# Patient Record
Sex: FEMALE | Race: WHITE | NOT HISPANIC OR LATINO | Employment: OTHER | URBAN - NONMETROPOLITAN AREA
[De-identification: names, ages, dates, MRNs, and addresses within clinical notes are randomized per-mention and may not be internally consistent; named-entity substitution may affect disease eponyms.]

---

## 2021-07-21 ENCOUNTER — HOSPITAL ENCOUNTER (EMERGENCY)
Facility: HOSPITAL | Age: 66
Discharge: HOME | End: 2021-07-21
Attending: EMERGENCY MEDICINE
Payer: MEDICARE

## 2021-07-21 VITALS
HEART RATE: 82 BPM | BODY MASS INDEX: 23.38 KG/M2 | WEIGHT: 111.38 LBS | TEMPERATURE: 98 F | SYSTOLIC BLOOD PRESSURE: 129 MMHG | RESPIRATION RATE: 20 BRPM | OXYGEN SATURATION: 100 % | DIASTOLIC BLOOD PRESSURE: 71 MMHG | HEIGHT: 58 IN

## 2021-07-21 DIAGNOSIS — R68.83 CHILLS: ICD-10-CM

## 2021-07-21 DIAGNOSIS — Z53.29 LEFT AGAINST MEDICAL ADVICE: Primary | ICD-10-CM

## 2021-07-21 PROCEDURE — 99281 EMR DPT VST MAYX REQ PHY/QHP: CPT

## 2023-01-31 ENCOUNTER — HOSPITAL ENCOUNTER (EMERGENCY)
Facility: HOSPITAL | Age: 68
Discharge: HOME | End: 2023-01-31
Attending: EMERGENCY MEDICINE
Payer: MEDICARE

## 2023-01-31 VITALS
HEART RATE: 94 BPM | DIASTOLIC BLOOD PRESSURE: 72 MMHG | TEMPERATURE: 98 F | BODY MASS INDEX: 24.14 KG/M2 | RESPIRATION RATE: 18 BRPM | OXYGEN SATURATION: 94 % | WEIGHT: 115 LBS | SYSTOLIC BLOOD PRESSURE: 144 MMHG | HEIGHT: 58 IN

## 2023-01-31 DIAGNOSIS — J44.9 COPD (CHRONIC OBSTRUCTIVE PULMONARY DISEASE): ICD-10-CM

## 2023-01-31 DIAGNOSIS — F11.93 OPIATE WITHDRAWAL: Primary | ICD-10-CM

## 2023-01-31 DIAGNOSIS — R05.9 COUGH: ICD-10-CM

## 2023-01-31 DIAGNOSIS — M54.2 CERVICALGIA: ICD-10-CM

## 2023-01-31 LAB
ALBUMIN SERPL BCP-MCNC: 3.7 G/DL (ref 3.5–5.2)
ALP SERPL-CCNC: 49 U/L (ref 55–135)
ALT SERPL W/O P-5'-P-CCNC: 30 U/L (ref 10–44)
AMPHET+METHAMPHET UR QL: NEGATIVE
ANION GAP SERPL CALC-SCNC: 4 MMOL/L (ref 8–16)
AST SERPL-CCNC: 34 U/L (ref 10–40)
BACTERIA #/AREA URNS HPF: NEGATIVE /HPF
BARBITURATES UR QL SCN>200 NG/ML: NEGATIVE
BASOPHILS # BLD AUTO: 0.09 K/UL (ref 0–0.2)
BASOPHILS NFR BLD: 0.9 % (ref 0–1.9)
BENZODIAZ UR QL SCN>200 NG/ML: ABNORMAL
BILIRUB SERPL-MCNC: 0.2 MG/DL (ref 0.1–1)
BILIRUB UR QL STRIP: NEGATIVE
BUN SERPL-MCNC: 22 MG/DL (ref 8–23)
BZE UR QL SCN: NEGATIVE
CALCIUM SERPL-MCNC: 9.8 MG/DL (ref 8.7–10.5)
CANNABINOIDS UR QL SCN: NEGATIVE
CHLORIDE SERPL-SCNC: 111 MMOL/L (ref 95–110)
CLARITY UR: CLEAR
CO2 SERPL-SCNC: 28 MMOL/L (ref 23–29)
COLOR UR: YELLOW
CREAT SERPL-MCNC: 1.2 MG/DL (ref 0.5–1.4)
CREAT UR-MCNC: 300 MG/DL (ref 15–325)
DIFFERENTIAL METHOD: NORMAL
EOSINOPHIL # BLD AUTO: 0.3 K/UL (ref 0–0.5)
EOSINOPHIL NFR BLD: 2.7 % (ref 0–8)
ERYTHROCYTE [DISTWIDTH] IN BLOOD BY AUTOMATED COUNT: 13.7 % (ref 11.5–14.5)
EST. GFR  (NO RACE VARIABLE): 49 ML/MIN/1.73 M^2
GLUCOSE SERPL-MCNC: 106 MG/DL (ref 70–110)
GLUCOSE UR QL STRIP: NEGATIVE
HCT VFR BLD AUTO: 39.2 % (ref 37–48.5)
HGB BLD-MCNC: 13.1 G/DL (ref 12–16)
HGB UR QL STRIP: NEGATIVE
HYALINE CASTS #/AREA URNS LPF: 1.2 /LPF
IMM GRANULOCYTES # BLD AUTO: 0.04 K/UL (ref 0–0.04)
IMM GRANULOCYTES NFR BLD AUTO: 0.4 % (ref 0–0.5)
KETONES UR QL STRIP: ABNORMAL
LEUKOCYTE ESTERASE UR QL STRIP: NEGATIVE
LYMPHOCYTES # BLD AUTO: 3 K/UL (ref 1–4.8)
LYMPHOCYTES NFR BLD: 30.1 % (ref 18–48)
MCH RBC QN AUTO: 29.2 PG (ref 27–31)
MCHC RBC AUTO-ENTMCNC: 33.4 G/DL (ref 32–36)
MCV RBC AUTO: 87 FL (ref 82–98)
METHADONE UR QL SCN>300 NG/ML: NEGATIVE
MICROSCOPIC COMMENT: ABNORMAL
MONOCYTES # BLD AUTO: 0.7 K/UL (ref 0.3–1)
MONOCYTES NFR BLD: 6.9 % (ref 4–15)
NEUTROPHILS # BLD AUTO: 5.9 K/UL (ref 1.8–7.7)
NEUTROPHILS NFR BLD: 59 % (ref 38–73)
NITRITE UR QL STRIP: NEGATIVE
NRBC BLD-RTO: 0 /100 WBC
OPIATES UR QL SCN: NEGATIVE
PCP UR QL SCN>25 NG/ML: NEGATIVE
PH UR STRIP: 5 [PH] (ref 5–8)
PLATELET # BLD AUTO: 279 K/UL (ref 150–450)
PMV BLD AUTO: 11.1 FL (ref 9.2–12.9)
POTASSIUM SERPL-SCNC: 4 MMOL/L (ref 3.5–5.1)
PROT SERPL-MCNC: 8.6 G/DL (ref 6–8.4)
PROT UR QL STRIP: ABNORMAL
RBC # BLD AUTO: 4.49 M/UL (ref 4–5.4)
RBC #/AREA URNS HPF: 3 /HPF (ref 0–4)
SODIUM SERPL-SCNC: 143 MMOL/L (ref 136–145)
SP GR UR STRIP: >=1.03 (ref 1–1.03)
SQUAMOUS #/AREA URNS HPF: 2 /HPF
TOXICOLOGY INFORMATION: ABNORMAL
URN SPEC COLLECT METH UR: ABNORMAL
UROBILINOGEN UR STRIP-ACNC: 1 EU/DL
WBC # BLD AUTO: 10.04 K/UL (ref 3.9–12.7)
WBC #/AREA URNS HPF: 1 /HPF (ref 0–5)

## 2023-01-31 PROCEDURE — 80053 COMPREHEN METABOLIC PANEL: CPT | Performed by: EMERGENCY MEDICINE

## 2023-01-31 PROCEDURE — 36415 COLL VENOUS BLD VENIPUNCTURE: CPT | Performed by: EMERGENCY MEDICINE

## 2023-01-31 PROCEDURE — 85025 COMPLETE CBC W/AUTO DIFF WBC: CPT | Performed by: EMERGENCY MEDICINE

## 2023-01-31 PROCEDURE — 81000 URINALYSIS NONAUTO W/SCOPE: CPT | Mod: 59 | Performed by: EMERGENCY MEDICINE

## 2023-01-31 PROCEDURE — 94640 AIRWAY INHALATION TREATMENT: CPT

## 2023-01-31 PROCEDURE — 93010 EKG 12-LEAD: ICD-10-PCS | Mod: ,,, | Performed by: INTERNAL MEDICINE

## 2023-01-31 PROCEDURE — 25000242 PHARM REV CODE 250 ALT 637 W/ HCPCS: Performed by: EMERGENCY MEDICINE

## 2023-01-31 PROCEDURE — 99285 EMERGENCY DEPT VISIT HI MDM: CPT | Mod: 25

## 2023-01-31 PROCEDURE — 96374 THER/PROPH/DIAG INJ IV PUSH: CPT

## 2023-01-31 PROCEDURE — 99900035 HC TECH TIME PER 15 MIN (STAT)

## 2023-01-31 PROCEDURE — 93010 ELECTROCARDIOGRAM REPORT: CPT | Mod: ,,, | Performed by: INTERNAL MEDICINE

## 2023-01-31 PROCEDURE — 96361 HYDRATE IV INFUSION ADD-ON: CPT

## 2023-01-31 PROCEDURE — 80307 DRUG TEST PRSMV CHEM ANLYZR: CPT | Performed by: EMERGENCY MEDICINE

## 2023-01-31 PROCEDURE — 63600175 PHARM REV CODE 636 W HCPCS: Performed by: EMERGENCY MEDICINE

## 2023-01-31 PROCEDURE — 93005 ELECTROCARDIOGRAM TRACING: CPT

## 2023-01-31 PROCEDURE — 25000003 PHARM REV CODE 250: Performed by: EMERGENCY MEDICINE

## 2023-01-31 PROCEDURE — 96375 TX/PRO/DX INJ NEW DRUG ADDON: CPT

## 2023-01-31 PROCEDURE — 99900031 HC PATIENT EDUCATION (STAT)

## 2023-01-31 RX ORDER — KETOROLAC TROMETHAMINE 30 MG/ML
15 INJECTION, SOLUTION INTRAMUSCULAR; INTRAVENOUS
Status: COMPLETED | OUTPATIENT
Start: 2023-01-31 | End: 2023-01-31

## 2023-01-31 RX ORDER — PROMETHAZINE HYDROCHLORIDE AND DEXTROMETHORPHAN HYDROBROMIDE 6.25; 15 MG/5ML; MG/5ML
5 SYRUP ORAL EVERY 6 HOURS PRN
Qty: 60 ML | Refills: 0 | Status: SHIPPED | OUTPATIENT
Start: 2023-01-31 | End: 2023-02-10

## 2023-01-31 RX ORDER — BENZONATATE 100 MG/1
200 CAPSULE ORAL
Status: COMPLETED | OUTPATIENT
Start: 2023-01-31 | End: 2023-01-31

## 2023-01-31 RX ORDER — METHYLPREDNISOLONE SOD SUCC 125 MG
125 VIAL (EA) INJECTION
Status: COMPLETED | OUTPATIENT
Start: 2023-01-31 | End: 2023-01-31

## 2023-01-31 RX ORDER — MELOXICAM 15 MG/1
15 TABLET ORAL DAILY
Qty: 10 TABLET | Refills: 0 | Status: SHIPPED | OUTPATIENT
Start: 2023-01-31

## 2023-01-31 RX ORDER — DOXYCYCLINE 100 MG/1
100 CAPSULE ORAL 2 TIMES DAILY
Qty: 14 CAPSULE | Refills: 0 | Status: SHIPPED | OUTPATIENT
Start: 2023-01-31 | End: 2023-02-07

## 2023-01-31 RX ORDER — PREDNISONE 10 MG/1
10 TABLET ORAL DAILY
Qty: 21 TABLET | Refills: 0 | Status: SHIPPED | OUTPATIENT
Start: 2023-01-31

## 2023-01-31 RX ORDER — IPRATROPIUM BROMIDE AND ALBUTEROL SULFATE 2.5; .5 MG/3ML; MG/3ML
3 SOLUTION RESPIRATORY (INHALATION)
Status: COMPLETED | OUTPATIENT
Start: 2023-01-31 | End: 2023-01-31

## 2023-01-31 RX ADMIN — SODIUM CHLORIDE 500 ML: 9 INJECTION, SOLUTION INTRAVENOUS at 09:01

## 2023-01-31 RX ADMIN — KETOROLAC TROMETHAMINE 15 MG: 30 INJECTION, SOLUTION INTRAMUSCULAR; INTRAVENOUS at 09:01

## 2023-01-31 RX ADMIN — METHYLPREDNISOLONE SODIUM SUCCINATE 125 MG: 125 INJECTION, POWDER, FOR SOLUTION INTRAMUSCULAR; INTRAVENOUS at 09:01

## 2023-01-31 RX ADMIN — BENZONATATE 200 MG: 100 CAPSULE ORAL at 09:01

## 2023-01-31 RX ADMIN — IPRATROPIUM BROMIDE AND ALBUTEROL SULFATE 3 ML: 2.5; .5 SOLUTION RESPIRATORY (INHALATION) at 09:01
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
## 2023-02-23 DIAGNOSIS — M25.551 HIP PAIN, ACUTE, RIGHT: ICD-10-CM

## 2023-02-23 DIAGNOSIS — R10.11 RUQ PAIN: Primary | ICD-10-CM

## 2023-02-23 DIAGNOSIS — M54.41 BILATERAL LOW BACK PAIN WITH RIGHT-SIDED SCIATICA, UNSPECIFIED CHRONICITY: ICD-10-CM

## 2023-02-24 ENCOUNTER — HOSPITAL ENCOUNTER (OUTPATIENT)
Dept: RADIOLOGY | Facility: HOSPITAL | Age: 68
Discharge: HOME | End: 2023-02-24
Attending: SURGERY
Payer: MEDICARE

## 2023-02-24 DIAGNOSIS — M54.41 BILATERAL LOW BACK PAIN WITH RIGHT-SIDED SCIATICA, UNSPECIFIED CHRONICITY: ICD-10-CM

## 2023-02-24 DIAGNOSIS — M25.551 HIP PAIN, ACUTE, RIGHT: ICD-10-CM

## 2023-02-24 PROCEDURE — 73502 X-RAY EXAM HIP UNI 2-3 VIEWS: CPT | Mod: TC,RT

## 2023-02-24 PROCEDURE — 72100 X-RAY EXAM L-S SPINE 2/3 VWS: CPT | Mod: TC

## 2023-03-15 ENCOUNTER — HOSPITAL ENCOUNTER (OUTPATIENT)
Dept: RADIOLOGY | Facility: HOSPITAL | Age: 68
Discharge: HOME | End: 2023-03-15
Attending: ORTHOPAEDIC SURGERY
Payer: MEDICARE

## 2023-03-15 DIAGNOSIS — M54.50 LOW BACK PAIN: ICD-10-CM

## 2023-03-15 DIAGNOSIS — M25.551 RIGHT HIP PAIN: ICD-10-CM

## 2023-03-15 PROCEDURE — 73721 MRI JNT OF LWR EXTRE W/O DYE: CPT | Mod: TC,RT

## 2023-04-26 ENCOUNTER — LAB VISIT (OUTPATIENT)
Dept: LAB | Facility: HOSPITAL | Age: 68
End: 2023-04-26
Attending: INTERNAL MEDICINE
Payer: MEDICARE

## 2023-04-26 DIAGNOSIS — Z00.00 ROUTINE CHECK-UP: Primary | ICD-10-CM

## 2023-04-26 LAB
ALBUMIN SERPL BCP-MCNC: 3.6 G/DL (ref 3.5–5.2)
ALP SERPL-CCNC: 52 U/L (ref 55–135)
ALT SERPL W/O P-5'-P-CCNC: 37 U/L (ref 10–44)
ANION GAP SERPL CALC-SCNC: 1 MMOL/L (ref 8–16)
AST SERPL-CCNC: 34 U/L (ref 10–40)
BASOPHILS # BLD AUTO: 0.09 K/UL (ref 0–0.2)
BASOPHILS NFR BLD: 1 % (ref 0–1.9)
BILIRUB SERPL-MCNC: 0.4 MG/DL (ref 0.1–1)
BUN SERPL-MCNC: 28 MG/DL (ref 8–23)
CALCIUM SERPL-MCNC: 9.7 MG/DL (ref 8.7–10.5)
CHLORIDE SERPL-SCNC: 104 MMOL/L (ref 95–110)
CHOLEST SERPL-MCNC: 166 MG/DL (ref 120–199)
CHOLEST/HDLC SERPL: 2.4 {RATIO} (ref 2–5)
CO2 SERPL-SCNC: 34 MMOL/L (ref 23–29)
CREAT SERPL-MCNC: 1 MG/DL (ref 0.5–1.4)
DIFFERENTIAL METHOD: NORMAL
EOSINOPHIL # BLD AUTO: 0.3 K/UL (ref 0–0.5)
EOSINOPHIL NFR BLD: 3.4 % (ref 0–8)
ERYTHROCYTE [DISTWIDTH] IN BLOOD BY AUTOMATED COUNT: 12.7 % (ref 11.5–14.5)
EST. GFR  (NO RACE VARIABLE): >60 ML/MIN/1.73 M^2
GLUCOSE SERPL-MCNC: 52 MG/DL (ref 70–110)
HCT VFR BLD AUTO: 41.7 % (ref 37–48.5)
HDLC SERPL-MCNC: 68 MG/DL (ref 40–75)
HDLC SERPL: 41 % (ref 20–50)
HGB BLD-MCNC: 13.6 G/DL (ref 12–16)
IMM GRANULOCYTES # BLD AUTO: 0.02 K/UL (ref 0–0.04)
IMM GRANULOCYTES NFR BLD AUTO: 0.2 % (ref 0–0.5)
LDLC SERPL CALC-MCNC: 79.6 MG/DL (ref 63–159)
LYMPHOCYTES # BLD AUTO: 2.4 K/UL (ref 1–4.8)
LYMPHOCYTES NFR BLD: 27.3 % (ref 18–48)
MCH RBC QN AUTO: 30 PG (ref 27–31)
MCHC RBC AUTO-ENTMCNC: 32.6 G/DL (ref 32–36)
MCV RBC AUTO: 92 FL (ref 82–98)
MONOCYTES # BLD AUTO: 1 K/UL (ref 0.3–1)
MONOCYTES NFR BLD: 10.8 % (ref 4–15)
NEUTROPHILS # BLD AUTO: 5 K/UL (ref 1.8–7.7)
NEUTROPHILS NFR BLD: 57.3 % (ref 38–73)
NONHDLC SERPL-MCNC: 98 MG/DL
NRBC BLD-RTO: 0 /100 WBC
PLATELET # BLD AUTO: 244 K/UL (ref 150–450)
PMV BLD AUTO: 11.6 FL (ref 9.2–12.9)
POTASSIUM SERPL-SCNC: 4.6 MMOL/L (ref 3.5–5.1)
PROT SERPL-MCNC: 8.1 G/DL (ref 6–8.4)
RBC # BLD AUTO: 4.53 M/UL (ref 4–5.4)
SODIUM SERPL-SCNC: 139 MMOL/L (ref 136–145)
TRIGL SERPL-MCNC: 92 MG/DL (ref 30–150)
TSH SERPL DL<=0.005 MIU/L-ACNC: 1.37 UIU/ML (ref 0.4–4)
WBC # BLD AUTO: 8.76 K/UL (ref 3.9–12.7)

## 2023-04-26 PROCEDURE — 80053 COMPREHEN METABOLIC PANEL: CPT | Performed by: INTERNAL MEDICINE

## 2023-04-26 PROCEDURE — 80061 LIPID PANEL: CPT | Performed by: INTERNAL MEDICINE

## 2023-04-26 PROCEDURE — 85025 COMPLETE CBC W/AUTO DIFF WBC: CPT | Performed by: INTERNAL MEDICINE

## 2023-04-26 PROCEDURE — 84443 ASSAY THYROID STIM HORMONE: CPT | Performed by: INTERNAL MEDICINE

## 2023-04-26 PROCEDURE — 36415 COLL VENOUS BLD VENIPUNCTURE: CPT | Performed by: INTERNAL MEDICINE

## 2023-04-30 ENCOUNTER — HOSPITAL ENCOUNTER (EMERGENCY)
Facility: HOSPITAL | Age: 68
Discharge: HOME | End: 2023-04-30
Attending: EMERGENCY MEDICINE
Payer: MEDICARE

## 2023-04-30 VITALS
HEIGHT: 58 IN | OXYGEN SATURATION: 94 % | TEMPERATURE: 98 F | RESPIRATION RATE: 20 BRPM | BODY MASS INDEX: 24.98 KG/M2 | SYSTOLIC BLOOD PRESSURE: 116 MMHG | HEART RATE: 90 BPM | DIASTOLIC BLOOD PRESSURE: 70 MMHG | WEIGHT: 119 LBS

## 2023-04-30 DIAGNOSIS — J44.1 COPD EXACERBATION: Primary | ICD-10-CM

## 2023-04-30 PROCEDURE — 96372 THER/PROPH/DIAG INJ SC/IM: CPT | Performed by: EMERGENCY MEDICINE

## 2023-04-30 PROCEDURE — 25000242 PHARM REV CODE 250 ALT 637 W/ HCPCS: Performed by: EMERGENCY MEDICINE

## 2023-04-30 PROCEDURE — 99900035 HC TECH TIME PER 15 MIN (STAT)

## 2023-04-30 PROCEDURE — 63600175 PHARM REV CODE 636 W HCPCS: Performed by: EMERGENCY MEDICINE

## 2023-04-30 PROCEDURE — 99900031 HC PATIENT EDUCATION (STAT)

## 2023-04-30 PROCEDURE — 99285 EMERGENCY DEPT VISIT HI MDM: CPT | Mod: 25

## 2023-04-30 PROCEDURE — 94761 N-INVAS EAR/PLS OXIMETRY MLT: CPT

## 2023-04-30 PROCEDURE — 94640 AIRWAY INHALATION TREATMENT: CPT | Mod: XB

## 2023-04-30 RX ORDER — LISINOPRIL AND HYDROCHLOROTHIAZIDE 20; 25 MG/1; MG/1
TABLET ORAL
COMMUNITY
Start: 2023-04-17

## 2023-04-30 RX ORDER — PREDNISONE 20 MG/1
40 TABLET ORAL DAILY
Qty: 10 TABLET | Refills: 0 | Status: SHIPPED | OUTPATIENT
Start: 2023-04-30 | End: 2023-05-05

## 2023-04-30 RX ORDER — FLUTICASONE PROPIONATE 50 MCG
SPRAY, SUSPENSION (ML) NASAL
COMMUNITY
Start: 2023-03-27

## 2023-04-30 RX ORDER — PRAVASTATIN SODIUM 20 MG/1
TABLET ORAL
COMMUNITY
Start: 2023-04-08

## 2023-04-30 RX ORDER — ALBUTEROL SULFATE 90 UG/1
AEROSOL, METERED RESPIRATORY (INHALATION)
COMMUNITY
Start: 2023-03-27

## 2023-04-30 RX ORDER — METHYLPREDNISOLONE SOD SUCC 125 MG
125 VIAL (EA) INJECTION
Status: COMPLETED | OUTPATIENT
Start: 2023-04-30 | End: 2023-04-30

## 2023-04-30 RX ORDER — IPRATROPIUM BROMIDE AND ALBUTEROL SULFATE 2.5; .5 MG/3ML; MG/3ML
3 SOLUTION RESPIRATORY (INHALATION)
Status: COMPLETED | OUTPATIENT
Start: 2023-04-30 | End: 2023-04-30

## 2023-04-30 RX ORDER — OMEPRAZOLE 20 MG/1
CAPSULE, DELAYED RELEASE ORAL
COMMUNITY
Start: 2023-04-17

## 2023-04-30 RX ADMIN — IPRATROPIUM BROMIDE AND ALBUTEROL SULFATE 3 ML: .5; 3 SOLUTION RESPIRATORY (INHALATION) at 08:04

## 2023-04-30 RX ADMIN — METHYLPREDNISOLONE SODIUM SUCCINATE 125 MG: 125 INJECTION, POWDER, FOR SOLUTION INTRAMUSCULAR; INTRAVENOUS at 08:04

## 2023-04-30 NOTE — ED PROVIDER NOTES
"Encounter Date: 4/30/2023       History     Chief Complaint   Patient presents with    Shortness of Breath     Pt presents to the eR w/ complaints of sob "for a few days." Pt also reports productive cough, upper back pain when breathing. Pt reports hx of emphysema and copd, had two neb treatments and prn o2 at home but denies any improvement.      68-year-old female with a history of COPD, on home oxygen presents the emergency department with shortness of breath a few days.  States her nebulizer at home oxygen isn't helping.  O2 saturation here in the emergency department is 92%.  Denies fever.  Not ill appearing, alert oriented x4, GCS is 15.  History of this multiple times in the past.  Smoking 1 pack per day    Review of patient's allergies indicates:  No Known Allergies  Past Medical History:   Diagnosis Date    Anxiety disorder, unspecified     Emphysema, unspecified     Hypertension      No past surgical history on file.  No family history on file.  Social History     Tobacco Use    Smoking status: Heavy Smoker     Packs/day: 1.00     Types: Cigarettes    Smokeless tobacco: Never   Substance Use Topics    Alcohol use: No    Drug use: Yes     Types: Oxycodone, Hydrocodone     Review of Systems   Constitutional:  Negative for fever.   HENT:  Negative for sore throat.    Respiratory:  Positive for shortness of breath and wheezing.    Cardiovascular:  Negative for chest pain.   Gastrointestinal:  Negative for nausea.   Genitourinary:  Negative for dysuria.   Musculoskeletal:  Negative for back pain.   Skin:  Negative for rash.   Neurological:  Negative for weakness.   Hematological:  Does not bruise/bleed easily.   All other systems reviewed and are negative.    Physical Exam     Initial Vitals [04/30/23 0811]   BP Pulse Resp Temp SpO2   133/74 95 20 97.6 °F (36.4 °C) 95 %      MAP       --         Physical Exam    Nursing note and vitals reviewed.  Constitutional: She appears well-developed and well-nourished. " She is not diaphoretic. No distress.   HENT:   Head: Normocephalic and atraumatic.   Mouth/Throat: Oropharynx is clear and moist.   Eyes: Conjunctivae and EOM are normal. Pupils are equal, round, and reactive to light.   Neck: Neck supple. No tracheal deviation present. No JVD present.   Normal range of motion.  Cardiovascular:  Normal rate, regular rhythm, normal heart sounds and intact distal pulses.           No murmur heard.  Pulmonary/Chest: No stridor. No respiratory distress. She has wheezes. She has no rhonchi. She has no rales. She exhibits no tenderness.   Abdominal: Abdomen is soft. Bowel sounds are normal. She exhibits no distension. There is no abdominal tenderness. There is no rebound and no guarding.   Musculoskeletal:         General: No tenderness or edema. Normal range of motion.      Cervical back: Normal range of motion and neck supple.     Neurological: She is alert and oriented to person, place, and time. She has normal strength. No cranial nerve deficit. GCS score is 15. GCS eye subscore is 4. GCS verbal subscore is 5. GCS motor subscore is 6.   Skin: Skin is warm and dry. Capillary refill takes less than 2 seconds.       ED Course   Procedures  Labs Reviewed - No data to display       Imaging Results    None          Medications   methylPREDNISolone sodium succinate injection 125 mg (125 mg Intramuscular Given 4/30/23 0825)   albuterol-ipratropium 2.5 mg-0.5 mg/3 mL nebulizer solution 3 mL (3 mLs Nebulization Given 4/30/23 0839)     Medical Decision Making:   Differential Diagnosis:   COPD exacerbation, shortness of breath, tobacco use disorder           ED Course as of 04/30/23 0851   Sun Apr 30, 2023   0849 Much improved after nebulizer treatment and steroid injection.  For discharge with 5 day steroid prescription.  Vital signs are stable, improved. [SD]      ED Course User Index  [SD] Julio César Justice MD                 Clinical Impression:   Final diagnoses:  [J44.1] COPD exacerbation  (Primary)        ED Disposition Condition    Discharge Stable          ED Prescriptions       Medication Sig Dispense Start Date End Date Auth. Provider    predniSONE (DELTASONE) 20 MG tablet Take 2 tablets (40 mg total) by mouth once daily. for 5 days 10 tablet 4/30/2023 5/5/2023 Julio César Justice MD          Follow-up Information       Follow up With Specialties Details Why Contact Info Additional Information    Primary care physician  In 2 days       [facility] Emergency Department Emergency Medicine  As needed [address] [place] [id number] [phone] Floor [address]             Julio César Justice MD  04/30/23 0851

## 2023-08-09 ENCOUNTER — HOSPITAL ENCOUNTER (EMERGENCY)
Facility: HOSPITAL | Age: 68
Discharge: HOME | End: 2023-08-09
Attending: EMERGENCY MEDICINE
Payer: MEDICARE

## 2023-08-09 VITALS
OXYGEN SATURATION: 90 % | HEART RATE: 75 BPM | TEMPERATURE: 99 F | BODY MASS INDEX: 24.77 KG/M2 | SYSTOLIC BLOOD PRESSURE: 154 MMHG | RESPIRATION RATE: 20 BRPM | DIASTOLIC BLOOD PRESSURE: 76 MMHG | HEIGHT: 58 IN | WEIGHT: 118 LBS

## 2023-08-09 DIAGNOSIS — J44.9 COPD (CHRONIC OBSTRUCTIVE PULMONARY DISEASE): ICD-10-CM

## 2023-08-09 LAB
ALBUMIN SERPL BCP-MCNC: 3.2 G/DL (ref 3.5–5.2)
ALP SERPL-CCNC: 40 U/L (ref 55–135)
ALT SERPL W/O P-5'-P-CCNC: 28 U/L (ref 10–44)
ANION GAP SERPL CALC-SCNC: 4 MMOL/L (ref 8–16)
AST SERPL-CCNC: 43 U/L (ref 10–40)
BASOPHILS # BLD AUTO: 0.05 K/UL (ref 0–0.2)
BASOPHILS NFR BLD: 0.8 % (ref 0–1.9)
BILIRUB SERPL-MCNC: 0.4 MG/DL (ref 0.1–1)
BUN SERPL-MCNC: 24 MG/DL (ref 8–23)
CALCIUM SERPL-MCNC: 8.6 MG/DL (ref 8.7–10.5)
CHLORIDE SERPL-SCNC: 98 MMOL/L (ref 95–110)
CO2 SERPL-SCNC: 37 MMOL/L (ref 23–29)
CREAT SERPL-MCNC: 0.9 MG/DL (ref 0.5–1.4)
DIFFERENTIAL METHOD: ABNORMAL
EOSINOPHIL # BLD AUTO: 0.1 K/UL (ref 0–0.5)
EOSINOPHIL NFR BLD: 1.5 % (ref 0–8)
ERYTHROCYTE [DISTWIDTH] IN BLOOD BY AUTOMATED COUNT: 15.1 % (ref 11.5–14.5)
EST. GFR  (NO RACE VARIABLE): >60 ML/MIN/1.73 M^2
GLUCOSE SERPL-MCNC: 114 MG/DL (ref 70–110)
HCT VFR BLD AUTO: 35.3 % (ref 37–48.5)
HGB BLD-MCNC: 10.8 G/DL (ref 12–16)
IMM GRANULOCYTES # BLD AUTO: 0.02 K/UL (ref 0–0.04)
IMM GRANULOCYTES NFR BLD AUTO: 0.3 % (ref 0–0.5)
LYMPHOCYTES # BLD AUTO: 2.1 K/UL (ref 1–4.8)
LYMPHOCYTES NFR BLD: 33.2 % (ref 18–48)
MCH RBC QN AUTO: 25.4 PG (ref 27–31)
MCHC RBC AUTO-ENTMCNC: 30.6 G/DL (ref 32–36)
MCV RBC AUTO: 83 FL (ref 82–98)
MONOCYTES # BLD AUTO: 0.6 K/UL (ref 0.3–1)
MONOCYTES NFR BLD: 10.2 % (ref 4–15)
NEUTROPHILS # BLD AUTO: 3.4 K/UL (ref 1.8–7.7)
NEUTROPHILS NFR BLD: 54 % (ref 38–73)
NRBC BLD-RTO: 0 /100 WBC
PLATELET # BLD AUTO: 162 K/UL (ref 150–450)
PMV BLD AUTO: 11.8 FL (ref 9.2–12.9)
POTASSIUM SERPL-SCNC: 3.6 MMOL/L (ref 3.5–5.1)
PROT SERPL-MCNC: 7.4 G/DL (ref 6–8.4)
RBC # BLD AUTO: 4.25 M/UL (ref 4–5.4)
SODIUM SERPL-SCNC: 139 MMOL/L (ref 136–145)
TROPONIN I SERPL DL<=0.01 NG/ML-MCNC: 36.1 PG/ML (ref 0–60)
WBC # BLD AUTO: 6.2 K/UL (ref 3.9–12.7)

## 2023-08-09 PROCEDURE — 93005 ELECTROCARDIOGRAM TRACING: CPT

## 2023-08-09 PROCEDURE — 25000003 PHARM REV CODE 250: Performed by: EMERGENCY MEDICINE

## 2023-08-09 PROCEDURE — 96361 HYDRATE IV INFUSION ADD-ON: CPT

## 2023-08-09 PROCEDURE — 63600175 PHARM REV CODE 636 W HCPCS: Performed by: EMERGENCY MEDICINE

## 2023-08-09 PROCEDURE — 93010 ELECTROCARDIOGRAM REPORT: CPT | Mod: ,,, | Performed by: INTERNAL MEDICINE

## 2023-08-09 PROCEDURE — 96374 THER/PROPH/DIAG INJ IV PUSH: CPT

## 2023-08-09 PROCEDURE — 36415 COLL VENOUS BLD VENIPUNCTURE: CPT | Performed by: EMERGENCY MEDICINE

## 2023-08-09 PROCEDURE — 84484 ASSAY OF TROPONIN QUANT: CPT | Performed by: EMERGENCY MEDICINE

## 2023-08-09 PROCEDURE — 93010 EKG 12-LEAD: ICD-10-PCS | Mod: ,,, | Performed by: INTERNAL MEDICINE

## 2023-08-09 PROCEDURE — 99285 EMERGENCY DEPT VISIT HI MDM: CPT | Mod: 25

## 2023-08-09 PROCEDURE — 85025 COMPLETE CBC W/AUTO DIFF WBC: CPT | Performed by: EMERGENCY MEDICINE

## 2023-08-09 PROCEDURE — 80053 COMPREHEN METABOLIC PANEL: CPT | Performed by: EMERGENCY MEDICINE

## 2023-08-09 RX ORDER — DOXYCYCLINE 100 MG/1
100 CAPSULE ORAL 2 TIMES DAILY
Qty: 14 CAPSULE | Refills: 0 | Status: SHIPPED | OUTPATIENT
Start: 2023-08-09 | End: 2023-08-16

## 2023-08-09 RX ORDER — PREDNISONE 10 MG/1
10 TABLET ORAL DAILY
Qty: 21 TABLET | Refills: 0 | Status: SHIPPED | OUTPATIENT
Start: 2023-08-09

## 2023-08-09 RX ADMIN — METHYLPREDNISOLONE SODIUM SUCCINATE 80 MG: 40 INJECTION, POWDER, FOR SOLUTION INTRAMUSCULAR; INTRAVENOUS at 07:08

## 2023-08-09 RX ADMIN — SODIUM CHLORIDE 500 ML: 9 INJECTION, SOLUTION INTRAVENOUS at 08:08

## 2023-08-10 NOTE — ED PROVIDER NOTES
Encounter Date: 8/9/2023       History     Chief Complaint   Patient presents with    Shortness of Breath     EMS reports pt reports feeling weak, muscle spasms and SOB. EMS gave breathing treatment in route. Hx of anxiety      69 yo female with COPD on 3L home O2, here via EMS with dyspnea, tremor, fatigue onset x 1 week. Found to be off home O2 by EMS with sat 70s on room air. No fever. No CP. Multiple prior similar. Episodes. EMS gave neb x 1 with improvement en route. Aggravated off oxygen and with exertion.       Review of patient's allergies indicates:  No Known Allergies  Past Medical History:   Diagnosis Date    Anxiety disorder, unspecified     Emphysema, unspecified     Hypertension      No past surgical history on file.  No family history on file.  Social History     Tobacco Use    Smoking status: Heavy Smoker     Current packs/day: 1.00     Types: Cigarettes    Smokeless tobacco: Never   Substance Use Topics    Alcohol use: No    Drug use: Yes     Types: Oxycodone, Hydrocodone     Review of Systems   Constitutional: Negative.    HENT: Negative.     Respiratory:  Positive for cough, shortness of breath and wheezing.    Cardiovascular: Negative.    Gastrointestinal: Negative.    All other systems reviewed and are negative.      Physical Exam     Initial Vitals [08/09/23 1857]   BP Pulse Resp Temp SpO2   (!) 148/91 76 (!) 24 98.6 °F (37 °C) (!) 89 %      MAP       --         Physical Exam    Nursing note and vitals reviewed.  Constitutional: She appears well-developed and well-nourished. She is not diaphoretic. No distress.   HENT:   Head: Normocephalic and atraumatic.   Eyes: EOM are normal. Pupils are equal, round, and reactive to light.   Neck: Neck supple.   Normal range of motion.  Cardiovascular:  Normal rate, regular rhythm and intact distal pulses.     Exam reveals no gallop and no friction rub.       No murmur heard.  Pulmonary/Chest: No respiratory distress. She has no wheezes. She has rhonchi.  She has no rales. She exhibits no tenderness.   Abdominal: Abdomen is soft. Bowel sounds are normal. She exhibits no distension. There is no abdominal tenderness. There is no rebound.   Musculoskeletal:         General: No tenderness or edema. Normal range of motion.      Cervical back: Normal range of motion and neck supple.     Neurological: She is alert and oriented to person, place, and time. She has normal strength and normal reflexes. GCS score is 15. GCS eye subscore is 4. GCS verbal subscore is 5. GCS motor subscore is 6.   Skin: Skin is warm and dry. Capillary refill takes less than 2 seconds.         ED Course   Procedures  Labs Reviewed   CBC W/ AUTO DIFFERENTIAL - Abnormal; Notable for the following components:       Result Value    Hemoglobin 10.8 (*)     Hematocrit 35.3 (*)     MCH 25.4 (*)     MCHC 30.6 (*)     RDW 15.1 (*)     All other components within normal limits   COMPREHENSIVE METABOLIC PANEL - Abnormal; Notable for the following components:    CO2 37 (*)     Glucose 114 (*)     BUN 24 (*)     Calcium 8.6 (*)     Albumin 3.2 (*)     Alkaline Phosphatase 40 (*)     AST 43 (*)     Anion Gap 4 (*)     All other components within normal limits   TROPONIN I HIGH SENSITIVITY     EKG Readings: (Independently Interpreted)   Initial Reading: No STEMI. Rhythm: Normal Sinus Rhythm. Heart Rate: 80. Ectopy: No Ectopy. Clinical Impression: Normal Sinus Rhythm       Imaging Results              X-Ray Chest AP Portable (In process)                   X-Rays:   Independently Interpreted Readings:   Chest X-Ray: No acute abnormalities.     Medications   methylPREDNISolone sodium succinate injection 80 mg (80 mg Intravenous Given 8/9/23 1954)   sodium chloride 0.9% bolus 500 mL 500 mL (0 mLs Intravenous Stopped 8/9/23 2036)     Medical Decision Making:   Differential Diagnosis:   COPD, URI, PNA  Independently Interpreted Test(s):   I have ordered and independently interpreted X-rays - see prior notes.  I have  ordered and independently interpreted EKG Reading(s) - see prior notes  Clinical Tests:   Lab Tests: Reviewed and Ordered  Radiological Study: Reviewed and Ordered  Medical Tests: Reviewed and Ordered  ED Management:  COPD exacerbation with benign ED work-up. Feels better, requesting d/c home. Will treat as outpatient. Return precautions discussed in detail.                           Clinical Impression:   Final diagnoses:  [J44.9] COPD (chronic obstructive pulmonary disease)        ED Disposition Condition    Discharge Stable          ED Prescriptions       Medication Sig Dispense Start Date End Date Auth. Provider    predniSONE (DELTASONE) 10 MG tablet Take 1 tablet (10 mg total) by mouth once daily. Take 4 tabs x 3 days, then take 2 tabs x 3 days, then take 1 tab x 3 days. 21 tablet 8/9/2023 -- Jonathon Yung MD    doxycycline (VIBRAMYCIN) 100 MG Cap Take 1 capsule (100 mg total) by mouth 2 (two) times daily. for 7 days 14 capsule 8/9/2023 8/16/2023 Jonathon Yung MD          Follow-up Information       Follow up With Specialties Details Why Contact Info    Mauricio Pierre MD Internal Medicine Schedule an appointment as soon as possible for a visit   [address] [facility] [address]  [phone]               Jonathon Yung MD  08/09/23 2051

## 2024-01-01 ENCOUNTER — HOSPITAL ENCOUNTER (INPATIENT)
Facility: HOSPITAL | Age: 69
LOS: 24 days | DRG: 004 | End: 2024-07-02
Attending: INTERNAL MEDICINE | Admitting: STUDENT IN AN ORGANIZED HEALTH CARE EDUCATION/TRAINING PROGRAM
Payer: MEDICARE

## 2024-01-01 VITALS
SYSTOLIC BLOOD PRESSURE: 42 MMHG | TEMPERATURE: 98 F | HEART RATE: 45 BPM | BODY MASS INDEX: 25.91 KG/M2 | RESPIRATION RATE: 4 BRPM | WEIGHT: 123.44 LBS | DIASTOLIC BLOOD PRESSURE: 24 MMHG | OXYGEN SATURATION: 58 % | HEIGHT: 58 IN

## 2024-01-01 DIAGNOSIS — Z91.89 AT RISK FOR LONG QT SYNDROME: ICD-10-CM

## 2024-01-01 DIAGNOSIS — R56.9 FOCAL SEIZURES: Primary | ICD-10-CM

## 2024-01-01 DIAGNOSIS — I95.2 HYPOTENSION DUE TO DRUGS: ICD-10-CM

## 2024-01-01 DIAGNOSIS — R07.9 CHEST PAIN: ICD-10-CM

## 2024-01-01 DIAGNOSIS — G93.41 ENCEPHALOPATHY, METABOLIC: ICD-10-CM

## 2024-01-01 DIAGNOSIS — G92.9 ENCEPHALOPATHY, TOXIC: ICD-10-CM

## 2024-01-01 DIAGNOSIS — G40.901 STATUS EPILEPTICUS: ICD-10-CM

## 2024-01-01 DIAGNOSIS — R56.9 SEIZURES: ICD-10-CM

## 2024-01-01 LAB
ABO + RH BLD: ABNORMAL
ALBUMIN SERPL BCP-MCNC: 1.6 G/DL (ref 3.5–5.2)
ALBUMIN SERPL BCP-MCNC: 1.7 G/DL (ref 3.5–5.2)
ALBUMIN SERPL BCP-MCNC: 1.7 G/DL (ref 3.5–5.2)
ALBUMIN SERPL BCP-MCNC: 1.8 G/DL (ref 3.5–5.2)
ALBUMIN SERPL BCP-MCNC: 1.9 G/DL (ref 3.5–5.2)
ALBUMIN SERPL BCP-MCNC: 1.9 G/DL (ref 3.5–5.2)
ALBUMIN SERPL BCP-MCNC: 2 G/DL (ref 3.5–5.2)
ALBUMIN SERPL BCP-MCNC: 2.1 G/DL (ref 3.5–5.2)
ALBUMIN SERPL BCP-MCNC: 2.1 G/DL (ref 3.5–5.2)
ALBUMIN SERPL BCP-MCNC: 2.2 G/DL (ref 3.5–5.2)
ALBUMIN SERPL BCP-MCNC: 2.3 G/DL (ref 3.5–5.2)
ALBUMIN SERPL BCP-MCNC: 2.4 G/DL (ref 3.5–5.2)
ALBUMIN SERPL BCP-MCNC: 2.9 G/DL (ref 3.5–5.2)
ALBUMIN SERPL BCP-MCNC: 3.1 G/DL (ref 3.5–5.2)
ALBUMIN SERPL BCP-MCNC: 3.1 G/DL (ref 3.5–5.2)
ALLENS TEST: ABNORMAL
ALLENS TEST: NORMAL
ALP SERPL-CCNC: 26 U/L (ref 55–135)
ALP SERPL-CCNC: 27 U/L (ref 55–135)
ALP SERPL-CCNC: 29 U/L (ref 55–135)
ALP SERPL-CCNC: 29 U/L (ref 55–135)
ALP SERPL-CCNC: 33 U/L (ref 55–135)
ALP SERPL-CCNC: 35 U/L (ref 55–135)
ALP SERPL-CCNC: 35 U/L (ref 55–135)
ALP SERPL-CCNC: 39 U/L (ref 55–135)
ALP SERPL-CCNC: 40 U/L (ref 55–135)
ALP SERPL-CCNC: 40 U/L (ref 55–135)
ALP SERPL-CCNC: 43 U/L (ref 55–135)
ALP SERPL-CCNC: 45 U/L (ref 55–135)
ALP SERPL-CCNC: 47 U/L (ref 55–135)
ALP SERPL-CCNC: 49 U/L (ref 55–135)
ALP SERPL-CCNC: 49 U/L (ref 55–135)
ALP SERPL-CCNC: 50 U/L (ref 55–135)
ALP SERPL-CCNC: 52 U/L (ref 55–135)
ALP SERPL-CCNC: 53 U/L (ref 55–135)
ALP SERPL-CCNC: 59 U/L (ref 55–135)
ALP SERPL-CCNC: 60 U/L (ref 55–135)
ALP SERPL-CCNC: 60 U/L (ref 55–135)
ALP SERPL-CCNC: 62 U/L (ref 55–135)
ALP SERPL-CCNC: 65 U/L (ref 55–135)
ALT SERPL W/O P-5'-P-CCNC: 10 U/L (ref 10–44)
ALT SERPL W/O P-5'-P-CCNC: 11 U/L (ref 10–44)
ALT SERPL W/O P-5'-P-CCNC: 12 U/L (ref 10–44)
ALT SERPL W/O P-5'-P-CCNC: 13 U/L (ref 10–44)
ALT SERPL W/O P-5'-P-CCNC: 14 U/L (ref 10–44)
ALT SERPL W/O P-5'-P-CCNC: 15 U/L (ref 10–44)
ALT SERPL W/O P-5'-P-CCNC: 16 U/L (ref 10–44)
ALT SERPL W/O P-5'-P-CCNC: 16 U/L (ref 10–44)
ALT SERPL W/O P-5'-P-CCNC: 20 U/L (ref 10–44)
ALT SERPL W/O P-5'-P-CCNC: 23 U/L (ref 10–44)
ALT SERPL W/O P-5'-P-CCNC: 25 U/L (ref 10–44)
ALT SERPL W/O P-5'-P-CCNC: 5 U/L (ref 10–44)
ALT SERPL W/O P-5'-P-CCNC: 5 U/L (ref 10–44)
ALT SERPL W/O P-5'-P-CCNC: 6 U/L (ref 10–44)
ALT SERPL W/O P-5'-P-CCNC: 6 U/L (ref 10–44)
ALT SERPL W/O P-5'-P-CCNC: <5 U/L (ref 10–44)
ALT SERPL W/O P-5'-P-CCNC: <5 U/L (ref 10–44)
ANION GAP SERPL CALC-SCNC: 10 MMOL/L (ref 8–16)
ANION GAP SERPL CALC-SCNC: 10 MMOL/L (ref 8–16)
ANION GAP SERPL CALC-SCNC: 11 MMOL/L (ref 8–16)
ANION GAP SERPL CALC-SCNC: 11 MMOL/L (ref 8–16)
ANION GAP SERPL CALC-SCNC: 4 MMOL/L (ref 8–16)
ANION GAP SERPL CALC-SCNC: 5 MMOL/L (ref 8–16)
ANION GAP SERPL CALC-SCNC: 6 MMOL/L (ref 8–16)
ANION GAP SERPL CALC-SCNC: 7 MMOL/L (ref 8–16)
ANION GAP SERPL CALC-SCNC: 8 MMOL/L (ref 8–16)
ANION GAP SERPL CALC-SCNC: 9 MMOL/L (ref 8–16)
AST SERPL-CCNC: 14 U/L (ref 10–40)
AST SERPL-CCNC: 14 U/L (ref 10–40)
AST SERPL-CCNC: 17 U/L (ref 10–40)
AST SERPL-CCNC: 18 U/L (ref 10–40)
AST SERPL-CCNC: 21 U/L (ref 10–40)
AST SERPL-CCNC: 21 U/L (ref 10–40)
AST SERPL-CCNC: 22 U/L (ref 10–40)
AST SERPL-CCNC: 23 U/L (ref 10–40)
AST SERPL-CCNC: 24 U/L (ref 10–40)
AST SERPL-CCNC: 25 U/L (ref 10–40)
AST SERPL-CCNC: 26 U/L (ref 10–40)
AST SERPL-CCNC: 26 U/L (ref 10–40)
AST SERPL-CCNC: 27 U/L (ref 10–40)
AST SERPL-CCNC: 27 U/L (ref 10–40)
AST SERPL-CCNC: 28 U/L (ref 10–40)
AST SERPL-CCNC: 29 U/L (ref 10–40)
AST SERPL-CCNC: 32 U/L (ref 10–40)
AST SERPL-CCNC: 34 U/L (ref 10–40)
AST SERPL-CCNC: 34 U/L (ref 10–40)
AST SERPL-CCNC: 35 U/L (ref 10–40)
BACTERIA #/AREA URNS AUTO: ABNORMAL /HPF
BACTERIA BLD CULT: NORMAL
BACTERIA BLD CULT: NORMAL
BACTERIA SPEC AEROBE CULT: NO GROWTH
BACTERIA UR CULT: ABNORMAL
BACTERIA UR CULT: ABNORMAL
BASOPHILS # BLD AUTO: 0.01 K/UL (ref 0–0.2)
BASOPHILS # BLD AUTO: 0.02 K/UL (ref 0–0.2)
BASOPHILS # BLD AUTO: 0.03 K/UL (ref 0–0.2)
BASOPHILS # BLD AUTO: 0.04 K/UL (ref 0–0.2)
BASOPHILS # BLD AUTO: 0.05 K/UL (ref 0–0.2)
BASOPHILS # BLD AUTO: 0.06 K/UL (ref 0–0.2)
BASOPHILS NFR BLD: 0.1 % (ref 0–1.9)
BASOPHILS NFR BLD: 0.2 % (ref 0–1.9)
BASOPHILS NFR BLD: 0.3 % (ref 0–1.9)
BASOPHILS NFR BLD: 0.4 % (ref 0–1.9)
BASOPHILS NFR BLD: 0.5 % (ref 0–1.9)
BASOPHILS NFR BLD: 0.6 % (ref 0–1.9)
BASOPHILS NFR BLD: 0.6 % (ref 0–1.9)
BASOPHILS NFR BLD: 0.7 % (ref 0–1.9)
BASOPHILS NFR BLD: 0.8 % (ref 0–1.9)
BASOPHILS NFR BLD: 0.8 % (ref 0–1.9)
BASOPHILS NFR BLD: 0.9 % (ref 0–1.9)
BASOPHILS NFR BLD: 1 % (ref 0–1.9)
BILIRUB SERPL-MCNC: 0.2 MG/DL (ref 0.1–1)
BILIRUB SERPL-MCNC: 0.3 MG/DL (ref 0.1–1)
BILIRUB SERPL-MCNC: 0.4 MG/DL (ref 0.1–1)
BILIRUB SERPL-MCNC: 0.5 MG/DL (ref 0.1–1)
BILIRUB SERPL-MCNC: 0.5 MG/DL (ref 0.1–1)
BILIRUB SERPL-MCNC: 0.6 MG/DL (ref 0.1–1)
BILIRUB UR QL STRIP: NEGATIVE
BLD GP AB SCN CELLS X3 SERPL QL: ABNORMAL
BLOOD GROUP ANTIBODIES SERPL: NORMAL
BNP SERPL-MCNC: 142 PG/ML (ref 0–99)
BUN SERPL-MCNC: 10 MG/DL (ref 8–23)
BUN SERPL-MCNC: 11 MG/DL (ref 8–23)
BUN SERPL-MCNC: 12 MG/DL (ref 8–23)
BUN SERPL-MCNC: 14 MG/DL (ref 8–23)
BUN SERPL-MCNC: 14 MG/DL (ref 8–23)
BUN SERPL-MCNC: 15 MG/DL (ref 8–23)
BUN SERPL-MCNC: 3 MG/DL (ref 8–23)
BUN SERPL-MCNC: 4 MG/DL (ref 8–23)
BUN SERPL-MCNC: 5 MG/DL (ref 8–23)
BUN SERPL-MCNC: 6 MG/DL (ref 8–23)
BUN SERPL-MCNC: 6 MG/DL (ref 8–23)
BUN SERPL-MCNC: 7 MG/DL (ref 8–23)
BUN SERPL-MCNC: 8 MG/DL (ref 8–23)
BUN SERPL-MCNC: 9 MG/DL (ref 8–23)
BUN SERPL-MCNC: 9 MG/DL (ref 8–23)
CA-I BLDV-SCNC: 1.05 MMOL/L (ref 1.06–1.42)
CALCIUM SERPL-MCNC: 10 MG/DL (ref 8.7–10.5)
CALCIUM SERPL-MCNC: 10.1 MG/DL (ref 8.7–10.5)
CALCIUM SERPL-MCNC: 7.6 MG/DL (ref 8.7–10.5)
CALCIUM SERPL-MCNC: 7.8 MG/DL (ref 8.7–10.5)
CALCIUM SERPL-MCNC: 7.8 MG/DL (ref 8.7–10.5)
CALCIUM SERPL-MCNC: 8 MG/DL (ref 8.7–10.5)
CALCIUM SERPL-MCNC: 8 MG/DL (ref 8.7–10.5)
CALCIUM SERPL-MCNC: 8.1 MG/DL (ref 8.7–10.5)
CALCIUM SERPL-MCNC: 8.1 MG/DL (ref 8.7–10.5)
CALCIUM SERPL-MCNC: 8.2 MG/DL (ref 8.7–10.5)
CALCIUM SERPL-MCNC: 8.2 MG/DL (ref 8.7–10.5)
CALCIUM SERPL-MCNC: 8.4 MG/DL (ref 8.7–10.5)
CALCIUM SERPL-MCNC: 8.4 MG/DL (ref 8.7–10.5)
CALCIUM SERPL-MCNC: 8.5 MG/DL (ref 8.7–10.5)
CALCIUM SERPL-MCNC: 8.8 MG/DL (ref 8.7–10.5)
CALCIUM SERPL-MCNC: 8.9 MG/DL (ref 8.7–10.5)
CALCIUM SERPL-MCNC: 9.1 MG/DL (ref 8.7–10.5)
CALCIUM SERPL-MCNC: 9.1 MG/DL (ref 8.7–10.5)
CALCIUM SERPL-MCNC: 9.2 MG/DL (ref 8.7–10.5)
CALCIUM SERPL-MCNC: 9.4 MG/DL (ref 8.7–10.5)
CALCIUM SERPL-MCNC: 9.6 MG/DL (ref 8.7–10.5)
CALCIUM SERPL-MCNC: 9.7 MG/DL (ref 8.7–10.5)
CHLORIDE SERPL-SCNC: 100 MMOL/L (ref 95–110)
CHLORIDE SERPL-SCNC: 101 MMOL/L (ref 95–110)
CHLORIDE SERPL-SCNC: 102 MMOL/L (ref 95–110)
CHLORIDE SERPL-SCNC: 102 MMOL/L (ref 95–110)
CHLORIDE SERPL-SCNC: 103 MMOL/L (ref 95–110)
CHLORIDE SERPL-SCNC: 104 MMOL/L (ref 95–110)
CHLORIDE SERPL-SCNC: 104 MMOL/L (ref 95–110)
CHLORIDE SERPL-SCNC: 105 MMOL/L (ref 95–110)
CHLORIDE SERPL-SCNC: 107 MMOL/L (ref 95–110)
CHLORIDE SERPL-SCNC: 108 MMOL/L (ref 95–110)
CHLORIDE SERPL-SCNC: 108 MMOL/L (ref 95–110)
CHLORIDE SERPL-SCNC: 109 MMOL/L (ref 95–110)
CHLORIDE SERPL-SCNC: 112 MMOL/L (ref 95–110)
CHLORIDE SERPL-SCNC: 88 MMOL/L (ref 95–110)
CHLORIDE SERPL-SCNC: 91 MMOL/L (ref 95–110)
CHLORIDE SERPL-SCNC: 91 MMOL/L (ref 95–110)
CHLORIDE SERPL-SCNC: 95 MMOL/L (ref 95–110)
CHLORIDE SERPL-SCNC: 99 MMOL/L (ref 95–110)
CHLORIDE SERPL-SCNC: 99 MMOL/L (ref 95–110)
CLARITY UR REFRACT.AUTO: ABNORMAL
CLARITY UR REFRACT.AUTO: CLEAR
CO2 SERPL-SCNC: 23 MMOL/L (ref 23–29)
CO2 SERPL-SCNC: 23 MMOL/L (ref 23–29)
CO2 SERPL-SCNC: 24 MMOL/L (ref 23–29)
CO2 SERPL-SCNC: 27 MMOL/L (ref 23–29)
CO2 SERPL-SCNC: 28 MMOL/L (ref 23–29)
CO2 SERPL-SCNC: 29 MMOL/L (ref 23–29)
CO2 SERPL-SCNC: 30 MMOL/L (ref 23–29)
CO2 SERPL-SCNC: 30 MMOL/L (ref 23–29)
CO2 SERPL-SCNC: 31 MMOL/L (ref 23–29)
CO2 SERPL-SCNC: 32 MMOL/L (ref 23–29)
CO2 SERPL-SCNC: 33 MMOL/L (ref 23–29)
CO2 SERPL-SCNC: 33 MMOL/L (ref 23–29)
CO2 SERPL-SCNC: 36 MMOL/L (ref 23–29)
CO2 SERPL-SCNC: 38 MMOL/L (ref 23–29)
CO2 SERPL-SCNC: 40 MMOL/L (ref 23–29)
COLOR UR AUTO: ABNORMAL
COLOR UR AUTO: COLORLESS
COLOR UR AUTO: COLORLESS
COLOR UR AUTO: YELLOW
CORTIS SERPL-MCNC: 12.9 UG/DL
CORTIS SERPL-MCNC: 18.6 UG/DL
CORTIS SERPL-MCNC: 28 UG/DL
CREAT SERPL-MCNC: 0.4 MG/DL (ref 0.5–1.4)
CREAT SERPL-MCNC: 0.5 MG/DL (ref 0.5–1.4)
CREAT SERPL-MCNC: 0.6 MG/DL (ref 0.5–1.4)
CREAT SERPL-MCNC: 0.7 MG/DL (ref 0.5–1.4)
DELSYS: ABNORMAL
DELSYS: NORMAL
DIFFERENTIAL METHOD BLD: ABNORMAL
EOSINOPHIL # BLD AUTO: 0 K/UL (ref 0–0.5)
EOSINOPHIL # BLD AUTO: 0 K/UL (ref 0–0.5)
EOSINOPHIL # BLD AUTO: 0.1 K/UL (ref 0–0.5)
EOSINOPHIL # BLD AUTO: 0.2 K/UL (ref 0–0.5)
EOSINOPHIL # BLD AUTO: 0.3 K/UL (ref 0–0.5)
EOSINOPHIL # BLD AUTO: 0.4 K/UL (ref 0–0.5)
EOSINOPHIL NFR BLD: 0.2 % (ref 0–8)
EOSINOPHIL NFR BLD: 0.3 % (ref 0–8)
EOSINOPHIL NFR BLD: 0.6 % (ref 0–8)
EOSINOPHIL NFR BLD: 0.7 % (ref 0–8)
EOSINOPHIL NFR BLD: 0.7 % (ref 0–8)
EOSINOPHIL NFR BLD: 0.8 % (ref 0–8)
EOSINOPHIL NFR BLD: 1.1 % (ref 0–8)
EOSINOPHIL NFR BLD: 1.5 % (ref 0–8)
EOSINOPHIL NFR BLD: 1.7 % (ref 0–8)
EOSINOPHIL NFR BLD: 1.8 % (ref 0–8)
EOSINOPHIL NFR BLD: 2 % (ref 0–8)
EOSINOPHIL NFR BLD: 2.1 % (ref 0–8)
EOSINOPHIL NFR BLD: 2.2 % (ref 0–8)
EOSINOPHIL NFR BLD: 2.4 % (ref 0–8)
EOSINOPHIL NFR BLD: 2.4 % (ref 0–8)
EOSINOPHIL NFR BLD: 2.5 % (ref 0–8)
EOSINOPHIL NFR BLD: 2.6 % (ref 0–8)
EOSINOPHIL NFR BLD: 2.6 % (ref 0–8)
EOSINOPHIL NFR BLD: 2.7 % (ref 0–8)
EOSINOPHIL NFR BLD: 2.8 % (ref 0–8)
EOSINOPHIL NFR BLD: 2.9 % (ref 0–8)
EOSINOPHIL NFR BLD: 3.3 % (ref 0–8)
EOSINOPHIL NFR BLD: 3.5 % (ref 0–8)
EOSINOPHIL NFR BLD: 3.6 % (ref 0–8)
EOSINOPHIL NFR BLD: 4.5 % (ref 0–8)
EOSINOPHIL NFR BLD: 4.7 % (ref 0–8)
EOSINOPHIL NFR BLD: 5.5 % (ref 0–8)
EOSINOPHIL NFR BLD: 5.8 % (ref 0–8)
EOSINOPHIL NFR BLD: 6.4 % (ref 0–8)
EP: 6
EP: 6
ERYTHROCYTE [DISTWIDTH] IN BLOOD BY AUTOMATED COUNT: 18.3 % (ref 11.5–14.5)
ERYTHROCYTE [DISTWIDTH] IN BLOOD BY AUTOMATED COUNT: 18.4 % (ref 11.5–14.5)
ERYTHROCYTE [DISTWIDTH] IN BLOOD BY AUTOMATED COUNT: 18.4 % (ref 11.5–14.5)
ERYTHROCYTE [DISTWIDTH] IN BLOOD BY AUTOMATED COUNT: 18.6 % (ref 11.5–14.5)
ERYTHROCYTE [DISTWIDTH] IN BLOOD BY AUTOMATED COUNT: 18.7 % (ref 11.5–14.5)
ERYTHROCYTE [DISTWIDTH] IN BLOOD BY AUTOMATED COUNT: 18.8 % (ref 11.5–14.5)
ERYTHROCYTE [DISTWIDTH] IN BLOOD BY AUTOMATED COUNT: 18.8 % (ref 11.5–14.5)
ERYTHROCYTE [DISTWIDTH] IN BLOOD BY AUTOMATED COUNT: 18.9 % (ref 11.5–14.5)
ERYTHROCYTE [DISTWIDTH] IN BLOOD BY AUTOMATED COUNT: 19 % (ref 11.5–14.5)
ERYTHROCYTE [DISTWIDTH] IN BLOOD BY AUTOMATED COUNT: 19.3 % (ref 11.5–14.5)
ERYTHROCYTE [DISTWIDTH] IN BLOOD BY AUTOMATED COUNT: 19.7 % (ref 11.5–14.5)
ERYTHROCYTE [DISTWIDTH] IN BLOOD BY AUTOMATED COUNT: 19.7 % (ref 11.5–14.5)
ERYTHROCYTE [DISTWIDTH] IN BLOOD BY AUTOMATED COUNT: 19.8 % (ref 11.5–14.5)
ERYTHROCYTE [DISTWIDTH] IN BLOOD BY AUTOMATED COUNT: 19.9 % (ref 11.5–14.5)
ERYTHROCYTE [DISTWIDTH] IN BLOOD BY AUTOMATED COUNT: 20.1 % (ref 11.5–14.5)
ERYTHROCYTE [DISTWIDTH] IN BLOOD BY AUTOMATED COUNT: 20.2 % (ref 11.5–14.5)
ERYTHROCYTE [DISTWIDTH] IN BLOOD BY AUTOMATED COUNT: 20.2 % (ref 11.5–14.5)
ERYTHROCYTE [DISTWIDTH] IN BLOOD BY AUTOMATED COUNT: 20.3 % (ref 11.5–14.5)
ERYTHROCYTE [DISTWIDTH] IN BLOOD BY AUTOMATED COUNT: 20.4 % (ref 11.5–14.5)
ERYTHROCYTE [DISTWIDTH] IN BLOOD BY AUTOMATED COUNT: 20.5 % (ref 11.5–14.5)
ERYTHROCYTE [DISTWIDTH] IN BLOOD BY AUTOMATED COUNT: 20.5 % (ref 11.5–14.5)
ERYTHROCYTE [DISTWIDTH] IN BLOOD BY AUTOMATED COUNT: 20.6 % (ref 11.5–14.5)
ERYTHROCYTE [DISTWIDTH] IN BLOOD BY AUTOMATED COUNT: 20.7 % (ref 11.5–14.5)
ERYTHROCYTE [DISTWIDTH] IN BLOOD BY AUTOMATED COUNT: 20.9 % (ref 11.5–14.5)
ERYTHROCYTE [DISTWIDTH] IN BLOOD BY AUTOMATED COUNT: 20.9 % (ref 11.5–14.5)
ERYTHROCYTE [SEDIMENTATION RATE] IN BLOOD BY WESTERGREN METHOD: 12 MM/H
ERYTHROCYTE [SEDIMENTATION RATE] IN BLOOD BY WESTERGREN METHOD: 12 MM/H
ERYTHROCYTE [SEDIMENTATION RATE] IN BLOOD BY WESTERGREN METHOD: 16 MM/H
ERYTHROCYTE [SEDIMENTATION RATE] IN BLOOD BY WESTERGREN METHOD: 18 MM/H
ERYTHROCYTE [SEDIMENTATION RATE] IN BLOOD BY WESTERGREN METHOD: 20 MM/H
ERYTHROCYTE [SEDIMENTATION RATE] IN BLOOD BY WESTERGREN METHOD: 22 MM/H
EST. GFR  (NO RACE VARIABLE): >60 ML/MIN/1.73 M^2
FACT X PPP CHRO-ACNC: 0.45 IU/ML (ref 0.3–0.7)
FIO2: 35
FIO2: 40
FIO2: 60
FLOW: 6
GLUCOSE SERPL-MCNC: 104 MG/DL (ref 70–110)
GLUCOSE SERPL-MCNC: 104 MG/DL (ref 70–110)
GLUCOSE SERPL-MCNC: 108 MG/DL (ref 70–110)
GLUCOSE SERPL-MCNC: 109 MG/DL (ref 70–110)
GLUCOSE SERPL-MCNC: 109 MG/DL (ref 70–110)
GLUCOSE SERPL-MCNC: 111 MG/DL (ref 70–110)
GLUCOSE SERPL-MCNC: 130 MG/DL (ref 70–110)
GLUCOSE SERPL-MCNC: 144 MG/DL (ref 70–110)
GLUCOSE SERPL-MCNC: 204 MG/DL (ref 70–110)
GLUCOSE SERPL-MCNC: 74 MG/DL (ref 70–110)
GLUCOSE SERPL-MCNC: 76 MG/DL (ref 70–110)
GLUCOSE SERPL-MCNC: 78 MG/DL (ref 70–110)
GLUCOSE SERPL-MCNC: 80 MG/DL (ref 70–110)
GLUCOSE SERPL-MCNC: 81 MG/DL (ref 70–110)
GLUCOSE SERPL-MCNC: 81 MG/DL (ref 70–110)
GLUCOSE SERPL-MCNC: 84 MG/DL (ref 70–110)
GLUCOSE SERPL-MCNC: 86 MG/DL (ref 70–110)
GLUCOSE SERPL-MCNC: 87 MG/DL (ref 70–110)
GLUCOSE SERPL-MCNC: 91 MG/DL (ref 70–110)
GLUCOSE SERPL-MCNC: 92 MG/DL (ref 70–110)
GLUCOSE SERPL-MCNC: 95 MG/DL (ref 70–110)
GLUCOSE SERPL-MCNC: 96 MG/DL (ref 70–110)
GLUCOSE SERPL-MCNC: 97 MG/DL (ref 70–110)
GLUCOSE SERPL-MCNC: 97 MG/DL (ref 70–110)
GLUCOSE SERPL-MCNC: 98 MG/DL (ref 70–110)
GLUCOSE SERPL-MCNC: 98 MG/DL (ref 70–110)
GLUCOSE UR QL STRIP: NEGATIVE
GRAM STN SPEC: NORMAL
HCO3 UR-SCNC: 27.1 MMOL/L (ref 24–28)
HCO3 UR-SCNC: 28.3 MMOL/L (ref 24–28)
HCO3 UR-SCNC: 29 MMOL/L (ref 24–28)
HCO3 UR-SCNC: 29.2 MMOL/L (ref 24–28)
HCO3 UR-SCNC: 29.5 MMOL/L (ref 24–28)
HCO3 UR-SCNC: 30.3 MMOL/L (ref 24–28)
HCO3 UR-SCNC: 30.7 MMOL/L (ref 24–28)
HCO3 UR-SCNC: 30.8 MMOL/L (ref 24–28)
HCO3 UR-SCNC: 30.9 MMOL/L (ref 24–28)
HCO3 UR-SCNC: 31 MMOL/L (ref 24–28)
HCO3 UR-SCNC: 31.1 MMOL/L (ref 24–28)
HCO3 UR-SCNC: 31.3 MMOL/L (ref 24–28)
HCO3 UR-SCNC: 31.5 MMOL/L (ref 24–28)
HCO3 UR-SCNC: 31.7 MMOL/L (ref 24–28)
HCO3 UR-SCNC: 31.7 MMOL/L (ref 24–28)
HCO3 UR-SCNC: 32.1 MMOL/L (ref 24–28)
HCO3 UR-SCNC: 32.4 MMOL/L (ref 24–28)
HCO3 UR-SCNC: 32.6 MMOL/L (ref 24–28)
HCO3 UR-SCNC: 32.8 MMOL/L (ref 24–28)
HCO3 UR-SCNC: 34.1 MMOL/L (ref 24–28)
HCO3 UR-SCNC: 35.8 MMOL/L (ref 24–28)
HCO3 UR-SCNC: 38.2 MMOL/L (ref 24–28)
HCO3 UR-SCNC: 39.8 MMOL/L (ref 24–28)
HCO3 UR-SCNC: 43.2 MMOL/L (ref 24–28)
HCO3 UR-SCNC: 47.5 MMOL/L (ref 24–28)
HCO3 UR-SCNC: 49.8 MMOL/L (ref 24–28)
HCO3 UR-SCNC: 50.1 MMOL/L (ref 24–28)
HCO3 UR-SCNC: 53.7 MMOL/L (ref 24–28)
HCT VFR BLD AUTO: 22.4 % (ref 37–48.5)
HCT VFR BLD AUTO: 22.9 % (ref 37–48.5)
HCT VFR BLD AUTO: 23.4 % (ref 37–48.5)
HCT VFR BLD AUTO: 23.7 % (ref 37–48.5)
HCT VFR BLD AUTO: 24.4 % (ref 37–48.5)
HCT VFR BLD AUTO: 25.6 % (ref 37–48.5)
HCT VFR BLD AUTO: 27.1 % (ref 37–48.5)
HCT VFR BLD AUTO: 27.2 % (ref 37–48.5)
HCT VFR BLD AUTO: 27.6 % (ref 37–48.5)
HCT VFR BLD AUTO: 27.6 % (ref 37–48.5)
HCT VFR BLD AUTO: 27.7 % (ref 37–48.5)
HCT VFR BLD AUTO: 27.7 % (ref 37–48.5)
HCT VFR BLD AUTO: 27.8 % (ref 37–48.5)
HCT VFR BLD AUTO: 28.3 % (ref 37–48.5)
HCT VFR BLD AUTO: 28.3 % (ref 37–48.5)
HCT VFR BLD AUTO: 28.6 % (ref 37–48.5)
HCT VFR BLD AUTO: 29.4 % (ref 37–48.5)
HCT VFR BLD AUTO: 29.4 % (ref 37–48.5)
HCT VFR BLD AUTO: 31 % (ref 37–48.5)
HCT VFR BLD AUTO: 31.2 % (ref 37–48.5)
HCT VFR BLD AUTO: 31.9 % (ref 37–48.5)
HCT VFR BLD AUTO: 32.2 % (ref 37–48.5)
HCT VFR BLD AUTO: 33.5 % (ref 37–48.5)
HCT VFR BLD AUTO: 34.3 % (ref 37–48.5)
HCT VFR BLD AUTO: 35 % (ref 37–48.5)
HCT VFR BLD AUTO: 35.2 % (ref 37–48.5)
HCT VFR BLD AUTO: 35.2 % (ref 37–48.5)
HCT VFR BLD AUTO: 37.2 % (ref 37–48.5)
HCT VFR BLD AUTO: 47.5 % (ref 37–48.5)
HCT VFR BLD AUTO: 49.8 % (ref 37–48.5)
HCT VFR BLD AUTO: 50.9 % (ref 37–48.5)
HCT VFR BLD CALC: 23 %PCV (ref 36–54)
HCT VFR BLD CALC: 24 %PCV (ref 36–54)
HCT VFR BLD CALC: 29 %PCV (ref 36–54)
HCT VFR BLD CALC: 32 %PCV (ref 36–54)
HCT VFR BLD CALC: 51 %PCV (ref 36–54)
HGB BLD-MCNC: 10 G/DL (ref 12–16)
HGB BLD-MCNC: 10.1 G/DL (ref 12–16)
HGB BLD-MCNC: 10.1 G/DL (ref 12–16)
HGB BLD-MCNC: 10.4 G/DL (ref 12–16)
HGB BLD-MCNC: 10.6 G/DL (ref 12–16)
HGB BLD-MCNC: 10.6 G/DL (ref 12–16)
HGB BLD-MCNC: 10.9 G/DL (ref 12–16)
HGB BLD-MCNC: 14.3 G/DL (ref 12–16)
HGB BLD-MCNC: 14.4 G/DL (ref 12–16)
HGB BLD-MCNC: 14.7 G/DL (ref 12–16)
HGB BLD-MCNC: 6.8 G/DL (ref 12–16)
HGB BLD-MCNC: 7.1 G/DL (ref 12–16)
HGB BLD-MCNC: 7.2 G/DL (ref 12–16)
HGB BLD-MCNC: 7.2 G/DL (ref 12–16)
HGB BLD-MCNC: 7.4 G/DL (ref 12–16)
HGB BLD-MCNC: 7.5 G/DL (ref 12–16)
HGB BLD-MCNC: 8.1 G/DL (ref 12–16)
HGB BLD-MCNC: 8.1 G/DL (ref 12–16)
HGB BLD-MCNC: 8.2 G/DL (ref 12–16)
HGB BLD-MCNC: 8.3 G/DL (ref 12–16)
HGB BLD-MCNC: 8.3 G/DL (ref 12–16)
HGB BLD-MCNC: 8.5 G/DL (ref 12–16)
HGB BLD-MCNC: 8.5 G/DL (ref 12–16)
HGB BLD-MCNC: 8.6 G/DL (ref 12–16)
HGB BLD-MCNC: 8.6 G/DL (ref 12–16)
HGB BLD-MCNC: 8.8 G/DL (ref 12–16)
HGB BLD-MCNC: 8.9 G/DL (ref 12–16)
HGB BLD-MCNC: 9.2 G/DL (ref 12–16)
HGB BLD-MCNC: 9.2 G/DL (ref 12–16)
HGB BLD-MCNC: 9.4 G/DL (ref 12–16)
HGB BLD-MCNC: 9.6 G/DL (ref 12–16)
HGB BLD-MCNC: 9.8 G/DL (ref 12–16)
HGB UR QL STRIP: ABNORMAL
HGB UR QL STRIP: NEGATIVE
HYALINE CASTS UR QL AUTO: 0 /LPF
IMM GRANULOCYTES # BLD AUTO: 0 K/UL (ref 0–0.04)
IMM GRANULOCYTES # BLD AUTO: 0.01 K/UL (ref 0–0.04)
IMM GRANULOCYTES # BLD AUTO: 0.02 K/UL (ref 0–0.04)
IMM GRANULOCYTES # BLD AUTO: 0.03 K/UL (ref 0–0.04)
IMM GRANULOCYTES # BLD AUTO: 0.04 K/UL (ref 0–0.04)
IMM GRANULOCYTES # BLD AUTO: 0.05 K/UL (ref 0–0.04)
IMM GRANULOCYTES # BLD AUTO: 0.06 K/UL (ref 0–0.04)
IMM GRANULOCYTES # BLD AUTO: 0.07 K/UL (ref 0–0.04)
IMM GRANULOCYTES # BLD AUTO: 0.08 K/UL (ref 0–0.04)
IMM GRANULOCYTES # BLD AUTO: 0.09 K/UL (ref 0–0.04)
IMM GRANULOCYTES # BLD AUTO: 0.1 K/UL (ref 0–0.04)
IMM GRANULOCYTES # BLD AUTO: 0.1 K/UL (ref 0–0.04)
IMM GRANULOCYTES # BLD AUTO: 0.12 K/UL (ref 0–0.04)
IMM GRANULOCYTES # BLD AUTO: 0.18 K/UL (ref 0–0.04)
IMM GRANULOCYTES NFR BLD AUTO: 0 % (ref 0–0.5)
IMM GRANULOCYTES NFR BLD AUTO: 0.2 % (ref 0–0.5)
IMM GRANULOCYTES NFR BLD AUTO: 0.2 % (ref 0–0.5)
IMM GRANULOCYTES NFR BLD AUTO: 0.3 % (ref 0–0.5)
IMM GRANULOCYTES NFR BLD AUTO: 0.4 % (ref 0–0.5)
IMM GRANULOCYTES NFR BLD AUTO: 0.5 % (ref 0–0.5)
IMM GRANULOCYTES NFR BLD AUTO: 0.6 % (ref 0–0.5)
IMM GRANULOCYTES NFR BLD AUTO: 0.7 % (ref 0–0.5)
IMM GRANULOCYTES NFR BLD AUTO: 0.7 % (ref 0–0.5)
IMM GRANULOCYTES NFR BLD AUTO: 0.8 % (ref 0–0.5)
IMM GRANULOCYTES NFR BLD AUTO: 0.8 % (ref 0–0.5)
IMM GRANULOCYTES NFR BLD AUTO: 0.9 % (ref 0–0.5)
IMM GRANULOCYTES NFR BLD AUTO: 1 % (ref 0–0.5)
IMM GRANULOCYTES NFR BLD AUTO: 1.2 % (ref 0–0.5)
IMM GRANULOCYTES NFR BLD AUTO: 1.3 % (ref 0–0.5)
IMM GRANULOCYTES NFR BLD AUTO: 1.5 % (ref 0–0.5)
IMM GRANULOCYTES NFR BLD AUTO: 1.7 % (ref 0–0.5)
IMM GRANULOCYTES NFR BLD AUTO: 1.8 % (ref 0–0.5)
IMM GRANULOCYTES NFR BLD AUTO: 2.4 % (ref 0–0.5)
IP: 16
IP: 16
KETONES UR QL STRIP: ABNORMAL
KETONES UR QL STRIP: ABNORMAL
KETONES UR QL STRIP: NEGATIVE
KETONES UR QL STRIP: NEGATIVE
LACTATE SERPL-SCNC: 0.6 MMOL/L (ref 0.5–2.2)
LACTATE SERPL-SCNC: 0.6 MMOL/L (ref 0.5–2.2)
LACTATE SERPL-SCNC: 2.2 MMOL/L (ref 0.5–2.2)
LDH SERPL L TO P-CCNC: 0.79 MMOL/L (ref 0.36–1.25)
LEUKOCYTE ESTERASE UR QL STRIP: ABNORMAL
LYMPHOCYTES # BLD AUTO: 0.6 K/UL (ref 1–4.8)
LYMPHOCYTES # BLD AUTO: 0.8 K/UL (ref 1–4.8)
LYMPHOCYTES # BLD AUTO: 1 K/UL (ref 1–4.8)
LYMPHOCYTES # BLD AUTO: 1.2 K/UL (ref 1–4.8)
LYMPHOCYTES # BLD AUTO: 1.2 K/UL (ref 1–4.8)
LYMPHOCYTES # BLD AUTO: 1.3 K/UL (ref 1–4.8)
LYMPHOCYTES # BLD AUTO: 1.4 K/UL (ref 1–4.8)
LYMPHOCYTES # BLD AUTO: 1.5 K/UL (ref 1–4.8)
LYMPHOCYTES # BLD AUTO: 1.6 K/UL (ref 1–4.8)
LYMPHOCYTES # BLD AUTO: 1.7 K/UL (ref 1–4.8)
LYMPHOCYTES # BLD AUTO: 1.8 K/UL (ref 1–4.8)
LYMPHOCYTES # BLD AUTO: 1.9 K/UL (ref 1–4.8)
LYMPHOCYTES # BLD AUTO: 1.9 K/UL (ref 1–4.8)
LYMPHOCYTES # BLD AUTO: 2 K/UL (ref 1–4.8)
LYMPHOCYTES # BLD AUTO: 2.4 K/UL (ref 1–4.8)
LYMPHOCYTES # BLD AUTO: 2.4 K/UL (ref 1–4.8)
LYMPHOCYTES # BLD AUTO: 2.5 K/UL (ref 1–4.8)
LYMPHOCYTES # BLD AUTO: 2.7 K/UL (ref 1–4.8)
LYMPHOCYTES NFR BLD: 12.2 % (ref 18–48)
LYMPHOCYTES NFR BLD: 13 % (ref 18–48)
LYMPHOCYTES NFR BLD: 14.5 % (ref 18–48)
LYMPHOCYTES NFR BLD: 16 % (ref 18–48)
LYMPHOCYTES NFR BLD: 18.1 % (ref 18–48)
LYMPHOCYTES NFR BLD: 18.2 % (ref 18–48)
LYMPHOCYTES NFR BLD: 18.9 % (ref 18–48)
LYMPHOCYTES NFR BLD: 21.9 % (ref 18–48)
LYMPHOCYTES NFR BLD: 22.1 % (ref 18–48)
LYMPHOCYTES NFR BLD: 22.6 % (ref 18–48)
LYMPHOCYTES NFR BLD: 23.4 % (ref 18–48)
LYMPHOCYTES NFR BLD: 23.8 % (ref 18–48)
LYMPHOCYTES NFR BLD: 24.4 % (ref 18–48)
LYMPHOCYTES NFR BLD: 24.8 % (ref 18–48)
LYMPHOCYTES NFR BLD: 25 % (ref 18–48)
LYMPHOCYTES NFR BLD: 26 % (ref 18–48)
LYMPHOCYTES NFR BLD: 26.1 % (ref 18–48)
LYMPHOCYTES NFR BLD: 26.4 % (ref 18–48)
LYMPHOCYTES NFR BLD: 26.8 % (ref 18–48)
LYMPHOCYTES NFR BLD: 26.8 % (ref 18–48)
LYMPHOCYTES NFR BLD: 27.5 % (ref 18–48)
LYMPHOCYTES NFR BLD: 28 % (ref 18–48)
LYMPHOCYTES NFR BLD: 29.2 % (ref 18–48)
LYMPHOCYTES NFR BLD: 30.5 % (ref 18–48)
LYMPHOCYTES NFR BLD: 31.7 % (ref 18–48)
LYMPHOCYTES NFR BLD: 32.3 % (ref 18–48)
LYMPHOCYTES NFR BLD: 33.1 % (ref 18–48)
LYMPHOCYTES NFR BLD: 33.7 % (ref 18–48)
LYMPHOCYTES NFR BLD: 36 % (ref 18–48)
LYMPHOCYTES NFR BLD: 36.9 % (ref 18–48)
LYMPHOCYTES NFR BLD: 39.9 % (ref 18–48)
MAGNESIUM SERPL-MCNC: 1.5 MG/DL (ref 1.6–2.6)
MAGNESIUM SERPL-MCNC: 1.6 MG/DL (ref 1.6–2.6)
MAGNESIUM SERPL-MCNC: 1.7 MG/DL (ref 1.6–2.6)
MAGNESIUM SERPL-MCNC: 1.8 MG/DL (ref 1.6–2.6)
MAGNESIUM SERPL-MCNC: 1.9 MG/DL (ref 1.6–2.6)
MAGNESIUM SERPL-MCNC: 2.1 MG/DL (ref 1.6–2.6)
MAGNESIUM SERPL-MCNC: 2.2 MG/DL (ref 1.6–2.6)
MCH RBC QN AUTO: 22.2 PG (ref 27–31)
MCH RBC QN AUTO: 22.5 PG (ref 27–31)
MCH RBC QN AUTO: 22.5 PG (ref 27–31)
MCH RBC QN AUTO: 22.6 PG (ref 27–31)
MCH RBC QN AUTO: 22.7 PG (ref 27–31)
MCH RBC QN AUTO: 22.7 PG (ref 27–31)
MCH RBC QN AUTO: 22.8 PG (ref 27–31)
MCH RBC QN AUTO: 22.9 PG (ref 27–31)
MCH RBC QN AUTO: 23 PG (ref 27–31)
MCH RBC QN AUTO: 23.2 PG (ref 27–31)
MCH RBC QN AUTO: 23.3 PG (ref 27–31)
MCH RBC QN AUTO: 23.3 PG (ref 27–31)
MCH RBC QN AUTO: 23.4 PG (ref 27–31)
MCH RBC QN AUTO: 23.5 PG (ref 27–31)
MCH RBC QN AUTO: 23.6 PG (ref 27–31)
MCH RBC QN AUTO: 23.6 PG (ref 27–31)
MCH RBC QN AUTO: 23.7 PG (ref 27–31)
MCH RBC QN AUTO: 23.8 PG (ref 27–31)
MCH RBC QN AUTO: 23.8 PG (ref 27–31)
MCH RBC QN AUTO: 24.1 PG (ref 27–31)
MCH RBC QN AUTO: 24.2 PG (ref 27–31)
MCHC RBC AUTO-ENTMCNC: 28.3 G/DL (ref 32–36)
MCHC RBC AUTO-ENTMCNC: 28.7 G/DL (ref 32–36)
MCHC RBC AUTO-ENTMCNC: 28.9 G/DL (ref 32–36)
MCHC RBC AUTO-ENTMCNC: 29.2 G/DL (ref 32–36)
MCHC RBC AUTO-ENTMCNC: 29.3 G/DL (ref 32–36)
MCHC RBC AUTO-ENTMCNC: 29.4 G/DL (ref 32–36)
MCHC RBC AUTO-ENTMCNC: 29.5 G/DL (ref 32–36)
MCHC RBC AUTO-ENTMCNC: 29.5 G/DL (ref 32–36)
MCHC RBC AUTO-ENTMCNC: 29.7 G/DL (ref 32–36)
MCHC RBC AUTO-ENTMCNC: 29.7 G/DL (ref 32–36)
MCHC RBC AUTO-ENTMCNC: 29.9 G/DL (ref 32–36)
MCHC RBC AUTO-ENTMCNC: 29.9 G/DL (ref 32–36)
MCHC RBC AUTO-ENTMCNC: 30.1 G/DL (ref 32–36)
MCHC RBC AUTO-ENTMCNC: 30.3 G/DL (ref 32–36)
MCHC RBC AUTO-ENTMCNC: 30.4 G/DL (ref 32–36)
MCHC RBC AUTO-ENTMCNC: 30.5 G/DL (ref 32–36)
MCHC RBC AUTO-ENTMCNC: 30.7 G/DL (ref 32–36)
MCHC RBC AUTO-ENTMCNC: 30.8 G/DL (ref 32–36)
MCHC RBC AUTO-ENTMCNC: 30.9 G/DL (ref 32–36)
MCHC RBC AUTO-ENTMCNC: 30.9 G/DL (ref 32–36)
MCHC RBC AUTO-ENTMCNC: 31.3 G/DL (ref 32–36)
MCHC RBC AUTO-ENTMCNC: 31.4 G/DL (ref 32–36)
MCV RBC AUTO: 74 FL (ref 82–98)
MCV RBC AUTO: 75 FL (ref 82–98)
MCV RBC AUTO: 76 FL (ref 82–98)
MCV RBC AUTO: 77 FL (ref 82–98)
MCV RBC AUTO: 78 FL (ref 82–98)
MCV RBC AUTO: 79 FL (ref 82–98)
MCV RBC AUTO: 79 FL (ref 82–98)
MCV RBC AUTO: 80 FL (ref 82–98)
MCV RBC AUTO: 81 FL (ref 82–98)
MCV RBC AUTO: 81 FL (ref 82–98)
MICROSCOPIC COMMENT: ABNORMAL
MIN VOL: 6.53
MIN VOL: 7
MODE: ABNORMAL
MODE: NORMAL
MONOCYTES # BLD AUTO: 0.3 K/UL (ref 0.3–1)
MONOCYTES # BLD AUTO: 0.5 K/UL (ref 0.3–1)
MONOCYTES # BLD AUTO: 0.6 K/UL (ref 0.3–1)
MONOCYTES # BLD AUTO: 0.7 K/UL (ref 0.3–1)
MONOCYTES # BLD AUTO: 0.8 K/UL (ref 0.3–1)
MONOCYTES # BLD AUTO: 0.8 K/UL (ref 0.3–1)
MONOCYTES # BLD AUTO: 0.9 K/UL (ref 0.3–1)
MONOCYTES # BLD AUTO: 0.9 K/UL (ref 0.3–1)
MONOCYTES # BLD AUTO: 1 K/UL (ref 0.3–1)
MONOCYTES # BLD AUTO: 1.2 K/UL (ref 0.3–1)
MONOCYTES # BLD AUTO: 1.3 K/UL (ref 0.3–1)
MONOCYTES # BLD AUTO: 1.3 K/UL (ref 0.3–1)
MONOCYTES # BLD AUTO: 1.4 K/UL (ref 0.3–1)
MONOCYTES # BLD AUTO: 1.5 K/UL (ref 0.3–1)
MONOCYTES NFR BLD: 10.1 % (ref 4–15)
MONOCYTES NFR BLD: 10.5 % (ref 4–15)
MONOCYTES NFR BLD: 10.6 % (ref 4–15)
MONOCYTES NFR BLD: 10.9 % (ref 4–15)
MONOCYTES NFR BLD: 11.4 % (ref 4–15)
MONOCYTES NFR BLD: 11.7 % (ref 4–15)
MONOCYTES NFR BLD: 12.4 % (ref 4–15)
MONOCYTES NFR BLD: 12.6 % (ref 4–15)
MONOCYTES NFR BLD: 12.8 % (ref 4–15)
MONOCYTES NFR BLD: 13 % (ref 4–15)
MONOCYTES NFR BLD: 13 % (ref 4–15)
MONOCYTES NFR BLD: 13.3 % (ref 4–15)
MONOCYTES NFR BLD: 13.4 % (ref 4–15)
MONOCYTES NFR BLD: 14 % (ref 4–15)
MONOCYTES NFR BLD: 14.8 % (ref 4–15)
MONOCYTES NFR BLD: 16.1 % (ref 4–15)
MONOCYTES NFR BLD: 17 % (ref 4–15)
MONOCYTES NFR BLD: 17.2 % (ref 4–15)
MONOCYTES NFR BLD: 18.3 % (ref 4–15)
MONOCYTES NFR BLD: 18.6 % (ref 4–15)
MONOCYTES NFR BLD: 21.1 % (ref 4–15)
MONOCYTES NFR BLD: 7.1 % (ref 4–15)
MONOCYTES NFR BLD: 8.2 % (ref 4–15)
MONOCYTES NFR BLD: 8.5 % (ref 4–15)
MONOCYTES NFR BLD: 9.1 % (ref 4–15)
MONOCYTES NFR BLD: 9.4 % (ref 4–15)
MONOCYTES NFR BLD: 9.6 % (ref 4–15)
MONOCYTES NFR BLD: 9.6 % (ref 4–15)
MONOCYTES NFR BLD: 9.9 % (ref 4–15)
NEUTROPHILS # BLD AUTO: 2.2 K/UL (ref 1.8–7.7)
NEUTROPHILS # BLD AUTO: 2.3 K/UL (ref 1.8–7.7)
NEUTROPHILS # BLD AUTO: 2.6 K/UL (ref 1.8–7.7)
NEUTROPHILS # BLD AUTO: 2.6 K/UL (ref 1.8–7.7)
NEUTROPHILS # BLD AUTO: 2.7 K/UL (ref 1.8–7.7)
NEUTROPHILS # BLD AUTO: 2.7 K/UL (ref 1.8–7.7)
NEUTROPHILS # BLD AUTO: 2.9 K/UL (ref 1.8–7.7)
NEUTROPHILS # BLD AUTO: 3.1 K/UL (ref 1.8–7.7)
NEUTROPHILS # BLD AUTO: 3.2 K/UL (ref 1.8–7.7)
NEUTROPHILS # BLD AUTO: 3.2 K/UL (ref 1.8–7.7)
NEUTROPHILS # BLD AUTO: 3.3 K/UL (ref 1.8–7.7)
NEUTROPHILS # BLD AUTO: 3.5 K/UL (ref 1.8–7.7)
NEUTROPHILS # BLD AUTO: 3.6 K/UL (ref 1.8–7.7)
NEUTROPHILS # BLD AUTO: 3.7 K/UL (ref 1.8–7.7)
NEUTROPHILS # BLD AUTO: 3.7 K/UL (ref 1.8–7.7)
NEUTROPHILS # BLD AUTO: 3.9 K/UL (ref 1.8–7.7)
NEUTROPHILS # BLD AUTO: 3.9 K/UL (ref 1.8–7.7)
NEUTROPHILS # BLD AUTO: 4.1 K/UL (ref 1.8–7.7)
NEUTROPHILS # BLD AUTO: 4.2 K/UL (ref 1.8–7.7)
NEUTROPHILS # BLD AUTO: 4.3 K/UL (ref 1.8–7.7)
NEUTROPHILS # BLD AUTO: 4.3 K/UL (ref 1.8–7.7)
NEUTROPHILS # BLD AUTO: 4.9 K/UL (ref 1.8–7.7)
NEUTROPHILS # BLD AUTO: 5.3 K/UL (ref 1.8–7.7)
NEUTROPHILS # BLD AUTO: 5.4 K/UL (ref 1.8–7.7)
NEUTROPHILS # BLD AUTO: 6.2 K/UL (ref 1.8–7.7)
NEUTROPHILS # BLD AUTO: 8 K/UL (ref 1.8–7.7)
NEUTROPHILS # BLD AUTO: 8.8 K/UL (ref 1.8–7.7)
NEUTROPHILS NFR BLD: 48 % (ref 38–73)
NEUTROPHILS NFR BLD: 49.9 % (ref 38–73)
NEUTROPHILS NFR BLD: 51 % (ref 38–73)
NEUTROPHILS NFR BLD: 51.1 % (ref 38–73)
NEUTROPHILS NFR BLD: 51.3 % (ref 38–73)
NEUTROPHILS NFR BLD: 51.9 % (ref 38–73)
NEUTROPHILS NFR BLD: 52.8 % (ref 38–73)
NEUTROPHILS NFR BLD: 53.4 % (ref 38–73)
NEUTROPHILS NFR BLD: 53.5 % (ref 38–73)
NEUTROPHILS NFR BLD: 54.1 % (ref 38–73)
NEUTROPHILS NFR BLD: 54.4 % (ref 38–73)
NEUTROPHILS NFR BLD: 54.6 % (ref 38–73)
NEUTROPHILS NFR BLD: 55.6 % (ref 38–73)
NEUTROPHILS NFR BLD: 55.7 % (ref 38–73)
NEUTROPHILS NFR BLD: 57 % (ref 38–73)
NEUTROPHILS NFR BLD: 57.1 % (ref 38–73)
NEUTROPHILS NFR BLD: 57.3 % (ref 38–73)
NEUTROPHILS NFR BLD: 57.6 % (ref 38–73)
NEUTROPHILS NFR BLD: 59.6 % (ref 38–73)
NEUTROPHILS NFR BLD: 59.6 % (ref 38–73)
NEUTROPHILS NFR BLD: 59.8 % (ref 38–73)
NEUTROPHILS NFR BLD: 61.1 % (ref 38–73)
NEUTROPHILS NFR BLD: 61.2 % (ref 38–73)
NEUTROPHILS NFR BLD: 61.6 % (ref 38–73)
NEUTROPHILS NFR BLD: 63.3 % (ref 38–73)
NEUTROPHILS NFR BLD: 63.7 % (ref 38–73)
NEUTROPHILS NFR BLD: 64.6 % (ref 38–73)
NEUTROPHILS NFR BLD: 68.2 % (ref 38–73)
NEUTROPHILS NFR BLD: 73.1 % (ref 38–73)
NEUTROPHILS NFR BLD: 73.4 % (ref 38–73)
NEUTROPHILS NFR BLD: 74.7 % (ref 38–73)
NITRITE UR QL STRIP: NEGATIVE
NITRITE UR QL STRIP: POSITIVE
NRBC BLD-RTO: 0 /100 WBC
OB PNL STL: NEGATIVE
OHS QRS DURATION: 80 MS
OHS QTC CALCULATION: 428 MS
OSMOLALITY SERPL: 305 MOSM/KG (ref 275–295)
OSMOLALITY UR: 486 MOSM/KG (ref 50–1200)
PCO2 BLDA: 107.2 MMHG (ref 35–45)
PCO2 BLDA: 107.9 MMHG (ref 35–45)
PCO2 BLDA: 37.5 MMHG (ref 35–45)
PCO2 BLDA: 42.7 MMHG (ref 35–45)
PCO2 BLDA: 44.3 MMHG (ref 35–45)
PCO2 BLDA: 45.6 MMHG (ref 35–45)
PCO2 BLDA: 45.7 MMHG (ref 35–45)
PCO2 BLDA: 45.7 MMHG (ref 35–45)
PCO2 BLDA: 46.6 MMHG (ref 35–45)
PCO2 BLDA: 46.6 MMHG (ref 35–45)
PCO2 BLDA: 46.8 MMHG (ref 35–45)
PCO2 BLDA: 47.1 MMHG (ref 35–45)
PCO2 BLDA: 47.5 MMHG (ref 35–45)
PCO2 BLDA: 48.1 MMHG (ref 35–45)
PCO2 BLDA: 48.2 MMHG (ref 35–45)
PCO2 BLDA: 48.3 MMHG (ref 35–45)
PCO2 BLDA: 49 MMHG (ref 35–45)
PCO2 BLDA: 49.5 MMHG (ref 35–45)
PCO2 BLDA: 49.8 MMHG (ref 35–45)
PCO2 BLDA: 49.9 MMHG (ref 35–45)
PCO2 BLDA: 50.1 MMHG (ref 35–45)
PCO2 BLDA: 50.9 MMHG (ref 35–45)
PCO2 BLDA: 51.4 MMHG (ref 35–45)
PCO2 BLDA: 51.4 MMHG (ref 35–45)
PCO2 BLDA: 57.9 MMHG (ref 35–45)
PCO2 BLDA: 65.2 MMHG (ref 35–45)
PCO2 BLDA: 67.7 MMHG (ref 35–45)
PCO2 BLDA: 99.9 MMHG (ref 35–45)
PEEP: 5
PH SMN: 7.28 [PH] (ref 7.35–7.45)
PH SMN: 7.28 [PH] (ref 7.35–7.45)
PH SMN: 7.29 [PH] (ref 7.35–7.45)
PH SMN: 7.33 [PH] (ref 7.35–7.45)
PH SMN: 7.34 [PH] (ref 7.35–7.45)
PH SMN: 7.39 [PH] (ref 7.35–7.45)
PH SMN: 7.4 [PH] (ref 7.35–7.45)
PH SMN: 7.41 [PH] (ref 7.35–7.45)
PH SMN: 7.42 [PH] (ref 7.35–7.45)
PH SMN: 7.43 [PH] (ref 7.35–7.45)
PH SMN: 7.44 [PH] (ref 7.35–7.45)
PH SMN: 7.44 [PH] (ref 7.35–7.45)
PH SMN: 7.45 [PH] (ref 7.35–7.45)
PH SMN: 7.46 [PH] (ref 7.35–7.45)
PH SMN: 7.46 [PH] (ref 7.35–7.45)
PH SMN: 7.47 [PH] (ref 7.35–7.45)
PH SMN: 7.5 [PH] (ref 7.35–7.45)
PH SMN: 7.51 [PH] (ref 7.35–7.45)
PH UR STRIP: 7 [PH] (ref 5–8)
PH UR STRIP: 8 [PH] (ref 5–8)
PHOSPHATE SERPL-MCNC: 2.1 MG/DL (ref 2.7–4.5)
PHOSPHATE SERPL-MCNC: 2.4 MG/DL (ref 2.7–4.5)
PHOSPHATE SERPL-MCNC: 2.5 MG/DL (ref 2.7–4.5)
PHOSPHATE SERPL-MCNC: 2.7 MG/DL (ref 2.7–4.5)
PHOSPHATE SERPL-MCNC: 2.9 MG/DL (ref 2.7–4.5)
PHOSPHATE SERPL-MCNC: 3 MG/DL (ref 2.7–4.5)
PHOSPHATE SERPL-MCNC: 3.1 MG/DL (ref 2.7–4.5)
PHOSPHATE SERPL-MCNC: 3.2 MG/DL (ref 2.7–4.5)
PHOSPHATE SERPL-MCNC: 3.4 MG/DL (ref 2.7–4.5)
PHOSPHATE SERPL-MCNC: 3.4 MG/DL (ref 2.7–4.5)
PHOSPHATE SERPL-MCNC: 3.5 MG/DL (ref 2.7–4.5)
PHOSPHATE SERPL-MCNC: 3.6 MG/DL (ref 2.7–4.5)
PHOSPHATE SERPL-MCNC: 3.7 MG/DL (ref 2.7–4.5)
PHOSPHATE SERPL-MCNC: 3.7 MG/DL (ref 2.7–4.5)
PHOSPHATE SERPL-MCNC: 4 MG/DL (ref 2.7–4.5)
PHOSPHATE SERPL-MCNC: 4.8 MG/DL (ref 2.7–4.5)
PIP: 32
PLATELET # BLD AUTO: 144 K/UL (ref 150–450)
PLATELET # BLD AUTO: 145 K/UL (ref 150–450)
PLATELET # BLD AUTO: 158 K/UL (ref 150–450)
PLATELET # BLD AUTO: 163 K/UL (ref 150–450)
PLATELET # BLD AUTO: 168 K/UL (ref 150–450)
PLATELET # BLD AUTO: 170 K/UL (ref 150–450)
PLATELET # BLD AUTO: 175 K/UL (ref 150–450)
PLATELET # BLD AUTO: 182 K/UL (ref 150–450)
PLATELET # BLD AUTO: 190 K/UL (ref 150–450)
PLATELET # BLD AUTO: 191 K/UL (ref 150–450)
PLATELET # BLD AUTO: 191 K/UL (ref 150–450)
PLATELET # BLD AUTO: 193 K/UL (ref 150–450)
PLATELET # BLD AUTO: 195 K/UL (ref 150–450)
PLATELET # BLD AUTO: 195 K/UL (ref 150–450)
PLATELET # BLD AUTO: 201 K/UL (ref 150–450)
PLATELET # BLD AUTO: 204 K/UL (ref 150–450)
PLATELET # BLD AUTO: 208 K/UL (ref 150–450)
PLATELET # BLD AUTO: 209 K/UL (ref 150–450)
PLATELET # BLD AUTO: 213 K/UL (ref 150–450)
PLATELET # BLD AUTO: 213 K/UL (ref 150–450)
PLATELET # BLD AUTO: 215 K/UL (ref 150–450)
PLATELET # BLD AUTO: 217 K/UL (ref 150–450)
PLATELET # BLD AUTO: 241 K/UL (ref 150–450)
PLATELET # BLD AUTO: 254 K/UL (ref 150–450)
PLATELET # BLD AUTO: 272 K/UL (ref 150–450)
PLATELET # BLD AUTO: 274 K/UL (ref 150–450)
PLATELET # BLD AUTO: 278 K/UL (ref 150–450)
PLATELET # BLD AUTO: 282 K/UL (ref 150–450)
PLATELET # BLD AUTO: 290 K/UL (ref 150–450)
PLATELET # BLD AUTO: 293 K/UL (ref 150–450)
PLATELET # BLD AUTO: 307 K/UL (ref 150–450)
PMV BLD AUTO: 10.4 FL (ref 9.2–12.9)
PMV BLD AUTO: 10.7 FL (ref 9.2–12.9)
PMV BLD AUTO: 10.8 FL (ref 9.2–12.9)
PMV BLD AUTO: 10.9 FL (ref 9.2–12.9)
PMV BLD AUTO: 10.9 FL (ref 9.2–12.9)
PMV BLD AUTO: 11.1 FL (ref 9.2–12.9)
PMV BLD AUTO: 11.2 FL (ref 9.2–12.9)
PMV BLD AUTO: 11.2 FL (ref 9.2–12.9)
PMV BLD AUTO: 11.3 FL (ref 9.2–12.9)
PMV BLD AUTO: 11.4 FL (ref 9.2–12.9)
PMV BLD AUTO: 11.4 FL (ref 9.2–12.9)
PMV BLD AUTO: 11.5 FL (ref 9.2–12.9)
PMV BLD AUTO: 11.6 FL (ref 9.2–12.9)
PMV BLD AUTO: 12.2 FL (ref 9.2–12.9)
PMV BLD AUTO: 12.2 FL (ref 9.2–12.9)
PMV BLD AUTO: 12.3 FL (ref 9.2–12.9)
PMV BLD AUTO: 12.8 FL (ref 9.2–12.9)
PMV BLD AUTO: ABNORMAL FL (ref 9.2–12.9)
PO2 BLDA: 100 MMHG (ref 80–100)
PO2 BLDA: 105 MMHG (ref 80–100)
PO2 BLDA: 107 MMHG (ref 80–100)
PO2 BLDA: 108 MMHG (ref 80–100)
PO2 BLDA: 109 MMHG (ref 80–100)
PO2 BLDA: 112 MMHG (ref 80–100)
PO2 BLDA: 114 MMHG (ref 80–100)
PO2 BLDA: 115 MMHG (ref 80–100)
PO2 BLDA: 118 MMHG (ref 80–100)
PO2 BLDA: 119 MMHG (ref 80–100)
PO2 BLDA: 119 MMHG (ref 80–100)
PO2 BLDA: 126 MMHG (ref 80–100)
PO2 BLDA: 137 MMHG (ref 80–100)
PO2 BLDA: 138 MMHG (ref 80–100)
PO2 BLDA: 140 MMHG (ref 80–100)
PO2 BLDA: 42 MMHG (ref 80–100)
PO2 BLDA: 57 MMHG (ref 80–100)
PO2 BLDA: 57 MMHG (ref 80–100)
PO2 BLDA: 61 MMHG (ref 80–100)
PO2 BLDA: 75 MMHG (ref 80–100)
PO2 BLDA: 78 MMHG (ref 80–100)
PO2 BLDA: 79 MMHG (ref 80–100)
PO2 BLDA: 90 MMHG (ref 80–100)
PO2 BLDA: 91 MMHG (ref 80–100)
PO2 BLDA: 91 MMHG (ref 80–100)
PO2 BLDA: 93 MMHG (ref 80–100)
PO2 BLDA: 95 MMHG (ref 80–100)
PO2 BLDA: 96 MMHG (ref 80–100)
POC BE: 1 MMOL/L
POC BE: 10 MMOL/L
POC BE: 12 MMOL/L
POC BE: 15 MMOL/L
POC BE: 17 MMOL/L
POC BE: 19 MMOL/L
POC BE: 21 MMOL/L
POC BE: 23 MMOL/L
POC BE: 23 MMOL/L
POC BE: 3 MMOL/L
POC BE: 4 MMOL/L
POC BE: 5 MMOL/L
POC BE: 5 MMOL/L
POC BE: 6 MMOL/L
POC BE: 7 MMOL/L
POC BE: 8 MMOL/L
POC BE: 8 MMOL/L
POC BE: 9 MMOL/L
POC BE: 9 MMOL/L
POC BE: >30 MMOL/L
POC IONIZED CALCIUM: 1.29 MMOL/L (ref 1.06–1.42)
POC IONIZED CALCIUM: 1.4 MMOL/L (ref 1.06–1.42)
POC SATURATED O2: 67 % (ref 95–100)
POC SATURATED O2: 82 % (ref 95–100)
POC SATURATED O2: 85 % (ref 95–100)
POC SATURATED O2: 92 % (ref 95–100)
POC SATURATED O2: 94 % (ref 95–100)
POC SATURATED O2: 95 % (ref 95–100)
POC SATURATED O2: 96 % (ref 95–100)
POC SATURATED O2: 97 % (ref 95–100)
POC SATURATED O2: 98 % (ref 95–100)
POC SATURATED O2: 99 % (ref 95–100)
POC TCO2: 29 MMOL/L (ref 23–27)
POC TCO2: 30 MMOL/L (ref 23–27)
POC TCO2: 31 MMOL/L (ref 23–27)
POC TCO2: 32 MMOL/L (ref 23–27)
POC TCO2: 33 MMOL/L (ref 23–27)
POC TCO2: 34 MMOL/L (ref 23–27)
POC TCO2: 36 MMOL/L (ref 23–27)
POC TCO2: 37 MMOL/L (ref 23–27)
POC TCO2: 40 MMOL/L (ref 23–27)
POC TCO2: 41 MMOL/L (ref 23–27)
POC TCO2: 45 MMOL/L (ref 23–27)
POC TCO2: >50 MMOL/L (ref 23–27)
POCT GLUCOSE: 107 MG/DL (ref 70–110)
POCT GLUCOSE: 168 MG/DL (ref 70–110)
POCT GLUCOSE: 86 MG/DL (ref 70–110)
POCT GLUCOSE: 88 MG/DL (ref 70–110)
POTASSIUM BLD-SCNC: 3.7 MMOL/L (ref 3.5–5.1)
POTASSIUM BLD-SCNC: 3.9 MMOL/L (ref 3.5–5.1)
POTASSIUM SERPL-SCNC: 3 MMOL/L (ref 3.5–5.1)
POTASSIUM SERPL-SCNC: 3.2 MMOL/L (ref 3.5–5.1)
POTASSIUM SERPL-SCNC: 3.2 MMOL/L (ref 3.5–5.1)
POTASSIUM SERPL-SCNC: 3.3 MMOL/L (ref 3.5–5.1)
POTASSIUM SERPL-SCNC: 3.5 MMOL/L (ref 3.5–5.1)
POTASSIUM SERPL-SCNC: 3.6 MMOL/L (ref 3.5–5.1)
POTASSIUM SERPL-SCNC: 3.7 MMOL/L (ref 3.5–5.1)
POTASSIUM SERPL-SCNC: 3.8 MMOL/L (ref 3.5–5.1)
POTASSIUM SERPL-SCNC: 3.9 MMOL/L (ref 3.5–5.1)
POTASSIUM SERPL-SCNC: 3.9 MMOL/L (ref 3.5–5.1)
POTASSIUM SERPL-SCNC: 4 MMOL/L (ref 3.5–5.1)
POTASSIUM SERPL-SCNC: 4 MMOL/L (ref 3.5–5.1)
POTASSIUM SERPL-SCNC: 4.1 MMOL/L (ref 3.5–5.1)
POTASSIUM SERPL-SCNC: 4.2 MMOL/L (ref 3.5–5.1)
POTASSIUM SERPL-SCNC: 4.2 MMOL/L (ref 3.5–5.1)
POTASSIUM SERPL-SCNC: 4.4 MMOL/L (ref 3.5–5.1)
POTASSIUM SERPL-SCNC: 4.5 MMOL/L (ref 3.5–5.1)
POTASSIUM SERPL-SCNC: 4.6 MMOL/L (ref 3.5–5.1)
POTASSIUM SERPL-SCNC: 5.1 MMOL/L (ref 3.5–5.1)
POTASSIUM SERPL-SCNC: 5.2 MMOL/L (ref 3.5–5.1)
PROCALCITONIN SERPL IA-MCNC: 0.05 NG/ML
PROCALCITONIN SERPL IA-MCNC: 0.06 NG/ML
PROCALCITONIN SERPL IA-MCNC: 0.07 NG/ML
PROT SERPL-MCNC: 4.4 G/DL (ref 6–8.4)
PROT SERPL-MCNC: 4.9 G/DL (ref 6–8.4)
PROT SERPL-MCNC: 5.1 G/DL (ref 6–8.4)
PROT SERPL-MCNC: 5.5 G/DL (ref 6–8.4)
PROT SERPL-MCNC: 5.5 G/DL (ref 6–8.4)
PROT SERPL-MCNC: 5.6 G/DL (ref 6–8.4)
PROT SERPL-MCNC: 5.7 G/DL (ref 6–8.4)
PROT SERPL-MCNC: 5.8 G/DL (ref 6–8.4)
PROT SERPL-MCNC: 5.8 G/DL (ref 6–8.4)
PROT SERPL-MCNC: 5.9 G/DL (ref 6–8.4)
PROT SERPL-MCNC: 5.9 G/DL (ref 6–8.4)
PROT SERPL-MCNC: 6.3 G/DL (ref 6–8.4)
PROT SERPL-MCNC: 6.3 G/DL (ref 6–8.4)
PROT SERPL-MCNC: 6.4 G/DL (ref 6–8.4)
PROT SERPL-MCNC: 6.5 G/DL (ref 6–8.4)
PROT SERPL-MCNC: 6.7 G/DL (ref 6–8.4)
PROT SERPL-MCNC: 6.8 G/DL (ref 6–8.4)
PROT SERPL-MCNC: 7.1 G/DL (ref 6–8.4)
PROT SERPL-MCNC: 7.4 G/DL (ref 6–8.4)
PROT SERPL-MCNC: 7.5 G/DL (ref 6–8.4)
PROT UR QL STRIP: ABNORMAL
PROT UR QL STRIP: ABNORMAL
PROT UR QL STRIP: NEGATIVE
PROT UR QL STRIP: NEGATIVE
RBC # BLD AUTO: 2.86 M/UL (ref 4–5.4)
RBC # BLD AUTO: 2.99 M/UL (ref 4–5.4)
RBC # BLD AUTO: 3 M/UL (ref 4–5.4)
RBC # BLD AUTO: 3.04 M/UL (ref 4–5.4)
RBC # BLD AUTO: 3.13 M/UL (ref 4–5.4)
RBC # BLD AUTO: 3.17 M/UL (ref 4–5.4)
RBC # BLD AUTO: 3.46 M/UL (ref 4–5.4)
RBC # BLD AUTO: 3.48 M/UL (ref 4–5.4)
RBC # BLD AUTO: 3.49 M/UL (ref 4–5.4)
RBC # BLD AUTO: 3.5 M/UL (ref 4–5.4)
RBC # BLD AUTO: 3.56 M/UL (ref 4–5.4)
RBC # BLD AUTO: 3.57 M/UL (ref 4–5.4)
RBC # BLD AUTO: 3.62 M/UL (ref 4–5.4)
RBC # BLD AUTO: 3.7 M/UL (ref 4–5.4)
RBC # BLD AUTO: 3.76 M/UL (ref 4–5.4)
RBC # BLD AUTO: 3.78 M/UL (ref 4–5.4)
RBC # BLD AUTO: 3.83 M/UL (ref 4–5.4)
RBC # BLD AUTO: 3.95 M/UL (ref 4–5.4)
RBC # BLD AUTO: 4 M/UL (ref 4–5.4)
RBC # BLD AUTO: 4.16 M/UL (ref 4–5.4)
RBC # BLD AUTO: 4.22 M/UL (ref 4–5.4)
RBC # BLD AUTO: 4.3 M/UL (ref 4–5.4)
RBC # BLD AUTO: 4.35 M/UL (ref 4–5.4)
RBC # BLD AUTO: 4.42 M/UL (ref 4–5.4)
RBC # BLD AUTO: 4.55 M/UL (ref 4–5.4)
RBC # BLD AUTO: 4.61 M/UL (ref 4–5.4)
RBC # BLD AUTO: 4.61 M/UL (ref 4–5.4)
RBC # BLD AUTO: 4.84 M/UL (ref 4–5.4)
RBC # BLD AUTO: 6.34 M/UL (ref 4–5.4)
RBC # BLD AUTO: 6.39 M/UL (ref 4–5.4)
RBC # BLD AUTO: 6.41 M/UL (ref 4–5.4)
RBC #/AREA URNS AUTO: 1 /HPF (ref 0–4)
RBC #/AREA URNS AUTO: 34 /HPF (ref 0–4)
RBC #/AREA URNS AUTO: 46 /HPF (ref 0–4)
SAMPLE: ABNORMAL
SAMPLE: NORMAL
SITE: ABNORMAL
SITE: NORMAL
SODIUM BLD-SCNC: 138 MMOL/L (ref 136–145)
SODIUM BLD-SCNC: 142 MMOL/L (ref 136–145)
SODIUM SERPL-SCNC: 132 MMOL/L (ref 136–145)
SODIUM SERPL-SCNC: 134 MMOL/L (ref 136–145)
SODIUM SERPL-SCNC: 135 MMOL/L (ref 136–145)
SODIUM SERPL-SCNC: 135 MMOL/L (ref 136–145)
SODIUM SERPL-SCNC: 136 MMOL/L (ref 136–145)
SODIUM SERPL-SCNC: 137 MMOL/L (ref 136–145)
SODIUM SERPL-SCNC: 137 MMOL/L (ref 136–145)
SODIUM SERPL-SCNC: 138 MMOL/L (ref 136–145)
SODIUM SERPL-SCNC: 139 MMOL/L (ref 136–145)
SODIUM SERPL-SCNC: 140 MMOL/L (ref 136–145)
SODIUM SERPL-SCNC: 141 MMOL/L (ref 136–145)
SODIUM SERPL-SCNC: 142 MMOL/L (ref 136–145)
SODIUM SERPL-SCNC: 145 MMOL/L (ref 136–145)
SODIUM SERPL-SCNC: 146 MMOL/L (ref 136–145)
SODIUM SERPL-SCNC: 148 MMOL/L (ref 136–145)
SP GR UR STRIP: 1.01 (ref 1–1.03)
SP02: 100
SP02: 100
SP02: 92
SP02: 93
SP02: 95
SP02: 95
SP02: 97
SP02: 98
SP02: 98
SPECIMEN OUTDATE: ABNORMAL
SPONT RATE: 32
SQUAMOUS #/AREA URNS AUTO: 0 /HPF
SQUAMOUS #/AREA URNS AUTO: 1 /HPF
TRIGL SERPL-MCNC: 101 MG/DL (ref 30–150)
TRIGL SERPL-MCNC: 82 MG/DL (ref 30–150)
TRIGL SERPL-MCNC: 96 MG/DL (ref 30–150)
URN SPEC COLLECT METH UR: ABNORMAL
VALPROATE SERPL-MCNC: 85.5 UG/ML (ref 50–100)
VALPROATE SERPL-MCNC: 88.8 UG/ML (ref 50–100)
VANCOMYCIN TROUGH SERPL-MCNC: 5.3 UG/ML (ref 10–22)
VT: 300
VT: 300
VT: 320
VT: 335
WBC # BLD AUTO: 10.82 K/UL (ref 3.9–12.7)
WBC # BLD AUTO: 11.75 K/UL (ref 3.9–12.7)
WBC # BLD AUTO: 4 K/UL (ref 3.9–12.7)
WBC # BLD AUTO: 4.23 K/UL (ref 3.9–12.7)
WBC # BLD AUTO: 4.53 K/UL (ref 3.9–12.7)
WBC # BLD AUTO: 4.53 K/UL (ref 3.9–12.7)
WBC # BLD AUTO: 4.78 K/UL (ref 3.9–12.7)
WBC # BLD AUTO: 4.85 K/UL (ref 3.9–12.7)
WBC # BLD AUTO: 4.88 K/UL (ref 3.9–12.7)
WBC # BLD AUTO: 5.3 K/UL (ref 3.9–12.7)
WBC # BLD AUTO: 5.33 K/UL (ref 3.9–12.7)
WBC # BLD AUTO: 5.44 K/UL (ref 3.9–12.7)
WBC # BLD AUTO: 5.6 K/UL (ref 3.9–12.7)
WBC # BLD AUTO: 5.71 K/UL (ref 3.9–12.7)
WBC # BLD AUTO: 5.81 K/UL (ref 3.9–12.7)
WBC # BLD AUTO: 5.84 K/UL (ref 3.9–12.7)
WBC # BLD AUTO: 6.05 K/UL (ref 3.9–12.7)
WBC # BLD AUTO: 6.05 K/UL (ref 3.9–12.7)
WBC # BLD AUTO: 6.12 K/UL (ref 3.9–12.7)
WBC # BLD AUTO: 6.35 K/UL (ref 3.9–12.7)
WBC # BLD AUTO: 6.67 K/UL (ref 3.9–12.7)
WBC # BLD AUTO: 6.83 K/UL (ref 3.9–12.7)
WBC # BLD AUTO: 7.31 K/UL (ref 3.9–12.7)
WBC # BLD AUTO: 7.43 K/UL (ref 3.9–12.7)
WBC # BLD AUTO: 7.44 K/UL (ref 3.9–12.7)
WBC # BLD AUTO: 7.6 K/UL (ref 3.9–12.7)
WBC # BLD AUTO: 8.03 K/UL (ref 3.9–12.7)
WBC # BLD AUTO: 8.08 K/UL (ref 3.9–12.7)
WBC # BLD AUTO: 8.19 K/UL (ref 3.9–12.7)
WBC # BLD AUTO: 8.46 K/UL (ref 3.9–12.7)
WBC # BLD AUTO: 9.07 K/UL (ref 3.9–12.7)
WBC #/AREA URNS AUTO: 13 /HPF (ref 0–5)
WBC #/AREA URNS AUTO: 90 /HPF (ref 0–5)
WBC #/AREA URNS AUTO: >100 /HPF (ref 0–5)
WBC CLUMPS UR QL AUTO: ABNORMAL
YEAST UR QL AUTO: ABNORMAL

## 2024-01-01 PROCEDURE — 63600175 PHARM REV CODE 636 W HCPCS: Performed by: PHYSICIAN ASSISTANT

## 2024-01-01 PROCEDURE — 25000003 PHARM REV CODE 250

## 2024-01-01 PROCEDURE — 25000242 PHARM REV CODE 250 ALT 637 W/ HCPCS: Performed by: REGISTERED NURSE

## 2024-01-01 PROCEDURE — 20000000 HC ICU ROOM

## 2024-01-01 PROCEDURE — 25000003 PHARM REV CODE 250: Performed by: INTERNAL MEDICINE

## 2024-01-01 PROCEDURE — 25500020 PHARM REV CODE 255

## 2024-01-01 PROCEDURE — 83735 ASSAY OF MAGNESIUM: CPT | Performed by: INTERNAL MEDICINE

## 2024-01-01 PROCEDURE — 85025 COMPLETE CBC W/AUTO DIFF WBC: CPT

## 2024-01-01 PROCEDURE — 63600175 PHARM REV CODE 636 W HCPCS: Performed by: REGISTERED NURSE

## 2024-01-01 PROCEDURE — 25000003 PHARM REV CODE 250: Performed by: REGISTERED NURSE

## 2024-01-01 PROCEDURE — 37799 UNLISTED PX VASCULAR SURGERY: CPT

## 2024-01-01 PROCEDURE — 27100171 HC OXYGEN HIGH FLOW UP TO 24 HOURS

## 2024-01-01 PROCEDURE — 87070 CULTURE OTHR SPECIMN AEROBIC: CPT

## 2024-01-01 PROCEDURE — 99233 SBSQ HOSP IP/OBS HIGH 50: CPT | Mod: ,,, | Performed by: PSYCHIATRY & NEUROLOGY

## 2024-01-01 PROCEDURE — 25000003 PHARM REV CODE 250: Performed by: NURSE PRACTITIONER

## 2024-01-01 PROCEDURE — 94761 N-INVAS EAR/PLS OXIMETRY MLT: CPT

## 2024-01-01 PROCEDURE — 99223 1ST HOSP IP/OBS HIGH 75: CPT | Mod: GC,,, | Performed by: PSYCHIATRY & NEUROLOGY

## 2024-01-01 PROCEDURE — 99900026 HC AIRWAY MAINTENANCE (STAT)

## 2024-01-01 PROCEDURE — 25000003 PHARM REV CODE 250: Performed by: PSYCHIATRY & NEUROLOGY

## 2024-01-01 PROCEDURE — 80164 ASSAY DIPROPYLACETIC ACD TOT: CPT | Performed by: PSYCHIATRY & NEUROLOGY

## 2024-01-01 PROCEDURE — 80053 COMPREHEN METABOLIC PANEL: CPT

## 2024-01-01 PROCEDURE — 51798 US URINE CAPACITY MEASURE: CPT

## 2024-01-01 PROCEDURE — 94003 VENT MGMT INPAT SUBQ DAY: CPT

## 2024-01-01 PROCEDURE — 95714 VEEG EA 12-26 HR UNMNTR: CPT

## 2024-01-01 PROCEDURE — 11000001 HC ACUTE MED/SURG PRIVATE ROOM

## 2024-01-01 PROCEDURE — 25000242 PHARM REV CODE 250 ALT 637 W/ HCPCS: Performed by: PSYCHIATRY & NEUROLOGY

## 2024-01-01 PROCEDURE — 37000008 HC ANESTHESIA 1ST 15 MINUTES: Performed by: SURGERY

## 2024-01-01 PROCEDURE — 31500 INSERT EMERGENCY AIRWAY: CPT | Mod: ,,, | Performed by: STUDENT IN AN ORGANIZED HEALTH CARE EDUCATION/TRAINING PROGRAM

## 2024-01-01 PROCEDURE — 84145 PROCALCITONIN (PCT): CPT

## 2024-01-01 PROCEDURE — 25000003 PHARM REV CODE 250: Performed by: STUDENT IN AN ORGANIZED HEALTH CARE EDUCATION/TRAINING PROGRAM

## 2024-01-01 PROCEDURE — 93010 ELECTROCARDIOGRAM REPORT: CPT | Mod: ,,, | Performed by: INTERNAL MEDICINE

## 2024-01-01 PROCEDURE — 94640 AIRWAY INHALATION TREATMENT: CPT

## 2024-01-01 PROCEDURE — 99232 SBSQ HOSP IP/OBS MODERATE 35: CPT | Mod: ,,, | Performed by: NURSE PRACTITIONER

## 2024-01-01 PROCEDURE — 83880 ASSAY OF NATRIURETIC PEPTIDE: CPT

## 2024-01-01 PROCEDURE — 36556 INSERT NON-TUNNEL CV CATH: CPT | Mod: GC,,, | Performed by: PSYCHIATRY & NEUROLOGY

## 2024-01-01 PROCEDURE — 84100 ASSAY OF PHOSPHORUS: CPT

## 2024-01-01 PROCEDURE — 25000242 PHARM REV CODE 250 ALT 637 W/ HCPCS: Performed by: STUDENT IN AN ORGANIZED HEALTH CARE EDUCATION/TRAINING PROGRAM

## 2024-01-01 PROCEDURE — 63600175 PHARM REV CODE 636 W HCPCS

## 2024-01-01 PROCEDURE — 99291 CRITICAL CARE FIRST HOUR: CPT | Mod: FS,,, | Performed by: INTERNAL MEDICINE

## 2024-01-01 PROCEDURE — 85025 COMPLETE CBC W/AUTO DIFF WBC: CPT | Mod: 91

## 2024-01-01 PROCEDURE — 99233 SBSQ HOSP IP/OBS HIGH 50: CPT | Mod: ,,, | Performed by: PHYSICIAN ASSISTANT

## 2024-01-01 PROCEDURE — 85025 COMPLETE CBC W/AUTO DIFF WBC: CPT | Performed by: INTERNAL MEDICINE

## 2024-01-01 PROCEDURE — 25000003 PHARM REV CODE 250: Performed by: PHYSICIAN ASSISTANT

## 2024-01-01 PROCEDURE — 27000221 HC OXYGEN, UP TO 24 HOURS

## 2024-01-01 PROCEDURE — 86870 RBC ANTIBODY IDENTIFICATION: CPT

## 2024-01-01 PROCEDURE — 36600 WITHDRAWAL OF ARTERIAL BLOOD: CPT

## 2024-01-01 PROCEDURE — 95720 EEG PHY/QHP EA INCR W/VEEG: CPT | Mod: ,,, | Performed by: PSYCHIATRY & NEUROLOGY

## 2024-01-01 PROCEDURE — 87088 URINE BACTERIA CULTURE: CPT

## 2024-01-01 PROCEDURE — 37000009 HC ANESTHESIA EA ADD 15 MINS: Performed by: SURGERY

## 2024-01-01 PROCEDURE — 84132 ASSAY OF SERUM POTASSIUM: CPT

## 2024-01-01 PROCEDURE — C9254 INJECTION, LACOSAMIDE: HCPCS | Performed by: PHYSICIAN ASSISTANT

## 2024-01-01 PROCEDURE — 27200966 HC CLOSED SUCTION SYSTEM

## 2024-01-01 PROCEDURE — 99900035 HC TECH TIME PER 15 MIN (STAT)

## 2024-01-01 PROCEDURE — 0BH17EZ INSERTION OF ENDOTRACHEAL AIRWAY INTO TRACHEA, VIA NATURAL OR ARTIFICIAL OPENING: ICD-10-PCS | Performed by: STUDENT IN AN ORGANIZED HEALTH CARE EDUCATION/TRAINING PROGRAM

## 2024-01-01 PROCEDURE — 94002 VENT MGMT INPAT INIT DAY: CPT

## 2024-01-01 PROCEDURE — 86901 BLOOD TYPING SEROLOGIC RH(D): CPT

## 2024-01-01 PROCEDURE — 99291 CRITICAL CARE FIRST HOUR: CPT | Mod: GC,,, | Performed by: PSYCHIATRY & NEUROLOGY

## 2024-01-01 PROCEDURE — 84100 ASSAY OF PHOSPHORUS: CPT | Performed by: INTERNAL MEDICINE

## 2024-01-01 PROCEDURE — 36000707: Performed by: SURGERY

## 2024-01-01 PROCEDURE — 84295 ASSAY OF SERUM SODIUM: CPT

## 2024-01-01 PROCEDURE — 83735 ASSAY OF MAGNESIUM: CPT

## 2024-01-01 PROCEDURE — 82803 BLOOD GASES ANY COMBINATION: CPT

## 2024-01-01 PROCEDURE — 82800 BLOOD PH: CPT

## 2024-01-01 PROCEDURE — 80053 COMPREHEN METABOLIC PANEL: CPT | Performed by: INTERNAL MEDICINE

## 2024-01-01 PROCEDURE — 99291 CRITICAL CARE FIRST HOUR: CPT | Mod: GC,,, | Performed by: INTERNAL MEDICINE

## 2024-01-01 PROCEDURE — 86901 BLOOD TYPING SEROLOGIC RH(D): CPT | Performed by: PSYCHIATRY & NEUROLOGY

## 2024-01-01 PROCEDURE — 85014 HEMATOCRIT: CPT

## 2024-01-01 PROCEDURE — 25000242 PHARM REV CODE 250 ALT 637 W/ HCPCS: Performed by: NURSE PRACTITIONER

## 2024-01-01 PROCEDURE — 63600175 PHARM REV CODE 636 W HCPCS: Performed by: PSYCHIATRY & NEUROLOGY

## 2024-01-01 PROCEDURE — 83735 ASSAY OF MAGNESIUM: CPT | Performed by: STUDENT IN AN ORGANIZED HEALTH CARE EDUCATION/TRAINING PROGRAM

## 2024-01-01 PROCEDURE — 94761 N-INVAS EAR/PLS OXIMETRY MLT: CPT | Mod: XB

## 2024-01-01 PROCEDURE — 80053 COMPREHEN METABOLIC PANEL: CPT | Performed by: STUDENT IN AN ORGANIZED HEALTH CARE EDUCATION/TRAINING PROGRAM

## 2024-01-01 PROCEDURE — 87205 SMEAR GRAM STAIN: CPT

## 2024-01-01 PROCEDURE — 85025 COMPLETE CBC W/AUTO DIFF WBC: CPT | Mod: 91 | Performed by: REGISTERED NURSE

## 2024-01-01 PROCEDURE — 27201423 OPTIME MED/SURG SUP & DEVICES STERILE SUPPLY: Performed by: SURGERY

## 2024-01-01 PROCEDURE — 95718 EEG PHYS/QHP 2-12 HR W/VEEG: CPT | Mod: ,,, | Performed by: PSYCHIATRY & NEUROLOGY

## 2024-01-01 PROCEDURE — 99291 CRITICAL CARE FIRST HOUR: CPT | Mod: FS,25,, | Performed by: PSYCHIATRY & NEUROLOGY

## 2024-01-01 PROCEDURE — 86870 RBC ANTIBODY IDENTIFICATION: CPT | Performed by: INTERNAL MEDICINE

## 2024-01-01 PROCEDURE — 80048 BASIC METABOLIC PNL TOTAL CA: CPT | Mod: XB | Performed by: STUDENT IN AN ORGANIZED HEALTH CARE EDUCATION/TRAINING PROGRAM

## 2024-01-01 PROCEDURE — 81001 URINALYSIS AUTO W/SCOPE: CPT | Performed by: PHYSICIAN ASSISTANT

## 2024-01-01 PROCEDURE — 87106 FUNGI IDENTIFICATION YEAST: CPT

## 2024-01-01 PROCEDURE — 36000706: Performed by: SURGERY

## 2024-01-01 PROCEDURE — 83735 ASSAY OF MAGNESIUM: CPT | Mod: 91

## 2024-01-01 PROCEDURE — 99291 CRITICAL CARE FIRST HOUR: CPT | Mod: ,,, | Performed by: PSYCHIATRY & NEUROLOGY

## 2024-01-01 PROCEDURE — 83605 ASSAY OF LACTIC ACID: CPT

## 2024-01-01 PROCEDURE — 99292 CRITICAL CARE ADDL 30 MIN: CPT | Mod: FS,,, | Performed by: REGISTERED NURSE

## 2024-01-01 PROCEDURE — 0B110F4 BYPASS TRACHEA TO CUTANEOUS WITH TRACHEOSTOMY DEVICE, OPEN APPROACH: ICD-10-PCS | Performed by: SURGERY

## 2024-01-01 PROCEDURE — 31600 PLANNED TRACHEOSTOMY: CPT | Mod: 59,,, | Performed by: SURGERY

## 2024-01-01 PROCEDURE — 85520 HEPARIN ASSAY: CPT | Performed by: REGISTERED NURSE

## 2024-01-01 PROCEDURE — 36415 COLL VENOUS BLD VENIPUNCTURE: CPT | Performed by: STUDENT IN AN ORGANIZED HEALTH CARE EDUCATION/TRAINING PROGRAM

## 2024-01-01 PROCEDURE — C9113 INJ PANTOPRAZOLE SODIUM, VIA: HCPCS | Performed by: REGISTERED NURSE

## 2024-01-01 PROCEDURE — 85025 COMPLETE CBC W/AUTO DIFF WBC: CPT | Mod: 91 | Performed by: NURSE PRACTITIONER

## 2024-01-01 PROCEDURE — 87086 URINE CULTURE/COLONY COUNT: CPT | Performed by: PHYSICIAN ASSISTANT

## 2024-01-01 PROCEDURE — 43246 EGD PLACE GASTROSTOMY TUBE: CPT | Mod: 51,,, | Performed by: SURGERY

## 2024-01-01 PROCEDURE — 99291 CRITICAL CARE FIRST HOUR: CPT | Mod: FS,,, | Performed by: PSYCHIATRY & NEUROLOGY

## 2024-01-01 PROCEDURE — 51701 INSERT BLADDER CATHETER: CPT

## 2024-01-01 PROCEDURE — 02HV33Z INSERTION OF INFUSION DEVICE INTO SUPERIOR VENA CAVA, PERCUTANEOUS APPROACH: ICD-10-PCS | Performed by: PSYCHIATRY & NEUROLOGY

## 2024-01-01 PROCEDURE — C9113 INJ PANTOPRAZOLE SODIUM, VIA: HCPCS

## 2024-01-01 PROCEDURE — C9254 INJECTION, LACOSAMIDE: HCPCS | Performed by: STUDENT IN AN ORGANIZED HEALTH CARE EDUCATION/TRAINING PROGRAM

## 2024-01-01 PROCEDURE — 95720 EEG PHY/QHP EA INCR W/VEEG: CPT | Mod: ,,, | Performed by: STUDENT IN AN ORGANIZED HEALTH CARE EDUCATION/TRAINING PROGRAM

## 2024-01-01 PROCEDURE — 80202 ASSAY OF VANCOMYCIN: CPT

## 2024-01-01 PROCEDURE — 93005 ELECTROCARDIOGRAM TRACING: CPT

## 2024-01-01 PROCEDURE — 86900 BLOOD TYPING SEROLOGIC ABO: CPT | Performed by: INTERNAL MEDICINE

## 2024-01-01 PROCEDURE — 25000242 PHARM REV CODE 250 ALT 637 W/ HCPCS: Performed by: PHYSICIAN ASSISTANT

## 2024-01-01 PROCEDURE — 25000242 PHARM REV CODE 250 ALT 637 W/ HCPCS

## 2024-01-01 PROCEDURE — 27000190 HC CPAP FULL FACE MASK W/VALVE

## 2024-01-01 PROCEDURE — 84132 ASSAY OF SERUM POTASSIUM: CPT | Performed by: PSYCHIATRY & NEUROLOGY

## 2024-01-01 PROCEDURE — 63600175 PHARM REV CODE 636 W HCPCS: Performed by: STUDENT IN AN ORGANIZED HEALTH CARE EDUCATION/TRAINING PROGRAM

## 2024-01-01 PROCEDURE — 80053 COMPREHEN METABOLIC PANEL: CPT | Mod: 91 | Performed by: PSYCHIATRY & NEUROLOGY

## 2024-01-01 PROCEDURE — 85025 COMPLETE CBC W/AUTO DIFF WBC: CPT | Performed by: STUDENT IN AN ORGANIZED HEALTH CARE EDUCATION/TRAINING PROGRAM

## 2024-01-01 PROCEDURE — 99291 CRITICAL CARE FIRST HOUR: CPT | Mod: ,,, | Performed by: STUDENT IN AN ORGANIZED HEALTH CARE EDUCATION/TRAINING PROGRAM

## 2024-01-01 PROCEDURE — 82533 TOTAL CORTISOL: CPT | Performed by: PHYSICIAN ASSISTANT

## 2024-01-01 PROCEDURE — 84478 ASSAY OF TRIGLYCERIDES: CPT | Performed by: PSYCHIATRY & NEUROLOGY

## 2024-01-01 PROCEDURE — 27201112

## 2024-01-01 PROCEDURE — 8E0WXCZ ROBOTIC ASSISTED PROCEDURE OF TRUNK REGION: ICD-10-PCS | Performed by: SURGERY

## 2024-01-01 PROCEDURE — 80164 ASSAY DIPROPYLACETIC ACD TOT: CPT

## 2024-01-01 PROCEDURE — 99499 UNLISTED E&M SERVICE: CPT | Mod: ,,, | Performed by: REGISTERED NURSE

## 2024-01-01 PROCEDURE — 0DQ64ZZ REPAIR STOMACH, PERCUTANEOUS ENDOSCOPIC APPROACH: ICD-10-PCS | Performed by: SURGERY

## 2024-01-01 PROCEDURE — 63600175 PHARM REV CODE 636 W HCPCS: Mod: JZ,JG | Performed by: SURGERY

## 2024-01-01 PROCEDURE — 43659 UNLISTED LAPS PX STOMACH: CPT | Mod: 58,,, | Performed by: SURGERY

## 2024-01-01 PROCEDURE — 5A1955Z RESPIRATORY VENTILATION, GREATER THAN 96 CONSECUTIVE HOURS: ICD-10-PCS | Performed by: STUDENT IN AN ORGANIZED HEALTH CARE EDUCATION/TRAINING PROGRAM

## 2024-01-01 PROCEDURE — 83735 ASSAY OF MAGNESIUM: CPT | Mod: 91 | Performed by: PSYCHIATRY & NEUROLOGY

## 2024-01-01 PROCEDURE — 36415 COLL VENOUS BLD VENIPUNCTURE: CPT

## 2024-01-01 PROCEDURE — 83930 ASSAY OF BLOOD OSMOLALITY: CPT | Performed by: NURSE PRACTITIONER

## 2024-01-01 PROCEDURE — 99499 UNLISTED E&M SERVICE: CPT | Mod: 25,,, | Performed by: PSYCHIATRY & NEUROLOGY

## 2024-01-01 PROCEDURE — 87086 URINE CULTURE/COLONY COUNT: CPT

## 2024-01-01 PROCEDURE — 99238 HOSP IP/OBS DSCHRG MGMT 30/<: CPT | Mod: ,,, | Performed by: NURSE PRACTITIONER

## 2024-01-01 PROCEDURE — 94660 CPAP INITIATION&MGMT: CPT

## 2024-01-01 PROCEDURE — C9113 INJ PANTOPRAZOLE SODIUM, VIA: HCPCS | Performed by: PHYSICIAN ASSISTANT

## 2024-01-01 PROCEDURE — 87040 BLOOD CULTURE FOR BACTERIA: CPT

## 2024-01-01 PROCEDURE — 87088 URINE BACTERIA CULTURE: CPT | Performed by: PHYSICIAN ASSISTANT

## 2024-01-01 PROCEDURE — 87077 CULTURE AEROBIC IDENTIFY: CPT | Performed by: PHYSICIAN ASSISTANT

## 2024-01-01 PROCEDURE — 0DH63UZ INSERTION OF FEEDING DEVICE INTO STOMACH, PERCUTANEOUS APPROACH: ICD-10-PCS | Performed by: SURGERY

## 2024-01-01 PROCEDURE — 82330 ASSAY OF CALCIUM: CPT | Performed by: PSYCHIATRY & NEUROLOGY

## 2024-01-01 PROCEDURE — 99233 SBSQ HOSP IP/OBS HIGH 50: CPT | Mod: FS,,, | Performed by: STUDENT IN AN ORGANIZED HEALTH CARE EDUCATION/TRAINING PROGRAM

## 2024-01-01 PROCEDURE — 81001 URINALYSIS AUTO W/SCOPE: CPT

## 2024-01-01 PROCEDURE — 86900 BLOOD TYPING SEROLOGIC ABO: CPT

## 2024-01-01 PROCEDURE — 95700 EEG CONT REC W/VID EEG TECH: CPT

## 2024-01-01 PROCEDURE — 99499 UNLISTED E&M SERVICE: CPT | Mod: 25,,, | Performed by: REGISTERED NURSE

## 2024-01-01 PROCEDURE — 82330 ASSAY OF CALCIUM: CPT

## 2024-01-01 PROCEDURE — 82272 OCCULT BLD FECES 1-3 TESTS: CPT

## 2024-01-01 PROCEDURE — 99222 1ST HOSP IP/OBS MODERATE 55: CPT | Mod: ,,, | Performed by: NURSE PRACTITIONER

## 2024-01-01 PROCEDURE — 36415 COLL VENOUS BLD VENIPUNCTURE: CPT | Performed by: PSYCHIATRY & NEUROLOGY

## 2024-01-01 PROCEDURE — 81001 URINALYSIS AUTO W/SCOPE: CPT | Performed by: NURSE PRACTITIONER

## 2024-01-01 PROCEDURE — 99223 1ST HOSP IP/OBS HIGH 75: CPT | Mod: 57,GC,, | Performed by: SURGERY

## 2024-01-01 PROCEDURE — 99292 CRITICAL CARE ADDL 30 MIN: CPT | Mod: FS,25,, | Performed by: REGISTERED NURSE

## 2024-01-01 PROCEDURE — 83935 ASSAY OF URINE OSMOLALITY: CPT | Performed by: NURSE PRACTITIONER

## 2024-01-01 PROCEDURE — 87186 SC STD MICRODIL/AGAR DIL: CPT | Performed by: PHYSICIAN ASSISTANT

## 2024-01-01 PROCEDURE — 85018 HEMOGLOBIN: CPT

## 2024-01-01 PROCEDURE — C9254 INJECTION, LACOSAMIDE: HCPCS

## 2024-01-01 PROCEDURE — 99024 POSTOP FOLLOW-UP VISIT: CPT | Mod: ,,, | Performed by: STUDENT IN AN ORGANIZED HEALTH CARE EDUCATION/TRAINING PROGRAM

## 2024-01-01 RX ORDER — SODIUM,POTASSIUM PHOSPHATES 280-250MG
2 POWDER IN PACKET (EA) ORAL
Status: DISCONTINUED | OUTPATIENT
Start: 2024-01-01 | End: 2024-01-01

## 2024-01-01 RX ORDER — ATORVASTATIN CALCIUM 40 MG/1
40 TABLET, FILM COATED ORAL DAILY
Status: DISCONTINUED | OUTPATIENT
Start: 2024-01-01 | End: 2024-01-01

## 2024-01-01 RX ORDER — HYDRALAZINE HYDROCHLORIDE 20 MG/ML
10 INJECTION INTRAMUSCULAR; INTRAVENOUS EVERY 4 HOURS PRN
Status: DISCONTINUED | OUTPATIENT
Start: 2024-01-01 | End: 2024-01-01

## 2024-01-01 RX ORDER — ROCURONIUM BROMIDE 10 MG/ML
50 INJECTION, SOLUTION INTRAVENOUS ONCE
Status: COMPLETED | OUTPATIENT
Start: 2024-01-01 | End: 2024-01-01

## 2024-01-01 RX ORDER — LANOLIN ALCOHOL/MO/W.PET/CERES
800 CREAM (GRAM) TOPICAL
Status: DISCONTINUED | OUTPATIENT
Start: 2024-01-01 | End: 2024-01-01

## 2024-01-01 RX ORDER — LISINOPRIL AND HYDROCHLOROTHIAZIDE 10; 12.5 MG/1; MG/1
1 TABLET ORAL DAILY
Status: DISCONTINUED | OUTPATIENT
Start: 2024-01-01 | End: 2024-01-01

## 2024-01-01 RX ORDER — MORPHINE SULFATE 2 MG/ML
2 INJECTION, SOLUTION INTRAMUSCULAR; INTRAVENOUS EVERY 4 HOURS PRN
Status: DISCONTINUED | OUTPATIENT
Start: 2024-01-01 | End: 2024-01-01 | Stop reason: HOSPADM

## 2024-01-01 RX ORDER — SODIUM CHLORIDE, SODIUM LACTATE, POTASSIUM CHLORIDE, CALCIUM CHLORIDE 600; 310; 30; 20 MG/100ML; MG/100ML; MG/100ML; MG/100ML
INJECTION, SOLUTION INTRAVENOUS CONTINUOUS
Status: DISCONTINUED | OUTPATIENT
Start: 2024-01-01 | End: 2024-01-01

## 2024-01-01 RX ORDER — PHENYLEPHRINE HCL IN 0.9% NACL 1 MG/10 ML
100 SYRINGE (ML) INTRAVENOUS ONCE
Status: COMPLETED | OUTPATIENT
Start: 2024-01-01 | End: 2024-01-01

## 2024-01-01 RX ORDER — FLUTICASONE PROPIONATE 50 MCG
1 SPRAY, SUSPENSION (ML) NASAL DAILY
Status: DISCONTINUED | OUTPATIENT
Start: 2024-01-01 | End: 2024-01-01

## 2024-01-01 RX ORDER — IPRATROPIUM BROMIDE AND ALBUTEROL SULFATE 2.5; .5 MG/3ML; MG/3ML
3 SOLUTION RESPIRATORY (INHALATION) EVERY 6 HOURS PRN
Status: DISCONTINUED | OUTPATIENT
Start: 2024-01-01 | End: 2024-01-01

## 2024-01-01 RX ORDER — PHENYLEPHRINE HCL IN 0.9% NACL 1 MG/10 ML
SYRINGE (ML) INTRAVENOUS
Status: COMPLETED
Start: 2024-01-01 | End: 2024-01-01

## 2024-01-01 RX ORDER — SUCCINYLCHOLINE CHLORIDE 20 MG/ML
INJECTION INTRAMUSCULAR; INTRAVENOUS
Status: DISPENSED
Start: 2024-01-01 | End: 2024-01-01

## 2024-01-01 RX ORDER — ONDANSETRON HYDROCHLORIDE 2 MG/ML
8 INJECTION, SOLUTION INTRAVENOUS EVERY 8 HOURS PRN
Status: DISCONTINUED | OUTPATIENT
Start: 2024-01-01 | End: 2024-01-01

## 2024-01-01 RX ORDER — LEVETIRACETAM 100 MG/ML
1500 SOLUTION ORAL 2 TIMES DAILY
Status: DISCONTINUED | OUTPATIENT
Start: 2024-01-01 | End: 2024-01-01

## 2024-01-01 RX ORDER — SODIUM CHLORIDE 1 G/1
2000 TABLET ORAL 3 TIMES DAILY
Status: DISCONTINUED | OUTPATIENT
Start: 2024-01-01 | End: 2024-01-01

## 2024-01-01 RX ORDER — METOPROLOL TARTRATE 25 MG/1
25 TABLET, FILM COATED ORAL 2 TIMES DAILY
Status: DISCONTINUED | OUTPATIENT
Start: 2024-01-01 | End: 2024-01-01

## 2024-01-01 RX ORDER — HEPARIN SODIUM 5000 [USP'U]/ML
5000 INJECTION, SOLUTION INTRAVENOUS; SUBCUTANEOUS EVERY 12 HOURS
Status: DISCONTINUED | OUTPATIENT
Start: 2024-01-01 | End: 2024-01-01

## 2024-01-01 RX ORDER — INDOMETHACIN 25 MG/1
50 CAPSULE ORAL ONCE
Status: COMPLETED | OUTPATIENT
Start: 2024-01-01 | End: 2024-01-01

## 2024-01-01 RX ORDER — IBUPROFEN 200 MG
24 TABLET ORAL
Status: DISCONTINUED | OUTPATIENT
Start: 2024-01-01 | End: 2024-01-01

## 2024-01-01 RX ORDER — NOREPINEPHRINE BITARTRATE/D5W 4MG/250ML
0-.2 PLASTIC BAG, INJECTION (ML) INTRAVENOUS CONTINUOUS
Status: DISPENSED | OUTPATIENT
Start: 2024-01-01 | End: 2024-01-01

## 2024-01-01 RX ORDER — METOPROLOL TARTRATE 50 MG/1
50 TABLET ORAL 2 TIMES DAILY
Status: DISCONTINUED | OUTPATIENT
Start: 2024-01-01 | End: 2024-01-01

## 2024-01-01 RX ORDER — NALOXONE HCL 0.4 MG/ML
0.02 VIAL (ML) INJECTION
Status: DISCONTINUED | OUTPATIENT
Start: 2024-01-01 | End: 2024-01-01

## 2024-01-01 RX ORDER — SODIUM CHLORIDE 0.9 % (FLUSH) 0.9 %
10 SYRINGE (ML) INJECTION EVERY 12 HOURS PRN
Status: DISCONTINUED | OUTPATIENT
Start: 2024-01-01 | End: 2024-01-01

## 2024-01-01 RX ORDER — ACETAMINOPHEN 325 MG/1
650 TABLET ORAL EVERY 4 HOURS PRN
Status: DISCONTINUED | OUTPATIENT
Start: 2024-01-01 | End: 2024-01-01

## 2024-01-01 RX ORDER — FENTANYL CITRATE 50 UG/ML
25 INJECTION, SOLUTION INTRAMUSCULAR; INTRAVENOUS
Status: DISCONTINUED | OUTPATIENT
Start: 2024-01-01 | End: 2024-01-01

## 2024-01-01 RX ORDER — GLUCAGON 1 MG
1 KIT INJECTION
Status: DISCONTINUED | OUTPATIENT
Start: 2024-01-01 | End: 2024-01-01

## 2024-01-01 RX ORDER — PANTOPRAZOLE SODIUM 40 MG/10ML
40 INJECTION, POWDER, LYOPHILIZED, FOR SOLUTION INTRAVENOUS 2 TIMES DAILY
Status: COMPLETED | OUTPATIENT
Start: 2024-01-01 | End: 2024-01-01

## 2024-01-01 RX ORDER — BALSAM PERU/CASTOR OIL
OINTMENT (GRAM) TOPICAL 2 TIMES DAILY
Status: DISCONTINUED | OUTPATIENT
Start: 2024-01-01 | End: 2024-01-01

## 2024-01-01 RX ORDER — SODIUM CHLORIDE 9 MG/ML
INJECTION, SOLUTION INTRAVENOUS CONTINUOUS
Status: DISCONTINUED | OUTPATIENT
Start: 2024-01-01 | End: 2024-01-01

## 2024-01-01 RX ORDER — PRAVASTATIN SODIUM 10 MG/1
10 TABLET ORAL DAILY
Status: DISCONTINUED | OUTPATIENT
Start: 2024-01-01 | End: 2024-01-01

## 2024-01-01 RX ORDER — CLOBAZAM 10 MG/1
20 TABLET ORAL DAILY
Status: DISCONTINUED | OUTPATIENT
Start: 2024-01-01 | End: 2024-01-01

## 2024-01-01 RX ORDER — CLOBAZAM 10 MG/1
20 TABLET ORAL NIGHTLY
Status: DISCONTINUED | OUTPATIENT
Start: 2024-01-01 | End: 2024-01-01

## 2024-01-01 RX ORDER — CALCIUM GLUCONATE 20 MG/ML
3 INJECTION, SOLUTION INTRAVENOUS
Status: DISCONTINUED | OUTPATIENT
Start: 2024-01-01 | End: 2024-01-01

## 2024-01-01 RX ORDER — CALCIUM GLUCONATE 20 MG/ML
1 INJECTION, SOLUTION INTRAVENOUS
Status: DISCONTINUED | OUTPATIENT
Start: 2024-01-01 | End: 2024-01-01

## 2024-01-01 RX ORDER — QUETIAPINE FUMARATE 25 MG/1
25 TABLET, FILM COATED ORAL NIGHTLY
Status: DISCONTINUED | OUTPATIENT
Start: 2024-01-01 | End: 2024-01-01

## 2024-01-01 RX ORDER — PANTOPRAZOLE SODIUM 40 MG/10ML
40 INJECTION, POWDER, LYOPHILIZED, FOR SOLUTION INTRAVENOUS 2 TIMES DAILY
Status: DISCONTINUED | OUTPATIENT
Start: 2024-01-01 | End: 2024-01-01

## 2024-01-01 RX ORDER — NAPROXEN SODIUM 220 MG/1
81 TABLET, FILM COATED ORAL DAILY
Status: DISCONTINUED | OUTPATIENT
Start: 2024-01-01 | End: 2024-01-01

## 2024-01-01 RX ORDER — LACOSAMIDE 100 MG/1
200 TABLET ORAL ONCE
Status: COMPLETED | OUTPATIENT
Start: 2024-01-01 | End: 2024-01-01

## 2024-01-01 RX ORDER — IBUPROFEN 200 MG
16 TABLET ORAL
Status: DISCONTINUED | OUTPATIENT
Start: 2024-01-01 | End: 2024-01-01

## 2024-01-01 RX ORDER — LEVETIRACETAM 500 MG/5ML
1500 INJECTION, SOLUTION, CONCENTRATE INTRAVENOUS EVERY 12 HOURS
Status: DISCONTINUED | OUTPATIENT
Start: 2024-01-01 | End: 2024-01-01

## 2024-01-01 RX ORDER — PROPOFOL 10 MG/ML
VIAL (ML) INTRAVENOUS
Status: DISPENSED
Start: 2024-01-01 | End: 2024-01-01

## 2024-01-01 RX ORDER — IPRATROPIUM BROMIDE AND ALBUTEROL SULFATE 2.5; .5 MG/3ML; MG/3ML
3 SOLUTION RESPIRATORY (INHALATION) EVERY 4 HOURS
Status: DISCONTINUED | OUTPATIENT
Start: 2024-01-01 | End: 2024-01-01

## 2024-01-01 RX ORDER — ONDANSETRON HYDROCHLORIDE 2 MG/ML
4 INJECTION, SOLUTION INTRAVENOUS EVERY 8 HOURS PRN
Status: DISCONTINUED | OUTPATIENT
Start: 2024-01-01 | End: 2024-01-01

## 2024-01-01 RX ORDER — THIAMINE HCL 100 MG
100 TABLET ORAL DAILY
Status: DISCONTINUED | OUTPATIENT
Start: 2024-01-01 | End: 2024-01-01

## 2024-01-01 RX ORDER — PANTOPRAZOLE SODIUM 40 MG/1
40 FOR SUSPENSION ORAL 2 TIMES DAILY
Status: DISCONTINUED | OUTPATIENT
Start: 2024-01-01 | End: 2024-01-01

## 2024-01-01 RX ORDER — LACOSAMIDE 100 MG/1
200 TABLET ORAL EVERY 12 HOURS
Status: DISCONTINUED | OUTPATIENT
Start: 2024-01-01 | End: 2024-01-01

## 2024-01-01 RX ORDER — PHENYLEPHRINE HCL IN 0.9% NACL 1 MG/10 ML
200 SYRINGE (ML) INTRAVENOUS ONCE
Status: COMPLETED | OUTPATIENT
Start: 2024-01-01 | End: 2024-01-01

## 2024-01-01 RX ORDER — INDOMETHACIN 25 MG/1
CAPSULE ORAL
Status: COMPLETED
Start: 2024-01-01 | End: 2024-01-01

## 2024-01-01 RX ORDER — PANTOPRAZOLE SODIUM 40 MG/1
40 FOR SUSPENSION ORAL DAILY
Status: DISCONTINUED | OUTPATIENT
Start: 2024-01-01 | End: 2024-01-01

## 2024-01-01 RX ORDER — PROPOFOL 10 MG/ML
0-50 INJECTION, EMULSION INTRAVENOUS CONTINUOUS
Status: DISCONTINUED | OUTPATIENT
Start: 2024-01-01 | End: 2024-01-01

## 2024-01-01 RX ORDER — NOREPINEPHRINE BITARTRATE/D5W 4MG/250ML
0-.2 PLASTIC BAG, INJECTION (ML) INTRAVENOUS CONTINUOUS
Status: DISCONTINUED | OUTPATIENT
Start: 2024-01-01 | End: 2024-01-01

## 2024-01-01 RX ORDER — PROPOFOL 10 MG/ML
INJECTION, EMULSION INTRAVENOUS
Status: COMPLETED
Start: 2024-01-01 | End: 2024-01-01

## 2024-01-01 RX ORDER — ROCURONIUM BROMIDE 10 MG/ML
INJECTION, SOLUTION INTRAVENOUS
Status: COMPLETED
Start: 2024-01-01 | End: 2024-01-01

## 2024-01-01 RX ORDER — PHENYLEPHRINE HCL IN 0.9% NACL 1 MG/10 ML
1000 SYRINGE (ML) INTRAVENOUS ONCE
Status: COMPLETED | OUTPATIENT
Start: 2024-01-01 | End: 2024-01-01

## 2024-01-01 RX ORDER — ENOXAPARIN SODIUM 100 MG/ML
1 INJECTION SUBCUTANEOUS EVERY 12 HOURS
Status: DISCONTINUED | OUTPATIENT
Start: 2024-01-01 | End: 2024-01-01

## 2024-01-01 RX ORDER — IPRATROPIUM BROMIDE AND ALBUTEROL SULFATE 2.5; .5 MG/3ML; MG/3ML
3 SOLUTION RESPIRATORY (INHALATION) EVERY 6 HOURS
Status: DISCONTINUED | OUTPATIENT
Start: 2024-01-01 | End: 2024-01-01

## 2024-01-01 RX ORDER — GLYCOPYRROLATE 0.2 MG/ML
0.1 INJECTION INTRAMUSCULAR; INTRAVENOUS ONCE
Status: COMPLETED | OUTPATIENT
Start: 2024-01-01 | End: 2024-01-01

## 2024-01-01 RX ORDER — NOREPINEPHRINE BITARTRATE/D5W 4MG/250ML
PLASTIC BAG, INJECTION (ML) INTRAVENOUS
Status: COMPLETED
Start: 2024-01-01 | End: 2024-01-01

## 2024-01-01 RX ORDER — NOREPINEPHRINE BITARTRATE/D5W 4MG/250ML
0-3 PLASTIC BAG, INJECTION (ML) INTRAVENOUS CONTINUOUS
Status: DISCONTINUED | OUTPATIENT
Start: 2024-01-01 | End: 2024-01-01

## 2024-01-01 RX ORDER — NOREPINEPHRINE BITARTRATE/D5W 4MG/250ML
0-1 PLASTIC BAG, INJECTION (ML) INTRAVENOUS CONTINUOUS
Status: DISPENSED | OUTPATIENT
Start: 2024-01-01 | End: 2024-01-01

## 2024-01-01 RX ORDER — PHENYLEPHRINE HCL IN 0.9% NACL 1 MG/10 ML
100 SYRINGE (ML) INTRAVENOUS ONCE
Status: DISCONTINUED | OUTPATIENT
Start: 2024-01-01 | End: 2024-01-01

## 2024-01-01 RX ORDER — MAGNESIUM SULFATE HEPTAHYDRATE 40 MG/ML
2 INJECTION, SOLUTION INTRAVENOUS ONCE
Status: COMPLETED | OUTPATIENT
Start: 2024-01-01 | End: 2024-01-01

## 2024-01-01 RX ORDER — LACOSAMIDE 100 MG/1
100 TABLET ORAL EVERY 12 HOURS
Status: DISCONTINUED | OUTPATIENT
Start: 2024-01-01 | End: 2024-01-01

## 2024-01-01 RX ORDER — ETOMIDATE 2 MG/ML
20 INJECTION INTRAVENOUS ONCE
Status: COMPLETED | OUTPATIENT
Start: 2024-01-01 | End: 2024-01-01

## 2024-01-01 RX ORDER — FAMOTIDINE 20 MG/1
20 TABLET, FILM COATED ORAL 2 TIMES DAILY
Status: DISCONTINUED | OUTPATIENT
Start: 2024-01-01 | End: 2024-01-01

## 2024-01-01 RX ORDER — ETOMIDATE 2 MG/ML
INJECTION INTRAVENOUS
Status: COMPLETED
Start: 2024-01-01 | End: 2024-01-01

## 2024-01-01 RX ORDER — ONDANSETRON HYDROCHLORIDE 2 MG/ML
4 INJECTION, SOLUTION INTRAVENOUS EVERY 6 HOURS PRN
Status: DISCONTINUED | OUTPATIENT
Start: 2024-01-01 | End: 2024-01-01 | Stop reason: HOSPADM

## 2024-01-01 RX ORDER — ALBUTEROL SULFATE 90 UG/1
1 AEROSOL, METERED RESPIRATORY (INHALATION) EVERY 4 HOURS PRN
Status: DISCONTINUED | OUTPATIENT
Start: 2024-01-01 | End: 2024-01-01

## 2024-01-01 RX ORDER — HALOPERIDOL 5 MG/ML
1 INJECTION INTRAMUSCULAR EVERY 4 HOURS PRN
Status: DISCONTINUED | OUTPATIENT
Start: 2024-01-01 | End: 2024-01-01 | Stop reason: HOSPADM

## 2024-01-01 RX ORDER — CLOBAZAM 10 MG/1
20 TABLET ORAL ONCE
Status: COMPLETED | OUTPATIENT
Start: 2024-01-01 | End: 2024-01-01

## 2024-01-01 RX ORDER — CLOBAZAM 10 MG/1
10 TABLET ORAL ONCE
Status: COMPLETED | OUTPATIENT
Start: 2024-01-01 | End: 2024-01-01

## 2024-01-01 RX ORDER — PANTOPRAZOLE SODIUM 40 MG/10ML
80 INJECTION, POWDER, LYOPHILIZED, FOR SOLUTION INTRAVENOUS ONCE
Status: COMPLETED | OUTPATIENT
Start: 2024-01-01 | End: 2024-01-01

## 2024-01-01 RX ORDER — CLOBAZAM 10 MG/1
10 TABLET ORAL DAILY
Status: DISCONTINUED | OUTPATIENT
Start: 2024-01-01 | End: 2024-01-01

## 2024-01-01 RX ORDER — POLYETHYLENE GLYCOL 3350 17 G/17G
17 POWDER, FOR SOLUTION ORAL DAILY
Status: DISCONTINUED | OUTPATIENT
Start: 2024-01-01 | End: 2024-01-01

## 2024-01-01 RX ORDER — LORAZEPAM 2 MG/ML
1 INJECTION INTRAMUSCULAR ONCE
Status: COMPLETED | OUTPATIENT
Start: 2024-01-01 | End: 2024-01-01

## 2024-01-01 RX ORDER — POTASSIUM CHLORIDE 750 MG/1
10 CAPSULE, EXTENDED RELEASE ORAL ONCE
Status: COMPLETED | OUTPATIENT
Start: 2024-01-01 | End: 2024-01-01

## 2024-01-01 RX ORDER — IPRATROPIUM BROMIDE AND ALBUTEROL SULFATE 2.5; .5 MG/3ML; MG/3ML
3 SOLUTION RESPIRATORY (INHALATION) ONCE
Status: COMPLETED | OUTPATIENT
Start: 2024-01-01 | End: 2024-01-01

## 2024-01-01 RX ORDER — MORPHINE SULFATE 1 MG/ML
0-10 INJECTION, SOLUTION INTRAVENOUS CONTINUOUS
Status: DISCONTINUED | OUTPATIENT
Start: 2024-01-01 | End: 2024-01-01 | Stop reason: HOSPADM

## 2024-01-01 RX ORDER — FAMOTIDINE 10 MG/ML
20 INJECTION INTRAVENOUS 2 TIMES DAILY
Status: DISCONTINUED | OUTPATIENT
Start: 2024-01-01 | End: 2024-01-01

## 2024-01-01 RX ORDER — BUPIVACAINE HYDROCHLORIDE 2.5 MG/ML
INJECTION, SOLUTION EPIDURAL; INFILTRATION; INTRACAUDAL
Status: DISCONTINUED | OUTPATIENT
Start: 2024-01-01 | End: 2024-01-01 | Stop reason: HOSPADM

## 2024-01-01 RX ORDER — NOREPINEPHRINE BITARTRATE/D5W 4MG/250ML
0-1 PLASTIC BAG, INJECTION (ML) INTRAVENOUS CONTINUOUS
Status: DISCONTINUED | OUTPATIENT
Start: 2024-01-01 | End: 2024-01-01

## 2024-01-01 RX ORDER — BUDESONIDE AND FORMOTEROL FUMARATE DIHYDRATE 160; 4.5 UG/1; UG/1
2 AEROSOL RESPIRATORY (INHALATION) EVERY 12 HOURS
Status: DISCONTINUED | OUTPATIENT
Start: 2024-01-01 | End: 2024-01-01

## 2024-01-01 RX ORDER — DOCUSATE SODIUM 100 MG/1
100 CAPSULE, LIQUID FILLED ORAL 2 TIMES DAILY
Status: DISCONTINUED | OUTPATIENT
Start: 2024-01-01 | End: 2024-01-01

## 2024-01-01 RX ORDER — MUPIROCIN 20 MG/G
OINTMENT TOPICAL 2 TIMES DAILY
Status: DISCONTINUED | OUTPATIENT
Start: 2024-01-01 | End: 2024-01-01

## 2024-01-01 RX ORDER — ENOXAPARIN SODIUM 100 MG/ML
30 INJECTION SUBCUTANEOUS EVERY 24 HOURS
Status: DISCONTINUED | OUTPATIENT
Start: 2024-01-01 | End: 2024-01-01

## 2024-01-01 RX ORDER — GABAPENTIN 100 MG/1
100 CAPSULE ORAL 3 TIMES DAILY
Status: DISCONTINUED | OUTPATIENT
Start: 2024-01-01 | End: 2024-01-01

## 2024-01-01 RX ORDER — PHENYLEPHRINE HCL IN 0.9% NACL 1 MG/10 ML
400 SYRINGE (ML) INTRAVENOUS ONCE
Status: COMPLETED | OUTPATIENT
Start: 2024-01-01 | End: 2024-01-01

## 2024-01-01 RX ORDER — COSYNTROPIN 0.25 MG/ML
0.25 INJECTION, POWDER, FOR SOLUTION INTRAMUSCULAR; INTRAVENOUS ONCE
Status: COMPLETED | OUTPATIENT
Start: 2024-01-01 | End: 2024-01-01

## 2024-01-01 RX ORDER — NOREPINEPHRINE BITARTRATE/D5W 4MG/250ML
PLASTIC BAG, INJECTION (ML) INTRAVENOUS
Status: DISPENSED
Start: 2024-01-01 | End: 2024-01-01

## 2024-01-01 RX ORDER — CALCIUM GLUCONATE 20 MG/ML
2 INJECTION, SOLUTION INTRAVENOUS
Status: DISCONTINUED | OUTPATIENT
Start: 2024-01-01 | End: 2024-01-01

## 2024-01-01 RX ORDER — LEVETIRACETAM 500 MG/5ML
1500 INJECTION, SOLUTION, CONCENTRATE INTRAVENOUS EVERY 12 HOURS
Status: DISCONTINUED | OUTPATIENT
Start: 2024-01-01 | End: 2024-01-01 | Stop reason: HOSPADM

## 2024-01-01 RX ADMIN — LEVETIRACETAM 1500 MG: 500 SOLUTION ORAL at 08:06

## 2024-01-01 RX ADMIN — SODIUM CHLORIDE 1000 ML: 0.9 INJECTION, SOLUTION INTRAVENOUS at 10:06

## 2024-01-01 RX ADMIN — PROPOFOL 35 MCG/KG/MIN: 10 INJECTION, EMULSION INTRAVENOUS at 06:06

## 2024-01-01 RX ADMIN — LACOSAMIDE 200 MG: 10 INJECTION INTRAVENOUS at 08:06

## 2024-01-01 RX ADMIN — Medication 1000 MCG: at 01:06

## 2024-01-01 RX ADMIN — THIAMINE HCL TAB 100 MG 100 MG: 100 TAB at 08:06

## 2024-01-01 RX ADMIN — METOPROLOL TARTRATE 25 MG: 25 TABLET, FILM COATED ORAL at 09:06

## 2024-01-01 RX ADMIN — Medication: at 08:06

## 2024-01-01 RX ADMIN — METOPROLOL TARTRATE 25 MG: 25 TABLET, FILM COATED ORAL at 08:06

## 2024-01-01 RX ADMIN — ETOMIDATE 20 MG: 2 INJECTION INTRAVENOUS at 01:06

## 2024-01-01 RX ADMIN — PROPOFOL 30 MCG/KG/MIN: 10 INJECTION, EMULSION INTRAVENOUS at 05:06

## 2024-01-01 RX ADMIN — LEVETIRACETAM 1500 MG: 100 INJECTION INTRAVENOUS at 09:06

## 2024-01-01 RX ADMIN — LACOSAMIDE 200 MG: 100 TABLET, FILM COATED ORAL at 08:06

## 2024-01-01 RX ADMIN — Medication 400 MCG: at 01:06

## 2024-01-01 RX ADMIN — LACOSAMIDE 200 MG: 100 TABLET, FILM COATED ORAL at 06:06

## 2024-01-01 RX ADMIN — LACOSAMIDE 200 MG: 100 TABLET, FILM COATED ORAL at 05:06

## 2024-01-01 RX ADMIN — Medication: at 09:06

## 2024-01-01 RX ADMIN — MUPIROCIN: 20 OINTMENT TOPICAL at 09:06

## 2024-01-01 RX ADMIN — PSYLLIUM HUSK 1 PACKET: 3.4 POWDER ORAL at 03:06

## 2024-01-01 RX ADMIN — LACOSAMIDE 100 MG: 100 TABLET, FILM COATED ORAL at 08:06

## 2024-01-01 RX ADMIN — FLUTICASONE PROPIONATE 50 MCG: 50 SPRAY, METERED NASAL at 12:06

## 2024-01-01 RX ADMIN — VALPROATE SODIUM 1000 MG: 100 INJECTION, SOLUTION INTRAVENOUS at 08:06

## 2024-01-01 RX ADMIN — BUDESONIDE AND FORMOTEROL FUMARATE DIHYDRATE 2 PUFF: 160; 4.5 AEROSOL RESPIRATORY (INHALATION) at 10:06

## 2024-01-01 RX ADMIN — Medication 100 MG: at 08:06

## 2024-01-01 RX ADMIN — METOPROLOL TARTRATE 50 MG: 50 TABLET, FILM COATED ORAL at 09:06

## 2024-01-01 RX ADMIN — SODIUM CHLORIDE 100 ML/HR: 9 INJECTION, SOLUTION INTRAVENOUS at 08:06

## 2024-01-01 RX ADMIN — NOREPINEPHRINE BITARTRATE 0.5 MCG/KG/MIN: 4 INJECTION, SOLUTION INTRAVENOUS at 10:06

## 2024-01-01 RX ADMIN — HEPARIN SODIUM 5000 UNITS: 5000 INJECTION INTRAVENOUS; SUBCUTANEOUS at 08:06

## 2024-01-01 RX ADMIN — SODIUM CHLORIDE 2000 MG: 1 TABLET ORAL at 08:06

## 2024-01-01 RX ADMIN — ATORVASTATIN CALCIUM 40 MG: 40 TABLET, FILM COATED ORAL at 08:06

## 2024-01-01 RX ADMIN — GABAPENTIN 100 MG: 100 CAPSULE ORAL at 08:07

## 2024-01-01 RX ADMIN — IPRATROPIUM BROMIDE AND ALBUTEROL SULFATE 3 ML: 2.5; .5 SOLUTION RESPIRATORY (INHALATION) at 10:06

## 2024-01-01 RX ADMIN — SODIUM CHLORIDE 2000 MG: 1 TABLET ORAL at 03:06

## 2024-01-01 RX ADMIN — Medication 800 MG: at 09:06

## 2024-01-01 RX ADMIN — SODIUM CHLORIDE, SODIUM LACTATE, POTASSIUM CHLORIDE, AND CALCIUM CHLORIDE 500 ML: .6; .31; .03; .02 INJECTION, SOLUTION INTRAVENOUS at 02:06

## 2024-01-01 RX ADMIN — HYDRALAZINE HYDROCHLORIDE 10 MG: 20 INJECTION, SOLUTION INTRAMUSCULAR; INTRAVENOUS at 11:06

## 2024-01-01 RX ADMIN — PSYLLIUM HUSK 1 PACKET: 3.4 POWDER ORAL at 08:06

## 2024-01-01 RX ADMIN — LEVETIRACETAM 1500 MG: 100 INJECTION, SOLUTION INTRAVENOUS at 09:06

## 2024-01-01 RX ADMIN — LEVETIRACETAM 1500 MG: 100 INJECTION INTRAVENOUS at 08:06

## 2024-01-01 RX ADMIN — SODIUM CHLORIDE 2000 MG: 1 TABLET ORAL at 02:06

## 2024-01-01 RX ADMIN — Medication 800 MG: at 03:06

## 2024-01-01 RX ADMIN — LEVETIRACETAM 1500 MG: 500 SOLUTION ORAL at 09:06

## 2024-01-01 RX ADMIN — HEPARIN SODIUM 5000 UNITS: 5000 INJECTION INTRAVENOUS; SUBCUTANEOUS at 09:06

## 2024-01-01 RX ADMIN — LACOSAMIDE 200 MG: 100 TABLET, FILM COATED ORAL at 09:06

## 2024-01-01 RX ADMIN — POTASSIUM BICARBONATE 50 MEQ: 978 TABLET, EFFERVESCENT ORAL at 06:06

## 2024-01-01 RX ADMIN — PERAMPANEL 12 MG: 12 TABLET ORAL at 09:06

## 2024-01-01 RX ADMIN — SODIUM CHLORIDE: 9 INJECTION, SOLUTION INTRAVENOUS at 05:06

## 2024-01-01 RX ADMIN — Medication 800 MG: at 05:06

## 2024-01-01 RX ADMIN — SODIUM CHLORIDE 2000 MG: 1 TABLET ORAL at 09:06

## 2024-01-01 RX ADMIN — SODIUM CHLORIDE: 9 INJECTION, SOLUTION INTRAVENOUS at 11:06

## 2024-01-01 RX ADMIN — PROPOFOL 25 MCG/KG/MIN: 10 INJECTION, EMULSION INTRAVENOUS at 01:06

## 2024-01-01 RX ADMIN — PIPERACILLIN SODIUM AND TAZOBACTAM SODIUM 4.5 G: 4; .5 INJECTION, POWDER, FOR SOLUTION INTRAVENOUS at 01:06

## 2024-01-01 RX ADMIN — Medication 800 MG: at 08:06

## 2024-01-01 RX ADMIN — IPRATROPIUM BROMIDE AND ALBUTEROL SULFATE 3 ML: 2.5; .5 SOLUTION RESPIRATORY (INHALATION) at 04:06

## 2024-01-01 RX ADMIN — LACOSAMIDE 300 MG: 10 INJECTION INTRAVENOUS at 11:06

## 2024-01-01 RX ADMIN — LEVETIRACETAM 1500 MG: 500 SOLUTION ORAL at 08:07

## 2024-01-01 RX ADMIN — CLOBAZAM 20 MG: 10 TABLET ORAL at 12:06

## 2024-01-01 RX ADMIN — POTASSIUM BICARBONATE 50 MEQ: 978 TABLET, EFFERVESCENT ORAL at 11:06

## 2024-01-01 RX ADMIN — NOREPINEPHRINE BITARTRATE 0.06 MCG/KG/MIN: 4 INJECTION, SOLUTION INTRAVENOUS at 02:06

## 2024-01-01 RX ADMIN — ATORVASTATIN CALCIUM 40 MG: 40 TABLET, FILM COATED ORAL at 09:06

## 2024-01-01 RX ADMIN — Medication 200 MCG: at 10:06

## 2024-01-01 RX ADMIN — POTASSIUM BICARBONATE 50 MEQ: 978 TABLET, EFFERVESCENT ORAL at 02:06

## 2024-01-01 RX ADMIN — PANTOPRAZOLE SODIUM 40 MG: 40 FOR SUSPENSION ORAL at 08:06

## 2024-01-01 RX ADMIN — CLOBAZAM 20 MG: 10 TABLET ORAL at 08:06

## 2024-01-01 RX ADMIN — ASPIRIN 81 MG CHEWABLE TABLET 81 MG: 81 TABLET CHEWABLE at 09:06

## 2024-01-01 RX ADMIN — POTASSIUM & SODIUM PHOSPHATES POWDER PACK 280-160-250 MG 2 PACKET: 280-160-250 PACK at 12:06

## 2024-01-01 RX ADMIN — PIPERACILLIN SODIUM AND TAZOBACTAM SODIUM 4.5 G: 4; .5 INJECTION, POWDER, FOR SOLUTION INTRAVENOUS at 12:06

## 2024-01-01 RX ADMIN — DOCUSATE SODIUM 100 MG: 100 CAPSULE, LIQUID FILLED ORAL at 09:06

## 2024-01-01 RX ADMIN — Medication 800 MG: at 10:06

## 2024-01-01 RX ADMIN — POLYETHYLENE GLYCOL 3350 17 G: 17 POWDER, FOR SOLUTION ORAL at 08:06

## 2024-01-01 RX ADMIN — PROPOFOL 50 MCG/KG/MIN: 10 INJECTION, EMULSION INTRAVENOUS at 11:06

## 2024-01-01 RX ADMIN — ASPIRIN 81 MG CHEWABLE TABLET 81 MG: 81 TABLET CHEWABLE at 08:06

## 2024-01-01 RX ADMIN — IPRATROPIUM BROMIDE AND ALBUTEROL SULFATE 3 ML: 2.5; .5 SOLUTION RESPIRATORY (INHALATION) at 07:06

## 2024-01-01 RX ADMIN — VANCOMYCIN HYDROCHLORIDE 750 MG: 750 INJECTION, POWDER, LYOPHILIZED, FOR SOLUTION INTRAVENOUS at 02:06

## 2024-01-01 RX ADMIN — LACOSAMIDE 200 MG: 10 INJECTION INTRAVENOUS at 04:07

## 2024-01-01 RX ADMIN — NOREPINEPHRINE BITARTRATE 0.1 MCG/KG/MIN: 4 INJECTION, SOLUTION INTRAVENOUS at 02:06

## 2024-01-01 RX ADMIN — PANTOPRAZOLE SODIUM 40 MG: 40 INJECTION, POWDER, FOR SOLUTION INTRAVENOUS at 08:06

## 2024-01-01 RX ADMIN — PSYLLIUM HUSK 1 PACKET: 3.4 POWDER ORAL at 09:06

## 2024-01-01 RX ADMIN — PROPOFOL 50 MCG/KG/MIN: 10 INJECTION, EMULSION INTRAVENOUS at 04:06

## 2024-01-01 RX ADMIN — CEFTRIAXONE 1 G: 1 INJECTION, POWDER, FOR SOLUTION INTRAMUSCULAR; INTRAVENOUS at 09:06

## 2024-01-01 RX ADMIN — CLOBAZAM 20 MG: 10 TABLET ORAL at 11:06

## 2024-01-01 RX ADMIN — SODIUM CHLORIDE, POTASSIUM CHLORIDE, SODIUM LACTATE AND CALCIUM CHLORIDE 500 ML: 600; 310; 30; 20 INJECTION, SOLUTION INTRAVENOUS at 10:06

## 2024-01-01 RX ADMIN — SODIUM CHLORIDE, POTASSIUM CHLORIDE, SODIUM LACTATE AND CALCIUM CHLORIDE 1000 ML: 600; 310; 30; 20 INJECTION, SOLUTION INTRAVENOUS at 07:06

## 2024-01-01 RX ADMIN — THIAMINE HCL TAB 100 MG 100 MG: 100 TAB at 09:06

## 2024-01-01 RX ADMIN — POTASSIUM & SODIUM PHOSPHATES POWDER PACK 280-160-250 MG 2 PACKET: 280-160-250 PACK at 06:06

## 2024-01-01 RX ADMIN — PIPERACILLIN SODIUM AND TAZOBACTAM SODIUM 4.5 G: 4; .5 INJECTION, POWDER, FOR SOLUTION INTRAVENOUS at 05:06

## 2024-01-01 RX ADMIN — NOREPINEPHRINE BITARTRATE 0.1 MCG/KG/MIN: 4 INJECTION, SOLUTION INTRAVENOUS at 06:06

## 2024-01-01 RX ADMIN — POTASSIUM BICARBONATE 50 MEQ: 978 TABLET, EFFERVESCENT ORAL at 03:06

## 2024-01-01 RX ADMIN — PERAMPANEL 12 MG: 12 TABLET ORAL at 08:06

## 2024-01-01 RX ADMIN — SODIUM CHLORIDE: 9 INJECTION, SOLUTION INTRAVENOUS at 08:06

## 2024-01-01 RX ADMIN — GABAPENTIN 100 MG: 100 CAPSULE ORAL at 02:06

## 2024-01-01 RX ADMIN — POTASSIUM BICARBONATE 35 MEQ: 391 TABLET, EFFERVESCENT ORAL at 02:06

## 2024-01-01 RX ADMIN — Medication 100 MG: at 08:07

## 2024-01-01 RX ADMIN — PIPERACILLIN SODIUM AND TAZOBACTAM SODIUM 4.5 G: 4; .5 INJECTION, POWDER, FOR SOLUTION INTRAVENOUS at 09:06

## 2024-01-01 RX ADMIN — SODIUM CHLORIDE 250 ML: 9 INJECTION, SOLUTION INTRAVENOUS at 11:06

## 2024-01-01 RX ADMIN — CEFTRIAXONE 1 G: 1 INJECTION, POWDER, FOR SOLUTION INTRAMUSCULAR; INTRAVENOUS at 06:06

## 2024-01-01 RX ADMIN — CLOBAZAM 20 MG: 10 TABLET ORAL at 09:06

## 2024-01-01 RX ADMIN — COSYNTROPIN 0.25 MG: 0.25 INJECTION, POWDER, LYOPHILIZED, FOR SOLUTION INTRAVENOUS at 01:06

## 2024-01-01 RX ADMIN — ENOXAPARIN SODIUM 50 MG: 100 INJECTION SUBCUTANEOUS at 09:06

## 2024-01-01 RX ADMIN — SODIUM CHLORIDE 250 ML: 9 INJECTION, SOLUTION INTRAVENOUS at 12:06

## 2024-01-01 RX ADMIN — LISINOPRIL AND HYDROCHLOROTHIAZIDE 1 TABLET: 12.5; 1 TABLET ORAL at 09:06

## 2024-01-01 RX ADMIN — PANTOPRAZOLE SODIUM 80 MG: 40 INJECTION, POWDER, FOR SOLUTION INTRAVENOUS at 08:06

## 2024-01-01 RX ADMIN — SODIUM CHLORIDE 2000 MG: 1 TABLET ORAL at 08:07

## 2024-01-01 RX ADMIN — SODIUM CHLORIDE 100 ML/HR: 9 INJECTION, SOLUTION INTRAVENOUS at 01:06

## 2024-01-01 RX ADMIN — FAMOTIDINE 20 MG: 10 INJECTION, SOLUTION INTRAVENOUS at 09:06

## 2024-01-01 RX ADMIN — IPRATROPIUM BROMIDE AND ALBUTEROL SULFATE 3 ML: 2.5; .5 SOLUTION RESPIRATORY (INHALATION) at 12:06

## 2024-01-01 RX ADMIN — PROPOFOL 50 MCG/KG/MIN: 10 INJECTION, EMULSION INTRAVENOUS at 03:06

## 2024-01-01 RX ADMIN — HEPARIN SODIUM 5000 UNITS: 5000 INJECTION INTRAVENOUS; SUBCUTANEOUS at 08:07

## 2024-01-01 RX ADMIN — INDOMETHACIN 50 MEQ: 25 CAPSULE ORAL at 01:06

## 2024-01-01 RX ADMIN — Medication 800 MG: at 02:06

## 2024-01-01 RX ADMIN — VANCOMYCIN HYDROCHLORIDE 750 MG: 750 INJECTION, POWDER, LYOPHILIZED, FOR SOLUTION INTRAVENOUS at 03:06

## 2024-01-01 RX ADMIN — NOREPINEPHRINE BITARTRATE 0.02 MCG/KG/MIN: 4 INJECTION, SOLUTION INTRAVENOUS at 01:06

## 2024-01-01 RX ADMIN — SODIUM BICARBONATE 50 MEQ: 84 INJECTION, SOLUTION INTRAVENOUS at 02:06

## 2024-01-01 RX ADMIN — POTASSIUM & SODIUM PHOSPHATES POWDER PACK 280-160-250 MG 2 PACKET: 280-160-250 PACK at 03:06

## 2024-01-01 RX ADMIN — QUETIAPINE FUMARATE 25 MG: 25 TABLET ORAL at 08:06

## 2024-01-01 RX ADMIN — CLOBAZAM 20 MG: 10 TABLET ORAL at 11:07

## 2024-01-01 RX ADMIN — Medication 800 MG: at 04:06

## 2024-01-01 RX ADMIN — PROPOFOL 50 MCG/KG/MIN: 10 INJECTION, EMULSION INTRAVENOUS at 06:06

## 2024-01-01 RX ADMIN — SODIUM CHLORIDE: 9 INJECTION, SOLUTION INTRAVENOUS at 10:06

## 2024-01-01 RX ADMIN — ROCURONIUM BROMIDE 50 MG: 10 INJECTION, SOLUTION INTRAVENOUS at 01:06

## 2024-01-01 RX ADMIN — POTASSIUM & SODIUM PHOSPHATES POWDER PACK 280-160-250 MG 2 PACKET: 280-160-250 PACK at 05:06

## 2024-01-01 RX ADMIN — PIPERACILLIN SODIUM AND TAZOBACTAM SODIUM 4.5 G: 4; .5 INJECTION, POWDER, FOR SOLUTION INTRAVENOUS at 08:06

## 2024-01-01 RX ADMIN — THIAMINE HCL TAB 100 MG 100 MG: 100 TAB at 10:06

## 2024-01-01 RX ADMIN — Medication 0.12 MCG/KG/MIN: at 05:06

## 2024-01-01 RX ADMIN — PANTOPRAZOLE SODIUM 40 MG: 40 INJECTION, POWDER, FOR SOLUTION INTRAVENOUS at 09:06

## 2024-01-01 RX ADMIN — PERAMPANEL 4 MG: 2 TABLET ORAL at 12:06

## 2024-01-01 RX ADMIN — PANTOPRAZOLE SODIUM 40 MG: 40 FOR SUSPENSION ORAL at 09:06

## 2024-01-01 RX ADMIN — Medication 100 MCG: at 10:06

## 2024-01-01 RX ADMIN — QUETIAPINE FUMARATE 25 MG: 25 TABLET ORAL at 12:06

## 2024-01-01 RX ADMIN — PROPOFOL 50 MCG/KG/MIN: 10 INJECTION, EMULSION INTRAVENOUS at 05:06

## 2024-01-01 RX ADMIN — SODIUM CHLORIDE 500 ML: 9 INJECTION, SOLUTION INTRAVENOUS at 10:06

## 2024-01-01 RX ADMIN — SODIUM CHLORIDE, SODIUM LACTATE, POTASSIUM CHLORIDE, AND CALCIUM CHLORIDE: 600; 310; 30; 20 INJECTION, SOLUTION INTRAVENOUS at 03:06

## 2024-01-01 RX ADMIN — Medication: at 03:06

## 2024-01-01 RX ADMIN — Medication 800 MG: at 11:06

## 2024-01-01 RX ADMIN — LEVETIRACETAM 1500 MG: 500 SOLUTION ORAL at 10:06

## 2024-01-01 RX ADMIN — IPRATROPIUM BROMIDE AND ALBUTEROL SULFATE 3 ML: 2.5; .5 SOLUTION RESPIRATORY (INHALATION) at 01:06

## 2024-01-01 RX ADMIN — KETAMINE HYDROCHLORIDE 25 MCG/KG/MIN: 50 INJECTION INTRAMUSCULAR; INTRAVENOUS at 05:06

## 2024-01-01 RX ADMIN — MUPIROCIN: 20 OINTMENT TOPICAL at 08:06

## 2024-01-01 RX ADMIN — CLOBAZAM 10 MG: 10 TABLET ORAL at 09:06

## 2024-01-01 RX ADMIN — DIATRIZOATE MEGLUMINE AND DIATRIZOATE SODIUM 50 ML: 660; 100 LIQUID ORAL; RECTAL at 07:06

## 2024-01-01 RX ADMIN — SODIUM CHLORIDE 500 ML: 9 INJECTION, SOLUTION INTRAVENOUS at 03:06

## 2024-01-01 RX ADMIN — ACETAMINOPHEN 650 MG: 325 TABLET ORAL at 03:06

## 2024-01-01 RX ADMIN — Medication 800 MG: at 06:06

## 2024-01-01 RX ADMIN — KETAMINE HYDROCHLORIDE 50 MCG/KG/MIN: 100 INJECTION INTRAMUSCULAR; INTRAVENOUS at 12:06

## 2024-01-01 RX ADMIN — PERAMPANEL 12 MG: 12 TABLET ORAL at 10:06

## 2024-01-01 RX ADMIN — Medication 800 MG: at 08:07

## 2024-01-01 RX ADMIN — IPRATROPIUM BROMIDE AND ALBUTEROL SULFATE 3 ML: 2.5; .5 SOLUTION RESPIRATORY (INHALATION) at 11:06

## 2024-01-01 RX ADMIN — POTASSIUM BICARBONATE 50 MEQ: 978 TABLET, EFFERVESCENT ORAL at 05:06

## 2024-01-01 RX ADMIN — INDOMETHACIN 50 MEQ: 25 CAPSULE ORAL at 02:06

## 2024-01-01 RX ADMIN — SODIUM CHLORIDE: 9 INJECTION, SOLUTION INTRAVENOUS at 02:06

## 2024-01-01 RX ADMIN — ACETAMINOPHEN 650 MG: 325 TABLET ORAL at 08:06

## 2024-01-01 RX ADMIN — SODIUM CHLORIDE 500 ML: 9 INJECTION, SOLUTION INTRAVENOUS at 05:06

## 2024-01-01 RX ADMIN — LACOSAMIDE 200 MG: 100 TABLET, FILM COATED ORAL at 12:06

## 2024-01-01 RX ADMIN — FLUTICASONE PROPIONATE 50 MCG: 50 SPRAY, METERED NASAL at 08:06

## 2024-01-01 RX ADMIN — Medication: at 09:07

## 2024-01-01 RX ADMIN — CLOBAZAM 10 MG: 10 TABLET ORAL at 11:06

## 2024-01-01 RX ADMIN — POTASSIUM BICARBONATE 60 MEQ: 391 TABLET, EFFERVESCENT ORAL at 05:06

## 2024-01-01 RX ADMIN — KETAMINE HYDROCHLORIDE 25 MCG/KG/MIN: 50 INJECTION INTRAMUSCULAR; INTRAVENOUS at 08:06

## 2024-01-01 RX ADMIN — HYDRALAZINE HYDROCHLORIDE 10 MG: 20 INJECTION, SOLUTION INTRAMUSCULAR; INTRAVENOUS at 10:06

## 2024-01-01 RX ADMIN — CALCIUM GLUCONATE 1 G: 20 INJECTION, SOLUTION INTRAVENOUS at 09:06

## 2024-01-01 RX ADMIN — VANCOMYCIN HYDROCHLORIDE 750 MG: 750 INJECTION, POWDER, LYOPHILIZED, FOR SOLUTION INTRAVENOUS at 01:06

## 2024-01-01 RX ADMIN — PERAMPANEL 12 MG: 12 TABLET ORAL at 11:06

## 2024-01-01 RX ADMIN — POTASSIUM CHLORIDE 10 MEQ: 750 CAPSULE, EXTENDED RELEASE ORAL at 06:06

## 2024-01-01 RX ADMIN — Medication 0.5 MG/HR: at 03:07

## 2024-01-01 RX ADMIN — PSYLLIUM HUSK 1 PACKET: 3.4 POWDER ORAL at 10:06

## 2024-01-01 RX ADMIN — NOREPINEPHRINE BITARTRATE 0.1 MCG/KG/MIN: 4 INJECTION, SOLUTION INTRAVENOUS at 08:06

## 2024-01-01 RX ADMIN — PROPOFOL 40 MCG/KG/MIN: 10 INJECTION, EMULSION INTRAVENOUS at 09:06

## 2024-01-01 RX ADMIN — SODIUM CHLORIDE, POTASSIUM CHLORIDE, SODIUM LACTATE AND CALCIUM CHLORIDE 1000 ML: 600; 310; 30; 20 INJECTION, SOLUTION INTRAVENOUS at 01:06

## 2024-01-01 RX ADMIN — PROPOFOL 25 MCG/KG/MIN: 10 INJECTION, EMULSION INTRAVENOUS at 08:06

## 2024-01-01 RX ADMIN — KETAMINE HYDROCHLORIDE 50 MCG/KG/MIN: 100 INJECTION INTRAMUSCULAR; INTRAVENOUS at 05:06

## 2024-01-01 RX ADMIN — ENOXAPARIN SODIUM 50 MG: 100 INJECTION SUBCUTANEOUS at 08:06

## 2024-01-01 RX ADMIN — PERAMPANEL 12 MG: 12 TABLET ORAL at 03:06

## 2024-01-01 RX ADMIN — SODIUM CHLORIDE 1000 ML: 9 INJECTION, SOLUTION INTRAVENOUS at 11:06

## 2024-01-01 RX ADMIN — HEPARIN SODIUM 5000 UNITS: 5000 INJECTION INTRAVENOUS; SUBCUTANEOUS at 10:06

## 2024-01-01 RX ADMIN — PROPOFOL 50 MCG/KG/MIN: 10 INJECTION, EMULSION INTRAVENOUS at 12:06

## 2024-01-01 RX ADMIN — SODIUM BICARBONATE 50 MEQ: 84 INJECTION, SOLUTION INTRAVENOUS at 01:06

## 2024-01-01 RX ADMIN — PROPOFOL 35 MCG/KG/MIN: 10 INJECTION, EMULSION INTRAVENOUS at 04:06

## 2024-01-01 RX ADMIN — PIPERACILLIN SODIUM AND TAZOBACTAM SODIUM 4.5 G: 4; .5 INJECTION, POWDER, FOR SOLUTION INTRAVENOUS at 04:06

## 2024-01-01 RX ADMIN — LACOSAMIDE 200 MG: 10 INJECTION INTRAVENOUS at 09:06

## 2024-01-01 RX ADMIN — Medication 200 MCG: at 01:06

## 2024-01-01 RX ADMIN — POTASSIUM BICARBONATE 50 MEQ: 978 TABLET, EFFERVESCENT ORAL at 04:06

## 2024-01-01 RX ADMIN — IPRATROPIUM BROMIDE AND ALBUTEROL SULFATE 3 ML: 2.5; .5 SOLUTION RESPIRATORY (INHALATION) at 03:06

## 2024-01-01 RX ADMIN — SODIUM CHLORIDE 1000 ML: 9 INJECTION, SOLUTION INTRAVENOUS at 10:06

## 2024-01-01 RX ADMIN — SODIUM CHLORIDE 1000 ML: 0.9 INJECTION, SOLUTION INTRAVENOUS at 03:06

## 2024-01-01 RX ADMIN — VALPROATE SODIUM 1000 MG: 100 INJECTION, SOLUTION INTRAVENOUS at 12:06

## 2024-01-01 RX ADMIN — MUPIROCIN: 20 OINTMENT TOPICAL at 08:07

## 2024-01-01 RX ADMIN — PROPOFOL 40 MCG/KG/MIN: 10 INJECTION, EMULSION INTRAVENOUS at 01:06

## 2024-01-01 RX ADMIN — POTASSIUM BICARBONATE 35 MEQ: 391 TABLET, EFFERVESCENT ORAL at 05:06

## 2024-01-01 RX ADMIN — KETAMINE HYDROCHLORIDE 25 MCG/KG/MIN: 50 INJECTION INTRAMUSCULAR; INTRAVENOUS at 02:06

## 2024-01-01 RX ADMIN — Medication 800 MG: at 01:06

## 2024-01-01 RX ADMIN — BUDESONIDE AND FORMOTEROL FUMARATE DIHYDRATE 2 PUFF: 160; 4.5 AEROSOL RESPIRATORY (INHALATION) at 08:06

## 2024-01-01 RX ADMIN — IPRATROPIUM BROMIDE AND ALBUTEROL SULFATE 3 ML: 2.5; .5 SOLUTION RESPIRATORY (INHALATION) at 08:06

## 2024-01-01 RX ADMIN — LACOSAMIDE 200 MG: 100 TABLET, FILM COATED ORAL at 10:06

## 2024-01-01 RX ADMIN — VALPROATE SODIUM 1000 MG: 100 INJECTION, SOLUTION INTRAVENOUS at 06:06

## 2024-01-01 RX ADMIN — SODIUM CHLORIDE: 9 INJECTION, SOLUTION INTRAVENOUS at 04:06

## 2024-01-01 RX ADMIN — ATORVASTATIN CALCIUM 40 MG: 40 TABLET, FILM COATED ORAL at 08:07

## 2024-01-01 RX ADMIN — PROPOFOL 25 MCG/KG/MIN: 10 INJECTION, EMULSION INTRAVENOUS at 09:06

## 2024-01-01 RX ADMIN — PERAMPANEL 12 MG: 12 TABLET ORAL at 11:07

## 2024-01-01 RX ADMIN — LACOSAMIDE 200 MG: 100 TABLET, FILM COATED ORAL at 06:07

## 2024-01-01 RX ADMIN — VALPROATE SODIUM 1000 MG: 100 INJECTION, SOLUTION INTRAVENOUS at 09:06

## 2024-01-01 RX ADMIN — ACETAMINOPHEN 650 MG: 325 TABLET ORAL at 10:06

## 2024-01-01 RX ADMIN — SODIUM CHLORIDE 100 ML/HR: 9 INJECTION, SOLUTION INTRAVENOUS at 05:06

## 2024-01-01 RX ADMIN — POTASSIUM & SODIUM PHOSPHATES POWDER PACK 280-160-250 MG 2 PACKET: 280-160-250 PACK at 08:06

## 2024-01-01 RX ADMIN — IPRATROPIUM BROMIDE AND ALBUTEROL SULFATE 3 ML: 2.5; .5 SOLUTION RESPIRATORY (INHALATION) at 09:06

## 2024-01-01 RX ADMIN — BUDESONIDE AND FORMOTEROL FUMARATE DIHYDRATE 2 PUFF: 160; 4.5 AEROSOL RESPIRATORY (INHALATION) at 09:06

## 2024-01-01 RX ADMIN — Medication 100 MG: at 09:06

## 2024-01-01 RX ADMIN — FAMOTIDINE 20 MG: 20 TABLET ORAL at 08:07

## 2024-01-01 RX ADMIN — SODIUM CHLORIDE, SODIUM LACTATE, POTASSIUM CHLORIDE, AND CALCIUM CHLORIDE 1000 ML: .6; .31; .03; .02 INJECTION, SOLUTION INTRAVENOUS at 01:06

## 2024-01-01 RX ADMIN — SODIUM CHLORIDE: 9 INJECTION, SOLUTION INTRAVENOUS at 01:06

## 2024-01-01 RX ADMIN — QUETIAPINE FUMARATE 25 MG: 25 TABLET ORAL at 09:06

## 2024-01-01 RX ADMIN — NOREPINEPHRINE BITARTRATE 0.02 MCG/KG/MIN: 4 INJECTION, SOLUTION INTRAVENOUS at 12:06

## 2024-01-01 RX ADMIN — POTASSIUM BICARBONATE 35 MEQ: 391 TABLET, EFFERVESCENT ORAL at 08:06

## 2024-01-01 RX ADMIN — HYDRALAZINE HYDROCHLORIDE 10 MG: 20 INJECTION, SOLUTION INTRAMUSCULAR; INTRAVENOUS at 07:06

## 2024-01-01 RX ADMIN — SODIUM CHLORIDE: 9 INJECTION, SOLUTION INTRAVENOUS at 03:06

## 2024-01-01 RX ADMIN — PSYLLIUM HUSK 1 PACKET: 3.4 POWDER ORAL at 02:06

## 2024-01-01 RX ADMIN — GLYCOPYRROLATE 0.1 MG: 0.2 INJECTION INTRAMUSCULAR; INTRAVENOUS at 03:07

## 2024-01-01 RX ADMIN — PANTOPRAZOLE SODIUM 40 MG: 40 GRANULE, DELAYED RELEASE ORAL at 08:06

## 2024-01-01 RX ADMIN — SODIUM CHLORIDE 250 ML: 9 INJECTION, SOLUTION INTRAVENOUS at 10:06

## 2024-01-01 RX ADMIN — NOREPINEPHRINE BITARTRATE 0.12 MCG/KG/MIN: 4 INJECTION, SOLUTION INTRAVENOUS at 05:06

## 2024-01-01 RX ADMIN — FLUTICASONE PROPIONATE 50 MCG: 50 SPRAY, METERED NASAL at 09:06

## 2024-01-01 RX ADMIN — LEVETIRACETAM 1500 MG: 100 INJECTION, SOLUTION INTRAVENOUS at 08:06

## 2024-01-01 RX ADMIN — POTASSIUM BICARBONATE 60 MEQ: 391 TABLET, EFFERVESCENT ORAL at 03:06

## 2024-01-01 RX ADMIN — METOPROLOL TARTRATE 50 MG: 50 TABLET, FILM COATED ORAL at 11:06

## 2024-01-01 RX ADMIN — PROPOFOL 35 MCG/KG/MIN: 10 INJECTION, EMULSION INTRAVENOUS at 02:06

## 2024-01-01 RX ADMIN — CLOBAZAM 20 MG: 10 TABLET ORAL at 03:06

## 2024-01-01 RX ADMIN — KETAMINE HYDROCHLORIDE 50 MCG/KG/MIN: 100 INJECTION INTRAMUSCULAR; INTRAVENOUS at 02:06

## 2024-01-01 RX ADMIN — CEFTRIAXONE 1 G: 1 INJECTION, POWDER, FOR SOLUTION INTRAMUSCULAR; INTRAVENOUS at 08:06

## 2024-01-01 RX ADMIN — PROPOFOL 50 MCG/KG/MIN: 10 INJECTION, EMULSION INTRAVENOUS at 10:06

## 2024-01-01 RX ADMIN — NOREPINEPHRINE BITARTRATE 0.04 MCG/KG/MIN: 4 INJECTION, SOLUTION INTRAVENOUS at 01:06

## 2024-01-01 RX ADMIN — CLOBAZAM 10 MG: 10 TABLET ORAL at 10:06

## 2024-01-01 RX ADMIN — FAMOTIDINE 20 MG: 20 TABLET ORAL at 09:06

## 2024-01-01 RX ADMIN — CLOBAZAM 20 MG: 10 TABLET ORAL at 12:07

## 2024-01-01 RX ADMIN — GABAPENTIN 100 MG: 100 CAPSULE ORAL at 09:06

## 2024-01-01 RX ADMIN — ACETAMINOPHEN 650 MG: 325 TABLET ORAL at 07:06

## 2024-01-01 RX ADMIN — MAGNESIUM SULFATE HEPTAHYDRATE 2 G: 40 INJECTION, SOLUTION INTRAVENOUS at 03:06

## 2024-01-01 RX ADMIN — SODIUM CHLORIDE, POTASSIUM CHLORIDE, SODIUM LACTATE AND CALCIUM CHLORIDE 500 ML: 600; 310; 30; 20 INJECTION, SOLUTION INTRAVENOUS at 12:06

## 2024-01-01 RX ADMIN — SODIUM CHLORIDE 1000 ML: 0.9 INJECTION, SOLUTION INTRAVENOUS at 09:06

## 2024-01-01 RX ADMIN — ATORVASTATIN CALCIUM 40 MG: 40 TABLET, FILM COATED ORAL at 10:06

## 2024-01-01 RX ADMIN — SODIUM CHLORIDE 500 ML: 0.9 INJECTION, SOLUTION INTRAVENOUS at 02:06

## 2024-01-01 RX ADMIN — LORAZEPAM 1 MG: 2 INJECTION INTRAMUSCULAR; INTRAVENOUS at 03:06

## 2024-01-01 RX ADMIN — POTASSIUM & SODIUM PHOSPHATES POWDER PACK 280-160-250 MG 2 PACKET: 280-160-250 PACK at 09:06

## 2024-01-01 RX ADMIN — POTASSIUM BICARBONATE 35 MEQ: 391 TABLET, EFFERVESCENT ORAL at 04:06

## 2024-01-01 RX ADMIN — HYDRALAZINE HYDROCHLORIDE 10 MG: 20 INJECTION, SOLUTION INTRAMUSCULAR; INTRAVENOUS at 05:06

## 2024-01-01 RX ADMIN — ENOXAPARIN SODIUM 50 MG: 100 INJECTION SUBCUTANEOUS at 10:06

## 2024-01-01 RX ADMIN — HYDRALAZINE HYDROCHLORIDE 10 MG: 20 INJECTION, SOLUTION INTRAMUSCULAR; INTRAVENOUS at 12:06

## 2024-01-01 RX ADMIN — HYDRALAZINE HYDROCHLORIDE 10 MG: 20 INJECTION, SOLUTION INTRAMUSCULAR; INTRAVENOUS at 04:06

## 2024-01-01 RX ADMIN — VANCOMYCIN HYDROCHLORIDE 1000 MG: 1 INJECTION, POWDER, LYOPHILIZED, FOR SOLUTION INTRAVENOUS at 05:06

## 2024-01-01 RX ADMIN — SODIUM CHLORIDE 500 ML: 9 INJECTION, SOLUTION INTRAVENOUS at 06:06

## 2024-01-01 RX ADMIN — SODIUM CHLORIDE 1000 ML: 9 INJECTION, SOLUTION INTRAVENOUS at 09:06

## 2024-01-01 RX ADMIN — PERAMPANEL 4 MG: 2 TABLET ORAL at 08:06

## 2024-01-01 RX ADMIN — POLYETHYLENE GLYCOL 3350 17 G: 17 POWDER, FOR SOLUTION ORAL at 10:06

## 2024-01-01 RX ADMIN — LEVETIRACETAM 1500 MG: 100 INJECTION INTRAVENOUS at 09:07

## 2024-01-01 RX ADMIN — PSYLLIUM HUSK 2 PACKET: 3.4 POWDER ORAL at 11:07

## 2024-01-01 RX ADMIN — PROPOFOL 25 MCG/KG/MIN: 10 INJECTION, EMULSION INTRAVENOUS at 02:06

## 2024-01-01 RX ADMIN — METOPROLOL TARTRATE 25 MG: 25 TABLET, FILM COATED ORAL at 11:06

## 2024-02-01 ENCOUNTER — HOSPITAL ENCOUNTER (INPATIENT)
Facility: HOSPITAL | Age: 69
LOS: 3 days | Discharge: LEFT BEFORE DISCHARGE | DRG: 690 | End: 2024-02-04
Attending: EMERGENCY MEDICINE | Admitting: EMERGENCY MEDICINE
Payer: MEDICARE

## 2024-02-01 DIAGNOSIS — F15.10 METHAMPHETAMINE ABUSE: ICD-10-CM

## 2024-02-01 DIAGNOSIS — R09.02 HYPOXIA: ICD-10-CM

## 2024-02-01 DIAGNOSIS — J44.1 COPD EXACERBATION: ICD-10-CM

## 2024-02-01 DIAGNOSIS — N30.00 ACUTE CYSTITIS WITHOUT HEMATURIA: Primary | ICD-10-CM

## 2024-02-01 DIAGNOSIS — N30.00 ACUTE CYSTITIS: ICD-10-CM

## 2024-02-01 DIAGNOSIS — R06.02 SHORTNESS OF BREATH: ICD-10-CM

## 2024-02-01 DIAGNOSIS — N17.9 AKI (ACUTE KIDNEY INJURY): ICD-10-CM

## 2024-02-01 LAB
ALBUMIN SERPL BCP-MCNC: 3.1 G/DL (ref 3.5–5.2)
ALP SERPL-CCNC: 69 U/L (ref 55–135)
ALT SERPL W/O P-5'-P-CCNC: 23 U/L (ref 10–44)
AMPHET+METHAMPHET UR QL: ABNORMAL
ANION GAP SERPL CALC-SCNC: 5 MMOL/L (ref 3–11)
APAP SERPL-MCNC: <2 UG/ML (ref 10–20)
AST SERPL-CCNC: 29 U/L (ref 10–40)
BACTERIA #/AREA URNS HPF: ABNORMAL /HPF
BARBITURATES UR QL SCN>200 NG/ML: NEGATIVE
BASOPHILS # BLD AUTO: 0.07 K/UL (ref 0–0.2)
BASOPHILS NFR BLD: 0.3 % (ref 0–1.9)
BENZODIAZ UR QL SCN>200 NG/ML: ABNORMAL
BILIRUB SERPL-MCNC: 0.4 MG/DL (ref 0.1–1)
BILIRUB UR QL STRIP: NEGATIVE
BUN SERPL-MCNC: 60 MG/DL (ref 8–23)
BZE UR QL SCN: NEGATIVE
CALCIUM SERPL-MCNC: 9.2 MG/DL (ref 8.7–10.5)
CANNABINOIDS UR QL SCN: NEGATIVE
CHLORIDE SERPL-SCNC: 89 MMOL/L (ref 95–110)
CLARITY UR: ABNORMAL
CO2 SERPL-SCNC: 33 MMOL/L (ref 23–29)
COLOR UR: YELLOW
CREAT SERPL-MCNC: 1.5 MG/DL (ref 0.5–1.4)
CREAT UR-MCNC: 214 MG/DL (ref 15–325)
CTP QC/QA: YES
CTP QC/QA: YES
DIFFERENTIAL METHOD BLD: ABNORMAL
EOSINOPHIL # BLD AUTO: 0.1 K/UL (ref 0–0.5)
EOSINOPHIL NFR BLD: 0.2 % (ref 0–8)
ERYTHROCYTE [DISTWIDTH] IN BLOOD BY AUTOMATED COUNT: 16.1 % (ref 11.5–14.5)
EST. GFR  (NO RACE VARIABLE): 37.3 ML/MIN/1.73 M^2
ETHANOL SERPL-MCNC: <3 MG/DL
FIO2: 32 %
GLUCOSE SERPL-MCNC: 126 MG/DL (ref 70–110)
GLUCOSE UR QL STRIP: NEGATIVE
HCT VFR BLD AUTO: 41.3 % (ref 37–48.5)
HGB BLD-MCNC: 12.8 G/DL (ref 12–16)
HGB UR QL STRIP: ABNORMAL
HYALINE CASTS #/AREA URNS LPF: 2.9 /LPF
IMM GRANULOCYTES # BLD AUTO: 0.22 K/UL (ref 0–0.04)
IMM GRANULOCYTES NFR BLD AUTO: 0.8 % (ref 0–0.5)
KETONES UR QL STRIP: ABNORMAL
LACTATE SERPL-SCNC: 1.4 MMOL/L (ref 0.5–2.2)
LEUKOCYTE ESTERASE UR QL STRIP: ABNORMAL
LPM: 3
LYMPHOCYTES # BLD AUTO: 1.1 K/UL (ref 1–4.8)
LYMPHOCYTES NFR BLD: 4.1 % (ref 18–48)
Lab: ABNORMAL
MAGNESIUM SERPL-MCNC: 2.1 MG/DL (ref 1.6–2.6)
MCH RBC QN AUTO: 24.8 PG (ref 27–31)
MCHC RBC AUTO-ENTMCNC: 31 G/DL (ref 32–36)
MCV RBC AUTO: 80 FL (ref 82–98)
METHADONE UR QL SCN>300 NG/ML: NEGATIVE
MICROSCOPIC COMMENT: ABNORMAL
MODIFIED ALLEN'S TEST: ABNORMAL
MONOCYTES # BLD AUTO: 3 K/UL (ref 0.3–1)
MONOCYTES NFR BLD: 11.3 % (ref 4–15)
NEUTROPHILS # BLD AUTO: 22.1 K/UL (ref 1.8–7.7)
NEUTROPHILS NFR BLD: 83.3 % (ref 38–73)
NITRITE UR QL STRIP: NEGATIVE
NOTIFIED BY: ABNORMAL
NRBC BLD-RTO: 0 /100 WBC
NT-PROBNP SERPL-MCNC: 2120 PG/ML (ref 5–900)
O2DEVICE: ABNORMAL
OPIATES UR QL SCN: NEGATIVE
PCO2 BLDA: 57.6 MMHG (ref 35–45)
PCP UR QL SCN>25 NG/ML: NEGATIVE
PH SMN: 7.37 [PH] (ref 7.34–7.45)
PH UR STRIP: 6 [PH] (ref 5–8)
PLATELET # BLD AUTO: 200 K/UL (ref 150–450)
PMV BLD AUTO: 12.2 FL (ref 9.2–12.9)
PO2 BLDA: 63.2 MMHG (ref 80–100)
POC BASE DEFICIT: 7.8 MMOL/L (ref -2–2)
POC HCO3: 29.8 MMOL/L (ref 24–28)
POC MOLECULAR INFLUENZA A AGN: NEGATIVE
POC MOLECULAR INFLUENZA B AGN: NEGATIVE
POC NOTIFIED NOTE: ABNORMAL
POC PERFORMED BY: ABNORMAL
POC SATURATED O2: 90.3 % (ref 95–100)
POC TEMPERATURE: 37 C
POTASSIUM SERPL-SCNC: 3.9 MMOL/L (ref 3.5–5.1)
PROT SERPL-MCNC: 8.7 G/DL (ref 6–8.4)
PROT UR QL STRIP: ABNORMAL
PROVIDER NOTIFIED: ABNORMAL
RBC # BLD AUTO: 5.17 M/UL (ref 4–5.4)
RBC #/AREA URNS HPF: 1 /HPF (ref 0–4)
SALICYLATES SERPL-MCNC: <1.7 MG/DL (ref 15–30)
SARS-COV-2 RDRP RESP QL NAA+PROBE: NEGATIVE
SODIUM SERPL-SCNC: 127 MMOL/L (ref 136–145)
SP GR UR STRIP: 1.02 (ref 1–1.03)
SPECIMEN SOURCE: ABNORMAL
SQUAMOUS #/AREA URNS HPF: 1 /HPF
TOXICOLOGY INFORMATION: ABNORMAL
URN SPEC COLLECT METH UR: ABNORMAL
UROBILINOGEN UR STRIP-ACNC: 1 EU/DL
WBC # BLD AUTO: 26.57 K/UL (ref 3.9–12.7)
WBC #/AREA URNS HPF: >100 /HPF (ref 0–5)

## 2024-02-01 PROCEDURE — 87077 CULTURE AEROBIC IDENTIFY: CPT | Mod: 59 | Performed by: EMERGENCY MEDICINE

## 2024-02-01 PROCEDURE — 87502 INFLUENZA DNA AMP PROBE: CPT

## 2024-02-01 PROCEDURE — 80307 DRUG TEST PRSMV CHEM ANLYZR: CPT | Performed by: EMERGENCY MEDICINE

## 2024-02-01 PROCEDURE — 83605 ASSAY OF LACTIC ACID: CPT | Performed by: EMERGENCY MEDICINE

## 2024-02-01 PROCEDURE — 96375 TX/PRO/DX INJ NEW DRUG ADDON: CPT

## 2024-02-01 PROCEDURE — 11000001 HC ACUTE MED/SURG PRIVATE ROOM

## 2024-02-01 PROCEDURE — 94761 N-INVAS EAR/PLS OXIMETRY MLT: CPT | Mod: XB

## 2024-02-01 PROCEDURE — 80053 COMPREHEN METABOLIC PANEL: CPT | Performed by: EMERGENCY MEDICINE

## 2024-02-01 PROCEDURE — 25000242 PHARM REV CODE 250 ALT 637 W/ HCPCS: Performed by: EMERGENCY MEDICINE

## 2024-02-01 PROCEDURE — 25000003 PHARM REV CODE 250: Performed by: EMERGENCY MEDICINE

## 2024-02-01 PROCEDURE — 80143 DRUG ASSAY ACETAMINOPHEN: CPT | Performed by: EMERGENCY MEDICINE

## 2024-02-01 PROCEDURE — 82077 ASSAY SPEC XCP UR&BREATH IA: CPT | Performed by: EMERGENCY MEDICINE

## 2024-02-01 PROCEDURE — 94640 AIRWAY INHALATION TREATMENT: CPT

## 2024-02-01 PROCEDURE — 99285 EMERGENCY DEPT VISIT HI MDM: CPT | Mod: 25

## 2024-02-01 PROCEDURE — 93010 ELECTROCARDIOGRAM REPORT: CPT | Mod: ,,, | Performed by: INTERNAL MEDICINE

## 2024-02-01 PROCEDURE — 27000221 HC OXYGEN, UP TO 24 HOURS

## 2024-02-01 PROCEDURE — 87088 URINE BACTERIA CULTURE: CPT | Performed by: EMERGENCY MEDICINE

## 2024-02-01 PROCEDURE — 87086 URINE CULTURE/COLONY COUNT: CPT | Performed by: EMERGENCY MEDICINE

## 2024-02-01 PROCEDURE — 87040 BLOOD CULTURE FOR BACTERIA: CPT | Mod: 59 | Performed by: EMERGENCY MEDICINE

## 2024-02-01 PROCEDURE — 36600 WITHDRAWAL OF ARTERIAL BLOOD: CPT

## 2024-02-01 PROCEDURE — 87635 SARS-COV-2 COVID-19 AMP PRB: CPT | Performed by: EMERGENCY MEDICINE

## 2024-02-01 PROCEDURE — 85025 COMPLETE CBC W/AUTO DIFF WBC: CPT | Performed by: EMERGENCY MEDICINE

## 2024-02-01 PROCEDURE — 36415 COLL VENOUS BLD VENIPUNCTURE: CPT | Performed by: EMERGENCY MEDICINE

## 2024-02-01 PROCEDURE — 83880 ASSAY OF NATRIURETIC PEPTIDE: CPT | Performed by: EMERGENCY MEDICINE

## 2024-02-01 PROCEDURE — 80179 DRUG ASSAY SALICYLATE: CPT | Performed by: EMERGENCY MEDICINE

## 2024-02-01 PROCEDURE — 81000 URINALYSIS NONAUTO W/SCOPE: CPT | Mod: 59 | Performed by: EMERGENCY MEDICINE

## 2024-02-01 PROCEDURE — 63600175 PHARM REV CODE 636 W HCPCS: Performed by: EMERGENCY MEDICINE

## 2024-02-01 PROCEDURE — 96365 THER/PROPH/DIAG IV INF INIT: CPT

## 2024-02-01 PROCEDURE — 87154 CUL TYP ID BLD PTHGN 6+ TRGT: CPT | Performed by: EMERGENCY MEDICINE

## 2024-02-01 PROCEDURE — 82803 BLOOD GASES ANY COMBINATION: CPT

## 2024-02-01 PROCEDURE — 87186 SC STD MICRODIL/AGAR DIL: CPT | Performed by: EMERGENCY MEDICINE

## 2024-02-01 PROCEDURE — 93005 ELECTROCARDIOGRAM TRACING: CPT

## 2024-02-01 PROCEDURE — 99900035 HC TECH TIME PER 15 MIN (STAT)

## 2024-02-01 PROCEDURE — 84145 PROCALCITONIN (PCT): CPT | Performed by: EMERGENCY MEDICINE

## 2024-02-01 PROCEDURE — 83735 ASSAY OF MAGNESIUM: CPT | Performed by: EMERGENCY MEDICINE

## 2024-02-01 PROCEDURE — 21400001 HC TELEMETRY ROOM

## 2024-02-01 RX ORDER — FAMOTIDINE 20 MG/1
20 TABLET, FILM COATED ORAL DAILY
Status: DISCONTINUED | OUTPATIENT
Start: 2024-02-02 | End: 2024-02-04

## 2024-02-01 RX ORDER — SODIUM CHLORIDE 0.9 % (FLUSH) 0.9 %
10 SYRINGE (ML) INJECTION
Status: DISCONTINUED | OUTPATIENT
Start: 2024-02-01 | End: 2024-02-04 | Stop reason: HOSPADM

## 2024-02-01 RX ORDER — METHYLPREDNISOLONE SOD SUCC 125 MG
125 VIAL (EA) INJECTION
Status: COMPLETED | OUTPATIENT
Start: 2024-02-01 | End: 2024-02-01

## 2024-02-01 RX ORDER — TALC
6 POWDER (GRAM) TOPICAL NIGHTLY PRN
Status: DISCONTINUED | OUTPATIENT
Start: 2024-02-01 | End: 2024-02-04 | Stop reason: HOSPADM

## 2024-02-01 RX ORDER — ACETAMINOPHEN 325 MG/1
650 TABLET ORAL EVERY 8 HOURS PRN
Status: DISCONTINUED | OUTPATIENT
Start: 2024-02-01 | End: 2024-02-04 | Stop reason: HOSPADM

## 2024-02-01 RX ORDER — ACETAMINOPHEN 500 MG
1000 TABLET ORAL
Status: COMPLETED | OUTPATIENT
Start: 2024-02-01 | End: 2024-02-01

## 2024-02-01 RX ORDER — ONDANSETRON HYDROCHLORIDE 2 MG/ML
4 INJECTION, SOLUTION INTRAVENOUS EVERY 8 HOURS PRN
Status: DISCONTINUED | OUTPATIENT
Start: 2024-02-01 | End: 2024-02-04 | Stop reason: HOSPADM

## 2024-02-01 RX ORDER — IPRATROPIUM BROMIDE AND ALBUTEROL SULFATE 2.5; .5 MG/3ML; MG/3ML
3 SOLUTION RESPIRATORY (INHALATION)
Status: COMPLETED | OUTPATIENT
Start: 2024-02-01 | End: 2024-02-01

## 2024-02-01 RX ORDER — SODIUM CHLORIDE 9 MG/ML
INJECTION, SOLUTION INTRAVENOUS CONTINUOUS
Status: DISCONTINUED | OUTPATIENT
Start: 2024-02-01 | End: 2024-02-04 | Stop reason: HOSPADM

## 2024-02-01 RX ORDER — IPRATROPIUM BROMIDE AND ALBUTEROL SULFATE 2.5; .5 MG/3ML; MG/3ML
3 SOLUTION RESPIRATORY (INHALATION) EVERY 4 HOURS PRN
Status: DISCONTINUED | OUTPATIENT
Start: 2024-02-01 | End: 2024-02-04 | Stop reason: HOSPADM

## 2024-02-01 RX ADMIN — METHYLPREDNISOLONE SODIUM SUCCINATE 125 MG: 125 INJECTION, POWDER, FOR SOLUTION INTRAMUSCULAR; INTRAVENOUS at 07:02

## 2024-02-01 RX ADMIN — SODIUM CHLORIDE 1605 ML: 9 INJECTION, SOLUTION INTRAVENOUS at 08:02

## 2024-02-01 RX ADMIN — ACETAMINOPHEN 1000 MG: 500 TABLET ORAL at 07:02

## 2024-02-01 RX ADMIN — SODIUM CHLORIDE: 9 INJECTION, SOLUTION INTRAVENOUS at 10:02

## 2024-02-01 RX ADMIN — DEXTROSE MONOHYDRATE 2 G: 50 INJECTION, SOLUTION INTRAVENOUS at 08:02

## 2024-02-01 RX ADMIN — IPRATROPIUM BROMIDE AND ALBUTEROL SULFATE 3 ML: .5; 3 SOLUTION RESPIRATORY (INHALATION) at 07:02

## 2024-02-02 PROBLEM — F20.0 PARANOID SCHIZOPHRENIA: Status: ACTIVE | Noted: 2024-02-02

## 2024-02-02 PROBLEM — F33.1 MODERATE RECURRENT MAJOR DEPRESSION: Status: ACTIVE | Noted: 2024-02-02

## 2024-02-02 PROBLEM — N30.00 ACUTE CYSTITIS: Status: ACTIVE | Noted: 2024-02-02

## 2024-02-02 PROBLEM — E78.49 OTHER HYPERLIPIDEMIA: Status: ACTIVE | Noted: 2024-02-02

## 2024-02-02 PROBLEM — I10 PRIMARY HYPERTENSION: Status: ACTIVE | Noted: 2024-02-02

## 2024-02-02 PROBLEM — F25.9 SCHIZOAFFECTIVE DISORDER: Status: ACTIVE | Noted: 2024-02-02

## 2024-02-02 LAB
ACINETOBACTER CALCOACETICUS/BAUMANNII COMPLEX: NOT DETECTED
ALBUMIN SERPL BCP-MCNC: 2.6 G/DL (ref 3.5–5.2)
ALP SERPL-CCNC: 65 U/L (ref 55–135)
ALT SERPL W/O P-5'-P-CCNC: 19 U/L (ref 10–44)
ANION GAP SERPL CALC-SCNC: 4 MMOL/L (ref 3–11)
AST SERPL-CCNC: 24 U/L (ref 10–40)
BACTEROIDES FRAGILIS: NOT DETECTED
BASOPHILS # BLD AUTO: 0.03 K/UL (ref 0–0.2)
BASOPHILS NFR BLD: 0.2 % (ref 0–1.9)
BILIRUB SERPL-MCNC: 0.3 MG/DL (ref 0.1–1)
BUN SERPL-MCNC: 45 MG/DL (ref 8–23)
CALCIUM SERPL-MCNC: 8.6 MG/DL (ref 8.7–10.5)
CANDIDA ALBICANS: NOT DETECTED
CANDIDA AURIS: NOT DETECTED
CANDIDA GLABRATA: NOT DETECTED
CANDIDA KRUSEI: NOT DETECTED
CANDIDA PARAPSILOSIS: NOT DETECTED
CANDIDA TROPICALIS: NOT DETECTED
CHLORIDE SERPL-SCNC: 100 MMOL/L (ref 95–110)
CO2 SERPL-SCNC: 29 MMOL/L (ref 23–29)
CREAT SERPL-MCNC: 0.9 MG/DL (ref 0.5–1.4)
CRYPTOCOCCUS NEOFORMANS/GATTII: NOT DETECTED
CTX-M GENE (ESBL PRODUCER): NOT DETECTED
DIFFERENTIAL METHOD BLD: ABNORMAL
ENTEROBACTER CLOACAE COMPLEX: NOT DETECTED
ENTEROBACTERALES: ABNORMAL
ENTEROCOCCUS FAECALIS: NOT DETECTED
ENTEROCOCCUS FAECIUM: NOT DETECTED
EOSINOPHIL # BLD AUTO: 0.1 K/UL (ref 0–0.5)
EOSINOPHIL NFR BLD: 0.8 % (ref 0–8)
ERYTHROCYTE [DISTWIDTH] IN BLOOD BY AUTOMATED COUNT: 16.3 % (ref 11.5–14.5)
ESCHERICHIA COLI: DETECTED
EST. GFR  (NO RACE VARIABLE): >60 ML/MIN/1.73 M^2
GLUCOSE SERPL-MCNC: 148 MG/DL (ref 70–110)
HAEMOPHILUS INFLUENZAE: NOT DETECTED
HCT VFR BLD AUTO: 40 % (ref 37–48.5)
HGB BLD-MCNC: 12.3 G/DL (ref 12–16)
IMM GRANULOCYTES # BLD AUTO: 0.23 K/UL (ref 0–0.04)
IMM GRANULOCYTES NFR BLD AUTO: 1.3 % (ref 0–0.5)
IMP GENE (CARBAPENEM RESISTANT): NOT DETECTED
KLEBSIELLA AEROGENES: NOT DETECTED
KLEBSIELLA OXYTOCA: NOT DETECTED
KLEBSIELLA PNEUMONIAE GROUP: NOT DETECTED
KPC RESISTANCE GENE (CARBAPENEM): NOT DETECTED
LACTATE SERPL-SCNC: 1.1 MMOL/L (ref 0.5–2.2)
LISTERIA MONOCYTOGENES: NOT DETECTED
LYMPHOCYTES # BLD AUTO: 0.6 K/UL (ref 1–4.8)
LYMPHOCYTES NFR BLD: 3.5 % (ref 18–48)
MCH RBC QN AUTO: 24.7 PG (ref 27–31)
MCHC RBC AUTO-ENTMCNC: 30.8 G/DL (ref 32–36)
MCR-1: NOT DETECTED
MCV RBC AUTO: 80 FL (ref 82–98)
MEC A/C AND MREJ (MRSA): ABNORMAL
MEC A/C: ABNORMAL
MONOCYTES # BLD AUTO: 0.2 K/UL (ref 0.3–1)
MONOCYTES NFR BLD: 1.3 % (ref 4–15)
NDM GENE (CARBAPENEM RESISTANT): NOT DETECTED
NEISSERIA MENINGITIDIS: NOT DETECTED
NEUTROPHILS # BLD AUTO: 16.7 K/UL (ref 1.8–7.7)
NEUTROPHILS NFR BLD: 92.9 % (ref 38–73)
NRBC BLD-RTO: 0 /100 WBC
OXA-48-LIKE (CARBAPENEM RESISTANT): NOT DETECTED
PLATELET # BLD AUTO: 177 K/UL (ref 150–450)
PMV BLD AUTO: 12.3 FL (ref 9.2–12.9)
POTASSIUM SERPL-SCNC: 4.3 MMOL/L (ref 3.5–5.1)
PROCALCITONIN SERPL IA-MCNC: 1.08 NG/ML
PROT SERPL-MCNC: 7.9 G/DL (ref 6–8.4)
PROTEUS SPECIES: NOT DETECTED
PSEUDOMONAS AERUGINOSA: NOT DETECTED
RBC # BLD AUTO: 4.98 M/UL (ref 4–5.4)
SALMONELLA SP: NOT DETECTED
SERRATIA MARCESCENS: NOT DETECTED
SODIUM SERPL-SCNC: 133 MMOL/L (ref 136–145)
STAPHYLOCOCCUS AUREUS: NOT DETECTED
STAPHYLOCOCCUS EPIDERMIDIS: NOT DETECTED
STAPHYLOCOCCUS LUGDUNESIS: NOT DETECTED
STAPHYLOCOCCUS SPECIES: NOT DETECTED
STENOTROPHOMONAS MALTOPHILIA: NOT DETECTED
STREPTOCOCCUS AGALACTIAE: NOT DETECTED
STREPTOCOCCUS PNEUMONIAE: NOT DETECTED
STREPTOCOCCUS PYOGENES: NOT DETECTED
STREPTOCOCCUS SPECIES: NOT DETECTED
VAN A/B (VRE GENE): ABNORMAL
VIM GENE (CARBAPENEM RESISTANT): NOT DETECTED
WBC # BLD AUTO: 17.96 K/UL (ref 3.9–12.7)

## 2024-02-02 PROCEDURE — 63600175 PHARM REV CODE 636 W HCPCS: Performed by: EMERGENCY MEDICINE

## 2024-02-02 PROCEDURE — 83605 ASSAY OF LACTIC ACID: CPT | Performed by: EMERGENCY MEDICINE

## 2024-02-02 PROCEDURE — 97162 PT EVAL MOD COMPLEX 30 MIN: CPT

## 2024-02-02 PROCEDURE — 25000242 PHARM REV CODE 250 ALT 637 W/ HCPCS: Performed by: EMERGENCY MEDICINE

## 2024-02-02 PROCEDURE — 85025 COMPLETE CBC W/AUTO DIFF WBC: CPT | Performed by: EMERGENCY MEDICINE

## 2024-02-02 PROCEDURE — 99900031 HC PATIENT EDUCATION (STAT)

## 2024-02-02 PROCEDURE — 97166 OT EVAL MOD COMPLEX 45 MIN: CPT

## 2024-02-02 PROCEDURE — 36415 COLL VENOUS BLD VENIPUNCTURE: CPT | Mod: XB | Performed by: EMERGENCY MEDICINE

## 2024-02-02 PROCEDURE — 27000221 HC OXYGEN, UP TO 24 HOURS

## 2024-02-02 PROCEDURE — 25000003 PHARM REV CODE 250: Performed by: EMERGENCY MEDICINE

## 2024-02-02 PROCEDURE — 25000003 PHARM REV CODE 250

## 2024-02-02 PROCEDURE — 94761 N-INVAS EAR/PLS OXIMETRY MLT: CPT

## 2024-02-02 PROCEDURE — 99900035 HC TECH TIME PER 15 MIN (STAT)

## 2024-02-02 PROCEDURE — 80053 COMPREHEN METABOLIC PANEL: CPT | Performed by: EMERGENCY MEDICINE

## 2024-02-02 PROCEDURE — 21400001 HC TELEMETRY ROOM

## 2024-02-02 RX ORDER — PRAVASTATIN SODIUM 20 MG/1
20 TABLET ORAL NIGHTLY
Status: DISCONTINUED | OUTPATIENT
Start: 2024-02-02 | End: 2024-02-04 | Stop reason: HOSPADM

## 2024-02-02 RX ADMIN — FAMOTIDINE 20 MG: 20 TABLET ORAL at 08:02

## 2024-02-02 RX ADMIN — PRAVASTATIN SODIUM 20 MG: 20 TABLET ORAL at 08:02

## 2024-02-02 RX ADMIN — IPRATROPIUM BROMIDE AND ALBUTEROL SULFATE 3 ML: .5; 3 SOLUTION RESPIRATORY (INHALATION) at 07:02

## 2024-02-02 RX ADMIN — CEFTRIAXONE 2 G: 2 INJECTION, POWDER, FOR SOLUTION INTRAMUSCULAR; INTRAVENOUS at 08:02

## 2024-02-02 RX ADMIN — Medication 6 MG: at 08:02

## 2024-02-02 NOTE — H&P
"  [facility] Medicine  History & Physical    Patient Name: June Boykin  MRN: [id number]  Patient Class: IP- Inpatient  Admission Date: 2/1/2024  Attending Physician: Rocío Betancourt MD   Primary Care Provider: Mauricio Pierre MD         Patient information was obtained from patient and ER records.     Subjective:     Principal Problem:Acute cystitis    Chief Complaint:   Chief Complaint   Patient presents with    Altered Mental Status     Pt to ED via EMS - presents with AMS - stating that she took too much of her medications. Family stated that unknown pills were found in pill sorter.         HPI: ED HPI: 68-year-old female with a history of anxiety, emphysema, hypertension presents the ER with "altered mental status" per EMS.  Patient is alert, answers questions.  Patient states she may have taken too much medications which include trazodone, gabapentin.  Patient denies SI, denies HI.  History of severe COPD, presents with shortness of breath.  She is speaking 3-4 words before she takes a breath.  Denies any chest pain.  No lower leg edema.  No fever. Patient admits to methamphetamine use.    IM HPI:  68-year-old  female with a past medical history of anxiety, emphysema, hypertension, reported methamphetamine use (reports 1st time she used methamphetamine was last night) presented to the emergency department yesterday via EMS with complaints of AMS.  Patient was awake and alert in the emergency department, answering questions.  Patient reported she may have taken too much of her home medication, but denied any SI/HI.  Workup in the emergency department significant for leukocytosis, MARY.  UDS positive for benzos and amphetamines.  Urinalysis grossly positive, urine and blood cultures are pending.  Chest x-ray negative.  ABG demonstrates hypoxemic respiratory patient admitted for IV fluids, supplemental oxygen, IV antibiotics, close monitoring.  Patient was febrile upon " presentation to the emergency department, but is afebrile this morning.  This morning patient reports she is feeling better and would like to go home.  Patient states she has oxygen as well as a nebulizer at home, PCP is Dr. Pierre.  Informed patient we need to get therapy to evaluate her to ensure she is able to move safely.  Continue with antibiotic therapy for treatment of urinary tract infection.  Patient's blood pressure is stable right now, hold off on resuming home antihypertensives in an effort to prevent profound hypotension.    Past Medical History:   Diagnosis Date    Anxiety disorder, unspecified     Emphysema, unspecified     Hypertension        History reviewed. No pertinent surgical history.    Review of patient's allergies indicates:  No Known Allergies    No current facility-administered medications on file prior to encounter.     Current Outpatient Medications on File Prior to Encounter   Medication Sig    albuterol (PROVENTIL/VENTOLIN HFA) 90 mcg/actuation inhaler Inhale into the lungs.    amoxicillin (AMOXIL) 875 MG tablet Take 1 tablet (875 mg total) by mouth every 12 (twelve) hours.    azithromycin (Z-DEIRDRE) 250 MG tablet Take 2 tablets on day 1, then take 1 tablet on days 2-5    buprenorphine HCL (SUBUTEX) 8 mg Subl 2.5 tabs SL QD    busPIRone (BUSPAR) 5 MG Tab Take 1 tablet (5 mg total) by mouth 2 (two) times daily.    cloNIDine (CATAPRES) 0.1 MG tablet Take 1 tablet (0.1 mg total) by mouth every evening.    cyclobenzaprine (FLEXERIL) 5 MG tablet Take 1 tablet (5 mg total) by mouth nightly as needed.    doxycycline (VIBRA-TABS) 100 MG tablet Take 1 tablet (100 mg total) by mouth 2 (two) times daily.    FLUoxetine 10 MG capsule Take 1 capsule (10 mg total) by mouth once daily.    fluticasone propionate (FLONASE) 50 mcg/actuation nasal spray     gabapentin (NEURONTIN) 100 MG capsule Take 1 capsule (100 mg total) by mouth 2 (two) times daily.    lisinopriL-hydrochlorothiazide  (PRINZIDE,ZESTORETIC) 20-25 mg Tab     meloxicam (MOBIC) 15 MG tablet Take 1 tablet (15 mg total) by mouth once daily. TAKE WITH FOOD    omeprazole (PRILOSEC) 20 MG capsule     pravastatin (PRAVACHOL) 20 MG tablet     predniSONE (DELTASONE) 10 MG tablet Take 1 tablet (10 mg total) by mouth once daily. Take 4 tabs x 3 days, then take 2 tabs x 3 days, then take 1 tab x 3 days.    predniSONE (DELTASONE) 10 MG tablet Take 1 tablet (10 mg total) by mouth once daily. Take 4 tabs x 3 days, then take 2 tabs x 3 days, then take 1 tab x 3 days.     Family History    None       Tobacco Use    Smoking status: Heavy Smoker     Current packs/day: 1.00     Types: Cigarettes    Smokeless tobacco: Never   Substance and Sexual Activity    Alcohol use: No    Drug use: Yes     Types: Oxycodone, Hydrocodone    Sexual activity: Not on file     Review of Systems   Constitutional:  Positive for activity change and fatigue. Negative for appetite change, chills and fever.   HENT:  Negative for ear pain, mouth sores, nosebleeds and sore throat.    Eyes:  Negative for visual disturbance.   Respiratory:  Positive for shortness of breath. Negative for cough and wheezing.    Cardiovascular:  Negative for chest pain, palpitations and leg swelling.   Gastrointestinal:  Negative for abdominal distention, abdominal pain, blood in stool, constipation, diarrhea, nausea and vomiting.   Endocrine: Negative for polyphagia.   Genitourinary:  Negative for difficulty urinating, dysuria, flank pain and frequency.   Musculoskeletal:  Negative for arthralgias, back pain and myalgias.   Skin:  Negative for rash.   Neurological:  Positive for weakness. Negative for dizziness, tremors, seizures, syncope, facial asymmetry, speech difficulty and headaches.   Hematological:  Negative for adenopathy.   Psychiatric/Behavioral:  Positive for dysphoric mood. Negative for agitation, confusion and hallucinations. The patient is nervous/anxious.      Objective:     Vital  Signs (Most Recent):  Temp: 97.3 °F (36.3 °C) (02/02/24 0741)  Pulse: 85 (02/02/24 0745)  Resp: 20 (02/02/24 0745)  BP: 116/65 (02/02/24 0741)  SpO2: (!) 92 % (02/02/24 0815) Vital Signs (24h Range):  Temp:  [97.3 °F (36.3 °C)-100.5 °F (38.1 °C)] 97.3 °F (36.3 °C)  Pulse:  [] 85  Resp:  [16-29] 20  SpO2:  [80 %-97 %] 92 %  BP: (105-139)/(58-69) 116/65     Weight: 51.7 kg (114 lb)  Body mass index is 23.83 kg/m².     Physical Exam  Constitutional:       General: She is not in acute distress.     Appearance: She is not ill-appearing or toxic-appearing.   HENT:      Head: Normocephalic and atraumatic.      Nose: No congestion or rhinorrhea.      Mouth/Throat:      Pharynx: No oropharyngeal exudate.   Eyes:      General: No scleral icterus.  Cardiovascular:      Rate and Rhythm: Normal rate and regular rhythm.      Heart sounds: No murmur heard.     No friction rub. No gallop.   Pulmonary:      Effort: No respiratory distress.      Breath sounds: No stridor. No wheezing, rhonchi or rales.      Comments: On supplemental oxygen via nasal cannula.  Chest:      Chest wall: No tenderness.   Abdominal:      General: There is no distension.      Palpations: There is no mass.      Tenderness: There is no abdominal tenderness. There is no right CVA tenderness, left CVA tenderness, guarding or rebound.      Hernia: No hernia is present.   Musculoskeletal:      Cervical back: No rigidity.      Right lower leg: No edema.      Left lower leg: No edema.   Lymphadenopathy:      Cervical: No cervical adenopathy.   Skin:     General: Skin is dry.      Capillary Refill: Capillary refill takes less than 2 seconds.      Coloration: Skin is not jaundiced or pale.   Neurological:      General: No focal deficit present.      Mental Status: She is lethargic.      Motor: Weakness present.      Gait: Gait abnormal.                Significant Labs: All pertinent labs within the past 24 hours have been reviewed.  CBC:   Recent Labs   Lab  02/01/24 1929 02/02/24  0533   WBC 26.57* 17.96*   HGB 12.8 12.3   HCT 41.3 40.0    177     CMP:   Recent Labs   Lab 02/01/24 1929 02/02/24  0533   * 133*   K 3.9 4.3   CL 89* 100   CO2 33* 29   * 148*   BUN 60* 45*   CREATININE 1.5* 0.9   CALCIUM 9.2 8.6*   PROT 8.7* 7.9   ALBUMIN 3.1* 2.6*   BILITOT 0.4 0.3   ALKPHOS 69 65   AST 29 24   ALT 23 19   ANIONGAP 5 4       Significant Imaging: I have reviewed all pertinent imaging results/findings within the past 24 hours.  Assessment/Plan:     * Acute cystitis  Urinalysis grossly positive, culture pending, leukocytosis decreasing is now 17 K, continue with IV Rocephin.      Primary hypertension  Chronic, controlled. Latest blood pressure and vitals reviewed-     Temp:  [97.3 °F (36.3 °C)-100.5 °F (38.1 °C)]   Pulse:  []   Resp:  [16-29]   BP: (105-139)/(58-69)   SpO2:  [80 %-97 %] .   Home meds for hypertension were reviewed and noted below.   Hypertension Medications               cloNIDine (CATAPRES) 0.1 MG tablet Take 1 tablet (0.1 mg total) by mouth every evening.    lisinopriL-hydrochlorothiazide (PRINZIDE,ZESTORETIC) 20-25 mg Tab             While in the hospital, will manage blood pressure as follows; Adjust home antihypertensive regimen as follows- hold for now due to blood pressure being on the lower end.      Other hyperlipidemia  Resume pravastatin.      Paranoid schizophrenia  As above.  Mood is stable.      Moderate recurrent major depression  Patient reports she is on benzodiazepines as well as trazodone.  We will hold home medication at this time due to patient taking too much and still seeming slightly lethargic.        VTE Risk Mitigation (From admission, onward)           Ordered     IP VTE HIGH RISK PATIENT  Once         02/01/24 2227     Place sequential compression device  Until discontinued         02/01/24 2227     Place GRANT hose  Until discontinued         02/01/24 2227                                    EKATERINA ELIAS  LAMONTE AGUILAR  Department of Hospital Medicine  [facility]

## 2024-02-02 NOTE — PLAN OF CARE
Problem: Occupational Therapy  Goal: Occupational Therapy Goal  Description: Goals to be met by: 2/16/2024     Patient will increase functional independence with ADLs by performing:    Toileting from toilet with [facility] for hygiene and clothing management.   Bathing from  shower chair/bench with Modified [facility].  Step transfer with Modified [facility]  Toilet transfer to toilet with Modified [facility].

## 2024-02-02 NOTE — PLAN OF CARE
"[facility] Surg  Initial Discharge Assessment       Primary Care Provider: Mauricio Pierre MD    Admission Diagnosis: Shortness of breath [R06.02]  Hypoxia [R09.02]  COPD exacerbation [J44.1]  Methamphetamine abuse [F15.10]  Acute cystitis without hematuria [N30.00]  MARY (acute kidney injury) [N17.9]    Admission Date: 2/1/2024  Expected Discharge Date:     Transition of Care Barriers: Substance Abuse, Mental illness (Attempted to offer the patient resources for substance usage, and she denied.)    Payor: [employer] [facility] / Plan: [employer] / Product Type: Medicare Advantage /     Extended Emergency Contact Information  Primary Emergency Contact: Alexandria Cleveland  Home Phone: [phone]  Mobile Phone: [phone]  Relation: Daughter  Secondary Emergency Contact: GARCIAALEK  Mobile Phone: [phone]  Relation: Grandchild  Preferred language: English   needed? No    Discharge Plan A: Home  Discharge Plan B: Home, Home Health      [facility] - [place], [place] - [address]  [address] [address]  Phone: [phone] Fax: [phone]    [id number] - [place], [place] - [address]  [address]  [facility] [address]  Phone: [phone] Fax: [phone]      Initial Assessment (most recent)       Adult Discharge Assessment - 02/02/24 1228          Discharge Assessment    Assessment Type Discharge Planning Assessment     Confirmed/corrected address, phone number and insurance Yes     Confirmed Demographics Correct on Facesheet     Source of Information patient     When was your last doctors appointment? --   "3 months ago."    Reason For Admission Acute cystitis     People in Home alone     Do you expect to return to your current living situation? Yes     Prior to hospitilization cognitive status: Unable to Assess     Current cognitive status: Alert/Oriented     Walking or Climbing Stairs Difficulty no   The " "patient stated that she was independent, but she does have a cane/walker if needed.    Dressing/Bathing Difficulty yes   "I have a little stool."    Dressing/Bathing bathing difficulty, requires equipment     Do you have any problems with: --   Independent    Home Accessibility stairs to enter home     Number of Stairs, Main Entrance four     Stair Railings, Main Entrance railings on both sides of stairs     Home Layout Able to live on 1st floor     Equipment Currently Used at Home oxygen;other (see comments);nebulizer   portable tanks    Readmission within 30 days? No     Patient currently being followed by outpatient case management? No     Do you currently have service(s) that help you manage your care at home? No     Do you take prescription medications? Yes     Do you have prescription coverage? Yes     Coverage [facility]     Do you have any problems affording any of your prescribed medications? No     Is the patient taking medications as prescribed? --   Unable to determine    Who is going to help you get home at discharge? family     How do you get to doctors appointments? family or friend will provide     Are you on dialysis? No     Discharge Plan A Home     Discharge Plan B Home;Home Health     DME Needed Upon Discharge  other (see comments)   TBD    Discharge Plan discussed with: Patient     Transition of Care Barriers Substance Abuse;Mental illness   Attempted to offer the patient resources for substance usage, and she denied.       Physical Activity    On average, how many days per week do you engage in moderate to strenuous exercise (like a brisk walk)? 0 days     On average, how many minutes do you engage in exercise at this level? 0 min        Financial Resource Strain    How hard is it for you to pay for the very basics like food, housing, medical care, and heating? Not hard at all        Housing Stability    In the last 12 months, was there a time when " "you were not able to pay the mortgage or rent on time? No     In the last 12 months, how many places have you lived? 1     In the last 12 months, was there a time when you did not have a steady place to sleep or slept in a shelter (including now)? No        Transportation Needs    In the past 12 months, has lack of transportation kept you from medical appointments or from getting medications? No     In the past 12 months, has lack of transportation kept you from meetings, work, or from getting things needed for daily living? No        Food Insecurity    Within the past 12 months, you worried that your food would run out before you got the money to buy more. Never true     Within the past 12 months, the food you bought just didn't last and you didn't have money to get more. Never true        Stress    Do you feel stress - tense, restless, nervous, or anxious, or unable to sleep at night because your mind is troubled all the time - these days? Rather much        Social Connections    In a typical week, how many times do you talk on the phone with family, friends, or neighbors? More than three times a week   "My daughter checks on me every other day."    How often do you get together with friends or relatives? Once a week     How often do you attend Jehovah's witness or Druze services? Never     Do you belong to any clubs or organizations such as Jehovah's witness groups, unions, fraternal or athletic groups, or school groups? No     How often do you attend meetings of the clubs or organizations you belong to? Never     Are you , , , , never , or living with a partner?         Alcohol Use    Q1: How often do you have a drink containing alcohol? Never     Q2: How many drinks containing alcohol do you have on a typical day when you are drinking? Patient does not drink     Q3: How often do you have six or more drinks on one occasion? Never                 Initial discharge assessment is " completed. Spoke to the patient in her room. She was awake, alert, and oriented to the questions being asked. The patient stated that she lives alone. She does have family that checks on her. She also informed me that she goes to stay with her daughter every other weekend.     The patient stated that she has equipment at home for her care. She currently does not utilize any DME to ambulate with, but she does have a walker and cane. The patent plans to return home upon discharge. The patient does present with several social determinants of health. I attempted to offer the patient resources, but she denied.     I spoke to the patient about going home with home health services, and she was open. I informed the NP.

## 2024-02-02 NOTE — ASSESSMENT & PLAN NOTE
Chronic, controlled. Latest blood pressure and vitals reviewed-     Temp:  [97.3 °F (36.3 °C)-100.5 °F (38.1 °C)]   Pulse:  []   Resp:  [16-29]   BP: (105-139)/(58-69)   SpO2:  [80 %-97 %] .   Home meds for hypertension were reviewed and noted below.   Hypertension Medications               cloNIDine (CATAPRES) 0.1 MG tablet Take 1 tablet (0.1 mg total) by mouth every evening.    lisinopriL-hydrochlorothiazide (PRINZIDE,ZESTORETIC) 20-25 mg Tab             While in the hospital, will manage blood pressure as follows; Adjust home antihypertensive regimen as follows- hold for now due to blood pressure being on the lower end.

## 2024-02-02 NOTE — PT/OT/SLP EVAL
"Physical Therapy Evaluation     Patient Name: June Boykin   MRN: [id number]  Recent Surgery: * No surgery found *      Recommendations:     Discharge Recommendations: Low Intensity Therapy   Discharge Equipment Recommendations: none   Barriers to discharge: None    Assessment:     June Boykin is a 68 y.o. female admitted with a medical diagnosis of Acute cystitis. She presents with the following impairments/functional limitations: weakness, impaired endurance, impaired muscle length, impaired self care skills, impaired functional mobility, decreased lower extremity function, gait instability, impaired cardiopulmonary response to activity. Patient reports that she was independent with all aspects of ADL's and ambulatory without A.D. prior to this hospital admission.  She plans on going home with her daughter upon discharge.  At the time of evaluation, patient denies pain, she required set up assistance with supine <> sit, SBA with sit <> stand with no A.D., SBA with toileting and toilet transfers, and she ambulated ~625 feet with no A.D. with 4 liters of O2 via nasal cannula, SPO2 dropped to 70% with walking but went up to 90% with deep breathing, HR went up as high as 120 bpm during ambulation.  Patient could benefit from skilled P.T. services to increase functional endurance while monitoring SPO2 and HR.  Progress as tolerated.  No DME needed upon discharge.     Rehab Prognosis: Fair; patient would benefit from acute PT services to address these deficits and reach maximum level of function.    Plan:     During this hospitalization, patient to be seen 5 x/week to address the above listed problems via gait training, therapeutic activities, therapeutic exercises, neuromuscular re-education    Plan of Care Expires: 02/09/24    Subjective     Chief Complaint: "I need to use the bathroom."   Patient Comments/Goals: Go home with my daughter.   Pain/Comfort:  Pain Rating 1: 0/10  Pain Rating " Post-Intervention 1: 0/10    Social History:  Living Environment: Patient lives alone in a single story home with number of outside stair(s): 5  Prior Level of Function: Prior to admission, patient was independent  Equipment Used at Home: nebulizer, oxygen (has a cane and RW that she does not use)  DME owned (not currently used): rolling walker and quad cane  Assistance Upon Discharge: family    Objective:     Communicated with nurse and patient  prior to session. Patient found supine with telemetry, peripheral IV, PureWick, oxygen upon PT entry to room.    General Precautions: Standard, fall, respiratory   Orthopedic Precautions: N/A   Braces: N/A    Respiratory Status: Nasal cannula, flow 4 L/min    Exams:  Cognition: Patient is oriented to Person, Place, Time, Situation  RLE ROM: WFL  RLE Strength: WFL  LLE ROM: WFL  LLE Strength: WFL  Fine Motor Coordination:    -       Intact  Gross Motor Coordination: WFL  Postural Exam: Patient presented with the following abnormalities:    -       Rounded shoulders  Sensation:    -       Intact  Skin Integrity/Edema:     -       Skin integrity: Visible skin intact    Functional Mobility:  Gait belt applied - Yes  Bed Mobility  Rolling Left: supervision  Rolling Right: supervision  Scooting: supervision  Supine to Sit: supervision for LE management and trunk management  Sit to Supine: supervision for LE management and trunk management  Transfers  Sit to Stand: stand by assistance with no AD and with cues for foot placement  Gait  Patient ambulated ~625 feet  with  O2 supplement   and stand by assistance. Patient demonstrates steady gait.  All lines remained intact throughout ambulation trail and portable Supplemental O2 4L utilized.  Balance  Sitting: independence  Standing: stand by assistance      Therapeutic Activities and Exercises:   Patient educated on role of acute care PT and PT POC, safety while in hospital including calling nurse for mobility, and call light  usage  Patient educated about importance of OOB mobility and remaining up in chair most of the day.      AM-PAC 6 CLICK MOBILITY  Total Score:21    Patient left HOB elevated with all lines intact, call button in reach, RN notified, and bed alarm on.    GOALS:   Multidisciplinary Problems       Physical Therapy Goals          Problem: Physical Therapy    Goal Priority Disciplines Outcome Goal Variances Interventions   Physical Therapy Goal     PT, PT/OT Ongoing, Progressing     Description: Goals to be met by: 2024    Patient will increase functional independence with mobility by performin. Supine to sit with Modified Independent.  2. Sit to supine with Modified Independent.  3. Bed to chair transfer with Modified Independent with no A.D.  using Step Transfer technique.  4. Sit to Stand with Modified Independent with no A.D..  5. Gait  x 700  feet with Supervision or Set-up Assistance with O2 supplement, SPO2 > 92%. .  6. Lower extremity exercise program x10 reps.                           History:     Past Medical History:   Diagnosis Date    Anxiety disorder, unspecified     Emphysema, unspecified     Hypertension        History reviewed. No pertinent surgical history.    Time Tracking:     PT Received On: 24  PT Start Time: 1450  PT Stop Time: 1508  PT Total Time (min): 18 min     Billable Minutes: Evaluation moderate complexity     2024

## 2024-02-02 NOTE — PLAN OF CARE
02/02/24 1425   Post-Acute Status   Post-Acute Authorization Home Health   Home Health Status Referrals Sent   Discharge Delays None known at this time   Discharge Plan   Discharge Plan A Home;Home Health   Discharge Plan B Home;Home Health     New referral. The patient was in agreement with home health care.

## 2024-02-02 NOTE — ASSESSMENT & PLAN NOTE
Urinalysis grossly positive, culture pending, leukocytosis decreasing is now 17 K, continue with IV Rocephin.

## 2024-02-02 NOTE — ED PROVIDER NOTES
"Encounter Date: 2/1/2024       History     Chief Complaint   Patient presents with    Altered Mental Status     Pt to ED via EMS - presents with AMS - stating that she took too much of her medications. Family stated that unknown pills were found in pill sorter.      68-year-old female with a history of anxiety, emphysema, hypertension presents the ER with "altered mental status" per EMS.  Patient is alert, answers questions.  Patient states she may have taken too much medications which include trazodone, gabapentin.  Patient denies SI, denies HI.  History of severe COPD, presents with shortness of breath.  She is speaking 3-4 words before she takes a breath.  Denies any chest pain.  No lower leg edema.  No fever.    Patient admits to methamphetamine use      Review of patient's allergies indicates:  No Known Allergies  Past Medical History:   Diagnosis Date    Anxiety disorder, unspecified     Emphysema, unspecified     Hypertension      No past surgical history on file.  No family history on file.  Social History     Tobacco Use    Smoking status: Heavy Smoker     Current packs/day: 1.00     Types: Cigarettes    Smokeless tobacco: Never   Substance Use Topics    Alcohol use: No    Drug use: Yes     Types: Oxycodone, Hydrocodone     Review of Systems   Constitutional:  Negative for fever.   HENT:  Negative for sore throat.    Respiratory:  Positive for shortness of breath and wheezing.    Cardiovascular:  Negative for chest pain.   Gastrointestinal:  Negative for nausea.   Genitourinary:  Negative for dysuria.   Musculoskeletal:  Negative for back pain.   Skin:  Negative for rash.   Neurological:  Negative for weakness.   Hematological:  Does not bruise/bleed easily.   All other systems reviewed and are negative.      Physical Exam     Initial Vitals   BP Pulse Resp Temp SpO2   02/01/24 1919 02/01/24 1919 02/01/24 1919 02/01/24 1921 02/01/24 1921   139/67 110 (!) 28 (!) 100.5 °F (38.1 °C) (!) 80 %      MAP       --  "               Physical Exam    Nursing note and vitals reviewed.  Constitutional: She appears well-developed and well-nourished. She is not diaphoretic. No distress.   HENT:   Head: Normocephalic and atraumatic.   Eyes: Conjunctivae and EOM are normal. Pupils are equal, round, and reactive to light.   Neck: Neck supple. No tracheal deviation present. No JVD present.   Normal range of motion.  Cardiovascular:  Regular rhythm, normal heart sounds and intact distal pulses.           No murmur heard.  Mildly tachycardic at 107 beats per minute   Pulmonary/Chest: No stridor. No respiratory distress. She has wheezes. She has no rhonchi. She has rales. She exhibits no tenderness.   Abdominal: Abdomen is soft. Bowel sounds are normal. She exhibits no distension. There is no abdominal tenderness. There is no rebound and no guarding.   Musculoskeletal:         General: No tenderness or edema. Normal range of motion.      Cervical back: Normal range of motion and neck supple.     Neurological: She is alert and oriented to person, place, and time. She has normal strength. No cranial nerve deficit. GCS score is 15. GCS eye subscore is 4. GCS verbal subscore is 5. GCS motor subscore is 6.   Skin: Skin is warm and dry. Capillary refill takes less than 2 seconds.   Psychiatric:   No SI, no HI         ED Course   Critical Care    Date/Time: 2/1/2024 9:21 PM    Performed by: Julio César Justice MD  Authorized by: Julio César Justice MD  Direct patient critical care time: 10 minutes  Additional history critical care time: 10 minutes  Ordering / reviewing critical care time: 10 minutes  Documentation critical care time: 10 minutes  Consulting other physicians critical care time: 10 minutes  Consult with family critical care time: 10 minutes  Total critical care time (exclusive of procedural time) : 60 minutes  Critical care was necessary to treat or prevent imminent or life-threatening deterioration of the following conditions:  sepsis.  Critical care was time spent personally by me on the following activities: review of old charts, re-evaluation of patient's condition, pulse oximetry, ordering and review of radiographic studies, ordering and review of laboratory studies, ordering and performing treatments and interventions, obtaining history from patient or surrogate, examination of patient, evaluation of patient's response to treatment, discussions with primary provider and development of treatment plan with patient or surrogate.        Labs Reviewed   CBC W/ AUTO DIFFERENTIAL - Abnormal; Notable for the following components:       Result Value    WBC 26.57 (*)     MCV 80 (*)     MCH 24.8 (*)     MCHC 31.0 (*)     RDW 16.1 (*)     Immature Granulocytes 0.8 (*)     Gran # (ANC) 22.1 (*)     Immature Grans (Abs) 0.22 (*)     Mono # 3.0 (*)     Gran % 83.3 (*)     Lymph % 4.1 (*)     All other components within normal limits   COMPREHENSIVE METABOLIC PANEL - Abnormal; Notable for the following components:    Sodium 127 (*)     Chloride 89 (*)     CO2 33 (*)     Glucose 126 (*)     BUN 60 (*)     Creatinine 1.5 (*)     Total Protein 8.7 (*)     Albumin 3.1 (*)     eGFR 37.3 (*)     All other components within normal limits   NT-PRO NATRIURETIC PEPTIDE - Abnormal; Notable for the following components:    NT-proBNP 2120 (*)     All other components within normal limits   URINALYSIS, REFLEX TO URINE CULTURE - Abnormal; Notable for the following components:    Appearance, UA Cloudy (*)     Protein, UA 2+ (*)     Ketones, UA Trace (*)     Occult Blood UA 1+ (*)     Leukocytes, UA 3+ (*)     All other components within normal limits    Narrative:     Preferred Collection Type->Urine, Clean Catch  Specimen Source->Urine   DRUG SCREEN PANEL, URINE EMERGENCY - Abnormal; Notable for the following components:    Benzodiazepines Presumptive Positive (*)     Amphetamine Screen, Ur Presumptive Positive (*)     All other components within normal limits     Narrative:     Preferred Collection Type->Urine, Clean Catch  Specimen Source->Urine   ACETAMINOPHEN LEVEL - Abnormal; Notable for the following components:    Acetaminophen (Tylenol), Serum <2.0 (*)     All other components within normal limits   SALICYLATE LEVEL - Abnormal; Notable for the following components:    Salicylate Lvl <1.7 (*)     All other components within normal limits   URINALYSIS MICROSCOPIC - Abnormal; Notable for the following components:    WBC, UA >100 (*)     Bacteria Many (*)     Hyaline Casts, UA 2.9 (*)     All other components within normal limits    Narrative:     Preferred Collection Type->Urine, Clean Catch  Specimen Source->Urine   CULTURE, BLOOD   CULTURE, BLOOD   CULTURE, URINE   ALCOHOL,MEDICAL (ETHANOL)   LACTIC ACID, PLASMA   MAGNESIUM   URINALYSIS, REFLEX TO URINE CULTURE   PROCALCITONIN   SARS-COV-2 RDRP GENE    Narrative:     This test utilizes isothermal nucleic acid amplification technology to detect the SARS-CoV-2 RdRp nucleic acid segment. The analytical sensitivity (limit of detection) is 500 copies/swab.     A POSITIVE result is indicative of the presence of SARS-CoV-2 RNA; clinical correlation with patient history and other diagnostic information is necessary to determine patient infection status.    A NEGATIVE result means that SARS-CoV-2 nucleic acids are not present above the limit of detection. A NEGATIVE result should be treated as presumptive. It does not rule out the possibility of COVID-19 and should not be the sole basis for treatment decisions. If COVID-19 is strongly suspected based on clinical and exposure history, re-testing using an alternate molecular assay should be considered.     Commercial kits are provided by [employer].   _________________________________________________________________   The authorized Fact Sheet for Healthcare Providers and the authorized Fact Sheet for Patients of the ID NOW COVID-19 are available on the FDA website:   "  https://www.fda.gov/media/810144/download      https://www.fda.gov/media/181043/download      POCT INFLUENZA A/B MOLECULAR     EKG Readings: (Independently Interpreted)   Initial Reading: No STEMI. Rhythm: Sinus Tachycardia. Heart Rate: 112. Ectopy: No Ectopy. Conduction: Normal. ST Segments: Normal ST Segments. T Waves: Normal. Axis: Normal. Clinical Impression: Sinus Tachycardia       Imaging Results              X-Ray Chest 1 View (In process)                      Medications   albuterol-ipratropium 2.5 mg-0.5 mg/3 mL nebulizer solution 3 mL (3 mLs Nebulization Given 2/1/24 1951)   methylPREDNISolone sodium succinate injection 125 mg (125 mg Intravenous Given 2/1/24 1926)   acetaminophen tablet 1,000 mg (1,000 mg Oral Given 2/1/24 1928)   sodium chloride 0.9% bolus 1,605 mL 1,605 mL (0 mLs Intravenous Stopped 2/1/24 2131)   cefTRIAXone (ROCEPHIN) 2 g in dextrose 5 % in water (D5W) 100 mL IVPB (MB+) (0 g Intravenous Stopped 2/1/24 2102)     Medical Decision Making  Amount and/or Complexity of Data Reviewed  Labs: ordered.  Radiology: ordered.    Risk  OTC drugs.  Prescription drug management.  Decision regarding hospitalization.               ED Course as of 02/01/24 2137 Thu Feb 01, 2024 1925 Chest x-ray without acute changes from previous chest x-ray in August [SD]   1956 Laboratory values consistent with urinary tract infection, mild MARY as well.  PCO2 is slightly elevated with no other ABG for comparison.  Will proceed with sepsis protocol and workup [SD]   2136 Discussed case with , agrees to admit for IV antibiotics, neb treatments, oxygen therapy [SD]      ED Course User Index  [SD] Julio César Justice MD    Sepsis Perfusion Assessment: "I attest a sepsis perfusion exam was performed within 6 hours of sepsis, severe sepsis, or septic shock presentation, following fluid resuscitation."          Medical Decision Making:   Differential Diagnosis:   COPD exacerbation, viral syndrome  Clinical " Tests:   Sepsis Perfusion Assessment: "I attest a sepsis perfusion exam was performed within 6 hours of sepsis, severe sepsis, or septic shock presentation, following fluid resuscitation."  ED Management:  This patient does have evidence of infective focus  My overall impression is sepsis.  Source: Urinary Tract  Antibiotics given- Antibiotics (72h ago, onward)    None      Latest lactate reviewed-  @[id number](lactate,poclac)@  Organ dysfunction indicated by Acute kidney injury    Fluid challenge Actual Body weight- Patient will receive 30ml/kg actual body weight to calculate fluid bolus for treatment of septic shock.     Post- resuscitation assessment Yes Perfusion exam was performed within 6 hours of septic shock presentation after bolus shows Adequate tissue perfusion assessed by non-invasive monitoring       Will Not start Pressors- Levophed for MAP of 65              Clinical Impression:  Final diagnoses:  [R06.02] Shortness of breath  [N30.00] Acute cystitis without hematuria (Primary)  [J44.1] COPD exacerbation  [R09.02] Hypoxia  [N17.9] MARY (acute kidney injury)  [F15.10] Methamphetamine abuse          ED Disposition Condition    Admit Stable                Julio César Justice MD  02/01/24 2136       Julio César Justice MD  02/01/24 2137

## 2024-02-02 NOTE — PT/OT/SLP EVAL
Occupational Therapy   Evaluation    Name: June Boykin  MRN: [id number]  Admitting Diagnosis: Acute cystitis  Recent Surgery: * No surgery found *      Recommendations:     Discharge Recommendations: Low Intensity Therapy  Discharge Equipment Recommendations:  walker, rolling  Barriers to discharge:  Other (Comment) (current medical status)    Assessment:     June Boykin is a 68 y.o. female with a medical diagnosis of Acute cystitis.  She presents with weakness. Performance deficits affecting function: weakness, impaired endurance, impaired self care skills, impaired functional mobility, decreased safety awareness.      Rehab Prognosis: Good; patient would benefit from acute skilled OT services to address these deficits and reach maximum level of function.       Plan:     Patient to be seen 5 x/week to address the above listed problems via self-care/home management, therapeutic activities, therapeutic exercises  Plan of Care Expires: 02/16/24  Plan of Care Reviewed with: patient    Subjective     Chief Complaint: weakness  Patient/Family Comments/goals: Return to PLOF    Occupational Profile:  Living Environment: lives alone in apartment with 4 steps to enter  Previous level of function: independent  Equipment Used at Home: nebulizer, oxygen  Assistance upon Discharge: TBD    Pain/Comfort:  Pain Rating 1: 0/10  Pain Rating Post-Intervention 1: 0/10    Patients cultural, spiritual, Religion conflicts given the current situation:      Objective:     Communicated with: nurse prior to session.  Patient found HOB elevated with PureWick, peripheral IV, pulse ox (continuous), telemetry, oxygen, bed alarm upon OT entry to room.    General Precautions: Standard, fall  Orthopedic Precautions: N/A  Braces: N/A  Respiratory Status: Nasal cannula, flow 4 L/min    Occupational Performance:    Bed Mobility:    Patient completed Rolling/Turning to Left with  modified independence and with side rail  Patient  completed Rolling/Turning to Right with modified independence and with side rail  Patient completed Scooting/Bridging with contact guard assistance  Patient completed Supine to Sit with contact guard assistance  Patient completed Sit to Supine with contact guard assistance    Functional Mobility/Transfers:  Patient completed Sit <> Stand Transfer with minimum assistance  with  hand-held assist     Activities of Daily Living:  Feeding:  independence    Grooming: independence    Bathing: minimum assistance    Upper Body Dressing: independence    Lower Body Dressing: contact guard assistance    Toileting: minimum assistance      Cognitive/Visual Perceptual:  Cognitive/Psychosocial Skills:     -       Oriented to: Person, Place, Time, and Situation   -       Follows Commands/attention:Follows multistep  commands  -       Communication: clear/fluent    Physical Exam:  Upper Extremity Range of Motion:     -       Right Upper Extremity: WFL  -       Left Upper Extremity: WFL  Upper Extremity Strength:    -       Right Upper Extremity: Deficits: 4-/5  -       Left Upper Extremity: Deficits: 4-/5   Strength:    -       Right Upper Extremity: WFL  -       Left Upper Extremity: WFL    AMPAC 6 Click ADL:  AMPAC Total Score: 22    Treatment & Education:  Pt educated on the role of OT and OT POC    Patient left HOB elevated with all lines intact, call button in reach, bed alarm on, and nurse notified    GOALS:   Multidisciplinary Problems       Occupational Therapy Goals          Problem: Occupational Therapy    Goal Priority Disciplines Outcome Interventions   Occupational Therapy Goal     OT, PT/OT Ongoing, Progressing    Description: Goals to be met by: 2/16/2024     Patient will increase functional independence with ADLs by performing:    Toileting from toilet with [place] for hygiene and clothing management.   Bathing from  shower chair/bench with Modified [place].  Step transfer with Modified  [place]  Toilet transfer to toilet with Modified [place].                         History:     Past Medical History:   Diagnosis Date    Anxiety disorder, unspecified     Emphysema, unspecified     Hypertension        History reviewed. No pertinent surgical history.    Time Tracking:     OT Date of Treatment: 02/02/24  OT Start Time: 1530  OT Stop Time: 1550  OT Total Time (min): 20 min    Billable Minutes:Evaluation 20 2/2/2024

## 2024-02-02 NOTE — PLAN OF CARE
POC reviewed with pt. VSS. 4L NC. 20 gauge R AC with NS at 127. 20 gauge L ac sl. No acute injures throughout the night. All questions and concerns addressed. Bed lowered and call bell in reach.  Problem: Adult Inpatient Plan of Care  Goal: Plan of Care Review  Outcome: Ongoing, Progressing  Goal: Patient-Specific Goal (Individualized)  Outcome: Ongoing, Progressing  Goal: Absence of Hospital-Acquired Illness or Injury  Outcome: Ongoing, Progressing  Goal: Optimal Comfort and Wellbeing  Outcome: Ongoing, Progressing  Goal: Readiness for Transition of Care  Outcome: Ongoing, Progressing

## 2024-02-02 NOTE — ASSESSMENT & PLAN NOTE
Patient reports she is on benzodiazepines as well as trazodone.  We will hold home medication at this time due to patient taking too much and still seeming slightly lethargic.

## 2024-02-02 NOTE — PLAN OF CARE
Problem: Physical Therapy  Goal: Physical Therapy Goal  Description: Goals to be met by: 2024    Patient will increase functional independence with mobility by performin. Supine to sit with Modified Independent.  2. Sit to supine with Modified Independent.  3. Bed to chair transfer with Modified Independent with no A.D.  using Step Transfer technique.  4. Sit to Stand with Modified Independent with no A.D..  5. Gait  x 700  feet with Supervision or Set-up Assistance with O2 supplement, SPO2 > 92%. .  6. Lower extremity exercise program x10 reps.      Outcome: Plan of care established

## 2024-02-02 NOTE — HPI
"ED HPI: 68-year-old female with a history of anxiety, emphysema, hypertension presents the ER with "altered mental status" per EMS.  Patient is alert, answers questions.  Patient states she may have taken too much medications which include trazodone, gabapentin.  Patient denies SI, denies HI.  History of severe COPD, presents with shortness of breath.  She is speaking 3-4 words before she takes a breath.  Denies any chest pain.  No lower leg edema.  No fever. Patient admits to methamphetamine use.    IM HPI:  68-year-old  female with a past medical history of anxiety, emphysema, hypertension, reported methamphetamine use (reports 1st time she used methamphetamine was last night) presented to the emergency department yesterday via EMS with complaints of AMS.  Patient was awake and alert in the emergency department, answering questions.  Patient reported she may have taken too much of her home medication, but denied any SI/HI.  Workup in the emergency department significant for leukocytosis, MARY.  UDS positive for benzos and amphetamines.  Urinalysis grossly positive, urine and blood cultures are pending.  Chest x-ray negative.  ABG demonstrates hypoxemic respiratory patient admitted for IV fluids, supplemental oxygen, IV antibiotics, close monitoring.  Patient was febrile upon presentation to the emergency department, but is afebrile this morning.  This morning patient reports she is feeling better and would like to go home.  Patient states she has oxygen as well as a nebulizer at home, PCP is Dr. Pierre.  Informed patient we need to get therapy to evaluate her to ensure she is able to move safely.  Continue with antibiotic therapy for treatment of urinary tract infection.  Patient's blood pressure is stable right now, hold off on resuming home antihypertensives in an effort to prevent profound hypotension.  "

## 2024-02-02 NOTE — SUBJECTIVE & OBJECTIVE
Past Medical History:   Diagnosis Date    Anxiety disorder, unspecified     Emphysema, unspecified     Hypertension        History reviewed. No pertinent surgical history.    Review of patient's allergies indicates:  No Known Allergies    No current facility-administered medications on file prior to encounter.     Current Outpatient Medications on File Prior to Encounter   Medication Sig    albuterol (PROVENTIL/VENTOLIN HFA) 90 mcg/actuation inhaler Inhale into the lungs.    amoxicillin (AMOXIL) 875 MG tablet Take 1 tablet (875 mg total) by mouth every 12 (twelve) hours.    azithromycin (Z-DEIRDRE) 250 MG tablet Take 2 tablets on day 1, then take 1 tablet on days 2-5    buprenorphine HCL (SUBUTEX) 8 mg Subl 2.5 tabs SL QD    busPIRone (BUSPAR) 5 MG Tab Take 1 tablet (5 mg total) by mouth 2 (two) times daily.    cloNIDine (CATAPRES) 0.1 MG tablet Take 1 tablet (0.1 mg total) by mouth every evening.    cyclobenzaprine (FLEXERIL) 5 MG tablet Take 1 tablet (5 mg total) by mouth nightly as needed.    doxycycline (VIBRA-TABS) 100 MG tablet Take 1 tablet (100 mg total) by mouth 2 (two) times daily.    FLUoxetine 10 MG capsule Take 1 capsule (10 mg total) by mouth once daily.    fluticasone propionate (FLONASE) 50 mcg/actuation nasal spray     gabapentin (NEURONTIN) 100 MG capsule Take 1 capsule (100 mg total) by mouth 2 (two) times daily.    lisinopriL-hydrochlorothiazide (PRINZIDE,ZESTORETIC) 20-25 mg Tab     meloxicam (MOBIC) 15 MG tablet Take 1 tablet (15 mg total) by mouth once daily. TAKE WITH FOOD    omeprazole (PRILOSEC) 20 MG capsule     pravastatin (PRAVACHOL) 20 MG tablet     predniSONE (DELTASONE) 10 MG tablet Take 1 tablet (10 mg total) by mouth once daily. Take 4 tabs x 3 days, then take 2 tabs x 3 days, then take 1 tab x 3 days.    predniSONE (DELTASONE) 10 MG tablet Take 1 tablet (10 mg total) by mouth once daily. Take 4 tabs x 3 days, then take 2 tabs x 3 days, then take 1 tab x 3 days.     Family History     None       Tobacco Use    Smoking status: Heavy Smoker     Current packs/day: 1.00     Types: Cigarettes    Smokeless tobacco: Never   Substance and Sexual Activity    Alcohol use: No    Drug use: Yes     Types: Oxycodone, Hydrocodone    Sexual activity: Not on file     Review of Systems   Constitutional:  Positive for activity change and fatigue. Negative for appetite change, chills and fever.   HENT:  Negative for ear pain, mouth sores, nosebleeds and sore throat.    Eyes:  Negative for visual disturbance.   Respiratory:  Positive for shortness of breath. Negative for cough and wheezing.    Cardiovascular:  Negative for chest pain, palpitations and leg swelling.   Gastrointestinal:  Negative for abdominal distention, abdominal pain, blood in stool, constipation, diarrhea, nausea and vomiting.   Endocrine: Negative for polyphagia.   Genitourinary:  Negative for difficulty urinating, dysuria, flank pain and frequency.   Musculoskeletal:  Negative for arthralgias, back pain and myalgias.   Skin:  Negative for rash.   Neurological:  Positive for weakness. Negative for dizziness, tremors, seizures, syncope, facial asymmetry, speech difficulty and headaches.   Hematological:  Negative for adenopathy.   Psychiatric/Behavioral:  Positive for dysphoric mood. Negative for agitation, confusion and hallucinations. The patient is nervous/anxious.      Objective:     Vital Signs (Most Recent):  Temp: 97.3 °F (36.3 °C) (02/02/24 0741)  Pulse: 85 (02/02/24 0745)  Resp: 20 (02/02/24 0745)  BP: 116/65 (02/02/24 0741)  SpO2: (!) 92 % (02/02/24 0815) Vital Signs (24h Range):  Temp:  [97.3 °F (36.3 °C)-100.5 °F (38.1 °C)] 97.3 °F (36.3 °C)  Pulse:  [] 85  Resp:  [16-29] 20  SpO2:  [80 %-97 %] 92 %  BP: (105-139)/(58-69) 116/65     Weight: 51.7 kg (114 lb)  Body mass index is 23.83 kg/m².     Physical Exam  Constitutional:       General: She is not in acute distress.     Appearance: She is not ill-appearing or toxic-appearing.    HENT:      Head: Normocephalic and atraumatic.      Nose: No congestion or rhinorrhea.      Mouth/Throat:      Pharynx: No oropharyngeal exudate.   Eyes:      General: No scleral icterus.  Cardiovascular:      Rate and Rhythm: Normal rate and regular rhythm.      Heart sounds: No murmur heard.     No friction rub. No gallop.   Pulmonary:      Effort: No respiratory distress.      Breath sounds: No stridor. No wheezing, rhonchi or rales.      Comments: On supplemental oxygen via nasal cannula.  Chest:      Chest wall: No tenderness.   Abdominal:      General: There is no distension.      Palpations: There is no mass.      Tenderness: There is no abdominal tenderness. There is no right CVA tenderness, left CVA tenderness, guarding or rebound.      Hernia: No hernia is present.   Musculoskeletal:      Cervical back: No rigidity.      Right lower leg: No edema.      Left lower leg: No edema.   Lymphadenopathy:      Cervical: No cervical adenopathy.   Skin:     General: Skin is dry.      Capillary Refill: Capillary refill takes less than 2 seconds.      Coloration: Skin is not jaundiced or pale.   Neurological:      General: No focal deficit present.      Mental Status: She is lethargic.      Motor: Weakness present.      Gait: Gait abnormal.                Significant Labs: All pertinent labs within the past 24 hours have been reviewed.  CBC:   Recent Labs   Lab 02/01/24 1929 02/02/24  0533   WBC 26.57* 17.96*   HGB 12.8 12.3   HCT 41.3 40.0    177     CMP:   Recent Labs   Lab 02/01/24 1929 02/02/24  0533   * 133*   K 3.9 4.3   CL 89* 100   CO2 33* 29   * 148*   BUN 60* 45*   CREATININE 1.5* 0.9   CALCIUM 9.2 8.6*   PROT 8.7* 7.9   ALBUMIN 3.1* 2.6*   BILITOT 0.4 0.3   ALKPHOS 69 65   AST 29 24   ALT 23 19   ANIONGAP 5 4       Significant Imaging: I have reviewed all pertinent imaging results/findings within the past 24 hours.

## 2024-02-02 NOTE — ED NOTES
"Pt stated "my daughter is not going to be happy with me" when I asked why, she stated "because I did meth but I didn't do it all at once, I broke it up and did some yesterday and some today" notified MD  "

## 2024-02-03 LAB
ALBUMIN SERPL BCP-MCNC: 2.3 G/DL (ref 3.5–5.2)
ALP SERPL-CCNC: 51 U/L (ref 55–135)
ALT SERPL W/O P-5'-P-CCNC: 17 U/L (ref 10–44)
ANION GAP SERPL CALC-SCNC: 1 MMOL/L (ref 3–11)
AST SERPL-CCNC: 14 U/L (ref 10–40)
BASOPHILS # BLD AUTO: 0.03 K/UL (ref 0–0.2)
BASOPHILS NFR BLD: 0.2 % (ref 0–1.9)
BILIRUB SERPL-MCNC: 0.2 MG/DL (ref 0.1–1)
BUN SERPL-MCNC: 28 MG/DL (ref 8–23)
CALCIUM SERPL-MCNC: 8.7 MG/DL (ref 8.7–10.5)
CHLORIDE SERPL-SCNC: 102 MMOL/L (ref 95–110)
CO2 SERPL-SCNC: 32 MMOL/L (ref 23–29)
CREAT SERPL-MCNC: 0.5 MG/DL (ref 0.5–1.4)
DIFFERENTIAL METHOD BLD: ABNORMAL
EOSINOPHIL # BLD AUTO: 0 K/UL (ref 0–0.5)
EOSINOPHIL NFR BLD: 0 % (ref 0–8)
ERYTHROCYTE [DISTWIDTH] IN BLOOD BY AUTOMATED COUNT: 16.1 % (ref 11.5–14.5)
EST. GFR  (NO RACE VARIABLE): >60 ML/MIN/1.73 M^2
GLUCOSE SERPL-MCNC: 121 MG/DL (ref 70–110)
HCT VFR BLD AUTO: 38.6 % (ref 37–48.5)
HGB BLD-MCNC: 12 G/DL (ref 12–16)
IMM GRANULOCYTES # BLD AUTO: 0.16 K/UL (ref 0–0.04)
IMM GRANULOCYTES NFR BLD AUTO: 0.9 % (ref 0–0.5)
LYMPHOCYTES # BLD AUTO: 0.9 K/UL (ref 1–4.8)
LYMPHOCYTES NFR BLD: 4.8 % (ref 18–48)
MAGNESIUM SERPL-MCNC: 2.1 MG/DL (ref 1.6–2.6)
MCH RBC QN AUTO: 24.8 PG (ref 27–31)
MCHC RBC AUTO-ENTMCNC: 31.1 G/DL (ref 32–36)
MCV RBC AUTO: 80 FL (ref 82–98)
MONOCYTES # BLD AUTO: 1.1 K/UL (ref 0.3–1)
MONOCYTES NFR BLD: 6.3 % (ref 4–15)
NEUTROPHILS # BLD AUTO: 15.7 K/UL (ref 1.8–7.7)
NEUTROPHILS NFR BLD: 87.8 % (ref 38–73)
NRBC BLD-RTO: 0 /100 WBC
PLATELET # BLD AUTO: 179 K/UL (ref 150–450)
PMV BLD AUTO: 12.5 FL (ref 9.2–12.9)
POTASSIUM SERPL-SCNC: 4.2 MMOL/L (ref 3.5–5.1)
PROT SERPL-MCNC: 7 G/DL (ref 6–8.4)
RBC # BLD AUTO: 4.84 M/UL (ref 4–5.4)
SODIUM SERPL-SCNC: 135 MMOL/L (ref 136–145)
WBC # BLD AUTO: 17.83 K/UL (ref 3.9–12.7)

## 2024-02-03 PROCEDURE — 11000001 HC ACUTE MED/SURG PRIVATE ROOM

## 2024-02-03 PROCEDURE — 83735 ASSAY OF MAGNESIUM: CPT

## 2024-02-03 PROCEDURE — 25000003 PHARM REV CODE 250

## 2024-02-03 PROCEDURE — 63600175 PHARM REV CODE 636 W HCPCS: Performed by: EMERGENCY MEDICINE

## 2024-02-03 PROCEDURE — 25000242 PHARM REV CODE 250 ALT 637 W/ HCPCS: Performed by: EMERGENCY MEDICINE

## 2024-02-03 PROCEDURE — 99900035 HC TECH TIME PER 15 MIN (STAT)

## 2024-02-03 PROCEDURE — 94640 AIRWAY INHALATION TREATMENT: CPT

## 2024-02-03 PROCEDURE — 85025 COMPLETE CBC W/AUTO DIFF WBC: CPT

## 2024-02-03 PROCEDURE — 25000003 PHARM REV CODE 250: Performed by: EMERGENCY MEDICINE

## 2024-02-03 PROCEDURE — 99900031 HC PATIENT EDUCATION (STAT)

## 2024-02-03 PROCEDURE — 94761 N-INVAS EAR/PLS OXIMETRY MLT: CPT

## 2024-02-03 PROCEDURE — 27000221 HC OXYGEN, UP TO 24 HOURS

## 2024-02-03 PROCEDURE — 80053 COMPREHEN METABOLIC PANEL: CPT

## 2024-02-03 PROCEDURE — 36415 COLL VENOUS BLD VENIPUNCTURE: CPT

## 2024-02-03 RX ADMIN — ACETAMINOPHEN 650 MG: 325 TABLET ORAL at 08:02

## 2024-02-03 RX ADMIN — Medication 6 MG: at 08:02

## 2024-02-03 RX ADMIN — ACETAMINOPHEN 650 MG: 325 TABLET ORAL at 01:02

## 2024-02-03 RX ADMIN — CEFTRIAXONE 2 G: 2 INJECTION, POWDER, FOR SOLUTION INTRAMUSCULAR; INTRAVENOUS at 08:02

## 2024-02-03 RX ADMIN — PRAVASTATIN SODIUM 20 MG: 20 TABLET ORAL at 08:02

## 2024-02-03 RX ADMIN — IPRATROPIUM BROMIDE AND ALBUTEROL SULFATE 3 ML: .5; 3 SOLUTION RESPIRATORY (INHALATION) at 09:02

## 2024-02-03 RX ADMIN — IPRATROPIUM BROMIDE AND ALBUTEROL SULFATE 3 ML: .5; 3 SOLUTION RESPIRATORY (INHALATION) at 02:02

## 2024-02-03 RX ADMIN — FAMOTIDINE 20 MG: 20 TABLET ORAL at 08:02

## 2024-02-03 NOTE — HOSPITAL COURSE
2/3 DL:  Patient doing well this morning.  She is ambulating back from bathroom and is awake, alert, oriented.  Patient with continued leukocytosis.  She is afebrile.  Continue current IV antibiotic treatment for acute cystitis.  Urine cultures showing infection with Gram-negative rods.  Final susceptibility report pending.  Hyponatremia improved.  Continue IV fluids.  Vital signs stable.  Continue supplemental O2.  2/4 DL:  Patient doing well this morning.  She is lying in bed and is awake, alert, oriented.  No acute distress noted.  Patient afebrile without leukocytosis.  Urine culture final susceptibility report pending.  Continue current treatment plan.    2/5 DL Addendum/Discharge Summary:  Upon arrival this morning, we were informed that patient left against medical advice.  At time of rounding yesterday, discussed with patient results of preliminary urine and blood cultures and need for final susceptibility to fine tune antibiotic therapy prior to discharge.  Patient appeared agreeable at that time.  Per staff, later that morning patient became agitated and stated she was leaving.  AMA paper signed at that time.

## 2024-02-03 NOTE — ASSESSMENT & PLAN NOTE
Chronic, controlled. Latest blood pressure and vitals reviewed-     Temp:  [97.5 °F (36.4 °C)-97.7 °F (36.5 °C)]   Pulse:  [60-85]   Resp:  [20]   BP: (131-154)/(74-87)   SpO2:  [93 %-99 %] .   Home meds for hypertension were reviewed and noted below.   Hypertension Medications               cloNIDine (CATAPRES) 0.1 MG tablet Take 1 tablet (0.1 mg total) by mouth every evening.    lisinopriL-hydrochlorothiazide (PRINZIDE,ZESTORETIC) 20-25 mg Tab             While in the hospital, will manage blood pressure as follows; Adjust home antihypertensive regimen as follows- hold for now due to blood pressure being on the lower end.

## 2024-02-03 NOTE — PROGRESS NOTES
"[facility] Medicine  Progress Note    Patient Name: June Boykin  MRN: [id number]  Patient Class: IP- Inpatient   Admission Date: 2/1/2024  Length of Stay: 2 days  Attending Physician: Rocío Betancourt MD  Primary Care Provider: Mauricio Pierre MD        Subjective:     Principal Problem:Acute cystitis        HPI:  ED HPI: 68-year-old female with a history of anxiety, emphysema, hypertension presents the ER with "altered mental status" per EMS.  Patient is alert, answers questions.  Patient states she may have taken too much medications which include trazodone, gabapentin.  Patient denies SI, denies HI.  History of severe COPD, presents with shortness of breath.  She is speaking 3-4 words before she takes a breath.  Denies any chest pain.  No lower leg edema.  No fever. Patient admits to methamphetamine use.    IM HPI:  68-year-old  female with a past medical history of anxiety, emphysema, hypertension, reported methamphetamine use (reports 1st time she used methamphetamine was last night) presented to the emergency department yesterday via EMS with complaints of AMS.  Patient was awake and alert in the emergency department, answering questions.  Patient reported she may have taken too much of her home medication, but denied any SI/HI.  Workup in the emergency department significant for leukocytosis, MARY.  UDS positive for benzos and amphetamines.  Urinalysis grossly positive, urine and blood cultures are pending.  Chest x-ray negative.  ABG demonstrates hypoxemic respiratory patient admitted for IV fluids, supplemental oxygen, IV antibiotics, close monitoring.  Patient was febrile upon presentation to the emergency department, but is afebrile this morning.  This morning patient reports she is feeling better and would like to go home.  Patient states she has oxygen as well as a nebulizer at home, PCP is Dr. Pierre.  Informed patient we need to get therapy to evaluate " her to ensure she is able to move safely.  Continue with antibiotic therapy for treatment of urinary tract infection.  Patient's blood pressure is stable right now, hold off on resuming home antihypertensives in an effort to prevent profound hypotension.    Overview/Hospital Course:  2/3 DL:  Patient doing well this morning.  She is ambulating back from bathroom and is awake, alert, oriented.  Patient with continued leukocytosis.  She is afebrile.  Continue current IV antibiotic treatment for acute cystitis.  Urine cultures showing infection with Gram-negative rods.  Final susceptibility report pending.  Hyponatremia improved.  Continue IV fluids.  Vital signs stable.  Continue supplemental O2.    Past Medical History:   Diagnosis Date    Anxiety disorder, unspecified     Emphysema, unspecified     Hypertension        History reviewed. No pertinent surgical history.    Review of patient's allergies indicates:  No Known Allergies    No current facility-administered medications on file prior to encounter.     Current Outpatient Medications on File Prior to Encounter   Medication Sig    albuterol (PROVENTIL/VENTOLIN HFA) 90 mcg/actuation inhaler Inhale into the lungs.    amoxicillin (AMOXIL) 875 MG tablet Take 1 tablet (875 mg total) by mouth every 12 (twelve) hours.    azithromycin (Z-DEIRDRE) 250 MG tablet Take 2 tablets on day 1, then take 1 tablet on days 2-5    buprenorphine HCL (SUBUTEX) 8 mg Subl 2.5 tabs SL QD    busPIRone (BUSPAR) 5 MG Tab Take 1 tablet (5 mg total) by mouth 2 (two) times daily.    cloNIDine (CATAPRES) 0.1 MG tablet Take 1 tablet (0.1 mg total) by mouth every evening.    cyclobenzaprine (FLEXERIL) 5 MG tablet Take 1 tablet (5 mg total) by mouth nightly as needed.    doxycycline (VIBRA-TABS) 100 MG tablet Take 1 tablet (100 mg total) by mouth 2 (two) times daily.    FLUoxetine 10 MG capsule Take 1 capsule (10 mg total) by mouth once daily.    fluticasone propionate (FLONASE) 50 mcg/actuation nasal  spray     gabapentin (NEURONTIN) 100 MG capsule Take 1 capsule (100 mg total) by mouth 2 (two) times daily.    lisinopriL-hydrochlorothiazide (PRINZIDE,ZESTORETIC) 20-25 mg Tab     meloxicam (MOBIC) 15 MG tablet Take 1 tablet (15 mg total) by mouth once daily. TAKE WITH FOOD    omeprazole (PRILOSEC) 20 MG capsule     pravastatin (PRAVACHOL) 20 MG tablet     predniSONE (DELTASONE) 10 MG tablet Take 1 tablet (10 mg total) by mouth once daily. Take 4 tabs x 3 days, then take 2 tabs x 3 days, then take 1 tab x 3 days.    predniSONE (DELTASONE) 10 MG tablet Take 1 tablet (10 mg total) by mouth once daily. Take 4 tabs x 3 days, then take 2 tabs x 3 days, then take 1 tab x 3 days.     Family History    None       Tobacco Use    Smoking status: Heavy Smoker     Current packs/day: 1.00     Types: Cigarettes    Smokeless tobacco: Never   Substance and Sexual Activity    Alcohol use: No    Drug use: Yes     Types: Oxycodone, Hydrocodone    Sexual activity: Not on file     Review of Systems   Constitutional:  Positive for activity change and fatigue. Negative for appetite change, chills and fever.   HENT:  Negative for ear pain, mouth sores, nosebleeds and sore throat.    Eyes:  Negative for visual disturbance.   Respiratory:  Positive for shortness of breath. Negative for cough and wheezing.    Cardiovascular:  Negative for chest pain, palpitations and leg swelling.   Gastrointestinal:  Negative for abdominal distention, abdominal pain, blood in stool, constipation, diarrhea, nausea and vomiting.   Endocrine: Negative for polyphagia.   Genitourinary:  Negative for difficulty urinating, dysuria, flank pain and frequency.   Musculoskeletal:  Negative for arthralgias, back pain and myalgias.   Skin:  Negative for rash.   Neurological:  Positive for weakness. Negative for dizziness, tremors, seizures, syncope, facial asymmetry, speech difficulty and headaches.   Hematological:  Negative for adenopathy.   Psychiatric/Behavioral:   Positive for dysphoric mood. Negative for agitation, confusion and hallucinations. The patient is nervous/anxious.      Objective:     Vital Signs (Most Recent):  Temp: 97.6 °F (36.4 °C) (02/03/24 0807)  Pulse: 79 (02/03/24 0807)  Resp: 20 (02/03/24 0804)  BP: 136/78 (02/03/24 0807)  SpO2: (!) 93 % (02/03/24 0807) Vital Signs (24h Range):  Temp:  [97.5 °F (36.4 °C)-97.7 °F (36.5 °C)] 97.6 °F (36.4 °C)  Pulse:  [60-85] 79  Resp:  [20] 20  SpO2:  [93 %-99 %] 93 %  BP: (131-154)/(74-87) 136/78     Weight: 51.7 kg (114 lb)  Body mass index is 23.83 kg/m².     Physical Exam  Constitutional:       General: She is not in acute distress.     Appearance: She is not ill-appearing or toxic-appearing.   HENT:      Head: Normocephalic and atraumatic.      Nose: No congestion or rhinorrhea.      Mouth/Throat:      Pharynx: No oropharyngeal exudate.   Eyes:      General: No scleral icterus.  Cardiovascular:      Rate and Rhythm: Normal rate and regular rhythm.      Heart sounds: No murmur heard.     No friction rub. No gallop.   Pulmonary:      Effort: No respiratory distress.      Breath sounds: No stridor. No wheezing, rhonchi or rales.      Comments: On supplemental oxygen via nasal cannula.  Chest:      Chest wall: No tenderness.   Abdominal:      General: There is no distension.      Palpations: There is no mass.      Tenderness: There is no abdominal tenderness. There is no right CVA tenderness, left CVA tenderness, guarding or rebound.      Hernia: No hernia is present.   Musculoskeletal:      Cervical back: No rigidity.      Right lower leg: No edema.      Left lower leg: No edema.   Lymphadenopathy:      Cervical: No cervical adenopathy.   Skin:     General: Skin is dry.      Capillary Refill: Capillary refill takes less than 2 seconds.      Coloration: Skin is not jaundiced or pale.   Neurological:      General: No focal deficit present.      Mental Status: She is lethargic.      Motor: Weakness present.      Gait: Gait  abnormal.                Significant Labs: All pertinent labs within the past 24 hours have been reviewed.  CBC:   Recent Labs   Lab 02/01/24 1929 02/02/24  0533 02/03/24  0430   WBC 26.57* 17.96* 17.83*   HGB 12.8 12.3 12.0   HCT 41.3 40.0 38.6    177 179       CMP:   Recent Labs   Lab 02/01/24 1929 02/02/24  0533 02/03/24  0430   * 133* 135*   K 3.9 4.3 4.2   CL 89* 100 102   CO2 33* 29 32*   * 148* 121*   BUN 60* 45* 28*   CREATININE 1.5* 0.9 0.5   CALCIUM 9.2 8.6* 8.7   PROT 8.7* 7.9 7.0   ALBUMIN 3.1* 2.6* 2.3*   BILITOT 0.4 0.3 0.2   ALKPHOS 69 65 51*   AST 29 24 14   ALT 23 19 17   ANIONGAP 5 4 1*         Significant Imaging: I have reviewed all pertinent imaging results/findings within the past 24 hours.    Assessment/Plan:      * Acute cystitis  Urinalysis grossly positive, culture pending, leukocytosis decreasing is now 17 K, continue with IV Rocephin.    2/3:  Initial urine culture report showing Gram-negative infection.  Final susceptibility report pending.  Continue current IV antibiotics.      Primary hypertension  Chronic, controlled. Latest blood pressure and vitals reviewed-     Temp:  [97.5 °F (36.4 °C)-97.7 °F (36.5 °C)]   Pulse:  [60-85]   Resp:  [20]   BP: (131-154)/(74-87)   SpO2:  [93 %-99 %] .   Home meds for hypertension were reviewed and noted below.   Hypertension Medications               cloNIDine (CATAPRES) 0.1 MG tablet Take 1 tablet (0.1 mg total) by mouth every evening.    lisinopriL-hydrochlorothiazide (PRINZIDE,ZESTORETIC) 20-25 mg Tab             While in the hospital, will manage blood pressure as follows; Adjust home antihypertensive regimen as follows- hold for now due to blood pressure being on the lower end.      Other hyperlipidemia  Resume pravastatin.      Paranoid schizophrenia  As above.  Mood is stable.      Moderate recurrent major depression  Patient reports she is on benzodiazepines as well as trazodone.  We will hold home medication at this  time due to patient taking too much and still seeming slightly lethargic.        VTE Risk Mitigation (From admission, onward)           Ordered     IP VTE HIGH RISK PATIENT  Once         02/01/24 2227     Place sequential compression device  Until discontinued         02/01/24 2227     Place GRANT hose  Until discontinued         02/01/24 2227                    Discharge Planning   LACY:      Code Status: Full Code   Is the patient medically ready for discharge?:     Reason for patient still in hospital (select all that apply): Patient trending condition, Laboratory test, and Treatment  Discharge Plan A: Home, Home Health   Discharge Delays: None known at this time              Perla Loredo NP  Department of Hospital Medicine   [facility]

## 2024-02-03 NOTE — ASSESSMENT & PLAN NOTE
Urinalysis grossly positive, culture pending, leukocytosis decreasing is now 17 K, continue with IV Rocephin.    2/3:  Initial urine culture report showing Gram-negative infection.  Final susceptibility report pending.  Continue current IV antibiotics.

## 2024-02-03 NOTE — SUBJECTIVE & OBJECTIVE
Past Medical History:   Diagnosis Date    Anxiety disorder, unspecified     Emphysema, unspecified     Hypertension        History reviewed. No pertinent surgical history.    Review of patient's allergies indicates:  No Known Allergies    No current facility-administered medications on file prior to encounter.     Current Outpatient Medications on File Prior to Encounter   Medication Sig    albuterol (PROVENTIL/VENTOLIN HFA) 90 mcg/actuation inhaler Inhale into the lungs.    amoxicillin (AMOXIL) 875 MG tablet Take 1 tablet (875 mg total) by mouth every 12 (twelve) hours.    azithromycin (Z-DEIRDRE) 250 MG tablet Take 2 tablets on day 1, then take 1 tablet on days 2-5    buprenorphine HCL (SUBUTEX) 8 mg Subl 2.5 tabs SL QD    busPIRone (BUSPAR) 5 MG Tab Take 1 tablet (5 mg total) by mouth 2 (two) times daily.    cloNIDine (CATAPRES) 0.1 MG tablet Take 1 tablet (0.1 mg total) by mouth every evening.    cyclobenzaprine (FLEXERIL) 5 MG tablet Take 1 tablet (5 mg total) by mouth nightly as needed.    doxycycline (VIBRA-TABS) 100 MG tablet Take 1 tablet (100 mg total) by mouth 2 (two) times daily.    FLUoxetine 10 MG capsule Take 1 capsule (10 mg total) by mouth once daily.    fluticasone propionate (FLONASE) 50 mcg/actuation nasal spray     gabapentin (NEURONTIN) 100 MG capsule Take 1 capsule (100 mg total) by mouth 2 (two) times daily.    lisinopriL-hydrochlorothiazide (PRINZIDE,ZESTORETIC) 20-25 mg Tab     meloxicam (MOBIC) 15 MG tablet Take 1 tablet (15 mg total) by mouth once daily. TAKE WITH FOOD    omeprazole (PRILOSEC) 20 MG capsule     pravastatin (PRAVACHOL) 20 MG tablet     predniSONE (DELTASONE) 10 MG tablet Take 1 tablet (10 mg total) by mouth once daily. Take 4 tabs x 3 days, then take 2 tabs x 3 days, then take 1 tab x 3 days.    predniSONE (DELTASONE) 10 MG tablet Take 1 tablet (10 mg total) by mouth once daily. Take 4 tabs x 3 days, then take 2 tabs x 3 days, then take 1 tab x 3 days.     Family History     None       Tobacco Use    Smoking status: Heavy Smoker     Current packs/day: 1.00     Types: Cigarettes    Smokeless tobacco: Never   Substance and Sexual Activity    Alcohol use: No    Drug use: Yes     Types: Oxycodone, Hydrocodone    Sexual activity: Not on file     Review of Systems   Constitutional:  Positive for activity change and fatigue. Negative for appetite change, chills and fever.   HENT:  Negative for ear pain, mouth sores, nosebleeds and sore throat.    Eyes:  Negative for visual disturbance.   Respiratory:  Positive for shortness of breath. Negative for cough and wheezing.    Cardiovascular:  Negative for chest pain, palpitations and leg swelling.   Gastrointestinal:  Negative for abdominal distention, abdominal pain, blood in stool, constipation, diarrhea, nausea and vomiting.   Endocrine: Negative for polyphagia.   Genitourinary:  Negative for difficulty urinating, dysuria, flank pain and frequency.   Musculoskeletal:  Negative for arthralgias, back pain and myalgias.   Skin:  Negative for rash.   Neurological:  Positive for weakness. Negative for dizziness, tremors, seizures, syncope, facial asymmetry, speech difficulty and headaches.   Hematological:  Negative for adenopathy.   Psychiatric/Behavioral:  Positive for dysphoric mood. Negative for agitation, confusion and hallucinations. The patient is nervous/anxious.      Objective:     Vital Signs (Most Recent):  Temp: 97.6 °F (36.4 °C) (02/03/24 0807)  Pulse: 79 (02/03/24 0807)  Resp: 20 (02/03/24 0804)  BP: 136/78 (02/03/24 0807)  SpO2: (!) 93 % (02/03/24 0807) Vital Signs (24h Range):  Temp:  [97.5 °F (36.4 °C)-97.7 °F (36.5 °C)] 97.6 °F (36.4 °C)  Pulse:  [60-85] 79  Resp:  [20] 20  SpO2:  [93 %-99 %] 93 %  BP: (131-154)/(74-87) 136/78     Weight: 51.7 kg (114 lb)  Body mass index is 23.83 kg/m².     Physical Exam  Constitutional:       General: She is not in acute distress.     Appearance: She is not ill-appearing or toxic-appearing.    HENT:      Head: Normocephalic and atraumatic.      Nose: No congestion or rhinorrhea.      Mouth/Throat:      Pharynx: No oropharyngeal exudate.   Eyes:      General: No scleral icterus.  Cardiovascular:      Rate and Rhythm: Normal rate and regular rhythm.      Heart sounds: No murmur heard.     No friction rub. No gallop.   Pulmonary:      Effort: No respiratory distress.      Breath sounds: No stridor. No wheezing, rhonchi or rales.      Comments: On supplemental oxygen via nasal cannula.  Chest:      Chest wall: No tenderness.   Abdominal:      General: There is no distension.      Palpations: There is no mass.      Tenderness: There is no abdominal tenderness. There is no right CVA tenderness, left CVA tenderness, guarding or rebound.      Hernia: No hernia is present.   Musculoskeletal:      Cervical back: No rigidity.      Right lower leg: No edema.      Left lower leg: No edema.   Lymphadenopathy:      Cervical: No cervical adenopathy.   Skin:     General: Skin is dry.      Capillary Refill: Capillary refill takes less than 2 seconds.      Coloration: Skin is not jaundiced or pale.   Neurological:      General: No focal deficit present.      Mental Status: She is lethargic.      Motor: Weakness present.      Gait: Gait abnormal.                Significant Labs: All pertinent labs within the past 24 hours have been reviewed.  CBC:   Recent Labs   Lab 02/01/24 1929 02/02/24  0533 02/03/24  0430   WBC 26.57* 17.96* 17.83*   HGB 12.8 12.3 12.0   HCT 41.3 40.0 38.6    177 179       CMP:   Recent Labs   Lab 02/01/24 1929 02/02/24  0533 02/03/24  0430   * 133* 135*   K 3.9 4.3 4.2   CL 89* 100 102   CO2 33* 29 32*   * 148* 121*   BUN 60* 45* 28*   CREATININE 1.5* 0.9 0.5   CALCIUM 9.2 8.6* 8.7   PROT 8.7* 7.9 7.0   ALBUMIN 3.1* 2.6* 2.3*   BILITOT 0.4 0.3 0.2   ALKPHOS 69 65 51*   AST 29 24 14   ALT 23 19 17   ANIONGAP 5 4 1*         Significant Imaging: I have reviewed all pertinent  imaging results/findings within the past 24 hours.

## 2024-02-04 VITALS
TEMPERATURE: 98 F | HEIGHT: 58 IN | WEIGHT: 114 LBS | HEART RATE: 76 BPM | OXYGEN SATURATION: 99 % | BODY MASS INDEX: 23.93 KG/M2 | SYSTOLIC BLOOD PRESSURE: 161 MMHG | RESPIRATION RATE: 20 BRPM | DIASTOLIC BLOOD PRESSURE: 85 MMHG

## 2024-02-04 LAB
ALBUMIN SERPL BCP-MCNC: 2.3 G/DL (ref 3.5–5.2)
ALP SERPL-CCNC: 48 U/L (ref 55–135)
ALT SERPL W/O P-5'-P-CCNC: 17 U/L (ref 10–44)
ANION GAP SERPL CALC-SCNC: 2 MMOL/L (ref 3–11)
AST SERPL-CCNC: 30 U/L (ref 10–40)
BACTERIA BLD CULT: ABNORMAL
BASOPHILS # BLD AUTO: 0.02 K/UL (ref 0–0.2)
BASOPHILS NFR BLD: 0.4 % (ref 0–1.9)
BILIRUB SERPL-MCNC: 0.4 MG/DL (ref 0.1–1)
BUN SERPL-MCNC: 17 MG/DL (ref 8–23)
CALCIUM SERPL-MCNC: 9 MG/DL (ref 8.7–10.5)
CHLORIDE SERPL-SCNC: 99 MMOL/L (ref 95–110)
CO2 SERPL-SCNC: 35 MMOL/L (ref 23–29)
CREAT SERPL-MCNC: 0.7 MG/DL (ref 0.5–1.4)
DIFFERENTIAL METHOD BLD: ABNORMAL
EOSINOPHIL # BLD AUTO: 0 K/UL (ref 0–0.5)
EOSINOPHIL NFR BLD: 0.9 % (ref 0–8)
ERYTHROCYTE [DISTWIDTH] IN BLOOD BY AUTOMATED COUNT: 14.3 % (ref 11.5–14.5)
EST. GFR  (NO RACE VARIABLE): >60 ML/MIN/1.73 M^2
GLUCOSE SERPL-MCNC: 123 MG/DL (ref 70–110)
HCT VFR BLD AUTO: 30 % (ref 37–48.5)
HGB BLD-MCNC: 9.7 G/DL (ref 12–16)
IMM GRANULOCYTES # BLD AUTO: 0.04 K/UL (ref 0–0.04)
IMM GRANULOCYTES NFR BLD AUTO: 0.9 % (ref 0–0.5)
LYMPHOCYTES # BLD AUTO: 0.5 K/UL (ref 1–4.8)
LYMPHOCYTES NFR BLD: 11.7 % (ref 18–48)
MAGNESIUM SERPL-MCNC: 1.7 MG/DL (ref 1.6–2.6)
MCH RBC QN AUTO: 28.9 PG (ref 27–31)
MCHC RBC AUTO-ENTMCNC: 32.3 G/DL (ref 32–36)
MCV RBC AUTO: 89 FL (ref 82–98)
MONOCYTES # BLD AUTO: 0.4 K/UL (ref 0.3–1)
MONOCYTES NFR BLD: 9.3 % (ref 4–15)
NEUTROPHILS # BLD AUTO: 3.6 K/UL (ref 1.8–7.7)
NEUTROPHILS NFR BLD: 76.8 % (ref 38–73)
NRBC BLD-RTO: 0 /100 WBC
PLATELET # BLD AUTO: 158 K/UL (ref 150–450)
PMV BLD AUTO: 11.4 FL (ref 9.2–12.9)
POTASSIUM SERPL-SCNC: 4.2 MMOL/L (ref 3.5–5.1)
PROT SERPL-MCNC: 7.3 G/DL (ref 6–8.4)
RBC # BLD AUTO: 3.36 M/UL (ref 4–5.4)
SODIUM SERPL-SCNC: 136 MMOL/L (ref 136–145)
WBC # BLD AUTO: 4.62 K/UL (ref 3.9–12.7)

## 2024-02-04 PROCEDURE — 25000003 PHARM REV CODE 250: Performed by: INTERNAL MEDICINE

## 2024-02-04 PROCEDURE — 25000242 PHARM REV CODE 250 ALT 637 W/ HCPCS: Performed by: EMERGENCY MEDICINE

## 2024-02-04 PROCEDURE — 83735 ASSAY OF MAGNESIUM: CPT

## 2024-02-04 PROCEDURE — 25000003 PHARM REV CODE 250: Performed by: EMERGENCY MEDICINE

## 2024-02-04 PROCEDURE — 99900031 HC PATIENT EDUCATION (STAT)

## 2024-02-04 PROCEDURE — 80053 COMPREHEN METABOLIC PANEL: CPT

## 2024-02-04 PROCEDURE — 85025 COMPLETE CBC W/AUTO DIFF WBC: CPT

## 2024-02-04 PROCEDURE — 36415 COLL VENOUS BLD VENIPUNCTURE: CPT

## 2024-02-04 PROCEDURE — 94761 N-INVAS EAR/PLS OXIMETRY MLT: CPT

## 2024-02-04 PROCEDURE — 94640 AIRWAY INHALATION TREATMENT: CPT

## 2024-02-04 PROCEDURE — 63600175 PHARM REV CODE 636 W HCPCS: Performed by: EMERGENCY MEDICINE

## 2024-02-04 PROCEDURE — 99900035 HC TECH TIME PER 15 MIN (STAT)

## 2024-02-04 PROCEDURE — 27000221 HC OXYGEN, UP TO 24 HOURS

## 2024-02-04 RX ORDER — FAMOTIDINE 20 MG/1
20 TABLET, FILM COATED ORAL 2 TIMES DAILY
Status: DISCONTINUED | OUTPATIENT
Start: 2024-02-04 | End: 2024-02-04 | Stop reason: HOSPADM

## 2024-02-04 RX ADMIN — IPRATROPIUM BROMIDE AND ALBUTEROL SULFATE 3 ML: .5; 3 SOLUTION RESPIRATORY (INHALATION) at 07:02

## 2024-02-04 RX ADMIN — CEFTRIAXONE 2 G: 2 INJECTION, POWDER, FOR SOLUTION INTRAMUSCULAR; INTRAVENOUS at 09:02

## 2024-02-04 RX ADMIN — FAMOTIDINE 20 MG: 20 TABLET ORAL at 09:02

## 2024-02-04 NOTE — SUBJECTIVE & OBJECTIVE
Past Medical History:   Diagnosis Date    Anxiety disorder, unspecified     Emphysema, unspecified     Hypertension        History reviewed. No pertinent surgical history.    Review of patient's allergies indicates:  No Known Allergies    No current facility-administered medications on file prior to encounter.     Current Outpatient Medications on File Prior to Encounter   Medication Sig    albuterol (PROVENTIL/VENTOLIN HFA) 90 mcg/actuation inhaler Inhale into the lungs.    amoxicillin (AMOXIL) 875 MG tablet Take 1 tablet (875 mg total) by mouth every 12 (twelve) hours.    azithromycin (Z-DEIRDRE) 250 MG tablet Take 2 tablets on day 1, then take 1 tablet on days 2-5    buprenorphine HCL (SUBUTEX) 8 mg Subl 2.5 tabs SL QD    busPIRone (BUSPAR) 5 MG Tab Take 1 tablet (5 mg total) by mouth 2 (two) times daily.    cloNIDine (CATAPRES) 0.1 MG tablet Take 1 tablet (0.1 mg total) by mouth every evening.    cyclobenzaprine (FLEXERIL) 5 MG tablet Take 1 tablet (5 mg total) by mouth nightly as needed.    doxycycline (VIBRA-TABS) 100 MG tablet Take 1 tablet (100 mg total) by mouth 2 (two) times daily.    FLUoxetine 10 MG capsule Take 1 capsule (10 mg total) by mouth once daily.    fluticasone propionate (FLONASE) 50 mcg/actuation nasal spray     gabapentin (NEURONTIN) 100 MG capsule Take 1 capsule (100 mg total) by mouth 2 (two) times daily.    lisinopriL-hydrochlorothiazide (PRINZIDE,ZESTORETIC) 20-25 mg Tab     meloxicam (MOBIC) 15 MG tablet Take 1 tablet (15 mg total) by mouth once daily. TAKE WITH FOOD    omeprazole (PRILOSEC) 20 MG capsule     pravastatin (PRAVACHOL) 20 MG tablet     predniSONE (DELTASONE) 10 MG tablet Take 1 tablet (10 mg total) by mouth once daily. Take 4 tabs x 3 days, then take 2 tabs x 3 days, then take 1 tab x 3 days.    predniSONE (DELTASONE) 10 MG tablet Take 1 tablet (10 mg total) by mouth once daily. Take 4 tabs x 3 days, then take 2 tabs x 3 days, then take 1 tab x 3 days.     Family History     None       Tobacco Use    Smoking status: Heavy Smoker     Current packs/day: 1.00     Types: Cigarettes    Smokeless tobacco: Never   Substance and Sexual Activity    Alcohol use: No    Drug use: Yes     Types: Oxycodone, Hydrocodone    Sexual activity: Not on file     Review of Systems   Constitutional:  Positive for activity change and fatigue. Negative for appetite change, chills and fever.   HENT:  Negative for ear pain, mouth sores, nosebleeds and sore throat.    Eyes:  Negative for visual disturbance.   Respiratory:  Positive for shortness of breath. Negative for cough and wheezing.    Cardiovascular:  Negative for chest pain, palpitations and leg swelling.   Gastrointestinal:  Negative for abdominal distention, abdominal pain, blood in stool, constipation, diarrhea, nausea and vomiting.   Endocrine: Negative for polyphagia.   Genitourinary:  Negative for difficulty urinating, dysuria, flank pain and frequency.   Musculoskeletal:  Negative for arthralgias, back pain and myalgias.   Skin:  Negative for rash.   Neurological:  Positive for weakness. Negative for dizziness, tremors, seizures, syncope, facial asymmetry, speech difficulty and headaches.   Hematological:  Negative for adenopathy.   Psychiatric/Behavioral:  Positive for dysphoric mood. Negative for agitation, confusion and hallucinations. The patient is nervous/anxious.      Objective:     Vital Signs (Most Recent):  Temp: 98.2 °F (36.8 °C) (02/04/24 0723)  Pulse: 76 (02/04/24 0726)  Resp: 20 (02/04/24 0726)  BP: (!) 161/85 (02/04/24 0723)  SpO2: 99 % (02/04/24 0726) Vital Signs (24h Range):  Temp:  [97 °F (36.1 °C)-98.4 °F (36.9 °C)] 98.2 °F (36.8 °C)  Pulse:  [69-77] 76  Resp:  [14-20] 20  SpO2:  [97 %-100 %] 99 %  BP: (131-161)/(71-85) 161/85     Weight: 51.7 kg (114 lb)  Body mass index is 23.83 kg/m².     Physical Exam  Constitutional:       General: She is not in acute distress.     Appearance: She is not ill-appearing or toxic-appearing.    HENT:      Head: Normocephalic and atraumatic.      Nose: No congestion or rhinorrhea.      Mouth/Throat:      Pharynx: No oropharyngeal exudate.   Eyes:      General: No scleral icterus.  Cardiovascular:      Rate and Rhythm: Normal rate and regular rhythm.      Heart sounds: No murmur heard.     No friction rub. No gallop.   Pulmonary:      Effort: No respiratory distress.      Breath sounds: No stridor. No wheezing, rhonchi or rales.      Comments: On supplemental oxygen via nasal cannula.  Chest:      Chest wall: No tenderness.   Abdominal:      General: There is no distension.      Palpations: There is no mass.      Tenderness: There is no abdominal tenderness. There is no right CVA tenderness, left CVA tenderness, guarding or rebound.      Hernia: No hernia is present.   Musculoskeletal:      Cervical back: No rigidity.      Right lower leg: No edema.      Left lower leg: No edema.   Lymphadenopathy:      Cervical: No cervical adenopathy.   Skin:     General: Skin is dry.      Capillary Refill: Capillary refill takes less than 2 seconds.      Coloration: Skin is not jaundiced or pale.   Neurological:      General: No focal deficit present.      Mental Status: She is lethargic.      Motor: Weakness present.      Gait: Gait abnormal.                Significant Labs: All pertinent labs within the past 24 hours have been reviewed.  CBC:   Recent Labs   Lab 02/03/24  0430 02/04/24  0625   WBC 17.83* 4.62   HGB 12.0 9.7*   HCT 38.6 30.0*    158       CMP:   Recent Labs   Lab 02/03/24  0430 02/04/24  0625   * 136   K 4.2 4.2    99   CO2 32* 35*   * 123*   BUN 28* 17   CREATININE 0.5 0.7   CALCIUM 8.7 9.0   PROT 7.0 7.3   ALBUMIN 2.3* 2.3*   BILITOT 0.2 0.4   ALKPHOS 51* 48*   AST 14 30   ALT 17 17   ANIONGAP 1* 2*         Significant Imaging: I have reviewed all pertinent imaging results/findings within the past 24 hours.

## 2024-02-04 NOTE — PROGRESS NOTES
Pharmacist Renal Dose Adjustment Note    June Boykin is a 68 y.o. female being treated with the medication famotidine    Patient Data:    Vital Signs (Most Recent):  Temp: 98.2 °F (36.8 °C) (02/04/24 0723)  Pulse: 75 (02/04/24 0723)  Resp: 20 (02/04/24 0715)  BP: (!) 161/85 (02/04/24 0723)  SpO2: 100 % (02/04/24 0723) Vital Signs (72h Range):  Temp:  [97 °F (36.1 °C)-100.5 °F (38.1 °C)]   Pulse:  []   Resp:  [14-29]   BP: (105-161)/(58-87)   SpO2:  [80 %-100 %]      Recent Labs   Lab 02/02/24  0533 02/03/24  0430 02/04/24  0625   CREATININE 0.9 0.5 0.7     Serum creatinine: 0.7 mg/dL 02/04/24 0625  Estimated creatinine clearance: 55.6 mL/min    Medication: famotidine dose:  20 mg frequency QD will be changed to medication: famotidine dose: 20 mg frequency:BID    Pharmacist's Name: Haroon Gale  Pharmacist's Extension: [phone]

## 2024-02-04 NOTE — ASSESSMENT & PLAN NOTE
Urinalysis grossly positive, culture pending, leukocytosis decreasing is now 17 K, continue with IV Rocephin.    2/3:  Initial urine culture report showing Gram-negative infection.  Final susceptibility report pending.  Continue current IV antibiotics.  2/4:  Preliminary urine culture with E coli infection.  Final susceptibility report pending.  Continue current IV antibiotic regimen.

## 2024-02-04 NOTE — ASSESSMENT & PLAN NOTE
Chronic, controlled. Latest blood pressure and vitals reviewed-     Temp:  [97 °F (36.1 °C)-98.4 °F (36.9 °C)]   Pulse:  [69-77]   Resp:  [14-20]   BP: (131-161)/(71-85)   SpO2:  [97 %-100 %] .   Home meds for hypertension were reviewed and noted below.   Hypertension Medications               cloNIDine (CATAPRES) 0.1 MG tablet Take 1 tablet (0.1 mg total) by mouth every evening.    lisinopriL-hydrochlorothiazide (PRINZIDE,ZESTORETIC) 20-25 mg Tab             While in the hospital, will manage blood pressure as follows; Adjust home antihypertensive regimen as follows- hold for now due to blood pressure being on the lower end.

## 2024-02-04 NOTE — PLAN OF CARE
02/04/24 0854   Medicare Message   Important Message from Medicare regarding Discharge Appeal Rights Given to patient/caregiver;Explained to patient/caregiver;Signed/date by patient/caregiver   Date IMM was signed 02/04/24   Time IMM was signed 0853

## 2024-02-04 NOTE — PLAN OF CARE
Plan of care reviewed with patient. Pt is free of falls and injury. NADN, VSS, afebrile, anxious, depressed. Pt tolerating medications well, IV abx infused. Pain controlled with PRN pain meds. Questions and concerns addressed.     Problem: Adult Inpatient Plan of Care  Goal: Plan of Care Review  Outcome: Ongoing, Progressing  Goal: Patient-Specific Goal (Individualized)  Outcome: Ongoing, Progressing  Goal: Absence of Hospital-Acquired Illness or Injury  Outcome: Ongoing, Progressing  Goal: Optimal Comfort and Wellbeing  Outcome: Ongoing, Progressing  Goal: Readiness for Transition of Care  Outcome: Ongoing, Progressing     Problem: Thought Process Alteration  Goal: Optimal Thought Clarity  Outcome: Ongoing, Progressing     Problem: UTI (Urinary Tract Infection)  Goal: Improved Infection Symptoms  Outcome: Ongoing, Progressing     Problem: Behavioral Health Comorbidity  Goal: Maintenance of Behavioral Health Symptom Control  Outcome: Ongoing, Progressing     Problem: COPD (Chronic Obstructive Pulmonary Disease) Comorbidity  Goal: Maintenance of COPD Symptom Control  Outcome: Ongoing, Progressing     Problem: Hypertension Comorbidity  Goal: Blood Pressure in Desired Range  Outcome: Ongoing, Progressing     Problem: Activity and Energy Impairment (Anxiety Signs/Symptoms)  Goal: Optimized Energy Level (Anxiety Signs/Symptoms)  Outcome: Ongoing, Progressing     Problem: Cognitive Impairment (Anxiety Signs/Symptoms)  Goal: Optimized Cognitive Function (Anxiety Signs/Symptoms)  Outcome: Ongoing, Progressing     Problem: Mood Impairment (Anxiety Signs/Symptoms)  Goal: Improved Mood Symptoms (Anxiety Signs/Symptoms)  Outcome: Ongoing, Progressing     Problem: Sleep Impairment (Anxiety Signs/Symptoms)  Goal: Improved Sleep (Anxiety Signs/Symptoms)  Outcome: Ongoing, Progressing     Problem: Social, Occupational or Functional Impairment (Anxiety Signs/Symptoms)  Goal: Enhanced Social, Occupational or Functional Skills  (Anxiety Signs/Symptoms)  Outcome: Ongoing, Progressing     Problem: Somatic Disturbance (Anxiety Signs/Symptoms)  Goal: Improved Somatic Symptoms (Anxiety Signs/Symptoms)  Outcome: Ongoing, Progressing

## 2024-02-04 NOTE — PROGRESS NOTES
"[facility] Medicine  Progress Note    Patient Name: June Boykin  MRN: [id number]  Patient Class: IP- Inpatient   Admission Date: 2/1/2024  Length of Stay: 3 days  Attending Physician: Rocío Betancourt MD  Primary Care Provider: Mauricio Pierre MD        Subjective:     Principal Problem:Acute cystitis        HPI:  ED HPI: 68-year-old female with a history of anxiety, emphysema, hypertension presents the ER with "altered mental status" per EMS.  Patient is alert, answers questions.  Patient states she may have taken too much medications which include trazodone, gabapentin.  Patient denies SI, denies HI.  History of severe COPD, presents with shortness of breath.  She is speaking 3-4 words before she takes a breath.  Denies any chest pain.  No lower leg edema.  No fever. Patient admits to methamphetamine use.    IM HPI:  68-year-old  female with a past medical history of anxiety, emphysema, hypertension, reported methamphetamine use (reports 1st time she used methamphetamine was last night) presented to the emergency department yesterday via EMS with complaints of AMS.  Patient was awake and alert in the emergency department, answering questions.  Patient reported she may have taken too much of her home medication, but denied any SI/HI.  Workup in the emergency department significant for leukocytosis, MARY.  UDS positive for benzos and amphetamines.  Urinalysis grossly positive, urine and blood cultures are pending.  Chest x-ray negative.  ABG demonstrates hypoxemic respiratory patient admitted for IV fluids, supplemental oxygen, IV antibiotics, close monitoring.  Patient was febrile upon presentation to the emergency department, but is afebrile this morning.  This morning patient reports she is feeling better and would like to go home.  Patient states she has oxygen as well as a nebulizer at home, PCP is Dr. Pierre.  Informed patient we need to get therapy to evaluate " her to ensure she is able to move safely.  Continue with antibiotic therapy for treatment of urinary tract infection.  Patient's blood pressure is stable right now, hold off on resuming home antihypertensives in an effort to prevent profound hypotension.    Overview/Hospital Course:  2/3 DL:  Patient doing well this morning.  She is ambulating back from bathroom and is awake, alert, oriented.  Patient with continued leukocytosis.  She is afebrile.  Continue current IV antibiotic treatment for acute cystitis.  Urine cultures showing infection with Gram-negative rods.  Final susceptibility report pending.  Hyponatremia improved.  Continue IV fluids.  Vital signs stable.  Continue supplemental O2.  2/4 DL:  Patient doing well this morning.  She is lying in bed and is awake, alert, oriented.  No acute distress noted.  Patient afebrile without leukocytosis.  Urine culture final susceptibility report pending.  Continue current treatment plan.    Past Medical History:   Diagnosis Date    Anxiety disorder, unspecified     Emphysema, unspecified     Hypertension        History reviewed. No pertinent surgical history.    Review of patient's allergies indicates:  No Known Allergies    No current facility-administered medications on file prior to encounter.     Current Outpatient Medications on File Prior to Encounter   Medication Sig    albuterol (PROVENTIL/VENTOLIN HFA) 90 mcg/actuation inhaler Inhale into the lungs.    amoxicillin (AMOXIL) 875 MG tablet Take 1 tablet (875 mg total) by mouth every 12 (twelve) hours.    azithromycin (Z-DEIRDRE) 250 MG tablet Take 2 tablets on day 1, then take 1 tablet on days 2-5    buprenorphine HCL (SUBUTEX) 8 mg Subl 2.5 tabs SL QD    busPIRone (BUSPAR) 5 MG Tab Take 1 tablet (5 mg total) by mouth 2 (two) times daily.    cloNIDine (CATAPRES) 0.1 MG tablet Take 1 tablet (0.1 mg total) by mouth every evening.    cyclobenzaprine (FLEXERIL) 5 MG tablet Take 1 tablet (5 mg total) by mouth nightly as  needed.    doxycycline (VIBRA-TABS) 100 MG tablet Take 1 tablet (100 mg total) by mouth 2 (two) times daily.    FLUoxetine 10 MG capsule Take 1 capsule (10 mg total) by mouth once daily.    fluticasone propionate (FLONASE) 50 mcg/actuation nasal spray     gabapentin (NEURONTIN) 100 MG capsule Take 1 capsule (100 mg total) by mouth 2 (two) times daily.    lisinopriL-hydrochlorothiazide (PRINZIDE,ZESTORETIC) 20-25 mg Tab     meloxicam (MOBIC) 15 MG tablet Take 1 tablet (15 mg total) by mouth once daily. TAKE WITH FOOD    omeprazole (PRILOSEC) 20 MG capsule     pravastatin (PRAVACHOL) 20 MG tablet     predniSONE (DELTASONE) 10 MG tablet Take 1 tablet (10 mg total) by mouth once daily. Take 4 tabs x 3 days, then take 2 tabs x 3 days, then take 1 tab x 3 days.    predniSONE (DELTASONE) 10 MG tablet Take 1 tablet (10 mg total) by mouth once daily. Take 4 tabs x 3 days, then take 2 tabs x 3 days, then take 1 tab x 3 days.     Family History    None       Tobacco Use    Smoking status: Heavy Smoker     Current packs/day: 1.00     Types: Cigarettes    Smokeless tobacco: Never   Substance and Sexual Activity    Alcohol use: No    Drug use: Yes     Types: Oxycodone, Hydrocodone    Sexual activity: Not on file     Review of Systems   Constitutional:  Positive for activity change and fatigue. Negative for appetite change, chills and fever.   HENT:  Negative for ear pain, mouth sores, nosebleeds and sore throat.    Eyes:  Negative for visual disturbance.   Respiratory:  Positive for shortness of breath. Negative for cough and wheezing.    Cardiovascular:  Negative for chest pain, palpitations and leg swelling.   Gastrointestinal:  Negative for abdominal distention, abdominal pain, blood in stool, constipation, diarrhea, nausea and vomiting.   Endocrine: Negative for polyphagia.   Genitourinary:  Negative for difficulty urinating, dysuria, flank pain and frequency.   Musculoskeletal:  Negative for arthralgias, back pain and  myalgias.   Skin:  Negative for rash.   Neurological:  Positive for weakness. Negative for dizziness, tremors, seizures, syncope, facial asymmetry, speech difficulty and headaches.   Hematological:  Negative for adenopathy.   Psychiatric/Behavioral:  Positive for dysphoric mood. Negative for agitation, confusion and hallucinations. The patient is nervous/anxious.      Objective:     Vital Signs (Most Recent):  Temp: 98.2 °F (36.8 °C) (02/04/24 0723)  Pulse: 76 (02/04/24 0726)  Resp: 20 (02/04/24 0726)  BP: (!) 161/85 (02/04/24 0723)  SpO2: 99 % (02/04/24 0726) Vital Signs (24h Range):  Temp:  [97 °F (36.1 °C)-98.4 °F (36.9 °C)] 98.2 °F (36.8 °C)  Pulse:  [69-77] 76  Resp:  [14-20] 20  SpO2:  [97 %-100 %] 99 %  BP: (131-161)/(71-85) 161/85     Weight: 51.7 kg (114 lb)  Body mass index is 23.83 kg/m².     Physical Exam  Constitutional:       General: She is not in acute distress.     Appearance: She is not ill-appearing or toxic-appearing.   HENT:      Head: Normocephalic and atraumatic.      Nose: No congestion or rhinorrhea.      Mouth/Throat:      Pharynx: No oropharyngeal exudate.   Eyes:      General: No scleral icterus.  Cardiovascular:      Rate and Rhythm: Normal rate and regular rhythm.      Heart sounds: No murmur heard.     No friction rub. No gallop.   Pulmonary:      Effort: No respiratory distress.      Breath sounds: No stridor. No wheezing, rhonchi or rales.      Comments: On supplemental oxygen via nasal cannula.  Chest:      Chest wall: No tenderness.   Abdominal:      General: There is no distension.      Palpations: There is no mass.      Tenderness: There is no abdominal tenderness. There is no right CVA tenderness, left CVA tenderness, guarding or rebound.      Hernia: No hernia is present.   Musculoskeletal:      Cervical back: No rigidity.      Right lower leg: No edema.      Left lower leg: No edema.   Lymphadenopathy:      Cervical: No cervical adenopathy.   Skin:     General: Skin is dry.       Capillary Refill: Capillary refill takes less than 2 seconds.      Coloration: Skin is not jaundiced or pale.   Neurological:      General: No focal deficit present.      Mental Status: She is lethargic.      Motor: Weakness present.      Gait: Gait abnormal.                Significant Labs: All pertinent labs within the past 24 hours have been reviewed.  CBC:   Recent Labs   Lab 02/03/24  0430 02/04/24  0625   WBC 17.83* 4.62   HGB 12.0 9.7*   HCT 38.6 30.0*    158       CMP:   Recent Labs   Lab 02/03/24  0430 02/04/24  0625   * 136   K 4.2 4.2    99   CO2 32* 35*   * 123*   BUN 28* 17   CREATININE 0.5 0.7   CALCIUM 8.7 9.0   PROT 7.0 7.3   ALBUMIN 2.3* 2.3*   BILITOT 0.2 0.4   ALKPHOS 51* 48*   AST 14 30   ALT 17 17   ANIONGAP 1* 2*         Significant Imaging: I have reviewed all pertinent imaging results/findings within the past 24 hours.    Assessment/Plan:      * Acute cystitis  Urinalysis grossly positive, culture pending, leukocytosis decreasing is now 17 K, continue with IV Rocephin.    2/3:  Initial urine culture report showing Gram-negative infection.  Final susceptibility report pending.  Continue current IV antibiotics.  2/4:  Preliminary urine culture with E coli infection.  Final susceptibility report pending.  Continue current IV antibiotic regimen.      Primary hypertension  Chronic, controlled. Latest blood pressure and vitals reviewed-     Temp:  [97 °F (36.1 °C)-98.4 °F (36.9 °C)]   Pulse:  [69-77]   Resp:  [14-20]   BP: (131-161)/(71-85)   SpO2:  [97 %-100 %] .   Home meds for hypertension were reviewed and noted below.   Hypertension Medications               cloNIDine (CATAPRES) 0.1 MG tablet Take 1 tablet (0.1 mg total) by mouth every evening.    lisinopriL-hydrochlorothiazide (PRINZIDE,ZESTORETIC) 20-25 mg Tab             While in the hospital, will manage blood pressure as follows; Adjust home antihypertensive regimen as follows- hold for now due to blood  pressure being on the lower end.      Other hyperlipidemia  Resume pravastatin.      Paranoid schizophrenia  As above.  Mood is stable.      Moderate recurrent major depression  Patient reports she is on benzodiazepines as well as trazodone.  We will hold home medication at this time due to patient taking too much and still seeming slightly lethargic.        VTE Risk Mitigation (From admission, onward)           Ordered     IP VTE HIGH RISK PATIENT  Once         02/01/24 2227     Place sequential compression device  Until discontinued         02/01/24 2227     Place GRANT hose  Until discontinued         02/01/24 2227                    Discharge Planning   LACY:      Code Status: Full Code   Is the patient medically ready for discharge?:     Reason for patient still in hospital (select all that apply): Patient trending condition, Laboratory test, and Treatment  Discharge Plan A: Home, Home Health   Discharge Delays: None known at this time              Perla Loredo NP  Department of Hospital Medicine   [facility] Surg

## 2024-02-05 LAB — BACTERIA UR CULT: ABNORMAL

## 2024-02-05 NOTE — PLAN OF CARE
Problem: Physical Therapy  Goal: Physical Therapy Goal  Description: Goals to be met by: 2024    Patient will increase functional independence with mobility by performin. Supine to sit with Modified Independent.  2. Sit to supine with Modified Independent.  3. Bed to chair transfer with Modified Independent with no A.D.  using Step Transfer technique.  4. Sit to Stand with Modified Independent with no A.D..  5. Gait  x 700  feet with Supervision or Set-up Assistance with O2 supplement, SPO2 > 92%. .  6. Lower extremity exercise program x10 reps.      Outcome: Adequate for Care Transition

## 2024-02-05 NOTE — PT/OT/SLP DISCHARGE
Physical Therapy Discharge Summary    Name: June Boykin  MRN: [id number]   Principal Problem: Acute cystitis     Patient Discharged from acute Physical Therapy on 2024.  Please refer to prior PT noted date on 2024 for functional status.     Assessment:     Patient was discharged unexpectedly.  Information required to complete an accurate discharge summary is unknown.  Refer to therapy initial evaluation and last progress note for initial and most recent functional status and goal achievement.  Recommendations made may be found in medical record.    Objective:     GOALS:   Multidisciplinary Problems       Physical Therapy Goals          Problem: Physical Therapy    Goal Priority Disciplines Outcome Goal Variances Interventions   Physical Therapy Goal     PT, PT/OT Adequate for Care Transition     Description: Goals to be met by: 2024    Patient will increase functional independence with mobility by performin. Supine to sit with Modified Independent.  2. Sit to supine with Modified Independent.  3. Bed to chair transfer with Modified Independent with no A.D.  using Step Transfer technique.  4. Sit to Stand with Modified Independent with no A.D..  5. Gait  x 700  feet with Supervision or Set-up Assistance with O2 supplement, SPO2 > 92%. .  6. Lower extremity exercise program x10 reps.                           Reasons for Discontinuation of Therapy Services  Transfer to alternate level of care.      Plan:     Patient Discharged to: Home with Home Health Service.      2024

## 2024-02-06 NOTE — DISCHARGE SUMMARY
"[facility] Medicine  Discharge Summary      Patient Name: June Boykin  MRN: [id number]  PURNIMA: [id number]  Patient Class: IP- Inpatient  Admission Date: 2/1/2024  Hospital Length of Stay: 3 days  Discharge Date and Time: 2/4/2024 10:49 AM  Attending Physician: No att. providers found   Discharging Provider: Perla Loredo NP  Primary Care Provider: Mauricio Pierre MD    Primary Care Team: Networked reference to record PCT     HPI:   ED HPI: 68-year-old female with a history of anxiety, emphysema, hypertension presents the ER with "altered mental status" per EMS.  Patient is alert, answers questions.  Patient states she may have taken too much medications which include trazodone, gabapentin.  Patient denies SI, denies HI.  History of severe COPD, presents with shortness of breath.  She is speaking 3-4 words before she takes a breath.  Denies any chest pain.  No lower leg edema.  No fever. Patient admits to methamphetamine use.    IM HPI:  68-year-old  female with a past medical history of anxiety, emphysema, hypertension, reported methamphetamine use (reports 1st time she used methamphetamine was last night) presented to the emergency department yesterday via EMS with complaints of AMS.  Patient was awake and alert in the emergency department, answering questions.  Patient reported she may have taken too much of her home medication, but denied any SI/HI.  Workup in the emergency department significant for leukocytosis, MARY.  UDS positive for benzos and amphetamines.  Urinalysis grossly positive, urine and blood cultures are pending.  Chest x-ray negative.  ABG demonstrates hypoxemic respiratory patient admitted for IV fluids, supplemental oxygen, IV antibiotics, close monitoring.  Patient was febrile upon presentation to the emergency department, but is afebrile this morning.  This morning patient reports she is feeling better and would like to go home.  Patient states she " has oxygen as well as a nebulizer at home, PCP is Dr. Pierre.  Informed patient we need to get therapy to evaluate her to ensure she is able to move safely.  Continue with antibiotic therapy for treatment of urinary tract infection.  Patient's blood pressure is stable right now, hold off on resuming home antihypertensives in an effort to prevent profound hypotension.    * No surgery found *      Hospital Course:   2/3 DL:  Patient doing well this morning.  She is ambulating back from bathroom and is awake, alert, oriented.  Patient with continued leukocytosis.  She is afebrile.  Continue current IV antibiotic treatment for acute cystitis.  Urine cultures showing infection with Gram-negative rods.  Final susceptibility report pending.  Hyponatremia improved.  Continue IV fluids.  Vital signs stable.  Continue supplemental O2.  2/4 DL:  Patient doing well this morning.  She is lying in bed and is awake, alert, oriented.  No acute distress noted.  Patient afebrile without leukocytosis.  Urine culture final susceptibility report pending.  Continue current treatment plan.    2/5 DL Addendum/Discharge Summary:  Upon arrival this morning, we were informed that patient left against medical advice.  At time of rounding yesterday, discussed with patient results of preliminary urine and blood cultures and need for final susceptibility to fine tune antibiotic therapy prior to discharge.  Patient appeared agreeable at that time.  Per staff, later that morning patient became agitated and stated she was leaving.  AMA paper signed at that time.     Goals of Care Treatment Preferences:  Code Status: Full Code      Consults:     No new Assessment & Plan notes have been filed under this hospital service since the last note was generated.  Service: Hospital Medicine    Final Active Diagnoses:    Diagnosis Date Noted POA    PRINCIPAL PROBLEM:  Acute cystitis [N30.00] 02/02/2024 Yes    Moderate recurrent major depression [F33.1]  "02/02/2024 Yes    Paranoid schizophrenia [F20.0] 02/02/2024 Yes    Other hyperlipidemia [E78.49] 02/02/2024 Yes    Primary hypertension [I10] 02/02/2024 Yes      Problems Resolved During this Admission:       Discharged Condition: against medical advice    Disposition: Left Against Medical Adv*    Follow Up:   Contact information for after-discharge care       Home Medical Care       [facility] .    Services: Home Nursing, Home Rehabilitation, Home Medical   Contact information:  [address]  [facility] [address]  [phone]                                 Patient Instructions:      OXYGEN FOR HOME USE     Order Specific Question Answer Comments   Liter Flow 4    Duration Continuous    Qualifying Test Performed at: Rest    Oxygen saturation: 80    Portable mode: continuous    Route nasal cannula    Device: home concentrator with portable concentrator    Length of need (in months): 99 mos    Patient condition with qualifying saturation COPD    Height: 4' 10" (1.473 m)    Weight: 51.7 kg (114 lb)    Alternative treatment measures have been tried or considered and deemed clinically ineffective. Yes      Ambulatory referral/consult to Home Health   Standing Status: Future   Referral Priority: Routine Referral Type: Home Health   Referral Reason: Specialty Services Required   Requested Specialty: Home Health Services   Number of Visits Requested: 1       Significant Diagnostic Studies: Labs: All labs within the past 24 hours have been reviewed    Pending Diagnostic Studies:       None           Medications:  None    Indwelling Lines/Drains at time of discharge:   Lines/Drains/Airways       None                   Time spent on the discharge of patient: 35 minutes         Perla Loredo NP  Department of Hospital Medicine  [facility] Surg  "

## 2024-02-07 LAB
BACTERIA BLD CULT: ABNORMAL

## 2024-05-19 DIAGNOSIS — R63.4 ABNORMAL WEIGHT LOSS: ICD-10-CM

## 2024-05-19 DIAGNOSIS — R10.13 ABDOMINAL PAIN, EPIGASTRIC: Primary | ICD-10-CM

## 2024-05-19 DIAGNOSIS — K21.9 GASTROESOPHAGEAL REFLUX DISEASE, UNSPECIFIED WHETHER ESOPHAGITIS PRESENT: ICD-10-CM

## 2024-05-19 DIAGNOSIS — R10.13 EPIGASTRIC PAIN: ICD-10-CM

## 2024-05-19 RX ORDER — SODIUM CHLORIDE 0.9 % (FLUSH) 0.9 %
10 SYRINGE (ML) INJECTION
OUTPATIENT
Start: 2024-05-19

## 2024-05-19 RX ORDER — SODIUM CHLORIDE 9 MG/ML
INJECTION, SOLUTION INTRAVENOUS CONTINUOUS
OUTPATIENT
Start: 2024-05-19

## 2024-05-21 ENCOUNTER — HOSPITAL ENCOUNTER (OUTPATIENT)
Dept: PREADMISSION TESTING | Facility: HOSPITAL | Age: 69
Discharge: HOME | DRG: 871 | End: 2024-05-21
Attending: SURGERY
Payer: MEDICARE

## 2024-05-21 ENCOUNTER — HOSPITAL ENCOUNTER (OUTPATIENT)
Dept: PULMONOLOGY | Facility: HOSPITAL | Age: 69
Discharge: HOME | DRG: 871 | End: 2024-05-21
Attending: SURGERY
Payer: MEDICARE

## 2024-05-21 VITALS — BODY MASS INDEX: 21.41 KG/M2 | WEIGHT: 102 LBS | HEIGHT: 58 IN

## 2024-05-21 DIAGNOSIS — R10.13 ABDOMINAL PAIN, EPIGASTRIC: ICD-10-CM

## 2024-05-21 DIAGNOSIS — R63.4 ABNORMAL WEIGHT LOSS: ICD-10-CM

## 2024-05-21 DIAGNOSIS — K21.9 GASTROESOPHAGEAL REFLUX DISEASE, UNSPECIFIED WHETHER ESOPHAGITIS PRESENT: ICD-10-CM

## 2024-05-21 LAB
OHS QRS DURATION: 82 MS
OHS QTC CALCULATION: 481 MS

## 2024-05-21 PROCEDURE — 93005 ELECTROCARDIOGRAM TRACING: CPT

## 2024-05-21 PROCEDURE — 93010 ELECTROCARDIOGRAM REPORT: CPT | Mod: ,,, | Performed by: INTERNAL MEDICINE

## 2024-05-21 RX ORDER — ALBUTEROL SULFATE 90 UG/1
AEROSOL, METERED RESPIRATORY (INHALATION)
Status: ON HOLD | COMMUNITY
End: 2024-05-27 | Stop reason: HOSPADM

## 2024-05-21 RX ORDER — NAPROXEN SODIUM 220 MG/1
81 TABLET, FILM COATED ORAL DAILY
Status: ON HOLD | COMMUNITY

## 2024-05-21 RX ORDER — DOCUSATE SODIUM 100 MG/1
100 CAPSULE, LIQUID FILLED ORAL 2 TIMES DAILY
Status: ON HOLD | COMMUNITY
Start: 2024-04-12

## 2024-05-21 RX ORDER — CETIRIZINE HYDROCHLORIDE 5 MG/1
5 TABLET ORAL DAILY
Status: ON HOLD | COMMUNITY
End: 2024-05-27 | Stop reason: HOSPADM

## 2024-05-21 RX ORDER — DIAZEPAM 10 MG/1
10 TABLET ORAL EVERY 6 HOURS PRN
Status: ON HOLD | COMMUNITY
Start: 2024-05-14

## 2024-05-21 RX ORDER — POTASSIUM CHLORIDE 750 MG/1
10 CAPSULE, EXTENDED RELEASE ORAL ONCE
Status: ON HOLD | COMMUNITY

## 2024-05-21 RX ORDER — IBUPROFEN 200 MG
200 TABLET ORAL EVERY 6 HOURS PRN
Status: ON HOLD | COMMUNITY
End: 2024-05-27 | Stop reason: HOSPADM

## 2024-05-21 RX ORDER — MELATONIN 3 MG
CAPSULE ORAL
Status: ON HOLD | COMMUNITY
End: 2024-05-27 | Stop reason: HOSPADM

## 2024-05-21 RX ORDER — TRAZODONE HYDROCHLORIDE 100 MG/1
100 TABLET ORAL NIGHTLY PRN
Status: ON HOLD | COMMUNITY
Start: 2024-03-12 | End: 2024-05-27 | Stop reason: HOSPADM

## 2024-05-21 RX ORDER — BUDESONIDE AND FORMOTEROL FUMARATE DIHYDRATE 160; 4.5 UG/1; UG/1
AEROSOL RESPIRATORY (INHALATION)
Status: ON HOLD | COMMUNITY

## 2024-05-21 NOTE — PRE-PROCEDURE INSTRUCTIONS
Patient and daughter were confused about who is the pt cardiologist. Called [email or web address] office and she confirmed pt sees Dr. Carr in [place]-office faxed for cardiac clearance.

## 2024-05-22 ENCOUNTER — HOSPITAL ENCOUNTER (INPATIENT)
Facility: HOSPITAL | Age: 69
LOS: 4 days | Discharge: HOME HEALTH | DRG: 871 | End: 2024-05-27
Attending: EMERGENCY MEDICINE | Admitting: STUDENT IN AN ORGANIZED HEALTH CARE EDUCATION/TRAINING PROGRAM
Payer: MEDICARE

## 2024-05-22 DIAGNOSIS — I49.9 ABNORMAL HEART RHYTHM: ICD-10-CM

## 2024-05-22 DIAGNOSIS — N30.00 ACUTE CYSTITIS WITHOUT HEMATURIA: ICD-10-CM

## 2024-05-22 DIAGNOSIS — I95.9 HYPOTENSION: ICD-10-CM

## 2024-05-22 DIAGNOSIS — E87.5 HYPERKALEMIA: ICD-10-CM

## 2024-05-22 DIAGNOSIS — N28.9 ACUTE RENAL INSUFFICIENCY: ICD-10-CM

## 2024-05-22 DIAGNOSIS — F19.10 POLYSUBSTANCE ABUSE: ICD-10-CM

## 2024-05-22 DIAGNOSIS — R19.7 DIARRHEA, UNSPECIFIED TYPE: ICD-10-CM

## 2024-05-22 DIAGNOSIS — J96.02 ACUTE HYPERCAPNIC RESPIRATORY FAILURE: Primary | ICD-10-CM

## 2024-05-22 DIAGNOSIS — J18.9 MULTIFOCAL PNEUMONIA: ICD-10-CM

## 2024-05-22 LAB
ALBUMIN SERPL BCP-MCNC: 2.7 G/DL (ref 3.5–5.2)
ALP SERPL-CCNC: 56 U/L (ref 55–135)
ALT SERPL W/O P-5'-P-CCNC: 46 U/L (ref 10–44)
ANION GAP SERPL CALC-SCNC: 8 MMOL/L (ref 3–11)
AST SERPL-CCNC: 91 U/L (ref 10–40)
BASOPHILS # BLD AUTO: 0.02 K/UL (ref 0–0.2)
BASOPHILS NFR BLD: 0.3 % (ref 0–1.9)
BILIRUB SERPL-MCNC: 0.9 MG/DL (ref 0.1–1)
BUN SERPL-MCNC: 42 MG/DL (ref 8–23)
CALCIUM SERPL-MCNC: 8.7 MG/DL (ref 8.7–10.5)
CHLORIDE SERPL-SCNC: 95 MMOL/L (ref 95–110)
CO2 SERPL-SCNC: 28 MMOL/L (ref 23–29)
CREAT SERPL-MCNC: 3.4 MG/DL (ref 0.5–1.4)
DIFFERENTIAL METHOD BLD: ABNORMAL
EOSINOPHIL # BLD AUTO: 0 K/UL (ref 0–0.5)
EOSINOPHIL NFR BLD: 0 % (ref 0–8)
ERYTHROCYTE [DISTWIDTH] IN BLOOD BY AUTOMATED COUNT: 18.1 % (ref 11.5–14.5)
EST. GFR  (NO RACE VARIABLE): 13.9 ML/MIN/1.73 M^2
GLUCOSE SERPL-MCNC: 88 MG/DL (ref 70–110)
HCT VFR BLD AUTO: 42.3 % (ref 37–48.5)
HGB BLD-MCNC: 12.3 G/DL (ref 12–16)
IMM GRANULOCYTES # BLD AUTO: 0.04 K/UL (ref 0–0.04)
IMM GRANULOCYTES NFR BLD AUTO: 0.5 % (ref 0–0.5)
LIPASE SERPL-CCNC: 26 U/L (ref 13–75)
LYMPHOCYTES # BLD AUTO: 1.1 K/UL (ref 1–4.8)
LYMPHOCYTES NFR BLD: 14.1 % (ref 18–48)
MCH RBC QN AUTO: 22.9 PG (ref 27–31)
MCHC RBC AUTO-ENTMCNC: 29.1 G/DL (ref 32–36)
MCV RBC AUTO: 79 FL (ref 82–98)
MONOCYTES # BLD AUTO: 1 K/UL (ref 0.3–1)
MONOCYTES NFR BLD: 12.2 % (ref 4–15)
NEUTROPHILS # BLD AUTO: 5.8 K/UL (ref 1.8–7.7)
NEUTROPHILS NFR BLD: 72.9 % (ref 38–73)
NRBC BLD-RTO: 0 /100 WBC
PLATELET # BLD AUTO: 318 K/UL (ref 150–450)
PMV BLD AUTO: 9.9 FL (ref 9.2–12.9)
POTASSIUM SERPL-SCNC: 6 MMOL/L (ref 3.5–5.1)
PROT SERPL-MCNC: 7.2 G/DL (ref 6–8.4)
RBC # BLD AUTO: 5.38 M/UL (ref 4–5.4)
SODIUM SERPL-SCNC: 131 MMOL/L (ref 136–145)
WBC # BLD AUTO: 7.94 K/UL (ref 3.9–12.7)

## 2024-05-22 PROCEDURE — 80053 COMPREHEN METABOLIC PANEL: CPT | Performed by: EMERGENCY MEDICINE

## 2024-05-22 PROCEDURE — 93005 ELECTROCARDIOGRAM TRACING: CPT

## 2024-05-22 PROCEDURE — 96361 HYDRATE IV INFUSION ADD-ON: CPT

## 2024-05-22 PROCEDURE — 85025 COMPLETE CBC W/AUTO DIFF WBC: CPT | Performed by: EMERGENCY MEDICINE

## 2024-05-22 PROCEDURE — 93010 ELECTROCARDIOGRAM REPORT: CPT | Mod: ,,, | Performed by: INTERNAL MEDICINE

## 2024-05-22 PROCEDURE — 83690 ASSAY OF LIPASE: CPT | Performed by: EMERGENCY MEDICINE

## 2024-05-22 PROCEDURE — 99285 EMERGENCY DEPT VISIT HI MDM: CPT | Mod: 25

## 2024-05-22 PROCEDURE — 25000003 PHARM REV CODE 250: Performed by: EMERGENCY MEDICINE

## 2024-05-22 PROCEDURE — 36415 COLL VENOUS BLD VENIPUNCTURE: CPT | Performed by: EMERGENCY MEDICINE

## 2024-05-22 PROCEDURE — 82962 GLUCOSE BLOOD TEST: CPT

## 2024-05-22 RX ORDER — PANTOPRAZOLE SODIUM 40 MG/1
TABLET, DELAYED RELEASE ORAL
Status: ON HOLD | COMMUNITY
Start: 2024-05-19 | End: 2024-05-27 | Stop reason: HOSPADM

## 2024-05-22 RX ORDER — DIPHENOXYLATE HYDROCHLORIDE AND ATROPINE SULFATE 2.5; .025 MG/1; MG/1
2 TABLET ORAL
Status: COMPLETED | OUTPATIENT
Start: 2024-05-22 | End: 2024-05-22

## 2024-05-22 RX ORDER — LUBIPROSTONE 8 UG/1
CAPSULE ORAL
Status: ON HOLD | COMMUNITY
Start: 2024-05-19 | End: 2024-05-27 | Stop reason: HOSPADM

## 2024-05-22 RX ADMIN — DIPHENOXYLATE HYDROCHLORIDE AND ATROPINE SULFATE 2 TABLET: 2.5; .025 TABLET ORAL at 10:05

## 2024-05-22 RX ADMIN — SODIUM CHLORIDE 1000 ML: 9 INJECTION, SOLUTION INTRAVENOUS at 10:05

## 2024-05-22 NOTE — LETTER
Lorin Merida accompanied their family to the hospital on 5/23/2024. They may return to work on 5/24/2024      If you have any questions or concerns, please don't hesitate to call.      Dr. Valladares/JESSICA Rosas, RN

## 2024-05-22 NOTE — LETTER
Kian Karlene accompanied  their family to the hospital on 5/24/2024. He may return to school on 5/25/2024      If you have any questions or concerns, please don't hesitate to call.      Dr. Valladares/JESSICA Rosas RN

## 2024-05-22 NOTE — LETTER
Scar Moody accompanied their family to the hospital on 5/24/2024. They may return to school on 5/25/2024.      If you have any questions or concerns, please don't hesitate to call.      Dr. Valladares/JESSICA Rosas RN

## 2024-05-23 PROBLEM — G93.41 ENCEPHALOPATHY, METABOLIC: Status: ACTIVE | Noted: 2024-01-01

## 2024-05-23 PROBLEM — A41.9 SEPSIS WITH ACUTE RENAL FAILURE AND TUBULAR NECROSIS WITHOUT SEPTIC SHOCK: Status: ACTIVE | Noted: 2024-05-23

## 2024-05-23 PROBLEM — R65.20 SEPSIS WITH ACUTE RENAL FAILURE AND TUBULAR NECROSIS WITHOUT SEPTIC SHOCK: Status: ACTIVE | Noted: 2024-05-23

## 2024-05-23 PROBLEM — G92.9 ENCEPHALOPATHY, TOXIC: Status: ACTIVE | Noted: 2024-05-23

## 2024-05-23 PROBLEM — E86.1 HYPOTENSION DUE TO HYPOVOLEMIA: Status: ACTIVE | Noted: 2024-02-02

## 2024-05-23 PROBLEM — J18.9 MULTIFOCAL PNEUMONIA: Status: ACTIVE | Noted: 2024-05-23

## 2024-05-23 PROBLEM — N17.0 SEPSIS WITH ACUTE RENAL FAILURE AND TUBULAR NECROSIS WITHOUT SEPTIC SHOCK: Status: ACTIVE | Noted: 2024-05-23

## 2024-05-23 PROBLEM — J96.02 ACUTE HYPERCAPNIC RESPIRATORY FAILURE: Status: ACTIVE | Noted: 2024-05-23

## 2024-05-23 PROBLEM — Z79.899 POLYPHARMACY: Chronic | Status: ACTIVE | Noted: 2024-05-23

## 2024-05-23 LAB
AMPHET+METHAMPHET UR QL: ABNORMAL
ANION GAP SERPL CALC-SCNC: -1 MMOL/L (ref 3–11)
ANION GAP SERPL CALC-SCNC: 2 MMOL/L (ref 3–11)
ANION GAP SERPL CALC-SCNC: 3 MMOL/L (ref 3–11)
ANION GAP SERPL CALC-SCNC: 6 MMOL/L (ref 3–11)
BACTERIA #/AREA URNS HPF: NEGATIVE /HPF
BARBITURATES UR QL SCN>200 NG/ML: NEGATIVE
BENZODIAZ UR QL SCN>200 NG/ML: ABNORMAL
BILIRUB UR QL STRIP: ABNORMAL
BIPAP: ABNORMAL
BIPAP: ABNORMAL
BUN SERPL-MCNC: 36 MG/DL (ref 8–23)
BUN SERPL-MCNC: 39 MG/DL (ref 8–23)
BUN SERPL-MCNC: 41 MG/DL (ref 8–23)
BUN SERPL-MCNC: 45 MG/DL (ref 8–23)
BZE UR QL SCN: NEGATIVE
CALCIUM SERPL-MCNC: 8.2 MG/DL (ref 8.7–10.5)
CALCIUM SERPL-MCNC: 8.2 MG/DL (ref 8.7–10.5)
CALCIUM SERPL-MCNC: 8.4 MG/DL (ref 8.7–10.5)
CALCIUM SERPL-MCNC: 8.7 MG/DL (ref 8.7–10.5)
CANNABINOIDS UR QL SCN: NEGATIVE
CHLORIDE SERPL-SCNC: 93 MMOL/L (ref 95–110)
CHLORIDE SERPL-SCNC: 93 MMOL/L (ref 95–110)
CHLORIDE SERPL-SCNC: 96 MMOL/L (ref 95–110)
CHLORIDE SERPL-SCNC: 98 MMOL/L (ref 95–110)
CK SERPL-CCNC: 82 U/L (ref 20–180)
CLARITY UR: ABNORMAL
CO2 SERPL-SCNC: 30 MMOL/L (ref 23–29)
CO2 SERPL-SCNC: 33 MMOL/L (ref 23–29)
CO2 SERPL-SCNC: 38 MMOL/L (ref 23–29)
CO2 SERPL-SCNC: 39 MMOL/L (ref 23–29)
COLOR UR: YELLOW
COMMENTS: ABNORMAL
COMMENTS: ABNORMAL
CREAT SERPL-MCNC: 1.3 MG/DL (ref 0.5–1.4)
CREAT SERPL-MCNC: 1.9 MG/DL (ref 0.5–1.4)
CREAT SERPL-MCNC: 2.3 MG/DL (ref 0.5–1.4)
CREAT SERPL-MCNC: 3.2 MG/DL (ref 0.5–1.4)
CREAT UR-MCNC: 253 MG/DL (ref 15–325)
CTP QC/QA: YES
CTP QC/QA: YES
EST. GFR  (NO RACE VARIABLE): 14.9 ML/MIN/1.73 M^2
EST. GFR  (NO RACE VARIABLE): 22.2 ML/MIN/1.73 M^2
EST. GFR  (NO RACE VARIABLE): 27.9 ML/MIN/1.73 M^2
EST. GFR  (NO RACE VARIABLE): 44 ML/MIN/1.73 M^2
FIO2: 30 %
FIO2: 40 %
FIO2: 50 %
FIO2: 50 %
GLUCOSE SERPL-MCNC: 104 MG/DL (ref 70–110)
GLUCOSE SERPL-MCNC: 86 MG/DL (ref 70–110)
GLUCOSE SERPL-MCNC: 86 MG/DL (ref 70–110)
GLUCOSE SERPL-MCNC: 88 MG/DL (ref 70–110)
GLUCOSE UR QL STRIP: NEGATIVE
HGB UR QL STRIP: NEGATIVE
HYALINE CASTS #/AREA URNS LPF: 4.4 /LPF
KETONES UR QL STRIP: ABNORMAL
LACTATE SERPL-SCNC: 0.7 MMOL/L (ref 0.5–2.2)
LEUKOCYTE ESTERASE UR QL STRIP: ABNORMAL
LPM: 8
Lab: ABNORMAL
METHADONE UR QL SCN>300 NG/ML: NEGATIVE
MICROSCOPIC COMMENT: ABNORMAL
MODIFIED ALLEN'S TEST: ABNORMAL
MODIFIED ALLEN'S TEST: ABNORMAL
MODIFIED ALLEN'S TEST: POSITIVE
MODIFIED ALLEN'S TEST: POSITIVE
NITRITE UR QL STRIP: NEGATIVE
NOTIFIED BY: ABNORMAL
O2DEVICE: ABNORMAL
OHS QRS DURATION: 84 MS
OHS QTC CALCULATION: 418 MS
OPIATES UR QL SCN: NEGATIVE
PCO2 BLDA: 77.4 MMHG (ref 35–45)
PCO2 BLDA: 81.2 MMHG (ref 35–45)
PCO2 BLDA: 84.4 MMHG (ref 35–45)
PCO2 BLDA: 94.1 MMHG (ref 35–45)
PCP UR QL SCN>25 NG/ML: NEGATIVE
PH SMN: 7.2 [PH] (ref 7.34–7.45)
PH SMN: 7.25 [PH] (ref 7.34–7.45)
PH SMN: 7.26 [PH] (ref 7.34–7.45)
PH SMN: 7.29 [PH] (ref 7.34–7.45)
PH UR STRIP: 5 [PH] (ref 5–8)
PO2 BLDA: 107 MMHG (ref 80–100)
PO2 BLDA: 108 MMHG (ref 80–100)
PO2 BLDA: 54.4 MMHG (ref 80–100)
PO2 BLDA: 77.8 MMHG (ref 80–100)
POC BASE DEFICIT: 10.2 MMOL/L (ref -2–2)
POC BASE DEFICIT: 10.3 MMOL/L (ref -2–2)
POC BASE DEFICIT: 9 MMOL/L (ref -2–2)
POC BASE DEFICIT: 9.2 MMOL/L (ref -2–2)
POC HCO3: 29.9 MMOL/L (ref 24–28)
POC HCO3: 30.3 MMOL/L (ref 24–28)
POC HCO3: 31.7 MMOL/L (ref 24–28)
POC HCO3: 31.8 MMOL/L (ref 24–28)
POC MOLECULAR INFLUENZA A AGN: NEGATIVE
POC MOLECULAR INFLUENZA B AGN: NEGATIVE
POC NOTIFIED NOTE: ABNORMAL
POC PERFORMED BY: ABNORMAL
POC SATURATED O2: 82.5 % (ref 95–100)
POC SATURATED O2: 92 % (ref 95–100)
POC SATURATED O2: 98.1 % (ref 95–100)
POC SATURATED O2: 98.1 % (ref 95–100)
POC TEMPERATURE: 37 C
POCT GLUCOSE: 116 MG/DL (ref 70–110)
POCT GLUCOSE: 88 MG/DL (ref 70–110)
POTASSIUM SERPL-SCNC: 4.2 MMOL/L (ref 3.5–5.1)
POTASSIUM SERPL-SCNC: 4.4 MMOL/L (ref 3.5–5.1)
POTASSIUM SERPL-SCNC: 4.4 MMOL/L (ref 3.5–5.1)
POTASSIUM SERPL-SCNC: 5.5 MMOL/L (ref 3.5–5.1)
PROT UR QL STRIP: ABNORMAL
PROVIDER NOTIFIED: ABNORMAL
RBC #/AREA URNS HPF: 8 /HPF (ref 0–4)
SARS-COV-2 RDRP RESP QL NAA+PROBE: NEGATIVE
SODIUM SERPL-SCNC: 131 MMOL/L (ref 136–145)
SODIUM SERPL-SCNC: 132 MMOL/L (ref 136–145)
SODIUM SERPL-SCNC: 133 MMOL/L (ref 136–145)
SODIUM SERPL-SCNC: 134 MMOL/L (ref 136–145)
SP GR UR STRIP: 1.02 (ref 1–1.03)
SPECIMEN SOURCE: ABNORMAL
SQUAMOUS #/AREA URNS HPF: 1 /HPF
TOXICOLOGY INFORMATION: ABNORMAL
URN SPEC COLLECT METH UR: ABNORMAL
UROBILINOGEN UR STRIP-ACNC: 1 EU/DL
WBC #/AREA URNS HPF: 16 /HPF (ref 0–5)

## 2024-05-23 PROCEDURE — 96367 TX/PROPH/DG ADDL SEQ IV INF: CPT

## 2024-05-23 PROCEDURE — 80307 DRUG TEST PRSMV CHEM ANLYZR: CPT | Performed by: EMERGENCY MEDICINE

## 2024-05-23 PROCEDURE — 27100171 HC OXYGEN HIGH FLOW UP TO 24 HOURS

## 2024-05-23 PROCEDURE — 82803 BLOOD GASES ANY COMBINATION: CPT

## 2024-05-23 PROCEDURE — 25000242 PHARM REV CODE 250 ALT 637 W/ HCPCS: Performed by: STUDENT IN AN ORGANIZED HEALTH CARE EDUCATION/TRAINING PROGRAM

## 2024-05-23 PROCEDURE — 25000003 PHARM REV CODE 250: Performed by: EMERGENCY MEDICINE

## 2024-05-23 PROCEDURE — 96366 THER/PROPH/DIAG IV INF ADDON: CPT

## 2024-05-23 PROCEDURE — 99900035 HC TECH TIME PER 15 MIN (STAT)

## 2024-05-23 PROCEDURE — 94660 CPAP INITIATION&MGMT: CPT

## 2024-05-23 PROCEDURE — 81000 URINALYSIS NONAUTO W/SCOPE: CPT | Mod: 59 | Performed by: EMERGENCY MEDICINE

## 2024-05-23 PROCEDURE — 80048 BASIC METABOLIC PNL TOTAL CA: CPT | Mod: 91 | Performed by: STUDENT IN AN ORGANIZED HEALTH CARE EDUCATION/TRAINING PROGRAM

## 2024-05-23 PROCEDURE — 36600 WITHDRAWAL OF ARTERIAL BLOOD: CPT

## 2024-05-23 PROCEDURE — 25000003 PHARM REV CODE 250: Performed by: STUDENT IN AN ORGANIZED HEALTH CARE EDUCATION/TRAINING PROGRAM

## 2024-05-23 PROCEDURE — 94761 N-INVAS EAR/PLS OXIMETRY MLT: CPT | Mod: XB

## 2024-05-23 PROCEDURE — 99223 1ST HOSP IP/OBS HIGH 75: CPT | Mod: ,,, | Performed by: STUDENT IN AN ORGANIZED HEALTH CARE EDUCATION/TRAINING PROGRAM

## 2024-05-23 PROCEDURE — 87635 SARS-COV-2 COVID-19 AMP PRB: CPT | Performed by: EMERGENCY MEDICINE

## 2024-05-23 PROCEDURE — 96361 HYDRATE IV INFUSION ADD-ON: CPT

## 2024-05-23 PROCEDURE — 27000221 HC OXYGEN, UP TO 24 HOURS

## 2024-05-23 PROCEDURE — 36415 COLL VENOUS BLD VENIPUNCTURE: CPT | Performed by: EMERGENCY MEDICINE

## 2024-05-23 PROCEDURE — 82550 ASSAY OF CK (CPK): CPT | Performed by: STUDENT IN AN ORGANIZED HEALTH CARE EDUCATION/TRAINING PROGRAM

## 2024-05-23 PROCEDURE — 20000000 HC ICU ROOM

## 2024-05-23 PROCEDURE — 63600175 PHARM REV CODE 636 W HCPCS: Performed by: STUDENT IN AN ORGANIZED HEALTH CARE EDUCATION/TRAINING PROGRAM

## 2024-05-23 PROCEDURE — 87086 URINE CULTURE/COLONY COUNT: CPT | Performed by: EMERGENCY MEDICINE

## 2024-05-23 PROCEDURE — 36415 COLL VENOUS BLD VENIPUNCTURE: CPT | Mod: XB | Performed by: EMERGENCY MEDICINE

## 2024-05-23 PROCEDURE — 83605 ASSAY OF LACTIC ACID: CPT | Performed by: STUDENT IN AN ORGANIZED HEALTH CARE EDUCATION/TRAINING PROGRAM

## 2024-05-23 PROCEDURE — 80048 BASIC METABOLIC PNL TOTAL CA: CPT | Mod: 91 | Performed by: EMERGENCY MEDICINE

## 2024-05-23 PROCEDURE — 96375 TX/PRO/DX INJ NEW DRUG ADDON: CPT

## 2024-05-23 PROCEDURE — 99900031 HC PATIENT EDUCATION (STAT)

## 2024-05-23 PROCEDURE — 80048 BASIC METABOLIC PNL TOTAL CA: CPT | Performed by: EMERGENCY MEDICINE

## 2024-05-23 PROCEDURE — 63600175 PHARM REV CODE 636 W HCPCS: Performed by: EMERGENCY MEDICINE

## 2024-05-23 PROCEDURE — 94640 AIRWAY INHALATION TREATMENT: CPT

## 2024-05-23 PROCEDURE — 87040 BLOOD CULTURE FOR BACTERIA: CPT | Mod: 59 | Performed by: STUDENT IN AN ORGANIZED HEALTH CARE EDUCATION/TRAINING PROGRAM

## 2024-05-23 PROCEDURE — 27000190 HC CPAP FULL FACE MASK W/VALVE

## 2024-05-23 PROCEDURE — 36415 COLL VENOUS BLD VENIPUNCTURE: CPT | Performed by: STUDENT IN AN ORGANIZED HEALTH CARE EDUCATION/TRAINING PROGRAM

## 2024-05-23 PROCEDURE — 96365 THER/PROPH/DIAG IV INF INIT: CPT

## 2024-05-23 RX ORDER — FAMOTIDINE 10 MG/ML
20 INJECTION INTRAVENOUS 2 TIMES DAILY
Status: DISCONTINUED | OUTPATIENT
Start: 2024-05-23 | End: 2024-05-27 | Stop reason: HOSPADM

## 2024-05-23 RX ORDER — FUROSEMIDE 10 MG/ML
20 INJECTION INTRAMUSCULAR; INTRAVENOUS
Status: COMPLETED | OUTPATIENT
Start: 2024-05-23 | End: 2024-05-23

## 2024-05-23 RX ORDER — SODIUM CHLORIDE 9 MG/ML
INJECTION, SOLUTION INTRAVENOUS
Status: DISCONTINUED | OUTPATIENT
Start: 2024-05-23 | End: 2024-05-27 | Stop reason: HOSPADM

## 2024-05-23 RX ORDER — SODIUM CHLORIDE 9 MG/ML
INJECTION, SOLUTION INTRAVENOUS CONTINUOUS
Status: DISCONTINUED | OUTPATIENT
Start: 2024-05-23 | End: 2024-05-24

## 2024-05-23 RX ORDER — LORAZEPAM 2 MG/ML
1 INJECTION INTRAMUSCULAR EVERY 6 HOURS PRN
Status: DISCONTINUED | OUTPATIENT
Start: 2024-05-23 | End: 2024-05-25

## 2024-05-23 RX ORDER — SUCRALFATE 1 G/10ML
1 SUSPENSION ORAL EVERY 6 HOURS
Status: DISCONTINUED | OUTPATIENT
Start: 2024-05-23 | End: 2024-05-23

## 2024-05-23 RX ORDER — CALCIUM GLUCONATE 20 MG/ML
1 INJECTION, SOLUTION INTRAVENOUS
Status: COMPLETED | OUTPATIENT
Start: 2024-05-23 | End: 2024-05-23

## 2024-05-23 RX ORDER — MUPIROCIN 20 MG/G
OINTMENT TOPICAL 2 TIMES DAILY
Status: DISCONTINUED | OUTPATIENT
Start: 2024-05-23 | End: 2024-05-27 | Stop reason: HOSPADM

## 2024-05-23 RX ORDER — BUDESONIDE 0.5 MG/2ML
0.5 INHALANT ORAL EVERY 12 HOURS
Status: DISCONTINUED | OUTPATIENT
Start: 2024-05-23 | End: 2024-05-27 | Stop reason: HOSPADM

## 2024-05-23 RX ORDER — ENOXAPARIN SODIUM 100 MG/ML
30 INJECTION SUBCUTANEOUS EVERY 24 HOURS
Status: DISCONTINUED | OUTPATIENT
Start: 2024-05-23 | End: 2024-05-24

## 2024-05-23 RX ORDER — IPRATROPIUM BROMIDE AND ALBUTEROL SULFATE 2.5; .5 MG/3ML; MG/3ML
3 SOLUTION RESPIRATORY (INHALATION) EVERY 4 HOURS
Status: DISCONTINUED | OUTPATIENT
Start: 2024-05-23 | End: 2024-05-24

## 2024-05-23 RX ORDER — TALC
6 POWDER (GRAM) TOPICAL NIGHTLY PRN
Status: DISCONTINUED | OUTPATIENT
Start: 2024-05-23 | End: 2024-05-24

## 2024-05-23 RX ORDER — DEXTROSE MONOHYDRATE 100 MG/ML
25 INJECTION, SOLUTION INTRAVENOUS
Status: ACTIVE | OUTPATIENT
Start: 2024-05-23 | End: 2024-05-24

## 2024-05-23 RX ORDER — ONDANSETRON HYDROCHLORIDE 2 MG/ML
4 INJECTION, SOLUTION INTRAVENOUS EVERY 8 HOURS PRN
Status: DISCONTINUED | OUTPATIENT
Start: 2024-05-23 | End: 2024-05-27 | Stop reason: HOSPADM

## 2024-05-23 RX ORDER — DEXTROSE MONOHYDRATE 100 MG/ML
50 INJECTION, SOLUTION INTRAVENOUS ONCE
Status: COMPLETED | OUTPATIENT
Start: 2024-05-23 | End: 2024-05-23

## 2024-05-23 RX ORDER — LORAZEPAM 2 MG/ML
2 INJECTION INTRAMUSCULAR ONCE
Status: COMPLETED | OUTPATIENT
Start: 2024-05-23 | End: 2024-05-23

## 2024-05-23 RX ORDER — CALCIUM GLUCONATE 20 MG/ML
1 INJECTION, SOLUTION INTRAVENOUS EVERY 10 MIN PRN
Status: DISCONTINUED | OUTPATIENT
Start: 2024-05-23 | End: 2024-05-27 | Stop reason: HOSPADM

## 2024-05-23 RX ORDER — SODIUM CHLORIDE 0.9 % (FLUSH) 0.9 %
10 SYRINGE (ML) INJECTION
Status: DISCONTINUED | OUTPATIENT
Start: 2024-05-23 | End: 2024-05-27 | Stop reason: HOSPADM

## 2024-05-23 RX ADMIN — LORAZEPAM 2 MG: 2 INJECTION INTRAMUSCULAR; INTRAVENOUS at 12:05

## 2024-05-23 RX ADMIN — SODIUM CHLORIDE: 9 INJECTION, SOLUTION INTRAVENOUS at 03:05

## 2024-05-23 RX ADMIN — MUPIROCIN: 20 OINTMENT TOPICAL at 09:05

## 2024-05-23 RX ADMIN — FAMOTIDINE 20 MG: 10 INJECTION, SOLUTION INTRAVENOUS at 09:05

## 2024-05-23 RX ADMIN — CEFTRIAXONE 1 G: 1 INJECTION, POWDER, FOR SOLUTION INTRAMUSCULAR; INTRAVENOUS at 03:05

## 2024-05-23 RX ADMIN — PIPERACILLIN SODIUM AND TAZOBACTAM SODIUM 4.5 G: 4; .5 INJECTION, POWDER, LYOPHILIZED, FOR SOLUTION INTRAVENOUS at 11:05

## 2024-05-23 RX ADMIN — SODIUM BICARBONATE: 84 INJECTION, SOLUTION INTRAVENOUS at 02:05

## 2024-05-23 RX ADMIN — DEXTROSE MONOHYDRATE 50 G: 100 INJECTION, SOLUTION INTRAVENOUS at 01:05

## 2024-05-23 RX ADMIN — SODIUM CHLORIDE: 9 INJECTION, SOLUTION INTRAVENOUS at 07:05

## 2024-05-23 RX ADMIN — FUROSEMIDE 20 MG: 10 INJECTION, SOLUTION INTRAMUSCULAR; INTRAVENOUS at 01:05

## 2024-05-23 RX ADMIN — BUDESONIDE INHALATION 0.5 MG: 0.5 SUSPENSION RESPIRATORY (INHALATION) at 02:05

## 2024-05-23 RX ADMIN — CALCIUM GLUCONATE 1 G: 20 INJECTION, SOLUTION INTRAVENOUS at 01:05

## 2024-05-23 RX ADMIN — LORAZEPAM 1 MG: 2 INJECTION INTRAMUSCULAR; INTRAVENOUS at 05:05

## 2024-05-23 RX ADMIN — BUDESONIDE INHALATION 0.5 MG: 0.5 SUSPENSION RESPIRATORY (INHALATION) at 07:05

## 2024-05-23 RX ADMIN — INSULIN HUMAN 4.94 UNITS: 100 INJECTION, SOLUTION PARENTERAL at 01:05

## 2024-05-23 RX ADMIN — ENOXAPARIN SODIUM 30 MG: 30 INJECTION SUBCUTANEOUS at 05:05

## 2024-05-23 RX ADMIN — SODIUM ZIRCONIUM CYCLOSILICATE 10 G: 10 POWDER, FOR SUSPENSION ORAL at 02:05

## 2024-05-23 RX ADMIN — IPRATROPIUM BROMIDE AND ALBUTEROL SULFATE 3 ML: 2.5; .5 SOLUTION RESPIRATORY (INHALATION) at 07:05

## 2024-05-23 RX ADMIN — SODIUM CHLORIDE 1000 ML: 9 INJECTION, SOLUTION INTRAVENOUS at 08:05

## 2024-05-23 RX ADMIN — IPRATROPIUM BROMIDE AND ALBUTEROL SULFATE 3 ML: 2.5; .5 SOLUTION RESPIRATORY (INHALATION) at 02:05

## 2024-05-23 RX ADMIN — SODIUM CHLORIDE: 9 INJECTION, SOLUTION INTRAVENOUS at 11:05

## 2024-05-23 RX ADMIN — PIPERACILLIN SODIUM AND TAZOBACTAM SODIUM 4.5 G: 4; .5 INJECTION, POWDER, LYOPHILIZED, FOR SOLUTION INTRAVENOUS at 03:05

## 2024-05-23 NOTE — HPI
"Chief Complaint    Complaint Comment   Diarrhea Pt to the ER w/ complaints of diarrhea, decreased appetite, "feels dehydrated." Pt denies any recent illness, denies any weakness, dizziness, chest pain, or SOB during triage. "Out of it and lethargic" per family.     69 year old with hypertension, anxiety, emphysema (on home O2 3L via nasal cannula), history of methamphetamine use presented to the emergency department overnight with complaints of diarrhea, dehydration and frequent falls in the past two days.   Patient on exam is very sleepy, responds with a grown and a word of two with voice and painful stimulus, unable to answer questions.   Vital signs at time of exam, BP 85/53 mmHg, RR 30, HR 69, SpO2 100% on continuous O2 4L via nasal cannula with sodium bicarb gtt at 150/hr.     ED course:  Vital signs on arrival BP 85/47 mmHg, HR 90, RR 18, SpO2 94% on room air, temp 97.7F.   WBC 7.94, Hg 12.3, HCT 42.3,   Na 131, K 6, Cl 95, CO2 28, Glu 88, BUN 42, Cr 3.4 (baseline 1), [place] 9.7, Alb 2.7, Tbili 0.9, Alk phos 56, AST 91, ALT 46, lipase 26, Anion Gap 8.  UDS positive for benzos and amphetamines.   Urinalysis microhematuria, WBC 16, no bacteria, no nitrite.  Received rocephin 1g.  Patient received bolus NS 1L x1, lomotil x1.   Calcium gluc x1, D10 IVF 50g x 1, insulin 5U, Lokelma 10 mg.   Placed on sodium bicarb gtt 150mEq x1.  Repeat potassium levels 4.4 <5.5 with improving renal function.     Patient acutely became agitated, pulling on telemetry pulse oximetry readings in 80s and palced on high flow nasal cannula.   ABG findings pH 7.2/94.1/77.8/29.9 92% on FiO2 40% on venturi mask.  Patient admitted to ICU for acute respiratory failure on BiPAP and acute encephalopathy secondary to metabolic, infectious and toxic (polypharmacy) sources.     Medications reviewed from pill boxes provided by daughter and niece contain  Gabapentin 800 mg tablets, Simethicone 125 mg capsules, omeprazole 20 mg capsules, " cetirizine 10 mg tablet, docusate 100 mg capsules, aspirin 81 mg tablets, trazodone 100 mg tablet, advil PM tablets, vitamin B2 100 mg tablets, pravastatin 20 mg tablets, and lisinopril/hctz 20/25 mg tablet.

## 2024-05-23 NOTE — ED NOTES
Attempted to call report. Spoke with Pamela who stated that there was a meeting going on and she would call back for report.

## 2024-05-23 NOTE — ASSESSMENT & PLAN NOTE
Latest blood pressure and vitals reviewed-   HOLD all home antihypertensive medications until blood pressure improves.   Patient received IVF NS 2L followed by continuous IVF.   - continuous telemetry  - continuous pulse oximetry    Temp:  [97.2 °F (36.2 °C)-97.7 °F (36.5 °C)]   Pulse:  []   Resp:  [15-41]   BP: ()/(47-70)   SpO2:  [65 %-100 %] .   Home meds for hypertension were reviewed and noted below.   Hypertension Medications               lisinopriL-hydrochlorothiazide (PRINZIDE,ZESTORETIC) 20-25 mg Tab           While in the hospital, will manage blood pressure as follows.

## 2024-05-23 NOTE — ASSESSMENT & PLAN NOTE
Patient with Hypercapnic and Hypoxic Respiratory failure which is Acute.  she is on home oxygen at 3 LPM. Supplemental oxygen was provided and noted- Oxygen Concentration (%):  [30-50] 50    Signs/symptoms of respiratory failure include- respiratory distress and lethargy. Contributing diagnoses includes - COPD, Pleural effusion, Pneumonia, and polysubstance use with respiratory depression.  Labs and images were reviewed. Patient Has recent ABG, which has been reviewed. Will treat underlying causes and adjust management of respiratory failure as follows- continuous O2, scheduled breathing treatments, IV antibiotics.     Hypercarbia improving with BiPAP.   - continuous O2 per respiratory  - maintain SpO2 >92%

## 2024-05-23 NOTE — CARE UPDATE
05/23/24 1040   PRE-TX-O2   Device (Oxygen Therapy) venturi mask   Flow (L/min) (Oxygen Therapy) 8   Oxygen Concentration (%) 40   SpO2 (!) 93 %   Pulse 97

## 2024-05-23 NOTE — ASSESSMENT & PLAN NOTE
Multifactorial with acute renal faliure, hyponatremia, hyperkalemia.   Initial potassium 6, now within normal range.   Continue IVF resuscitation and trend chemistry.

## 2024-05-23 NOTE — ED NOTES
Notified Dr Valladares of change in pt's mental status and that pt continues to be hypotensive. Awaiting a reply.

## 2024-05-23 NOTE — ASSESSMENT & PLAN NOTE
UDS presumptive positive for amphetamine and benzodiazepine.   Patient has RX for valium 10 mg (last filled 5/14/24 30 tablets with current bottle with 10 tablets remaining).   Patient daily intake 10-30 mg by count above.   Trazodone which is a medication patient currently taking may falsely result in positive UDS screen.  During last hospitalization in February 2024, patient reported to be doing methamphetamine and also, during ER stay and while still communicating with staff, she reported taking street provided adderall of unknown dose.   Combination benzodiazepine, gabapentin, trazodone, and stimulants may be source of respiratory depression.

## 2024-05-23 NOTE — ED NOTES
Spoke with Stefany, she stated that she last saw patient two days ago and she was behaving normally (able to walk/talk), etc. She stated that she did not know if patient took her antibiotics following her last admission. She will bring updated med list in approximately one hour.

## 2024-05-23 NOTE — PLAN OF CARE
"[facility] - Intensive Care  Initial Discharge Assessment       Primary Care Provider: Mauricio Pierre MD    Admission Diagnosis: Hyperkalemia [E87.5]  Acute renal insufficiency [N28.9]  Acute cystitis without hematuria [N30.00]  Hypotension [I95.9]  Diarrhea, unspecified type [R19.7]    Admission Date: 5/22/2024  Expected Discharge Date:     Transition of Care Barriers: None    Payor: [employer] [facility] / Plan: [employer] / Product Type: Medicare Advantage /     Extended Emergency Contact Information  Primary Emergency Contact: Alexandria Cleveland  Home Phone: [phone]  Mobile Phone: [phone]  Relation: Daughter  Secondary Emergency Contact: ALEK ZELAYA  Mobile Phone: [phone]  Relation: Grandchild  Preferred language: English   needed? No    Discharge Plan A: Home with family  Discharge Plan B: Home with family, Home Health      [facility] - [place], [place] - [address]  [address] [place] [address]  Phone: [phone] Fax: [phone]    [id number] - [place], [place] - [address]  [address]  [facility] [address]  Phone: [phone] Fax: [phone]      Initial Assessment (most recent)       Adult Discharge Assessment - 05/23/24 1507          Discharge Assessment    Assessment Type Discharge Planning Assessment     Confirmed/corrected address, phone number and insurance Yes     Confirmed Demographics Correct on Facesheet     Source of Information family   Alexandria Brown" [facility] [phone] provided answers to assessment questions.    If unable to respond/provide information was family/caregiver contacted? Yes     Contact Name/Number Alexandria Brown" [facility] [phone]     When was your last doctors appointment? --   Daughter reports taking pt to see doctor last week about pt's gallbladder.    Communicated LACY with patient/caregiver Date not available/Unable to determine     Reason For " "Admission (I95.9) Hypotension  (R19.7) Diarrhea, unspecified type (Primary)  (E87.5) Hyperkalemia  (N28.9) Acute renal insufficiency  (N30.00) Acute cystitis without hematuria     People in Home alone   Daughter reports pt lives in a studio apartment alone.    Do you expect to return to your current living situation? Other (see comments)   Daughter reports that is unknown at this time. Daughter reports pt may be coming to her home or another family member's home at discharge.    Do you have help at home or someone to help you manage your care at home? No     Prior to hospitilization cognitive status: Not Oriented to Time   Per ED Note, "The patient is lethargic and oriented to person and place."    Current cognitive status: Unable to Assess     Walking or Climbing Stairs Difficulty no     Dressing/Bathing Difficulty no     Do you have any problems with: Errands/Grocery   Daughter reports pt does not have a vehicle. Daughter reports she does the errands/grocery shopping for pt.    Home Accessibility wheelchair accessible     Home Layout Able to live on 1st floor     Equipment Currently Used at Home oxygen;nebulizer;walker, rolling;shower chair     Readmission within 30 days? No     Patient currently being followed by outpatient case management? Unable to determine (comments)     Do you currently have service(s) that help you manage your care at home? No     Do you take prescription medications? Yes     Do you have prescription coverage? Yes     Coverage [facility]     Do you have any problems affording any of your prescribed medications? No     Is the patient taking medications as prescribed? yes     Who is going to help you get home at discharge? Family members     How do you get to doctors appointments? family or friend will provide     Are you on dialysis? No     Do you take coumadin? No     Discharge Plan A Home with family     Discharge Plan B Home with family;Home " Health     DME Needed Upon Discharge  other (see comments)   TBD    Discharge Plan discussed with: Adult children     Transition of Care Barriers None                   Initial assessment completed. SW met with pt, pt's daughter, and pt's granddaughter (Erica Higginbotham [phone]) at bedside. Pt's other granddaughter (Caro) was at pt's bedside in the ED. Caro provided her spouse contact information if she cannot be reached (Denver Modi [phone]). Pt's daughter was able to provide answers to assessment questions. Daughter reports that pt lives in a studio apartment alone. Daughter reports pt does not have a vehicle so she takes her to her doctor appointments and does the pt grocery shopping. Daughter reports the pt has a caretaker that does medication management for the pt. Daughter reports pt had home health, but the pt told the agency to stop coming to her home. Daughter reports the pt has portable oxygen, albuterol neb treatments, and a lung exercise machine. Pt also has a shower chair and rolling walker at home. Daughter reports that pt's insurance delivers a chest full of food to the home.     Pt's daughter reported that the pt has been at home for the past two days. Daughter stated she noticed the pt had been falling a lot at home. Daughter reported that she took the pt to the doctor on Tuesday to get some labwork done.     Daughter reported that she is not sure if the pt will return home or not. Daughter reports that she has thought about placement, but she would have to discuss the matter with her brother. Daughter reports the pt only has two children and no one has POA.    Pt's granddaughter (Caro) had some concerns about pt's medication management. RONLAD provided granddaughter with the number for [facility] [phone] to make a report.

## 2024-05-23 NOTE — SUBJECTIVE & OBJECTIVE
Past Medical History:   Diagnosis Date    Abdominal pain 05/2024    Acid reflux 05/2024    Acid reflux     Anxiety disorder, unspecified     At high risk for falls     Emphysema, unspecified     History of opioid abuse     Hypertension     On home oxygen therapy     2l/nc    Wears partial dentures     upper-none on lower    Weight loss 05/2024       Past Surgical History:   Procedure Laterality Date    ANKLE SURGERY Left     HYSTERECTOMY      TONSILLECTOMY         Review of patient's allergies indicates:  No Known Allergies    No current facility-administered medications on file prior to encounter.     Current Outpatient Medications on File Prior to Encounter   Medication Sig    albuterol (PROAIR HFA) 90 mcg/actuation inhaler ProAir HFA    albuterol (PROVENTIL/VENTOLIN HFA) 90 mcg/actuation inhaler Inhale into the lungs.    aspirin 81 MG Chew Take 81 mg by mouth once daily. Stop 05/20/2024 per [facility] office    budesonide-formoterol 160-4.5 mcg (SYMBICORT) 160-4.5 mcg/actuation HFAA 2 puffs Inhalation Twice a day    cetirizine (ZYRTEC) 5 MG tablet Take 5 mg by mouth once daily.    diazePAM (VALIUM) 10 MG Tab Take 10 mg by mouth every 6 (six) hours as needed.    docusate sodium (COLACE) 100 MG capsule Take 100 mg by mouth 2 (two) times daily.    fluticasone propionate (FLONASE) 50 mcg/actuation nasal spray     gabapentin (NEURONTIN) 100 MG capsule Take 1 capsule (100 mg total) by mouth 2 (two) times daily. (Patient taking differently: Take 800 mg by mouth 2 (two) times daily.)    ibuprofen (ADVIL,MOTRIN) 200 MG tablet Take 200 mg by mouth every 6 (six) hours as needed for Pain.    lisinopriL-hydrochlorothiazide (PRINZIDE,ZESTORETIC) 20-25 mg Tab     melatonin 3 mg Cap Take by mouth.    omeprazole (PRILOSEC) 20 MG capsule     pantoprazole (PROTONIX) 40 MG tablet     potassium chloride (MICRO-K) 10 MEQ CpSR Take 10 mEq by mouth once.    pravastatin (PRAVACHOL) 20 MG tablet     traZODone (DESYREL) 100 MG  tablet Take 100 mg by mouth nightly as needed.    azithromycin (Z-DEIRDRE) 250 MG tablet Take 2 tablets on day 1, then take 1 tablet on days 2-5    cyclobenzaprine (FLEXERIL) 5 MG tablet Take 1 tablet (5 mg total) by mouth nightly as needed.    doxycycline (VIBRA-TABS) 100 MG tablet Take 1 tablet (100 mg total) by mouth 2 (two) times daily.    FLUoxetine 10 MG capsule Take 1 capsule (10 mg total) by mouth once daily.    lubiprostone (AMITIZA) 8 MCG Cap     [DISCONTINUED] amoxicillin (AMOXIL) 875 MG tablet Take 1 tablet (875 mg total) by mouth every 12 (twelve) hours.     Family History       Problem Relation (Age of Onset)    Diabetes Father    No Known Problems Mother, Brother, Brother, Brother    Pancreatic cancer Father    Parkinsonism Sister    Stroke Father          Tobacco Use    Smoking status: Former     Current packs/day: 1.00     Types: Cigarettes    Smokeless tobacco: Never    Tobacco comments:     Quit 2 years ago   Substance and Sexual Activity    Alcohol use: No    Drug use: Not Currently     Types: Oxycodone, Hydrocodone    Sexual activity: Not on file     Comment:      Review of Systems   Unable to perform ROS: Acuity of condition     Objective:     Vital Signs (Most Recent):  Temp: 97.2 °F (36.2 °C) (05/23/24 1630)  Pulse: 75 (05/23/24 1630)  Resp: 15 (05/23/24 1630)  BP: (!) 96/52 (05/23/24 1630)  SpO2: 98 % (05/23/24 1630) Vital Signs (24h Range):  Temp:  [97.2 °F (36.2 °C)-97.7 °F (36.5 °C)] 97.2 °F (36.2 °C)  Pulse:  [] 75  Resp:  [15-41] 15  SpO2:  [65 %-100 %] 98 %  BP: ()/(47-70) 96/52     Weight: 49.4 kg (109 lb)  Body mass index is 22.78 kg/m².     Physical Exam  Constitutional:       General: She is not in acute distress.     Appearance: She is ill-appearing.      Comments: Frail stature   HENT:      Mouth/Throat:      Mouth: Mucous membranes are dry.      Pharynx: Oropharynx is clear.   Cardiovascular:      Rate and Rhythm: Normal rate.      Pulses: Normal pulses.      Heart  "sounds: Normal heart sounds. No murmur heard.  Pulmonary:      Effort: Bradypnea present. No retractions.      Breath sounds: Decreased air movement present.      Comments: BiPAP  Abdominal:      General: Bowel sounds are normal. There is no distension.      Palpations: Abdomen is soft.      Tenderness: There is no abdominal tenderness. There is no guarding.      Comments: Passing flatus, no stool   Genitourinary:     Comments: Cleveland catheter in place draining yellow colored urine  Musculoskeletal:      Cervical back: Normal range of motion and neck supple. No rigidity or tenderness.      Right lower leg: No edema.      Left lower leg: No edema.   Lymphadenopathy:      Cervical: No cervical adenopathy.   Skin:     General: Skin is warm and dry.      Capillary Refill: Capillary refill takes less than 2 seconds.      Findings: No erythema or rash.               Significant Labs: All pertinent labs within the past 24 hours have been reviewed.  A1C: No results for input(s): "HGBA1C" in the last 4320 hours.  ABGs:   Recent Labs   Lab 05/23/24  1106 05/23/24  1340 05/23/24  1605   PH 7.203* 7.259* 7.286*   PCO2 94.1* 81.2* 77.4*   HCO3 29.9* 30.3* 31.8*   POCSATURATED 92.0* 82.5* 98.1   PO2 77.8* 54.4* 107*     Bilirubin:   Recent Labs   Lab 05/21/24  0953 05/22/24  2256   BILITOT 0.4 0.9     BMP:   Recent Labs   Lab 05/23/24  1227   GLU 86   *   K 4.2   CL 93*   CO2 38*   BUN 39*   CREATININE 1.9*   CALCIUM 8.4*     CBC:   Recent Labs   Lab 05/22/24  2223   WBC 7.94   HGB 12.3   HCT 42.3        CMP:   Recent Labs   Lab 05/22/24  2256 05/23/24  0148 05/23/24  0638 05/23/24  1227   * 132* 131* 133*   K 6.0* 5.5* 4.4 4.2   CL 95 96 93* 93*   CO2 28 30* 39* 38*   GLU 88 88 104 86   BUN 42* 45* 41* 39*   CREATININE 3.4* 3.2* 2.3* 1.9*   CALCIUM 8.7 8.7 8.2* 8.4*   PROT 7.2  --   --   --    ALBUMIN 2.7*  --   --   --    BILITOT 0.9  --   --   --    ALKPHOS 56  --   --   --    AST 91*  --   --   --    ALT 46* "  --   --   --    ANIONGAP 8 6 -1* 2*     Lactic Acid:   Recent Labs   Lab 05/23/24  0747   LACTATE 0.7     Lipase:   Recent Labs   Lab 05/22/24  2256   LIPASE 26     POCT Glucose:   Recent Labs   Lab 05/23/24  0048 05/23/24  0416   POCTGLUCOSE 88 116*     Urine Studies:   Recent Labs   Lab 05/23/24  0116   COLORU Yellow   APPEARANCEUA Cloudy*   PHUR 5.0   SPECGRAV 1.020   PROTEINUA 1+*   GLUCUA Negative   KETONESU Trace*   BILIRUBINUA 2+*   OCCULTUA Negative   NITRITE Negative   UROBILINOGEN 1.0   LEUKOCYTESUR 1+*   RBCUA 8*   WBCUA 16*   BACTERIA Negative   SQUAMEPITHEL 1   HYALINECASTS 4.4*      X-Ray Chest 1 View  Narrative: EXAMINATION:  XR CHEST 1 VIEW    CLINICAL HISTORY:  hypoxia;    COMPARISON:  Portable chest 02/01/2024.    FINDINGS:  Frontal chest radiograph demonstrates mild heterogeneous opacities bilateral lower lung zones including the left upper lung zone.  No pleural fluid.  Mild enlargement of cardiac silhouette.  No osseous abnormality.  Impression: Evidence of edema or multifocal pneumonia.    Electronically signed by: Keith Schuler MD  Date:    05/23/2024  Time:    14:59    CT Head Without Contrast  Narrative: EXAMINATION:  CT HEAD WITHOUT CONTRAST    CLINICAL HISTORY:  altered mental status;    TECHNIQUE:  Axial CT images were obtained. Iterative reconstruction technique was used.  CT/cardiac nuclear exam/s in prior 12 months: 0.    COMPARISON:  None.    FINDINGS:  There is no acute intracranial hemorrhage.  There is no mass or mass effect.  No ventricular dilatation or abnormal extra-axial fluid collection.  There is no osseous abnormality.  Impression: No acute intracranial hemorrhage, mass or other brain abnormality.    A preliminary report was faxed by [facility] shortly after completion of this examination.    Electronically signed by: Keith Schuler MD  Date:    05/23/2024  Time:    10:11     Significant Imaging: I have reviewed all pertinent imaging results/findings within the  past 24 hours.

## 2024-05-23 NOTE — ASSESSMENT & PLAN NOTE
This patient does have evidence of infective focus  My overall impression is sepsis.  Source: Respiratory  Antibiotics given-   Antibiotics (72h ago, onward)      Start     Stop Route Frequency Ordered    05/23/24 2100  mupirocin 2 % ointment         05/28/24 2059 Nasl 2 times daily 05/23/24 1410    05/23/24 1615  piperacillin-tazobactam (ZOSYN) 4.5 g in dextrose 5 % in water (D5W) 100 mL IVPB (MB+)         -- IV Every 8 hours (non-standard times) 05/23/24 1511          Latest lactate reviewed-  Recent Labs   Lab 05/23/24  0747   LACTATE 0.7     Organ dysfunction indicated by Acute kidney injury, Acute respiratory failure, and Encephalopathy    Fluid challenge Ideal Body Weight- The patient's ideal body weight is Ideal body weight: 47.1 kg (103 lb 14.5 oz) which will be used to calculate fluid bolus of 30 ml/kg for treatment of septic shock.      Post- resuscitation assessment Yes Perfusion exam was performed within 6 hours of septic shock presentation after bolus shows Adequate tissue perfusion assessed by non-invasive monitoring     Will Not start Pressors- Levophed for MAP of 65  Source control achieved by: rocephin x1, followed by zosyn     Review of medical chart with bacteremia with Ecoli growth in February 2024. Patient during last hospitalization left AMA. No record of continued antibiotics therapy.   Per pharmacy review; per Dr. Pierre, patient has been prescribed the following antibiotics.   - 4/9/24 macrobid 5 days  - 4/15/24 levaquin 7 days  - 4/22/24 doxycycline 7 days  CXR findings of multifocal disease, continue with zosyn    Initial management with fluid resuscitation, patient Estimated Creatinine Clearance: 29.5 mL/min (based on SCr of 1.3 mg/dL). Improving.   Cr baseline 1, on arrival 3.4  - f/u urine culture, blood cultures x2 and adjust antibiotics accordingly  - monitor renal function  - avoid nephrotoxic medications  - strict I/Os  - continue IVF /hr

## 2024-05-23 NOTE — NURSING
Patient arrived to 205 via stretcher with a venti mask on. Unable to obtain 02 sat at present, patient is thrashing in bed and unable to understand direction. She is not moving a lot of air on auscultation. Dr Valladares was called, left message to call me back.

## 2024-05-23 NOTE — ED NOTES
Notified Dr Valladares of patients blood pressure. MD stated to give 1 liter bolus of NS and then continue at 150 ml/hour.

## 2024-05-23 NOTE — ED PROVIDER NOTES
"Encounter Date: 5/22/2024       History     Chief Complaint   Patient presents with    Diarrhea     Pt to the ER w/ complaints of diarrhea, decreased appetite, "feels dehydrated." Pt denies any recent illness, denies any weakness, dizziness, chest pain, or SOB during triage. "Out of it and lethargic" per family.      69 year old female presents to the emergency department for evaluation due to diarrhea and dehydration.  Upon ED arrival, the patient was noted to be hypotensive.  Patient also presents with lethargy/altered mental status.  No pain.  No associated symptoms.        Review of patient's allergies indicates:  No Known Allergies  Past Medical History:   Diagnosis Date    Abdominal pain 05/2024    Acid reflux 05/2024    Acid reflux     Anxiety disorder, unspecified     At high risk for falls     Emphysema, unspecified     History of opioid abuse     Hypertension     On home oxygen therapy     2l/nc    Wears partial dentures     upper-none on lower    Weight loss 05/2024     Past Surgical History:   Procedure Laterality Date    ANKLE SURGERY Left     HYSTERECTOMY      TONSILLECTOMY       Family History   Problem Relation Name Age of Onset    No Known Problems Mother          @90    Diabetes Father      Pancreatic cancer Father      Stroke Father      No Known Problems Brother      No Known Problems Brother      No Known Problems Brother      Parkinsonism Sister       Social History     Tobacco Use    Smoking status: Former     Current packs/day: 1.00     Types: Cigarettes    Smokeless tobacco: Never    Tobacco comments:     Quit 2 years ago   Substance Use Topics    Alcohol use: No    Drug use: Not Currently     Types: Oxycodone, Hydrocodone     Review of Systems   Gastrointestinal:  Positive for diarrhea.   All other systems reviewed and are negative.      Physical Exam     Initial Vitals   BP Pulse Resp Temp SpO2   05/22/24 2217 05/22/24 2217 05/22/24 2218 05/22/24 2225 05/22/24 2224   (!) 85/47 90 18 97.7 " °F (36.5 °C) (!) 94 %      MAP       --                Physical Exam    Nursing note and vitals reviewed.  Constitutional: She appears well-developed and well-nourished. She appears lethargic.   HENT:   Head: Normocephalic and atraumatic.   Eyes: EOM are normal. Pupils are equal, round, and reactive to light.   Neck: Neck supple.   Normal range of motion.  Cardiovascular:  Normal rate, regular rhythm and normal heart sounds.     Exam reveals no gallop and no friction rub.       No murmur heard.  Pulmonary/Chest: Effort normal and breath sounds normal.   Abdominal: Abdomen is soft. Bowel sounds are normal. She exhibits no distension. There is no abdominal tenderness.   Musculoskeletal:         General: Normal range of motion.      Cervical back: Normal range of motion and neck supple.     Neurological: She appears lethargic. She is disoriented. GCS score is 15. GCS eye subscore is 4. GCS verbal subscore is 5. GCS motor subscore is 6.   The patient is lethargic and oriented to person and place.   Skin: Skin is warm and dry. No rash noted.   Psychiatric: She has a normal mood and affect.         ED Course   Procedures  Labs Reviewed   CBC W/ AUTO DIFFERENTIAL - Abnormal; Notable for the following components:       Result Value    MCV 79 (*)     MCH 22.9 (*)     MCHC 29.1 (*)     RDW 18.1 (*)     Lymph % 14.1 (*)     All other components within normal limits   COMPREHENSIVE METABOLIC PANEL - Abnormal; Notable for the following components:    Sodium 131 (*)     Potassium 6.0 (*)     BUN 42 (*)     Creatinine 3.4 (*)     Albumin 2.7 (*)     AST 91 (*)     ALT 46 (*)     eGFR 13.9 (*)     All other components within normal limits    Narrative:     Recoll. [id number] by [facility] at 05/22/2024 22:47, reason: Specimen   hemolyzed   URINALYSIS, REFLEX TO URINE CULTURE - Abnormal; Notable for the following components:    Appearance, UA Cloudy (*)     Protein, UA 1+ (*)     Ketones, UA Trace (*)     Bilirubin (UA) 2+ (*)      Leukocytes, UA 1+ (*)     All other components within normal limits    Narrative:     Preferred Collection Type->Urine, Clean Catch  Specimen Source->Urine   DRUG SCREEN PANEL, URINE EMERGENCY - Abnormal; Notable for the following components:    Benzodiazepines Presumptive Positive (*)     Amphetamine Screen, Ur Presumptive Positive (*)     All other components within normal limits    Narrative:     Preferred Collection Type->Urine, Clean Catch  Specimen Source->Urine   URINALYSIS MICROSCOPIC - Abnormal; Notable for the following components:    RBC, UA 8 (*)     WBC, UA 16 (*)     Hyaline Casts, UA 4.4 (*)     All other components within normal limits    Narrative:     Preferred Collection Type->Urine, Clean Catch  Specimen Source->Urine   BASIC METABOLIC PANEL - Abnormal; Notable for the following components:    Sodium 132 (*)     Potassium 5.5 (*)     CO2 30 (*)     BUN 45 (*)     Creatinine 3.2 (*)     eGFR 14.9 (*)     All other components within normal limits   POCT GLUCOSE - Abnormal; Notable for the following components:    POCT Glucose 116 (*)     All other components within normal limits   CULTURE, URINE   LIPASE    Narrative:     Recoll. [id number] by [facility] at 05/22/2024 22:47, reason: Specimen   hemolyzed   BASIC METABOLIC PANEL   POCT INFLUENZA A/B MOLECULAR   SARS-COV-2 RDRP GENE   POCT GLUCOSE   POCT GLUCOSE MONITORING CONTINUOUS     EKG Readings: (Independently Interpreted)   Initial Reading: No STEMI. Rhythm: Normal Sinus Rhythm. Heart Rate: 72. Ectopy: No Ectopy. ST Segments: Normal ST Segments. T Waves Flipped: V1, V2, V3, V4 and V5. Clinical Impression: Normal Sinus Rhythm       Imaging Results              CT Head Without Contrast (In process)                   X-Rays:   Independently Interpreted Readings:   Head CT: No acute intracranial abnormality.     Medications   calcium gluconate 1 g in NS IVPB (premixed) (0 g Intravenous Stopped 5/23/24 0114)     And   calcium gluconate 1 g in NS IVPB  (premixed) (has no administration in time range)   dextrose 10 % infusion ( Intravenous Stopped 5/23/24 0213)     And   dextrose 10 % infusion (has no administration in time range)     And   insulin regular injection 4.94 Units 0.0494 mL (4.94 Units Intravenous Given 5/23/24 0136)   sodium bicarbonate 150 mEq in dextrose 5 % (D5W) 1,000 mL infusion ( Intravenous New Bag 5/23/24 0212)   cefTRIAXone (Rocephin) 1 g in dextrose 5 % in water (D5W) 100 mL IVPB (MB+) (0 g Intravenous Stopped 5/23/24 0407)   0.9%  NaCl infusion ( Intravenous New Bag 5/23/24 0335)   sodium chloride 0.9% bolus 1,000 mL 1,000 mL (0 mLs Intravenous Stopped 5/23/24 0138)   diphenoxylate-atropine 2.5-0.025 mg per tablet 2 tablet (2 tablets Oral Given 5/22/24 2225)   furosemide injection 20 mg (20 mg Intravenous Given 5/23/24 0118)   sodium zirconium cyclosilicate packet 10 g (10 g Oral Given 5/23/24 0219)     Medical Decision Making  Discussed with Dr Valladares. Accepts observation admission.     Problems Addressed:  Acute renal insufficiency: acute illness or injury with systemic symptoms  Diarrhea, unspecified type: acute illness or injury with systemic symptoms    Amount and/or Complexity of Data Reviewed  Labs: ordered. Decision-making details documented in ED Course.  Radiology: ordered.    Risk  OTC drugs.  Prescription drug management.  Decision regarding hospitalization.                                      Clinical Impression:  Final diagnoses:  [I95.9] Hypotension  [R19.7] Diarrhea, unspecified type (Primary)  [E87.5] Hyperkalemia  [N28.9] Acute renal insufficiency  [N30.00] Acute cystitis without hematuria          ED Disposition Condition    Observation Stable                Jonathon Yung MD  05/23/24 2698

## 2024-05-23 NOTE — H&P
"  [facility]  History & Physical    Patient Name: June Boykin  MRN: [id number]  Patient Class: IP- Inpatient  Admission Date: 5/22/2024  Attending Physician: Thierno Valladares DO   Primary Care Provider: Mauricio Pierre MD         Patient information was obtained from relative(s) and ER records.     Subjective:     Principal Problem:<principal problem not specified>    Chief Complaint:   Chief Complaint   Patient presents with    Diarrhea     Pt to the ER w/ complaints of diarrhea, decreased appetite, "feels dehydrated." Pt denies any recent illness, denies any weakness, dizziness, chest pain, or SOB during triage. "Out of it and lethargic" per family.         HPI: Chief Complaint    Complaint Comment   Diarrhea Pt to the ER w/ complaints of diarrhea, decreased appetite, "feels dehydrated." Pt denies any recent illness, denies any weakness, dizziness, chest pain, or SOB during triage. "Out of it and lethargic" per family.     69 year old with hypertension, anxiety, emphysema (on home O2 3L via nasal cannula), history of methamphetamine use presented to the emergency department overnight with complaints of diarrhea, dehydration and frequent falls in the past two days.   Patient on exam is very sleepy, responds with a grown and a word of two with voice and painful stimulus, unable to answer questions.   Vital signs at time of exam, BP 85/53 mmHg, RR 30, HR 69, SpO2 100% on continuous O2 4L via nasal cannula with sodium bicarb gtt at 150/hr.     ED course:  Vital signs on arrival BP 85/47 mmHg, HR 90, RR 18, SpO2 94% on room air, temp 97.7F.   WBC 7.94, Hg 12.3, HCT 42.3,   Na 131, K 6, Cl 95, CO2 28, Glu 88, BUN 42, Cr 3.4 (baseline 1), [place] 9.7, Alb 2.7, Tbili 0.9, Alk phos 56, AST 91, ALT 46, lipase 26, Anion Gap 8.  UDS positive for benzos and amphetamines.   Urinalysis microhematuria, WBC 16, no bacteria, no nitrite.  Received rocephin 1g.  Patient received " bolus NS 1L x1, lomotil x1.   Calcium gluc x1, D10 IVF 50g x 1, insulin 5U, Lokelma 10 mg.   Placed on sodium bicarb gtt 150mEq x1.  Repeat potassium levels 4.4 <5.5 with improving renal function.     Patient acutely became agitated, pulling on telemetry pulse oximetry readings in 80s and palced on high flow nasal cannula.   ABG findings pH 7.2/94.1/77.8/29.9 92% on FiO2 40% on venturi mask.  Patient admitted to ICU for acute respiratory failure on BiPAP and acute encephalopathy secondary to metabolic, infectious and toxic (polypharmacy) sources.     Medications reviewed from pill boxes provided by daughter and niece contain  Gabapentin 800 mg tablets, Simethicone 125 mg capsules, omeprazole 20 mg capsules, cetirizine 10 mg tablet, docusate 100 mg capsules, aspirin 81 mg tablets, trazodone 100 mg tablet, advil PM tablets, vitamin B2 100 mg tablets, pravastatin 20 mg tablets, and lisinopril/hctz 20/25 mg tablet.    Past Medical History:   Diagnosis Date    Abdominal pain 05/2024    Acid reflux 05/2024    Acid reflux     Anxiety disorder, unspecified     At high risk for falls     Emphysema, unspecified     History of opioid abuse     Hypertension     On home oxygen therapy     2l/nc    Wears partial dentures     upper-none on lower    Weight loss 05/2024       Past Surgical History:   Procedure Laterality Date    ANKLE SURGERY Left     HYSTERECTOMY      TONSILLECTOMY         Review of patient's allergies indicates:  No Known Allergies    No current facility-administered medications on file prior to encounter.     Current Outpatient Medications on File Prior to Encounter   Medication Sig    albuterol (PROAIR HFA) 90 mcg/actuation inhaler ProAir HFA    albuterol (PROVENTIL/VENTOLIN HFA) 90 mcg/actuation inhaler Inhale into the lungs.    aspirin 81 MG Chew Take 81 mg by mouth once daily. Stop 05/20/2024 per [facility] office    budesonide-formoterol 160-4.5 mcg (SYMBICORT) 160-4.5 mcg/actuation HFAA 2 puffs  Inhalation Twice a day    cetirizine (ZYRTEC) 5 MG tablet Take 5 mg by mouth once daily.    diazePAM (VALIUM) 10 MG Tab Take 10 mg by mouth every 6 (six) hours as needed.    docusate sodium (COLACE) 100 MG capsule Take 100 mg by mouth 2 (two) times daily.    fluticasone propionate (FLONASE) 50 mcg/actuation nasal spray     gabapentin (NEURONTIN) 100 MG capsule Take 1 capsule (100 mg total) by mouth 2 (two) times daily. (Patient taking differently: Take 800 mg by mouth 2 (two) times daily.)    ibuprofen (ADVIL,MOTRIN) 200 MG tablet Take 200 mg by mouth every 6 (six) hours as needed for Pain.    lisinopriL-hydrochlorothiazide (PRINZIDE,ZESTORETIC) 20-25 mg Tab     melatonin 3 mg Cap Take by mouth.    omeprazole (PRILOSEC) 20 MG capsule     pantoprazole (PROTONIX) 40 MG tablet     potassium chloride (MICRO-K) 10 MEQ CpSR Take 10 mEq by mouth once.    pravastatin (PRAVACHOL) 20 MG tablet     traZODone (DESYREL) 100 MG tablet Take 100 mg by mouth nightly as needed.    azithromycin (Z-DEIRDRE) 250 MG tablet Take 2 tablets on day 1, then take 1 tablet on days 2-5    cyclobenzaprine (FLEXERIL) 5 MG tablet Take 1 tablet (5 mg total) by mouth nightly as needed.    doxycycline (VIBRA-TABS) 100 MG tablet Take 1 tablet (100 mg total) by mouth 2 (two) times daily.    FLUoxetine 10 MG capsule Take 1 capsule (10 mg total) by mouth once daily.    lubiprostone (AMITIZA) 8 MCG Cap     [DISCONTINUED] amoxicillin (AMOXIL) 875 MG tablet Take 1 tablet (875 mg total) by mouth every 12 (twelve) hours.     Family History       Problem Relation (Age of Onset)    Diabetes Father    No Known Problems Mother, Brother, Brother, Brother    Pancreatic cancer Father    Parkinsonism Sister    Stroke Father          Tobacco Use    Smoking status: Former     Current packs/day: 1.00     Types: Cigarettes    Smokeless tobacco: Never    Tobacco comments:     Quit 2 years ago   Substance and Sexual Activity    Alcohol use: No    Drug use: Not Currently      "Types: Oxycodone, Hydrocodone    Sexual activity: Not on file     Comment:      Review of Systems   Unable to perform ROS: Acuity of condition     Objective:     Vital Signs (Most Recent):  Temp: 97.2 °F (36.2 °C) (05/23/24 1630)  Pulse: 75 (05/23/24 1630)  Resp: 15 (05/23/24 1630)  BP: (!) 96/52 (05/23/24 1630)  SpO2: 98 % (05/23/24 1630) Vital Signs (24h Range):  Temp:  [97.2 °F (36.2 °C)-97.7 °F (36.5 °C)] 97.2 °F (36.2 °C)  Pulse:  [] 75  Resp:  [15-41] 15  SpO2:  [65 %-100 %] 98 %  BP: ()/(47-70) 96/52     Weight: 49.4 kg (109 lb)  Body mass index is 22.78 kg/m².     Physical Exam  Constitutional:       General: She is not in acute distress.     Appearance: She is ill-appearing.      Comments: Frail stature   HENT:      Mouth/Throat:      Mouth: Mucous membranes are dry.      Pharynx: Oropharynx is clear.   Cardiovascular:      Rate and Rhythm: Normal rate.      Pulses: Normal pulses.      Heart sounds: Normal heart sounds. No murmur heard.  Pulmonary:      Effort: Bradypnea present. No retractions.      Breath sounds: Decreased air movement present.      Comments: BiPAP  Abdominal:      General: Bowel sounds are normal. There is no distension.      Palpations: Abdomen is soft.      Tenderness: There is no abdominal tenderness. There is no guarding.      Comments: Passing flatus, no stool   Genitourinary:     Comments: Cleveland catheter in place draining yellow colored urine  Musculoskeletal:      Cervical back: Normal range of motion and neck supple. No rigidity or tenderness.      Right lower leg: No edema.      Left lower leg: No edema.   Lymphadenopathy:      Cervical: No cervical adenopathy.   Skin:     General: Skin is warm and dry.      Capillary Refill: Capillary refill takes less than 2 seconds.      Findings: No erythema or rash.               Significant Labs: All pertinent labs within the past 24 hours have been reviewed.  A1C: No results for input(s): "HGBA1C" in the last 4320 " hours.  ABGs:   Recent Labs   Lab 05/23/24  1106 05/23/24  1340 05/23/24  1605   PH 7.203* 7.259* 7.286*   PCO2 94.1* 81.2* 77.4*   HCO3 29.9* 30.3* 31.8*   POCSATURATED 92.0* 82.5* 98.1   PO2 77.8* 54.4* 107*     Bilirubin:   Recent Labs   Lab 05/21/24  0953 05/22/24 2256   BILITOT 0.4 0.9     BMP:   Recent Labs   Lab 05/23/24  1227   GLU 86   *   K 4.2   CL 93*   CO2 38*   BUN 39*   CREATININE 1.9*   CALCIUM 8.4*     CBC:   Recent Labs   Lab 05/22/24 2223   WBC 7.94   HGB 12.3   HCT 42.3        CMP:   Recent Labs   Lab 05/22/24  2256 05/23/24  0148 05/23/24  0638 05/23/24  1227   * 132* 131* 133*   K 6.0* 5.5* 4.4 4.2   CL 95 96 93* 93*   CO2 28 30* 39* 38*   GLU 88 88 104 86   BUN 42* 45* 41* 39*   CREATININE 3.4* 3.2* 2.3* 1.9*   CALCIUM 8.7 8.7 8.2* 8.4*   PROT 7.2  --   --   --    ALBUMIN 2.7*  --   --   --    BILITOT 0.9  --   --   --    ALKPHOS 56  --   --   --    AST 91*  --   --   --    ALT 46*  --   --   --    ANIONGAP 8 6 -1* 2*     Lactic Acid:   Recent Labs   Lab 05/23/24  0747   LACTATE 0.7     Lipase:   Recent Labs   Lab 05/22/24 2256   LIPASE 26     POCT Glucose:   Recent Labs   Lab 05/23/24  0048 05/23/24  0416   POCTGLUCOSE 88 116*     Urine Studies:   Recent Labs   Lab 05/23/24  0116   COLORU Yellow   APPEARANCEUA Cloudy*   PHUR 5.0   SPECGRAV 1.020   PROTEINUA 1+*   GLUCUA Negative   KETONESU Trace*   BILIRUBINUA 2+*   OCCULTUA Negative   NITRITE Negative   UROBILINOGEN 1.0   LEUKOCYTESUR 1+*   RBCUA 8*   WBCUA 16*   BACTERIA Negative   SQUAMEPITHEL 1   HYALINECASTS 4.4*      X-Ray Chest 1 View  Narrative: EXAMINATION:  XR CHEST 1 VIEW    CLINICAL HISTORY:  hypoxia;    COMPARISON:  Portable chest 02/01/2024.    FINDINGS:  Frontal chest radiograph demonstrates mild heterogeneous opacities bilateral lower lung zones including the left upper lung zone.  No pleural fluid.  Mild enlargement of cardiac silhouette.  No osseous abnormality.  Impression: Evidence of edema or  multifocal pneumonia.    Electronically signed by: Keith Schuler MD  Date:    05/23/2024  Time:    14:59    CT Head Without Contrast  Narrative: EXAMINATION:  CT HEAD WITHOUT CONTRAST    CLINICAL HISTORY:  altered mental status;    TECHNIQUE:  Axial CT images were obtained. Iterative reconstruction technique was used.  CT/cardiac nuclear exam/s in prior 12 months: 0.    COMPARISON:  None.    FINDINGS:  There is no acute intracranial hemorrhage.  There is no mass or mass effect.  No ventricular dilatation or abnormal extra-axial fluid collection.  There is no osseous abnormality.  Impression: No acute intracranial hemorrhage, mass or other brain abnormality.    A preliminary report was faxed by [facility] shortly after completion of this examination.    Electronically signed by: Keith Schuler MD  Date:    05/23/2024  Time:    10:11     Significant Imaging: I have reviewed all pertinent imaging results/findings within the past 24 hours.  Assessment/Plan:     Multifocal pneumonia  Patient has a diagnosis of pneumonia. The cause of the pneumonia is unknown at this time. The pneumonia is worsening due to required need for oxygen support, baseline O2 continuous 3L via nasal cannula.  .   The patient has the following signs/symptoms of pneumonia: persistent hypoxia . The patient does have a current oxygen requirement and the patient does have a home oxygen requirement. I have reviewed the pertinent imaging. The following cultures have been collected: Blood cultures The culture results are listed below.     Current antimicrobial regimen consists of the antibiotics listed below. Will monitor patient closely and Adjust treatment plan as follows zosyn, breathing treatments,BiPAP adjusted per respiratory     COVID19 and influenza negative.   Antibiotics (From admission, onward)      Start     Stop Route Frequency Ordered    05/23/24 2100  mupirocin 2 % ointment         05/28/24 2059 Nasl 2 times daily 05/23/24 1410     05/23/24 1615  piperacillin-tazobactam (ZOSYN) 4.5 g in dextrose 5 % in water (D5W) 100 mL IVPB (MB+)         -- IV Every 8 hours (non-standard times) 05/23/24 1511            Microbiology Results (last 7 days)       Procedure Component Value Units Date/Time    Blood culture [[id number]] Collected: 05/23/24 0752    Order Status: Sent Specimen: Blood Updated: 05/23/24 1617    Blood culture [[id number]] Collected: 05/23/24 0747    Order Status: Sent Specimen: Blood from Peripheral, Hand, Right Updated: 05/23/24 1617    Urine culture [[id number]] Collected: 05/23/24 0116    Order Status: No result Specimen: Urine Updated: 05/23/24 0129            Encephalopathy, toxic  Secondary to polypharmacy. UDS positive for amphetamine and benzodiazepine.   Follow management above.       Encephalopathy, metabolic  Multifactorial with acute renal faliure, hyponatremia, hyperkalemia.   Initial potassium 6, now within normal range.   Continue IVF resuscitation and trend chemistry.        Polypharmacy  UDS presumptive positive for amphetamine and benzodiazepine.   Patient has RX for valium 10 mg (last filled 5/14/24 30 tablets with current bottle with 10 tablets remaining).   Patient daily intake 10-30 mg by count above.   Trazodone which is a medication patient currently taking may falsely result in positive UDS screen.  During last hospitalization in February 2024, patient reported to be doing methamphetamine and also, during ER stay and while still communicating with staff, she reported taking street provided adderall of unknown dose.   Combination benzodiazepine, gabapentin, trazodone, and stimulants may be source of respiratory depression.       Sepsis with acute renal failure and tubular necrosis without septic shock  This patient does have evidence of infective focus  My overall impression is sepsis.  Source: Respiratory  Antibiotics given-   Antibiotics (72h ago, onward)      Start     Stop Route Frequency Ordered     05/23/24 2100  mupirocin 2 % ointment         05/28/24 2059 Nasl 2 times daily 05/23/24 1410    05/23/24 1615  piperacillin-tazobactam (ZOSYN) 4.5 g in dextrose 5 % in water (D5W) 100 mL IVPB (MB+)         -- IV Every 8 hours (non-standard times) 05/23/24 1511          Latest lactate reviewed-  Recent Labs   Lab 05/23/24  0747   LACTATE 0.7     Organ dysfunction indicated by Acute kidney injury, Acute respiratory failure, and Encephalopathy    Fluid challenge Ideal Body Weight- The patient's ideal body weight is Ideal body weight: 47.1 kg (103 lb 14.5 oz) which will be used to calculate fluid bolus of 30 ml/kg for treatment of septic shock.      Post- resuscitation assessment Yes Perfusion exam was performed within 6 hours of septic shock presentation after bolus shows Adequate tissue perfusion assessed by non-invasive monitoring     Will Not start Pressors- Levophed for MAP of 65  Source control achieved by: rocephin x1, followed by zosyn     Review of medical chart with bacteremia with Ecoli growth in February 2024. Patient during last hospitalization left AMA. No record of continued antibiotics therapy.   Per pharmacy review; per Dr. Pierre, patient has been prescribed the following antibiotics.   - 4/9/24 macrobid 5 days  - 4/15/24 levaquin 7 days  - 4/22/24 doxycycline 7 days  CXR findings of multifocal disease, continue with zosyn    Initial management with fluid resuscitation, patient Estimated Creatinine Clearance: 29.5 mL/min (based on SCr of 1.3 mg/dL). Improving.   Cr baseline 1, on arrival 3.4  - f/u urine culture, blood cultures x2 and adjust antibiotics accordingly  - monitor renal function  - avoid nephrotoxic medications  - strict I/Os  - continue IVF /hr          Acute hypercapnic respiratory failure  Patient with Hypercapnic and Hypoxic Respiratory failure which is Acute.  she is on home oxygen at 3 LPM. Supplemental oxygen was provided and noted- Oxygen Concentration (%):  [30-50]  50    Signs/symptoms of respiratory failure include- respiratory distress and lethargy. Contributing diagnoses includes - COPD, Pleural effusion, Pneumonia, and polysubstance use with respiratory depression.  Labs and images were reviewed. Patient Has recent ABG, which has been reviewed. Will treat underlying causes and adjust management of respiratory failure as follows- continuous O2, scheduled breathing treatments, IV antibiotics.     Hypercarbia improving with BiPAP.   - continuous O2 per respiratory  - maintain SpO2 >92%    Hypotension due to hypovolemia  Latest blood pressure and vitals reviewed-   HOLD all home antihypertensive medications until blood pressure improves.   Patient received IVF NS 2L followed by continuous IVF.   - continuous telemetry  - continuous pulse oximetry    Temp:  [97.2 °F (36.2 °C)-97.7 °F (36.5 °C)]   Pulse:  []   Resp:  [15-41]   BP: ()/(47-70)   SpO2:  [65 %-100 %] .   Home meds for hypertension were reviewed and noted below.   Hypertension Medications               lisinopriL-hydrochlorothiazide (PRINZIDE,ZESTORETIC) 20-25 mg Tab           While in the hospital, will manage blood pressure as follows.        VTE Risk Mitigation (From admission, onward)           Ordered     enoxaparin injection 30 mg  Daily         05/23/24 1037     IP VTE HIGH RISK PATIENT  Once         05/23/24 1037     Place sequential compression device  Until discontinued         05/23/24 0706                                    Thierno Valladares DO  Department of Hospital Medicine  [facility]

## 2024-05-23 NOTE — CARE UPDATE
05/23/24 1035   Patient Assessment/Suction   Level of Consciousness (AVPU) responds to voice   Respiratory Effort Mouth breathing   Expansion/Accessory Muscles/Retractions no use of accessory muscles   Rhythm/Pattern, Respiratory shortness of breath   PRE-TX-O2   Device (Oxygen Therapy) venturi mask   $ Is the patient on Low Flow Oxygen? Yes   Flow (L/min) (Oxygen Therapy) 8   Oxygen Concentration (%) 40   SpO2 (!) 80 %   Pulse Oximetry Type Continuous   $ Pulse Oximetry - Multiple Charge Pulse Oximetry - Multiple   Pulse 103   Resp (!) 41   Education   $ Education 15 min   Respiratory Evaluation   $ Care Plan Tech Time 15 min

## 2024-05-23 NOTE — ED NOTES
ALEX Russell stated that Dr Valladares informed her to put patient on Bipap. ALEX Santiago notified during report.

## 2024-05-23 NOTE — CARE UPDATE
05/23/24 1403   PRE-TX-O2   Device (Oxygen Therapy) (S)  BIPAP   Oxygen Concentration (%) (S)  50   SpO2 (S)  100 %   Pulse (S)  67   Resp 16   BP (!) 91/55

## 2024-05-23 NOTE — ASSESSMENT & PLAN NOTE
Secondary to polypharmacy. UDS positive for amphetamine and benzodiazepine.   Follow management above.

## 2024-05-23 NOTE — ED NOTES
Attempted to call family members Alexandria and Caro listed on patient's facesheet. No answer at any number. Left voicemail.

## 2024-05-23 NOTE — ASSESSMENT & PLAN NOTE
Patient has a diagnosis of pneumonia. The cause of the pneumonia is unknown at this time. The pneumonia is worsening due to required need for oxygen support, baseline O2 continuous 3L via nasal cannula.  .   The patient has the following signs/symptoms of pneumonia: persistent hypoxia . The patient does have a current oxygen requirement and the patient does have a home oxygen requirement. I have reviewed the pertinent imaging. The following cultures have been collected: Blood cultures The culture results are listed below.     Current antimicrobial regimen consists of the antibiotics listed below. Will monitor patient closely and Adjust treatment plan as follows zosyn, breathing treatments,BiPAP adjusted per respiratory     COVID19 and influenza negative.   Antibiotics (From admission, onward)      Start     Stop Route Frequency Ordered    05/23/24 2100  mupirocin 2 % ointment         05/28/24 2059 Nasl 2 times daily 05/23/24 1410    05/23/24 1615  piperacillin-tazobactam (ZOSYN) 4.5 g in dextrose 5 % in water (D5W) 100 mL IVPB (MB+)         -- IV Every 8 hours (non-standard times) 05/23/24 1511            Microbiology Results (last 7 days)       Procedure Component Value Units Date/Time    Blood culture [[id number]] Collected: 05/23/24 0752    Order Status: Sent Specimen: Blood Updated: 05/23/24 1617    Blood culture [[id number]] Collected: 05/23/24 0747    Order Status: Sent Specimen: Blood from Peripheral, Hand, Right Updated: 05/23/24 1617    Urine culture [[id number]] Collected: 05/23/24 0116    Order Status: No result Specimen: Urine Updated: 05/23/24 0129

## 2024-05-23 NOTE — PLAN OF CARE
Problem: Adult Inpatient Plan of Care  Goal: Plan of Care Review  Outcome: Not Progressing  Goal: Patient-Specific Goal (Individualized)  Outcome: Not Progressing  Goal: Absence of Hospital-Acquired Illness or Injury  Outcome: Not Progressing  Goal: Optimal Comfort and Wellbeing  Outcome: Not Progressing  Goal: Readiness for Transition of Care  Outcome: Not Progressing     Problem: Infection  Goal: Absence of Infection Signs and Symptoms  Outcome: Not Progressing     Problem: Skin Injury Risk Increased  Goal: Skin Health and Integrity  Outcome: Not Progressing

## 2024-05-23 NOTE — ED NOTES
Patient sitting up and pulling off medical equipment. Family at bedside. Patient not speaking or answering questions appropriately at this time.

## 2024-05-24 LAB
ALBUMIN SERPL BCP-MCNC: 2.3 G/DL (ref 3.5–5.2)
ALP SERPL-CCNC: 46 U/L (ref 55–135)
ALT SERPL W/O P-5'-P-CCNC: 123 U/L (ref 10–44)
ANION GAP SERPL CALC-SCNC: 0 MMOL/L (ref 3–11)
AST SERPL-CCNC: 201 U/L (ref 10–40)
BACTERIA UR CULT: NO GROWTH
BASOPHILS # BLD AUTO: 0.06 K/UL (ref 0–0.2)
BASOPHILS NFR BLD: 0.9 % (ref 0–1.9)
BILIRUB SERPL-MCNC: 0.6 MG/DL (ref 0.1–1)
BIPAP: ABNORMAL
BIPAP: ABNORMAL
BUN SERPL-MCNC: 27 MG/DL (ref 8–23)
BUN SERPL-MCNC: 27 MG/DL (ref 8–23)
BUN SERPL-MCNC: 31 MG/DL (ref 8–23)
CALCIUM SERPL-MCNC: 8 MG/DL (ref 8.7–10.5)
CALCIUM SERPL-MCNC: 8.3 MG/DL (ref 8.7–10.5)
CALCIUM SERPL-MCNC: 8.3 MG/DL (ref 8.7–10.5)
CHLORIDE SERPL-SCNC: 100 MMOL/L (ref 95–110)
CHLORIDE SERPL-SCNC: 100 MMOL/L (ref 95–110)
CHLORIDE SERPL-SCNC: 99 MMOL/L (ref 95–110)
CO2 SERPL-SCNC: 35 MMOL/L (ref 23–29)
CO2 SERPL-SCNC: 35 MMOL/L (ref 23–29)
CO2 SERPL-SCNC: 38 MMOL/L (ref 23–29)
CREAT SERPL-MCNC: 0.8 MG/DL (ref 0.5–1.4)
CREAT SERPL-MCNC: 0.8 MG/DL (ref 0.5–1.4)
CREAT SERPL-MCNC: 0.9 MG/DL (ref 0.5–1.4)
DIFFERENTIAL METHOD BLD: ABNORMAL
EOSINOPHIL # BLD AUTO: 0.2 K/UL (ref 0–0.5)
EOSINOPHIL NFR BLD: 3.4 % (ref 0–8)
ERYTHROCYTE [DISTWIDTH] IN BLOOD BY AUTOMATED COUNT: 16.8 % (ref 11.5–14.5)
EST. GFR  (NO RACE VARIABLE): >60 ML/MIN/1.73 M^2
FIO2: 40 %
FIO2: 50 %
GLUCOSE SERPL-MCNC: 83 MG/DL (ref 70–110)
GLUCOSE SERPL-MCNC: 86 MG/DL (ref 70–110)
GLUCOSE SERPL-MCNC: 86 MG/DL (ref 70–110)
HAV IGM SERPL QL IA: ABNORMAL
HBV CORE IGM SERPL QL IA: ABNORMAL
HBV SURFACE AG SERPL QL IA: ABNORMAL
HCT VFR BLD AUTO: 36.6 % (ref 37–48.5)
HCV AB SERPL QL IA: REACTIVE
HGB BLD-MCNC: 10.6 G/DL (ref 12–16)
IMM GRANULOCYTES # BLD AUTO: 0.08 K/UL (ref 0–0.04)
IMM GRANULOCYTES NFR BLD AUTO: 1.1 % (ref 0–0.5)
LYMPHOCYTES # BLD AUTO: 1.6 K/UL (ref 1–4.8)
LYMPHOCYTES NFR BLD: 22.8 % (ref 18–48)
Lab: ABNORMAL
Lab: ABNORMAL
MCH RBC QN AUTO: 23 PG (ref 27–31)
MCHC RBC AUTO-ENTMCNC: 29 G/DL (ref 32–36)
MCV RBC AUTO: 80 FL (ref 82–98)
MODIFIED ALLEN'S TEST: ABNORMAL
MODIFIED ALLEN'S TEST: ABNORMAL
MONOCYTES # BLD AUTO: 1.3 K/UL (ref 0.3–1)
MONOCYTES NFR BLD: 19.1 % (ref 4–15)
NEUTROPHILS # BLD AUTO: 3.7 K/UL (ref 1.8–7.7)
NEUTROPHILS NFR BLD: 52.7 % (ref 38–73)
NOTIFIED BY: ABNORMAL
NOTIFIED BY: ABNORMAL
NRBC BLD-RTO: 0 /100 WBC
O2DEVICE: ABNORMAL
O2DEVICE: ABNORMAL
PCO2 BLDA: 66.9 MMHG (ref 35–45)
PCO2 BLDA: 77.2 MMHG (ref 35–45)
PH SMN: 7.28 [PH] (ref 7.34–7.45)
PH SMN: 7.36 [PH] (ref 7.34–7.45)
PLATELET # BLD AUTO: 212 K/UL (ref 150–450)
PMV BLD AUTO: 11.2 FL (ref 9.2–12.9)
PO2 BLDA: 93.6 MMHG (ref 80–100)
PO2 BLDA: 99.1 MMHG (ref 80–100)
POC BASE DEFICIT: 11.9 MMOL/L (ref -2–2)
POC BASE DEFICIT: 9.6 MMOL/L (ref -2–2)
POC HCO3: 31.3 MMOL/L (ref 24–28)
POC HCO3: 33.6 MMOL/L (ref 24–28)
POC NOTIFIED NOTE: ABNORMAL
POC NOTIFIED NOTE: ABNORMAL
POC PERFORMED BY: ABNORMAL
POC PERFORMED BY: ABNORMAL
POC SATURATED O2: 97.8 % (ref 95–100)
POC SATURATED O2: 98 % (ref 95–100)
POC TEMPERATURE: 37 C
POC TEMPERATURE: 37 C
POTASSIUM SERPL-SCNC: 4.1 MMOL/L (ref 3.5–5.1)
POTASSIUM SERPL-SCNC: 4.2 MMOL/L (ref 3.5–5.1)
POTASSIUM SERPL-SCNC: 4.2 MMOL/L (ref 3.5–5.1)
PROT SERPL-MCNC: 6.2 G/DL (ref 6–8.4)
PROVIDER NOTIFIED: ABNORMAL
PROVIDER NOTIFIED: ABNORMAL
RBC # BLD AUTO: 4.6 M/UL (ref 4–5.4)
SODIUM SERPL-SCNC: 135 MMOL/L (ref 136–145)
SODIUM SERPL-SCNC: 135 MMOL/L (ref 136–145)
SODIUM SERPL-SCNC: 137 MMOL/L (ref 136–145)
SPECIMEN SOURCE: ABNORMAL
SPECIMEN SOURCE: ABNORMAL
VT: 400
WBC # BLD AUTO: 6.98 K/UL (ref 3.9–12.7)

## 2024-05-24 PROCEDURE — 63600175 PHARM REV CODE 636 W HCPCS: Performed by: STUDENT IN AN ORGANIZED HEALTH CARE EDUCATION/TRAINING PROGRAM

## 2024-05-24 PROCEDURE — 80048 BASIC METABOLIC PNL TOTAL CA: CPT | Mod: XB | Performed by: STUDENT IN AN ORGANIZED HEALTH CARE EDUCATION/TRAINING PROGRAM

## 2024-05-24 PROCEDURE — 27100171 HC OXYGEN HIGH FLOW UP TO 24 HOURS

## 2024-05-24 PROCEDURE — 36415 COLL VENOUS BLD VENIPUNCTURE: CPT | Mod: XB | Performed by: STUDENT IN AN ORGANIZED HEALTH CARE EDUCATION/TRAINING PROGRAM

## 2024-05-24 PROCEDURE — 27000221 HC OXYGEN, UP TO 24 HOURS

## 2024-05-24 PROCEDURE — 36600 WITHDRAWAL OF ARTERIAL BLOOD: CPT

## 2024-05-24 PROCEDURE — 20000000 HC ICU ROOM

## 2024-05-24 PROCEDURE — 25000003 PHARM REV CODE 250: Performed by: EMERGENCY MEDICINE

## 2024-05-24 PROCEDURE — 99233 SBSQ HOSP IP/OBS HIGH 50: CPT | Mod: ,,, | Performed by: STUDENT IN AN ORGANIZED HEALTH CARE EDUCATION/TRAINING PROGRAM

## 2024-05-24 PROCEDURE — 25000242 PHARM REV CODE 250 ALT 637 W/ HCPCS: Performed by: STUDENT IN AN ORGANIZED HEALTH CARE EDUCATION/TRAINING PROGRAM

## 2024-05-24 PROCEDURE — 94761 N-INVAS EAR/PLS OXIMETRY MLT: CPT | Mod: XB

## 2024-05-24 PROCEDURE — 99900035 HC TECH TIME PER 15 MIN (STAT)

## 2024-05-24 PROCEDURE — 80053 COMPREHEN METABOLIC PANEL: CPT | Performed by: EMERGENCY MEDICINE

## 2024-05-24 PROCEDURE — 85025 COMPLETE CBC W/AUTO DIFF WBC: CPT | Performed by: EMERGENCY MEDICINE

## 2024-05-24 PROCEDURE — 99900031 HC PATIENT EDUCATION (STAT)

## 2024-05-24 PROCEDURE — 94640 AIRWAY INHALATION TREATMENT: CPT

## 2024-05-24 PROCEDURE — 80074 ACUTE HEPATITIS PANEL: CPT | Performed by: STUDENT IN AN ORGANIZED HEALTH CARE EDUCATION/TRAINING PROGRAM

## 2024-05-24 PROCEDURE — 36415 COLL VENOUS BLD VENIPUNCTURE: CPT | Performed by: EMERGENCY MEDICINE

## 2024-05-24 PROCEDURE — A4216 STERILE WATER/SALINE, 10 ML: HCPCS | Performed by: EMERGENCY MEDICINE

## 2024-05-24 PROCEDURE — 82803 BLOOD GASES ANY COMBINATION: CPT

## 2024-05-24 PROCEDURE — 27000207 HC ISOLATION

## 2024-05-24 PROCEDURE — 25000003 PHARM REV CODE 250: Performed by: STUDENT IN AN ORGANIZED HEALTH CARE EDUCATION/TRAINING PROGRAM

## 2024-05-24 PROCEDURE — 94660 CPAP INITIATION&MGMT: CPT

## 2024-05-24 RX ORDER — DEXMEDETOMIDINE HYDROCHLORIDE 4 UG/ML
0-1.4 INJECTION, SOLUTION INTRAVENOUS CONTINUOUS
Status: DISCONTINUED | OUTPATIENT
Start: 2024-05-24 | End: 2024-05-27

## 2024-05-24 RX ORDER — ENOXAPARIN SODIUM 100 MG/ML
40 INJECTION SUBCUTANEOUS EVERY 24 HOURS
Status: DISCONTINUED | OUTPATIENT
Start: 2024-05-24 | End: 2024-05-27 | Stop reason: HOSPADM

## 2024-05-24 RX ORDER — IPRATROPIUM BROMIDE AND ALBUTEROL SULFATE 2.5; .5 MG/3ML; MG/3ML
3 SOLUTION RESPIRATORY (INHALATION)
Status: DISCONTINUED | OUTPATIENT
Start: 2024-05-24 | End: 2024-05-27 | Stop reason: HOSPADM

## 2024-05-24 RX ORDER — DEXMEDETOMIDINE HYDROCHLORIDE 4 UG/ML
0-1.4 INJECTION, SOLUTION INTRAVENOUS CONTINUOUS
Status: DISCONTINUED | OUTPATIENT
Start: 2024-05-24 | End: 2024-05-24

## 2024-05-24 RX ADMIN — IPRATROPIUM BROMIDE AND ALBUTEROL SULFATE 3 ML: 2.5; .5 SOLUTION RESPIRATORY (INHALATION) at 07:05

## 2024-05-24 RX ADMIN — FAMOTIDINE 20 MG: 10 INJECTION, SOLUTION INTRAVENOUS at 09:05

## 2024-05-24 RX ADMIN — PIPERACILLIN SODIUM AND TAZOBACTAM SODIUM 4.5 G: 4; .5 INJECTION, POWDER, LYOPHILIZED, FOR SOLUTION INTRAVENOUS at 08:05

## 2024-05-24 RX ADMIN — IPRATROPIUM BROMIDE AND ALBUTEROL SULFATE 3 ML: 2.5; .5 SOLUTION RESPIRATORY (INHALATION) at 03:05

## 2024-05-24 RX ADMIN — IPRATROPIUM BROMIDE AND ALBUTEROL SULFATE 3 ML: 2.5; .5 SOLUTION RESPIRATORY (INHALATION) at 12:05

## 2024-05-24 RX ADMIN — DEXMEDETOMIDINE HYDROCHLORIDE 0.2 MCG/KG/HR: 4 INJECTION INTRAVENOUS at 02:05

## 2024-05-24 RX ADMIN — Medication 10 ML: at 04:05

## 2024-05-24 RX ADMIN — FAMOTIDINE 20 MG: 10 INJECTION, SOLUTION INTRAVENOUS at 08:05

## 2024-05-24 RX ADMIN — BUDESONIDE INHALATION 0.5 MG: 0.5 SUSPENSION RESPIRATORY (INHALATION) at 07:05

## 2024-05-24 RX ADMIN — ENOXAPARIN SODIUM 40 MG: 40 INJECTION SUBCUTANEOUS at 05:05

## 2024-05-24 RX ADMIN — BUDESONIDE INHALATION 0.5 MG: 0.5 SUSPENSION RESPIRATORY (INHALATION) at 08:05

## 2024-05-24 RX ADMIN — LORAZEPAM 1 MG: 2 INJECTION INTRAMUSCULAR; INTRAVENOUS at 04:05

## 2024-05-24 RX ADMIN — PIPERACILLIN SODIUM AND TAZOBACTAM SODIUM 4.5 G: 4; .5 INJECTION, POWDER, LYOPHILIZED, FOR SOLUTION INTRAVENOUS at 05:05

## 2024-05-24 RX ADMIN — IPRATROPIUM BROMIDE AND ALBUTEROL SULFATE 3 ML: 2.5; .5 SOLUTION RESPIRATORY (INHALATION) at 11:05

## 2024-05-24 RX ADMIN — MUPIROCIN: 20 OINTMENT TOPICAL at 09:05

## 2024-05-24 RX ADMIN — IPRATROPIUM BROMIDE AND ALBUTEROL SULFATE 3 ML: 2.5; .5 SOLUTION RESPIRATORY (INHALATION) at 08:05

## 2024-05-24 RX ADMIN — Medication 10 ML: at 08:05

## 2024-05-24 RX ADMIN — MUPIROCIN: 20 OINTMENT TOPICAL at 08:05

## 2024-05-24 RX ADMIN — IPRATROPIUM BROMIDE AND ALBUTEROL SULFATE 3 ML: 2.5; .5 SOLUTION RESPIRATORY (INHALATION) at 04:05

## 2024-05-24 NOTE — PLAN OF CARE
Care plan reviewed and updated. Cont with Avaps. Restraints and Precedex in use. IVF dc'd. CDIFF pending. No BM today. Special contact precautions in place pending CDIFF results. [place] cath draining without difficulty. Multiple family members visited today.

## 2024-05-24 NOTE — HOSPITAL COURSE
"5/24/24 Patient intermittently waking up trying to pull on lines and mask, nursing staff and family at bedside reporting complaints of pain over facial mask and misidentifying family names and face. Required ativan 1mg x2, will continue with symptom directed use of anxiolytic and monitor withdrawal signs. Follow up psychiatry when patient alert and responsive to questions.   Overnight BiPAP with ABG 7.2/77.2/99/31 97.8% on FiO2 50%. PCO2 slowly improving from PCO2 94 chronic retention, on last hospitalization noted to leave AMA with PCO2 57. Will transition to AVAPS and repeat ABG PCO2 94. Continue with scheduled breathing treatments and zosyn for bilateral pneumonia. No leukocytosis. Follow up cultures.   MARY resolved. Estimated Creatinine Clearance: 48 mL/min (based on SCr of 0.8 mg/dL). Renal function back to baseline with IVF hydration. Monitor I/Os.     5/25: Patient remains BiPAP dependent but appears to be resting comfortably with current settings.  Arterial blood gases show continued improvement in pCO2 levels.  Blood cultures remain negative.    5/26:  Patient required BiPAP earlier this morning but did tolerate nasal cannula for a good while yesterday appears to be improving clinically but very slowly.    5/27 Patient alert and awake, slept well without residual effects on seroquel. Discussed concerns with interacting medications that cause respiratory depression with valium, trazodone, gabapentin with use of opioid based pain medications and stimulants (?use of street adderall use). Maintain SpO2 >96% on home oxygen continuous 3L via nasal cannula. Patient agreeable to follow up outpatient with respiratory specialist for follow up pneumonia and emphysema. Patient requests to be discharged as she is feeling "100% better." Will arrange for home health skilled nursing for medication management and have PT and OT follow up with evaluation.   "

## 2024-05-24 NOTE — EICU
Intervention Initiated From:  [facility] / EICU    Megha intervened regarding:  Rounding (Video assessment)    Nurse Notified:  Yes    Doctor Notified:  No    Comments: EICU rounding done. Chart and meds reviewed.  VS stable.

## 2024-05-24 NOTE — PLAN OF CARE
Spoke to patient's daughter and granddaughter along with RONALD Jenkins regarding concerns they have with patient's home and medications.  As per daughter, her brother's girlfriend gives patient her meds everyday.  When cleaning patient's home she found multiple pills around the house not prescribed to patient.  Her concern is patient taking medication not prescribed to her.  As per granddaughter, when she was sitting with patient patient asked her for fentanyl.  Informed family if patient is going back to her home home health with a  can be set up prior to discharge.  [facility] provided daughter with the phone number to EPS to report concerns if needed and [facility] for le[place] pack and delivery.

## 2024-05-24 NOTE — NURSING
Calm and cooperative.. Resting on side without adverse reaction. No attempts at pulling at medical devices. Daughter, Kelvin at side. Aware to call staff if start pulling. Voiced understanding.

## 2024-05-24 NOTE — ASSESSMENT & PLAN NOTE
Patient has a diagnosis of pneumonia. The cause of the pneumonia is unknown at this time. The pneumonia is worsening due to required need for oxygen support, baseline O2 continuous 3L via nasal cannula.  .   The patient has the following signs/symptoms of pneumonia: persistent hypoxia . The patient does have a current oxygen requirement and the patient does have a home oxygen requirement. I have reviewed the pertinent imaging. The following cultures have been collected: Blood cultures The culture results are listed below.     Current antimicrobial regimen consists of the antibiotics listed below. Will monitor patient closely and Adjust treatment plan as follows zosyn, breathing treatments,BiPAP adjusted per respiratory     COVID19 and influenza negative.   Antibiotics (From admission, onward)      Start     Stop Route Frequency Ordered    05/23/24 2100  mupirocin 2 % ointment         05/28/24 2059 Nasl 2 times daily 05/23/24 1410    05/23/24 1615  piperacillin-tazobactam (ZOSYN) 4.5 g in dextrose 5 % in water (D5W) 100 mL IVPB (MB+)         -- IV Every 8 hours (non-standard times) 05/23/24 1511            Microbiology Results (last 7 days)       Procedure Component Value Units Date/Time    Clostridium difficile EIA [[id number]]     Order Status: No result Specimen: Stool     Blood culture [[id number]] Collected: 05/23/24 0747    Order Status: Completed Specimen: Blood from Peripheral, Hand, Right Updated: 05/24/24 0115     Blood Culture, Routine No Growth to date    Blood culture [[id number]] Collected: 05/23/24 0752    Order Status: Completed Specimen: Blood Updated: 05/24/24 0115     Blood Culture, Routine No Growth to date    Urine culture [[id number]] Collected: 05/23/24 0116    Order Status: No result Specimen: Urine Updated: 05/23/24 0129        5/24/24 Continue zosyn

## 2024-05-24 NOTE — NURSING
Pulling at Bipap mask and lira cath. Unable to reorient at this time. Cont to pull. Educated on not pulling at medical devices. Daughter at side.

## 2024-05-24 NOTE — PRE-PROCEDURE INSTRUCTIONS
Reviewed patient history with Dr. Jolley-informed him that patient is currently in ICU. No cardiac note available at this time. Spoke with [facility] office-they will continue to evaluate the situation.

## 2024-05-24 NOTE — PROGRESS NOTES
"[facility] Medicine  Progress Note    Patient Name: June Boykin  MRN: [id number]  Patient Class: IP- Inpatient   Admission Date: 5/22/2024  Length of Stay: 1 days  Attending Physician: Thierno Valladares DO  Primary Care Provider: Mauricio Pierre MD    Subjective:     Principal Problem:Acute hypercapnic respiratory failure    HPI:  Chief Complaint    Complaint Comment   Diarrhea Pt to the ER w/ complaints of diarrhea, decreased appetite, "feels dehydrated." Pt denies any recent illness, denies any weakness, dizziness, chest pain, or SOB during triage. "Out of it and lethargic" per family.     69 year old with hypertension, anxiety, emphysema (on home O2 3L via nasal cannula), history of methamphetamine use presented to the emergency department overnight with complaints of diarrhea, dehydration and frequent falls in the past two days.   Patient on exam is very sleepy, responds with a grown and a word of two with voice and painful stimulus, unable to answer questions.   Vital signs at time of exam, BP 85/53 mmHg, RR 30, HR 69, SpO2 100% on continuous O2 4L via nasal cannula with sodium bicarb gtt at 150/hr.     ED course:  Vital signs on arrival BP 85/47 mmHg, HR 90, RR 18, SpO2 94% on room air, temp 97.7F.   WBC 7.94, Hg 12.3, HCT 42.3,   Na 131, K 6, Cl 95, CO2 28, Glu 88, BUN 42, Cr 3.4 (baseline 1), [place] 9.7, Alb 2.7, Tbili 0.9, Alk phos 56, AST 91, ALT 46, lipase 26, Anion Gap 8.  UDS positive for benzos and amphetamines.   Urinalysis microhematuria, WBC 16, no bacteria, no nitrite.  Received rocephin 1g.  Patient received bolus NS 1L x1, lomotil x1.   Calcium gluc x1, D10 IVF 50g x 1, insulin 5U, Lokelma 10 mg.   Placed on sodium bicarb gtt 150mEq x1.  Repeat potassium levels 4.4 <5.5 with improving renal function.     Patient acutely became agitated, pulling on telemetry pulse oximetry readings in 80s and palced on high flow nasal cannula.   ABG findings pH " 7.2/94.1/77.8/29.9 92% on FiO2 40% on venturi mask.  Patient admitted to ICU for acute respiratory failure on BiPAP and acute encephalopathy secondary to metabolic, infectious and toxic (polypharmacy) sources.     Medications reviewed from pill boxes provided by daughter and niece contain  Gabapentin 800 mg tablets, Simethicone 125 mg capsules, omeprazole 20 mg capsules, cetirizine 10 mg tablet, docusate 100 mg capsules, aspirin 81 mg tablets, trazodone 100 mg tablet, advil PM tablets, vitamin B2 100 mg tablets, pravastatin 20 mg tablets, and lisinopril/hctz 20/25 mg tablet.    Overview/Hospital Course:  5/24/24 Patient intermittently waking up trying to pull on lines and mask, nursing staff and family at bedside reporting complaints of pain over facial mask and misidentifying family names and face. Required ativan 1mg x2, will continue with symptom directed use of anxiolytic and monitor withdrawal signs. Follow up psychiatry when patient alert and responsive to questions.   Overnight BiPAP with ABG 7.2/77.2/99/31 97.8% on FiO2 50%. PCO2 slowly improving from PCO2 94 chronic retention, on last hospitalization noted to leave AMA with PCO2 57. Will transition to AVAPS and repeat ABG PCO2 94. Continue with scheduled breathing treatments and zosyn for bilateral pneumonia. No leukocytosis. Follow up cultures.   MARY resolved. Estimated Creatinine Clearance: 48 mL/min (based on SCr of 0.8 mg/dL). Renal function back to baseline with IVF hydration. Monitor I/Os.         Interval History: patient seen and examined.     Review of Systems   Unable to perform ROS: Acuity of condition     Objective:     Vital Signs (Most Recent):  Temp: 97.2 °F (36.2 °C) (05/24/24 0354)  Pulse: 80 (05/24/24 1145)  Resp: (!) 23 (05/24/24 1145)  BP: (!) 89/55 (05/24/24 0530)  SpO2: 98 % (05/24/24 1145) Vital Signs (24h Range):  Temp:  [97.2 °F (36.2 °C)-98.7 °F (37.1 °C)] 97.2 °F (36.2 °C)  Pulse:  [65-84] 80  Resp:  [15-35] 23  SpO2:  [96 %-100  %] 98 %  BP: ()/(41-71) 89/55     Weight: 49.4 kg (109 lb)  Body mass index is 22.78 kg/m².    Intake/Output Summary (Last 24 hours) at 5/24/2024 1312  Last data filed at 5/24/2024 0811  Gross per 24 hour   Intake 1993.76 ml   Output 1125 ml   Net 868.76 ml         Physical Exam  Constitutional:       General: She is not in acute distress.     Appearance: She is ill-appearing.      Comments: Frail stature   HENT:      Mouth/Throat:      Mouth: Mucous membranes are dry.      Pharynx: Oropharynx is clear.   Cardiovascular:      Rate and Rhythm: Normal rate.      Pulses: Normal pulses.      Heart sounds: Normal heart sounds. No murmur heard.  Pulmonary:      Effort: Bradypnea present. No retractions.      Breath sounds: Transmitted upper airway sounds present. Rhonchi (bilaterally) present.   Abdominal:      General: Bowel sounds are normal. There is no distension.      Palpations: Abdomen is soft.      Tenderness: There is no abdominal tenderness. There is no guarding.      Comments: No stool   Genitourinary:     Comments: Cleveland catheter in place draining yellow colored urine  Musculoskeletal:      Cervical back: Normal range of motion and neck supple. No rigidity or tenderness.      Right lower leg: No edema.      Left lower leg: No edema.   Lymphadenopathy:      Cervical: No cervical adenopathy.   Skin:     General: Skin is warm and dry.      Capillary Refill: Capillary refill takes less than 2 seconds.      Findings: No erythema or rash.   Neurological:      Motor: No weakness.      Comments: Moving upper and lower extremities with appropriate strength   Psychiatric:         Behavior: Behavior is uncooperative.             Significant Labs: All pertinent labs within the past 24 hours have been reviewed.  Bilirubin:   Recent Labs   Lab 05/21/24  0953 05/22/24  2256 05/24/24  0550   BILITOT 0.4 0.9 0.6     Blood Culture:   Recent Labs   Lab 05/23/24  0747 05/23/24  0752   LABBLOO No Growth to date No Growth to  date     BMP:   Recent Labs   Lab 05/24/24  0550   GLU 86  86   *  135*   K 4.2  4.2     100   CO2 35*  35*   BUN 27*  27*   CREATININE 0.8  0.8   CALCIUM 8.3*  8.3*     CBC:   Recent Labs   Lab 05/22/24  2223 05/24/24  0550   WBC 7.94 6.98   HGB 12.3 10.6*   HCT 42.3 36.6*    212     CMP:   Recent Labs   Lab 05/22/24  2256 05/23/24  0148 05/23/24  1810 05/24/24  0006 05/24/24  0550   *   < > 134* 137 135*  135*   K 6.0*   < > 4.4 4.1 4.2  4.2   CL 95   < > 98 99 100  100   CO2 28   < > 33* 38* 35*  35*   GLU 88   < > 86 83 86  86   BUN 42*   < > 36* 31* 27*  27*   CREATININE 3.4*   < > 1.3 0.9 0.8  0.8   CALCIUM 8.7   < > 8.2* 8.0* 8.3*  8.3*   PROT 7.2  --   --   --  6.2   ALBUMIN 2.7*  --   --   --  2.3*   BILITOT 0.9  --   --   --  0.6   ALKPHOS 56  --   --   --  46*   AST 91*  --   --   --  201*   ALT 46*  --   --   --  123*   ANIONGAP 8   < > 3 0* 0*  0*    < > = values in this interval not displayed.     Lipase:   Recent Labs   Lab 05/22/24 2256   LIPASE 26     POCT Glucose:   Recent Labs   Lab 05/23/24  0048 05/23/24  0416   POCTGLUCOSE 88 116*     Urine Studies:   Recent Labs   Lab 05/23/24  0116   COLORU Yellow   APPEARANCEUA Cloudy*   PHUR 5.0   SPECGRAV 1.020   PROTEINUA 1+*   GLUCUA Negative   KETONESU Trace*   BILIRUBINUA 2+*   OCCULTUA Negative   NITRITE Negative   UROBILINOGEN 1.0   LEUKOCYTESUR 1+*   RBCUA 8*   WBCUA 16*   BACTERIA Negative   SQUAMEPITHEL 1   HYALINECASTS 4.4*     X-Ray Chest 1 View  Narrative: EXAMINATION:  XR CHEST 1 VIEW    CLINICAL HISTORY:  hypoxia;    COMPARISON:  Portable chest 02/01/2024.    FINDINGS:  Frontal chest radiograph demonstrates mild heterogeneous opacities bilateral lower lung zones including the left upper lung zone.  No pleural fluid.  Mild enlargement of cardiac silhouette.  No osseous abnormality.  Impression: Evidence of edema or multifocal pneumonia.    Electronically signed by: Keith Schuler,  MD  Date:    05/23/2024  Time:    14:59    CT Head Without Contrast  Narrative: EXAMINATION:  CT HEAD WITHOUT CONTRAST    CLINICAL HISTORY:  altered mental status;    TECHNIQUE:  Axial CT images were obtained. Iterative reconstruction technique was used.  CT/cardiac nuclear exam/s in prior 12 months: 0.    COMPARISON:  None.    FINDINGS:  There is no acute intracranial hemorrhage.  There is no mass or mass effect.  No ventricular dilatation or abnormal extra-axial fluid collection.  There is no osseous abnormality.  Impression: No acute intracranial hemorrhage, mass or other brain abnormality.    A preliminary report was faxed by [facility] shortly after completion of this examination.    Electronically signed by: Keith Schuler MD  Date:    05/23/2024  Time:    10:11     Significant Imaging: I have reviewed all pertinent imaging results/findings within the past 24 hours.    Assessment/Plan:      * Acute hypercapnic respiratory failure  Patient with Hypercapnic and Hypoxic Respiratory failure which is Acute.  she is on home oxygen at 3 LPM. Supplemental oxygen was provided and noted- Oxygen Concentration (%):  [40-50] 40    Signs/symptoms of respiratory failure include- respiratory distress and lethargy. Contributing diagnoses includes - COPD, Pleural effusion, Pneumonia, and polysubstance use with respiratory depression.  Labs and images were reviewed. Patient Has recent ABG, which has been reviewed. Will treat underlying causes and adjust management of respiratory failure as follows- continuous O2, scheduled breathing treatments, IV antibiotics.     5/24/24 Hypercarbia with small improvement, will transition to AVAPS and continue to monitor response.   Mental status wax and wane, intermittently agitated trying to pull on lines and mask.   - continue scheduled neuro checks  - continuous O2 per respiratory  - maintain SpO2 >92%  - last ABG pH 7.28/pCO2 77.2/pO2 99/HCO3 31, O2 97.8% on FiO2 50%    Multifocal  pneumonia  Patient has a diagnosis of pneumonia. The cause of the pneumonia is unknown at this time. The pneumonia is worsening due to required need for oxygen support, baseline O2 continuous 3L via nasal cannula.  .   The patient has the following signs/symptoms of pneumonia: persistent hypoxia . The patient does have a current oxygen requirement and the patient does have a home oxygen requirement. I have reviewed the pertinent imaging. The following cultures have been collected: Blood cultures The culture results are listed below.     Current antimicrobial regimen consists of the antibiotics listed below. Will monitor patient closely and Adjust treatment plan as follows zosyn, breathing treatments,BiPAP adjusted per respiratory     COVID19 and influenza negative.   Antibiotics (From admission, onward)      Start     Stop Route Frequency Ordered    05/23/24 2100  mupirocin 2 % ointment         05/28/24 2059 Nasl 2 times daily 05/23/24 1410    05/23/24 1615  piperacillin-tazobactam (ZOSYN) 4.5 g in dextrose 5 % in water (D5W) 100 mL IVPB (MB+)         -- IV Every 8 hours (non-standard times) 05/23/24 1511            Microbiology Results (last 7 days)       Procedure Component Value Units Date/Time    Clostridium difficile EIA [[id number]]     Order Status: No result Specimen: Stool     Blood culture [[id number]] Collected: 05/23/24 0747    Order Status: Completed Specimen: Blood from Peripheral, Hand, Right Updated: 05/24/24 0115     Blood Culture, Routine No Growth to date    Blood culture [[id number]] Collected: 05/23/24 0752    Order Status: Completed Specimen: Blood Updated: 05/24/24 0115     Blood Culture, Routine No Growth to date    Urine culture [[id number]] Collected: 05/23/24 0116    Order Status: No result Specimen: Urine Updated: 05/23/24 0129        5/24/24 Continue zosyn    Encephalopathy, toxic  Secondary to polypharmacy. UDS positive for amphetamine and benzodiazepine.   Follow management above.        Encephalopathy, metabolic  Multifactorial with acute renal faliure, hyponatremia, hyperkalemia.   Initial potassium 6, now within normal range.   Stable chemistry labs.   LFTs elevated, likely reactive with infectious foci, continue to monitor.       Polypharmacy  UDS presumptive positive for amphetamine and benzodiazepine.   Patient has RX for valium 10 mg (last filled 5/14/24 30 tablets with current bottle with 10 tablets remaining).   Patient daily intake 10-30 mg by count above.   Trazodone which is a medication patient currently taking may falsely result in positive UDS screen.  During last hospitalization in February 2024, patient reported to be doing methamphetamine and also, during ER stay and while still communicating with staff, she reported taking street provided adderall of unknown dose.   Combination benzodiazepine, gabapentin, trazodone, and stimulants may be source of respiratory depression.       Sepsis with acute renal failure and tubular necrosis without septic shock  This patient does have evidence of infective focus  My overall impression is sepsis.  Source: Respiratory  Antibiotics given-   Antibiotics (72h ago, onward)      Start     Stop Route Frequency Ordered    05/23/24 2100  mupirocin 2 % ointment         05/28/24 2059 Nasl 2 times daily 05/23/24 1410    05/23/24 1615  piperacillin-tazobactam (ZOSYN) 4.5 g in dextrose 5 % in water (D5W) 100 mL IVPB (MB+)         -- IV Every 8 hours (non-standard times) 05/23/24 1511          Latest lactate reviewed-  Recent Labs   Lab 05/23/24  0747   LACTATE 0.7     Organ dysfunction indicated by Acute kidney injury, Acute respiratory failure, and Encephalopathy    Fluid challenge Ideal Body Weight- The patient's ideal body weight is Ideal body weight: 47.1 kg (103 lb 14.5 oz) which will be used to calculate fluid bolus of 30 ml/kg for treatment of septic shock.      Post- resuscitation assessment Yes Perfusion exam was performed within 6 hours of  septic shock presentation after bolus shows Adequate tissue perfusion assessed by non-invasive monitoring     Will Not start Pressors- Levophed for MAP of 65  Source control achieved by: rocephin x1, followed by zosyn     Review of medical chart with bacteremia with Ecoli growth in February 2024. Patient during last hospitalization left AMA. No record of continued antibiotics therapy.   Per pharmacy review; per Dr. Pierre, patient has been prescribed the following antibiotics.   - 4/9/24 macrobid 5 days  - 4/15/24 levaquin 7 days  - 4/22/24 doxycycline 7 days  CXR findings of multifocal disease, continue with zosyn    Initial management with fluid resuscitation, patient Estimated Creatinine Clearance: 48 mL/min (based on SCr of 0.8 mg/dL). Renal function at baseline Cr 0.8 from 3.4.   Blood cultures x2, NGTD x1 days  - f/u urine culture  - monitor renal function  - avoid nephrotoxic medications  - strict I/Os            Hypotension due to hypovolemia  Latest blood pressure and vitals reviewed-   HOLD all home antihypertensive medications until blood pressure improves.   Patient received IVF NS 2L followed by continuous IVF.   - continuous telemetry  - continuous pulse oximetry    Temp:  [97.2 °F (36.2 °C)-98.7 °F (37.1 °C)]   Pulse:  [65-84]   Resp:  [15-35]   BP: ()/(41-71)   SpO2:  [96 %-100 %] .   Home meds for hypertension were reviewed and noted below.   Hypertension Medications               lisinopriL-hydrochlorothiazide (PRINZIDE,ZESTORETIC) 20-25 mg Tab           While in the hospital, will manage blood pressure as follows.        VTE Risk Mitigation (From admission, onward)           Ordered     enoxaparin injection 30 mg  Daily         05/23/24 1037     IP VTE HIGH RISK PATIENT  Once         05/23/24 1037     Place sequential compression device  Until discontinued         05/23/24 0706                    Discharge Planning   LACY:      Code Status: Full Code   Is the patient medically ready for  discharge?:     Reason for patient still in hospital (select all that apply): Patient trending condition, Treatment, and Consult recommendations  Discharge Plan A: Home with family                  Thierno Valladares DO  Department of Hospital Medicine   [place] [facility]

## 2024-05-24 NOTE — NURSING
Dr. Valladares aware unable to complete Pulmonary consult at this time. Reports doesn't need Pulmonology at this time

## 2024-05-24 NOTE — ASSESSMENT & PLAN NOTE
Multifactorial with acute renal faliure, hyponatremia, hyperkalemia.   Initial potassium 6, now within normal range.   Stable chemistry labs.   LFTs elevated, likely reactive with infectious foci, continue to monitor.

## 2024-05-24 NOTE — NURSING
Patient periodically removes her BIPAP mask, explained to patient she needs to keep the mask on for now . Ativan given as ordered.

## 2024-05-24 NOTE — NURSING
Continuing to pull Bipap mask off at this time after reinforcement. Cont to orient. Reinforcement needed. Unable to follow directions at this time. Granddaughter at side trying to calm patient. Cont to pull mask of with staff and granddaughter at side. Dr. Valladares notified of pulling off mask and Ativan not due at this time. New order for restraints noted. Restraints initiated per MD order. See restraint flowsheet. Educated patient and granddaughter J'ae. Family voiced understanding.

## 2024-05-24 NOTE — ASSESSMENT & PLAN NOTE
This patient does have evidence of infective focus  My overall impression is sepsis.  Source: Respiratory  Antibiotics given-   Antibiotics (72h ago, onward)      Start     Stop Route Frequency Ordered    05/23/24 2100  mupirocin 2 % ointment         05/28/24 2059 Nasl 2 times daily 05/23/24 1410    05/23/24 1615  piperacillin-tazobactam (ZOSYN) 4.5 g in dextrose 5 % in water (D5W) 100 mL IVPB (MB+)         -- IV Every 8 hours (non-standard times) 05/23/24 1511          Latest lactate reviewed-  Recent Labs   Lab 05/23/24  0747   LACTATE 0.7     Organ dysfunction indicated by Acute kidney injury, Acute respiratory failure, and Encephalopathy    Fluid challenge Ideal Body Weight- The patient's ideal body weight is Ideal body weight: 47.1 kg (103 lb 14.5 oz) which will be used to calculate fluid bolus of 30 ml/kg for treatment of septic shock.      Post- resuscitation assessment Yes Perfusion exam was performed within 6 hours of septic shock presentation after bolus shows Adequate tissue perfusion assessed by non-invasive monitoring     Will Not start Pressors- Levophed for MAP of 65  Source control achieved by: rocephin x1, followed by zosyn     Review of medical chart with bacteremia with Ecoli growth in February 2024. Patient during last hospitalization left AMA. No record of continued antibiotics therapy.   Per pharmacy review; per Dr. Pierre, patient has been prescribed the following antibiotics.   - 4/9/24 macrobid 5 days  - 4/15/24 levaquin 7 days  - 4/22/24 doxycycline 7 days  CXR findings of multifocal disease, continue with zosyn    Initial management with fluid resuscitation, patient Estimated Creatinine Clearance: 48 mL/min (based on SCr of 0.8 mg/dL). Renal function at baseline Cr 0.8 from 3.4.   Blood cultures x2, NGTD x1 days  - f/u urine culture  - monitor renal function  - avoid nephrotoxic medications  - strict I/Os

## 2024-05-24 NOTE — ASSESSMENT & PLAN NOTE
UDS presumptive positive for amphetamine and benzodiazepine.   Patient has RX for valium 10 mg (last filled 5/14/24 30 tablets with current bottle with 10 tablets remaining).   Patient daily intake 10-30 mg by count above.   Trazodone which is a medication patient currently taking may falsely result in positive UDS screen.  During last hospitalization in February 2024, patient reported to be doing methamphetamine and also, during ER stay and while still communicating with staff, she reported taking street provided adderall of unknown dose.   Combination benzodiazepine, gabapentin, trazodone, and stimulants may be source of respiratory depression.      No

## 2024-05-24 NOTE — ASSESSMENT & PLAN NOTE
Patient with Hypercapnic and Hypoxic Respiratory failure which is Acute.  she is on home oxygen at 3 LPM. Supplemental oxygen was provided and noted- Oxygen Concentration (%):  [40-50] 40    Signs/symptoms of respiratory failure include- respiratory distress and lethargy. Contributing diagnoses includes - COPD, Pleural effusion, Pneumonia, and polysubstance use with respiratory depression.  Labs and images were reviewed. Patient Has recent ABG, which has been reviewed. Will treat underlying causes and adjust management of respiratory failure as follows- continuous O2, scheduled breathing treatments, IV antibiotics.     5/24/24 Hypercarbia with small improvement, will transition to AVAPS and continue to monitor response.   Mental status wax and wane, intermittently agitated trying to pull on lines and mask.   - continue scheduled neuro checks  - continuous O2 per respiratory  - maintain SpO2 >92%  - last ABG pH 7.28/pCO2 77.2/pO2 99/HCO3 31, O2 97.8% on FiO2 50%

## 2024-05-24 NOTE — SUBJECTIVE & OBJECTIVE
Interval History: patient seen and examined.     Review of Systems   Unable to perform ROS: Acuity of condition     Objective:     Vital Signs (Most Recent):  Temp: 97.2 °F (36.2 °C) (05/24/24 0354)  Pulse: 80 (05/24/24 1145)  Resp: (!) 23 (05/24/24 1145)  BP: (!) 89/55 (05/24/24 0530)  SpO2: 98 % (05/24/24 1145) Vital Signs (24h Range):  Temp:  [97.2 °F (36.2 °C)-98.7 °F (37.1 °C)] 97.2 °F (36.2 °C)  Pulse:  [65-84] 80  Resp:  [15-35] 23  SpO2:  [96 %-100 %] 98 %  BP: ()/(41-71) 89/55     Weight: 49.4 kg (109 lb)  Body mass index is 22.78 kg/m².    Intake/Output Summary (Last 24 hours) at 5/24/2024 1312  Last data filed at 5/24/2024 0811  Gross per 24 hour   Intake 1993.76 ml   Output 1125 ml   Net 868.76 ml         Physical Exam  Constitutional:       General: She is not in acute distress.     Appearance: She is ill-appearing.      Comments: Frail stature   HENT:      Mouth/Throat:      Mouth: Mucous membranes are dry.      Pharynx: Oropharynx is clear.   Cardiovascular:      Rate and Rhythm: Normal rate.      Pulses: Normal pulses.      Heart sounds: Normal heart sounds. No murmur heard.  Pulmonary:      Effort: Bradypnea present. No retractions.      Breath sounds: Transmitted upper airway sounds present. Rhonchi (bilaterally) present.   Abdominal:      General: Bowel sounds are normal. There is no distension.      Palpations: Abdomen is soft.      Tenderness: There is no abdominal tenderness. There is no guarding.      Comments: No stool   Genitourinary:     Comments: [place] catheter in place draining yellow colored urine  Musculoskeletal:      Cervical back: Normal range of motion and neck supple. No rigidity or tenderness.      Right lower leg: No edema.      Left lower leg: No edema.   Lymphadenopathy:      Cervical: No cervical adenopathy.   Skin:     General: Skin is warm and dry.      Capillary Refill: Capillary refill takes less than 2 seconds.      Findings: No erythema or rash.   Neurological:       Motor: No weakness.      Comments: Moving upper and lower extremities with appropriate strength   Psychiatric:         Behavior: Behavior is uncooperative.             Significant Labs: All pertinent labs within the past 24 hours have been reviewed.  Bilirubin:   Recent Labs   Lab 05/21/24  0953 05/22/24  2256 05/24/24  0550   BILITOT 0.4 0.9 0.6     Blood Culture:   Recent Labs   Lab 05/23/24  0747 05/23/24  0752   LABBLOO No Growth to date No Growth to date     BMP:   Recent Labs   Lab 05/24/24  0550   GLU 86  86   *  135*   K 4.2  4.2     100   CO2 35*  35*   BUN 27*  27*   CREATININE 0.8  0.8   CALCIUM 8.3*  8.3*     CBC:   Recent Labs   Lab 05/22/24  2223 05/24/24  0550   WBC 7.94 6.98   HGB 12.3 10.6*   HCT 42.3 36.6*    212     CMP:   Recent Labs   Lab 05/22/24 2256 05/23/24  0148 05/23/24  1810 05/24/24  0006 05/24/24  0550   *   < > 134* 137 135*  135*   K 6.0*   < > 4.4 4.1 4.2  4.2   CL 95   < > 98 99 100  100   CO2 28   < > 33* 38* 35*  35*   GLU 88   < > 86 83 86  86   BUN 42*   < > 36* 31* 27*  27*   CREATININE 3.4*   < > 1.3 0.9 0.8  0.8   CALCIUM 8.7   < > 8.2* 8.0* 8.3*  8.3*   PROT 7.2  --   --   --  6.2   ALBUMIN 2.7*  --   --   --  2.3*   BILITOT 0.9  --   --   --  0.6   ALKPHOS 56  --   --   --  46*   AST 91*  --   --   --  201*   ALT 46*  --   --   --  123*   ANIONGAP 8   < > 3 0* 0*  0*    < > = values in this interval not displayed.     Lipase:   Recent Labs   Lab 05/22/24 2256   LIPASE 26     POCT Glucose:   Recent Labs   Lab 05/23/24  0048 05/23/24  0416   POCTGLUCOSE 88 116*     Urine Studies:   Recent Labs   Lab 05/23/24  0116   COLORU Yellow   APPEARANCEUA Cloudy*   PHUR 5.0   SPECGRAV 1.020   PROTEINUA 1+*   GLUCUA Negative   KETONESU Trace*   BILIRUBINUA 2+*   OCCULTUA Negative   NITRITE Negative   UROBILINOGEN 1.0   LEUKOCYTESUR 1+*   RBCUA 8*   WBCUA 16*   BACTERIA Negative   SQUAMEPITHEL 1   HYALINECASTS 4.4*     X-Ray Chest 1  View  Narrative: EXAMINATION:  XR CHEST 1 VIEW    CLINICAL HISTORY:  hypoxia;    COMPARISON:  Portable chest 02/01/2024.    FINDINGS:  Frontal chest radiograph demonstrates mild heterogeneous opacities bilateral lower lung zones including the left upper lung zone.  No pleural fluid.  Mild enlargement of cardiac silhouette.  No osseous abnormality.  Impression: Evidence of edema or multifocal pneumonia.    Electronically signed by: Keith Schuler MD  Date:    05/23/2024  Time:    14:59    CT Head Without Contrast  Narrative: EXAMINATION:  CT HEAD WITHOUT CONTRAST    CLINICAL HISTORY:  altered mental status;    TECHNIQUE:  Axial CT images were obtained. Iterative reconstruction technique was used.  CT/cardiac nuclear exam/s in prior 12 months: 0.    COMPARISON:  None.    FINDINGS:  There is no acute intracranial hemorrhage.  There is no mass or mass effect.  No ventricular dilatation or abnormal extra-axial fluid collection.  There is no osseous abnormality.  Impression: No acute intracranial hemorrhage, mass or other brain abnormality.    A preliminary report was faxed by [facility] shortly after completion of this examination.    Electronically signed by: Keith Schuler MD  Date:    05/23/2024  Time:    10:11     Significant Imaging: I have reviewed all pertinent imaging results/findings within the past 24 hours.

## 2024-05-24 NOTE — ASSESSMENT & PLAN NOTE
Latest blood pressure and vitals reviewed-   HOLD all home antihypertensive medications until blood pressure improves.   Patient received IVF NS 2L followed by continuous IVF.   - continuous telemetry  - continuous pulse oximetry    Temp:  [97.2 °F (36.2 °C)-98.7 °F (37.1 °C)]   Pulse:  [65-84]   Resp:  [15-35]   BP: ()/(41-71)   SpO2:  [96 %-100 %] .   Home meds for hypertension were reviewed and noted below.   Hypertension Medications               lisinopriL-hydrochlorothiazide (PRINZIDE,ZESTORETIC) 20-25 mg Tab           While in the hospital, will manage blood pressure as follows.

## 2024-05-24 NOTE — PLAN OF CARE
Problem: Adult Inpatient Plan of Care  Goal: Plan of Care Review  Outcome: Progressing  Goal: Patient-Specific Goal (Individualized)  Outcome: Progressing  Goal: Absence of Hospital-Acquired Illness or Injury  Outcome: Progressing  Goal: Optimal Comfort and Wellbeing  Outcome: Progressing  Goal: Readiness for Transition of Care  Outcome: Progressing     Problem: Infection  Goal: Absence of Infection Signs and Symptoms  Outcome: Progressing     Problem: Skin Injury Risk Increased  Goal: Skin Health and Integrity  Outcome: Progressing     Problem: Sepsis/Septic Shock  Goal: Optimal Coping  Outcome: Progressing  Goal: Absence of Bleeding  Outcome: Progressing  Goal: Blood Glucose Level Within Targeted Range  Outcome: Progressing  Goal: Absence of Infection Signs and Symptoms  Outcome: Progressing     Problem: Pneumonia  Goal: Fluid Balance  Outcome: Progressing  Goal: Resolution of Infection Signs and Symptoms  Outcome: Progressing  Goal: Effective Oxygenation and Ventilation  Outcome: Progressing       Patient remained on the BIPAP last night, She would wake up periodically and pull the mask off. Woody was given this AM . Patient had really good O2 sat's, B/P up and down. Resp. Between 18-30 breathes per min. Urine output fair.

## 2024-05-25 LAB
ALBUMIN SERPL BCP-MCNC: 2.6 G/DL (ref 3.5–5.2)
ALP SERPL-CCNC: 47 U/L (ref 55–135)
ALT SERPL W/O P-5'-P-CCNC: 98 U/L (ref 10–44)
ANION GAP SERPL CALC-SCNC: 2 MMOL/L (ref 3–11)
AST SERPL-CCNC: 123 U/L (ref 10–40)
ASTROVIRUS: NOT DETECTED
BILIRUB SERPL-MCNC: 1 MG/DL (ref 0.1–1)
BUN SERPL-MCNC: 15 MG/DL (ref 8–23)
C COLI+JEJ+UPSA DNA STL QL NAA+NON-PROBE: NOT DETECTED
C DIF TOX TCDA+TCDB STL QL NAA+NON-PROBE: NORMAL
CALCIUM SERPL-MCNC: 8.6 MG/DL (ref 8.7–10.5)
CHLORIDE SERPL-SCNC: 98 MMOL/L (ref 95–110)
CO2 SERPL-SCNC: 36 MMOL/L (ref 23–29)
COMMENTS: ABNORMAL
CREAT SERPL-MCNC: 0.7 MG/DL (ref 0.5–1.4)
CYCLOSPORA CAYETANENSIS: NOT DETECTED
ENTEROAGGREGATIVE E COLI: NOT DETECTED
ENTEROPATHOGENIC E COLI: NOT DETECTED
ERYTHROCYTE [DISTWIDTH] IN BLOOD BY AUTOMATED COUNT: 16.3 % (ref 11.5–14.5)
EST. GFR  (NO RACE VARIABLE): >60 ML/MIN/1.73 M^2
FIO2: 40 %
GLUCOSE SERPL-MCNC: 119 MG/DL (ref 70–110)
GPP - ADENOVIRUS 40/41: NOT DETECTED
GPP - CRYPTOSPORIDIUM: NOT DETECTED
GPP - ENTAMOEBA HISTOLYTICA: NOT DETECTED
GPP - ENTEROTOXIGENIC E COLI (ETEC): NOT DETECTED
GPP - GIARDIA LAMBLIA: NOT DETECTED
GPP - NOROVIRUS GI/GII: NOT DETECTED
GPP - ROTAVIRUS A: NOT DETECTED
GPP - SALMONELLA: NOT DETECTED
GPP - VIBRIO CHOLERA: NOT DETECTED
GPP - YERSINIA ENTEROCOLITICA: NOT DETECTED
HCT VFR BLD AUTO: 34.9 % (ref 37–48.5)
HGB BLD-MCNC: 10.6 G/DL (ref 12–16)
LACTATE PLASV-SCNC: NOT DETECTED MMOL/L
Lab: ABNORMAL
MAGNESIUM SERPL-MCNC: 1.3 MG/DL (ref 1.6–2.6)
MCH RBC QN AUTO: 23.3 PG (ref 27–31)
MCHC RBC AUTO-ENTMCNC: 30.4 G/DL (ref 32–36)
MCV RBC AUTO: 77 FL (ref 82–98)
MODIFIED ALLEN'S TEST: ABNORMAL
NOTIFIED BY: ABNORMAL
O2DEVICE: ABNORMAL
PCO2 BLDA: 63 MMHG (ref 35–45)
PH SMN: 7.39 [PH] (ref 7.34–7.45)
PLATELET # BLD AUTO: 229 K/UL (ref 150–450)
PLESIOMONAS SHIGELLOIDES: NOT DETECTED
PMV BLD AUTO: 10.3 FL (ref 9.2–12.9)
PO2 BLDA: 100 MMHG (ref 80–100)
POC BASE DEFICIT: 13.2 MMOL/L (ref -2–2)
POC HCO3: 34.8 MMOL/L (ref 24–28)
POC NOTIFIED NOTE: ABNORMAL
POC PERFORMED BY: ABNORMAL
POC SATURATED O2: 98.7 % (ref 95–100)
POC TEMPERATURE: 37 C
POTASSIUM SERPL-SCNC: 4.1 MMOL/L (ref 3.5–5.1)
PROT SERPL-MCNC: 7 G/DL (ref 6–8.4)
PROVIDER NOTIFIED: ABNORMAL
RBC # BLD AUTO: 4.54 M/UL (ref 4–5.4)
SAPOVIRUS: NOT DETECTED
SHIGELLA SP+EIEC IPAH STL QL NAA+PROBE: NOT DETECTED
SODIUM SERPL-SCNC: 136 MMOL/L (ref 136–145)
SPECIMEN SOURCE: ABNORMAL
VIBRIO: NOT DETECTED
VT: 400
WBC # BLD AUTO: 6.17 K/UL (ref 3.9–12.7)

## 2024-05-25 PROCEDURE — 87507 IADNA-DNA/RNA PROBE TQ 12-25: CPT | Performed by: EMERGENCY MEDICINE

## 2024-05-25 PROCEDURE — 20000000 HC ICU ROOM

## 2024-05-25 PROCEDURE — 25000242 PHARM REV CODE 250 ALT 637 W/ HCPCS: Performed by: STUDENT IN AN ORGANIZED HEALTH CARE EDUCATION/TRAINING PROGRAM

## 2024-05-25 PROCEDURE — 63600175 PHARM REV CODE 636 W HCPCS: Performed by: INTERNAL MEDICINE

## 2024-05-25 PROCEDURE — 25000003 PHARM REV CODE 250: Performed by: INTERNAL MEDICINE

## 2024-05-25 PROCEDURE — 94660 CPAP INITIATION&MGMT: CPT

## 2024-05-25 PROCEDURE — 25000003 PHARM REV CODE 250: Performed by: STUDENT IN AN ORGANIZED HEALTH CARE EDUCATION/TRAINING PROGRAM

## 2024-05-25 PROCEDURE — 99900031 HC PATIENT EDUCATION (STAT)

## 2024-05-25 PROCEDURE — 99900035 HC TECH TIME PER 15 MIN (STAT)

## 2024-05-25 PROCEDURE — 36415 COLL VENOUS BLD VENIPUNCTURE: CPT | Performed by: STUDENT IN AN ORGANIZED HEALTH CARE EDUCATION/TRAINING PROGRAM

## 2024-05-25 PROCEDURE — 83735 ASSAY OF MAGNESIUM: CPT | Performed by: STUDENT IN AN ORGANIZED HEALTH CARE EDUCATION/TRAINING PROGRAM

## 2024-05-25 PROCEDURE — 63600175 PHARM REV CODE 636 W HCPCS: Performed by: STUDENT IN AN ORGANIZED HEALTH CARE EDUCATION/TRAINING PROGRAM

## 2024-05-25 PROCEDURE — 85027 COMPLETE CBC AUTOMATED: CPT | Performed by: STUDENT IN AN ORGANIZED HEALTH CARE EDUCATION/TRAINING PROGRAM

## 2024-05-25 PROCEDURE — 93010 ELECTROCARDIOGRAM REPORT: CPT | Mod: ,,, | Performed by: INTERNAL MEDICINE

## 2024-05-25 PROCEDURE — 80053 COMPREHEN METABOLIC PANEL: CPT | Performed by: STUDENT IN AN ORGANIZED HEALTH CARE EDUCATION/TRAINING PROGRAM

## 2024-05-25 PROCEDURE — 94640 AIRWAY INHALATION TREATMENT: CPT

## 2024-05-25 PROCEDURE — 27000221 HC OXYGEN, UP TO 24 HOURS

## 2024-05-25 PROCEDURE — 94761 N-INVAS EAR/PLS OXIMETRY MLT: CPT

## 2024-05-25 PROCEDURE — 93005 ELECTROCARDIOGRAM TRACING: CPT

## 2024-05-25 PROCEDURE — 36600 WITHDRAWAL OF ARTERIAL BLOOD: CPT

## 2024-05-25 PROCEDURE — 27100171 HC OXYGEN HIGH FLOW UP TO 24 HOURS

## 2024-05-25 PROCEDURE — 27000207 HC ISOLATION

## 2024-05-25 PROCEDURE — 27000190 HC CPAP FULL FACE MASK W/VALVE

## 2024-05-25 PROCEDURE — 82803 BLOOD GASES ANY COMBINATION: CPT

## 2024-05-25 RX ORDER — QUETIAPINE FUMARATE 50 MG/1
50 TABLET, FILM COATED ORAL ONCE
Status: COMPLETED | OUTPATIENT
Start: 2024-05-25 | End: 2024-05-25

## 2024-05-25 RX ORDER — SODIUM CHLORIDE 9 MG/ML
INJECTION, SOLUTION INTRAVENOUS
Status: DISCONTINUED | OUTPATIENT
Start: 2024-05-25 | End: 2024-05-27 | Stop reason: HOSPADM

## 2024-05-25 RX ORDER — MAGNESIUM SULFATE HEPTAHYDRATE 40 MG/ML
2 INJECTION, SOLUTION INTRAVENOUS ONCE
Status: COMPLETED | OUTPATIENT
Start: 2024-05-25 | End: 2024-05-25

## 2024-05-25 RX ADMIN — PIPERACILLIN SODIUM AND TAZOBACTAM SODIUM 4.5 G: 4; .5 INJECTION, POWDER, LYOPHILIZED, FOR SOLUTION INTRAVENOUS at 05:05

## 2024-05-25 RX ADMIN — MAGNESIUM SULFATE HEPTAHYDRATE 2 G: 40 INJECTION, SOLUTION INTRAVENOUS at 05:05

## 2024-05-25 RX ADMIN — SODIUM CHLORIDE: 9 INJECTION, SOLUTION INTRAVENOUS at 05:05

## 2024-05-25 RX ADMIN — FAMOTIDINE 20 MG: 10 INJECTION, SOLUTION INTRAVENOUS at 08:05

## 2024-05-25 RX ADMIN — DEXMEDETOMIDINE HYDROCHLORIDE 0.3 MCG/KG/HR: 4 INJECTION INTRAVENOUS at 11:05

## 2024-05-25 RX ADMIN — MUPIROCIN: 20 OINTMENT TOPICAL at 08:05

## 2024-05-25 RX ADMIN — IPRATROPIUM BROMIDE AND ALBUTEROL SULFATE 3 ML: 2.5; .5 SOLUTION RESPIRATORY (INHALATION) at 08:05

## 2024-05-25 RX ADMIN — PIPERACILLIN SODIUM AND TAZOBACTAM SODIUM 4.5 G: 4; .5 INJECTION, POWDER, LYOPHILIZED, FOR SOLUTION INTRAVENOUS at 12:05

## 2024-05-25 RX ADMIN — IPRATROPIUM BROMIDE AND ALBUTEROL SULFATE 3 ML: 2.5; .5 SOLUTION RESPIRATORY (INHALATION) at 03:05

## 2024-05-25 RX ADMIN — LORAZEPAM 1 MG: 2 INJECTION INTRAMUSCULAR; INTRAVENOUS at 03:05

## 2024-05-25 RX ADMIN — IPRATROPIUM BROMIDE AND ALBUTEROL SULFATE 3 ML: 2.5; .5 SOLUTION RESPIRATORY (INHALATION) at 11:05

## 2024-05-25 RX ADMIN — PIPERACILLIN SODIUM AND TAZOBACTAM SODIUM 4.5 G: 4; .5 INJECTION, POWDER, LYOPHILIZED, FOR SOLUTION INTRAVENOUS at 08:05

## 2024-05-25 RX ADMIN — BUDESONIDE INHALATION 0.5 MG: 0.5 SUSPENSION RESPIRATORY (INHALATION) at 08:05

## 2024-05-25 RX ADMIN — MAGNESIUM SULFATE HEPTAHYDRATE 2 G: 40 INJECTION, SOLUTION INTRAVENOUS at 02:05

## 2024-05-25 RX ADMIN — ENOXAPARIN SODIUM 40 MG: 40 INJECTION SUBCUTANEOUS at 05:05

## 2024-05-25 RX ADMIN — IPRATROPIUM BROMIDE AND ALBUTEROL SULFATE 3 ML: 2.5; .5 SOLUTION RESPIRATORY (INHALATION) at 07:05

## 2024-05-25 RX ADMIN — QUETIAPINE FUMARATE 50 MG: 50 TABLET ORAL at 08:05

## 2024-05-25 RX ADMIN — BUDESONIDE INHALATION 0.5 MG: 0.5 SUSPENSION RESPIRATORY (INHALATION) at 07:05

## 2024-05-25 RX ADMIN — FAMOTIDINE 20 MG: 10 INJECTION, SOLUTION INTRAVENOUS at 10:05

## 2024-05-25 RX ADMIN — MUPIROCIN: 20 OINTMENT TOPICAL at 10:05

## 2024-05-25 NOTE — PLAN OF CARE
Problem: Adult Inpatient Plan of Care  Goal: Plan of Care Review  Outcome: Not Progressing  Goal: Patient-Specific Goal (Individualized)  Outcome: Not Progressing  Goal: Absence of Hospital-Acquired Illness or Injury  Outcome: Not Progressing  Goal: Optimal Comfort and Wellbeing  Outcome: Not Progressing  Goal: Readiness for Transition of Care  Outcome: Not Progressing     Problem: Infection  Goal: Absence of Infection Signs and Symptoms  Outcome: Not Progressing     Problem: Skin Injury Risk Increased  Goal: Skin Health and Integrity  Outcome: Progressing     Problem: Sepsis/Septic Shock  Goal: Optimal Coping  Outcome: Progressing  Goal: Absence of Bleeding  Outcome: Progressing  Goal: Blood Glucose Level Within Targeted Range  Outcome: Progressing  Goal: Absence of Infection Signs and Symptoms  Outcome: Progressing  Goal: Optimal Nutrition Intake  Outcome: Not Progressing     Problem: Pneumonia  Goal: Fluid Balance  Outcome: Not Progressing  Goal: Resolution of Infection Signs and Symptoms  Outcome: Not Progressing  Goal: Effective Oxygenation and Ventilation  Outcome: Not Progressing     Problem: Restraint, Nonviolent  Goal: Absence of Harm or Injury  Outcome: Not Progressing     Problem: Fall Injury Risk  Goal: Absence of Fall and Fall-Related Injury  Outcome: Progressing     Problem: COPD (Chronic Obstructive Pulmonary Disease)  Goal: Optimal Chronic Illness Coping  Outcome: Not Progressing  Goal: Optimal Level of Functional [place]  Outcome: Not Progressing  Goal: Absence of Infection Signs and Symptoms  Outcome: Progressing  Goal: Improved Oral Intake  Outcome: Not Progressing  Goal: Effective Oxygenation and Ventilation  Outcome: Progressing    Patient is in restraints and requiring precedex to tolerate BIPAP and possible Withdrawls like symptoms. O2 sat remained in the upper 90's on 40% oxygen. B/P better, HR ranged from the 50's to the 80's. She became very agitated this morning for several hours,  the precedex was titrated up.

## 2024-05-25 NOTE — TELEMEDICINE CONSULT
[facility] - Intensive Care    Cardiology  Consult Note    Patient Name: June Boykin  MRN: [id number]  Admission Date: 5/22/2024  Hospital Length of Stay: 2 days  Code Status: Full Code   Attending Provider: Thierno Valladares DO   Consulting Provider: Kev Martinez MD  Primary Care Physician: Mauricio Pierre MD  Principal Problem:Acute hypercapnic respiratory failure    Patient information was obtained from patient, ER records, and primary team.     Subjective:     Chief Complaint/Reason for Consult: Arrhythmias    HPI:  Patient is a 69-year-old female admitted on 22nd complained of diarrhea dehydration.  Patient has history of hypertension, history of methamphetamines use admitted with diarrhea and frequent falls which were going on for 2 days or so.  Patient's initial electrolytes were significantly abnormal including potassium was 6 and creatinine was elevated.  Her UDS was positive for benzodiazepine and amphetamine.  ABG was significantly acidotic and was put on 40% Venturimask.  Patient was initially managed with BiPAP.  There was also arrhythmia noted on telemetry.  This consult was placed for arrhythmia reason.  Patient did not give much history since she was wearing BiPAP mask.  As per chart reported no chest pain, shortness of breath, palpitations, dizziness or passing out.      PMH:   PSH:   Family History:   Social History:     Previous Cardiac Diagnostics:       Past Medical History:   Diagnosis Date    Abdominal pain 05/2024    Acid reflux 05/2024    Anxiety disorder, unspecified     At high risk for falls     Emphysema, unspecified     History of opioid abuse     Hypertension     On home oxygen therapy     2l/nc    Wears partial dentures     upper-none on lower    Weight loss 05/2024     Past Surgical History:   Procedure Laterality Date    ANKLE SURGERY Left     HYSTERECTOMY      TONSILLECTOMY       Review of patient's allergies indicates:  No Known Allergies  No current  facility-administered medications on file prior to encounter.     Current Outpatient Medications on File Prior to Encounter   Medication Sig    albuterol (PROAIR HFA) 90 mcg/actuation inhaler ProAir HFA    albuterol (PROVENTIL/VENTOLIN HFA) 90 mcg/actuation inhaler Inhale into the lungs.    aspirin 81 MG Chew Take 81 mg by mouth once daily. Stop 05/20/2024 per kathleen@ Dr. Dominguez office    budesonide-formoterol 160-4.5 mcg (SYMBICORT) 160-4.5 mcg/actuation HFAA 2 puffs Inhalation Twice a day    cetirizine (ZYRTEC) 5 MG tablet Take 5 mg by mouth once daily.    diazePAM (VALIUM) 10 MG Tab Take 10 mg by mouth every 6 (six) hours as needed.    docusate sodium (COLACE) 100 MG capsule Take 100 mg by mouth 2 (two) times daily.    fluticasone propionate (FLONASE) 50 mcg/actuation nasal spray     gabapentin (NEURONTIN) 100 MG capsule Take 1 capsule (100 mg total) by mouth 2 (two) times daily. (Patient taking differently: Take 800 mg by mouth 2 (two) times daily.)    ibuprofen (ADVIL,MOTRIN) 200 MG tablet Take 200 mg by mouth every 6 (six) hours as needed for Pain.    lisinopriL-hydrochlorothiazide (PRINZIDE,ZESTORETIC) 20-25 mg Tab     melatonin 3 mg Cap Take by mouth.    omeprazole (PRILOSEC) 20 MG capsule     pantoprazole (PROTONIX) 40 MG tablet     potassium chloride (MICRO-K) 10 MEQ CpSR Take 10 mEq by mouth once.    pravastatin (PRAVACHOL) 20 MG tablet     traZODone (DESYREL) 100 MG tablet Take 100 mg by mouth nightly as needed.    azithromycin (Z-DEIRDRE) 250 MG tablet Take 2 tablets on day 1, then take 1 tablet on days 2-5    cyclobenzaprine (FLEXERIL) 5 MG tablet Take 1 tablet (5 mg total) by mouth nightly as needed.    doxycycline (VIBRA-TABS) 100 MG tablet Take 1 tablet (100 mg total) by mouth 2 (two) times daily.    FLUoxetine 10 MG capsule Take 1 capsule (10 mg total) by mouth once daily.    lubiprostone (AMITIZA) 8 MCG Cap      Family History       Problem Relation (Age of Onset)    Diabetes Father    No Known  Problems Mother, Brother, Brother, Brother    Pancreatic cancer Father    Parkinsonism Sister    Stroke Father          Tobacco Use    Smoking status: Former     Current packs/day: 1.00     Types: Cigarettes    Smokeless tobacco: Never    Tobacco comments:     Quit 2 years ago   Substance and Sexual Activity    Alcohol use: No    Drug use: Not Currently     Types: Oxycodone, Hydrocodone    Sexual activity: Not on file     Comment:        Review of Systems  Objective:     Vital Signs (Most Recent):  Temp: 97.2 °F (36.2 °C) (05/25/24 0716)  Pulse: 85 (05/25/24 1526)  Resp: (!) 22 (05/25/24 1526)  BP: (!) 179/87 (05/25/24 1440)  SpO2: 95 % (05/25/24 1526) Vital Signs (24h Range):  Temp:  [97.2 °F (36.2 °C)-97.4 °F (36.3 °C)] 97.2 °F (36.2 °C)  Pulse:  [52-96] 85  Resp:  [11-61] 22  SpO2:  [82 %-100 %] 95 %  BP: (110-179)/(56-98) 179/87   Weight: 49.4 kg (109 lb)  Body mass index is 22.78 kg/m².  SpO2: 95 %       Intake/Output Summary (Last 24 hours) at 5/25/2024 1541  Last data filed at 5/25/2024 0715  Gross per 24 hour   Intake 440.72 ml   Output 775 ml   Net -334.28 ml     Lines/Drains/Airways       Drain  Duration                  Urethral Catheter 05/23/24 1300 14 Fr. 2 days              Peripheral Intravenous Line  Duration                  Peripheral IV - Single Lumen 05/22/24 2221 20 G Left;Posterior Hand 2 days         Peripheral IV - Single Lumen 05/23/24 0148 20 G Posterior;Right Forearm 2 days                  Significant Labs:  Recent Results (from the past 72 hour(s))   CBC W/ AUTO DIFFERENTIAL    Collection Time: 05/22/24 10:23 PM   Result Value Ref Range    WBC 7.94 3.90 - 12.70 K/uL    RBC 5.38 4.00 - 5.40 M/uL    Hemoglobin 12.3 12.0 - 16.0 g/dL    Hematocrit 42.3 37.0 - 48.5 %    MCV 79 (L) 82 - 98 fL    MCH 22.9 (L) 27.0 - 31.0 pg    MCHC 29.1 (L) 32.0 - 36.0 g/dL    RDW 18.1 (H) 11.5 - 14.5 %    Platelets 318 150 - 450 K/uL    MPV 9.9 9.2 - 12.9 fL    Immature Granulocytes 0.5 0.0 - 0.5 %     Gran # (ANC) 5.8 1.8 - 7.7 K/uL    Immature Grans (Abs) 0.04 0.00 - 0.04 K/uL    Lymph # 1.1 1.0 - 4.8 K/uL    Mono # 1.0 0.3 - 1.0 K/uL    Eos # 0.0 0.0 - 0.5 K/uL    Baso # 0.02 0.00 - 0.20 K/uL    nRBC 0 0 /100 WBC    Gran % 72.9 38.0 - 73.0 %    Lymph % 14.1 (L) 18.0 - 48.0 %    Mono % 12.2 4.0 - 15.0 %    Eosinophil % 0.0 0.0 - 8.0 %    Basophil % 0.3 0.0 - 1.9 %    Differential Method Automated    EKG 12-lead    Collection Time: 05/22/24 10:33 PM   Result Value Ref Range    QRS Duration 84 ms    [facility] QTC Calculation 418 ms   Comp. Metabolic Panel    Collection Time: 05/22/24 10:56 PM   Result Value Ref Range    Sodium 131 (L) 136 - 145 mmol/L    Potassium 6.0 (H) 3.5 - 5.1 mmol/L    Chloride 95 95 - 110 mmol/L    CO2 28 23 - 29 mmol/L    Glucose 88 70 - 110 mg/dL    BUN 42 (H) 8 - 23 mg/dL    Creatinine 3.4 (H) 0.5 - 1.4 mg/dL    Calcium 8.7 8.7 - 10.5 mg/dL    Total Protein 7.2 6.0 - 8.4 g/dL    Albumin 2.7 (L) 3.5 - 5.2 g/dL    Total Bilirubin 0.9 0.1 - 1.0 mg/dL    Alkaline Phosphatase 56 55 - 135 U/L    AST 91 (H) 10 - 40 U/L    ALT 46 (H) 10 - 44 U/L    eGFR 13.9 (A) >60 mL/min/1.73 m^2    Anion Gap 8 3 - 11 mmol/L   Lipase    Collection Time: 05/22/24 10:56 PM   Result Value Ref Range    Lipase 26 13 - 75 U/L   POCT glucose    Collection Time: 05/23/24 12:48 AM   Result Value Ref Range    POCT Glucose 88 70 - 110 mg/dL   Urinalysis, Reflex to Urine Culture Urine, Clean Catch    Collection Time: 05/23/24  1:16 AM    Specimen: Urine, Clean Catch   Result Value Ref Range    Specimen UA Urine, Catheterized     Color, UA Yellow Yellow, Straw, [place]    Appearance, UA Cloudy (A) Clear    pH, UA 5.0 5.0 - 8.0    Specific Gravity, UA 1.020 1.005 - 1.030    Protein, UA 1+ (A) Negative    Glucose, UA Negative Negative    Ketones, UA Trace (A) Negative    Bilirubin (UA) 2+ (A) Negative    Occult Blood UA Negative Negative    Nitrite, UA Negative Negative    Urobilinogen, UA 1.0 <2.0 EU/dL    Leukocytes, UA 1+ (A)  Negative   Drug screen panel, emergency    Collection Time: 05/23/24  1:16 AM   Result Value Ref Range    Benzodiazepines Presumptive Positive (A) Negative    Methadone metabolites Negative Negative    Cocaine (Metab.) Negative Negative    Opiate Scrn, Ur Negative Negative    Barbiturate Screen, Ur Negative Negative    Amphetamine Screen, Ur Presumptive Positive (A) Negative    THC Negative Negative    Phencyclidine Negative Negative    Creatinine, Urine 253.0 15.0 - 325.0 mg/dL    Toxicology Information SEE COMMENT    Urinalysis Microscopic    Collection Time: 05/23/24  1:16 AM   Result Value Ref Range    RBC, UA 8 (H) 0 - 4 /hpf    WBC, UA 16 (H) 0 - 5 /hpf    Bacteria Negative None-Occ /hpf    Squam Epithel, UA 1 /hpf    Hyaline Casts, UA 4.4 (A) 0-1/lpf /lpf    Microscopic Comment SEE COMMENT    Urine culture    Collection Time: 05/23/24  1:16 AM    Specimen: Urine   Result Value Ref Range    Urine Culture, Routine No growth    Basic Metabolic Panel    Collection Time: 05/23/24  1:48 AM   Result Value Ref Range    Sodium 132 (L) 136 - 145 mmol/L    Potassium 5.5 (H) 3.5 - 5.1 mmol/L    Chloride 96 95 - 110 mmol/L    CO2 30 (H) 23 - 29 mmol/L    Glucose 88 70 - 110 mg/dL    BUN 45 (H) 8 - 23 mg/dL    Creatinine 3.2 (H) 0.5 - 1.4 mg/dL    Calcium 8.7 8.7 - 10.5 mg/dL    Anion Gap 6 3 - 11 mmol/L    eGFR 14.9 (A) >60 mL/min/1.73 m^2   POCT glucose    Collection Time: 05/23/24  4:16 AM   Result Value Ref Range    POCT Glucose 116 (H) 70 - 110 mg/dL   Basic metabolic panel    Collection Time: 05/23/24  6:38 AM   Result Value Ref Range    Sodium 131 (L) 136 - 145 mmol/L    Potassium 4.4 3.5 - 5.1 mmol/L    Chloride 93 (L) 95 - 110 mmol/L    CO2 39 (H) 23 - 29 mmol/L    Glucose 104 70 - 110 mg/dL    BUN 41 (H) 8 - 23 mg/dL    Creatinine 2.3 (H) 0.5 - 1.4 mg/dL    Calcium 8.2 (L) 8.7 - 10.5 mg/dL    Anion Gap -1 (L) 3 - 11 mmol/L    eGFR 22.2 (A) >60 mL/min/1.73 m^2   CK    Collection Time: 05/23/24  6:38 AM   Result  Value Ref Range    CPK 82 20 - 180 U/L   Lactic Acid, Plasma    Collection Time: 05/23/24  7:47 AM   Result Value Ref Range    Lactate (Lactic Acid) 0.7 0.5 - 2.2 mmol/L   Blood culture    Collection Time: 05/23/24  7:47 AM    Specimen: Peripheral, Hand, Right; Blood   Result Value Ref Range    Blood Culture, Routine No Growth to date     Blood Culture, Routine No Growth to date    Blood culture    Collection Time: 05/23/24  7:52 AM    Specimen: Blood   Result Value Ref Range    Blood Culture, Routine No Growth to date     Blood Culture, Routine No Growth to date    POCT Influenza A/B Molecular    Collection Time: 05/23/24  7:53 AM   Result Value Ref Range    POC Molecular Influenza A Ag Negative Negative    POC Molecular Influenza B Ag Negative Negative     Acceptable Yes    POCT COVID-19 Rapid Screening    Collection Time: 05/23/24  7:53 AM   Result Value Ref Range    POC Rapid COVID Negative Negative     Acceptable Yes    POCT ARTERIAL BLOOD GAS    Collection Time: 05/23/24 11:06 AM   Result Value Ref Range    POC PH 7.203 (LL) 7.345 - 7.450    POC PCO2 94.1 (HH) 35.0 - 45.0 mmHg    POC PO2 77.8 (L) 80.0 - 100 mmHg    POC SATURATED O2 92.0 (L) 95.0 - 100.0 %    POC HCO3 29.9 (H) 24.0 - 28.0 mmol/l    Base Deficit 9.0 (H) -2.0 - 2.0 mmol/l    Specimen source Arterial     Performed By: Sophia     MODIFIED MARIA ESTHER'S TEST POSITIVE     FiO2 40.0 %    O2DEVICE Venti Mask     LPM 8.0     Comments VENTI MASK 40 PERCENT  8LPM     Provider Notified: SLICK RN     Notified Time 05/23/2024 11:09     Notified By Sophia     Notified Note Called and read back by SLICK JOHNSON    Basic Metabolic Panel    Collection Time: 05/23/24 12:27 PM   Result Value Ref Range    Sodium 133 (L) 136 - 145 mmol/L    Potassium 4.2 3.5 - 5.1 mmol/L    Chloride 93 (L) 95 - 110 mmol/L    CO2 38 (H) 23 - 29 mmol/L    Glucose 86 70 - 110 mg/dL    BUN 39 (H) 8 - 23 mg/dL    Creatinine 1.9 (H) 0.5 - 1.4 mg/dL    Calcium 8.4  (L) 8.7 - 10.5 mg/dL    Anion Gap 2 (L) 3 - 11 mmol/L    eGFR 27.9 (A) >60 mL/min/1.73 m^2   POCT ARTERIAL BLOOD GAS    Collection Time: 05/23/24  1:40 PM   Result Value Ref Range    POC PH 7.259 (LL) 7.345 - 7.450    POC PCO2 81.2 (HH) 35.0 - 45.0 mmHg    POC PO2 54.4 (LL) 80.0 - 100 mmHg    POC SATURATED O2 82.5 (LL) 95.0 - 100.0 %    POC HCO3 30.3 (H) 24.0 - 28.0 mmol/l    Base Deficit 9.2 (H) -2.0 - 2.0 mmol/l    POC Temp 37.0 C    Specimen source Arterial     Performed By: Chuck CHAMBERS MARIA ESTHER'S TEST NA     FiO2 30.0 %    O2DEVICE BIPAP     BIPAP 12/4     Provider Notified: ANNY JOHNSON     Notified Time 05/23/2024 13:48     Notified By Chuck Potter Note Called and read back by ANNY JOHNSON    POCT ARTERIAL BLOOD GAS    Collection Time: 05/23/24  4:05 PM   Result Value Ref Range    POC PH 7.286 (L) 7.345 - 7.450    POC PCO2 77.4 (HH) 35.0 - 45.0 mmHg    POC PO2 107 (H) 80.0 - 100 mmHg    POC SATURATED O2 98.1 95.0 - 100.0 %    POC HCO3 31.8 (H) 24.0 - 28.0 mmol/l    Base Deficit 10.3 (H) -2.0 - 2.0 mmol/l    POC Temp 37.0 C    Specimen source Arterial     Performed By: Sophia CHAMBERS MARIA ESTHER'S TEST POSITIVE     FiO2 50.0 %    O2DEVICE BIPAP     Comments BIPAP 14/7 FIO2 50 PRCENT BACK UP RATE 8     Provider Notified: KATHLEEN JOHNSON     Notified Time 05/23/2024 16:10     Notified By Sophia Potter Note Called and read back by KATHLEEN JOHNSON    Basic Metabolic Panel    Collection Time: 05/23/24  6:10 PM   Result Value Ref Range    Sodium 134 (L) 136 - 145 mmol/L    Potassium 4.4 3.5 - 5.1 mmol/L    Chloride 98 95 - 110 mmol/L    CO2 33 (H) 23 - 29 mmol/L    Glucose 86 70 - 110 mg/dL    BUN 36 (H) 8 - 23 mg/dL    Creatinine 1.3 0.5 - 1.4 mg/dL    Calcium 8.2 (L) 8.7 - 10.5 mg/dL    Anion Gap 3 3 - 11 mmol/L    eGFR 44.0 (A) >60 mL/min/1.73 m^2   POCT ARTERIAL BLOOD GAS    Collection Time: 05/23/24  8:42 PM   Result Value Ref Range    POC PH 7.255 (LL) 7.345 - 7.450    POC PCO2 84.4 (HH) 35.0 - 45.0  mmHg    POC PO2 108 (H) 80.0 - 100 mmHg    POC SATURATED O2 98.1 95.0 - 100.0 %    POC HCO3 31.7 (H) 24.0 - 28.0 mmol/l    Base Deficit 10.2 (H) -2.0 - 2.0 mmol/l    POC Temp 37.0 C    Specimen source Arterial     Performed By: Marleen     MODIFIED MARIA ESTHER'S TEST +     FiO2 50.0 %    O2DEVICE BIPAP     BIPAP 16/8     Provider Notified: DR PEMBERTON     Notified Time 05/23/2024 20:44     Notified By Marleen     Notified Note Called and read back by DR PEMBERTON    Basic Metabolic Panel    Collection Time: 05/24/24 12:06 AM   Result Value Ref Range    Sodium 137 136 - 145 mmol/L    Potassium 4.1 3.5 - 5.1 mmol/L    Chloride 99 95 - 110 mmol/L    CO2 38 (H) 23 - 29 mmol/L    Glucose 83 70 - 110 mg/dL    BUN 31 (H) 8 - 23 mg/dL    Creatinine 0.9 0.5 - 1.4 mg/dL    Calcium 8.0 (L) 8.7 - 10.5 mg/dL    Anion Gap 0 (L) 3 - 11 mmol/L    eGFR >60.0 >60 mL/min/1.73 m^2   CBC auto differential    Collection Time: 05/24/24  5:50 AM   Result Value Ref Range    WBC 6.98 3.90 - 12.70 K/uL    RBC 4.60 4.00 - 5.40 M/uL    Hemoglobin 10.6 (L) 12.0 - 16.0 g/dL    Hematocrit 36.6 (L) 37.0 - 48.5 %    MCV 80 (L) 82 - 98 fL    MCH 23.0 (L) 27.0 - 31.0 pg    MCHC 29.0 (L) 32.0 - 36.0 g/dL    RDW 16.8 (H) 11.5 - 14.5 %    Platelets 212 150 - 450 K/uL    MPV 11.2 9.2 - 12.9 fL    Immature Granulocytes 1.1 (H) 0.0 - 0.5 %    Gran # (ANC) 3.7 1.8 - 7.7 K/uL    Immature Grans (Abs) 0.08 (H) 0.00 - 0.04 K/uL    Lymph # 1.6 1.0 - 4.8 K/uL    Mono # 1.3 (H) 0.3 - 1.0 K/uL    Eos # 0.2 0.0 - 0.5 K/uL    Baso # 0.06 0.00 - 0.20 K/uL    nRBC 0 0 /100 WBC    Gran % 52.7 38.0 - 73.0 %    Lymph % 22.8 18.0 - 48.0 %    Mono % 19.1 (H) 4.0 - 15.0 %    Eosinophil % 3.4 0.0 - 8.0 %    Basophil % 0.9 0.0 - 1.9 %    Differential Method Automated    Comprehensive metabolic panel    Collection Time: 05/24/24  5:50 AM   Result Value Ref Range    Sodium 135 (L) 136 - 145 mmol/L    Potassium 4.2 3.5 - 5.1 mmol/L    Chloride 100 95 - 110 mmol/L    CO2 35 (H) 23 - 29  mmol/L    Glucose 86 70 - 110 mg/dL    BUN 27 (H) 8 - 23 mg/dL    Creatinine 0.8 0.5 - 1.4 mg/dL    Calcium 8.3 (L) 8.7 - 10.5 mg/dL    Total Protein 6.2 6.0 - 8.4 g/dL    Albumin 2.3 (L) 3.5 - 5.2 g/dL    Total Bilirubin 0.6 0.1 - 1.0 mg/dL    Alkaline Phosphatase 46 (L) 55 - 135 U/L     (H) 10 - 40 U/L     (H) 10 - 44 U/L    eGFR >60.0 >60 mL/min/1.73 m^2    Anion Gap 0 (L) 3 - 11 mmol/L   Basic Metabolic Panel    Collection Time: 05/24/24  5:50 AM   Result Value Ref Range    Sodium 135 (L) 136 - 145 mmol/L    Potassium 4.2 3.5 - 5.1 mmol/L    Chloride 100 95 - 110 mmol/L    CO2 35 (H) 23 - 29 mmol/L    Glucose 86 70 - 110 mg/dL    BUN 27 (H) 8 - 23 mg/dL    Creatinine 0.8 0.5 - 1.4 mg/dL    Calcium 8.3 (L) 8.7 - 10.5 mg/dL    Anion Gap 0 (L) 3 - 11 mmol/L    eGFR >60.0 >60 mL/min/1.73 m^2   POCT ARTERIAL BLOOD GAS    Collection Time: 05/24/24  7:35 AM   Result Value Ref Range    POC PH 7.281 (L) 7.345 - 7.450    POC PCO2 77.2 (HH) 35.0 - 45.0 mmHg    POC PO2 99.1 80.0 - 100 mmHg    POC SATURATED O2 97.8 95.0 - 100.0 %    POC HCO3 31.3 (H) 24.0 - 28.0 mmol/l    Base Deficit 9.6 (H) -2.0 - 2.0 mmol/l    POC Temp 37.0 C    Specimen source Arterial     Performed By: Reshma     MODIFIED MARIA ESTHER'S TEST POS     FiO2 50.0 %    O2DEVICE BIPAP     BIPAP 20/8     Provider Notified: ALEX MOYA     Notified Time 05/24/2024 07:36     Notified By Reshma     Notified Note Called and read back by ALEX MOYA    Hepatitis Panel, Acute    Collection Time: 05/24/24  8:23 AM   Result Value Ref Range    Hepatitis B Surface Ag Non-reactive Non-reactive    Hep B C IgM Non-reactive Non-reactive    Hep A IgM Non-reactive Non-reactive    Hepatitis C Ab Reactive (A) Non-reactive   POCT ARTERIAL BLOOD GAS    Collection Time: 05/24/24  2:06 PM   Result Value Ref Range    POC PH 7.355 7.345 - 7.450    POC PCO2 66.9 (HH) 35.0 - 45.0 mmHg    POC PO2 93.6 80.0 - 100 mmHg    POC SATURATED O2 98.0 95.0 - 100.0 %    POC HCO3 33.6 (H) 24.0 -  28.0 mmol/l    Base Deficit 11.9 (H) -2.0 - 2.0 mmol/l    POC Temp 37.0 C    Specimen source Arterial     Performed By: Reshma     MODIFIED MARIA ESTHER'S TEST POS     FiO2 40.0 %    O2DEVICE Other     Vt 400     BIPAP AVAPS 10-25 IPAP/8 EPAP     Provider Notified: ALEX MOYA     Notified Time 05/24/2024 14:08     Notified By Reshma     Notified Note Called and read back by ALEX MOYA    Magnesium    Collection Time: 05/25/24  3:52 AM   Result Value Ref Range    Magnesium 1.3 (L) 1.6 - 2.6 mg/dL   Comprehensive Metabolic Panel    Collection Time: 05/25/24  3:52 AM   Result Value Ref Range    Sodium 136 136 - 145 mmol/L    Potassium 4.1 3.5 - 5.1 mmol/L    Chloride 98 95 - 110 mmol/L    CO2 36 (H) 23 - 29 mmol/L    Glucose 119 (H) 70 - 110 mg/dL    BUN 15 8 - 23 mg/dL    Creatinine 0.7 0.5 - 1.4 mg/dL    Calcium 8.6 (L) 8.7 - 10.5 mg/dL    Total Protein 7.0 6.0 - 8.4 g/dL    Albumin 2.6 (L) 3.5 - 5.2 g/dL    Total Bilirubin 1.0 0.1 - 1.0 mg/dL    Alkaline Phosphatase 47 (L) 55 - 135 U/L     (H) 10 - 40 U/L    ALT 98 (H) 10 - 44 U/L    eGFR >60.0 >60 mL/min/1.73 m^2    Anion Gap 2 (L) 3 - 11 mmol/L   CBC Without Differential    Collection Time: 05/25/24  4:36 AM   Result Value Ref Range    WBC 6.17 3.90 - 12.70 K/uL    RBC 4.54 4.00 - 5.40 M/uL    Hemoglobin 10.6 (L) 12.0 - 16.0 g/dL    Hematocrit 34.9 (L) 37.0 - 48.5 %    MCV 77 (L) 82 - 98 fL    MCH 23.3 (L) 27.0 - 31.0 pg    MCHC 30.4 (L) 32.0 - 36.0 g/dL    RDW 16.3 (H) 11.5 - 14.5 %    Platelets 229 150 - 450 K/uL    MPV 10.3 9.2 - 12.9 fL   POCT ARTERIAL BLOOD GAS    Collection Time: 05/25/24 10:03 AM   Result Value Ref Range    POC PH 7.391 7.345 - 7.450    POC PCO2 63.0 (HH) 35.0 - 45.0 mmHg    POC PO2 100 (H) 80.0 - 100 mmHg    POC SATURATED O2 98.7 95.0 - 100.0 %    POC HCO3 34.8 (H) 24.0 - 28.0 mmol/l    Base Deficit 13.2 (H) -2.0 - 2.0 mmol/l    POC Temp 37.0 C    Specimen source Arterial     Performed By: Torrey     MODIFIED MARIA ESTHER'S TEST NA     FiO2 40.0  %    O2DEVICE BIPAP     Vt 400     Comments AVAPS 10-25 MIN/MAX; 8 EPAP; 8 BACKUP RATE     Provider Notified: ALEX MOYA     Notified Time 05/25/2024 10:05     Notified By Torrey     Notified Note Called and read back by ALEX MOYA    Gastrointestinal Pathogens Panel, PCR    Collection Time: 05/25/24 11:00 AM   Result Value Ref Range    GPP - Campylobacter Not Detected Not Detected    GPP - Clostridium difficile Toxin A/B Specimen inadequate for testing Not Detected    Plesiomonas shigelloides Not Detected Not Detected    GPP - Salmonella Not Detected Not Detected    Vibrio Not Detected Not Detected    GPP - Vibrio cholera Not Detected Not Detected    GPP - Yersinia enterocolitica Not Detected Not Detected    Enteroaggregative E coli Not Detected Not Detected    Enteropathogenic E coli Not Detected Not Detected    GPP - Enterotoxigenic E coli (ETEC) Not Detected Not Detected    GPP - Shiga Toxin-producing E coli (STEC) Not Detected Not Detected    Shigella/Enteroinvasive E coli Not Detected Not Detected    GPP - Cryptosporidium Not Detected Not Detected    Cyclospora cayetanensis Not Detected Not Detected    GPP - Entamoeba histolytica Not Detected Not Detected    GPP - Giardia lamblia Not Detected Not Detected    GPP - Adenovirus 40/41 Not Detected Not Detected    Astrovirus Not Detected Not Detected    GPP - Norovirus GI/GII Not Detected Not Detected    GPP - Rotavirus A Not Detected Not Detected    Sapovirus Not Detected Not Detected     Significant Imaging:  Imaging Results              CT Head Without Contrast (Final result)  Result time 05/23/24 10:11:32      Final result by Keith Schuler MD (05/23/24 10:11:32)                   Impression:      No acute intracranial hemorrhage, mass or other brain abnormality.    A preliminary report was faxed by [facility] shortly after completion of this examination.      Electronically signed by: Keith Schuler MD  Date:    05/23/2024  Time:    10:11                Narrative:    EXAMINATION:  CT HEAD WITHOUT CONTRAST    CLINICAL HISTORY:  altered mental status;    TECHNIQUE:  Axial CT images were obtained. Iterative reconstruction technique was used.  CT/cardiac nuclear exam/s in prior 12 months: 0.    COMPARISON:  None.    FINDINGS:  There is no acute intracranial hemorrhage.  There is no mass or mass effect.  No ventricular dilatation or abnormal extra-axial fluid collection.  There is no osseous abnormality.                                    EKG:  On 5/22 ECG showed normal sinus rhythm with nonspecific ST or T-wave changes.  No arrhythmia noted.    5/21/24:  Sinus rhythm with a PAC and nonspecific ST changes noted    Telemetry:      Physical Exam  S1, S2 normal   Clear to auscultation bilaterally   1+ edema bilaterally lower extremity  Home Medications:   No current facility-administered medications on file prior to encounter.     Current Outpatient Medications on File Prior to Encounter   Medication Sig Dispense Refill    albuterol (PROAIR HFA) 90 mcg/actuation inhaler ProAir HFA      albuterol (PROVENTIL/VENTOLIN HFA) 90 mcg/actuation inhaler Inhale into the lungs.      aspirin 81 MG Chew Take 81 mg by mouth once daily. Stop 05/20/2024 per [facility] office      budesonide-formoterol 160-4.5 mcg (SYMBICORT) 160-4.5 mcg/actuation HFAA 2 puffs Inhalation Twice a day      cetirizine (ZYRTEC) 5 MG tablet Take 5 mg by mouth once daily.      diazePAM (VALIUM) 10 MG Tab Take 10 mg by mouth every 6 (six) hours as needed.      docusate sodium (COLACE) 100 MG capsule Take 100 mg by mouth 2 (two) times daily.      fluticasone propionate (FLONASE) 50 mcg/actuation nasal spray       gabapentin (NEURONTIN) 100 MG capsule Take 1 capsule (100 mg total) by mouth 2 (two) times daily. (Patient taking differently: Take 800 mg by mouth 2 (two) times daily.) 60 capsule 3    ibuprofen (ADVIL,MOTRIN) 200 MG tablet Take 200 mg by mouth every 6 (six) hours as needed for Pain.       lisinopriL-hydrochlorothiazide (PRINZIDE,ZESTORETIC) 20-25 mg Tab       melatonin 3 mg Cap Take by mouth.      omeprazole (PRILOSEC) 20 MG capsule       pantoprazole (PROTONIX) 40 MG tablet       potassium chloride (MICRO-K) 10 MEQ CpSR Take 10 mEq by mouth once.      pravastatin (PRAVACHOL) 20 MG tablet       traZODone (DESYREL) 100 MG tablet Take 100 mg by mouth nightly as needed.      azithromycin (Z-DEIRDRE) 250 MG tablet Take 2 tablets on day 1, then take 1 tablet on days 2-5 6 tablet 0    cyclobenzaprine (FLEXERIL) 5 MG tablet Take 1 tablet (5 mg total) by mouth nightly as needed. 30 tablet 1    doxycycline (VIBRA-TABS) 100 MG tablet Take 1 tablet (100 mg total) by mouth 2 (two) times daily. 20 tablet 0    FLUoxetine 10 MG capsule Take 1 capsule (10 mg total) by mouth once daily. 30 capsule 11    lubiprostone (AMITIZA) 8 MCG Cap        Current Inpatient Medications:    Current Facility-Administered Medications:     0.9%  NaCl infusion, , Intravenous, PRN, Kalen Gaines, , Stopped at 05/23/24 0727    0.9%  NaCl infusion, , Intravenous, PRN, Keith Garcia Jr., MD, Last Rate: 10 mL/hr at 05/25/24 0715, Rate Verify at 05/25/24 0715    albuterol-ipratropium 2.5 mg-0.5 mg/3 mL nebulizer solution 3 mL, 3 mL, Nebulization, Q4H WAKE, Thierno Valladares, DO, 3 mL at 05/25/24 1526    budesonide nebulizer solution 0.5 mg, 0.5 mg, Nebulization, Q12H, Thierno Valladares, DO, 0.5 mg at 05/25/24 0801    [COMPLETED] calcium gluconate 1 g in NS IVPB (premixed), 1 g, Intravenous, ED 1 Time, Stopped at 05/23/24 0114 **AND** calcium gluconate 1 g in NS IVPB (premixed), 1 g, Intravenous, Q10 Min PRN, Kalen Gaines DO    dexmedetomidine (PRECEDEX) 400mcg/100mL 0.9% NaCL infusion, 0-1.4 mcg/kg/hr (Ideal), Intravenous, Continuous, Thierno Valladares DO, Last Rate: 3.53 mL/hr at 05/25/24 1113, 0.3 mcg/kg/hr at 05/25/24 1113    enoxaparin injection 40 mg, 40 mg, Subcutaneous, Daily, Thierno Valladares DO, 40 mg at  05/24/24 1713    famotidine (PF) injection 20 mg, 20 mg, Intravenous, BID, Thierno Valladares, DO, 20 mg at 05/25/24 1001    LORazepam injection 1 mg, 1 mg, Intravenous, Q6H PRN, Thierno Valladares, DO, 1 mg at 05/25/24 0334    magnesium sulfate 2g in water 50mL IVPB (premix), 2 g, Intravenous, Once, Keith Garcia Jr., MD, Last Rate: 25 mL/hr at 05/25/24 1412, 2 g at 05/25/24 1412    mupirocin 2 % ointment, , Nasal, BID, Thierno Valladares, DO, Given at 05/25/24 1001    ondansetron injection 4 mg, 4 mg, Intravenous, Q8H PRN, Jonathon Yung MD    piperacillin-tazobactam (ZOSYN) 4.5 g in dextrose 5 % in water (D5W) 100 mL IVPB (MB+), 4.5 g, Intravenous, Q8H, Thierno Valladares, DO, Last Rate: 25 mL/hr at 05/25/24 0851, 4.5 g at 05/25/24 0851    sodium chloride 0.9% flush 10 mL, 10 mL, Intravenous, PRN, Jonathon Yung MD, 10 mL at 05/24/24 2032  VTE Risk Mitigation (From admission, onward)           Ordered     enoxaparin injection 40 mg  Daily         05/24/24 1553     IP VTE HIGH RISK PATIENT  Once         05/23/24 1037     Place sequential compression device  Until discontinued         05/23/24 0706                  Assessment:   Arrhythmia:  Ventricular Tachycardia , frequent PACs  Hypertension  Recent history of methamphetamine use.  Hypomagnesemia  Hyperkalemia (Resolved)  Pneumonia        Plan:   It was monomorphic VT converted to NSR itself. This very well could be related to hypoxia. Other causes include hypomagnesemia, sepsis and recent drug use and acidosis.  EKG reviewed, Qtc was 481. Repeated EKG today to showed . Continue close monitoring electrolytes and Keep K.4-5 range and Mg>2.0 range.  Start beta-blocker metoprolol 25 mg b.i.d. if patient not able to tolerate oral can be given 5 mg IV q.8 hours.  Also would recommend to prevent hypoxic episode and keep SpO2 above 90%.  Frequent PACs in the setting of recent drug abuse.  Found to be positive with amphetamines.  No  cocaine tracers performed in UDS.  Beta-blocker will help keeping premature beats under control.  Recommend to do an echocardiogram to evaluate heart function especially in the setting of VT  Once patient is stabilizes also recommend to do a stress test can be done as an outpatient.    Please do not hesitate to contact me if you have any questions.      Patient meets ASPEN criteria for   malnutrition of   per RD assessment as evidenced by:                       A minimum of two characteristics is recommended for diagnosis of either severe or non-severe malnutrition.    Thank you for your consult.     Kev Martinez MD  Cardiology  [facility]  05/25/2024

## 2024-05-25 NOTE — ASSESSMENT & PLAN NOTE
This patient does have evidence of infective focus  My overall impression is sepsis.  Source: Respiratory  Antibiotics given-   Antibiotics (72h ago, onward)    Start     Stop Route Frequency Ordered    05/23/24 2100  mupirocin 2 % ointment         05/28/24 2059 Nasl 2 times daily 05/23/24 1410    05/23/24 1615  piperacillin-tazobactam (ZOSYN) 4.5 g in dextrose 5 % in water (D5W) 100 mL IVPB (MB+)         -- IV Every 8 hours (non-standard times) 05/23/24 1511        Latest lactate reviewed-  Recent Labs   Lab 05/23/24  0747   LACTATE 0.7       Organ dysfunction indicated by Acute kidney injury, Acute respiratory failure, and Encephalopathy    Fluid challenge Ideal Body Weight- The patient's ideal body weight is Ideal body weight: 47.1 kg (103 lb 14.5 oz) which will be used to calculate fluid bolus of 30 ml/kg for treatment of septic shock.      Post- resuscitation assessment Yes Perfusion exam was performed within 6 hours of septic shock presentation after bolus shows Adequate tissue perfusion assessed by non-invasive monitoring     Will Not start Pressors- Levophed for MAP of 65  Source control achieved by: rocephin x1, followed by zosyn     Review of medical chart with bacteremia with Ecoli growth in February 2024. Patient during last hospitalization left AMA. No record of continued antibiotics therapy.   Per pharmacy review; per Dr. Pierre, patient has been prescribed the following antibiotics.   - 4/9/24 macrobid 5 days  - 4/15/24 levaquin 7 days  - 4/22/24 doxycycline 7 days  CXR findings of multifocal disease, continue with zosyn    Initial management with fluid resuscitation, patient Estimated Creatinine Clearance: 54.8 mL/min (based on SCr of 0.7 mg/dL). Renal function at baseline Cr 0.8 from 3.4.   Blood cultures x2, NGTD x1 days  - f/u urine culture  - monitor renal function  - avoid nephrotoxic medications  - strict I/Os

## 2024-05-25 NOTE — SUBJECTIVE & OBJECTIVE
Interval History: patient seen and examined.     Review of Systems   Unable to perform ROS: Acuity of condition     Objective:     Vital Signs (Most Recent):  Temp: 97.2 °F (36.2 °C) (05/25/24 0716)  Pulse: (!) 57 (05/25/24 1136)  Resp: 17 (05/25/24 1136)  BP: (!) 129/94 (05/25/24 1136)  SpO2: (!) 94 % (05/25/24 1136) Vital Signs (24h Range):  Temp:  [97.2 °F (36.2 °C)-97.4 °F (36.3 °C)] 97.2 °F (36.2 °C)  Pulse:  [52-96] 57  Resp:  [11-61] 17  SpO2:  [82 %-100 %] 94 %  BP: (110-176)/(56-98) 129/94     Weight: 49.4 kg (109 lb)  Body mass index is 22.78 kg/m².    Intake/Output Summary (Last 24 hours) at 5/25/2024 1205  Last data filed at 5/25/2024 0715  Gross per 24 hour   Intake 567.93 ml   Output 775 ml   Net -207.07 ml         Physical Exam  Constitutional:       General: She is not in acute distress.     Appearance: She is ill-appearing.      Comments: Frail stature   HENT:      Mouth/Throat:      Mouth: Mucous membranes are dry.      Pharynx: Oropharynx is clear.   Cardiovascular:      Rate and Rhythm: Normal rate.      Pulses: Normal pulses.      Heart sounds: Normal heart sounds. No murmur heard.  Pulmonary:      Effort: Bradypnea present. No retractions.      Breath sounds: Transmitted upper airway sounds present. Rhonchi (bilaterally) present.   Abdominal:      General: Bowel sounds are normal. There is no distension.      Palpations: Abdomen is soft.      Tenderness: There is no abdominal tenderness. There is no guarding.      Comments: No stool   Genitourinary:     Comments: Cleveland catheter in place draining yellow colored urine  Musculoskeletal:      Cervical back: Normal range of motion and neck supple. No rigidity or tenderness.      Right lower leg: No edema.      Left lower leg: No edema.   Lymphadenopathy:      Cervical: No cervical adenopathy.   Skin:     General: Skin is warm and dry.      Capillary Refill: Capillary refill takes less than 2 seconds.      Findings: No erythema or rash.  "  Neurological:      Motor: No weakness.      Comments: Moving upper and lower extremities with appropriate strength   Psychiatric:         Behavior: Behavior is uncooperative.             Significant Labs: All pertinent labs within the past 24 hours have been reviewed.  Bilirubin:   Recent Labs   Lab 05/21/24  0953 05/22/24  2256 05/24/24  0550 05/25/24  0352   BILITOT 0.4 0.9 0.6 1.0     Blood Culture:   No results for input(s): "LABBLOO" in the last 48 hours.    BMP:   Recent Labs   Lab 05/25/24  0352   *      K 4.1   CL 98   CO2 36*   BUN 15   CREATININE 0.7   CALCIUM 8.6*   MG 1.3*     CBC:   Recent Labs   Lab 05/24/24  0550 05/25/24  0436   WBC 6.98 6.17   HGB 10.6* 10.6*   HCT 36.6* 34.9*    229     CMP:   Recent Labs   Lab 05/24/24  0006 05/24/24  0550 05/25/24  0352    135*  135* 136   K 4.1 4.2  4.2 4.1   CL 99 100  100 98   CO2 38* 35*  35* 36*   GLU 83 86  86 119*   BUN 31* 27*  27* 15   CREATININE 0.9 0.8  0.8 0.7   CALCIUM 8.0* 8.3*  8.3* 8.6*   PROT  --  6.2 7.0   ALBUMIN  --  2.3* 2.6*   BILITOT  --  0.6 1.0   ALKPHOS  --  46* 47*   AST  --  201* 123*   ALT  --  123* 98*   ANIONGAP 0* 0*  0* 2*     Lipase:   No results for input(s): "LIPASE" in the last 48 hours.    POCT Glucose:   No results for input(s): "POCTGLUCOSE" in the last 48 hours.    Urine Studies:   No results for input(s): "COLORU", "APPEARANCEUA", "PHUR", "SPECGRAV", "PROTEINUA", "GLUCUA", "KETONESU", "BILIRUBINUA", "OCCULTUA", "NITRITE", "UROBILINOGEN", "LEUKOCYTESUR", "RBCUA", "WBCUA", "BACTERIA", "SQUAMEPITHEL", "HYALINECASTS" in the last 48 hours.    Invalid input(s): "WRIGHTSUR"    X-Ray Chest 1 View  Narrative: EXAMINATION:  XR CHEST 1 VIEW    CLINICAL HISTORY:  hypoxia;    COMPARISON:  Portable chest 02/01/2024.    FINDINGS:  Frontal chest radiograph demonstrates mild heterogeneous opacities bilateral lower lung zones including the left upper lung zone.  No pleural fluid.  Mild enlargement of " cardiac silhouette.  No osseous abnormality.  Impression: Evidence of edema or multifocal pneumonia.    Electronically signed by: Keith Schuler MD  Date:    05/23/2024  Time:    14:59    CT Head Without Contrast  Narrative: EXAMINATION:  CT HEAD WITHOUT CONTRAST    CLINICAL HISTORY:  altered mental status;    TECHNIQUE:  Axial CT images were obtained. Iterative reconstruction technique was used.  CT/cardiac nuclear exam/s in prior 12 months: 0.    COMPARISON:  None.    FINDINGS:  There is no acute intracranial hemorrhage.  There is no mass or mass effect.  No ventricular dilatation or abnormal extra-axial fluid collection.  There is no osseous abnormality.  Impression: No acute intracranial hemorrhage, mass or other brain abnormality.    A preliminary report was faxed by [facility] shortly after completion of this examination.    Electronically signed by: Keith Schuler MD  Date:    05/23/2024  Time:    10:11     Significant Imaging: I have reviewed all pertinent imaging results/findings within the past 24 hours.

## 2024-05-25 NOTE — PLAN OF CARE
Care plan reviewed and on-going. Restraints in use. See flowsheet. Cont with Precedex. Cardiology consult completed. EKG completed. Cont with Abx. [place] cath draining. More alert. Cont to pull at Bipap. Stool sample collected. Formed sample. Family visited. Mag replaced

## 2024-05-25 NOTE — NURSING
Patient keeps fighting to pull off the AVAPS, and get out of the bed etc. This is increasing her heart rate and lowering her pulse ox saturation.  The Precedex has been increased  as ordered now at 0.7 mcg/kg/hr.    unable to assess

## 2024-05-25 NOTE — ASSESSMENT & PLAN NOTE
Patient has a diagnosis of pneumonia. The cause of the pneumonia is unknown at this time. The pneumonia is worsening due to required need for oxygen support, baseline O2 continuous 3L via nasal cannula.  .   The patient has the following signs/symptoms of pneumonia: persistent hypoxia . The patient does have a current oxygen requirement and the patient does have a home oxygen requirement. I have reviewed the pertinent imaging. The following cultures have been collected: Blood cultures The culture results are listed below.     Current antimicrobial regimen consists of the antibiotics listed below. Will monitor patient closely and Adjust treatment plan as follows zosyn, breathing treatments,BiPAP adjusted per respiratory     COVID19 and influenza negative.   Antibiotics (From admission, onward)    Start     Stop Route Frequency Ordered    05/23/24 2100  mupirocin 2 % ointment         05/28/24 2059 Nasl 2 times daily 05/23/24 1410    05/23/24 1615  piperacillin-tazobactam (ZOSYN) 4.5 g in dextrose 5 % in water (D5W) 100 mL IVPB (MB+)         -- IV Every 8 hours (non-standard times) 05/23/24 1511          Microbiology Results (last 7 days)     Procedure Component Value Units Date/Time    Clostridium difficile EIA [[id number]] Collected: 05/25/24 1100    Order Status: Canceled Specimen: Stool     Urine culture [[id number]] Collected: 05/23/24 0116    Order Status: Completed Specimen: Urine Updated: 05/24/24 2206     Urine Culture, Routine No growth    Narrative:      Preferred Collection Type->Urine, Clean Catch  Specimen Source->Urine    Blood culture [[id number]] Collected: 05/23/24 0747    Order Status: Completed Specimen: Blood from Peripheral, Hand, Right Updated: 05/24/24 2012     Blood Culture, Routine No Growth to date      No Growth to date    Blood culture [[id number]] Collected: 05/23/24 0752    Order Status: Completed Specimen: Blood Updated: 05/24/24 2012     Blood Culture, Routine No Growth to date      No  Growth to date      5/24/24 Continue zosyn

## 2024-05-25 NOTE — NURSING
At 0342 patient went into V-Tach, Code blue was called, chest compressions started while adjusting the bed and patient converted into SR. Precedex decreased to 0.4 mcg/kg/hr.  ER doctor and staff arrived pt is in sinus, B/P is 131/60.   Called Dr. Garcia to report the rhythm, lab orders noted.

## 2024-05-25 NOTE — EICU
Intervention Initiated From:  [facility] / EICU    Megha intervened regarding:  Rounding (Video assessment)    Nurse Notified:  No    Doctor Notified:  No    Comments: Video rounding completed. RN at bedside. Precedex infusing as per MAR. VSS, AVAPS w/ 40% O2 in place, sats 100%.

## 2024-05-25 NOTE — EICU
Intervention Initiated From:  [facility] / EICU    Megha intervened regarding:  Rounding (Video assessment)    Comments: Video assessment completed.  Pt lying in bed with eyes closed.  BiPAP in place.  Resp even and unlabored with SaO2 99% at present.  Precedex infusion in progress at 0.4 mcg/kg/hr.  NAD noted at this time.

## 2024-05-25 NOTE — PROGRESS NOTES
"[facility] Medicine  Progress Note    Patient Name: June Boykin  MRN: [id number]  Patient Class: IP- Inpatient   Admission Date: 5/22/2024  Length of Stay: 2 days  Attending Physician: Thierno Valladares DO  Primary Care Provider: Mauricio Pierre MD        Subjective:     Principal Problem:Acute hypercapnic respiratory failure        HPI:  Chief Complaint    Complaint Comment   Diarrhea Pt to the ER w/ complaints of diarrhea, decreased appetite, "feels dehydrated." Pt denies any recent illness, denies any weakness, dizziness, chest pain, or SOB during triage. "Out of it and lethargic" per family.     69 year old with hypertension, anxiety, emphysema (on home O2 3L via nasal cannula), history of methamphetamine use presented to the emergency department overnight with complaints of diarrhea, dehydration and frequent falls in the past two days.   Patient on exam is very sleepy, responds with a grown and a word of two with voice and painful stimulus, unable to answer questions.   Vital signs at time of exam, BP 85/53 mmHg, RR 30, HR 69, SpO2 100% on continuous O2 4L via nasal cannula with sodium bicarb gtt at 150/hr.     ED course:  Vital signs on arrival BP 85/47 mmHg, HR 90, RR 18, SpO2 94% on room air, temp 97.7F.   WBC 7.94, Hg 12.3, HCT 42.3,   Na 131, K 6, Cl 95, CO2 28, Glu 88, BUN 42, Cr 3.4 (baseline 1), [place] 9.7, Alb 2.7, Tbili 0.9, Alk phos 56, AST 91, ALT 46, lipase 26, Anion Gap 8.  UDS positive for benzos and amphetamines.   Urinalysis microhematuria, WBC 16, no bacteria, no nitrite.  Received rocephin 1g.  Patient received bolus NS 1L x1, lomotil x1.   Calcium gluc x1, D10 IVF 50g x 1, insulin 5U, Lokelma 10 mg.   Placed on sodium bicarb gtt 150mEq x1.  Repeat potassium levels 4.4 <5.5 with improving renal function.     Patient acutely became agitated, pulling on telemetry pulse oximetry readings in 80s and palced on high flow nasal cannula.   ABG findings pH " 7.2/94.1/77.8/29.9 92% on FiO2 40% on venturi mask.  Patient admitted to ICU for acute respiratory failure on BiPAP and acute encephalopathy secondary to metabolic, infectious and toxic (polypharmacy) sources.     Medications reviewed from pill boxes provided by daughter and niece contain  Gabapentin 800 mg tablets, Simethicone 125 mg capsules, omeprazole 20 mg capsules, cetirizine 10 mg tablet, docusate 100 mg capsules, aspirin 81 mg tablets, trazodone 100 mg tablet, advil PM tablets, vitamin B2 100 mg tablets, pravastatin 20 mg tablets, and lisinopril/hctz 20/25 mg tablet.    Overview/Hospital Course:  5/24/24 Patient intermittently waking up trying to pull on lines and mask, nursing staff and family at bedside reporting complaints of pain over facial mask and misidentifying family names and face. Required ativan 1mg x2, will continue with symptom directed use of anxiolytic and monitor withdrawal signs. Follow up psychiatry when patient alert and responsive to questions.   Overnight BiPAP with ABG 7.2/77.2/99/31 97.8% on FiO2 50%. PCO2 slowly improving from PCO2 94 chronic retention, on last hospitalization noted to leave AMA with PCO2 57. Will transition to AVAPS and repeat ABG PCO2 94. Continue with scheduled breathing treatments and zosyn for bilateral pneumonia. No leukocytosis. Follow up cultures.   MARY resolved. Estimated Creatinine Clearance: 48 mL/min (based on SCr of 0.8 mg/dL). Renal function back to baseline with IVF hydration. Monitor I/Os.     5/25: Patient remains BiPAP dependent but appears to be resting comfortably with current settings.  Arterial blood gases show continued improvement in pCO2 levels.  Blood cultures remain negative.    Interval History: patient seen and examined.     Review of Systems   Unable to perform ROS: Acuity of condition     Objective:     Vital Signs (Most Recent):  Temp: 97.2 °F (36.2 °C) (05/25/24 0716)  Pulse: (!) 57 (05/25/24 1136)  Resp: 17 (05/25/24 1136)  BP: (!)  129/94 (05/25/24 1136)  SpO2: (!) 94 % (05/25/24 1136) Vital Signs (24h Range):  Temp:  [97.2 °F (36.2 °C)-97.4 °F (36.3 °C)] 97.2 °F (36.2 °C)  Pulse:  [52-96] 57  Resp:  [11-61] 17  SpO2:  [82 %-100 %] 94 %  BP: (110-176)/(56-98) 129/94     Weight: 49.4 kg (109 lb)  Body mass index is 22.78 kg/m².    Intake/Output Summary (Last 24 hours) at 5/25/2024 1205  Last data filed at 5/25/2024 0715  Gross per 24 hour   Intake 567.93 ml   Output 775 ml   Net -207.07 ml         Physical Exam  Constitutional:       General: She is not in acute distress.     Appearance: She is ill-appearing.      Comments: Frail stature   HENT:      Mouth/Throat:      Mouth: Mucous membranes are dry.      Pharynx: Oropharynx is clear.   Cardiovascular:      Rate and Rhythm: Normal rate.      Pulses: Normal pulses.      Heart sounds: Normal heart sounds. No murmur heard.  Pulmonary:      Effort: Bradypnea present. No retractions.      Breath sounds: Transmitted upper airway sounds present. Rhonchi (bilaterally) present.   Abdominal:      General: Bowel sounds are normal. There is no distension.      Palpations: Abdomen is soft.      Tenderness: There is no abdominal tenderness. There is no guarding.      Comments: No stool   Genitourinary:     Comments: [place] catheter in place draining yellow colored urine  Musculoskeletal:      Cervical back: Normal range of motion and neck supple. No rigidity or tenderness.      Right lower leg: No edema.      Left lower leg: No edema.   Lymphadenopathy:      Cervical: No cervical adenopathy.   Skin:     General: Skin is warm and dry.      Capillary Refill: Capillary refill takes less than 2 seconds.      Findings: No erythema or rash.   Neurological:      Motor: No weakness.      Comments: Moving upper and lower extremities with appropriate strength   Psychiatric:         Behavior: Behavior is uncooperative.             Significant Labs: All pertinent labs within the past 24 hours have been  "reviewed.  Bilirubin:   Recent Labs   Lab 05/21/24  0953 05/22/24  2256 05/24/24  0550 05/25/24  0352   BILITOT 0.4 0.9 0.6 1.0     Blood Culture:   No results for input(s): "LABBLOO" in the last 48 hours.    BMP:   Recent Labs   Lab 05/25/24  0352   *      K 4.1   CL 98   CO2 36*   BUN 15   CREATININE 0.7   CALCIUM 8.6*   MG 1.3*     CBC:   Recent Labs   Lab 05/24/24  0550 05/25/24  0436   WBC 6.98 6.17   HGB 10.6* 10.6*   HCT 36.6* 34.9*    229     CMP:   Recent Labs   Lab 05/24/24  0006 05/24/24  0550 05/25/24  0352    135*  135* 136   K 4.1 4.2  4.2 4.1   CL 99 100  100 98   CO2 38* 35*  35* 36*   GLU 83 86  86 119*   BUN 31* 27*  27* 15   CREATININE 0.9 0.8  0.8 0.7   CALCIUM 8.0* 8.3*  8.3* 8.6*   PROT  --  6.2 7.0   ALBUMIN  --  2.3* 2.6*   BILITOT  --  0.6 1.0   ALKPHOS  --  46* 47*   AST  --  201* 123*   ALT  --  123* 98*   ANIONGAP 0* 0*  0* 2*     Lipase:   No results for input(s): "LIPASE" in the last 48 hours.    POCT Glucose:   No results for input(s): "POCTGLUCOSE" in the last 48 hours.    Urine Studies:   No results for input(s): "COLORU", "APPEARANCEUA", "PHUR", "SPECGRAV", "PROTEINUA", "GLUCUA", "KETONESU", "BILIRUBINUA", "OCCULTUA", "NITRITE", "UROBILINOGEN", "LEUKOCYTESUR", "RBCUA", "WBCUA", "BACTERIA", "SQUAMEPITHEL", "HYALINECASTS" in the last 48 hours.    Invalid input(s): "WRIGHTSUR"    X-Ray Chest 1 View  Narrative: EXAMINATION:  XR CHEST 1 VIEW    CLINICAL HISTORY:  hypoxia;    COMPARISON:  Portable chest 02/01/2024.    FINDINGS:  Frontal chest radiograph demonstrates mild heterogeneous opacities bilateral lower lung zones including the left upper lung zone.  No pleural fluid.  Mild enlargement of cardiac silhouette.  No osseous abnormality.  Impression: Evidence of edema or multifocal pneumonia.    Electronically signed by: Keith Schuler MD  Date:    05/23/2024  Time:    14:59    CT Head Without Contrast  Narrative: EXAMINATION:  CT HEAD WITHOUT " CONTRAST    CLINICAL HISTORY:  altered mental status;    TECHNIQUE:  Axial CT images were obtained. Iterative reconstruction technique was used.  CT/cardiac nuclear exam/s in prior 12 months: 0.    COMPARISON:  None.    FINDINGS:  There is no acute intracranial hemorrhage.  There is no mass or mass effect.  No ventricular dilatation or abnormal extra-axial fluid collection.  There is no osseous abnormality.  Impression: No acute intracranial hemorrhage, mass or other brain abnormality.    A preliminary report was faxed by [facility] shortly after completion of this examination.    Electronically signed by: Keith Schuler MD  Date:    05/23/2024  Time:    10:11     Significant Imaging: I have reviewed all pertinent imaging results/findings within the past 24 hours.    Assessment/Plan:      * Acute hypercapnic respiratory failure  Patient with Hypercapnic and Hypoxic Respiratory failure which is Acute.  she is on home oxygen at 3 LPM. Supplemental oxygen was provided and noted- Oxygen Concentration (%):  [40] 40    Signs/symptoms of respiratory failure include- respiratory distress and lethargy. Contributing diagnoses includes - COPD, Pleural effusion, Pneumonia, and polysubstance use with respiratory depression.  Labs and images were reviewed. Patient Has recent ABG, which has been reviewed. Will treat underlying causes and adjust management of respiratory failure as follows- continuous O2, scheduled breathing treatments, IV antibiotics.     5/24/24 Hypercarbia with small improvement, will transition to AVAPS and continue to monitor response.   Mental status wax and wane, intermittently agitated trying to pull on lines and mask.   - continue scheduled neuro checks  - continuous O2 per respiratory  - maintain SpO2 >92%  - last ABG pH 7.28/pCO2 77.2/pO2 99/HCO3 31, O2 97.8% on FiO2 50%    Multifocal pneumonia  Patient has a diagnosis of pneumonia. The cause of the pneumonia is unknown at this time. The  pneumonia is worsening due to required need for oxygen support, baseline O2 continuous 3L via nasal cannula.  .   The patient has the following signs/symptoms of pneumonia: persistent hypoxia . The patient does have a current oxygen requirement and the patient does have a home oxygen requirement. I have reviewed the pertinent imaging. The following cultures have been collected: Blood cultures The culture results are listed below.     Current antimicrobial regimen consists of the antibiotics listed below. Will monitor patient closely and Adjust treatment plan as follows zosyn, breathing treatments,BiPAP adjusted per respiratory     COVID19 and influenza negative.   Antibiotics (From admission, onward)      Start     Stop Route Frequency Ordered    05/23/24 2100  mupirocin 2 % ointment         05/28/24 2059 Nasl 2 times daily 05/23/24 1410    05/23/24 1615  piperacillin-tazobactam (ZOSYN) 4.5 g in dextrose 5 % in water (D5W) 100 mL IVPB (MB+)         -- IV Every 8 hours (non-standard times) 05/23/24 1511            Microbiology Results (last 7 days)       Procedure Component Value Units Date/Time    Clostridium difficile EIA [[id number]] Collected: 05/25/24 1100    Order Status: Canceled Specimen: Stool     Urine culture [[id number]] Collected: 05/23/24 0116    Order Status: Completed Specimen: Urine Updated: 05/24/24 2206     Urine Culture, Routine No growth    Narrative:      Preferred Collection Type->Urine, Clean Catch  Specimen Source->Urine    Blood culture [[id number]] Collected: 05/23/24 0747    Order Status: Completed Specimen: Blood from Peripheral, Hand, Right Updated: 05/24/24 2012     Blood Culture, Routine No Growth to date      No Growth to date    Blood culture [[id number]] Collected: 05/23/24 0752    Order Status: Completed Specimen: Blood Updated: 05/24/24 2012     Blood Culture, Routine No Growth to date      No Growth to date        5/24/24 Continue zosyn    Encephalopathy, toxic  Secondary to  polypharmacy. UDS positive for amphetamine and benzodiazepine.   Follow management above.       Encephalopathy, metabolic  Multifactorial with acute renal faliure, hyponatremia, hyperkalemia.   Initial potassium 6, now within normal range.   Stable chemistry labs.   LFTs elevated, likely reactive with infectious foci, continue to monitor.       Polypharmacy  UDS presumptive positive for amphetamine and benzodiazepine.   Patient has RX for valium 10 mg (last filled 5/14/24 30 tablets with current bottle with 10 tablets remaining).   Patient daily intake 10-30 mg by count above.   Trazodone which is a medication patient currently taking may falsely result in positive UDS screen.  During last hospitalization in February 2024, patient reported to be doing methamphetamine and also, during ER stay and while still communicating with staff, she reported taking street provided adderall of unknown dose.   Combination benzodiazepine, gabapentin, trazodone, and stimulants may be source of respiratory depression.       Sepsis with acute renal failure and tubular necrosis without septic shock  This patient does have evidence of infective focus  My overall impression is sepsis.  Source: Respiratory  Antibiotics given-   Antibiotics (72h ago, onward)      Start     Stop Route Frequency Ordered    05/23/24 2100  mupirocin 2 % ointment         05/28/24 2059 Nasl 2 times daily 05/23/24 1410    05/23/24 1615  piperacillin-tazobactam (ZOSYN) 4.5 g in dextrose 5 % in water (D5W) 100 mL IVPB (MB+)         -- IV Every 8 hours (non-standard times) 05/23/24 1511          Latest lactate reviewed-  Recent Labs   Lab 05/23/24  0747   LACTATE 0.7       Organ dysfunction indicated by Acute kidney injury, Acute respiratory failure, and Encephalopathy    Fluid challenge Ideal Body Weight- The patient's ideal body weight is Ideal body weight: 47.1 kg (103 lb 14.5 oz) which will be used to calculate fluid bolus of 30 ml/kg for treatment of septic  shock.      Post- resuscitation assessment Yes Perfusion exam was performed within 6 hours of septic shock presentation after bolus shows Adequate tissue perfusion assessed by non-invasive monitoring     Will Not start Pressors- Levophed for MAP of 65  Source control achieved by: rocephin x1, followed by zosyn     Review of medical chart with bacteremia with Ecoli growth in February 2024. Patient during last hospitalization left AMA. No record of continued antibiotics therapy.   Per pharmacy review; per Dr. Pierre, patient has been prescribed the following antibiotics.   - 4/9/24 macrobid 5 days  - 4/15/24 levaquin 7 days  - 4/22/24 doxycycline 7 days  CXR findings of multifocal disease, continue with zosyn    Initial management with fluid resuscitation, patient Estimated Creatinine Clearance: 54.8 mL/min (based on SCr of 0.7 mg/dL). Renal function at baseline Cr 0.8 from 3.4.   Blood cultures x2, NGTD x1 days  - f/u urine culture  - monitor renal function  - avoid nephrotoxic medications  - strict I/Os            Hypotension due to hypovolemia  Latest blood pressure and vitals reviewed-   HOLD all home antihypertensive medications until blood pressure improves.   Patient received IVF NS 2L followed by continuous IVF.   - continuous telemetry  - continuous pulse oximetry    Temp:  [97.2 °F (36.2 °C)-97.4 °F (36.3 °C)]   Pulse:  [52-96]   Resp:  [11-61]   BP: (110-176)/(56-98)   SpO2:  [82 %-100 %] .   Home meds for hypertension were reviewed and noted below.   Hypertension Medications               lisinopriL-hydrochlorothiazide (PRINZIDE,ZESTORETIC) 20-25 mg Tab           While in the hospital, will manage blood pressure as follows.        VTE Risk Mitigation (From admission, onward)           Ordered     enoxaparin injection 40 mg  Daily         05/24/24 1553     IP VTE HIGH RISK PATIENT  Once         05/23/24 1037     Place sequential compression device  Until discontinued         05/23/24 0706                     Discharge Planning   LACY:      Code Status: Full Code   Is the patient medically ready for discharge?:     Reason for patient still in hospital (select all that apply): Treatment  Discharge Plan A: Home with family            Critical care time: 30 minutes      Keith Garcia Jr, MD  Department of [facility] Medicine   [place] - Intensive [facility], [place]

## 2024-05-25 NOTE — ASSESSMENT & PLAN NOTE
Latest blood pressure and vitals reviewed-   HOLD all home antihypertensive medications until blood pressure improves.   Patient received IVF NS 2L followed by continuous IVF.   - continuous telemetry  - continuous pulse oximetry    Temp:  [97.2 °F (36.2 °C)-97.4 °F (36.3 °C)]   Pulse:  [52-96]   Resp:  [11-61]   BP: (110-176)/(56-98)   SpO2:  [82 %-100 %] .   Home meds for hypertension were reviewed and noted below.   Hypertension Medications               lisinopriL-hydrochlorothiazide (PRINZIDE,ZESTORETIC) 20-25 mg Tab           While in the hospital, will manage blood pressure as follows.

## 2024-05-25 NOTE — ASSESSMENT & PLAN NOTE
Patient with Hypercapnic and Hypoxic Respiratory failure which is Acute.  she is on home oxygen at 3 LPM. Supplemental oxygen was provided and noted- Oxygen Concentration (%):  [40] 40    Signs/symptoms of respiratory failure include- respiratory distress and lethargy. Contributing diagnoses includes - COPD, Pleural effusion, Pneumonia, and polysubstance use with respiratory depression.  Labs and images were reviewed. Patient Has recent ABG, which has been reviewed. Will treat underlying causes and adjust management of respiratory failure as follows- continuous O2, scheduled breathing treatments, IV antibiotics.     5/24/24 Hypercarbia with small improvement, will transition to AVAPS and continue to monitor response.   Mental status wax and wane, intermittently agitated trying to pull on lines and mask.   - continue scheduled neuro checks  - continuous O2 per respiratory  - maintain SpO2 >92%  - last ABG pH 7.28/pCO2 77.2/pO2 99/HCO3 31, O2 97.8% on FiO2 50%

## 2024-05-26 LAB
ALBUMIN SERPL BCP-MCNC: 2.3 G/DL (ref 3.5–5.2)
ALP SERPL-CCNC: 39 U/L (ref 55–135)
ALT SERPL W/O P-5'-P-CCNC: 66 U/L (ref 10–44)
ANION GAP SERPL CALC-SCNC: 2 MMOL/L (ref 3–11)
AST SERPL-CCNC: 58 U/L (ref 10–40)
BASOPHILS # BLD AUTO: 0.05 K/UL (ref 0–0.2)
BASOPHILS NFR BLD: 0.7 % (ref 0–1.9)
BILIRUB SERPL-MCNC: 1 MG/DL (ref 0.1–1)
BUN SERPL-MCNC: 9 MG/DL (ref 8–23)
CALCIUM SERPL-MCNC: 8.6 MG/DL (ref 8.7–10.5)
CHLORIDE SERPL-SCNC: 98 MMOL/L (ref 95–110)
CO2 SERPL-SCNC: 38 MMOL/L (ref 23–29)
CREAT SERPL-MCNC: 0.5 MG/DL (ref 0.5–1.4)
DIFFERENTIAL METHOD BLD: ABNORMAL
EOSINOPHIL # BLD AUTO: 0.2 K/UL (ref 0–0.5)
EOSINOPHIL NFR BLD: 3 % (ref 0–8)
ERYTHROCYTE [DISTWIDTH] IN BLOOD BY AUTOMATED COUNT: 16.3 % (ref 11.5–14.5)
EST. GFR  (NO RACE VARIABLE): >60 ML/MIN/1.73 M^2
GLUCOSE SERPL-MCNC: 106 MG/DL (ref 70–110)
HCT VFR BLD AUTO: 34.7 % (ref 37–48.5)
HGB BLD-MCNC: 10.5 G/DL (ref 12–16)
IMM GRANULOCYTES # BLD AUTO: 0.03 K/UL (ref 0–0.04)
IMM GRANULOCYTES NFR BLD AUTO: 0.4 % (ref 0–0.5)
LYMPHOCYTES # BLD AUTO: 1.4 K/UL (ref 1–4.8)
LYMPHOCYTES NFR BLD: 20.6 % (ref 18–48)
MAGNESIUM SERPL-MCNC: 1.8 MG/DL (ref 1.6–2.6)
MCH RBC QN AUTO: 22.9 PG (ref 27–31)
MCHC RBC AUTO-ENTMCNC: 30.3 G/DL (ref 32–36)
MCV RBC AUTO: 76 FL (ref 82–98)
MONOCYTES # BLD AUTO: 1.2 K/UL (ref 0.3–1)
MONOCYTES NFR BLD: 17.6 % (ref 4–15)
NEUTROPHILS # BLD AUTO: 3.9 K/UL (ref 1.8–7.7)
NEUTROPHILS NFR BLD: 57.7 % (ref 38–73)
NRBC BLD-RTO: 0 /100 WBC
OHS QRS DURATION: 80 MS
OHS QTC CALCULATION: 460 MS
PLATELET # BLD AUTO: 246 K/UL (ref 150–450)
PMV BLD AUTO: 10.1 FL (ref 9.2–12.9)
POTASSIUM SERPL-SCNC: 3.2 MMOL/L (ref 3.5–5.1)
PROT SERPL-MCNC: 6.4 G/DL (ref 6–8.4)
RBC # BLD AUTO: 4.58 M/UL (ref 4–5.4)
SODIUM SERPL-SCNC: 138 MMOL/L (ref 136–145)
WBC # BLD AUTO: 6.7 K/UL (ref 3.9–12.7)

## 2024-05-26 PROCEDURE — 63600175 PHARM REV CODE 636 W HCPCS: Performed by: STUDENT IN AN ORGANIZED HEALTH CARE EDUCATION/TRAINING PROGRAM

## 2024-05-26 PROCEDURE — 94640 AIRWAY INHALATION TREATMENT: CPT

## 2024-05-26 PROCEDURE — 25000003 PHARM REV CODE 250: Performed by: INTERNAL MEDICINE

## 2024-05-26 PROCEDURE — 80053 COMPREHEN METABOLIC PANEL: CPT | Performed by: STUDENT IN AN ORGANIZED HEALTH CARE EDUCATION/TRAINING PROGRAM

## 2024-05-26 PROCEDURE — 25000242 PHARM REV CODE 250 ALT 637 W/ HCPCS: Performed by: STUDENT IN AN ORGANIZED HEALTH CARE EDUCATION/TRAINING PROGRAM

## 2024-05-26 PROCEDURE — 83735 ASSAY OF MAGNESIUM: CPT | Performed by: STUDENT IN AN ORGANIZED HEALTH CARE EDUCATION/TRAINING PROGRAM

## 2024-05-26 PROCEDURE — 94761 N-INVAS EAR/PLS OXIMETRY MLT: CPT

## 2024-05-26 PROCEDURE — 36415 COLL VENOUS BLD VENIPUNCTURE: CPT | Performed by: STUDENT IN AN ORGANIZED HEALTH CARE EDUCATION/TRAINING PROGRAM

## 2024-05-26 PROCEDURE — 99900031 HC PATIENT EDUCATION (STAT)

## 2024-05-26 PROCEDURE — 85025 COMPLETE CBC W/AUTO DIFF WBC: CPT | Performed by: STUDENT IN AN ORGANIZED HEALTH CARE EDUCATION/TRAINING PROGRAM

## 2024-05-26 PROCEDURE — 25000003 PHARM REV CODE 250: Performed by: STUDENT IN AN ORGANIZED HEALTH CARE EDUCATION/TRAINING PROGRAM

## 2024-05-26 PROCEDURE — 25000003 PHARM REV CODE 250: Performed by: EMERGENCY MEDICINE

## 2024-05-26 PROCEDURE — 63600175 PHARM REV CODE 636 W HCPCS: Performed by: INTERNAL MEDICINE

## 2024-05-26 PROCEDURE — 27100171 HC OXYGEN HIGH FLOW UP TO 24 HOURS

## 2024-05-26 PROCEDURE — 94660 CPAP INITIATION&MGMT: CPT

## 2024-05-26 PROCEDURE — 20000000 HC ICU ROOM

## 2024-05-26 PROCEDURE — 27000221 HC OXYGEN, UP TO 24 HOURS

## 2024-05-26 PROCEDURE — 99900035 HC TECH TIME PER 15 MIN (STAT)

## 2024-05-26 PROCEDURE — A4216 STERILE WATER/SALINE, 10 ML: HCPCS | Performed by: INTERNAL MEDICINE

## 2024-05-26 RX ORDER — QUETIAPINE FUMARATE 50 MG/1
50 TABLET, FILM COATED ORAL ONCE
Status: COMPLETED | OUTPATIENT
Start: 2024-05-26 | End: 2024-05-26

## 2024-05-26 RX ORDER — POTASSIUM CHLORIDE 7.45 MG/ML
10 INJECTION INTRAVENOUS
Status: DISCONTINUED | OUTPATIENT
Start: 2024-05-26 | End: 2024-05-26

## 2024-05-26 RX ORDER — SODIUM CHLORIDE 0.9 % (FLUSH) 0.9 %
10 SYRINGE (ML) INJECTION
Status: DISCONTINUED | OUTPATIENT
Start: 2024-05-26 | End: 2024-05-27 | Stop reason: HOSPADM

## 2024-05-26 RX ORDER — MAGNESIUM SULFATE HEPTAHYDRATE 40 MG/ML
2 INJECTION, SOLUTION INTRAVENOUS ONCE
Status: COMPLETED | OUTPATIENT
Start: 2024-05-26 | End: 2024-05-26

## 2024-05-26 RX ORDER — SODIUM CHLORIDE 0.9 % (FLUSH) 0.9 %
10 SYRINGE (ML) INJECTION EVERY 6 HOURS
Status: DISCONTINUED | OUTPATIENT
Start: 2024-05-26 | End: 2024-05-27 | Stop reason: HOSPADM

## 2024-05-26 RX ADMIN — DEXMEDETOMIDINE HYDROCHLORIDE 0.5 MCG/KG/HR: 4 INJECTION INTRAVENOUS at 07:05

## 2024-05-26 RX ADMIN — FAMOTIDINE 20 MG: 10 INJECTION, SOLUTION INTRAVENOUS at 09:05

## 2024-05-26 RX ADMIN — PIPERACILLIN SODIUM AND TAZOBACTAM SODIUM 4.5 G: 4; .5 INJECTION, POWDER, LYOPHILIZED, FOR SOLUTION INTRAVENOUS at 09:05

## 2024-05-26 RX ADMIN — MUPIROCIN: 20 OINTMENT TOPICAL at 09:05

## 2024-05-26 RX ADMIN — IPRATROPIUM BROMIDE AND ALBUTEROL SULFATE 3 ML: 2.5; .5 SOLUTION RESPIRATORY (INHALATION) at 07:05

## 2024-05-26 RX ADMIN — QUETIAPINE FUMARATE 50 MG: 50 TABLET ORAL at 08:05

## 2024-05-26 RX ADMIN — BUDESONIDE INHALATION 0.5 MG: 0.5 SUSPENSION RESPIRATORY (INHALATION) at 07:05

## 2024-05-26 RX ADMIN — FAMOTIDINE 20 MG: 10 INJECTION, SOLUTION INTRAVENOUS at 08:05

## 2024-05-26 RX ADMIN — POTASSIUM BICARBONATE 25 MEQ: 978 TABLET, EFFERVESCENT ORAL at 06:05

## 2024-05-26 RX ADMIN — PIPERACILLIN SODIUM AND TAZOBACTAM SODIUM 4.5 G: 4; .5 INJECTION, POWDER, LYOPHILIZED, FOR SOLUTION INTRAVENOUS at 12:05

## 2024-05-26 RX ADMIN — IPRATROPIUM BROMIDE AND ALBUTEROL SULFATE 3 ML: 2.5; .5 SOLUTION RESPIRATORY (INHALATION) at 03:05

## 2024-05-26 RX ADMIN — ENOXAPARIN SODIUM 40 MG: 40 INJECTION SUBCUTANEOUS at 05:05

## 2024-05-26 RX ADMIN — POTASSIUM CHLORIDE 10 MEQ: 7.46 INJECTION, SOLUTION INTRAVENOUS at 03:05

## 2024-05-26 RX ADMIN — PIPERACILLIN SODIUM AND TAZOBACTAM SODIUM 4.5 G: 4; .5 INJECTION, POWDER, LYOPHILIZED, FOR SOLUTION INTRAVENOUS at 05:05

## 2024-05-26 RX ADMIN — Medication 10 ML: at 06:05

## 2024-05-26 RX ADMIN — SODIUM CHLORIDE: 9 INJECTION, SOLUTION INTRAVENOUS at 03:05

## 2024-05-26 RX ADMIN — MAGNESIUM SULFATE HEPTAHYDRATE 2 G: 40 INJECTION, SOLUTION INTRAVENOUS at 03:05

## 2024-05-26 RX ADMIN — IPRATROPIUM BROMIDE AND ALBUTEROL SULFATE 3 ML: 2.5; .5 SOLUTION RESPIRATORY (INHALATION) at 11:05

## 2024-05-26 RX ADMIN — MUPIROCIN: 20 OINTMENT TOPICAL at 08:05

## 2024-05-26 NOTE — PLAN OF CARE
Problem: Adult Inpatient Plan of Care  Goal: Plan of Care Review  Outcome: Progressing  Goal: Patient-Specific Goal (Individualized)  Outcome: Progressing  Goal: Absence of Hospital-Acquired Illness or Injury  Outcome: Progressing  Goal: Optimal Comfort and Wellbeing  Outcome: Progressing  Goal: Readiness for Transition of Care  Outcome: Progressing     Problem: Infection  Goal: Absence of Infection Signs and Symptoms  Outcome: Progressing     Problem: Skin Injury Risk Increased  Goal: Skin Health and Integrity  Outcome: Progressing     Problem: Sepsis/Septic Shock  Goal: Optimal Coping  Outcome: Progressing  Goal: Absence of Bleeding  Outcome: Progressing  Goal: Blood Glucose Level Within Targeted Range  Outcome: Progressing  Goal: Absence of Infection Signs and Symptoms  Outcome: Progressing  Goal: Optimal Nutrition Intake  Outcome: Progressing     Problem: Pneumonia  Goal: Fluid Balance  Outcome: Progressing  Goal: Resolution of Infection Signs and Symptoms  Outcome: Progressing  Goal: Effective Oxygenation and Ventilation  Outcome: Progressing     Problem: Restraint, Nonviolent  Goal: Absence of Harm or Injury  Outcome: Progressing     Problem: Fall Injury Risk  Goal: Absence of Fall and Fall-Related Injury  Outcome: Progressing     Problem: COPD (Chronic Obstructive Pulmonary Disease)  Goal: Optimal Chronic Illness Coping  Outcome: Progressing  Goal: Optimal Level of Functional [place]  Outcome: Progressing  Goal: Absence of Infection Signs and Symptoms  Outcome: Progressing  Goal: Improved Oral Intake  Outcome: Progressing  Goal: Effective Oxygenation and Ventilation  Outcome: Progressing   Patient has been more alert and orientated. Her O2 sat remained 97-99% on 3 LPM N/C and resp. At 24-26/min. When the patient becomes panicky her O2 sat dropes and HR AND rr INCREASE.

## 2024-05-26 NOTE — SUBJECTIVE & OBJECTIVE
Interval History: patient seen and examined.     Review of Systems   Unable to perform ROS: Acuity of condition     Objective:     Vital Signs (Most Recent):  Temp: 98.5 °F (36.9 °C) (05/26/24 0337)  Pulse: (!) 59 (05/26/24 1108)  Resp: 18 (05/26/24 1108)  BP: (!) 149/90 (05/26/24 0800)  SpO2: 99 % (05/26/24 1108) Vital Signs (24h Range):  Temp:  [97.6 °F (36.4 °C)-98.6 °F (37 °C)] 98.5 °F (36.9 °C)  Pulse:  [] 59  Resp:  [18-37] 18  SpO2:  [68 %-100 %] 99 %  BP: (130-179)/() 149/90     Weight: 49.4 kg (109 lb)  Body mass index is 22.78 kg/m².    Intake/Output Summary (Last 24 hours) at 5/26/2024 1247  Last data filed at 5/26/2024 0802  Gross per 24 hour   Intake 983.71 ml   Output 800 ml   Net 183.71 ml         Physical Exam  Constitutional:       General: She is not in acute distress.     Appearance: She is ill-appearing.      Comments: Frail stature   HENT:      Mouth/Throat:      Mouth: Mucous membranes are dry.      Pharynx: Oropharynx is clear.   Cardiovascular:      Rate and Rhythm: Normal rate.      Pulses: Normal pulses.      Heart sounds: Normal heart sounds. No murmur heard.  Pulmonary:      Effort: Bradypnea present. No retractions.      Breath sounds: Transmitted upper airway sounds present. Rhonchi (bilaterally) present.   Abdominal:      General: Bowel sounds are normal. There is no distension.      Palpations: Abdomen is soft.      Tenderness: There is no abdominal tenderness. There is no guarding.      Comments: No stool   Genitourinary:     Comments: Cleveland catheter in place draining yellow colored urine  Musculoskeletal:      Cervical back: Normal range of motion and neck supple. No rigidity or tenderness.      Right lower leg: No edema.      Left lower leg: No edema.   Lymphadenopathy:      Cervical: No cervical adenopathy.   Skin:     General: Skin is warm and dry.      Capillary Refill: Capillary refill takes less than 2 seconds.      Findings: No erythema or rash.   Neurological:     "  Motor: No weakness.      Comments: Moving upper and lower extremities with appropriate strength   Psychiatric:         Behavior: Behavior is uncooperative.             Significant Labs: All pertinent labs within the past 24 hours have been reviewed.  Bilirubin:   Recent Labs   Lab 05/21/24  0953 05/22/24  2256 05/24/24  0550 05/25/24  0352 05/26/24  0337   BILITOT 0.4 0.9 0.6 1.0 1.0     Blood Culture:   No results for input(s): "LABBLOO" in the last 48 hours.    BMP:   Recent Labs   Lab 05/26/24  0337         K 3.2*   CL 98   CO2 38*   BUN 9   CREATININE 0.5   CALCIUM 8.6*   MG 1.8     CBC:   Recent Labs   Lab 05/25/24  0436 05/26/24  0337   WBC 6.17 6.70   HGB 10.6* 10.5*   HCT 34.9* 34.7*    246     CMP:   Recent Labs   Lab 05/25/24  0352 05/26/24  0337    138   K 4.1 3.2*   CL 98 98   CO2 36* 38*   * 106   BUN 15 9   CREATININE 0.7 0.5   CALCIUM 8.6* 8.6*   PROT 7.0 6.4   ALBUMIN 2.6* 2.3*   BILITOT 1.0 1.0   ALKPHOS 47* 39*   * 58*   ALT 98* 66*   ANIONGAP 2* 2*     Lipase:   No results for input(s): "LIPASE" in the last 48 hours.    POCT Glucose:   No results for input(s): "POCTGLUCOSE" in the last 48 hours.    Urine Studies:   No results for input(s): "COLORU", "APPEARANCEUA", "PHUR", "SPECGRAV", "PROTEINUA", "GLUCUA", "KETONESU", "BILIRUBINUA", "OCCULTUA", "NITRITE", "UROBILINOGEN", "LEUKOCYTESUR", "RBCUA", "WBCUA", "BACTERIA", "SQUAMEPITHEL", "HYALINECASTS" in the last 48 hours.    Invalid input(s): "WRIGHTSUR"    X-Ray Chest 1 View  Narrative: EXAMINATION:  XR CHEST 1 VIEW    CLINICAL HISTORY:  hypoxia;    COMPARISON:  Portable chest 02/01/2024.    FINDINGS:  Frontal chest radiograph demonstrates mild heterogeneous opacities bilateral lower lung zones including the left upper lung zone.  No pleural fluid.  Mild enlargement of cardiac silhouette.  No osseous abnormality.  Impression: Evidence of edema or multifocal pneumonia.    Electronically signed by: Keith" MD Harini  Date:    05/23/2024  Time:    14:59    CT Head Without Contrast  Narrative: EXAMINATION:  CT HEAD WITHOUT CONTRAST    CLINICAL HISTORY:  altered mental status;    TECHNIQUE:  Axial CT images were obtained. Iterative reconstruction technique was used.  CT/cardiac nuclear exam/s in prior 12 months: 0.    COMPARISON:  None.    FINDINGS:  There is no acute intracranial hemorrhage.  There is no mass or mass effect.  No ventricular dilatation or abnormal extra-axial fluid collection.  There is no osseous abnormality.  Impression: No acute intracranial hemorrhage, mass or other brain abnormality.    A preliminary report was faxed by [facility] shortly after completion of this examination.    Electronically signed by: Keith Schuler MD  Date:    05/23/2024  Time:    10:11     Significant Imaging: I have reviewed all pertinent imaging results/findings within the past 24 hours.

## 2024-05-26 NOTE — NURSING
Removed the ASAPS mask and put patient on 2 LPM N/C oral care performed,patient drank water without difficulty. O2 sat remained in the mid to upper 90's. Resp. Rate increased to the low 30's.   Called Dr. Garcia  an update and medication she can rest tonight. Orders noted, he also stated to wean the precedex so we can wean the mask off.    An IV was started, then ajith changed during that time. After an hour on the N/C the ASPAS mask  Was placed back on patient She became very agitated moving all about the bed etc. The Precedex was titrated up to 0.5 mcg/kg/hr . After an hour the patient settled and feel asleep.

## 2024-05-26 NOTE — NURSING
The patient pulled the mask off. Switched the O2 to 3 LPM N/C and decreased the precedex . Called respiratory to inform him of the change.

## 2024-05-26 NOTE — ASSESSMENT & PLAN NOTE
Patient with Hypercapnic and Hypoxic Respiratory failure which is Acute.  she is on home oxygen at 3 LPM. Supplemental oxygen was provided and noted- Oxygen Concentration (%):  [32-40] 40    Signs/symptoms of respiratory failure include- respiratory distress and lethargy. Contributing diagnoses includes - COPD, Pleural effusion, Pneumonia, and polysubstance use with respiratory depression.  Labs and images were reviewed. Patient Has recent ABG, which has been reviewed. Will treat underlying causes and adjust management of respiratory failure as follows- continuous O2, scheduled breathing treatments, IV antibiotics.     5/24/24 Hypercarbia with small improvement, will transition to AVAPS and continue to monitor response.   Mental status wax and wane, intermittently agitated trying to pull on lines and mask.   - continue scheduled neuro checks  - continuous O2 per respiratory  - maintain SpO2 >92%  - last ABG pH 7.28/pCO2 77.2/pO2 99/HCO3 31, O2 97.8% on FiO2 50%       Recent Labs     05/25/24  1003   PH 7.391   PCO2 63.0*   PO2 100*   HCO3 34.8*   POCSATURATED 98.7      Oxygen Concentration (%):  [32-40] 40         Results for orders placed or performed during the hospital encounter of 05/22/24   X-Ray Chest 1 View    Narrative    EXAMINATION:  XR CHEST 1 VIEW    CLINICAL HISTORY:  hypoxia;    COMPARISON:  Portable chest 02/01/2024.    FINDINGS:  Frontal chest radiograph demonstrates mild heterogeneous opacities bilateral lower lung zones including the left upper lung zone.  No pleural fluid.  Mild enlargement of cardiac silhouette.  No osseous abnormality.      Impression    Evidence of edema or multifocal pneumonia.      Electronically signed by: Keith Schuler MD  Date:    05/23/2024  Time:    14:59

## 2024-05-26 NOTE — EICU
Intervention Initiated From:  [facility] / EICU    Megha intervened regarding:  Rounding (Video assessment)    Nurse Notified:  No    Doctor Notified:  No    Comments:  Video rounding completed. Precedex infusing as per MAR. VSS, AVAPS w/ 40% O2 in place, sats 97%.

## 2024-05-26 NOTE — PROGRESS NOTES
"[facility] Medicine  Progress Note    Patient Name: June Boykin  MRN: [id number]  Patient Class: IP- Inpatient   Admission Date: 5/22/2024  Length of Stay: 3 days  Attending Physician: Thierno Valladares DO  Primary Care Provider: Mauricio Pierre MD        Subjective:     Principal Problem:Acute hypercapnic respiratory failure        HPI:  Chief Complaint    Complaint Comment   Diarrhea Pt to the ER w/ complaints of diarrhea, decreased appetite, "feels dehydrated." Pt denies any recent illness, denies any weakness, dizziness, chest pain, or SOB during triage. "Out of it and lethargic" per family.     69 year old with hypertension, anxiety, emphysema (on home O2 3L via nasal cannula), history of methamphetamine use presented to the emergency department overnight with complaints of diarrhea, dehydration and frequent falls in the past two days.   Patient on exam is very sleepy, responds with a grown and a word of two with voice and painful stimulus, unable to answer questions.   Vital signs at time of exam, BP 85/53 mmHg, RR 30, HR 69, SpO2 100% on continuous O2 4L via nasal cannula with sodium bicarb gtt at 150/hr.     ED course:  Vital signs on arrival BP 85/47 mmHg, HR 90, RR 18, SpO2 94% on room air, temp 97.7F.   WBC 7.94, Hg 12.3, HCT 42.3,   Na 131, K 6, Cl 95, CO2 28, Glu 88, BUN 42, Cr 3.4 (baseline 1), [place] 9.7, Alb 2.7, Tbili 0.9, Alk phos 56, AST 91, ALT 46, lipase 26, Anion Gap 8.  UDS positive for benzos and amphetamines.   Urinalysis microhematuria, WBC 16, no bacteria, no nitrite.  Received rocephin 1g.  Patient received bolus NS 1L x1, lomotil x1.   Calcium gluc x1, D10 IVF 50g x 1, insulin 5U, Lokelma 10 mg.   Placed on sodium bicarb gtt 150mEq x1.  Repeat potassium levels 4.4 <5.5 with improving renal function.     Patient acutely became agitated, pulling on telemetry pulse oximetry readings in 80s and palced on high flow nasal cannula.   ABG findings pH " 7.2/94.1/77.8/29.9 92% on FiO2 40% on venturi mask.  Patient admitted to ICU for acute respiratory failure on BiPAP and acute encephalopathy secondary to metabolic, infectious and toxic (polypharmacy) sources.     Medications reviewed from pill boxes provided by daughter and niece contain  Gabapentin 800 mg tablets, Simethicone 125 mg capsules, omeprazole 20 mg capsules, cetirizine 10 mg tablet, docusate 100 mg capsules, aspirin 81 mg tablets, trazodone 100 mg tablet, advil PM tablets, vitamin B2 100 mg tablets, pravastatin 20 mg tablets, and lisinopril/hctz 20/25 mg tablet.    Overview/Hospital Course:  5/24/24 Patient intermittently waking up trying to pull on lines and mask, nursing staff and family at bedside reporting complaints of pain over facial mask and misidentifying family names and face. Required ativan 1mg x2, will continue with symptom directed use of anxiolytic and monitor withdrawal signs. Follow up psychiatry when patient alert and responsive to questions.   Overnight BiPAP with ABG 7.2/77.2/99/31 97.8% on FiO2 50%. PCO2 slowly improving from PCO2 94 chronic retention, on last hospitalization noted to leave AMA with PCO2 57. Will transition to AVAPS and repeat ABG PCO2 94. Continue with scheduled breathing treatments and zosyn for bilateral pneumonia. No leukocytosis. Follow up cultures.   MARY resolved. Estimated Creatinine Clearance: 48 mL/min (based on SCr of 0.8 mg/dL). Renal function back to baseline with IVF hydration. Monitor I/Os.     5/25: Patient remains BiPAP dependent but appears to be resting comfortably with current settings.  Arterial blood gases show continued improvement in pCO2 levels.  Blood cultures remain negative.    5/26:  Patient required BiPAP earlier this morning but did tolerate nasal cannula for a good while yesterday appears to be improving clinically but very slowly.    Interval History: patient seen and examined.     Review of Systems   Unable to perform ROS: Acuity of  condition     Objective:     Vital Signs (Most Recent):  Temp: 98.5 °F (36.9 °C) (05/26/24 0337)  Pulse: (!) 59 (05/26/24 1108)  Resp: 18 (05/26/24 1108)  BP: (!) 149/90 (05/26/24 0800)  SpO2: 99 % (05/26/24 1108) Vital Signs (24h Range):  Temp:  [97.6 °F (36.4 °C)-98.6 °F (37 °C)] 98.5 °F (36.9 °C)  Pulse:  [] 59  Resp:  [18-37] 18  SpO2:  [68 %-100 %] 99 %  BP: (130-179)/() 149/90     Weight: 49.4 kg (109 lb)  Body mass index is 22.78 kg/m².    Intake/Output Summary (Last 24 hours) at 5/26/2024 1247  Last data filed at 5/26/2024 0802  Gross per 24 hour   Intake 983.71 ml   Output 800 ml   Net 183.71 ml         Physical Exam  Constitutional:       General: She is not in acute distress.     Appearance: She is ill-appearing.      Comments: Frail stature   HENT:      Mouth/Throat:      Mouth: Mucous membranes are dry.      Pharynx: Oropharynx is clear.   Cardiovascular:      Rate and Rhythm: Normal rate.      Pulses: Normal pulses.      Heart sounds: Normal heart sounds. No murmur heard.  Pulmonary:      Effort: Bradypnea present. No retractions.      Breath sounds: Transmitted upper airway sounds present. Rhonchi (bilaterally) present.   Abdominal:      General: Bowel sounds are normal. There is no distension.      Palpations: Abdomen is soft.      Tenderness: There is no abdominal tenderness. There is no guarding.      Comments: No stool   Genitourinary:     Comments: Cleveland catheter in place draining yellow colored urine  Musculoskeletal:      Cervical back: Normal range of motion and neck supple. No rigidity or tenderness.      Right lower leg: No edema.      Left lower leg: No edema.   Lymphadenopathy:      Cervical: No cervical adenopathy.   Skin:     General: Skin is warm and dry.      Capillary Refill: Capillary refill takes less than 2 seconds.      Findings: No erythema or rash.   Neurological:      Motor: No weakness.      Comments: Moving upper and lower extremities with appropriate strength  "  Psychiatric:         Behavior: Behavior is uncooperative.             Significant Labs: All pertinent labs within the past 24 hours have been reviewed.  Bilirubin:   Recent Labs   Lab 05/21/24  0953 05/22/24  2256 05/24/24  0550 05/25/24  0352 05/26/24  0337   BILITOT 0.4 0.9 0.6 1.0 1.0     Blood Culture:   No results for input(s): "LABBLOO" in the last 48 hours.    BMP:   Recent Labs   Lab 05/26/24  0337         K 3.2*   CL 98   CO2 38*   BUN 9   CREATININE 0.5   CALCIUM 8.6*   MG 1.8     CBC:   Recent Labs   Lab 05/25/24  0436 05/26/24  0337   WBC 6.17 6.70   HGB 10.6* 10.5*   HCT 34.9* 34.7*    246     CMP:   Recent Labs   Lab 05/25/24  0352 05/26/24  0337    138   K 4.1 3.2*   CL 98 98   CO2 36* 38*   * 106   BUN 15 9   CREATININE 0.7 0.5   CALCIUM 8.6* 8.6*   PROT 7.0 6.4   ALBUMIN 2.6* 2.3*   BILITOT 1.0 1.0   ALKPHOS 47* 39*   * 58*   ALT 98* 66*   ANIONGAP 2* 2*     Lipase:   No results for input(s): "LIPASE" in the last 48 hours.    POCT Glucose:   No results for input(s): "POCTGLUCOSE" in the last 48 hours.    Urine Studies:   No results for input(s): "COLORU", "APPEARANCEUA", "PHUR", "SPECGRAV", "PROTEINUA", "GLUCUA", "KETONESU", "BILIRUBINUA", "OCCULTUA", "NITRITE", "UROBILINOGEN", "LEUKOCYTESUR", "RBCUA", "WBCUA", "BACTERIA", "SQUAMEPITHEL", "HYALINECASTS" in the last 48 hours.    Invalid input(s): "WRIGHTSUR"    X-Ray Chest 1 View  Narrative: EXAMINATION:  XR CHEST 1 VIEW    CLINICAL HISTORY:  hypoxia;    COMPARISON:  Portable chest 02/01/2024.    FINDINGS:  Frontal chest radiograph demonstrates mild heterogeneous opacities bilateral lower lung zones including the left upper lung zone.  No pleural fluid.  Mild enlargement of cardiac silhouette.  No osseous abnormality.  Impression: Evidence of edema or multifocal pneumonia.    Electronically signed by: Keith Schuler MD  Date:    05/23/2024  Time:    14:59    CT Head Without Contrast  Narrative: " EXAMINATION:  CT HEAD WITHOUT CONTRAST    CLINICAL HISTORY:  altered mental status;    TECHNIQUE:  Axial CT images were obtained. Iterative reconstruction technique was used.  CT/cardiac nuclear exam/s in prior 12 months: 0.    COMPARISON:  None.    FINDINGS:  There is no acute intracranial hemorrhage.  There is no mass or mass effect.  No ventricular dilatation or abnormal extra-axial fluid collection.  There is no osseous abnormality.  Impression: No acute intracranial hemorrhage, mass or other brain abnormality.    A preliminary report was faxed by [facility] shortly after completion of this examination.    Electronically signed by: Keith Schuler MD  Date:    05/23/2024  Time:    10:11     Significant Imaging: I have reviewed all pertinent imaging results/findings within the past 24 hours.    Assessment/Plan:      * Acute hypercapnic respiratory failure  Patient with Hypercapnic and Hypoxic Respiratory failure which is Acute.  she is on home oxygen at 3 LPM. Supplemental oxygen was provided and noted- Oxygen Concentration (%):  [32-40] 40    Signs/symptoms of respiratory failure include- respiratory distress and lethargy. Contributing diagnoses includes - COPD, Pleural effusion, Pneumonia, and polysubstance use with respiratory depression.  Labs and images were reviewed. Patient Has recent ABG, which has been reviewed. Will treat underlying causes and adjust management of respiratory failure as follows- continuous O2, scheduled breathing treatments, IV antibiotics.     5/24/24 Hypercarbia with small improvement, will transition to AVAPS and continue to monitor response.   Mental status wax and wane, intermittently agitated trying to pull on lines and mask.   - continue scheduled neuro checks  - continuous O2 per respiratory  - maintain SpO2 >92%  - last ABG pH 7.28/pCO2 77.2/pO2 99/HCO3 31, O2 97.8% on FiO2 50%       Recent Labs     05/25/24  1003   PH 7.391   PCO2 63.0*   PO2 100*   HCO3 34.8*    POCSATURATED 98.7      Oxygen Concentration (%):  [32-40] 40         Results for orders placed or performed during the hospital encounter of 05/22/24   X-Ray Chest 1 View    Narrative    EXAMINATION:  XR CHEST 1 VIEW    CLINICAL HISTORY:  hypoxia;    COMPARISON:  Portable chest 02/01/2024.    FINDINGS:  Frontal chest radiograph demonstrates mild heterogeneous opacities bilateral lower lung zones including the left upper lung zone.  No pleural fluid.  Mild enlargement of cardiac silhouette.  No osseous abnormality.      Impression    Evidence of edema or multifocal pneumonia.      Electronically signed by: Keith Schuler MD  Date:    05/23/2024  Time:    14:59             Multifocal pneumonia  Patient has a diagnosis of pneumonia. The cause of the pneumonia is unknown at this time. The pneumonia is worsening due to required need for oxygen support, baseline O2 continuous 3L via nasal cannula.  .   The patient has the following signs/symptoms of pneumonia: persistent hypoxia . The patient does have a current oxygen requirement and the patient does have a home oxygen requirement. I have reviewed the pertinent imaging. The following cultures have been collected: Blood cultures The culture results are listed below.     Current antimicrobial regimen consists of the antibiotics listed below. Will monitor patient closely and Adjust treatment plan as follows zosyn, breathing treatments,BiPAP adjusted per respiratory     COVID19 and influenza negative.   Antibiotics (From admission, onward)      Start     Stop Route Frequency Ordered    05/23/24 2100  mupirocin 2 % ointment         05/28/24 2059 Nasl 2 times daily 05/23/24 1410    05/23/24 1615  piperacillin-tazobactam (ZOSYN) 4.5 g in dextrose 5 % in water (D5W) 100 mL IVPB (MB+)         -- IV Every 8 hours (non-standard times) 05/23/24 1511            Microbiology Results (last 7 days)       Procedure Component Value Units Date/Time    Blood culture [[id number]]  Collected: 05/23/24 0752    Order Status: Completed Specimen: Blood Updated: 05/25/24 2012     Blood Culture, Routine No Growth to date      No Growth to date      No Growth to date    Blood culture [[id number]] Collected: 05/23/24 0747    Order Status: Completed Specimen: Blood from Peripheral, Hand, Right Updated: 05/25/24 2012     Blood Culture, Routine No Growth to date      No Growth to date      No Growth to date    Clostridium difficile EIA [[id number]] Collected: 05/25/24 1100    Order Status: Canceled Specimen: Stool     Urine culture [[id number]] Collected: 05/23/24 0116    Order Status: Completed Specimen: Urine Updated: 05/24/24 2206     Urine Culture, Routine No growth    Narrative:      Preferred Collection Type->Urine, Clean Catch  Specimen Source->Urine        5/24/24 Continue zosyn    Encephalopathy, toxic  Secondary to polypharmacy. UDS positive for amphetamine and benzodiazepine.   Follow management above.       Encephalopathy, metabolic  Multifactorial with acute renal faliure, hyponatremia, hyperkalemia.   Initial potassium 6, now within normal range.   Stable chemistry labs.   LFTs elevated, likely reactive with infectious foci, continue to monitor.       Polypharmacy  UDS presumptive positive for amphetamine and benzodiazepine.   Patient has RX for valium 10 mg (last filled 5/14/24 30 tablets with current bottle with 10 tablets remaining).   Patient daily intake 10-30 mg by count above.   Trazodone which is a medication patient currently taking may falsely result in positive UDS screen.  During last hospitalization in February 2024, patient reported to be doing methamphetamine and also, during ER stay and while still communicating with staff, she reported taking street provided adderall of unknown dose.   Combination benzodiazepine, gabapentin, trazodone, and stimulants may be source of respiratory depression.       Sepsis with acute renal failure and tubular necrosis without septic  "shock  This patient does have evidence of infective focus  My overall impression is sepsis.  Source: Respiratory  Antibiotics given-   Antibiotics (72h ago, onward)      Start     Stop Route Frequency Ordered    05/23/24 2100  mupirocin 2 % ointment         05/28/24 2059 Nasl 2 times daily 05/23/24 1410    05/23/24 1615  piperacillin-tazobactam (ZOSYN) 4.5 g in dextrose 5 % in water (D5W) 100 mL IVPB (MB+)         -- IV Every 8 hours (non-standard times) 05/23/24 1511          Latest lactate reviewed-  No results for input(s): "LACTATE", "POCLAC" in the last 72 hours.    Organ dysfunction indicated by Acute kidney injury, Acute respiratory failure, and Encephalopathy    Fluid challenge Ideal Body Weight- The patient's ideal body weight is Ideal body weight: 47.1 kg (103 lb 14.5 oz) which will be used to calculate fluid bolus of 30 ml/kg for treatment of septic shock.      Post- resuscitation assessment Yes Perfusion exam was performed within 6 hours of septic shock presentation after bolus shows Adequate tissue perfusion assessed by non-invasive monitoring     Will Not start Pressors- Levophed for MAP of 65  Source control achieved by: rocephin x1, followed by zosyn     Review of medical chart with bacteremia with Ecoli growth in February 2024. Patient during last hospitalization left AMA. No record of continued antibiotics therapy.   Per pharmacy review; per Dr. Pierre, patient has been prescribed the following antibiotics.   - 4/9/24 macrobid 5 days  - 4/15/24 levaquin 7 days  - 4/22/24 doxycycline 7 days  CXR findings of multifocal disease, continue with zosyn    Initial management with fluid resuscitation, patient Estimated Creatinine Clearance: 76.7 mL/min (based on SCr of 0.5 mg/dL). Renal function at baseline Cr 0.8 from 3.4.   Blood cultures x2, NGTD x1 days  - f/u urine culture  - monitor renal function  - avoid nephrotoxic medications  - strict I/Os            Hypotension due to hypovolemia  Latest " blood pressure and vitals reviewed-   HOLD all home antihypertensive medications until blood pressure improves.   Patient received IVF NS 2L followed by continuous IVF.   - continuous telemetry  - continuous pulse oximetry    Temp:  [97.6 °F (36.4 °C)-98.6 °F (37 °C)]   Pulse:  []   Resp:  [18-37]   BP: (130-179)/()   SpO2:  [68 %-100 %] .   Home meds for hypertension were reviewed and noted below.   Hypertension Medications               lisinopriL-hydrochlorothiazide (PRINZIDE,ZESTORETIC) 20-25 mg Tab           While in the hospital, will manage blood pressure as follows.        VTE Risk Mitigation (From admission, onward)           Ordered     enoxaparin injection 40 mg  Daily         05/24/24 1553     IP VTE HIGH RISK PATIENT  Once         05/23/24 1037     Place sequential compression device  Until discontinued         05/23/24 0706                    Discharge Planning   LACY:      Code Status: Full Code   Is the patient medically ready for discharge?:     Reason for patient still in hospital (select all that apply): Treatment  Discharge Plan A: Home with family            Critical care time:  30 minutes      Keith Garcia Jr, MD  Department of Hospital Medicine   [facility] - Intensive Care

## 2024-05-26 NOTE — ASSESSMENT & PLAN NOTE
Patient has a diagnosis of pneumonia. The cause of the pneumonia is unknown at this time. The pneumonia is worsening due to required need for oxygen support, baseline O2 continuous 3L via nasal cannula.  .   The patient has the following signs/symptoms of pneumonia: persistent hypoxia . The patient does have a current oxygen requirement and the patient does have a home oxygen requirement. I have reviewed the pertinent imaging. The following cultures have been collected: Blood cultures The culture results are listed below.     Current antimicrobial regimen consists of the antibiotics listed below. Will monitor patient closely and Adjust treatment plan as follows zosyn, breathing treatments,BiPAP adjusted per respiratory     COVID19 and influenza negative.   Antibiotics (From admission, onward)    Start     Stop Route Frequency Ordered    05/23/24 2100  mupirocin 2 % ointment         05/28/24 2059 Nasl 2 times daily 05/23/24 1410    05/23/24 1615  piperacillin-tazobactam (ZOSYN) 4.5 g in dextrose 5 % in water (D5W) 100 mL IVPB (MB+)         -- IV Every 8 hours (non-standard times) 05/23/24 1511          Microbiology Results (last 7 days)     Procedure Component Value Units Date/Time    Blood culture [[id number]] Collected: 05/23/24 0752    Order Status: Completed Specimen: Blood Updated: 05/25/24 2012     Blood Culture, Routine No Growth to date      No Growth to date      No Growth to date    Blood culture [[id number]] Collected: 05/23/24 0747    Order Status: Completed Specimen: Blood from Peripheral, Hand, Right Updated: 05/25/24 2012     Blood Culture, Routine No Growth to date      No Growth to date      No Growth to date    Clostridium difficile EIA [[id number]] Collected: 05/25/24 1100    Order Status: Canceled Specimen: Stool     Urine culture [[id number]] Collected: 05/23/24 0116    Order Status: Completed Specimen: Urine Updated: 05/24/24 2206     Urine Culture, Routine No growth    Narrative:      Preferred  Collection Type->Urine, Clean Catch  Specimen Source->Urine      5/24/24 Continue zosyn

## 2024-05-26 NOTE — EICU
Intervention Initiated From:  [facility] / [facility]    Megha intervened regarding:  Rounding (Video assessment)    Comments: Video assessment completed.  Pt lying in bed.  Appears agitated and confused.  Precedex infusion in progress at 0.5 mcg/kg/hr.

## 2024-05-26 NOTE — ASSESSMENT & PLAN NOTE
"This patient does have evidence of infective focus  My overall impression is sepsis.  Source: Respiratory  Antibiotics given-   Antibiotics (72h ago, onward)    Start     Stop Route Frequency Ordered    05/23/24 2100  mupirocin 2 % ointment         05/28/24 2059 Nasl 2 times daily 05/23/24 1410    05/23/24 1615  piperacillin-tazobactam (ZOSYN) 4.5 g in dextrose 5 % in water (D5W) 100 mL IVPB (MB+)         -- IV Every 8 hours (non-standard times) 05/23/24 1511        Latest lactate reviewed-  No results for input(s): "LACTATE", "POCLAC" in the last 72 hours.    Organ dysfunction indicated by Acute kidney injury, Acute respiratory failure, and Encephalopathy    Fluid challenge Ideal Body Weight- The patient's ideal body weight is Ideal body weight: 47.1 kg (103 lb 14.5 oz) which will be used to calculate fluid bolus of 30 ml/kg for treatment of septic shock.      Post- resuscitation assessment Yes Perfusion exam was performed within 6 hours of septic shock presentation after bolus shows Adequate tissue perfusion assessed by non-invasive monitoring     Will Not start Pressors- Levophed for MAP of 65  Source control achieved by: rocephin x1, followed by zosyn     Review of medical chart with bacteremia with Ecoli growth in February 2024. Patient during last hospitalization left AMA. No record of continued antibiotics therapy.   Per pharmacy review; per Dr. Pierre, patient has been prescribed the following antibiotics.   - 4/9/24 macrobid 5 days  - 4/15/24 levaquin 7 days  - 4/22/24 doxycycline 7 days  CXR findings of multifocal disease, continue with zosyn    Initial management with fluid resuscitation, patient Estimated Creatinine Clearance: 76.7 mL/min (based on SCr of 0.5 mg/dL). Renal function at baseline Cr 0.8 from 3.4.   Blood cultures x2, NGTD x1 days  - f/u urine culture  - monitor renal function  - avoid nephrotoxic medications  - strict I/Os          "

## 2024-05-26 NOTE — ASSESSMENT & PLAN NOTE
Latest blood pressure and vitals reviewed-   HOLD all home antihypertensive medications until blood pressure improves.   Patient received IVF NS 2L followed by continuous IVF.   - continuous telemetry  - continuous pulse oximetry    Temp:  [97.6 °F (36.4 °C)-98.6 °F (37 °C)]   Pulse:  []   Resp:  [18-37]   BP: (130-179)/()   SpO2:  [68 %-100 %] .   Home meds for hypertension were reviewed and noted below.   Hypertension Medications               lisinopriL-hydrochlorothiazide (PRINZIDE,ZESTORETIC) 20-25 mg Tab           While in the hospital, will manage blood pressure as follows.

## 2024-05-27 VITALS
HEIGHT: 58 IN | TEMPERATURE: 98 F | BODY MASS INDEX: 22.88 KG/M2 | RESPIRATION RATE: 29 BRPM | WEIGHT: 109 LBS | DIASTOLIC BLOOD PRESSURE: 84 MMHG | HEART RATE: 98 BPM | OXYGEN SATURATION: 96 % | SYSTOLIC BLOOD PRESSURE: 174 MMHG

## 2024-05-27 LAB
ALBUMIN SERPL BCP-MCNC: 2.3 G/DL (ref 3.5–5.2)
ALP SERPL-CCNC: 39 U/L (ref 55–135)
ALT SERPL W/O P-5'-P-CCNC: 50 U/L (ref 10–44)
ANION GAP SERPL CALC-SCNC: 3 MMOL/L (ref 3–11)
AST SERPL-CCNC: 38 U/L (ref 10–40)
BASOPHILS # BLD AUTO: 0.03 K/UL (ref 0–0.2)
BASOPHILS NFR BLD: 0.5 % (ref 0–1.9)
BILIRUB SERPL-MCNC: 1.2 MG/DL (ref 0.1–1)
BUN SERPL-MCNC: 4 MG/DL (ref 8–23)
CALCIUM SERPL-MCNC: 8.6 MG/DL (ref 8.7–10.5)
CHLORIDE SERPL-SCNC: 99 MMOL/L (ref 95–110)
CO2 SERPL-SCNC: 38 MMOL/L (ref 23–29)
CREAT SERPL-MCNC: 0.6 MG/DL (ref 0.5–1.4)
DIFFERENTIAL METHOD BLD: ABNORMAL
EOSINOPHIL # BLD AUTO: 0.3 K/UL (ref 0–0.5)
EOSINOPHIL NFR BLD: 4.7 % (ref 0–8)
ERYTHROCYTE [DISTWIDTH] IN BLOOD BY AUTOMATED COUNT: 16.5 % (ref 11.5–14.5)
EST. GFR  (NO RACE VARIABLE): >60 ML/MIN/1.73 M^2
GLUCOSE SERPL-MCNC: 103 MG/DL (ref 70–110)
HCT VFR BLD AUTO: 36.6 % (ref 37–48.5)
HGB BLD-MCNC: 10.9 G/DL (ref 12–16)
IMM GRANULOCYTES # BLD AUTO: 0.02 K/UL (ref 0–0.04)
IMM GRANULOCYTES NFR BLD AUTO: 0.3 % (ref 0–0.5)
LYMPHOCYTES # BLD AUTO: 1.8 K/UL (ref 1–4.8)
LYMPHOCYTES NFR BLD: 27.6 % (ref 18–48)
MAGNESIUM SERPL-MCNC: 1.8 MG/DL (ref 1.6–2.6)
MCH RBC QN AUTO: 22.6 PG (ref 27–31)
MCHC RBC AUTO-ENTMCNC: 29.8 G/DL (ref 32–36)
MCV RBC AUTO: 76 FL (ref 82–98)
MONOCYTES # BLD AUTO: 1.2 K/UL (ref 0.3–1)
MONOCYTES NFR BLD: 19.4 % (ref 4–15)
NEUTROPHILS # BLD AUTO: 3 K/UL (ref 1.8–7.7)
NEUTROPHILS NFR BLD: 47.5 % (ref 38–73)
NRBC BLD-RTO: 0 /100 WBC
PLATELET # BLD AUTO: 255 K/UL (ref 150–450)
PMV BLD AUTO: 10.9 FL (ref 9.2–12.9)
POTASSIUM SERPL-SCNC: 3.2 MMOL/L (ref 3.5–5.1)
PROT SERPL-MCNC: 6.5 G/DL (ref 6–8.4)
RBC # BLD AUTO: 4.83 M/UL (ref 4–5.4)
SODIUM SERPL-SCNC: 140 MMOL/L (ref 136–145)
WBC # BLD AUTO: 6.38 K/UL (ref 3.9–12.7)

## 2024-05-27 PROCEDURE — 83735 ASSAY OF MAGNESIUM: CPT | Performed by: STUDENT IN AN ORGANIZED HEALTH CARE EDUCATION/TRAINING PROGRAM

## 2024-05-27 PROCEDURE — 85025 COMPLETE CBC W/AUTO DIFF WBC: CPT | Performed by: STUDENT IN AN ORGANIZED HEALTH CARE EDUCATION/TRAINING PROGRAM

## 2024-05-27 PROCEDURE — 25000003 PHARM REV CODE 250: Performed by: INTERNAL MEDICINE

## 2024-05-27 PROCEDURE — 99239 HOSP IP/OBS DSCHRG MGMT >30: CPT | Mod: ,,, | Performed by: STUDENT IN AN ORGANIZED HEALTH CARE EDUCATION/TRAINING PROGRAM

## 2024-05-27 PROCEDURE — 99900031 HC PATIENT EDUCATION (STAT)

## 2024-05-27 PROCEDURE — 94761 N-INVAS EAR/PLS OXIMETRY MLT: CPT

## 2024-05-27 PROCEDURE — 90836 PSYTX W PT W E/M 45 MIN: CPT | Mod: ,,, | Performed by: PSYCHIATRY & NEUROLOGY

## 2024-05-27 PROCEDURE — 36415 COLL VENOUS BLD VENIPUNCTURE: CPT | Performed by: STUDENT IN AN ORGANIZED HEALTH CARE EDUCATION/TRAINING PROGRAM

## 2024-05-27 PROCEDURE — 63600175 PHARM REV CODE 636 W HCPCS: Performed by: STUDENT IN AN ORGANIZED HEALTH CARE EDUCATION/TRAINING PROGRAM

## 2024-05-27 PROCEDURE — 27000221 HC OXYGEN, UP TO 24 HOURS

## 2024-05-27 PROCEDURE — 99900035 HC TECH TIME PER 15 MIN (STAT)

## 2024-05-27 PROCEDURE — 80053 COMPREHEN METABOLIC PANEL: CPT | Performed by: STUDENT IN AN ORGANIZED HEALTH CARE EDUCATION/TRAINING PROGRAM

## 2024-05-27 PROCEDURE — 25000003 PHARM REV CODE 250: Performed by: STUDENT IN AN ORGANIZED HEALTH CARE EDUCATION/TRAINING PROGRAM

## 2024-05-27 PROCEDURE — 25000242 PHARM REV CODE 250 ALT 637 W/ HCPCS: Performed by: STUDENT IN AN ORGANIZED HEALTH CARE EDUCATION/TRAINING PROGRAM

## 2024-05-27 PROCEDURE — 25000242 PHARM REV CODE 250 ALT 637 W/ HCPCS: Performed by: INTERNAL MEDICINE

## 2024-05-27 PROCEDURE — A4216 STERILE WATER/SALINE, 10 ML: HCPCS | Performed by: INTERNAL MEDICINE

## 2024-05-27 PROCEDURE — 94640 AIRWAY INHALATION TREATMENT: CPT

## 2024-05-27 PROCEDURE — 97161 PT EVAL LOW COMPLEX 20 MIN: CPT

## 2024-05-27 PROCEDURE — 99223 1ST HOSP IP/OBS HIGH 75: CPT | Mod: ,,, | Performed by: PSYCHIATRY & NEUROLOGY

## 2024-05-27 RX ORDER — AMOXICILLIN AND CLAVULANATE POTASSIUM 875; 125 MG/1; MG/1
1 TABLET, FILM COATED ORAL 2 TIMES DAILY
Qty: 9 TABLET | Refills: 0 | Status: ON HOLD | OUTPATIENT
Start: 2024-05-27

## 2024-05-27 RX ORDER — IPRATROPIUM BROMIDE AND ALBUTEROL SULFATE 2.5; .5 MG/3ML; MG/3ML
3 SOLUTION RESPIRATORY (INHALATION) EVERY 4 HOURS PRN
Status: DISCONTINUED | OUTPATIENT
Start: 2024-05-27 | End: 2024-05-27 | Stop reason: HOSPADM

## 2024-05-27 RX ORDER — AMOXICILLIN AND CLAVULANATE POTASSIUM 875; 125 MG/1; MG/1
1 TABLET, FILM COATED ORAL EVERY 12 HOURS
Qty: 7 TABLET | Refills: 0 | Status: ON HOLD | OUTPATIENT
Start: 2024-05-27

## 2024-05-27 RX ORDER — GUAIFENESIN 1200 MG/1
1200 TABLET, EXTENDED RELEASE ORAL 2 TIMES DAILY
Qty: 20 TABLET | Refills: 0 | Status: SHIPPED | OUTPATIENT
Start: 2024-05-27 | End: 2024-06-06

## 2024-05-27 RX ORDER — AMOXICILLIN AND CLAVULANATE POTASSIUM 875; 125 MG/1; MG/1
1 TABLET, FILM COATED ORAL EVERY 12 HOURS
Status: DISCONTINUED | OUTPATIENT
Start: 2024-05-27 | End: 2024-05-27 | Stop reason: HOSPADM

## 2024-05-27 RX ORDER — LANOLIN ALCOHOL/MO/W.PET/CERES
400 CREAM (GRAM) TOPICAL 2 TIMES DAILY
Status: DISCONTINUED | OUTPATIENT
Start: 2024-05-27 | End: 2024-05-27 | Stop reason: HOSPADM

## 2024-05-27 RX ORDER — POTASSIUM CHLORIDE 20 MEQ/1
20 TABLET, EXTENDED RELEASE ORAL
Status: DISPENSED | OUTPATIENT
Start: 2024-05-27 | End: 2024-05-27

## 2024-05-27 RX ORDER — QUETIAPINE FUMARATE 50 MG/1
50 TABLET, FILM COATED ORAL NIGHTLY
Qty: 30 TABLET | Refills: 0 | Status: ON HOLD | OUTPATIENT
Start: 2024-05-27

## 2024-05-27 RX ORDER — LISINOPRIL 10 MG/1
20 TABLET ORAL DAILY
Status: DISCONTINUED | OUTPATIENT
Start: 2024-05-27 | End: 2024-05-27 | Stop reason: HOSPADM

## 2024-05-27 RX ORDER — HYDROCHLOROTHIAZIDE 25 MG/1
25 TABLET ORAL DAILY
Status: DISCONTINUED | OUTPATIENT
Start: 2024-05-27 | End: 2024-05-27 | Stop reason: HOSPADM

## 2024-05-27 RX ADMIN — AMOXICILLIN AND CLAVULANATE POTASSIUM 1 TABLET: 875; 125 TABLET, FILM COATED ORAL at 11:05

## 2024-05-27 RX ADMIN — MUPIROCIN: 20 OINTMENT TOPICAL at 08:05

## 2024-05-27 RX ADMIN — HYDROCHLOROTHIAZIDE 25 MG: 25 TABLET ORAL at 08:05

## 2024-05-27 RX ADMIN — Medication 10 ML: at 05:05

## 2024-05-27 RX ADMIN — FAMOTIDINE 20 MG: 10 INJECTION, SOLUTION INTRAVENOUS at 08:05

## 2024-05-27 RX ADMIN — LISINOPRIL 20 MG: 10 TABLET ORAL at 08:05

## 2024-05-27 RX ADMIN — Medication 10 ML: at 12:05

## 2024-05-27 RX ADMIN — IPRATROPIUM BROMIDE AND ALBUTEROL SULFATE 3 ML: 2.5; .5 SOLUTION RESPIRATORY (INHALATION) at 07:05

## 2024-05-27 RX ADMIN — IPRATROPIUM BROMIDE AND ALBUTEROL SULFATE 3 ML: .5; 3 SOLUTION RESPIRATORY (INHALATION) at 05:05

## 2024-05-27 RX ADMIN — BUDESONIDE INHALATION 0.5 MG: 0.5 SUSPENSION RESPIRATORY (INHALATION) at 07:05

## 2024-05-27 RX ADMIN — POTASSIUM CHLORIDE 20 MEQ: 1500 TABLET, EXTENDED RELEASE ORAL at 10:05

## 2024-05-27 RX ADMIN — POTASSIUM CHLORIDE 20 MEQ: 1500 TABLET, EXTENDED RELEASE ORAL at 08:05

## 2024-05-27 RX ADMIN — Medication 400 MG: at 10:05

## 2024-05-27 RX ADMIN — PIPERACILLIN SODIUM AND TAZOBACTAM SODIUM 4.5 G: 4; .5 INJECTION, POWDER, LYOPHILIZED, FOR SOLUTION INTRAVENOUS at 08:05

## 2024-05-27 RX ADMIN — PIPERACILLIN SODIUM AND TAZOBACTAM SODIUM 4.5 G: 4; .5 INJECTION, POWDER, LYOPHILIZED, FOR SOLUTION INTRAVENOUS at 12:05

## 2024-05-27 RX ADMIN — IPRATROPIUM BROMIDE AND ALBUTEROL SULFATE 3 ML: 2.5; .5 SOLUTION RESPIRATORY (INHALATION) at 11:05

## 2024-05-27 NOTE — NURSING
Discharge instructions discussed with patient. Reminded patient that she has a procedure with Dr. Dominguez tomorrow and no eating or drinking or drinking after midnight. Also reminded patient to have oxygen available at all times.

## 2024-05-27 NOTE — ASSESSMENT & PLAN NOTE
Resolved.   Latest blood pressure and vitals reviewed-   HOLD all home antihypertensive medications until blood pressure improves.   Patient received IVF NS 2L followed by continuous IVF.   - continuous telemetry  - continuous pulse oximetry    Temp:  [97.6 °F (36.4 °C)-97.9 °F (36.6 °C)]   Pulse:  []   Resp:  [16-35]   BP: (127-186)/()   SpO2:  [86 %-100 %] .   Home meds for hypertension were reviewed and noted below.   Hypertension Medications               lisinopriL-hydrochlorothiazide (PRINZIDE,ZESTORETIC) 20-25 mg Tab           While in the hospital, will manage blood pressure as follows.  5/27/24 Patient denies symptoms.   - resume home blood pressure medicines and monitor

## 2024-05-27 NOTE — NURSING
Called to room by daughter to discuss code status. Patient awake and alert. Answering questions appropriately at present. Voices not wanting to be resuscitated. Denies wanting to be intubated, chest compressions, or medications even if temporary. Requesting to be cremated. Aware Full code until Dr nani. Dr. Garcia notified. Aware able to change wishes at any time. Voiced understanding. Wanting to speak to son. Will call son to see if can come and visit per patient request.

## 2024-05-27 NOTE — PLAN OF CARE
Problem: Adult Inpatient Plan of Care  Goal: Plan of Care Review  Outcome: Progressing  Goal: Patient-Specific Goal (Individualized)  Outcome: Progressing  Goal: Absence of Hospital-Acquired Illness or Injury  Outcome: Progressing  Goal: Optimal Comfort and Wellbeing  Outcome: Progressing  Goal: Readiness for Transition of Care  Outcome: Progressing     Problem: Infection  Goal: Absence of Infection Signs and Symptoms  Outcome: Progressing     Problem: Skin Injury Risk Increased  Goal: Skin Health and Integrity  Outcome: Progressing     Problem: Sepsis/Septic Shock  Goal: Optimal Coping  Outcome: Progressing  Goal: Absence of Bleeding  Outcome: Progressing  Goal: Blood Glucose Level Within Targeted Range  Outcome: Progressing  Goal: Absence of Infection Signs and Symptoms  Outcome: Progressing  Goal: Optimal Nutrition Intake  Outcome: Progressing     Problem: Pneumonia  Goal: Fluid Balance  Outcome: Progressing  Goal: Resolution of Infection Signs and Symptoms  Outcome: Progressing  Goal: Effective Oxygenation and Ventilation  Outcome: Progressing     Problem: Fall Injury Risk  Goal: Absence of Fall and Fall-Related Injury  Outcome: Progressing     Problem: COPD (Chronic Obstructive Pulmonary Disease)  Goal: Optimal Chronic Illness Coping  Outcome: Progressing  Goal: Optimal Level of Functional [place]  Outcome: Progressing  Goal: Absence of Infection Signs and Symptoms  Outcome: Progressing  Goal: Improved Oral Intake  Outcome: Progressing  Goal: Effective Oxygenation and Ventilation  Outcome: Progressing   Patient slept very well all night. VSS. She remained on the O2 4 LPM, N/C.

## 2024-05-27 NOTE — PLAN OF CARE
05/27/24 1124   Medicare Message   Important Message from Medicare regarding Discharge Appeal Rights Given to patient/caregiver;Explained to patient/caregiver;Signed/date by patient/caregiver   Date IMM was signed 05/27/24   Time IMM was signed 1056

## 2024-05-27 NOTE — PLAN OF CARE
Care plan reviewed and on-going. More alert today. Cont with NC. Tolerating PO intake. DNR status. [place] cath draining without difficulty. SCD's in place. Mag and potassium replaced. Awaiting Midline in am. PIV intact. Weaning Precedex. Calm and cooperative. Chest X-ray completed

## 2024-05-27 NOTE — DISCHARGE SUMMARY
"[facility] Medicine  Discharge Summary      Patient Name: June Boykin  MRN: [id number]  PURNIMA: [id number]  Patient Class: IP- Inpatient  Admission Date: 5/22/2024  Hospital Length of Stay: 4 days  Discharge Date and Time: No discharge date for patient encounter.  Attending Physician: Thierno Valladares DO   Discharging Provider: Thierno Valladares DO  Primary Care Provider: Mauricio Pierre MD    Primary Care Team: Networked reference to record PCT     HPI:   Chief Complaint    Complaint Comment   Diarrhea Pt to the ER w/ complaints of diarrhea, decreased appetite, "feels dehydrated." Pt denies any recent illness, denies any weakness, dizziness, chest pain, or SOB during triage. "Out of it and lethargic" per family.     69 year old with hypertension, anxiety, emphysema (on home O2 3L via nasal cannula), history of methamphetamine use presented to the emergency department overnight with complaints of diarrhea, dehydration and frequent falls in the past two days.   Patient on exam is very sleepy, responds with a grown and a word of two with voice and painful stimulus, unable to answer questions.   Vital signs at time of exam, BP 85/53 mmHg, RR 30, HR 69, SpO2 100% on continuous O2 4L via nasal cannula with sodium bicarb gtt at 150/hr.     ED course:  Vital signs on arrival BP 85/47 mmHg, HR 90, RR 18, SpO2 94% on room air, temp 97.7F.   WBC 7.94, Hg 12.3, HCT 42.3,   Na 131, K 6, Cl 95, CO2 28, Glu 88, BUN 42, Cr 3.4 (baseline 1), [place] 9.7, Alb 2.7, Tbili 0.9, Alk phos 56, AST 91, ALT 46, lipase 26, Anion Gap 8.  UDS positive for benzos and amphetamines.   Urinalysis microhematuria, WBC 16, no bacteria, no nitrite.  Received rocephin 1g.  Patient received bolus NS 1L x1, lomotil x1.   Calcium gluc x1, D10 IVF 50g x 1, insulin 5U, Lokelma 10 mg.   Placed on sodium bicarb gtt 150mEq x1.  Repeat potassium levels 4.4 <5.5 with improving renal function.     Patient acutely " became agitated, pulling on telemetry pulse oximetry readings in 80s and palced on high flow nasal cannula.   ABG findings pH 7.2/94.1/77.8/29.9 92% on FiO2 40% on venturi mask.  Patient admitted to ICU for acute respiratory failure on BiPAP and acute encephalopathy secondary to metabolic, infectious and toxic (polypharmacy) sources.     Medications reviewed from pill boxes provided by daughter and niece contain  Gabapentin 800 mg tablets, Simethicone 125 mg capsules, omeprazole 20 mg capsules, cetirizine 10 mg tablet, docusate 100 mg capsules, aspirin 81 mg tablets, trazodone 100 mg tablet, advil PM tablets, vitamin B2 100 mg tablets, pravastatin 20 mg tablets, and lisinopril/hctz 20/25 mg tablet.    * No surgery found *      Hospital Course:   5/24/24 Patient intermittently waking up trying to pull on lines and mask, nursing staff and family at bedside reporting complaints of pain over facial mask and misidentifying family names and face. Required ativan 1mg x2, will continue with symptom directed use of anxiolytic and monitor withdrawal signs. Follow up psychiatry when patient alert and responsive to questions.   Overnight BiPAP with ABG 7.2/77.2/99/31 97.8% on FiO2 50%. PCO2 slowly improving from PCO2 94 chronic retention, on last hospitalization noted to leave AMA with PCO2 57. Will transition to AVAPS and repeat ABG PCO2 94. Continue with scheduled breathing treatments and zosyn for bilateral pneumonia. No leukocytosis. Follow up cultures.   MARY resolved. Estimated Creatinine Clearance: 48 mL/min (based on SCr of 0.8 mg/dL). Renal function back to baseline with IVF hydration. Monitor I/Os.     5/25: Patient remains BiPAP dependent but appears to be resting comfortably with current settings.  Arterial blood gases show continued improvement in pCO2 levels.  Blood cultures remain negative.    5/26:  Patient required BiPAP earlier this morning but did tolerate nasal cannula for a good while yesterday appears to be  "improving clinically but very slowly.    5/27 Patient alert and awake, slept well without residual effects on seroquel. Discussed concerns with interacting medications that cause respiratory depression with valium, trazodone, gabapentin with use of opioid based pain medications and stimulants (?use of street adderall use). Maintain SpO2 >96% on home oxygen continuous 3L via nasal cannula. Patient agreeable to follow up outpatient with respiratory specialist for follow up pneumonia and emphysema. Patient requests to be discharged as she is feeling "100% better." Will arrange for home health skilled nursing for medication management and have PT and OT follow up with evaluation.      Goals of Care Treatment Preferences:  Code Status: DNR      Consults:   Consults (From admission, onward)          Status Ordering Provider     Inpatient consult to Social Work/Case Management  Once        Provider:  (Not yet assigned)    Acknowledged COREY PEMBERTON     Inpatient consult to Cardiology  Once        Provider:  Shin Alejo MD    Acknowledged ODESSA ARRIETA JR     Inpatient consult to Psychiatry  Once        Provider:  (Not yet assigned)    Acknowledged COREY PEMBERTON            Neuro  Encephalopathy, toxic  Resolved.   Secondary to polypharmacy. UDS positive for amphetamine and benzodiazepine.   Follow management above.         Encephalopathy, metabolic  Resolved.   Multifactorial with acute renal faliure, hyponatremia, hyperkalemia.   Initial potassium 6, now within normal range.   Stable chemistry labs.   LFTs elevated, likely reactive with infectious foci, continue to monitor.       Psychiatric  Moderate recurrent major depression  Patient has persistent depression which is moderate and is currently controlled.   Denies SI/HI, thoughts of harming self or others.   She states she would like to focus on getting better and healthier.   Consulted psychiatry at this time with recommendations to continue " with seroquel at bedtime.   Patient does not display psychosis at this time. Continue to monitor closely and adjust plan of care as needed.  Patient agreeable to discontinuation of trazodone and gabapentin.   Current dosing of valium, per patient she takes 2.5 mg valium PRN and there are days when she does not take valium.   Counseled on importance of safe guarding her home medications and to follow up with her primary care provider.   - continue seroquel 50 mg QHS for insomnia      Pulmonary  * Acute hypercapnic respiratory failure  Patient with Hypercapnic and Hypoxic Respiratory failure which is Acute.  she is on home oxygen at 3 LPM. Supplemental oxygen was provided and noted- Oxygen Concentration (%):  [32-40] 32    Signs/symptoms of respiratory failure include- respiratory distress and lethargy. Contributing diagnoses includes - COPD, Pleural effusion, Pneumonia, and polysubstance use with respiratory depression.  Labs and images were reviewed. Patient Has recent ABG, which has been reviewed. Will treat underlying causes and adjust management of respiratory failure as follows- continuous O2, scheduled breathing treatments, IV antibiotics.     5/24/24 Hypercarbia with small improvement, will transition to AVAPS and continue to monitor response.   Mental status wax and wane, intermittently agitated trying to pull on lines and mask.   - continue scheduled neuro checks  - continuous O2 per respiratory  - maintain SpO2 >92%  - last ABG pH 7.28/pCO2 77.2/pO2 99/HCO3 31, O2 97.8% on FiO2 50%  5/27/24 awake and alert, SpO2 >96% on continuous O2 3L via nasal cannula  - f/u referral pulmonology outpatient  - continue with supplemental oxygen at home  - continue home breathing treatment regimen     Recent Labs     05/25/24  1003   PH 7.391   PCO2 63.0*   PO2 100*   HCO3 34.8*   POCSATURATED 98.7       Oxygen Concentration (%):  [32-40] 32    Results for orders placed or performed during the hospital encounter of 05/22/24    X-Ray Chest 1 View    Narrative    EXAMINATION:  XR CHEST 1 VIEW    CLINICAL HISTORY:  hypoxia;    COMPARISON:  Portable chest 02/01/2024.    FINDINGS:  Frontal chest radiograph demonstrates mild heterogeneous opacities bilateral lower lung zones including the left upper lung zone.  No pleural fluid.  Mild enlargement of cardiac silhouette.  No osseous abnormality.      Impression    Evidence of edema or multifocal pneumonia.      Electronically signed by: Keith Schuler MD  Date:    05/23/2024  Time:    14:59             Multifocal pneumonia  Patient has a diagnosis of pneumonia. The cause of the pneumonia is unknown at this time. The pneumonia is worsening due to required need for oxygen support, baseline O2 continuous 3L via nasal cannula.  .   The patient has the following signs/symptoms of pneumonia: persistent hypoxia . The patient does have a current oxygen requirement and the patient does have a home oxygen requirement. I have reviewed the pertinent imaging. The following cultures have been collected: Blood cultures The culture results are listed below.     Current antimicrobial regimen consists of the antibiotics listed below. Will monitor patient closely and Adjust treatment plan as follows zosyn, breathing treatments,BiPAP adjusted per respiratory     COVID19 and influenza negative.   Antibiotics (From admission, onward)      Start     Stop Route Frequency Ordered    05/27/24 1100  amoxicillin-clavulanate 875-125mg per tablet 1 tablet         05/31/24 0859 Oral Every 12 hours 05/27/24 0950    05/23/24 2100  mupirocin 2 % ointment         05/28/24 2059 Nasl 2 times daily 05/23/24 1410            Microbiology Results (last 7 days)       Procedure Component Value Units Date/Time    Blood culture [[id number]] Collected: 05/23/24 0747    Order Status: Completed Specimen: Blood from Peripheral, Hand, Right Updated: 05/26/24 2012     Blood Culture, Routine No Growth to date      No Growth to date      No  Growth to date      No Growth to date    Blood culture [[id number]] Collected: 05/23/24 0752    Order Status: Completed Specimen: Blood Updated: 05/26/24 2012     Blood Culture, Routine No Growth to date      No Growth to date      No Growth to date      No Growth to date    Clostridium difficile EIA [[id number]] Collected: 05/25/24 1100    Order Status: Canceled Specimen: Stool     Urine culture [[id number]] Collected: 05/23/24 0116    Order Status: Completed Specimen: Urine Updated: 05/24/24 2206     Urine Culture, Routine No growth    Narrative:      Preferred Collection Type->Urine, Clean Catch  Specimen Source->Urine        5/24/24 Continue zosyn  5/27/25 transition to PO augmentin (complete therapy for total 10 days)    Cardiac/Vascular  Hypotension due to hypovolemia  Resolved.   Latest blood pressure and vitals reviewed-   HOLD all home antihypertensive medications until blood pressure improves.   Patient received IVF NS 2L followed by continuous IVF.   - continuous telemetry  - continuous pulse oximetry    Temp:  [97.6 °F (36.4 °C)-97.9 °F (36.6 °C)]   Pulse:  []   Resp:  [16-35]   BP: (127-186)/()   SpO2:  [86 %-100 %] .   Home meds for hypertension were reviewed and noted below.   Hypertension Medications               lisinopriL-hydrochlorothiazide (PRINZIDE,ZESTORETIC) 20-25 mg Tab           While in the hospital, will manage blood pressure as follows.  5/27/24 Patient denies symptoms.   - resume home blood pressure medicines and monitor         ID  Sepsis with acute renal failure and tubular necrosis without septic shock  This patient does have evidence of infective focus  My overall impression is sepsis.  Source: Respiratory  Antibiotics given-   Antibiotics (72h ago, onward)      Start     Stop Route Frequency Ordered    05/27/24 1100  amoxicillin-clavulanate 875-125mg per tablet 1 tablet         05/31/24 0859 Oral Every 12 hours 05/27/24 0950    05/23/24 2100  mupirocin 2 % ointment       "   05/28/24 2059 Nasl 2 times daily 05/23/24 1410          Latest lactate reviewed-  No results for input(s): "LACTATE", "POCLAC" in the last 72 hours.    Organ dysfunction indicated by Acute kidney injury, Acute respiratory failure, and Encephalopathy    Fluid challenge Ideal Body Weight- The patient's ideal body weight is Ideal body weight: 47.1 kg (103 lb 14.5 oz) which will be used to calculate fluid bolus of 30 ml/kg for treatment of septic shock.      Post- resuscitation assessment Yes Perfusion exam was performed within 6 hours of septic shock presentation after bolus shows Adequate tissue perfusion assessed by non-invasive monitoring     Will Not start Pressors- Levophed for MAP of 65  Source control achieved by: rocephin x1, followed by zosyn     Review of medical chart with bacteremia with Ecoli growth in February 2024. Patient during last hospitalization left AMA. No record of continued antibiotics therapy.   Per pharmacy review; per Dr. Pierre, patient has been prescribed the following antibiotics.   - 4/9/24 macrobid 5 days  - 4/15/24 levaquin 7 days  - 4/22/24 doxycycline 7 days  CXR findings of multifocal disease, continue with zosyn    Initial management with fluid resuscitation, patient Estimated Creatinine Clearance: 63.9 mL/min (based on SCr of 0.6 mg/dL). Renal function at baseline Cr 0.8 from 3.4.   Blood cultures x2, NGTD x1 days  - f/u urine culture  - monitor renal function  - avoid nephrotoxic medications  - strict I/Os            Palliative Care  Polypharmacy  UDS presumptive positive for amphetamine and benzodiazepine.   Patient has RX for valium 10 mg (last filled 5/14/24 30 tablets with current bottle with 10 tablets remaining).   Patient daily intake 10-30 mg by count above.   Trazodone which is a medication patient currently taking may falsely result in positive UDS screen.  During last hospitalization in February 2024, patient reported to be doing methamphetamine and also, during " ER stay and while still communicating with staff, she reported taking street provided adderall of unknown dose.   Combination benzodiazepine, gabapentin, trazodone, and stimulants may be source of respiratory depression.         Final Active Diagnoses:    Diagnosis Date Noted POA    PRINCIPAL PROBLEM:  Acute hypercapnic respiratory failure [J96.02] 05/23/2024 Yes    Sepsis with acute renal failure and tubular necrosis without septic shock [A41.9, R65.20, N17.0] 05/23/2024 Yes    Polypharmacy [Z79.899] 05/23/2024 Not Applicable     Chronic    Encephalopathy, metabolic [G93.41] 05/23/2024 Yes    Encephalopathy, toxic [G92.9] 05/23/2024 Yes    Multifocal pneumonia [J18.9] 05/23/2024 Yes    Hypotension due to hypovolemia [E86.1] 02/02/2024 Yes    Moderate recurrent major depression [F33.1] 02/02/2024 Yes      Problems Resolved During this Admission:       Discharged Condition: stable    Disposition:     Follow Up:   Follow-up Information       Mauricio Pierre MD Follow up.    Specialty: Internal Medicine  Contact information:  [address] [facility], [place] [address]  [phone]               Dougie Carr MD Follow up in 2 week(s).    Specialty: Cardiology  Contact information:  [address] [facility] [address] [phone]               Jessica Dominguez MD Follow up in 1 week(s).    Specialty: General Surgery  Contact information:  [address] [facility] [address]  [phone]                           Patient Instructions:      Ambulatory referral/consult to Pulmonology   Standing Status: Future   Referral Priority: Routine Referral Type: Consultation   Referral Reason: Specialty Services Required   Requested Specialty: Pulmonary Disease   Number of Visits Requested: 1       Significant Diagnostic Studies: Labs: BMP:   Recent Labs   Lab 05/26/24  0337 05/27/24  0505    103    140   K 3.2* 3.2*   CL 98 99   CO2 38* 38*   BUN 9 4*   CREATININE 0.5 0.6   CALCIUM 8.6* 8.6*   MG 1.8 1.8   ,  "CMP   Recent Labs   Lab 05/26/24  0337 05/27/24  0505    140   K 3.2* 3.2*   CL 98 99   CO2 38* 38*    103   BUN 9 4*   CREATININE 0.5 0.6   CALCIUM 8.6* 8.6*   PROT 6.4 6.5   ALBUMIN 2.3* 2.3*   BILITOT 1.0 1.2*   ALKPHOS 39* 39*   AST 58* 38   ALT 66* 50*   ANIONGAP 2* 3   , CBC   Recent Labs   Lab 05/26/24  0337 05/27/24  0505   WBC 6.70 6.38   HGB 10.5* 10.9*   HCT 34.7* 36.6*    255   , INR   Lab Results   Component Value Date    INR 1.1 05/21/2024   , Lipid Panel   Lab Results   Component Value Date    CHOL 166 04/26/2023    HDL 68 04/26/2023    LDLCALC 79.6 04/26/2023    TRIG 92 04/26/2023    CHOLHDL 41.0 04/26/2023   , Troponin No results for input(s): "TROPONINI" in the last 168 hours., A1C: No results for input(s): "HGBA1C" in the last 4320 hours., and All labs within the past 24 hours have been reviewed  Microbiology: Blood Culture   Lab Results   Component Value Date    LABBLOO No Growth to date 05/23/2024    LABBLOO No Growth to date 05/23/2024    LABBLOO No Growth to date 05/23/2024    LABBLOO No Growth to date 05/23/2024   , Sputum Culture No results found for: "GSRESP", "RESPIRATORYC", Urine Culture    Lab Results   Component Value Date    LABURIN No growth 05/23/2024       Pending Diagnostic Studies:       None           Medications:  Reconciled Home Medications:      Medication List        START taking these medications      amoxicillin-clavulanate 875-125mg 875-125 mg per tablet  Commonly known as: AUGMENTIN  Take 1 tablet by mouth every 12 (twelve) hours.     QUEtiapine 50 MG tablet  Commonly known as: SEROQUEL  Take 1 tablet (50 mg total) by mouth every evening.            CHANGE how you take these medications      albuterol 90 mcg/actuation inhaler  Commonly known as: PROVENTIL/VENTOLIN HFA  Inhale into the lungs.  What changed: Another medication with the same name was removed. Continue taking this medication, and follow the directions you see here.            CONTINUE " taking these medications      aspirin 81 MG Chew  Take 81 mg by mouth once daily. Stop 05/20/2024 per kathleen@ Dr. Dominguez office     diazePAM 10 MG Tab  Commonly known as: VALIUM  Take 10 mg by mouth every 6 (six) hours as needed.     docusate sodium 100 MG capsule  Commonly known as: COLACE  Take 100 mg by mouth 2 (two) times daily.     fluticasone propionate 50 mcg/actuation nasal spray  Commonly known as: FLONASE     lisinopriL-hydrochlorothiazide 20-25 mg Tab  Commonly known as: PRINZIDE,ZESTORETIC     potassium chloride 10 MEQ Cpsr  Commonly known as: MICRO-K  Take 10 mEq by mouth once.     pravastatin 20 MG tablet  Commonly known as: PRAVACHOL     SYMBICORT 160-4.5 mcg/actuation [facility]  Generic drug: budesonide-formoterol 160-4.5 mcg  2 puffs Inhalation Twice a day            STOP taking these medications      azithromycin 250 MG tablet  Commonly known as: Z-DEIRDRE     cetirizine 5 MG tablet  Commonly known as: ZYRTEC     cyclobenzaprine 5 MG tablet  Commonly known as: FLEXERIL     doxycycline 100 MG tablet  Commonly known as: VIBRA-TABS     FLUoxetine 10 MG capsule     gabapentin 100 MG capsule  Commonly known as: NEURONTIN     ibuprofen 200 MG tablet  Commonly known as: ADVIL,MOTRIN     lubiprostone 8 MCG Cap  Commonly known as: AMITIZA     melatonin 3 mg Cap     omeprazole 20 MG capsule  Commonly known as: PRILOSEC     pantoprazole 40 MG tablet  Commonly known as: PROTONIX     traZODone 100 MG tablet  Commonly known as: DESYREL              Indwelling Lines/Drains at time of discharge:   Lines/Drains/Airways       Drain  Duration                  Urethral Catheter 05/23/24 1300 14 Fr. 4 days                    Time spent on the discharge of patient: 35 minutes         Thierno Valladares DO  Department of Hospital Medicine  [place] [facility]

## 2024-05-27 NOTE — ASSESSMENT & PLAN NOTE
Patient has a diagnosis of pneumonia. The cause of the pneumonia is unknown at this time. The pneumonia is worsening due to required need for oxygen support, baseline O2 continuous 3L via nasal cannula.  .   The patient has the following signs/symptoms of pneumonia: persistent hypoxia . The patient does have a current oxygen requirement and the patient does have a home oxygen requirement. I have reviewed the pertinent imaging. The following cultures have been collected: Blood cultures The culture results are listed below.     Current antimicrobial regimen consists of the antibiotics listed below. Will monitor patient closely and Adjust treatment plan as follows zosyn, breathing treatments,BiPAP adjusted per respiratory     COVID19 and influenza negative.   Antibiotics (From admission, onward)      Start     Stop Route Frequency Ordered    05/27/24 1100  amoxicillin-clavulanate 875-125mg per tablet 1 tablet         05/31/24 0859 Oral Every 12 hours 05/27/24 0950    05/23/24 2100  mupirocin 2 % ointment         05/28/24 2059 Nasl 2 times daily 05/23/24 1410            Microbiology Results (last 7 days)       Procedure Component Value Units Date/Time    Blood culture [[id number]] Collected: 05/23/24 0747    Order Status: Completed Specimen: Blood from Peripheral, Hand, Right Updated: 05/26/24 2012     Blood Culture, Routine No Growth to date      No Growth to date      No Growth to date      No Growth to date    Blood culture [[id number]] Collected: 05/23/24 0752    Order Status: Completed Specimen: Blood Updated: 05/26/24 2012     Blood Culture, Routine No Growth to date      No Growth to date      No Growth to date      No Growth to date    Clostridium difficile EIA [[id number]] Collected: 05/25/24 1100    Order Status: Canceled Specimen: Stool     Urine culture [[id number]] Collected: 05/23/24 0116    Order Status: Completed Specimen: Urine Updated: 05/24/24 2206     Urine Culture, Routine No growth    Narrative:       Preferred Collection Type->Urine, Clean Catch  Specimen Source->Urine        5/24/24 Continue zosyn  5/27/25 transition to PO augmentin (complete therapy for total 10 days)

## 2024-05-27 NOTE — EICU
Intervention Initiated From:  COR / EICU    Megha intervened regarding:  Rounding (Video assessment)    Comments: Video assessment completed.  Pt lying in bed with eyes closed.  Resp even and unlabored.  SaO2 100% on NC.  Precedex infusion in progress at 0.4 mcg/kg/hr.  NAD noted at this time.

## 2024-05-27 NOTE — ASSESSMENT & PLAN NOTE
Patient with Hypercapnic and Hypoxic Respiratory failure which is Acute.  she is on home oxygen at 3 LPM. Supplemental oxygen was provided and noted- Oxygen Concentration (%):  [32-40] 32    Signs/symptoms of respiratory failure include- respiratory distress and lethargy. Contributing diagnoses includes - COPD, Pleural effusion, Pneumonia, and polysubstance use with respiratory depression.  Labs and images were reviewed. Patient Has recent ABG, which has been reviewed. Will treat underlying causes and adjust management of respiratory failure as follows- continuous O2, scheduled breathing treatments, IV antibiotics.     5/24/24 Hypercarbia with small improvement, will transition to AVAPS and continue to monitor response.   Mental status wax and wane, intermittently agitated trying to pull on lines and mask.   - continue scheduled neuro checks  - continuous O2 per respiratory  - maintain SpO2 >92%  - last ABG pH 7.28/pCO2 77.2/pO2 99/HCO3 31, O2 97.8% on FiO2 50%  5/27/24 awake and alert, SpO2 >96% on continuous O2 3L via nasal cannula  - f/u referral pulmonology outpatient  - continue with supplemental oxygen at home  - continue home breathing treatment regimen     Recent Labs     05/25/24  1003   PH 7.391   PCO2 63.0*   PO2 100*   HCO3 34.8*   POCSATURATED 98.7       Oxygen Concentration (%):  [32-40] 32    Results for orders placed or performed during the hospital encounter of 05/22/24   X-Ray Chest 1 View    Narrative    EXAMINATION:  XR CHEST 1 VIEW    CLINICAL HISTORY:  hypoxia;    COMPARISON:  Portable chest 02/01/2024.    FINDINGS:  Frontal chest radiograph demonstrates mild heterogeneous opacities bilateral lower lung zones including the left upper lung zone.  No pleural fluid.  Mild enlargement of cardiac silhouette.  No osseous abnormality.      Impression    Evidence of edema or multifocal pneumonia.      Electronically signed by: Keith Schuler MD  Date:    05/23/2024  Time:    14:59

## 2024-05-27 NOTE — ASSESSMENT & PLAN NOTE
Resolved.   Multifactorial with acute renal faliure, hyponatremia, hyperkalemia.   Initial potassium 6, now within normal range.   Stable chemistry labs.   LFTs elevated, likely reactive with infectious foci, continue to monitor.

## 2024-05-27 NOTE — ASSESSMENT & PLAN NOTE
"This patient does have evidence of infective focus  My overall impression is sepsis.  Source: Respiratory  Antibiotics given-   Antibiotics (72h ago, onward)    Start     Stop Route Frequency Ordered    05/27/24 1100  amoxicillin-clavulanate 875-125mg per tablet 1 tablet         05/31/24 0859 Oral Every 12 hours 05/27/24 0950    05/23/24 2100  mupirocin 2 % ointment         05/28/24 2059 Nasl 2 times daily 05/23/24 1410        Latest lactate reviewed-  No results for input(s): "LACTATE", "POCLAC" in the last 72 hours.    Organ dysfunction indicated by Acute kidney injury, Acute respiratory failure, and Encephalopathy    Fluid challenge Ideal Body Weight- The patient's ideal body weight is Ideal body weight: 47.1 kg (103 lb 14.5 oz) which will be used to calculate fluid bolus of 30 ml/kg for treatment of septic shock.      Post- resuscitation assessment Yes Perfusion exam was performed within 6 hours of septic shock presentation after bolus shows Adequate tissue perfusion assessed by non-invasive monitoring     Will Not start Pressors- Levophed for MAP of 65  Source control achieved by: rocephin x1, followed by zosyn     Review of medical chart with bacteremia with Ecoli growth in February 2024. Patient during last hospitalization left AMA. No record of continued antibiotics therapy.   Per pharmacy review; per Dr. Pierre, patient has been prescribed the following antibiotics.   - 4/9/24 macrobid 5 days  - 4/15/24 levaquin 7 days  - 4/22/24 doxycycline 7 days  CXR findings of multifocal disease, continue with zosyn    Initial management with fluid resuscitation, patient Estimated Creatinine Clearance: 63.9 mL/min (based on SCr of 0.6 mg/dL). Renal function at baseline Cr 0.8 from 3.4.   Blood cultures x2, NGTD x1 days  - f/u urine culture  - monitor renal function  - avoid nephrotoxic medications  - strict I/Os          "

## 2024-05-27 NOTE — EICU
Intervention Initiated From:  COR / EICU    Megha intervened regarding:  Rounding (Video assessment)    Nurse Notified:  No    Doctor Notified:  No    Comments:  Video rounding completed. Precedex infusing as per MAR. VSS, 4 L NC  O2 in place, sats 100%. NAD.

## 2024-05-27 NOTE — PT/OT/SLP EVAL
"Physical Therapy Evaluation    Patient Name:  June Boykin   MRN:  [id number]    Recommendations:     Discharge Recommendations: No Therapy Indicated   Discharge Equipment Recommendations: none   Barriers to discharge: None    Assessment:     June Boykin is a 69 y.o. female admitted with a medical diagnosis of Acute hypercapnic respiratory failure.  She presents with the following impairments/functional limitations:   Patient demonstrates no functional deficits needed to be addressed with physical therapy in the acute setting. Patient is being DC to home following this evaluation. Patient is safe to be DC home from a functional mobility standpoint with the ability to perform al bed mobility, transfers, and ambulation independently. Patient instructed to use her Rolator at home if she feels week or unbalanced.    Evaluation only this visit.     Plan:   Evaluation only    Subjective     Chief Complaint: "I want to go home"  Patient/Family Comments/goals: return home  Pain/Comfort:  Pain Rating 1: 0/10    Patients cultural, spiritual, Bahai conflicts given the current situation: no    Living Environment:  Patient lives alone in [facility] with help from her daughter and son  Prior to admission, patients level of function was requiring some assistance from her children.  Equipment used at home:  .  DME owned (not currently used): none.  Upon discharge, patient will have assistance from family.    Objective:     Communicated with nursing prior to session.  Patient found HOB elevated with pulse ox (continuous), oxygen, blood pressure cuff, lira catheter  upon PT entry to room.    General Precautions: Standard,    Orthopedic Precautions:N/A   Braces: N/A  Respiratory Status: Nasal cannula, flow 2.0 L/min    Exams:  Cognitive Exam:  Patient is oriented to Person, Place, Time, and Situation  Gross Motor Coordination:  WFL  RLE ROM: WFL  RLE Strength: WFL  LLE ROM: WFL  LLE Strength: WFL    Functional " Mobility:  Bed Mobility:  Rolling Left:  independence  Rolling Right: independence  Supine to Sit: independence  Sit to Supine: independence  Transfers:     Sit to Stand:  independence with no AD  Gait: 100 feet with no AD, but she did require HHA for the first 20 feet secondary to her anxiety with walking.       AM-PAC 6 CLICK MOBILITY  Total Score:24       Treatment & Education:  Patient was educated on functional mobility safety and the need to use her supplemental O2 at all times.     Patient left sitting edge of bed with all lines intact, call button in reach, nursing notified, and nurse present.    GOALS:   Multidisciplinary Problems       Physical Therapy Goals       Not on file                    History:     Past Medical History:   Diagnosis Date    Abdominal pain 05/2024    Acid reflux 05/2024    Anxiety disorder, unspecified     At high risk for falls     Emphysema, unspecified     History of opioid abuse     Hypertension     On home oxygen therapy     2l/nc    Wears partial dentures     upper-none on lower    Weight loss 05/2024       Past Surgical History:   Procedure Laterality Date    ANKLE SURGERY Left     HYSTERECTOMY      TONSILLECTOMY         Time Tracking:     PT Received On: 05/27/24  PT Start Time: 1440     PT Stop Time: 1455  PT Total Time (min): 15 min     Billable Minutes: Evaluation 15 minutes      05/27/2024

## 2024-05-27 NOTE — NURSING
Report received, talked to the patient, she does not want the mask back on and states she is breathing ok . Patient is on Precedex will wean in the AM. Patient is alert and orientated and calm. She is swallowing liquids and food without difficulty. Previous nurse reported that patient wants to be a DNR, this was discussed and confirmed today with the patient and her daughter was present, and Dr. Garcia notified order noted.  Patient is doing much better with her anxiety will wean Precedex in the AM so she can get a good night rest.  VSS, She is on 4 LPM N/C. O2 sat is 100%, RR 23, B/P a little elevated Dr is aware, her home meds have not yet restarted.

## 2024-05-27 NOTE — ASSESSMENT & PLAN NOTE
Resolved.   Secondary to polypharmacy. UDS positive for amphetamine and benzodiazepine.   Follow management above.

## 2024-05-27 NOTE — ASSESSMENT & PLAN NOTE
Patient has persistent depression which is moderate and is currently controlled.   Denies SI/HI, thoughts of harming self or others.   She states she would like to focus on getting better and healthier.   Consulted psychiatry at this time with recommendations to continue with seroquel at bedtime.   Patient does not display psychosis at this time. Continue to monitor closely and adjust plan of care as needed.  Patient agreeable to discontinuation of trazodone and gabapentin.   Current dosing of valium, per patient she takes 2.5 mg valium PRN and there are days when she does not take valium.   Counseled on importance of safe guarding her home medications and to follow up with her primary care provider.   - continue seroquel 50 mg QHS for insomnia

## 2024-05-27 NOTE — CONSULTS
[facility] - Intensive Care  Cardiology  Consult Note    Patient Name: June Boykin  MRN: [id number]  Admission Date: 5/22/2024  Hospital Length of Stay: 4 days  Code Status: DNR   Attending Provider: Thierno Valladares DO   Consulting Provider: Vivien Hussein PA-C  Primary Care Physician: Mauricio Pierre MD  Principal Problem:Acute hypercapnic respiratory failure    Patient information was obtained from patient, past medical records, and ER records.     Inpatient consult to Cardiology  Consult performed by: Vivien Hussein PA-C  Consult ordered by: Thierno Valladares DO        Subjective:     Chief Complaint:  arrhythmia     HPI:   This is a 68 yo female unknown to [facility] with PMHx HTN, anxiety, emphysema on home O2 3L NC, hx of methamphetamine use who is admitted to ICU care for acute respiratory failure with hypercapnia and pneumonia. She was initially started on BiPAP and is now weaned to NC. Clinically she has improved since admission. She continues on IV abx, breathing treatments for pneumonia.     EKG on admission shows sinus rhythm with PACs.  EKG yesterday shows similar findings of sinus rhythm with sinus arrhythmia.   Reviewed last 24 hours on telemetry - no gross abnormal rhythms. There was noted to be one episodes of 4 beat Vtach which spontaneously resolved; no recurrent episodes. Pt is asymptomatic at this time. She denies chest pain, syncope, palpitations, leg swelling.     Labs today reveal mild hypokalemia 3.2, which is being replaced. Mg 1.8    Past Medical History:   Diagnosis Date    Abdominal pain 05/2024    Acid reflux 05/2024    Anxiety disorder, unspecified     At high risk for falls     Emphysema, unspecified     History of opioid abuse     Hypertension     On home oxygen therapy     2l/nc    Wears partial dentures     upper-none on lower    Weight loss 05/2024       Past Surgical History:   Procedure Laterality Date    ANKLE SURGERY Left     HYSTERECTOMY      TONSILLECTOMY          Review of patient's allergies indicates:  No Known Allergies    No current facility-administered medications on file prior to encounter.     Current Outpatient Medications on File Prior to Encounter   Medication Sig    albuterol (PROAIR HFA) 90 mcg/actuation inhaler ProAir HFA    albuterol (PROVENTIL/VENTOLIN HFA) 90 mcg/actuation inhaler Inhale into the lungs.    aspirin 81 MG Chew Take 81 mg by mouth once daily. Stop 05/20/2024 per [facility] office    budesonide-formoterol 160-4.5 mcg (SYMBICORT) 160-4.5 mcg/actuation HFAA 2 puffs Inhalation Twice a day    cetirizine (ZYRTEC) 5 MG tablet Take 5 mg by mouth once daily.    diazePAM (VALIUM) 10 MG Tab Take 10 mg by mouth every 6 (six) hours as needed.    docusate sodium (COLACE) 100 MG capsule Take 100 mg by mouth 2 (two) times daily.    fluticasone propionate (FLONASE) 50 mcg/actuation nasal spray     gabapentin (NEURONTIN) 100 MG capsule Take 1 capsule (100 mg total) by mouth 2 (two) times daily. (Patient taking differently: Take 800 mg by mouth 2 (two) times daily.)    ibuprofen (ADVIL,MOTRIN) 200 MG tablet Take 200 mg by mouth every 6 (six) hours as needed for Pain.    lisinopriL-hydrochlorothiazide (PRINZIDE,ZESTORETIC) 20-25 mg Tab     melatonin 3 mg Cap Take by mouth.    omeprazole (PRILOSEC) 20 MG capsule     pantoprazole (PROTONIX) 40 MG tablet     potassium chloride (MICRO-K) 10 MEQ CpSR Take 10 mEq by mouth once.    pravastatin (PRAVACHOL) 20 MG tablet     traZODone (DESYREL) 100 MG tablet Take 100 mg by mouth nightly as needed.    azithromycin (Z-DEIRDRE) 250 MG tablet Take 2 tablets on day 1, then take 1 tablet on days 2-5    cyclobenzaprine (FLEXERIL) 5 MG tablet Take 1 tablet (5 mg total) by mouth nightly as needed.    doxycycline (VIBRA-TABS) 100 MG tablet Take 1 tablet (100 mg total) by mouth 2 (two) times daily.    FLUoxetine 10 MG capsule Take 1 capsule (10 mg total) by mouth once daily.    lubiprostone (AMITIZA) 8 MCG Cap      Family  History       Problem Relation (Age of Onset)    Diabetes Father    No Known Problems Mother, Brother, Brother, Brother    Pancreatic cancer Father    Parkinsonism Sister    Stroke Father          Tobacco Use    Smoking status: Former     Current packs/day: 1.00     Types: Cigarettes    Smokeless tobacco: Never    Tobacco comments:     Quit 2 years ago   Substance and Sexual Activity    Alcohol use: No    Drug use: Not Currently     Types: Oxycodone, Hydrocodone    Sexual activity: Not on file     Comment:      Review of Systems   Constitutional: Negative for fever.   Cardiovascular:  Negative for chest pain, claudication, dyspnea on exertion, irregular heartbeat, leg swelling and palpitations.   Respiratory:  Positive for cough and shortness of breath.    All other systems reviewed and are negative.    Objective:     Vital Signs (Most Recent):  Temp: 97.9 °F (36.6 °C) (05/27/24 0701)  Pulse: 87 (05/27/24 0716)  Resp: 20 (05/27/24 0716)  BP: (!) 169/91 (05/27/24 0515)  SpO2: 97 % (05/27/24 0737) Vital Signs (24h Range):  Temp:  [97.6 °F (36.4 °C)-97.9 °F (36.6 °C)] 97.9 °F (36.6 °C)  Pulse:  [55-87] 87  Resp:  [16-26] 20  SpO2:  [97 %-100 %] 97 %  BP: (159-186)/() 169/91     Weight: 49.4 kg (109 lb)  Body mass index is 22.78 kg/m².    SpO2: 97 %         Intake/Output Summary (Last 24 hours) at 5/27/2024 0925  Last data filed at 5/27/2024 0601  Gross per 24 hour   Intake 1516.84 ml   Output 1900 ml   Net -383.16 ml       Lines/Drains/Airways       Drain  Duration                  Urethral Catheter 05/23/24 1300 14 Fr. 3 days              Peripheral Intravenous Line  Duration                  Peripheral IV - Single Lumen 05/25/24 2030 20 G Anterior;Left;Proximal Forearm 1 day                    Physical Exam  Vitals and nursing note reviewed.   Constitutional:       Appearance: Normal appearance.   HENT:      Head: Normocephalic and atraumatic.      Nose: Nose normal.      Comments: NC in place      Mouth/Throat:      Pharynx: Oropharynx is clear.   Eyes:      Extraocular Movements: Extraocular movements intact.   Cardiovascular:      Rate and Rhythm: Normal rate and regular rhythm.      Pulses: Normal pulses.      Heart sounds: Normal heart sounds. No murmur heard.     No friction rub. No gallop.      Comments: Sinus arrhythmia.  Pulmonary:      Effort: Pulmonary effort is normal.      Breath sounds: Rhonchi and rales present.   Skin:     General: Skin is warm.      Capillary Refill: Capillary refill takes less than 2 seconds.   Neurological:      Mental Status: She is alert and oriented to person, place, and time.         Significant Labs: BMP:   Recent Labs   Lab 05/26/24  0337 05/27/24  0505    103    140   K 3.2* 3.2*   CL 98 99   CO2 38* 38*   BUN 9 4*   CREATININE 0.5 0.6   CALCIUM 8.6* 8.6*   MG 1.8 1.8   , CMP   Recent Labs   Lab 05/26/24  0337 05/27/24  0505    140   K 3.2* 3.2*   CL 98 99   CO2 38* 38*    103   BUN 9 4*   CREATININE 0.5 0.6   CALCIUM 8.6* 8.6*   PROT 6.4 6.5   ALBUMIN 2.3* 2.3*   BILITOT 1.0 1.2*   ALKPHOS 39* 39*   AST 58* 38   ALT 66* 50*   ANIONGAP 2* 3   , CBC   Recent Labs   Lab 05/26/24  0337 05/27/24  0505   WBC 6.70 6.38   HGB 10.5* 10.9*   HCT 34.7* 36.6*    255   , and All pertinent lab results from the last 24 hours have been reviewed.    Significant Imaging:   EKG 5/25/24:     Assessment and Plan:    SR with sinus arrhythmia and PACs   -currently asymptomatic from CV standpoint   -HR and rhythm controlled - 87  -one 4 beat run of PVCs (Vtach) - asymptomatic, resolved - no recurrent episodes   -telemetry reviewed - no other events noted    Acute respiratory failure with hypercapnia   PNA   -currently improving   -doing well on NC   -continue IV abx and management per primary     HTN  -BP currently normotensive   -continue current regimen   -continue to monitor BP          Active Diagnoses:    Diagnosis Date Noted POA    PRINCIPAL PROBLEM:   Acute hypercapnic respiratory failure [J96.02] 05/23/2024 Yes    Sepsis with acute renal failure and tubular necrosis without septic shock [A41.9, R65.20, N17.0] 05/23/2024 Yes    Polypharmacy [Z79.899] 05/23/2024 Not Applicable     Chronic    Encephalopathy, metabolic [G93.41] 05/23/2024 Yes    Encephalopathy, toxic [G92.9] 05/23/2024 Yes    Multifocal pneumonia [J18.9] 05/23/2024 Yes    Hypotension due to hypovolemia [E86.1] 02/02/2024 Yes      Problems Resolved During this Admission:       VTE Risk Mitigation (From admission, onward)           Ordered     enoxaparin injection 40 mg  Daily         05/24/24 1553     IP VTE HIGH RISK PATIENT  Once         05/23/24 1037     Place sequential compression device  Until discontinued         05/23/24 0706                    Thank you for your consult.     Vivien Hussein PA-C - scribed for Dr. Alejo   Cardiology     ***  Cardiology   [facility] - Intensive Care

## 2024-05-27 NOTE — SUBJECTIVE & OBJECTIVE
Interval History: Patient seen and examined.     Review of Systems   Unable to perform ROS: Acuity of condition   Constitutional:  Negative for activity change, appetite change, chills, diaphoresis, fatigue and fever.   HENT:  Negative for sinus pain and sore throat.    Eyes:  Negative for visual disturbance.   Respiratory:  Positive for shortness of breath and wheezing. Negative for cough, choking and chest tightness.    Cardiovascular:  Negative for chest pain, palpitations and leg swelling.   Gastrointestinal:  Negative for abdominal distention, abdominal pain, blood in stool, constipation, diarrhea, nausea and vomiting.   Musculoskeletal:  Negative for joint swelling, neck pain and neck stiffness.   Skin:  Negative for wound.   Neurological:  Negative for dizziness, tremors, seizures, light-headedness and numbness.   Psychiatric/Behavioral:  Negative for agitation, behavioral problems, confusion, dysphoric mood, hallucinations and suicidal ideas. The patient is not nervous/anxious.      Objective:     Vital Signs (Most Recent):  Temp: 97.6 °F (36.4 °C) (05/27/24 1101)  Pulse: 98 (05/27/24 1200)  Resp: (!) 29 (05/27/24 1200)  BP: (!) 174/84 (05/27/24 1200)  SpO2: 96 % (05/27/24 1200) Vital Signs (24h Range):  Temp:  [97.6 °F (36.4 °C)-97.9 °F (36.6 °C)] 97.6 °F (36.4 °C)  Pulse:  [] 98  Resp:  [16-35] 29  SpO2:  [86 %-100 %] 96 %  BP: (127-186)/() 174/84     Weight: 49.4 kg (109 lb)  Body mass index is 22.78 kg/m².    Intake/Output Summary (Last 24 hours) at 5/27/2024 1339  Last data filed at 5/27/2024 0601  Gross per 24 hour   Intake 1516.84 ml   Output 1900 ml   Net -383.16 ml         Physical Exam  Vitals and nursing note reviewed.   Constitutional:       General: She is not in acute distress.     Appearance: She is not ill-appearing, toxic-appearing or diaphoretic.      Comments: Frail stature   HENT:      Right Ear: External ear normal.      Left Ear: External ear normal.      Nose:       Comments: Nasal cannula in place     Mouth/Throat:      Mouth: Mucous membranes are dry.      Pharynx: Oropharynx is clear.   Cardiovascular:      Rate and Rhythm: Normal rate.      Pulses: Normal pulses.      Heart sounds: Normal heart sounds. No murmur heard.  Pulmonary:      Effort: Bradypnea present. No retractions.      Breath sounds: Transmitted upper airway sounds present. Wheezing present. No rhonchi (bilaterally).      Comments: Scattered wheezing, breath sounds reaching lung bases bilaterally  Chest:      Chest wall: No tenderness.   Abdominal:      General: Bowel sounds are normal. There is no distension.      Palpations: Abdomen is soft.      Tenderness: There is no abdominal tenderness. There is no guarding.      Comments: No stool   Genitourinary:     Comments: [place] catheter in place draining yellow colored urine  Musculoskeletal:      Cervical back: Normal range of motion and neck supple. No rigidity or tenderness.      Right lower leg: No edema.      Left lower leg: No edema.   Lymphadenopathy:      Cervical: No cervical adenopathy.   Skin:     General: Skin is warm and dry.      Capillary Refill: Capillary refill takes less than 2 seconds.      Findings: No erythema or rash.   Neurological:      Mental Status: She is alert and oriented to person, place, and time.      Motor: No weakness.      Gait: Gait normal.      Comments: Moving upper and lower extremities with appropriate strength   Psychiatric:         Attention and Perception: Attention and perception normal. She is attentive.         Mood and Affect: Mood is depressed. Mood is not anxious. Affect is not labile, flat or tearful.         Speech: Speech normal.         Behavior: Behavior is not agitated, slowed, aggressive, withdrawn, hyperactive or combative. Behavior is cooperative.         Thought Content: Thought content normal.         Cognition and Memory: Cognition normal.             Significant Labs: All pertinent labs within the past  24 hours have been reviewed.  A  Recent Labs   Lab 05/22/24  2256 05/24/24  0550 05/25/24  0352 05/26/24  0337 05/27/24  0505   BILITOT 0.9 0.6 1.0 1.0 1.2*     BMP:   Recent Labs   Lab 05/27/24  0505         K 3.2*   CL 99   CO2 38*   BUN 4*   CREATININE 0.6   CALCIUM 8.6*   MG 1.8     CBC:   Recent Labs   Lab 05/26/24  0337 05/27/24  0505   WBC 6.70 6.38   HGB 10.5* 10.9*   HCT 34.7* 36.6*    255     CMP:   Recent Labs   Lab 05/26/24  0337 05/27/24  0505    140   K 3.2* 3.2*   CL 98 99   CO2 38* 38*    103   BUN 9 4*   CREATININE 0.5 0.6   CALCIUM 8.6* 8.6*   PROT 6.4 6.5   ALBUMIN 2.3* 2.3*   BILITOT 1.0 1.2*   ALKPHOS 39* 39*   AST 58* 38   ALT 66* 50*   ANIONGAP 2* 3     Magnesium:   Recent Labs   Lab 05/26/24  0337 05/27/24  0505   MG 1.8 1.8       Significant Imaging: I have reviewed all pertinent imaging results/findings within the past 24 hours.

## 2024-05-27 NOTE — CONSULTS
"PSYCHIATRY CONSULT      5/27/2024 10:14 AM   June Boykin   1955   [id number]         DATE OF ADMISSION: 5/22/2024 10:03 PM    SITE: [facility]    CURRENT LEGAL STATUS: Voluntary Medical Admission       HISTORY    CHIEF COMPLAINT   June Boykin is a 69 y.o. female with a past psychiatric history of Anxiety Disorder NOS currently admitted to the inpatient unit with the following chief complaint:  respiratory failure    HPI   The patient was seen and examined. The chart was reviewed.    The patient presented to the ER on 5/22/2024 .    Per ED/Hospital medicine:  Principal Problem:Acute hypercapnic respiratory failure           HPI:  Chief Complaint     Complaint Comment   Diarrhea Pt to the ER w/ complaints of diarrhea, decreased appetite, "feels dehydrated." Pt denies any recent illness, denies any weakness, dizziness, chest pain, or SOB during triage. "Out of it and lethargic" per family.      69 year old with hypertension, anxiety, emphysema (on home O2 3L via nasal cannula), history of methamphetamine use presented to the emergency department overnight with complaints of diarrhea, dehydration and frequent falls in the past two days.   Patient on exam is very sleepy, responds with a grown and a word of two with voice and painful stimulus, unable to answer questions.   Vital signs at time of exam, BP 85/53 mmHg, RR 30, HR 69, SpO2 100% on continuous O2 4L via nasal cannula with sodium bicarb gtt at 150/hr.      ED course:  Vital signs on arrival BP 85/47 mmHg, HR 90, RR 18, SpO2 94% on room air, temp 97.7F.   WBC 7.94, Hg 12.3, HCT 42.3,   Na 131, K 6, Cl 95, CO2 28, Glu 88, BUN 42, Cr 3.4 (baseline 1), [place] 9.7, Alb 2.7, Tbili 0.9, Alk phos 56, AST 91, ALT 46, lipase 26, Anion Gap 8.  UDS positive for benzos and amphetamines.   Urinalysis microhematuria, WBC 16, no bacteria, no nitrite.  Received rocephin 1g.  Patient received bolus NS 1L x1, lomotil x1.   Calcium gluc x1, D10 IVF " 50g x 1, insulin 5U, Lokelma 10 mg.   Placed on sodium bicarb gtt 150mEq x1.  Repeat potassium levels 4.4 <5.5 with improving renal function.      Patient acutely became agitated, pulling on telemetry pulse oximetry readings in 80s and palced on high flow nasal cannula.   ABG findings pH 7.2/94.1/77.8/29.9 92% on FiO2 40% on venturi mask.  Patient admitted to ICU for acute respiratory failure on BiPAP and acute encephalopathy secondary to metabolic, infectious and toxic (polypharmacy) sources.      Medications reviewed from pill boxes provided by daughter and niece contain  Gabapentin 800 mg tablets, Simethicone 125 mg capsules, omeprazole 20 mg capsules, cetirizine 10 mg tablet, docusate 100 mg capsules, aspirin 81 mg tablets, trazodone 100 mg tablet, advil PM tablets, vitamin B2 100 mg tablets, pravastatin 20 mg tablets, and lisinopril/hctz 20/25 mg tablet.     Overview/Hospital Course:  5/24/24 Patient intermittently waking up trying to pull on lines and mask, nursing staff and family at bedside reporting complaints of pain over facial mask and misidentifying family names and face. Required ativan 1mg x2, will continue with symptom directed use of anxiolytic and monitor withdrawal signs. Follow up psychiatry when patient alert and responsive to questions.   Overnight BiPAP with ABG 7.2/77.2/99/31 97.8% on FiO2 50%. PCO2 slowly improving from PCO2 94 chronic retention, on last hospitalization noted to leave AMA with PCO2 57. Will transition to AVAPS and repeat ABG PCO2 94. Continue with scheduled breathing treatments and zosyn for bilateral pneumonia. No leukocytosis. Follow up cultures.   MARY resolved. Estimated Creatinine Clearance: 48 mL/min (based on SCr of 0.8 mg/dL). Renal function back to baseline with IVF hydration. Monitor I/Os.      5/25: Patient remains BiPAP dependent but appears to be resting comfortably with current settings.  Arterial blood gases show continued improvement in pCO2 levels.  Blood  "cultures remain negative.     5/26:  Patient required BiPAP earlier this morning but did tolerate nasal cannula for a good while yesterday appears to be improving clinically but very slowly.      Per initial psychiatric assessment:    Patient seen this morning for psychiatric consultation 2/2 medication management. Consult placed previous Friday, however pt was unable to participate in assessment at that time.     Today she is observed sitting upright in bed, awake, alert, oriented to person, place, situation. Oriented to year but not month (stated month of April.) Patient provides bulk of historical information. During assessment, the patient's son and daughter in law entered the patient's room and participated in the discussion.     Pt reports psychiatric history consisting of "anxiety." She is prescribed diazepam 10 mg q6h PRN at home, however the patient reports only taking "one fourth of a pill a day." Reports that diazepam is generally effective in managing anxiety. Pt is also prescribed trazodone  mg qhs PRN for insomnia which she states is "Sometimes" effective. During this morning's assessment, the patient is unable to account for presence of amphetamine in urine screen, denying any use including stimulant ADHD medication- However pt reportedly told MD that she has been using adderall "off the street" and has a history of methamphetamine use."    Prior to this hospitalization, pt was living alone with occasional help from family members, including the sorting of pt's medications on a weekly basis. Family is unclear as to whether patient has been taking home medication's as prescribed, noting that her medication's were found "all over the place" in her home. Family also reports pt had not used supplemental oxygen for 2-3 days prior to this hospitalization.     Additional assessment data and recs below    Symptoms of Depression: diminished mood - No, loss of interest/anhedonia - No;  recurrent - No, >14 " days - No, diminished energy - No, change in sleep - No, change in appetite - No, diminished concentration or cognition or indecisiveness - No, PMA/R -  No, excessive guilt or hopelessness or worthlessness - No, suicidal ideations - No    Changes in Sleep: trouble with initiation- No, maintenance, - No early morning awakening with inability to return to sleep - No, hypersomnolence - No    Suicidal- active/passive ideations - No, organized plans, future intentions - No    Homicidal ideations: active/passive ideations - No, organized plans, future intentions - No    Symptoms of psychosis: hallucinations - No, delusions - No, disorganized speech - No, disorganized behavior or abnormal motor behavior - No, or negative symptoms (diminshed emotional expression, avolition, anhedonia, alogia, asociality) - No, active phase symptoms >1 month - No, continuous signs of illness > 6 months - No, since onset of illness decreased level of functioning present - No    Symptoms of deandre or hypomania: elevated, expansive, or irritable mood with increased energy or activity - No; > 4 days - No,  >7 days - No; with inflated self-esteem or grandiosity - No, decreased need for sleep - No, increased rate of speech - No, FOI or racing thoughts - No, distractibility - No, increased goal directed activity or PMA - No, risky/disinhibited behavior - No    Symptoms of MIKE: excessive anxiety/worry/fear, more days than not, about numerous issues - Yes, ongoing for >6 months - Yes, difficult to control - Yes, with restlessness - Yes, fatigue - Yes, poor concentration - Yes, irritability - Yes, muscle tension - No, sleep disturbance - Yes; causes functionally impairing distress - Yes    Symptoms of Panic Disorder: recurrent panic attacks (palpitations/heart racing, sweating, shakiness, dyspnea, choking, chest pain/discomfort, Gi symptoms, dizzy/lightheadedness, hot/col flashes, paresthesias, derealization, fear of losing control or fear of dying or  "fear of "going crazy") - No, precipitated - No, un-precipitated - No, source of worry and/or behavioral changes secondary for 1 month or longer- No, agoraphobia - No    Symptoms of PTSD: h/o trauma exposure - No; re-experiencing/intrusive symptoms - No, avoidant behavior - No, 2 or more negative alterations in cognition or mood - No, 2 or more hyperarousal symptoms - No; with dissociative symptoms - No, ongoing for 1 or more  months - No    Symptoms of OCD: obsessions (recurrent thoughts/urges/images; intrusive and/or unwanted; uses other thoughts/actions to suppress) - No; compulsions (repetitive behaviors used to lower distress/anxiety/obsessions) - No, time-consuming (over 1 hour per day) or cause significant distress/impairment - - No    Symptoms of Anorexia: restriction of caloric intake leading to significantly low body weight - No, intense fear of gaining weight or persistent behavior that interferes with weight gain even thought at a significantly low weight - No, disturbance in the way in which one's body weight or shape is experienced, undue influence of body weight or shape on self evaluation, or persistent lack of recognition of the seriousness of the current low body weight - No    Symptoms of Bulimia: recurrent episodes of binge eating (definitely larger amount  than what others would eat and lack of a sense of control over eating during episode) - No, recurrent inappropriate compensatory behaviors in order to prevent weight gain (fasting, medications, exercise, vomiting) - No, binges and compensatory behaviors both occur on average at least once a week for 3 months - No, self evaluations is unduly influenced by body shape/weight- - No    Symptoms of Binge eating: recurrent episodes of binge eating (definitely larger amount than what others would eat and lack of a sense of control over eating during episode) - No, 3 or more of following (eating much more rapidly, eating until uncomfortably full, large " amounts when not hungry, eating alone because of embarrassed by how much,  feeling disgusted with oneself, depressed or very guilty afterward) - No, distress regarding binges - No, binges occur on average at least once a week for 3 months - No      Substance/s:  Taken in larger amounts or over longer periods than intended: Denies during assessment, however FM assessment indicates otherwise  Persistent desire or unsuccessful attempts to cut down or stop: No,  Great deal of time spent seeking, using or recovering from: No,  Craving or strong desire to use: No,  Recurrent use despite failure to meet major role obligation: No,  Continued use despite persistent or recurrent social/interparsonal issues due to use: No,  Important social/work/recreational activities given up due to use: No,  Recurrent use in physically hazardous situations: No,  Continued use despite knowledge of persistent physical or psychological problem: No,  Tolerance (either increased need or diminished effect): No,      Psychotherapy:  Target symptoms: anxiety , substance abuse  Why chosen therapy is appropriate versus another modality: relevant to diagnosis, evidence based practice  Outcome monitoring methods: self-report, observation  Therapeutic intervention type: insight oriented psychotherapy, supportive psychotherapy  Topics discussed/themes: building skills sets for symptom management, symptom recognition  The patient's response to the intervention is guarded. The patient's progress toward treatment goals is fair.   Duration of intervention: 16 minutes.      PAST PSYCHIATRIC HISTORY  Previous Psychiatric Hospitalizations: No  Previous SI/HI: No,  Previous Suicide Attempts: No,   Previous Medication Trials: Yes,  Psychiatric Care (current & past): Yes,  History of Psychotherapy: Yes,  History of Violence: No,  History of sexual/physical abuse: No,    PAST MEDICAL & SURGICAL HISTORY   Past Medical History:   Diagnosis Date    Abdominal pain  05/2024    Acid reflux 05/2024    Anxiety disorder, unspecified     At high risk for falls     Emphysema, unspecified     History of opioid abuse     Hypertension     On home oxygen therapy     2l/nc    Wears partial dentures     upper-none on lower    Weight loss 05/2024     Past Surgical History:   Procedure Laterality Date    ANKLE SURGERY Left     HYSTERECTOMY      TONSILLECTOMY            CURRENT PSYCH MEDICATION REGIMEN   Diazepam, daily dose unclear  Trazodone  mg qhs PRN insomnia  Current Medication side effects:  Denies  Current Medication compliance:  Unclear    Previous psych meds trials  Fluoxetine    Home Meds:   Prior to Admission medications    Medication Sig Start Date End Date Taking? Authorizing Provider   albuterol (PROAIR HFA) 90 mcg/actuation inhaler ProAir HFA   Yes Provider, Historical   albuterol (PROVENTIL/VENTOLIN HFA) 90 mcg/actuation inhaler Inhale into the lungs. 3/27/23  Yes Provider, Historical   aspirin 81 MG Chew Take 81 mg by mouth once daily. Stop 05/20/2024 per kathleen@ Dr. Dominguez office   Yes Provider, Historical   budesonide-formoterol 160-4.5 mcg (SYMBICORT) 160-4.5 mcg/actuation HFAA 2 puffs Inhalation Twice a day   Yes Provider, Historical   cetirizine (ZYRTEC) 5 MG tablet Take 5 mg by mouth once daily.   Yes Provider, Historical   diazePAM (VALIUM) 10 MG Tab Take 10 mg by mouth every 6 (six) hours as needed. 5/14/24  Yes Provider, Historical   docusate sodium (COLACE) 100 MG capsule Take 100 mg by mouth 2 (two) times daily. 4/12/24  Yes Provider, Historical   fluticasone propionate (FLONASE) 50 mcg/actuation nasal spray  3/27/23  Yes Provider, Historical   gabapentin (NEURONTIN) 100 MG capsule Take 1 capsule (100 mg total) by mouth 2 (two) times daily.  Patient taking differently: Take 800 mg by mouth 2 (two) times daily. 8/31/22  Yes Chicho Ovalles MD   ibuprofen (ADVIL,MOTRIN) 200 MG tablet Take 200 mg by mouth every 6 (six) hours as needed for Pain.   Yes Provider,  Historical   lisinopriL-hydrochlorothiazide (PRINZIDE,ZESTORETIC) 20-25 mg Tab  4/17/23  Yes Provider, Historical   melatonin 3 mg Cap Take by mouth.   Yes Provider, Historical   omeprazole (PRILOSEC) 20 MG capsule  4/17/23  Yes Provider, Historical   pantoprazole (PROTONIX) 40 MG tablet  5/19/24  Yes Provider, Historical   potassium chloride (MICRO-K) 10 MEQ CpSR Take 10 mEq by mouth once.   Yes Provider, Historical   pravastatin (PRAVACHOL) 20 MG tablet  4/8/23  Yes Provider, Historical   traZODone (DESYREL) 100 MG tablet Take 100 mg by mouth nightly as needed. 3/12/24  Yes Provider, Historical   azithromycin (Z-DEIRDRE) 250 MG tablet Take 2 tablets on day 1, then take 1 tablet on days 2-5 12/16/22   Chicho Ovalles MD   cyclobenzaprine (FLEXERIL) 5 MG tablet Take 1 tablet (5 mg total) by mouth nightly as needed. 3/29/19   Chicho Ovalles MD   doxycycline (VIBRA-TABS) 100 MG tablet Take 1 tablet (100 mg total) by mouth 2 (two) times daily. 8/26/22   Chicho Ovalles MD   FLUoxetine 10 MG capsule Take 1 capsule (10 mg total) by mouth once daily. 10/11/21 10/11/22  Chicho Ovalles MD   lubiprostone (AMITIZA) 8 MCG Cap  5/19/24   Provider, Historical         OTC Meds:     Scheduled Meds:    albuterol-ipratropium  3 mL Nebulization Q4H WAKE    amoxicillin-clavulanate 875-125mg  1 tablet Oral Q12H    budesonide  0.5 mg Nebulization Q12H    enoxparin  40 mg Subcutaneous Daily    famotidine (PF)  20 mg Intravenous BID    hydroCHLOROthiazide  25 mg Oral Daily    lisinopriL  20 mg Oral Daily    magnesium oxide  400 mg Oral BID    mupirocin   Nasal BID    potassium chloride  20 mEq Oral Q2H    sodium chloride 0.9%  10 mL Intravenous Q6H      PRN Meds:   Current Facility-Administered Medications:     sodium chloride 0.9%, , Intravenous, PRN    sodium chloride 0.9%, , Intravenous, PRN    albuterol-ipratropium, 3 mL, Nebulization, Q4H PRN    [COMPLETED] calcium gluconate IVPB, 1 g, Intravenous, ED 1 Time **AND** calcium gluconate IVPB, 1 g, Intravenous, Q10  Min PRN    ondansetron, 4 mg, Intravenous, Q8H PRN    sodium chloride 0.9%, 10 mL, Intravenous, PRN    Flushing PICC/Midline Protocol, , , Until Discontinued **AND** sodium chloride 0.9%, 10 mL, Intravenous, Q6H **AND** sodium chloride 0.9%, 10 mL, Intravenous, PRN   Psychotherapeutics (From admission, onward)      None            ALLERGIES   Review of patient's allergies indicates:  No Known Allergies    NEUROLOGIC HISTORY  Seizures: No  Head trauma: No    SOCIAL HISTORY:  Developmental/Childhood:Achieved all developmental milestones timely  Education:8th grade  Employment Status/Finances:Retired   Relationship Status/Sexual Orientation: single  Children: 2  Housing Status: Home    history:  NO   Access to Firearms: NO ;  Locked up? n/a  Temple:Spiritual without formal affiliation  Recreational activities:Time with family    SUBSTANCE ABUSE HISTORY   Recreational Drugs: Denies- Pt may be minimizing   Use of Alcohol: denied  Rehab History: denies    Tobacco Use:Yes    LEGAL HISTORY:   Past charges/incarcerations: NO  Pending charges:NO    FAMILY PSYCHIATRIC HISTORY   Family History   Problem Relation Name Age of Onset    No Known Problems Mother          @90    Diabetes Father      Pancreatic cancer Father      Stroke Father      No Known Problems Brother      No Known Problems Brother      No Known Problems Brother      Parkinsonism Sister                ROS  ROS      EXAMINATION    PHYSICAL EXAM  Reviewed note/exam by Dr. Valladares from 5/28/24     VITALS   Vitals:    05/27/24 0716   BP:    Pulse: 87   Resp: 20   Temp:         Body mass index is 22.78 kg/m².        PAIN  0/10  Subjective report of pain matches objective signs and symptoms:  na    LABORATORY DATA   Recent Results (from the past 72 hour(s))   POCT ARTERIAL BLOOD GAS    Collection Time: 05/24/24  2:06 PM   Result Value Ref Range    POC PH 7.355 7.345 - 7.450    POC PCO2 66.9 (HH) 35.0 - 45.0 mmHg    POC PO2 93.6 80.0 - 100 mmHg    POC  SATURATED O2 98.0 95.0 - 100.0 %    POC HCO3 33.6 (H) 24.0 - 28.0 mmol/l    Base Deficit 11.9 (H) -2.0 - 2.0 mmol/l    POC Temp 37.0 C    Specimen source Arterial     Performed By: Reshma     MODIFIED MARIA ESTHER'S TEST POS     FiO2 40.0 %    O2DEVICE Other     Vt 400     BIPAP AVAPS 10-25 IPAP/8 EPAP     Provider Notified: ALEX MOYA     Notified Time 05/24/2024 14:08     Notified By Reshma     Notified Note Called and read back by ALEX MOYA    Magnesium    Collection Time: 05/25/24  3:52 AM   Result Value Ref Range    Magnesium 1.3 (L) 1.6 - 2.6 mg/dL   Comprehensive Metabolic Panel    Collection Time: 05/25/24  3:52 AM   Result Value Ref Range    Sodium 136 136 - 145 mmol/L    Potassium 4.1 3.5 - 5.1 mmol/L    Chloride 98 95 - 110 mmol/L    CO2 36 (H) 23 - 29 mmol/L    Glucose 119 (H) 70 - 110 mg/dL    BUN 15 8 - 23 mg/dL    Creatinine 0.7 0.5 - 1.4 mg/dL    Calcium 8.6 (L) 8.7 - 10.5 mg/dL    Total Protein 7.0 6.0 - 8.4 g/dL    Albumin 2.6 (L) 3.5 - 5.2 g/dL    Total Bilirubin 1.0 0.1 - 1.0 mg/dL    Alkaline Phosphatase 47 (L) 55 - 135 U/L     (H) 10 - 40 U/L    ALT 98 (H) 10 - 44 U/L    eGFR >60.0 >60 mL/min/1.73 m^2    Anion Gap 2 (L) 3 - 11 mmol/L   CBC Without Differential    Collection Time: 05/25/24  4:36 AM   Result Value Ref Range    WBC 6.17 3.90 - 12.70 K/uL    RBC 4.54 4.00 - 5.40 M/uL    Hemoglobin 10.6 (L) 12.0 - 16.0 g/dL    Hematocrit 34.9 (L) 37.0 - 48.5 %    MCV 77 (L) 82 - 98 fL    MCH 23.3 (L) 27.0 - 31.0 pg    MCHC 30.4 (L) 32.0 - 36.0 g/dL    RDW 16.3 (H) 11.5 - 14.5 %    Platelets 229 150 - 450 K/uL    MPV 10.3 9.2 - 12.9 fL   POCT ARTERIAL BLOOD GAS    Collection Time: 05/25/24 10:03 AM   Result Value Ref Range    POC PH 7.391 7.345 - 7.450    POC PCO2 63.0 (HH) 35.0 - 45.0 mmHg    POC PO2 100 (H) 80.0 - 100 mmHg    POC SATURATED O2 98.7 95.0 - 100.0 %    POC HCO3 34.8 (H) 24.0 - 28.0 mmol/l    Base Deficit 13.2 (H) -2.0 - 2.0 mmol/l    POC Temp 37.0 C    Specimen source Arterial      Performed By: Torrey     MODIFIED MARIA ESTHER'S TEST NA     FiO2 40.0 %    O2DEVICE BIPAP     Vt 400     Comments AVAPS 10-25 MIN/MAX; 8 EPAP; 8 BACKUP RATE     Provider Notified: ALEX MOYA     Notified Time 05/25/2024 10:05     Notified By Torrey     Notified Note Called and read back by ALEX MOYA    Gastrointestinal Pathogens Panel, PCR    Collection Time: 05/25/24 11:00 AM   Result Value Ref Range    GPP - Campylobacter Not Detected Not Detected    GPP - Clostridium difficile Toxin A/B Specimen inadequate for testing Not Detected    Plesiomonas shigelloides Not Detected Not Detected    GPP - Salmonella Not Detected Not Detected    Vibrio Not Detected Not Detected    GPP - Vibrio cholera Not Detected Not Detected    GPP - Yersinia enterocolitica Not Detected Not Detected    Enteroaggregative E coli Not Detected Not Detected    Enteropathogenic E coli Not Detected Not Detected    GPP - Enterotoxigenic E coli (ETEC) Not Detected Not Detected    GPP - Shiga Toxin-producing E coli (STEC) Not Detected Not Detected    Shigella/Enteroinvasive E coli Not Detected Not Detected    GPP - Cryptosporidium Not Detected Not Detected    Cyclospora cayetanensis Not Detected Not Detected    GPP - Entamoeba histolytica Not Detected Not Detected    GPP - Giardia lamblia Not Detected Not Detected    GPP - Adenovirus 40/41 Not Detected Not Detected    Astrovirus Not Detected Not Detected    GPP - Norovirus GI/GII Not Detected Not Detected    GPP - Rotavirus A Not Detected Not Detected    Sapovirus Not Detected Not Detected   EKG 12-lead    Collection Time: 05/25/24  4:03 PM   Result Value Ref Range    QRS Duration 80 ms    [facility] QTC Calculation 460 ms   Comprehensive Metabolic Panel    Collection Time: 05/26/24  3:37 AM   Result Value Ref Range    Sodium 138 136 - 145 mmol/L    Potassium 3.2 (L) 3.5 - 5.1 mmol/L    Chloride 98 95 - 110 mmol/L    CO2 38 (H) 23 - 29 mmol/L    Glucose 106 70 - 110 mg/dL    BUN 9 8 - 23 mg/dL    Creatinine 0.5 0.5  - 1.4 mg/dL    Calcium 8.6 (L) 8.7 - 10.5 mg/dL    Total Protein 6.4 6.0 - 8.4 g/dL    Albumin 2.3 (L) 3.5 - 5.2 g/dL    Total Bilirubin 1.0 0.1 - 1.0 mg/dL    Alkaline Phosphatase 39 (L) 55 - 135 U/L    AST 58 (H) 10 - 40 U/L    ALT 66 (H) 10 - 44 U/L    eGFR >60.0 >60 mL/min/1.73 m^2    Anion Gap 2 (L) 3 - 11 mmol/L   Magnesium    Collection Time: 05/26/24  3:37 AM   Result Value Ref Range    Magnesium 1.8 1.6 - 2.6 mg/dL   CBC Auto Differential    Collection Time: 05/26/24  3:37 AM   Result Value Ref Range    WBC 6.70 3.90 - 12.70 K/uL    RBC 4.58 4.00 - 5.40 M/uL    Hemoglobin 10.5 (L) 12.0 - 16.0 g/dL    Hematocrit 34.7 (L) 37.0 - 48.5 %    MCV 76 (L) 82 - 98 fL    MCH 22.9 (L) 27.0 - 31.0 pg    MCHC 30.3 (L) 32.0 - 36.0 g/dL    RDW 16.3 (H) 11.5 - 14.5 %    Platelets 246 150 - 450 K/uL    MPV 10.1 9.2 - 12.9 fL    Immature Granulocytes 0.4 0.0 - 0.5 %    Gran # (ANC) 3.9 1.8 - 7.7 K/uL    Immature Grans (Abs) 0.03 0.00 - 0.04 K/uL    Lymph # 1.4 1.0 - 4.8 K/uL    Mono # 1.2 (H) 0.3 - 1.0 K/uL    Eos # 0.2 0.0 - 0.5 K/uL    Baso # 0.05 0.00 - 0.20 K/uL    nRBC 0 0 /100 WBC    Gran % 57.7 38.0 - 73.0 %    Lymph % 20.6 18.0 - 48.0 %    Mono % 17.6 (H) 4.0 - 15.0 %    Eosinophil % 3.0 0.0 - 8.0 %    Basophil % 0.7 0.0 - 1.9 %    Differential Method Automated    CBC Auto Differential    Collection Time: 05/27/24  5:05 AM   Result Value Ref Range    WBC 6.38 3.90 - 12.70 K/uL    RBC 4.83 4.00 - 5.40 M/uL    Hemoglobin 10.9 (L) 12.0 - 16.0 g/dL    Hematocrit 36.6 (L) 37.0 - 48.5 %    MCV 76 (L) 82 - 98 fL    MCH 22.6 (L) 27.0 - 31.0 pg    MCHC 29.8 (L) 32.0 - 36.0 g/dL    RDW 16.5 (H) 11.5 - 14.5 %    Platelets 255 150 - 450 K/uL    MPV 10.9 9.2 - 12.9 fL    Immature Granulocytes 0.3 0.0 - 0.5 %    Gran # (ANC) 3.0 1.8 - 7.7 K/uL    Immature Grans (Abs) 0.02 0.00 - 0.04 K/uL    Lymph # 1.8 1.0 - 4.8 K/uL    Mono # 1.2 (H) 0.3 - 1.0 K/uL    Eos # 0.3 0.0 - 0.5 K/uL    Baso # 0.03 0.00 - 0.20 K/uL    nRBC 0 0 /100 WBC  "   Gran % 47.5 38.0 - 73.0 %    Lymph % 27.6 18.0 - 48.0 %    Mono % 19.4 (H) 4.0 - 15.0 %    Eosinophil % 4.7 0.0 - 8.0 %    Basophil % 0.5 0.0 - 1.9 %    Differential Method Automated    Comprehensive Metabolic Panel    Collection Time: 05/27/24  5:05 AM   Result Value Ref Range    Sodium 140 136 - 145 mmol/L    Potassium 3.2 (L) 3.5 - 5.1 mmol/L    Chloride 99 95 - 110 mmol/L    CO2 38 (H) 23 - 29 mmol/L    Glucose 103 70 - 110 mg/dL    BUN 4 (L) 8 - 23 mg/dL    Creatinine 0.6 0.5 - 1.4 mg/dL    Calcium 8.6 (L) 8.7 - 10.5 mg/dL    Total Protein 6.5 6.0 - 8.4 g/dL    Albumin 2.3 (L) 3.5 - 5.2 g/dL    Total Bilirubin 1.2 (H) 0.1 - 1.0 mg/dL    Alkaline Phosphatase 39 (L) 55 - 135 U/L    AST 38 10 - 40 U/L    ALT 50 (H) 10 - 44 U/L    eGFR >60.0 >60 mL/min/1.73 m^2    Anion Gap 3 3 - 11 mmol/L   Magnesium    Collection Time: 05/27/24  5:05 AM   Result Value Ref Range    Magnesium 1.8 1.6 - 2.6 mg/dL      No results found for: "PHENYTOIN", "PHENOBARB", "VALPROATE", "CBMZ"        CONSTITUTIONAL  General Appearance: age appropriate, casually dressed    MUSCULOSKELETAL  Muscle Strength and Tone:no tremor, no tic  Abnormal Involuntary Movements: No  Gait and Station: not observed    PSYCHIATRIC   Level of Consciousness: awake, alert , and oriented  Orientation: person, place, situation, and stated date of APRIL  Grooming: Casually dressed and Well groomed  Psychomotor Behavior: normal, cooperative, friendly and cooperative  Speech: normal tone, normal rate, normal pitch, normal volume  Language: grossly intact  Mood: fine  Affect: Appropriate  Thought Process: linear, logical  Associations: intact   Thought Content: denies SI, denies HI, and no delusions  Perceptions: denies AH and denies  VH  Memory: Remote impaired and Recent impaired  Attention:Impaired but improving  Fund of Knowledge: Aware of current events and Vocabulary appropriate   Estimate if Intelligence:  Average based on work/education history, vocabulary " and mental status exam  Insight: limited awareness of illness  Judgment: limited      PSYCHOSOCIAL    PSYCHOSOCIAL STRESSORS   family and health    FUNCTIONING RELATIONSHIPS   good support system    STRENGTHS AND LIABILITIES   Strength: Patient accepts guidance/feedback, Liability: Patient has poor health., Liability: Patient has poor judgment    Is the patient aware of the biomedical complications associated with substance abuse and mental illness? yes    Does the patient have an Advance Directive for Mental Health treatment? no  (If yes, inform patient to bring copy.)        MEDICAL DECISION MAKING        ASSESSMENT       Generalized anxiety with long term benzodiazepine use  Amphetamine abuse  Primary insomnia  History of substance abuse per history         PROBLEM LIST AND MANAGEMENT PLANS    MIKE with benzo use:  -Patient and family counseled  -Continue valium at decreased dose (5-10 mg/day)  -Discussed with patient and family at length potential dangers associated with benzo use including risk for dependence/withdrawal, increased risk for respiratory depression, increased risk of falls. Family states they will be staying with the patient and monitoring for adverse effects/events.    Amphetamine abuse:  -Patient/family counseled  -Pt denies use during this morning's assessment, however during previous assessment with family medicine reportedly admitted to adderall use and hx of methamphetamine abuse.     Primary insomnia:  -Pt counseled  -Discontinue trazodone (ineffective)  -Continue seroquel 50 mg qhs (off label for insomnia. Pt tolerated well in hospital, denies SE, requests to resume medication outpatient. Discussed with patient and family risks including sedation, orthostasis, potential for weight gain and other metabolic concerns)    PRESCRIPTION DRUG MANAGEMENT    Discussed diagnosis, risks and benefits of proposed treatment vs alternative treatments vs no treatment, potential side effects of these  treatments and the inherent unpredictability of treatment. The patient expresses understanding of the above and displays the capacity to agree with this treatment given said understanding. Patient also agrees that, currently, the benefits outweigh the risks and would like to pursue/continue treatment at this time.    Any medications being used off-label were discussed with the patient inclusive of the evidence base for the use of the medications and consent was obtained for the off-label use of the medication.         DIAGNOSTIC TESTING  Labs reviewed with patient; follow up pending labs    Disposition:   Inpatient psychiatric hospitalization not indicated at this time.         Cleveland Martinez, PMLORIP-BC

## 2024-05-28 LAB
BACTERIA BLD CULT: NORMAL
BACTERIA BLD CULT: NORMAL

## 2024-05-30 ENCOUNTER — NURSE TRIAGE (OUTPATIENT)
Dept: ADMINISTRATIVE | Facility: CLINIC | Age: 69
End: 2024-05-30
Payer: MEDICARE

## 2024-05-30 ENCOUNTER — HOSPITAL ENCOUNTER (EMERGENCY)
Facility: HOSPITAL | Age: 69
Discharge: ANOTHER HOSPITAL | End: 2024-05-31
Attending: EMERGENCY MEDICINE
Payer: MEDICARE

## 2024-05-30 DIAGNOSIS — J96.02 ACUTE RESPIRATORY FAILURE WITH HYPERCAPNIA: Primary | ICD-10-CM

## 2024-05-30 DIAGNOSIS — R41.82 AMS (ALTERED MENTAL STATUS): ICD-10-CM

## 2024-05-30 DIAGNOSIS — R06.89 HYPERCARBIA: ICD-10-CM

## 2024-05-30 LAB
ALBUMIN SERPL BCP-MCNC: 3.3 G/DL (ref 3.5–5.2)
ALP SERPL-CCNC: 48 U/L (ref 55–135)
ALT SERPL W/O P-5'-P-CCNC: 37 U/L (ref 10–44)
AMMONIA PLAS-SCNC: 35 UMOL/L (ref 10–50)
AMPHET+METHAMPHET UR QL: NEGATIVE
ANION GAP SERPL CALC-SCNC: 3 MMOL/L (ref 3–11)
APAP SERPL-MCNC: <2 UG/ML (ref 10–20)
APTT PPP: 25.6 SEC (ref 21–32)
AST SERPL-CCNC: 30 U/L (ref 10–40)
BACTERIA #/AREA URNS HPF: NEGATIVE /HPF
BARBITURATES UR QL SCN>200 NG/ML: NEGATIVE
BASOPHILS # BLD AUTO: 0.05 K/UL (ref 0–0.2)
BASOPHILS NFR BLD: 0.5 % (ref 0–1.9)
BENZODIAZ UR QL SCN>200 NG/ML: ABNORMAL
BILIRUB SERPL-MCNC: 0.8 MG/DL (ref 0.1–1)
BILIRUB UR QL STRIP: NEGATIVE
BUN SERPL-MCNC: 5 MG/DL (ref 8–23)
BZE UR QL SCN: NEGATIVE
CALCIUM SERPL-MCNC: 9.3 MG/DL (ref 8.7–10.5)
CANNABINOIDS UR QL SCN: NEGATIVE
CHLORIDE SERPL-SCNC: 99 MMOL/L (ref 95–110)
CK SERPL-CCNC: 57 U/L (ref 20–180)
CLARITY UR: CLEAR
CO2 SERPL-SCNC: 33 MMOL/L (ref 23–29)
COLOR UR: YELLOW
CREAT SERPL-MCNC: 0.6 MG/DL (ref 0.5–1.4)
CREAT UR-MCNC: 92.5 MG/DL (ref 15–325)
DIFFERENTIAL METHOD BLD: ABNORMAL
EOSINOPHIL # BLD AUTO: 0.2 K/UL (ref 0–0.5)
EOSINOPHIL NFR BLD: 2.3 % (ref 0–8)
ERYTHROCYTE [DISTWIDTH] IN BLOOD BY AUTOMATED COUNT: 18.4 % (ref 11.5–14.5)
EST. GFR  (NO RACE VARIABLE): >60 ML/MIN/1.73 M^2
ETHANOL SERPL-MCNC: <3 MG/DL
FIO2: 36 %
GLUCOSE SERPL-MCNC: 129 MG/DL (ref 70–110)
GLUCOSE UR QL STRIP: NEGATIVE
HCT VFR BLD AUTO: 44 % (ref 37–48.5)
HGB BLD-MCNC: 12.9 G/DL (ref 12–16)
HGB UR QL STRIP: ABNORMAL
HYALINE CASTS #/AREA URNS LPF: 2.2 /LPF
IMM GRANULOCYTES # BLD AUTO: 0.1 K/UL (ref 0–0.04)
IMM GRANULOCYTES NFR BLD AUTO: 1.1 % (ref 0–0.5)
INR PPP: 1.1 (ref 0.8–1.2)
KETONES UR QL STRIP: NEGATIVE
LACTATE SERPL-SCNC: 0.6 MMOL/L (ref 0.5–2.2)
LEUKOCYTE ESTERASE UR QL STRIP: NEGATIVE
LPM: 4
LYMPHOCYTES # BLD AUTO: 1.2 K/UL (ref 1–4.8)
LYMPHOCYTES NFR BLD: 12.6 % (ref 18–48)
Lab: ABNORMAL
MCH RBC QN AUTO: 22.6 PG (ref 27–31)
MCHC RBC AUTO-ENTMCNC: 29.3 G/DL (ref 32–36)
MCV RBC AUTO: 77 FL (ref 82–98)
METHADONE UR QL SCN>300 NG/ML: NEGATIVE
MICROSCOPIC COMMENT: ABNORMAL
MODIFIED ALLEN'S TEST: POSITIVE
MONOCYTES # BLD AUTO: 0.9 K/UL (ref 0.3–1)
MONOCYTES NFR BLD: 9.6 % (ref 4–15)
NEUTROPHILS # BLD AUTO: 6.9 K/UL (ref 1.8–7.7)
NEUTROPHILS NFR BLD: 73.9 % (ref 38–73)
NITRITE UR QL STRIP: NEGATIVE
NOTIFIED BY: ABNORMAL
NRBC BLD-RTO: 0 /100 WBC
O2DEVICE: ABNORMAL
OPIATES UR QL SCN: NEGATIVE
PCO2 BLDA: 95.2 MMHG (ref 35–45)
PCP UR QL SCN>25 NG/ML: NEGATIVE
PH SMN: 7.18 [PH] (ref 7.34–7.45)
PH UR STRIP: 6 [PH] (ref 5–8)
PLATELET # BLD AUTO: 182 K/UL (ref 150–450)
PMV BLD AUTO: 10.5 FL (ref 9.2–12.9)
PO2 BLDA: 73.6 MMHG (ref 80–100)
POC BASE DEFICIT: 6.9 MMOL/L (ref -2–2)
POC HCO3: 27.6 MMOL/L (ref 24–28)
POC NOTIFIED NOTE: ABNORMAL
POC PERFORMED BY: ABNORMAL
POC SATURATED O2: 91.4 % (ref 95–100)
POC TEMPERATURE: 37 C
POTASSIUM SERPL-SCNC: 3.8 MMOL/L (ref 3.5–5.1)
PROT SERPL-MCNC: 8.3 G/DL (ref 6–8.4)
PROT UR QL STRIP: ABNORMAL
PROTHROMBIN TIME: 12.5 SEC (ref 9–12.5)
PROVIDER NOTIFIED: ABNORMAL
RBC # BLD AUTO: 5.71 M/UL (ref 4–5.4)
RBC #/AREA URNS HPF: 4 /HPF (ref 0–4)
SALICYLATES SERPL-MCNC: <1.7 MG/DL (ref 15–30)
SODIUM SERPL-SCNC: 135 MMOL/L (ref 136–145)
SP GR UR STRIP: 1.01 (ref 1–1.03)
SPECIMEN SOURCE: ABNORMAL
SQUAMOUS #/AREA URNS HPF: 1 /HPF
TOXICOLOGY INFORMATION: ABNORMAL
TROPONIN I SERPL DL<=0.01 NG/ML-MCNC: 78.3 PG/ML (ref 0–60)
TROPONIN I SERPL DL<=0.01 NG/ML-MCNC: 97 PG/ML (ref 0–60)
URN SPEC COLLECT METH UR: ABNORMAL
UROBILINOGEN UR STRIP-ACNC: 1 EU/DL
WBC # BLD AUTO: 9.33 K/UL (ref 3.9–12.7)
WBC #/AREA URNS HPF: 4 /HPF (ref 0–5)

## 2024-05-30 PROCEDURE — 96361 HYDRATE IV INFUSION ADD-ON: CPT

## 2024-05-30 PROCEDURE — 83605 ASSAY OF LACTIC ACID: CPT | Performed by: EMERGENCY MEDICINE

## 2024-05-30 PROCEDURE — 82803 BLOOD GASES ANY COMBINATION: CPT

## 2024-05-30 PROCEDURE — 85610 PROTHROMBIN TIME: CPT | Performed by: EMERGENCY MEDICINE

## 2024-05-30 PROCEDURE — 80143 DRUG ASSAY ACETAMINOPHEN: CPT | Performed by: EMERGENCY MEDICINE

## 2024-05-30 PROCEDURE — 27200966 HC CLOSED SUCTION SYSTEM

## 2024-05-30 PROCEDURE — 27100171 HC OXYGEN HIGH FLOW UP TO 24 HOURS

## 2024-05-30 PROCEDURE — 27100108

## 2024-05-30 PROCEDURE — 85025 COMPLETE CBC W/AUTO DIFF WBC: CPT | Performed by: EMERGENCY MEDICINE

## 2024-05-30 PROCEDURE — 99291 CRITICAL CARE FIRST HOUR: CPT

## 2024-05-30 PROCEDURE — 99292 CRITICAL CARE ADDL 30 MIN: CPT

## 2024-05-30 PROCEDURE — 85730 THROMBOPLASTIN TIME PARTIAL: CPT | Performed by: EMERGENCY MEDICINE

## 2024-05-30 PROCEDURE — 63600175 PHARM REV CODE 636 W HCPCS: Performed by: EMERGENCY MEDICINE

## 2024-05-30 PROCEDURE — 94761 N-INVAS EAR/PLS OXIMETRY MLT: CPT | Mod: XB

## 2024-05-30 PROCEDURE — 36415 COLL VENOUS BLD VENIPUNCTURE: CPT | Performed by: EMERGENCY MEDICINE

## 2024-05-30 PROCEDURE — 80307 DRUG TEST PRSMV CHEM ANLYZR: CPT | Performed by: EMERGENCY MEDICINE

## 2024-05-30 PROCEDURE — 84484 ASSAY OF TROPONIN QUANT: CPT | Mod: 91 | Performed by: EMERGENCY MEDICINE

## 2024-05-30 PROCEDURE — 82077 ASSAY SPEC XCP UR&BREATH IA: CPT | Performed by: EMERGENCY MEDICINE

## 2024-05-30 PROCEDURE — 81000 URINALYSIS NONAUTO W/SCOPE: CPT | Mod: 59 | Performed by: EMERGENCY MEDICINE

## 2024-05-30 PROCEDURE — 82140 ASSAY OF AMMONIA: CPT | Performed by: EMERGENCY MEDICINE

## 2024-05-30 PROCEDURE — 96366 THER/PROPH/DIAG IV INF ADDON: CPT

## 2024-05-30 PROCEDURE — 51702 INSERT TEMP BLADDER CATH: CPT

## 2024-05-30 PROCEDURE — 96365 THER/PROPH/DIAG IV INF INIT: CPT | Mod: 59

## 2024-05-30 PROCEDURE — 93005 ELECTROCARDIOGRAM TRACING: CPT

## 2024-05-30 PROCEDURE — 27000221 HC OXYGEN, UP TO 24 HOURS

## 2024-05-30 PROCEDURE — 99900035 HC TECH TIME PER 15 MIN (STAT)

## 2024-05-30 PROCEDURE — 25000003 PHARM REV CODE 250: Performed by: EMERGENCY MEDICINE

## 2024-05-30 PROCEDURE — 87040 BLOOD CULTURE FOR BACTERIA: CPT | Performed by: EMERGENCY MEDICINE

## 2024-05-30 PROCEDURE — 36600 WITHDRAWAL OF ARTERIAL BLOOD: CPT | Mod: 59

## 2024-05-30 PROCEDURE — 80053 COMPREHEN METABOLIC PANEL: CPT | Performed by: EMERGENCY MEDICINE

## 2024-05-30 PROCEDURE — 63600175 PHARM REV CODE 636 W HCPCS

## 2024-05-30 PROCEDURE — 80179 DRUG ASSAY SALICYLATE: CPT | Performed by: EMERGENCY MEDICINE

## 2024-05-30 PROCEDURE — 82550 ASSAY OF CK (CPK): CPT | Performed by: EMERGENCY MEDICINE

## 2024-05-30 PROCEDURE — 96375 TX/PRO/DX INJ NEW DRUG ADDON: CPT | Mod: 59

## 2024-05-30 PROCEDURE — 93010 ELECTROCARDIOGRAM REPORT: CPT | Mod: ,,, | Performed by: INTERNAL MEDICINE

## 2024-05-30 RX ORDER — SUCCINYLCHOLINE CHLORIDE 20 MG/ML
1.5 INJECTION INTRAMUSCULAR; INTRAVENOUS
Status: COMPLETED | OUTPATIENT
Start: 2024-05-30 | End: 2024-05-30

## 2024-05-30 RX ORDER — ACETAMINOPHEN 650 MG/1
650 SUPPOSITORY RECTAL
Status: COMPLETED | OUTPATIENT
Start: 2024-05-30 | End: 2024-05-30

## 2024-05-30 RX ORDER — NALOXONE HCL 0.4 MG/ML
VIAL (ML) INJECTION
Status: COMPLETED
Start: 2024-05-30 | End: 2024-05-30

## 2024-05-30 RX ORDER — ETOMIDATE 2 MG/ML
10 INJECTION INTRAVENOUS
Status: COMPLETED | OUTPATIENT
Start: 2024-05-30 | End: 2024-05-30

## 2024-05-30 RX ORDER — NALOXONE HCL 0.4 MG/ML
0.4 VIAL (ML) INJECTION
Status: COMPLETED | OUTPATIENT
Start: 2024-05-30 | End: 2024-05-30

## 2024-05-30 RX ORDER — SODIUM CHLORIDE, SODIUM LACTATE, POTASSIUM CHLORIDE, CALCIUM CHLORIDE 600; 310; 30; 20 MG/100ML; MG/100ML; MG/100ML; MG/100ML
1000 INJECTION, SOLUTION INTRAVENOUS
Status: COMPLETED | OUTPATIENT
Start: 2024-05-30 | End: 2024-05-30

## 2024-05-30 RX ORDER — PROPOFOL 10 MG/ML
INJECTION, EMULSION INTRAVENOUS
Status: COMPLETED
Start: 2024-05-30 | End: 2024-05-31

## 2024-05-30 RX ADMIN — ACETAMINOPHEN 650 MG: 650 SUPPOSITORY RECTAL at 11:05

## 2024-05-30 RX ADMIN — PIPERACILLIN SODIUM AND TAZOBACTAM SODIUM 4.5 G: 4; .5 INJECTION, POWDER, FOR SOLUTION INTRAVENOUS at 10:05

## 2024-05-30 RX ADMIN — SODIUM CHLORIDE, POTASSIUM CHLORIDE, SODIUM LACTATE AND CALCIUM CHLORIDE 1000 ML: 600; 310; 30; 20 INJECTION, SOLUTION INTRAVENOUS at 08:05

## 2024-05-30 RX ADMIN — ETOMIDATE 10 MG: 2 INJECTION INTRAVENOUS at 11:05

## 2024-05-30 RX ADMIN — NALXONE HYDROCHLORIDE 0.4 MG: 0.4 INJECTION INTRAMUSCULAR; INTRAVENOUS; SUBCUTANEOUS at 08:05

## 2024-05-30 RX ADMIN — SUCCINYLCHOLINE CHLORIDE 74 MG: 20 INJECTION, SOLUTION INTRAMUSCULAR; INTRAVENOUS at 11:05

## 2024-05-30 RX ADMIN — Medication 0.4 MG: at 08:05

## 2024-05-31 ENCOUNTER — HOSPITAL ENCOUNTER (INPATIENT)
Facility: HOSPITAL | Age: 69
LOS: 8 days | Discharge: ANOTHER HOSPITAL | DRG: 208 | End: 2024-06-08
Attending: FAMILY MEDICINE | Admitting: FAMILY MEDICINE
Payer: COMMERCIAL

## 2024-05-31 VITALS
DIASTOLIC BLOOD PRESSURE: 88 MMHG | HEIGHT: 58 IN | RESPIRATION RATE: 29 BRPM | TEMPERATURE: 99 F | SYSTOLIC BLOOD PRESSURE: 136 MMHG | BODY MASS INDEX: 23.09 KG/M2 | OXYGEN SATURATION: 97 % | HEART RATE: 82 BPM | WEIGHT: 110 LBS

## 2024-05-31 DIAGNOSIS — I48.91 A-FIB: ICD-10-CM

## 2024-05-31 DIAGNOSIS — I48.0 PAROXYSMAL ATRIAL FIBRILLATION: Primary | ICD-10-CM

## 2024-05-31 DIAGNOSIS — J96.02 ACUTE HYPERCAPNIC RESPIRATORY FAILURE: ICD-10-CM

## 2024-05-31 DIAGNOSIS — R41.82 ALTERED MENTAL STATUS, UNSPECIFIED ALTERED MENTAL STATUS TYPE: ICD-10-CM

## 2024-05-31 DIAGNOSIS — R00.0 TACHYCARDIA: ICD-10-CM

## 2024-05-31 DIAGNOSIS — R29.90 STROKE-LIKE SYMPTOMS: ICD-10-CM

## 2024-05-31 PROBLEM — I10 HYPERTENSION: Status: ACTIVE | Noted: 2024-01-01

## 2024-05-31 PROBLEM — J44.1 COPD EXACERBATION: Status: ACTIVE | Noted: 2024-05-31

## 2024-05-31 LAB
AC: 24
ALBUMIN SERPL BCP-MCNC: 2.4 G/DL (ref 3.5–5.2)
ALP SERPL-CCNC: 32 U/L (ref 55–135)
ALT SERPL W/O P-5'-P-CCNC: 19 U/L (ref 10–44)
ANION GAP SERPL CALC-SCNC: 14 MMOL/L (ref 8–16)
AST SERPL-CCNC: 21 U/L (ref 10–40)
BACTERIA #/AREA URNS HPF: ABNORMAL /HPF
BASOPHILS NFR BLD: 0 % (ref 0–1.9)
BILIRUB SERPL-MCNC: 0.8 MG/DL (ref 0.1–1)
BILIRUB UR QL STRIP: NEGATIVE
BUN SERPL-MCNC: 7 MG/DL (ref 8–23)
CALCIUM SERPL-MCNC: 8.6 MG/DL (ref 8.7–10.5)
CHLORIDE SERPL-SCNC: 98 MMOL/L (ref 95–110)
CLARITY UR: CLEAR
CO2 SERPL-SCNC: 21 MMOL/L (ref 23–29)
COLOR UR: YELLOW
CREAT SERPL-MCNC: 0.5 MG/DL (ref 0.5–1.4)
DIFFERENTIAL METHOD BLD: ABNORMAL
EOSINOPHIL NFR BLD: 0 % (ref 0–8)
ERYTHROCYTE [DISTWIDTH] IN BLOOD BY AUTOMATED COUNT: 17.2 % (ref 11.5–14.5)
EST. GFR  (NO RACE VARIABLE): >60 ML/MIN/1.73 M^2
FIO2: 21 %
FIO2: 40 %
GLUCOSE SERPL-MCNC: 103 MG/DL (ref 70–110)
GLUCOSE UR QL STRIP: NEGATIVE
HCT VFR BLD AUTO: 39.9 % (ref 37–48.5)
HGB BLD-MCNC: 11.7 G/DL (ref 12–16)
HGB UR QL STRIP: ABNORMAL
HYALINE CASTS #/AREA URNS LPF: 1 /LPF
IMM GRANULOCYTES # BLD AUTO: ABNORMAL K/UL (ref 0–0.04)
IMM GRANULOCYTES NFR BLD AUTO: ABNORMAL % (ref 0–0.5)
INR PPP: 1.3 (ref 0.8–1.2)
KETONES UR QL STRIP: ABNORMAL
LACTATE SERPL-SCNC: 1.1 MMOL/L (ref 0.5–2.2)
LACTATE SERPL-SCNC: 1.4 MMOL/L (ref 0.5–2.2)
LACTATE SERPL-SCNC: 3.9 MMOL/L (ref 0.5–2.2)
LEUKOCYTE ESTERASE UR QL STRIP: NEGATIVE
LIPASE SERPL-CCNC: 84 U/L (ref 4–60)
LYMPHOCYTES NFR BLD: 0 % (ref 18–48)
Lab: ABNORMAL
MCH RBC QN AUTO: 22.5 PG (ref 27–31)
MCHC RBC AUTO-ENTMCNC: 29.3 G/DL (ref 32–36)
MCV RBC AUTO: 77 FL (ref 82–98)
MICROSCOPIC COMMENT: ABNORMAL
MODIFIED ALLEN'S TEST: POSITIVE
MONOCYTES NFR BLD: 0 % (ref 4–15)
NEUTROPHILS NFR BLD: 100 % (ref 38–73)
NITRITE UR QL STRIP: NEGATIVE
NOTIFIED BY: ABNORMAL
NRBC BLD-RTO: 0 /100 WBC
O2DEVICE: ABNORMAL
OHS QRS DURATION: 78 MS
OHS QTC CALCULATION: 412 MS
PCO2 BLDA: 57.2 MMHG (ref 35–45)
PCO2 BLDA: 60.2 MMHG (ref 35–45)
PEAK FLOW: 60
PEEP: 5
PH SMN: 7.34 [PH] (ref 7.34–7.45)
PH SMN: 7.39 [PH] (ref 7.35–7.45)
PH UR STRIP: 7 [PH] (ref 5–8)
PLATELET # BLD AUTO: 122 K/UL (ref 150–450)
PLATELET BLD QL SMEAR: ABNORMAL
PMV BLD AUTO: 10.1 FL (ref 9.2–12.9)
PO2 BLDA: 195 MMHG (ref 80–100)
PO2 BLDA: 43.6 MMHG (ref 40–60)
POC BASE DEFICIT: 6.8 MMOL/L (ref -2–2)
POC BASE DEFICIT: 8.3 MMOL/L (ref -2–2)
POC HCO3: 34.9 MMOL/L (ref 24–28)
POC NOTIFIED NOTE: ABNORMAL
POC PERFORMED BY: ABNORMAL
POC PERFORMED BY: ABNORMAL
POC SATURATED O2: 84.6 % (ref 95–100)
POC TEMPERATURE: 37 C
POTASSIUM SERPL-SCNC: 3.8 MMOL/L (ref 3.5–5.1)
PROT SERPL-MCNC: 5.9 G/DL (ref 6–8.4)
PROT UR QL STRIP: ABNORMAL
PROTHROMBIN TIME: 13.4 SEC (ref 9–12.5)
PROVIDER NOTIFIED: ABNORMAL
RBC # BLD AUTO: 5.19 M/UL (ref 4–5.4)
RBC #/AREA URNS HPF: 6 /HPF (ref 0–4)
SODIUM SERPL-SCNC: 133 MMOL/L (ref 136–145)
SP GR UR STRIP: 1.02 (ref 1–1.03)
SPECIMEN SOURCE: ABNORMAL
SPECIMEN SOURCE: ABNORMAL
SQUAMOUS #/AREA URNS HPF: 1 /HPF
TROPONIN I SERPL DL<=0.01 NG/ML-MCNC: 0.01 NG/ML (ref 0–0.03)
URN SPEC COLLECT METH UR: ABNORMAL
UROBILINOGEN UR STRIP-ACNC: NEGATIVE EU/DL
VT: 300
WBC # BLD AUTO: 7.74 K/UL (ref 3.9–12.7)
WBC #/AREA URNS HPF: 6 /HPF (ref 0–5)

## 2024-05-31 PROCEDURE — 82803 BLOOD GASES ANY COMBINATION: CPT

## 2024-05-31 PROCEDURE — 94761 N-INVAS EAR/PLS OXIMETRY MLT: CPT

## 2024-05-31 PROCEDURE — 36415 COLL VENOUS BLD VENIPUNCTURE: CPT | Performed by: EMERGENCY MEDICINE

## 2024-05-31 PROCEDURE — 63600175 PHARM REV CODE 636 W HCPCS

## 2024-05-31 PROCEDURE — 36600 WITHDRAWAL OF ARTERIAL BLOOD: CPT

## 2024-05-31 PROCEDURE — 85027 COMPLETE CBC AUTOMATED: CPT | Performed by: FAMILY MEDICINE

## 2024-05-31 PROCEDURE — 94761 N-INVAS EAR/PLS OXIMETRY MLT: CPT | Mod: XB

## 2024-05-31 PROCEDURE — 31500 INSERT EMERGENCY AIRWAY: CPT

## 2024-05-31 PROCEDURE — 20000000 HC ICU ROOM

## 2024-05-31 PROCEDURE — 83605 ASSAY OF LACTIC ACID: CPT | Mod: 91 | Performed by: STUDENT IN AN ORGANIZED HEALTH CARE EDUCATION/TRAINING PROGRAM

## 2024-05-31 PROCEDURE — 99900035 HC TECH TIME PER 15 MIN (STAT)

## 2024-05-31 PROCEDURE — 85007 BL SMEAR W/DIFF WBC COUNT: CPT | Performed by: FAMILY MEDICINE

## 2024-05-31 PROCEDURE — 63600175 PHARM REV CODE 636 W HCPCS: Performed by: EMERGENCY MEDICINE

## 2024-05-31 PROCEDURE — 94003 VENT MGMT INPAT SUBQ DAY: CPT

## 2024-05-31 PROCEDURE — 36415 COLL VENOUS BLD VENIPUNCTURE: CPT | Mod: XB | Performed by: STUDENT IN AN ORGANIZED HEALTH CARE EDUCATION/TRAINING PROGRAM

## 2024-05-31 PROCEDURE — 83605 ASSAY OF LACTIC ACID: CPT | Mod: 91 | Performed by: FAMILY MEDICINE

## 2024-05-31 PROCEDURE — 27100171 HC OXYGEN HIGH FLOW UP TO 24 HOURS

## 2024-05-31 PROCEDURE — 5A1935Z RESPIRATORY VENTILATION, LESS THAN 24 CONSECUTIVE HOURS: ICD-10-PCS | Performed by: INTERNAL MEDICINE

## 2024-05-31 PROCEDURE — 81000 URINALYSIS NONAUTO W/SCOPE: CPT | Performed by: FAMILY MEDICINE

## 2024-05-31 PROCEDURE — 83880 ASSAY OF NATRIURETIC PEPTIDE: CPT

## 2024-05-31 PROCEDURE — 25000003 PHARM REV CODE 250: Performed by: FAMILY MEDICINE

## 2024-05-31 PROCEDURE — 36415 COLL VENOUS BLD VENIPUNCTURE: CPT

## 2024-05-31 PROCEDURE — 36415 COLL VENOUS BLD VENIPUNCTURE: CPT | Mod: XB | Performed by: FAMILY MEDICINE

## 2024-05-31 PROCEDURE — 83605 ASSAY OF LACTIC ACID: CPT | Performed by: EMERGENCY MEDICINE

## 2024-05-31 PROCEDURE — 25000003 PHARM REV CODE 250: Performed by: STUDENT IN AN ORGANIZED HEALTH CARE EDUCATION/TRAINING PROGRAM

## 2024-05-31 PROCEDURE — 63600175 PHARM REV CODE 636 W HCPCS: Performed by: STUDENT IN AN ORGANIZED HEALTH CARE EDUCATION/TRAINING PROGRAM

## 2024-05-31 PROCEDURE — 96366 THER/PROPH/DIAG IV INF ADDON: CPT

## 2024-05-31 PROCEDURE — 85610 PROTHROMBIN TIME: CPT | Performed by: FAMILY MEDICINE

## 2024-05-31 PROCEDURE — 96365 THER/PROPH/DIAG IV INF INIT: CPT | Mod: 59

## 2024-05-31 PROCEDURE — 94640 AIRWAY INHALATION TREATMENT: CPT

## 2024-05-31 PROCEDURE — 84484 ASSAY OF TROPONIN QUANT: CPT | Performed by: FAMILY MEDICINE

## 2024-05-31 PROCEDURE — 83690 ASSAY OF LIPASE: CPT | Performed by: FAMILY MEDICINE

## 2024-05-31 PROCEDURE — 94660 CPAP INITIATION&MGMT: CPT | Mod: XB

## 2024-05-31 PROCEDURE — 27000190 HC CPAP FULL FACE MASK W/VALVE

## 2024-05-31 PROCEDURE — 96375 TX/PRO/DX INJ NEW DRUG ADDON: CPT

## 2024-05-31 PROCEDURE — 80053 COMPREHEN METABOLIC PANEL: CPT | Performed by: FAMILY MEDICINE

## 2024-05-31 PROCEDURE — 99900026 HC AIRWAY MAINTENANCE (STAT)

## 2024-05-31 PROCEDURE — 25000242 PHARM REV CODE 250 ALT 637 W/ HCPCS: Performed by: EMERGENCY MEDICINE

## 2024-05-31 PROCEDURE — 25000242 PHARM REV CODE 250 ALT 637 W/ HCPCS: Performed by: FAMILY MEDICINE

## 2024-05-31 PROCEDURE — 63600175 PHARM REV CODE 636 W HCPCS: Performed by: FAMILY MEDICINE

## 2024-05-31 RX ORDER — NALOXONE HYDROCHLORIDE 1 MG/ML
INJECTION INTRAMUSCULAR; INTRAVENOUS; SUBCUTANEOUS
Status: DISPENSED
Start: 2024-05-31 | End: 2024-06-01

## 2024-05-31 RX ORDER — LABETALOL HYDROCHLORIDE 5 MG/ML
10 INJECTION, SOLUTION INTRAVENOUS EVERY 4 HOURS PRN
Status: DISCONTINUED | OUTPATIENT
Start: 2024-05-31 | End: 2024-06-01

## 2024-05-31 RX ORDER — IPRATROPIUM BROMIDE AND ALBUTEROL SULFATE 2.5; .5 MG/3ML; MG/3ML
3 SOLUTION RESPIRATORY (INHALATION)
Status: COMPLETED | OUTPATIENT
Start: 2024-05-31 | End: 2024-05-31

## 2024-05-31 RX ORDER — PROPOFOL 10 MG/ML
0-50 INJECTION, EMULSION INTRAVENOUS CONTINUOUS
Status: DISCONTINUED | OUTPATIENT
Start: 2024-05-31 | End: 2024-05-31

## 2024-05-31 RX ORDER — LISINOPRIL 20 MG/1
20 TABLET ORAL DAILY
Status: DISCONTINUED | OUTPATIENT
Start: 2024-05-31 | End: 2024-06-04

## 2024-05-31 RX ORDER — DOXYCYCLINE HYCLATE 100 MG
100 TABLET ORAL EVERY 12 HOURS
Status: DISCONTINUED | OUTPATIENT
Start: 2024-05-31 | End: 2024-06-03

## 2024-05-31 RX ORDER — SODIUM CHLORIDE, SODIUM LACTATE, POTASSIUM CHLORIDE, CALCIUM CHLORIDE 600; 310; 30; 20 MG/100ML; MG/100ML; MG/100ML; MG/100ML
INJECTION, SOLUTION INTRAVENOUS CONTINUOUS
Status: DISCONTINUED | OUTPATIENT
Start: 2024-05-31 | End: 2024-05-31

## 2024-05-31 RX ORDER — METHYLPREDNISOLONE SOD SUCC 125 MG
125 VIAL (EA) INJECTION
Status: COMPLETED | OUTPATIENT
Start: 2024-05-31 | End: 2024-05-31

## 2024-05-31 RX ORDER — MUPIROCIN 20 MG/G
OINTMENT TOPICAL 2 TIMES DAILY
Status: DISPENSED | OUTPATIENT
Start: 2024-05-31 | End: 2024-06-05

## 2024-05-31 RX ORDER — SODIUM CHLORIDE 0.9 % (FLUSH) 0.9 %
10 SYRINGE (ML) INJECTION
Status: DISCONTINUED | OUTPATIENT
Start: 2024-05-31 | End: 2024-06-08 | Stop reason: HOSPADM

## 2024-05-31 RX ORDER — LABETALOL HYDROCHLORIDE 5 MG/ML
INJECTION, SOLUTION INTRAVENOUS
Status: COMPLETED
Start: 2024-05-31 | End: 2024-06-02

## 2024-05-31 RX ORDER — ENOXAPARIN SODIUM 100 MG/ML
40 INJECTION SUBCUTANEOUS EVERY 24 HOURS
Status: DISCONTINUED | OUTPATIENT
Start: 2024-05-31 | End: 2024-05-31

## 2024-05-31 RX ORDER — ENOXAPARIN SODIUM 100 MG/ML
30 INJECTION SUBCUTANEOUS EVERY 24 HOURS
Status: DISCONTINUED | OUTPATIENT
Start: 2024-05-31 | End: 2024-05-31

## 2024-05-31 RX ORDER — ENOXAPARIN SODIUM 100 MG/ML
40 INJECTION SUBCUTANEOUS EVERY 24 HOURS
Status: DISCONTINUED | OUTPATIENT
Start: 2024-05-31 | End: 2024-06-02

## 2024-05-31 RX ORDER — FAMOTIDINE 10 MG/ML
20 INJECTION INTRAVENOUS EVERY 12 HOURS
Status: DISCONTINUED | OUTPATIENT
Start: 2024-05-31 | End: 2024-05-31

## 2024-05-31 RX ORDER — IPRATROPIUM BROMIDE AND ALBUTEROL SULFATE 2.5; .5 MG/3ML; MG/3ML
3 SOLUTION RESPIRATORY (INHALATION) EVERY 4 HOURS
Status: DISCONTINUED | OUTPATIENT
Start: 2024-05-31 | End: 2024-06-02

## 2024-05-31 RX ORDER — ONDANSETRON HYDROCHLORIDE 2 MG/ML
INJECTION, SOLUTION INTRAVENOUS
Status: DISPENSED
Start: 2024-05-31 | End: 2024-06-01

## 2024-05-31 RX ORDER — PROPOFOL 10 MG/ML
0-50 INJECTION, EMULSION INTRAVENOUS CONTINUOUS
Status: DISCONTINUED | OUTPATIENT
Start: 2024-05-31 | End: 2024-05-31 | Stop reason: HOSPADM

## 2024-05-31 RX ORDER — PREDNISONE 20 MG/1
40 TABLET ORAL DAILY
Status: DISPENSED | OUTPATIENT
Start: 2024-05-31 | End: 2024-06-05

## 2024-05-31 RX ORDER — HYDROCHLOROTHIAZIDE 25 MG/1
25 TABLET ORAL DAILY
Status: DISCONTINUED | OUTPATIENT
Start: 2024-05-31 | End: 2024-06-06

## 2024-05-31 RX ADMIN — MUPIROCIN: 20 OINTMENT TOPICAL at 09:05

## 2024-05-31 RX ADMIN — DOXYCYCLINE HYCLATE 100 MG: 100 TABLET, COATED ORAL at 11:05

## 2024-05-31 RX ADMIN — PREDNISONE 40 MG: 20 TABLET ORAL at 11:05

## 2024-05-31 RX ADMIN — SODIUM CHLORIDE, POTASSIUM CHLORIDE, SODIUM LACTATE AND CALCIUM CHLORIDE 1000 ML: 600; 310; 30; 20 INJECTION, SOLUTION INTRAVENOUS at 12:05

## 2024-05-31 RX ADMIN — FAMOTIDINE 20 MG: 10 INJECTION, SOLUTION INTRAVENOUS at 08:05

## 2024-05-31 RX ADMIN — LABETALOL HYDROCHLORIDE 5 MG: 5 INJECTION INTRAVENOUS at 01:05

## 2024-05-31 RX ADMIN — PROPOFOL 5 MCG/KG/MIN: 10 INJECTION, EMULSION INTRAVENOUS at 12:05

## 2024-05-31 RX ADMIN — IPRATROPIUM BROMIDE AND ALBUTEROL SULFATE 3 ML: .5; 3 SOLUTION RESPIRATORY (INHALATION) at 11:05

## 2024-05-31 RX ADMIN — LABETALOL HYDROCHLORIDE 10 MG: 5 INJECTION INTRAVENOUS at 11:05

## 2024-05-31 RX ADMIN — LABETALOL HYDROCHLORIDE 10 MG: 5 INJECTION INTRAVENOUS at 02:05

## 2024-05-31 RX ADMIN — IPRATROPIUM BROMIDE AND ALBUTEROL SULFATE 3 ML: .5; 3 SOLUTION RESPIRATORY (INHALATION) at 08:05

## 2024-05-31 RX ADMIN — ENOXAPARIN SODIUM 40 MG: 40 INJECTION SUBCUTANEOUS at 05:05

## 2024-05-31 RX ADMIN — MUPIROCIN: 20 OINTMENT TOPICAL at 08:05

## 2024-05-31 RX ADMIN — PROPOFOL 35 MCG/KG/MIN: 10 INJECTION, EMULSION INTRAVENOUS at 06:05

## 2024-05-31 RX ADMIN — SODIUM CHLORIDE, POTASSIUM CHLORIDE, SODIUM LACTATE AND CALCIUM CHLORIDE: 600; 310; 30; 20 INJECTION, SOLUTION INTRAVENOUS at 06:05

## 2024-05-31 RX ADMIN — METHYLPREDNISOLONE SODIUM SUCCINATE 125 MG: 125 INJECTION, POWDER, FOR SOLUTION INTRAMUSCULAR; INTRAVENOUS at 12:05

## 2024-05-31 RX ADMIN — IPRATROPIUM BROMIDE AND ALBUTEROL SULFATE 3 ML: 2.5; .5 SOLUTION RESPIRATORY (INHALATION) at 01:05

## 2024-05-31 RX ADMIN — IPRATROPIUM BROMIDE AND ALBUTEROL SULFATE 3 ML: .5; 3 SOLUTION RESPIRATORY (INHALATION) at 07:05
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
## 2024-05-31 NOTE — PLAN OF CARE
Vss, nadn, pt confused, not coop. In rest's. No injury noted. On 3.5L o2 per nc. O2 sats wnls. Refuses to take PO meds. Dr. Agustin aware. Has prn labetalol iv for elevated bp. Combative at times. Ox1, self. Goal is to maintain stable vs's, improve confusion, maintain safety, mon. Blood work and co2 level. Maintain normal bp. Transfer to floor when stable. See flow sheet for padmini info.

## 2024-05-31 NOTE — ASSESSMENT & PLAN NOTE
History of recent pneumonia treatment     Checks x-ray with no acute finding    Antibiotics (From admission, onward)      Start     Stop Route Frequency Ordered    05/31/24 1130  doxycycline tablet 100 mg         -- OG Every 12 hours 05/31/24 1020    05/31/24 0915  mupirocin 2 % ointment         06/05/24 0859 Nasl 2 times daily 05/31/24 0807            Microbiology Results (last 7 days)       ** No results found for the last 168 hours. **

## 2024-05-31 NOTE — ED NOTES
Pt started on propofol, pt became hypotensive. MD at bedside. Bolus of LR administered with pressure bag.Propofol paused

## 2024-05-31 NOTE — CHAPLAIN
05/31/24 0959   Clinical Encounter Type   Visit Type Initial Visit   Visit Category Spiritual Assessment;Critical Care   Visited With Patient not available;Health care provider;Family   Number of Family Visited 1   Length of Visit 30 Minutes   Continue Visiting Yes   Sacramental Encounters   Sacrament of Sick-Anointing Family requested anointing   Patient Spiritual Encounters   Comments - Patient Pt intubated and sedated, family lives away.  reached out to PT's granddaughter, Caro Modi ([phone]), to check in with the family. She requested a  for anointing which  will arrange.

## 2024-05-31 NOTE — ED NOTES
Per pts family the past 2-3 hours having difficulty arousing pt and being unresponsive. Pt was ambulatory prior to 5:30 this afternoon. The only evening medications administered at 5:30 this afternoon

## 2024-05-31 NOTE — PROVIDER TRANSFER
Outside Transfer Acceptance Note / Regional Referral Center    Referring facility: [facility]   Referring provider: GAIL ZELAYA  Accepting facility: [facility]  Accepting provider: GIANA GARRETT PAVANKUMAR  Admitting provider: Deandre Prince  Reason for transfer:  Higher level of Care  Transfer diagnosis: Acute hypercapnic respiratory failure  Transfer specialty requested: Pulmonology  Transfer specialty notified: Yes  Transfer level: NUMBER 1-5: 2  Bed type requested: ICU  Isolation status: No active isolations   Admission class or status: IP- Inpatient      Narrative     Patient is a 69-year-old female who presents with past medical history significant for hypertension, COPD, anxiety, previous methamphetamine abuse, chronic benzo use who presented last week with diarrhea, dehydration, frequent falls of 2 days.  She was very somnolent on initial exam and subsequently found to be hypercapnic and hypotensive with a respiratory rate of 30.  She was treated empirically for a COPD exacerbation caused by pneumonia.  She required 3 days of BiPAP with eventual improvement.  During her last hospitalization she had a very similar presentation with her initial pH being 7.2, pCO2 of 94, PO2 of 77.8, bicarb of 29.9 saturating 92% on venti mask 40% FiO2.    She is now re presenting with altered mental status after being found by her granddaughter for unresponsiveness.  Her granddaughter called EMS and found that she was not responding to sternal rub.  Initial pulse ox showed an SpO2 in the low 70s she was placed on nasal cannula with rapid improvement in her oxygenation.  This was escalated to a non-rebreather in route to emergency department.  Following her arrival she is unable to provide any history and her ABG showed profound respiratory acidosis with hypercapnia greater than 90.  Due to her altered mental status and concern for inability to protect airway, the decision was made to  "intubate her and treat her empirically for a COPD exacerbation.  Her daughter does note that the patient took her diazepam earlier today and this was a concern during previous hospitalization.  On arrival, her pupils were noted to be pinpoint and she received naloxone without any improvement.    Patient's initial vitals were notable for a temperature of 100.3°, heart rate of 112, respiratory rate of 29, blood pressure of 179/92.  Initial GCS was 8.  She was saturating 91% on 4 L of oxygen.    ABG shows acute worsening acidosis with a pH of 7.178, pCO2 of 95.2, bicarbonate of 27.6, PO2 of 73.6 prior to intubation.    Following intubation she was pulling at her tube and so she was initiated on propofol continuously at 35 mcg per kg per hour.    She was being transferred to [facility] for pulmonology/ICU level of care.  At outside hospital she received 125 mg of methylprednisone, DuoNeb x1, 1 L of LR as well as piperacillin/tazobactam with blood cultures drawn x2.    Objective     Vitals: Temp: 99.4 °F (37.4 °C) (05/30/24 2305)  Pulse: 85 (05/31/24 0142)  Resp: (!) 24 (05/31/24 0142)  BP: (!) 123/57 (05/31/24 0142)  SpO2: 98 % (05/31/24 0142)  Recent Labs: All pertinent labs within the past 24 hours have been reviewed.  ABGs:   Recent Labs   Lab 05/30/24  2324 05/31/24  0123   PH 7.178* 7.342*   PCO2 95.2* 60.2*   HCO3 27.6  --    POCSATURATED 91.4*  --    PO2 73.6* 195*     Blood Culture: No results for input(s): "LABBLOO" in the last 48 hours.  CBC:   Recent Labs   Lab 05/30/24 2046   WBC 9.33   HGB 12.9   HCT 44.0        CMP:   Recent Labs   Lab 05/30/24 2046   *   K 3.8   CL 99   CO2 33*   *   BUN 5*   CREATININE 0.6   CALCIUM 9.3   PROT 8.3   ALBUMIN 3.3*   BILITOT 0.8   ALKPHOS 48*   AST 30   ALT 37   ANIONGAP 3     Cardiac Markers: No results for input(s): "CKMB", "MYOGLOBIN", "BNP", "TROPISTAT" in the last 48 hours.  Lactic Acid:   Recent Labs   Lab 05/30/24 2046 05/31/24  0037   LACTATE " "0.6 1.4     Troponin:   Recent Labs   Lab 05/30/24 2046 05/30/24 2255   TROPONINIHS 78.3* 97.0*     TSH: No results for input(s): "TSH" in the last 4320 hours.  Urine Culture: No results for input(s): "LABURIN" in the last 48 hours.  Urine Studies:   Recent Labs   Lab 05/30/24 2053   COLORU Yellow   APPEARANCEUA Clear   PHUR 6.0   SPECGRAV 1.015   PROTEINUA 2+*   GLUCUA Negative   KETONESU Negative   BILIRUBINUA Negative   OCCULTUA Trace*   NITRITE Negative   UROBILINOGEN 1.0   LEUKOCYTESUR Negative   RBCUA 4   WBCUA 4   BACTERIA Negative   SQUAMEPITHEL 1   HYALINECASTS 2.2*     Recent imaging: CT head w/o:    Subtle hypodensity involving the left posterior parieto-occipital white matter.  This appears new from the previous study and could represent a recent infarct.     No intracranial hemorrhage.   Airway:        Airway - Non-Surgical 05/30/24 2351 Endotracheal Tube (Active)   Secured at 18 cm 05/30/24 2355   Measured At Lips 05/30/24 2355   Secured Location Left 05/30/24 2355   Secured by Commercial tube keyes 05/30/24 2355   Position of ETT in xRay In good position 05/30/24 2355   Bite Block none 05/30/24 2355   Site Condition Cool;Dry 05/30/24 2355   Status Intact;Secured;Patent 05/30/24 2355   Site Assessment Clean;Dry 05/30/24 2355      Vent settings: Vent Mode: A/C  Oxygen Concentration (%):  [36-40] 40  Resp Rate Total:  [24 br/min-36 br/min] 24 br/min  Vt Set:  [300 mL] 300 mL  PEEP/CPAP:  [5 cmH20] 5 cmH20  Mean Airway Pressure:  [12 cmH20] 12 cmH20       IV access:        Peripheral IV - Single Lumen 05/30/24 2020 20 G Left;Posterior Hand (Active)            Peripheral IV - Single Lumen 05/30/24 2045 18 G Anterior;Left Forearm (Active)   Site Assessment Clean;Dry;Intact;No redness;No swelling 05/30/24 2045   Line Status Blood return noted;Flushed;Saline locked 05/30/24 2045   Dressing Status Clean;Dry;Intact 05/30/24 2045   Dressing Intervention First dressing 05/30/24 2045            Peripheral IV - " Single Lumen 05/31/24 0022 20 G Anterior;Right Forearm (Active)   Site Assessment Clean;Dry;Intact;No redness 05/31/24 0022   Line Status Blood return noted;Flushed;Saline locked 05/31/24 0022   Dressing Status Clean;Intact;Dry 05/31/24 0022   Dressing Intervention First dressing 05/31/24 0022     Infusions: Propofol   Allergies: Review of patient's allergies indicates:  No Known Allergies   NPO: No    Anticoagulation:   Anticoagulants       None             Instructions      Upon patient arrival to the ICU, please contact Critical Care Medicine on call.
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
## 2024-05-31 NOTE — ASSESSMENT & PLAN NOTE
Patient's COPD is with exacerbation noted by continued dyspnea and worsening of baseline hypoxia currently.  Patient is currently off COPD Pathway. Continue scheduled inhalers Steroids, Antibiotics, and Supplemental oxygen and monitor respiratory status closely.    Checks x-ray:No acute findings.    Continue IV steroid  Intubated  DuoNebs  Antibiotics

## 2024-05-31 NOTE — ASSESSMENT & PLAN NOTE
Chronic,    Resume home meds as BP permits    Temp:  [79 °F (26.1 °C)-100.3 °F (37.9 °C)]   Pulse:  []   Resp:  [8-49]   BP: ()/()   SpO2:  [77 %-100 %] .   Home meds for hypertension were reviewed and noted below.   Hypertension Medications               lisinopriL-hydrochlorothiazide (PRINZIDE,ZESTORETIC) 20-25 mg Tab             While in the hospital, will manage blood pressure as follows; Continue home antihypertensive regimen    Will utilize p.r.n. blood pressure medication only if patient's blood pressure greater than 140/90 and she develops symptoms such as worsening chest pain or shortness of breath.

## 2024-05-31 NOTE — ASSESSMENT & PLAN NOTE
Patient with Hypercapnic Respiratory failure which is Acute on chronic.  she is not on home oxygen. Supplemental oxygen was provided and noted- Vent Mode: A/CMV-VC  Oxygen Concentration (%):  [21-40] 35  Resp Rate Total:  [24 br/min-36 br/min] 32 br/min  Vt Set:  [300 mL] 300 mL  PEEP/CPAP:  [4.7 cmH20-5 cmH20] 4.7 cmH20  Mean Airway Pressure:  [9.5 piH94-38 cmH20] 9.5 cmH20    .   Signs/symptoms of respiratory failure include- tachypnea, increased work of breathing, respiratory distress, and lethargy. Contributing diagnoses includes - COPD and Pneumonia Labs and images were reviewed. Patient Has recent ABG, which has been reviewed. Will treat underlying causes and adjust management of respiratory failure as follows-  intubated on arrival  Consult Pulmonary critical for vent management

## 2024-05-31 NOTE — ASSESSMENT & PLAN NOTE
History of substance abuse  UDS 7 days ago positive for benzos, amphetamines  Consult psych once medically stable   Daughter reported patient takes lot of Valium -monitor for benzos withdrawal

## 2024-05-31 NOTE — ED NOTES
Spoke to transfer center, Bed ready at [facility]. Notified transfer center of pts propofol drip, mechanical ventilation, cardiac monitoring, and maintenance fluids

## 2024-05-31 NOTE — ASSESSMENT & PLAN NOTE
Patient with Hypercapnic Respiratory failure which is Acute on chronic.  she is not on home oxygen. Supplemental oxygen was provided and noted- Vent Mode: A/CMV-VC  Oxygen Concentration (%):  [21-40] 35  Resp Rate Total:  [24 br/min-36 br/min] 32 br/min  Vt Set:  [300 mL] 300 mL  PEEP/CPAP:  [4.7 cmH20-5 cmH20] 4.7 cmH20  Mean Airway Pressure:  [9.5 dkF21-49 cmH20] 9.5 cmH20    .   Signs/symptoms of respiratory failure include- tachypnea, increased work of breathing, respiratory distress, and lethargy. Contributing diagnoses includes - COPD and Pneumonia Labs and images were reviewed. Patient Has recent ABG, which has been reviewed. Will treat underlying causes and adjust management of respiratory failure as follows-  intubated on arrival  Consult Pulmonary critical for vent management   Appreciate pulmonary team extubated on 05/31 2 BiPAP -continue to wean

## 2024-05-31 NOTE — PROGRESS NOTES
Pharmacist Renal Dose Adjustment Note    June Boykin is a 69 y.o. female being treated with the medication Zosyn    Patient Data:    Vital Signs (Most Recent):  Temp: (!) 95.5 °F (35.3 °C) (05/31/24 0800)  Pulse: 75 (05/31/24 0800)  Resp: (!) 24 (05/31/24 0800)  BP: 126/77 (05/31/24 0800)  SpO2: 100 % (05/31/24 0800) Vital Signs (72h Range):  Temp:  [79 °F (26.1 °C)-100.3 °F (37.9 °C)]   Pulse:  []   Resp:  [16-44]   BP: ()/()   SpO2:  [87 %-100 %]      Recent Labs   Lab 05/26/24  0337 05/27/24  0505 05/30/24 2046   CREATININE 0.5 0.6 0.6     Serum creatinine: 0.6 mg/dL 05/30/24 2046  Estimated creatinine clearance: 61.1 mL/min    Medication:Zosyn dose: 4.5 g frequency q12h will be changed to medication:Zosyn dose:4.5 g frequency:q8h    Pharmacist's Name: Saturnino Sandoval  Pharmacist's Extension: [id number]

## 2024-05-31 NOTE — HPI
Per PILOT Boykin, June Lopez is a 69-year-old female who presents with past medical history significant for hypertension, COPD  on 2 L home oxygen, anxiety, previous methamphetamine abuse, chronic benzo use who presented last week with diarrhea, dehydration, frequent falls of 2 days. She was very somnolent on initial exam and subsequently found to be hypercapnic and hypotensive with a respiratory rate of 30. She was treated empirically for a COPD exacerbation caused by pneumonia. She required 3 days of BiPAP with eventual improvement. During her last hospitalization she had a very similar presentation with her initial pH being 7.2, pCO2 of 94, PO2 of 77.8, bicarb of 29.9 saturating 92% on venti mask 40% FiO2.     She is now re presenting with altered mental status after being found by her granddaughter for unresponsiveness. Her granddaughter called EMS and found that she was not responding to sternal rub. Initial pulse ox showed an SpO2 in the low 70s she was placed on nasal cannula with rapid improvement in her oxygenation. This was escalated to a non-rebreather in route to emergency department. Following her arrival she is unable to provide any history and her ABG showed profound respiratory acidosis with hypercapnia greater than 90. Due to her altered mental status and concern for inability to protect airway, the decision was made to intubate her and treat her empirically for a COPD exacerbation. Her daughter does note that the patient took her diazepam earlier today and this was a concern during previous hospitalization. On arrival, her pupils were noted to be pinpoint and she received naloxone without any improvement.  Patient's initial vitals were notable for a temperature of 100.3°, heart rate of 112, respiratory rate of 29, blood pressure of 179/92. Initial GCS was 8. She was saturating 91% on 4 L of oxygen.  ABG shows acute worsening acidosis with a pH of 7.178, pCO2 of 95.2, bicarbonate of 27.6, PO2  of 73.6 prior to intubation.  Following intubation she was pulling at her tube and so she was initiated on propofol continuously at 35 mcg per kg per hour.  She was being transferred to [facility] for pulmonology/ICU level of care. At outside hospital she received 125 mg of methylprednisone, DuoNeb x1, 1 L of LR as well as piperacillin/tazobactam with blood cultures drawn x2.

## 2024-05-31 NOTE — ED PROVIDER NOTES
[facility] - EMERGENCY DEPARTMENT  EMERGENCY DEPARTMENT ENCOUNTER    Pt Name:  June Boykin  MRN:  [id number]  YOB: 1955  Date of evaluation: 5/30/2024  Provider: Christ Modi MD      CHIEF COMPLAINT:     Chief Complaint   Patient presents with    Altered Mental Status     Pt to ED via EMS - stated that pt was found unresponsive only responding to painful stimuli prior to arrival. Recently discharged from [facility] ICU with pneumonia diagnosis.        HPI history provided by EMS.    HISTORY IF PRESENT ILLNESS:  (location/symptom, timing/onset, context/setting, quality, duration, modifying factors, severity)   Note limiting factors.     June Boykin is a 69 y.o. female who presents to the emergency department for altered mental status.  EMS was called when family found patient was unresponsive.  They tell me that when fire fighters reached her she had a pulse ox in the low 70s.  Patient was placed on nasal cannula oxygen and popped to upper 80 SpO2.  They placed a 15 L non-rebreather on the patient and she is maintained 95 % and above.  Patient is only responsive to pain.  She is unable to provide any history.  EMS tells me that she was recently discharged from the ICU following a diagnosis of pneumonia.    Nursing notes were reviewed    REVIEW OF SYSTEMS:     Review of Systems   Unable to perform ROS: Mental status change       PAST MEDICAL HISTORY:     Past Medical History:   Diagnosis Date    Abdominal pain 05/2024    Acid reflux 05/2024    Anxiety disorder, unspecified     At high risk for falls     Emphysema, unspecified     History of opioid abuse     Hypertension     On home oxygen therapy     2l/nc    Wears partial dentures     upper-none on lower    Weight loss 05/2024       SURGICAL HISTORY:     Past Surgical History:   Procedure Laterality Date    ANKLE SURGERY Left     HYSTERECTOMY      TONSILLECTOMY         CURRENT MEDICATIONS:     Previous Medications    ALBUTEROL  (PROVENTIL/VENTOLIN HFA) 90 MCG/ACTUATION INHALER    Inhale into the lungs.    AMOXICILLIN-CLAVULANATE 875-125MG (AUGMENTIN) 875-125 MG PER TABLET    Take 1 tablet by mouth every 12 (twelve) hours.    AMOXICILLIN-CLAVULANATE 875-125MG (AUGMENTIN) 875-125 MG PER TABLET    Take 1 tablet by mouth 2 (two) times daily.    ASPIRIN 81 MG CHEW    Take 81 mg by mouth once daily. Stop 05/20/2024 per [facility] office    BUDESONIDE-FORMOTEROL 160-4.5 MCG (SYMBICORT) 160-4.5 MCG/ACTUATION HFAA    2 puffs Inhalation Twice a day    DIAZEPAM (VALIUM) 10 MG TAB    Take 10 mg by mouth every 6 (six) hours as needed.    DOCUSATE SODIUM (COLACE) 100 MG CAPSULE    Take 100 mg by mouth 2 (two) times daily.    FLUTICASONE PROPIONATE (FLONASE) 50 MCG/ACTUATION NASAL SPRAY        GUAIFENESIN (MUCINEX) 1,200 MG TA12    Take 1,200 mg by mouth 2 (two) times a day. for 10 days    LISINOPRIL-HYDROCHLOROTHIAZIDE (PRINZIDE,ZESTORETIC) 20-25 MG TAB        POTASSIUM CHLORIDE (MICRO-K) 10 MEQ CPSR    Take 10 mEq by mouth once.    PRAVASTATIN (PRAVACHOL) 20 MG TABLET        QUETIAPINE (SEROQUEL) 50 MG TABLET    Take 1 tablet (50 mg total) by mouth every evening.    QUETIAPINE (SEROQUEL) 50 MG TABLET    Take 1 tablet (50 mg total) by mouth nightly.       ALLERGIES:     Patient has no known allergies.    SOCIAL HISTORY:     Social History     Socioeconomic History    Marital status:    Tobacco Use    Smoking status: Former     Current packs/day: 1.00     Types: Cigarettes    Smokeless tobacco: Never    Tobacco comments:     Quit 2 years ago   Substance and Sexual Activity    Alcohol use: No    Drug use: Not Currently     Types: Oxycodone, Hydrocodone     Social Determinants of Health     Financial Resource Strain: Patient Unable To Answer (5/24/2024)    Overall Financial Resource Strain (CARDIA)     Difficulty of Paying Living Expenses: Patient unable to answer   Food Insecurity: Patient Unable To Answer (5/24/2024)    Hunger Vital  Sign     Worried About Running Out of Food in the Last Year: Patient unable to answer     Ran Out of Food in the Last Year: Patient unable to answer   Transportation Needs: Patient Unable To Answer (5/24/2024)    TRANSPORTATION NEEDS     Transportation : Patient unable to answer   Physical Activity: Inactive (2/2/2024)    Exercise Vital Sign     Days of Exercise per Week: 0 days     Minutes of Exercise per Session: 0 min   Stress: Patient Unable To Answer (5/24/2024)    [employer] [place] [employer] - Occupational Stress Questionnaire     Feeling of Stress : Patient unable to answer   Housing Stability: Patient Unable To Answer (5/24/2024)    Housing Stability Vital Sign     Unable to Pay for Housing in the Last Year: Patient unable to answer     Homeless in the Last Year: Patient unable to answer       SCREENINGS:           PHYSICAL EXAM:     ED Triage Vitals [05/30/24 2030]   BP (!) 179/92   Pulse (!) 120   Resp (!) 30   Temp 98.3 °F (36.8 °C)   SpO2 99 %        Physical Exam  Vitals and nursing note reviewed.   Constitutional:       General: She is not in acute distress.     Appearance: She is not toxic-appearing.      Interventions: She is sedated.      Comments: Patient responds to sternal rub.   HENT:      Head: Normocephalic and atraumatic.      Nose: Nose normal.      Mouth/Throat:      Mouth: Mucous membranes are moist.      Pharynx: Oropharynx is clear.   Eyes:      Pupils:      Right eye: Pupil is round.      Left eye: Pupil is round.      Comments: Pupils are constricted to two mm and do respond.  When I open her eyelid she does seem to fight me to some extent.   Cardiovascular:      Rate and Rhythm: Regular rhythm. Tachycardia present.      Heart sounds: No murmur heard.  Pulmonary:      Effort: Tachypnea and accessory muscle usage present. No respiratory distress or retractions.      Breath sounds: No decreased breath sounds, wheezing, rhonchi or rales.   Abdominal:      General: Abdomen is  flat. There is no distension.      Palpations: Abdomen is soft.      Tenderness: There is no abdominal tenderness.   Musculoskeletal:      Cervical back: Normal range of motion and neck supple. No tenderness.   Skin:     General: Skin is warm and dry.      Capillary Refill: Capillary refill takes less than 2 seconds.   Neurological:      Mental Status: She is unresponsive.      GCS: GCS eye subscore is 2. GCS verbal subscore is 1. GCS motor subscore is 4.      Comments: Patient did not have any obvious facial asymmetry.  She would respond to pain.  She could not participate in any other neurologic evaluation.         DIAGNOSTIC RESULTS:   EKG Readings: (Independently Interpreted)   Initial Reading: No STEMI. Previous EKG: Compared with most recent EKG Previous EKG Date: 5/25/24. Rhythm: Sinus Tachycardia. Heart Rate: 117. Ectopy: No Ectopy. Conduction: Normal. ST Segments: Non-Specific ST Segment Depression. ST Segment Depression: II, III and AVF. Axis: Normal.   There are no significant abnormalities when compared to the 12 lead from 5 days ago.          RADIOLOGY:  Non plain film images such as CT, ultrasound and MRI are read by the radiologist.  Plain radiographic images were visualized and preliminarily interpreted by the emergency physician below findings:        X-Ray Chest AP Portable   ED Interpretation   No acute process        CT Head Without Contrast   Final Result      Subtle hypodensity involving the left posterior parieto-occipital white matter.  This appears new from the previous study and could represent a recent infarct.      No intracranial hemorrhage.      A preliminary report was provided by [facility].         Electronically signed by: Aki Blue MD   Date:    05/30/2024   Time:    23:29      X-Ray Chest AP Portable    (Results Pending)   [facility]:    CT brain without contrast:    Impression:   Subtle hypodensity in the white matter and possibly the cortex posterior left occipital  parietal region indeterminate and too small to characterize.  Small acute or subacute infarct could this appearance.  This could also be a benign prominent sulcus or area of chronic change.  This can not be confirmed on the prior study.        LABS:  Labs Reviewed   CBC W/ AUTO DIFFERENTIAL - Abnormal; Notable for the following components:       Result Value    RBC 5.71 (*)     MCV 77 (*)     MCH 22.6 (*)     MCHC 29.3 (*)     RDW 18.4 (*)     Immature Granulocytes 1.1 (*)     Immature Grans (Abs) 0.10 (*)     Gran % 73.9 (*)     Lymph % 12.6 (*)     All other components within normal limits   COMPREHENSIVE METABOLIC PANEL - Abnormal; Notable for the following components:    Sodium 135 (*)     CO2 33 (*)     Glucose 129 (*)     BUN 5 (*)     Albumin 3.3 (*)     Alkaline Phosphatase 48 (*)     All other components within normal limits   URINALYSIS, REFLEX TO URINE CULTURE - Abnormal; Notable for the following components:    Protein, UA 2+ (*)     Occult Blood UA Trace (*)     All other components within normal limits    Narrative:     Preferred Collection Type->Urine, Catheterized  Specimen Source->Urine   SALICYLATE LEVEL - Abnormal; Notable for the following components:    Salicylate Lvl <1.7 (*)     All other components within normal limits   ACETAMINOPHEN LEVEL - Abnormal; Notable for the following components:    Acetaminophen (Tylenol), Serum <2.0 (*)     All other components within normal limits   DRUG SCREEN PANEL, URINE EMERGENCY - Abnormal; Notable for the following components:    Benzodiazepines Presumptive Positive (*)     All other components within normal limits    Narrative:     Preferred Collection Type->Urine, Catheterized  Specimen Source->Urine   TROPONIN I HIGH SENSITIVITY - Abnormal; Notable for the following components:    Troponin I High Sensitivity 78.3 (*)     All other components within normal limits   URINALYSIS MICROSCOPIC - Abnormal; Notable for the following components:    Hyaline Casts,  UA 2.2 (*)     All other components within normal limits    Narrative:     Preferred Collection Type->Urine, Catheterized  Specimen Source->Urine   TROPONIN I HIGH SENSITIVITY - Abnormal; Notable for the following components:    Troponin I High Sensitivity 97.0 (*)     All other components within normal limits   CULTURE, BLOOD   CULTURE, BLOOD   LACTIC ACID, PLASMA   APTT   PROTIME-INR   ALCOHOL,MEDICAL (ETHANOL)   CK   AMMONIA   LACTIC ACID, PLASMA       All other labs were within normal range her not returned as of this dictation.    EMERGENCY DEPARTMENT COURSE IN DIFFERENTIAL DIAGNOSIS/MDM:     Vitals:   Vitals:    05/31/24 0426 05/31/24 0431 05/31/24 0455 05/31/24 0500   BP: 139/84 (!) 140/78  136/88   Pulse: 81 82 84 82   Resp:   (!) 31 (!) 29   Temp:       TempSrc:       SpO2: 97% 96% 96% 97%   Weight:       Height:            Medical Decision Making  Patient was brought in by EMS for an altered mental state.  Patient was essentially unresponsive.  She would only respond to pain.  Workup was surprisingly unremarkable.  There was a subtle finding on the CT, but I do not believe that this was a CVA.  Patient was started on a septic workup.  She was treated with fluids and IV antibiotics.  Her lactate was normal.  Patient's cardiac enzyme was bumped.  Repeat showed an increase of 20+ points.  I do not believe that this is an NSTEMI though.  This could be demand related secondary to the hypercarbia.  Her 12 lead today in her 12 lead from about a week ago were similar with no acute findings.  Remaining lab work was essentially unremarkable.  Her ABG did show an elevation in her pCO2 at 95.  I suspect that the altered mental status was secondary to the hypercarbia.  At that point, patient was intubated.  Her hypercarbia improved while on the vent.  Patient will need to be transferred.  I spoke with Dr. Mays I can not her and he agreed to admit the patient for further evaluation and treatment of her  condition.    Amount and/or Complexity of Data Reviewed  Labs: ordered.  Radiology: ordered and independent interpretation performed.    Risk  OTC drugs.  Prescription drug management.         Reassessment:    ED Course as of 05/31/24 0521   Thu May 30, 2024   2041 Unsure as to if patient is septic or not.  Blood pressure is 179/92 and heart rate is 120.  Patient afebrile.  A septic workup was initiated.  I do not feel that she needs a full 30 milliliter/kilogram bolus as of yet.  Fluids were hung at 100 cc an hour right now. [PM]   2258 Called to pt's room when she began to have facial twitching.  Pt was noted to be drooling.  Her mental status has not improved.  No prior hx of SZ.   [PM]   2340 Patient found to be hypercarbic.  I suspect that this is going to be the cause of the altered mental status.  I am going to set the patient up to be intubated.  I discussed this with the granddaughter. [PM]   Fri May 31, 2024   0154 Spoke with Dr. Mays.  Patient will be admitted to CCU in [place] [PM]      ED Course User Index  [PM] Christ Modi MD        Medications   piperacillin-tazobactam (ZOSYN) 4.5 g in dextrose 5 % in water (D5W) 100 mL IVPB (MB+) (0 g Intravenous Stopped 5/31/24 0319)   propofol (DIPRIVAN) 10 mg/mL infusion ( Intravenous Handoff 5/31/24 0503)   lactated ringers infusion ( Intravenous Verify Only 5/31/24 0101)   acetaminophen suppository 650 mg (650 mg Rectal Given 5/30/24 2305)   naloxone 0.4 mg/mL injection 0.4 mg (0.4 mg Intravenous Given 5/30/24 2029)   etomidate injection 10 mg (10 mg Intravenous Given 5/30/24 2348)   succinylcholine injection 74 mg (74 mg Intravenous Given 5/30/24 2348)   methylPREDNISolone sodium succinate injection 125 mg (125 mg Intravenous Given 5/31/24 0050)   albuterol-ipratropium 2.5 mg-0.5 mg/3 mL nebulizer solution 3 mL (3 mLs Nebulization Given 5/31/24 0104)   lactated ringers bolus 1,000 mL (0 mLs Intravenous Stopped 5/31/24 0100)       CONSULTS:  None  Unless  otherwise noted below, none.    Intubation    Date/Time: 5/31/2024 12:10 AM  Location procedure was performed: [facility] EMERGENCY DEPARTMENT    Performed by: Christ Modi MD  Authorized by: Christ Modi MD  Pre-operative diagnosis: Respiratory failure  Post-operative diagnosis: Respiratory failure  Consent Done: Emergent Situation  Indications: respiratory distress and respiratory failure  Intubation method: video-assisted  Patient status: paralyzed (RSI)  Preoxygenation: BVM  Sedatives: etomidate  Paralytic: succinylcholine  Laryngoscope size: Mac 4  Tube size: 7.0 mm  Tube type: cuffed  Number of attempts: 1  Cricoid pressure: no  Cords visualized: yes  Post-procedure assessment: chest rise, ETCO2 monitor and CO2 detector  Breath sounds: clear, equal and equal and absent over the epigastrium  Cuff inflated: yes  ETT to lip: 19 cm  Tube secured with: ETT keyes  Chest x-ray interpreted by me.  Chest x-ray findings: endotracheal tube too high  Tube repositioned: tube repositioned successfully  Patient tolerance: Patient tolerated the procedure well with no immediate complications  Complications: No  Specimens: No  Implants: No      Critical Care    Date/Time: 5/31/2024 5:18 AM    Performed by: Christ Modi MD  Authorized by: Christ Modi MD  [facility] patient critical care time: 60 minutes  Additional history critical care time: 5 minutes  Ordering / reviewing critical care time: 15 minutes  Documentation critical care time: 20 minutes  Consulting other physicians critical care time: 7 minutes  Consult with family critical care time: 10 minutes  Total critical care time (exclusive of procedural time) : 117 minutes  Critical care time was exclusive of separately billable procedures and treating other patients.  Critical care was necessary to treat or prevent imminent or life-threatening deterioration of the following conditions: cardiac failure, sepsis, circulatory failure and respiratory  failure.  Critical care was time spent personally by me on the following activities: development of treatment plan with patient or surrogate, discussions with consultants, evaluation of patient's response to treatment, examination of patient, obtaining history from patient or surrogate, ordering and performing treatments and interventions, ordering and review of laboratory studies, ordering and review of radiographic studies, pulse oximetry, re-evaluation of patient's condition and review of old charts.            Additional MDM:   Differential Diagnosis:   Pneumonia, sepsis, CVA, ACS, polypharmacy, respiratory failure, hypercarbia           FINAL IMPRESSION:     1. Acute respiratory failure with hypercapnia    2. AMS (altered mental status)    3. Hypercarbia         DISPOSITION/PLAN:      ED Disposition Condition    Transfer to Another Facility Stable            PATIENT REFERRED TO:    No follow-up provider specified.    DISCHARGE MEDICATIONS:  New Prescriptions    No medications on file           (Please note that portions of this chart were completed with the voice recognition program.  Efforts were made to edit the dictations but occasionally words are mis-transcribed.)    Christ Modi MD (electronically signed) Emergency Physician                                 Christ Modi MD  05/31/24 5298

## 2024-05-31 NOTE — CONSULTS
"Pulmonary & Critical Care Medicine - Resident Consult Note     Primary Attending:  Mariella Hobbs*   Consultant Attending: Evlis Pisano MD   Consultant Fellow: : MD Haley Duron MD     Date of Admit: 5/31/2024  Hospital day: 0    Reason for Consult: acute on chronic respiratory failure requiring intubation    History of Present Illness:  June Boykin is a 69 y.o. female with medical comorbidities of HTN, COPD/emphysema on 2L home O2, anxiety, hx drug/methamphetamines use, chronic benzo use    Encephalopathic for 2-3 hours per family, satting 70s in the field, improved to 90s with 15L non-rebreather. No response to narcan. Required intubation in ED for continued encephalopathy. Labs notable for ABG 7.178/95.2/73.6/34.8, elevated troponin I      Recent ICU stay 5/22 to 5/27 at [facility] for acute respiratory failure on BiPAP and acute encephalopathy secondary to metabolic, infectious and toxic (polypharmacy) sources.     Per chart review:   "Patient is a 69-year-old female who presents with past medical history significant for hypertension, COPD, anxiety, previous methamphetamine abuse, chronic benzo use who presented last week with diarrhea, dehydration, frequent falls of 2 days.  She was very somnolent on initial exam and subsequently found to be hypercapnic and hypotensive with a respiratory rate of 30.  She was treated empirically for a COPD exacerbation caused by pneumonia.  She required 3 days of BiPAP with eventual improvement.  During her last hospitalization she had a very similar presentation with her initial pH being 7.2, pCO2 of 94, PO2 of 77.8, bicarb of 29.9 saturating 92% on venti mask 40% FiO2."    Past Medical and Surgical History:  Past Medical History:   Diagnosis Date    Abdominal pain 05/2024    Acid reflux 05/2024    Anxiety disorder, unspecified     At high risk for falls     Emphysema, unspecified     History of opioid " "abuse     Hypertension     On home oxygen therapy     2l/nc    Wears partial dentures     upper-none on lower    Weight loss 2024     Past Surgical History:   Procedure Laterality Date    ANKLE SURGERY Left     HYSTERECTOMY      TONSILLECTOMY         Allergies:  Review of patient's allergies indicates:  No Known Allergies    Family History:  Family History   Problem Relation Name Age of Onset    No Known Problems Mother          @90    Diabetes Father      Pancreatic cancer Father      Stroke Father      No Known Problems Brother      No Known Problems Brother      No Known Problems Brother      Parkinsonism Sister         Social History:  Social History     Tobacco Use    Smoking status: Former     Current packs/day: 1.00     Types: Cigarettes    Smokeless tobacco: Never    Tobacco comments:     Quit 2 years ago   Substance Use Topics    Alcohol use: No    Drug use: Not Currently     Types: Oxycodone, Hydrocodone       Review of Systems:  Negative unless otherwise specified in HPI.       Objective:   Last 24 Hour Vital Signs:  BP  Min: 63/44  Max: 179/92  Temp  Av.3 °F (35.2 °C)  Min: 79 °F (26.1 °C)  Max: 100.3 °F (37.9 °C)  Pulse  Av  Min: 70  Max: 120  Resp  Av.2  Min: 16  Max: 44  SpO2  Av.1 %  Min: 87 %  Max: 100 %  Height  Av' 10" (147.3 cm)  Min: 4' 10" (147.3 cm)  Max: 4' 10" (147.3 cm)  Weight  Av.4 kg (104 lb 9.7 oz)  Min: 45 kg (99 lb 3.3 oz)  Max: 49.9 kg (110 lb)  Body mass index is 20.73 kg/m².  I & O (Last 24H):No intake or output data in the 24 hours ending 24 0743    Physical Exam  Vitals and nursing note reviewed.   Constitutional:       Appearance: She is underweight. She is ill-appearing (chronic).      Interventions: She is sedated, intubated and restrained.   Cardiovascular:      Rate and Rhythm: Normal rate and regular rhythm.      Pulses:           Radial pulses are 2+ on the right side and 2+ on the left side.      Heart sounds: Normal heart sounds. "   Pulmonary:      Effort: She is intubated.      Breath sounds: Transmitted upper airway sounds present.   Abdominal:      General: Abdomen is flat. Bowel sounds are normal.      Palpations: Abdomen is soft.   Musculoskeletal:      Right lower leg: No edema.      Left lower leg: No edema.   Skin:     General: Skin is warm and dry.   Neurological:      Comments: Moving all limbs spontaneously          Laboratory, Microbiology, ECG(s), and Imaging:  Reviewed.        Assessment & Plan:       NEUROLOGICAL:  Encephalopathy, improving  - last known normal per family 1730 on 5/30  - not responsive to narcan in the field  - tox screen with benzos (home med)  - sedation weaned, extubated this morning  - pt now alert but incoherent and confused    CARDIOVASCULAR:  Hypertension  Tachycardia  - will restart home lisinopril, hydrochlorothiazide now that pt is alert  - PRN IV labetalol if pt not taking PO    Elevated troponin, resolved  - HST 78.3 -> 97  - troponin I now 0.012     PULMONOLOGY:  Acute on chronic hypoxic and hypercapneic respiratory failure  Hypercarbia  Respiratory Acidosis, resolving  - home O2 3L, first responders reported pulse ox 70-something, improved to 90s with 15L non-rebreather  - intubated in ED at [facility], agitated requiring propofol   - ABG 7.178/95.2/73.6/27.6  - pH improved to 7.394  - extubated this morning    COPD exacerbation   Recent pneumonia of unknown source  - repeat CXR is improved from one week ago  - will continue treatment for COPD exacerbation - duo-nebs, doxy, prednisone    GI/FEN:  Lactic acidosis  - 0.6 -> 1.4 -> 3.9 -> 1.1  - pt is not septic  - could be type B lactic acidosis 2/2 duo-nebs administration     Fluids: Net even / net negative strategy  Electrolytes: no acute needs at this time  Nutrition: soft and bite size diet    RENAL:   No acute needs  BUN/Cr WNL     HEMATOLOGY/ONCOLOGY:  Chronic microcytosis without anemia  - Transfusion threshold <7g/dl per  TRICC    ENDOCRINE:  Hypoglycemic precautions    INFECTIOUS DISEASE:  +Hep C IgG antibody   - lab done during last hospitalization 5/24/24  - no quant seen in chart  - no abdominal imaging or RUQ US seen in chart  - consider outpatient follow up     Hx E.coli sepsis  - pan sensitive in Feb 2024  - pending BCx     MUSCULOSKELETAL/RHEUMATOLOGIC:  - PT/OT evaluation, treatment, and for early mobility.    PSYCHIATRIC:  Prior polypharmacy  Hx substance use disorder  - during similar hospital presentation ~7 days ago, pt utox +benzos, +amphetamines  - consider consult to psychiatry for substance use  - pt may be taking up to 40mg of valium per day, will need to monitor for benzo withdrawal       Feeding: cardiac soft  Analgesia: none at this time  Sedation: none at this time   VTE Ppx:   Anticoagulants       Ordered     Route Frequency Start Stop    05/31/24 1004  enoxaparin  (VTE Prophylaxis Orders - High Risk)         SubQ Every 24 hours 05/31/24 1700 --          Head of Bed: elevated  Ulcer PPX: famotidine  Glucose: Hypoglycemic precautions  SBT/SAT: extubated this morning  Bowels: N/A  Indwelling Lines: PIV L hand, PIV L forearm, PIV R forearm; extubated this morning, lira removed this morning   Deescalation Abx: cont doxy for COPD exacerbation     Code Status: full - but has DNR order from prior hospitalization that was discontinued at discharge on 5/26, will need to clarify     Dispo: continue supportive treatment       Haley Meyers MD  [facility] HO[facility]  [facility] - Pulmonary and Critical Care Service     Pt seen and examined with Pulmonary/Critical Care team and this note was reviewed and validated with the following additional comments: Severe COPD. Prescription valium OD suspected.  Presented with acute hypercapnic respiratory failure.  Intubated.  Now passes SAT/SBT.  Extubated to NIV and then to NC.  Doing well.    Critical Care time was spent validating the history and physical exam,  reviewing the lab and imaging results, and discussing the care of the patient with the bedside nurse and the patient and/or surrogates. This critical care time did not overlap with that of any other provider or involve time for any procedures.  This patient has a high probability of sudden clinically significant deterioration which requires the highest level of physician preparedness to intervene urgently. I managed/supervised life or organ supporting interventions that required frequent physician assessments. I devoted my full attention in the ICU to the direct care of this patient for this period of time. Organ systems which are failing and require intensive, critical care support are: respiratory, behavioral  Critical Care time: 55 minutes    Monroe Pisano MD  Phone [phone]

## 2024-05-31 NOTE — ED NOTES
Pts daughter, Caro going home at this time. Asked to be updated on pts status.  [phone]  Password Purple

## 2024-05-31 NOTE — SUBJECTIVE & OBJECTIVE
Past Medical History:   Diagnosis Date    Abdominal pain 05/2024    Acid reflux 05/2024    Anxiety disorder, unspecified     At high risk for falls     Emphysema, unspecified     History of opioid abuse     Hypertension     On home oxygen therapy     2l/nc    Wears partial dentures     upper-none on lower    Weight loss 05/2024       Past Surgical History:   Procedure Laterality Date    ANKLE SURGERY Left     HYSTERECTOMY      TONSILLECTOMY         Review of patient's allergies indicates:  No Known Allergies    Current Facility-Administered Medications on File Prior to Encounter   Medication    [COMPLETED] acetaminophen suppository 650 mg    [COMPLETED] albuterol-ipratropium 2.5 mg-0.5 mg/3 mL nebulizer solution 3 mL    [COMPLETED] etomidate injection 10 mg    [COMPLETED] lactated ringers bolus 1,000 mL    [COMPLETED] lactated ringers infusion    [COMPLETED] methylPREDNISolone sodium succinate injection 125 mg    [COMPLETED] naloxone 0.4 mg/mL injection 0.4 mg    [COMPLETED] succinylcholine injection 74 mg    [DISCONTINUED] piperacillin-tazobactam (ZOSYN) 4.5 g in dextrose 5 % in water (D5W) 100 mL IVPB (MB+)    [DISCONTINUED] propofol (DIPRIVAN) 10 mg/mL infusion     Current Outpatient Medications on File Prior to Encounter   Medication Sig    albuterol (PROVENTIL/VENTOLIN HFA) 90 mcg/actuation inhaler Inhale into the lungs.    amoxicillin-clavulanate 875-125mg (AUGMENTIN) 875-125 mg per tablet Take 1 tablet by mouth every 12 (twelve) hours.    amoxicillin-clavulanate 875-125mg (AUGMENTIN) 875-125 mg per tablet Take 1 tablet by mouth 2 (two) times daily.    aspirin 81 MG Chew Take 81 mg by mouth once daily. Stop 05/20/2024 per [facility] office    budesonide-formoterol 160-4.5 mcg (SYMBICORT) 160-4.5 mcg/actuation HFAA 2 puffs Inhalation Twice a day    diazePAM (VALIUM) 10 MG Tab Take 10 mg by mouth every 6 (six) hours as needed.    docusate sodium (COLACE) 100 MG capsule  Take 100 mg by mouth 2 (two) times daily.    fluticasone propionate (FLONASE) 50 mcg/actuation nasal spray     guaiFENesin (MUCINEX) 1,200 mg Ta12 Take 1,200 mg by mouth 2 (two) times a day. for 10 days    lisinopriL-hydrochlorothiazide (PRINZIDE,ZESTORETIC) 20-25 mg Tab     potassium chloride (MICRO-K) 10 MEQ CpSR Take 10 mEq by mouth once.    pravastatin (PRAVACHOL) 20 MG tablet     QUEtiapine (SEROQUEL) 50 MG tablet Take 1 tablet (50 mg total) by mouth every evening.    QUEtiapine (SEROQUEL) 50 MG tablet Take 1 tablet (50 mg total) by mouth nightly.     Family History       Problem Relation (Age of Onset)    Diabetes Father    No Known Problems Mother, Brother, Brother, Brother    Pancreatic cancer Father    Parkinsonism Sister    Stroke Father          Tobacco Use    Smoking status: Former     Current packs/day: 1.00     Types: Cigarettes    Smokeless tobacco: Never    Tobacco comments:     Quit 2 years ago   Substance and Sexual Activity    Alcohol use: No    Drug use: Not Currently     Types: Oxycodone, Hydrocodone    Sexual activity: Not on file     Comment:      Review of Systems   Unable to perform ROS: Intubated     Objective:     Vital Signs (Most Recent):  Temp: 96.3 °F (35.7 °C) (05/31/24 0742)  Pulse: 74 (05/31/24 0742)  Resp: (!) 24 (05/31/24 0742)  BP: (!) 144/86 (05/31/24 0730)  SpO2: 100 % (05/31/24 0742) Vital Signs (24h Range):  Temp:  [79 °F (26.1 °C)-100.3 °F (37.9 °C)] 96.3 °F (35.7 °C)  Pulse:  [] 74  Resp:  [16-44] 24  SpO2:  [87 %-100 %] 100 %  BP: ()/() 144/86     Weight: 45 kg (99 lb 3.3 oz)  Body mass index is 20.73 kg/m².     Physical Exam  Constitutional:       Appearance: She is ill-appearing.   HENT:      Head: Normocephalic.   Cardiovascular:      Rate and Rhythm: Normal rate.      Comments:  intubated  Pulmonary:      Breath sounds: Rales present. No wheezing.   Abdominal:      General: Bowel sounds are normal. There is no distension.       "Palpations: Abdomen is soft.   Musculoskeletal:      Cervical back: Normal range of motion.      Right lower leg: No edema.      Left lower leg: No edema.   Neurological:      Comments:  intubated              Significant Labs: A1C: No results for input(s): "HGBA1C" in the last 4320 hours.  ABGs:   Recent Labs   Lab 05/30/24 2324 05/31/24  0123   PH 7.178* 7.342*   PCO2 95.2* 60.2*   HCO3 27.6  --    POCSATURATED 91.4*  --    PO2 73.6* 195*     Blood Culture: No results for input(s): "LABBLOO" in the last 48 hours.  CBC:   Recent Labs   Lab 05/30/24 2046   WBC 9.33   HGB 12.9   HCT 44.0        CMP:   Recent Labs   Lab 05/30/24 2046   *   K 3.8   CL 99   CO2 33*   *   BUN 5*   CREATININE 0.6   CALCIUM 9.3   PROT 8.3   ALBUMIN 3.3*   BILITOT 0.8   ALKPHOS 48*   AST 30   ALT 37   ANIONGAP 3     Lactic Acid:   Recent Labs   Lab 05/30/24 2046 05/31/24  0037   LACTATE 0.6 1.4     Lipase: No results for input(s): "LIPASE" in the last 48 hours.  Lipid Panel: No results for input(s): "CHOL", "HDL", "LDLCALC", "TRIG", "CHOLHDL" in the last 48 hours.  Magnesium: No results for input(s): "MG" in the last 48 hours.  Troponin:   Recent Labs   Lab 05/30/24 2046 05/30/24  2255   TROPONINIHS 78.3* 97.0*     TSH: No results for input(s): "TSH" in the last 4320 hours.  Urine Culture: No results for input(s): "LABURIN" in the last 48 hours.  Urine Studies:   Recent Labs   Lab 05/30/24 2053   COLORU Yellow   APPEARANCEUA Clear   PHUR 6.0   SPECGRAV 1.015   PROTEINUA 2+*   GLUCUA Negative   KETONESU Negative   BILIRUBINUA Negative   OCCULTUA Trace*   NITRITE Negative   UROBILINOGEN 1.0   LEUKOCYTESUR Negative   RBCUA 4   WBCUA 4   BACTERIA Negative   SQUAMEPITHEL 1   HYALINECASTS 2.2*       Significant Imaging: I have reviewed all pertinent imaging results/findings within the past 24 hours.  "

## 2024-05-31 NOTE — ED NOTES
Pts family member at bedside, called RN to bedside. Pt began to have brief twitching to the R side of face. Per family member pt was gasping for air for a few seconds. Once RN at bedside pt no longer twitching. Vitals stable. No change in neuro status. Pt still waking to stimulation with no verbal response. MD notified

## 2024-05-31 NOTE — ED NOTES
Called pts granddaughter and updated her on pts transfer to [facility]

## 2024-05-31 NOTE — H&P
[facility]  History & Physical    Patient Name: June Boykin  MRN: [id number]  Patient Class: IP- Inpatient  Admission Date: 5/31/2024  Attending Physician: Mariella Hobbs MD  Primary Care Provider: Mauricio Pierre MD         Patient information was obtained from ER records.     Subjective:     Principal Problem:Acute hypercapnic respiratory failure    Chief Complaint: No chief complaint on file.       HPI: Per     AshutoshJune is a 69-year-old female who presents with past medical history significant for hypertension, COPD  on 2 L home oxygen, anxiety, previous methamphetamine abuse, chronic benzo use who presented last week with diarrhea, dehydration, frequent falls of 2 days. She was very somnolent on initial exam and subsequently found to be hypercapnic and hypotensive with a respiratory rate of 30. She was treated empirically for a COPD exacerbation caused by pneumonia. She required 3 days of BiPAP with eventual improvement. During her last hospitalization she had a very similar presentation with her initial pH being 7.2, pCO2 of 94, PO2 of 77.8, bicarb of 29.9 saturating 92% on venti mask 40% FiO2.     She is now re presenting with altered mental status after being found by her granddaughter for unresponsiveness. Her granddaughter called EMS and found that she was not responding to sternal rub. Initial pulse ox showed an SpO2 in the low 70s she was placed on nasal cannula with rapid improvement in her oxygenation. This was escalated to a non-rebreather in route to emergency department. Following her arrival she is unable to provide any history and her ABG showed profound respiratory acidosis with hypercapnia greater than 90. Due to her altered mental status and concern for inability to protect airway, the decision was made to intubate her and treat her empirically for a COPD exacerbation. Her daughter does note that the patient took her  diazepam earlier today and this was a concern during previous hospitalization. On arrival, her pupils were noted to be pinpoint and she received naloxone without any improvement.  Patient's initial vitals were notable for a temperature of 100.3°, heart rate of 112, respiratory rate of 29, blood pressure of 179/92. Initial GCS was 8. She was saturating 91% on 4 L of oxygen.  ABG shows acute worsening acidosis with a pH of 7.178, pCO2 of 95.2, bicarbonate of 27.6, PO2 of 73.6 prior to intubation.  Following intubation she was pulling at her tube and so she was initiated on propofol continuously at 35 mcg per kg per hour.  She was being transferred to [facility] for pulmonology/ICU level of care. At outside hospital she received 125 mg of methylprednisone, DuoNeb x1, 1 L of LR as well as piperacillin/tazobactam with blood cultures drawn x2.        Past Medical History:   Diagnosis Date    Abdominal pain 05/2024    Acid reflux 05/2024    Anxiety disorder, unspecified     At high risk for falls     Emphysema, unspecified     History of opioid abuse     Hypertension     On home oxygen therapy     2l/nc    Wears partial dentures     upper-none on lower    Weight loss 05/2024       Past Surgical History:   Procedure Laterality Date    ANKLE SURGERY Left     HYSTERECTOMY      TONSILLECTOMY         Review of patient's allergies indicates:  No Known Allergies    Current Facility-Administered Medications on File Prior to Encounter   Medication    [COMPLETED] acetaminophen suppository 650 mg    [COMPLETED] albuterol-ipratropium 2.5 mg-0.5 mg/3 mL nebulizer solution 3 mL    [COMPLETED] etomidate injection 10 mg    [COMPLETED] lactated ringers bolus 1,000 mL    [COMPLETED] lactated ringers infusion    [COMPLETED] methylPREDNISolone sodium succinate injection 125 mg    [COMPLETED] naloxone 0.4 mg/mL injection 0.4 mg    [COMPLETED] succinylcholine injection 74 mg    [DISCONTINUED] piperacillin-tazobactam (ZOSYN) 4.5 g in dextrose 5  % in water (D5W) 100 mL IVPB (MB+)    [DISCONTINUED] propofol (DIPRIVAN) 10 mg/mL infusion     Current Outpatient Medications on File Prior to Encounter   Medication Sig    albuterol (PROVENTIL/VENTOLIN HFA) 90 mcg/actuation inhaler Inhale into the lungs.    amoxicillin-clavulanate 875-125mg (AUGMENTIN) 875-125 mg per tablet Take 1 tablet by mouth every 12 (twelve) hours.    amoxicillin-clavulanate 875-125mg (AUGMENTIN) 875-125 mg per tablet Take 1 tablet by mouth 2 (two) times daily.    aspirin 81 MG Chew Take 81 mg by mouth once daily. Stop 05/20/2024 per kathleen@ Dr. Dominguez office    budesonide-formoterol 160-4.5 mcg (SYMBICORT) 160-4.5 mcg/actuation HFAA 2 puffs Inhalation Twice a day    diazePAM (VALIUM) 10 MG Tab Take 10 mg by mouth every 6 (six) hours as needed.    docusate sodium (COLACE) 100 MG capsule Take 100 mg by mouth 2 (two) times daily.    fluticasone propionate (FLONASE) 50 mcg/actuation nasal spray     guaiFENesin (MUCINEX) 1,200 mg Ta12 Take 1,200 mg by mouth 2 (two) times a day. for 10 days    lisinopriL-hydrochlorothiazide (PRINZIDE,ZESTORETIC) 20-25 mg Tab     potassium chloride (MICRO-K) 10 MEQ CpSR Take 10 mEq by mouth once.    pravastatin (PRAVACHOL) 20 MG tablet     QUEtiapine (SEROQUEL) 50 MG tablet Take 1 tablet (50 mg total) by mouth every evening.    QUEtiapine (SEROQUEL) 50 MG tablet Take 1 tablet (50 mg total) by mouth nightly.     Family History       Problem Relation (Age of Onset)    Diabetes Father    No Known Problems Mother, Brother, Brother, Brother    Pancreatic cancer Father    Parkinsonism Sister    Stroke Father          Tobacco Use    Smoking status: Former     Current packs/day: 1.00     Types: Cigarettes    Smokeless tobacco: Never    Tobacco comments:     Quit 2 years ago   Substance and Sexual Activity    Alcohol use: No    Drug use: Not Currently     Types: Oxycodone, Hydrocodone    Sexual activity: Not on file     Comment:      Review of Systems   Unable to  "perform ROS: Intubated     Objective:     Vital Signs (Most Recent):  Temp: 96.3 °F (35.7 °C) (05/31/24 0742)  Pulse: 74 (05/31/24 0742)  Resp: (!) 24 (05/31/24 0742)  BP: (!) 144/86 (05/31/24 0730)  SpO2: 100 % (05/31/24 0742) Vital Signs (24h Range):  Temp:  [79 °F (26.1 °C)-100.3 °F (37.9 °C)] 96.3 °F (35.7 °C)  Pulse:  [] 74  Resp:  [16-44] 24  SpO2:  [87 %-100 %] 100 %  BP: ()/() 144/86     Weight: 45 kg (99 lb 3.3 oz)  Body mass index is 20.73 kg/m².     Physical Exam  Constitutional:       Appearance: She is ill-appearing.   HENT:      Head: Normocephalic.   Cardiovascular:      Rate and Rhythm: Normal rate.      Comments:  intubated  Pulmonary:      Breath sounds: Rales present. No wheezing.   Abdominal:      General: Bowel sounds are normal. There is no distension.      Palpations: Abdomen is soft.   Musculoskeletal:      Cervical back: Normal range of motion.      Right lower leg: No edema.      Left lower leg: No edema.   Neurological:      Comments:  intubated              Significant Labs: A1C: No results for input(s): "HGBA1C" in the last 4320 hours.  ABGs:   Recent Labs   Lab 05/30/24  2324 05/31/24  0123   PH 7.178* 7.342*   PCO2 95.2* 60.2*   HCO3 27.6  --    POCSATURATED 91.4*  --    PO2 73.6* 195*     Blood Culture: No results for input(s): "LABBLOO" in the last 48 hours.  CBC:   Recent Labs   Lab 05/30/24 2046   WBC 9.33   HGB 12.9   HCT 44.0        CMP:   Recent Labs   Lab 05/30/24 2046   *   K 3.8   CL 99   CO2 33*   *   BUN 5*   CREATININE 0.6   CALCIUM 9.3   PROT 8.3   ALBUMIN 3.3*   BILITOT 0.8   ALKPHOS 48*   AST 30   ALT 37   ANIONGAP 3     Lactic Acid:   Recent Labs   Lab 05/30/24 2046 05/31/24  0037   LACTATE 0.6 1.4     Lipase: No results for input(s): "LIPASE" in the last 48 hours.  Lipid Panel: No results for input(s): "CHOL", "HDL", "LDLCALC", "TRIG", "CHOLHDL" in the last 48 hours.  Magnesium: No results for input(s): "MG" in the last 48 " "hours.  Troponin:   Recent Labs   Lab 05/30/24 2046 05/30/24  2255   TROPONINIHS 78.3* 97.0*     TSH: No results for input(s): "TSH" in the last 4320 hours.  Urine Culture: No results for input(s): "LABURIN" in the last 48 hours.  Urine Studies:   Recent Labs   Lab 05/30/24 2053   COLORU Yellow   APPEARANCEUA Clear   PHUR 6.0   SPECGRAV 1.015   PROTEINUA 2+*   GLUCUA Negative   KETONESU Negative   BILIRUBINUA Negative   OCCULTUA Trace*   NITRITE Negative   UROBILINOGEN 1.0   LEUKOCYTESUR Negative   RBCUA 4   WBCUA 4   BACTERIA Negative   SQUAMEPITHEL 1   HYALINECASTS 2.2*       Significant Imaging: I have reviewed all pertinent imaging results/findings within the past 24 hours.   Assessment/Plan:     * Acute hypercapnic respiratory failure  Patient with Hypercapnic Respiratory failure which is Acute on chronic.  she is not on home oxygen. Supplemental oxygen was provided and noted- Vent Mode: A/CMV-VC  Oxygen Concentration (%):  [21-40] 35  Resp Rate Total:  [24 br/min-36 br/min] 32 br/min  Vt Set:  [300 mL] 300 mL  PEEP/CPAP:  [4.7 cmH20-5 cmH20] 4.7 cmH20  Mean Airway Pressure:  [9.5 clB60-21 cmH20] 9.5 cmH20    .   Signs/symptoms of respiratory failure include- tachypnea, increased work of breathing, respiratory distress, and lethargy. Contributing diagnoses includes - COPD and Pneumonia Labs and images were reviewed. Patient Has recent ABG, which has been reviewed. Will treat underlying causes and adjust management of respiratory failure as follows-  intubated on arrival  Consult Pulmonary critical for vent management    Hypertension  Chronic,    Resume home meds as BP permits    Temp:  [79 °F (26.1 °C)-100.3 °F (37.9 °C)]   Pulse:  []   Resp:  [8-49]   BP: ()/()   SpO2:  [77 %-100 %] .   Home meds for hypertension were reviewed and noted below.   Hypertension Medications               lisinopriL-hydrochlorothiazide (PRINZIDE,ZESTORETIC) 20-25 mg Tab             While in the hospital, will " manage blood pressure as follows; Continue home antihypertensive regimen    Will utilize p.r.n. blood pressure medication only if patient's blood pressure greater than 140/90 and she develops symptoms such as worsening chest pain or shortness of breath.    COPD exacerbation  Patient's COPD is with exacerbation noted by continued dyspnea and worsening of baseline hypoxia currently.  Patient is currently off COPD Pathway. Continue scheduled inhalers Steroids, Antibiotics, and Supplemental oxygen and monitor respiratory status closely.    Checks x-ray:No acute findings.    Continue IV steroid  Intubated  DuoNebs  Antibiotics    Pneumonia   History of recent pneumonia treatment     Checks x-ray with no acute finding    Antibiotics (From admission, onward)      Start     Stop Route Frequency Ordered    05/31/24 1130  doxycycline tablet 100 mg         -- OG Every 12 hours 05/31/24 1020    05/31/24 0915  mupirocin 2 % ointment         06/05/24 0859 Nasl 2 times daily 05/31/24 0807            Microbiology Results (last 7 days)       ** No results found for the last 168 hours. **            Polypharmacy   History of substance abuse  UDS 7 days ago positive for benzos, amphetamines  Consult psych once medically stable   Daughter reported patient takes lot of Valium -monitor for benzos withdrawal      Schizoaffective disorder   Recurrent major depression   Consult psych once patient is medically stable        VTE Risk Mitigation (From admission, onward)           Ordered     enoxaparin injection 40 mg  Every 24 hours         05/31/24 1004     IP VTE HIGH RISK PATIENT  Once         05/31/24 0747     Place sequential compression device  Until discontinued         05/31/24 0747                  Critical care time spent on the evaluation and treatment of severe organ dysfunction, review of pertinent labs and imaging studies, discussions with consulting providers and discussions with patient/family: 35 minutes.              Pharmacist Renal Dose Adjustment Note    June Boykin is a 69 y.o. female being treated with the medication Zosyn    Patient Data:    Vital Signs (Most Recent):  Temp: (!) 95.5 °F (35.3 °C) (05/31/24 0800)  Pulse: 75 (05/31/24 0800)  Resp: (!) 24 (05/31/24 0800)  BP: 126/77 (05/31/24 0800)  SpO2: 100 % (05/31/24 0800) Vital Signs (72h Range):  Temp:  [79 °F (26.1 °C)-100.3 °F (37.9 °C)]   Pulse:  []   Resp:  [16-44]   BP: ()/()   SpO2:  [87 %-100 %]      Recent Labs   Lab 05/26/24  0337 05/27/24  0505 05/30/24 2046   CREATININE 0.5 0.6 0.6     Serum creatinine: 0.6 mg/dL 05/30/24 2046  Estimated creatinine clearance: 61.1 mL/min    Medication:Zosyn dose: 4.5 g frequency q12h will be changed to medication:Zosyn dose:4.5 g frequency:q8h    Pharmacist's Name: Saturnino Sandoval  Pharmacist's Extension: [phone]      Mariella Hobbs MD  Department of Hospital Medicine  [place] - Intensive Care

## 2024-06-01 PROBLEM — Z71.89 ACP (ADVANCE CARE PLANNING): Status: ACTIVE | Noted: 2024-06-01

## 2024-06-01 LAB
ALBUMIN SERPL BCP-MCNC: 3.2 G/DL (ref 3.5–5.2)
ALP SERPL-CCNC: 41 U/L (ref 55–135)
ALT SERPL W/O P-5'-P-CCNC: 24 U/L (ref 10–44)
ANION GAP SERPL CALC-SCNC: 11 MMOL/L (ref 8–16)
AST SERPL-CCNC: 33 U/L (ref 10–40)
BASOPHILS # BLD AUTO: 0.03 K/UL (ref 0–0.2)
BASOPHILS NFR BLD: 0.2 % (ref 0–1.9)
BILIRUB SERPL-MCNC: 0.7 MG/DL (ref 0.1–1)
BUN SERPL-MCNC: 19 MG/DL (ref 8–23)
CALCIUM SERPL-MCNC: 9.5 MG/DL (ref 8.7–10.5)
CHLORIDE SERPL-SCNC: 92 MMOL/L (ref 95–110)
CO2 SERPL-SCNC: 31 MMOL/L (ref 23–29)
CREAT SERPL-MCNC: 0.7 MG/DL (ref 0.5–1.4)
DIFFERENTIAL METHOD BLD: ABNORMAL
EOSINOPHIL # BLD AUTO: 0 K/UL (ref 0–0.5)
EOSINOPHIL NFR BLD: 0 % (ref 0–8)
ERYTHROCYTE [DISTWIDTH] IN BLOOD BY AUTOMATED COUNT: 17.2 % (ref 11.5–14.5)
EST. GFR  (NO RACE VARIABLE): >60 ML/MIN/1.73 M^2
GLUCOSE SERPL-MCNC: 120 MG/DL (ref 70–110)
HCT VFR BLD AUTO: 43 % (ref 37–48.5)
HGB BLD-MCNC: 12.3 G/DL (ref 12–16)
IMM GRANULOCYTES # BLD AUTO: 0.08 K/UL (ref 0–0.04)
IMM GRANULOCYTES NFR BLD AUTO: 0.5 % (ref 0–0.5)
LYMPHOCYTES # BLD AUTO: 2.7 K/UL (ref 1–4.8)
LYMPHOCYTES NFR BLD: 17 % (ref 18–48)
MAGNESIUM SERPL-MCNC: 1.6 MG/DL (ref 1.6–2.6)
MCH RBC QN AUTO: 22.2 PG (ref 27–31)
MCHC RBC AUTO-ENTMCNC: 28.6 G/DL (ref 32–36)
MCV RBC AUTO: 78 FL (ref 82–98)
MONOCYTES # BLD AUTO: 1.9 K/UL (ref 0.3–1)
MONOCYTES NFR BLD: 12 % (ref 4–15)
NEUTROPHILS # BLD AUTO: 11.1 K/UL (ref 1.8–7.7)
NEUTROPHILS NFR BLD: 70.3 % (ref 38–73)
NRBC BLD-RTO: 0 /100 WBC
PHOSPHATE SERPL-MCNC: 3 MG/DL (ref 2.7–4.5)
PLATELET # BLD AUTO: 199 K/UL (ref 150–450)
PMV BLD AUTO: ABNORMAL FL (ref 9.2–12.9)
POTASSIUM SERPL-SCNC: 4 MMOL/L (ref 3.5–5.1)
PROT SERPL-MCNC: 7.9 G/DL (ref 6–8.4)
RBC # BLD AUTO: 5.54 M/UL (ref 4–5.4)
SODIUM SERPL-SCNC: 134 MMOL/L (ref 136–145)
WBC # BLD AUTO: 15.77 K/UL (ref 3.9–12.7)

## 2024-06-01 PROCEDURE — 25000003 PHARM REV CODE 250: Performed by: STUDENT IN AN ORGANIZED HEALTH CARE EDUCATION/TRAINING PROGRAM

## 2024-06-01 PROCEDURE — 11000001 HC ACUTE MED/SURG PRIVATE ROOM

## 2024-06-01 PROCEDURE — 27000221 HC OXYGEN, UP TO 24 HOURS

## 2024-06-01 PROCEDURE — 84100 ASSAY OF PHOSPHORUS: CPT | Performed by: FAMILY MEDICINE

## 2024-06-01 PROCEDURE — 25000003 PHARM REV CODE 250: Performed by: FAMILY MEDICINE

## 2024-06-01 PROCEDURE — 99900035 HC TECH TIME PER 15 MIN (STAT)

## 2024-06-01 PROCEDURE — 36415 COLL VENOUS BLD VENIPUNCTURE: CPT | Performed by: FAMILY MEDICINE

## 2024-06-01 PROCEDURE — 85025 COMPLETE CBC W/AUTO DIFF WBC: CPT | Performed by: FAMILY MEDICINE

## 2024-06-01 PROCEDURE — 25000242 PHARM REV CODE 250 ALT 637 W/ HCPCS: Performed by: FAMILY MEDICINE

## 2024-06-01 PROCEDURE — 63600175 PHARM REV CODE 636 W HCPCS: Performed by: FAMILY MEDICINE

## 2024-06-01 PROCEDURE — 63600175 PHARM REV CODE 636 W HCPCS: Performed by: STUDENT IN AN ORGANIZED HEALTH CARE EDUCATION/TRAINING PROGRAM

## 2024-06-01 PROCEDURE — 83735 ASSAY OF MAGNESIUM: CPT | Performed by: FAMILY MEDICINE

## 2024-06-01 PROCEDURE — 94761 N-INVAS EAR/PLS OXIMETRY MLT: CPT

## 2024-06-01 PROCEDURE — 94640 AIRWAY INHALATION TREATMENT: CPT

## 2024-06-01 PROCEDURE — 80053 COMPREHEN METABOLIC PANEL: CPT | Performed by: FAMILY MEDICINE

## 2024-06-01 RX ORDER — HYDRALAZINE HYDROCHLORIDE 25 MG/1
25 TABLET, FILM COATED ORAL EVERY 8 HOURS
Status: DISCONTINUED | OUTPATIENT
Start: 2024-06-01 | End: 2024-06-03

## 2024-06-01 RX ORDER — LABETALOL HYDROCHLORIDE 5 MG/ML
20 INJECTION, SOLUTION INTRAVENOUS EVERY 4 HOURS PRN
Status: DISCONTINUED | OUTPATIENT
Start: 2024-06-01 | End: 2024-06-08 | Stop reason: HOSPADM

## 2024-06-01 RX ORDER — NICARDIPINE HYDROCHLORIDE 0.2 MG/ML
0-15 INJECTION INTRAVENOUS CONTINUOUS
Status: DISCONTINUED | OUTPATIENT
Start: 2024-06-01 | End: 2024-06-01

## 2024-06-01 RX ADMIN — LABETALOL HYDROCHLORIDE 20 MG: 5 INJECTION INTRAVENOUS at 05:06

## 2024-06-01 RX ADMIN — LABETALOL HYDROCHLORIDE 10 MG: 5 INJECTION INTRAVENOUS at 03:06

## 2024-06-01 RX ADMIN — DOXYCYCLINE HYCLATE 100 MG: 100 TABLET, COATED ORAL at 04:06

## 2024-06-01 RX ADMIN — IPRATROPIUM BROMIDE AND ALBUTEROL SULFATE 3 ML: .5; 3 SOLUTION RESPIRATORY (INHALATION) at 07:06

## 2024-06-01 RX ADMIN — HYDRALAZINE HYDROCHLORIDE 25 MG: 25 TABLET, FILM COATED ORAL at 12:06

## 2024-06-01 RX ADMIN — IPRATROPIUM BROMIDE AND ALBUTEROL SULFATE 3 ML: .5; 3 SOLUTION RESPIRATORY (INHALATION) at 11:06

## 2024-06-01 RX ADMIN — LABETALOL HYDROCHLORIDE 20 MG: 5 INJECTION INTRAVENOUS at 07:06

## 2024-06-01 RX ADMIN — LABETALOL HYDROCHLORIDE 20 MG: 5 INJECTION INTRAVENOUS at 02:06

## 2024-06-01 RX ADMIN — ENOXAPARIN SODIUM 40 MG: 40 INJECTION SUBCUTANEOUS at 04:06

## 2024-06-01 RX ADMIN — HYDRALAZINE HYDROCHLORIDE 25 MG: 25 TABLET, FILM COATED ORAL at 09:06

## 2024-06-01 RX ADMIN — HYDROCHLOROTHIAZIDE 25 MG: 25 TABLET ORAL at 04:06

## 2024-06-01 RX ADMIN — LISINOPRIL 20 MG: 20 TABLET ORAL at 04:06

## 2024-06-01 RX ADMIN — IPRATROPIUM BROMIDE AND ALBUTEROL SULFATE 3 ML: .5; 3 SOLUTION RESPIRATORY (INHALATION) at 03:06

## 2024-06-01 RX ADMIN — MUPIROCIN: 20 OINTMENT TOPICAL at 09:06

## 2024-06-01 NOTE — PLAN OF CARE
Pt s bp remains elevated, dr webber aware, labetalol ordered prn q4 hours. Last given at 1440. Pt is now a DNR.  Family at bedside. Dr webber in room talking with family now. Pt remains confused, ox1. Cont's to refuse PO meds. Eating and drinking a little bit for the grand daughter. Except for bp, all other vs 's. No c/o pain. On 2l o2 per nc, o2 sats wnls. Pt transferring to room 476. Report called to RN, transport req'ed. See flow sheet for padmini info.

## 2024-06-01 NOTE — NURSING
Received report from ICU nurse, pt arrived on unit. VS taken, purewick applied, restraints assessed. Bed in lowest position call bell within reach. Instructed to call for assistance.
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
## 2024-06-01 NOTE — PLAN OF CARE
Patient remains confused/anxious. Oriented x 1. Not following commands and refused p.o. when offered. Required IV Labetalol for HTN tonight. Bipap for HS but pt pulled it off. Sats wnl on 2 liters [place] Safety maintained.

## 2024-06-01 NOTE — PLAN OF CARE
Patient on oxygen in no apparent distress, given aerosol treatment, no adverse reactions will continue to monitor.

## 2024-06-01 NOTE — ASSESSMENT & PLAN NOTE
Advance Care Planning     Date: 06/01/2024    Code Status  In light of the patients advanced and life limiting illness,I engaged the the family in a voluntary conversation about the patient's preferences for care  at the very end of life. The patient wishes to have a natural, peaceful death.  Along those lines, the patient does not wish to have CPR or other invasive treatments performed when her heart and/or breathing stops. I communicated to the family that a DNR order would be placed in her medical record to reflect this preference.  I spent a total of 20 minutes engaging the patient in this advance care planning discussion.   Patient grand daughter Caro  reported patient sister had updated family patient is a DNR.

## 2024-06-02 PROBLEM — R41.82 AMS (ALTERED MENTAL STATUS): Status: ACTIVE | Noted: 2024-06-02

## 2024-06-02 PROBLEM — I48.91 ATRIAL FIBRILLATION: Status: ACTIVE | Noted: 2024-06-02

## 2024-06-02 LAB
ALBUMIN SERPL BCP-MCNC: 2.9 G/DL (ref 3.5–5.2)
ALLENS TEST: ABNORMAL
ALP SERPL-CCNC: 39 U/L (ref 55–135)
ALT SERPL W/O P-5'-P-CCNC: 19 U/L (ref 10–44)
ANION GAP SERPL CALC-SCNC: 11 MMOL/L (ref 8–16)
AST SERPL-CCNC: 36 U/L (ref 10–40)
BASOPHILS # BLD AUTO: 0.01 K/UL (ref 0–0.2)
BASOPHILS NFR BLD: 0.1 % (ref 0–1.9)
BILIRUB SERPL-MCNC: 1 MG/DL (ref 0.1–1)
BUN SERPL-MCNC: 20 MG/DL (ref 8–23)
CALCIUM SERPL-MCNC: 9.1 MG/DL (ref 8.7–10.5)
CHLORIDE SERPL-SCNC: 90 MMOL/L (ref 95–110)
CO2 SERPL-SCNC: 29 MMOL/L (ref 23–29)
CREAT SERPL-MCNC: 0.6 MG/DL (ref 0.5–1.4)
DIFFERENTIAL METHOD BLD: ABNORMAL
EOSINOPHIL # BLD AUTO: 0 K/UL (ref 0–0.5)
EOSINOPHIL NFR BLD: 0 % (ref 0–8)
ERYTHROCYTE [DISTWIDTH] IN BLOOD BY AUTOMATED COUNT: 18.1 % (ref 11.5–14.5)
EST. GFR  (NO RACE VARIABLE): >60 ML/MIN/1.73 M^2
FIO2: 21 %
GLUCOSE SERPL-MCNC: 112 MG/DL (ref 70–110)
HCT VFR BLD AUTO: 43.3 % (ref 37–48.5)
HGB BLD-MCNC: 13.2 G/DL (ref 12–16)
IMM GRANULOCYTES # BLD AUTO: 0.04 K/UL (ref 0–0.04)
IMM GRANULOCYTES NFR BLD AUTO: 0.4 % (ref 0–0.5)
LYMPHOCYTES # BLD AUTO: 1.4 K/UL (ref 1–4.8)
LYMPHOCYTES NFR BLD: 15.1 % (ref 18–48)
MAGNESIUM SERPL-MCNC: 1.5 MG/DL (ref 1.6–2.6)
MCH RBC QN AUTO: 22.8 PG (ref 27–31)
MCHC RBC AUTO-ENTMCNC: 30.5 G/DL (ref 32–36)
MCV RBC AUTO: 75 FL (ref 82–98)
MONOCYTES # BLD AUTO: 1.1 K/UL (ref 0.3–1)
MONOCYTES NFR BLD: 12.3 % (ref 4–15)
NEUTROPHILS # BLD AUTO: 6.7 K/UL (ref 1.8–7.7)
NEUTROPHILS NFR BLD: 72.1 % (ref 38–73)
NRBC BLD-RTO: 0 /100 WBC
PCO2 BLDA: 56.1 MMHG (ref 35–45)
PH SMN: 7.45 [PH] (ref 7.35–7.45)
PHOSPHATE SERPL-MCNC: 2.1 MG/DL (ref 2.7–4.5)
PLATELET # BLD AUTO: 173 K/UL (ref 150–450)
PMV BLD AUTO: ABNORMAL FL (ref 9.2–12.9)
PO2 BLDA: 72.9 MMHG (ref 80–100)
POC BASE DEFICIT: 12.9 MMOL/L (ref -2–2)
POC HCO3: 39.3 MMOL/L (ref 24–28)
POC PERFORMED BY: ABNORMAL
POC SATURATED O2: 94.9 % (ref 95–100)
POCT GLUCOSE: 133 MG/DL (ref 70–110)
POTASSIUM SERPL-SCNC: 3.5 MMOL/L (ref 3.5–5.1)
PROT SERPL-MCNC: 7.3 G/DL (ref 6–8.4)
RBC # BLD AUTO: 5.78 M/UL (ref 4–5.4)
SODIUM SERPL-SCNC: 130 MMOL/L (ref 136–145)
SPECIMEN SOURCE: ABNORMAL
WBC # BLD AUTO: 9.3 K/UL (ref 3.9–12.7)

## 2024-06-02 PROCEDURE — 25000242 PHARM REV CODE 250 ALT 637 W/ HCPCS: Performed by: FAMILY MEDICINE

## 2024-06-02 PROCEDURE — 25000003 PHARM REV CODE 250: Performed by: STUDENT IN AN ORGANIZED HEALTH CARE EDUCATION/TRAINING PROGRAM

## 2024-06-02 PROCEDURE — G0425 INPT/ED TELECONSULT30: HCPCS | Mod: 95,,, | Performed by: PSYCHIATRY & NEUROLOGY

## 2024-06-02 PROCEDURE — 20000000 HC ICU ROOM

## 2024-06-02 PROCEDURE — 36600 WITHDRAWAL OF ARTERIAL BLOOD: CPT

## 2024-06-02 PROCEDURE — 36415 COLL VENOUS BLD VENIPUNCTURE: CPT | Performed by: FAMILY MEDICINE

## 2024-06-02 PROCEDURE — 93005 ELECTROCARDIOGRAM TRACING: CPT

## 2024-06-02 PROCEDURE — 87040 BLOOD CULTURE FOR BACTERIA: CPT | Performed by: FAMILY MEDICINE

## 2024-06-02 PROCEDURE — 84100 ASSAY OF PHOSPHORUS: CPT | Performed by: FAMILY MEDICINE

## 2024-06-02 PROCEDURE — 63600175 PHARM REV CODE 636 W HCPCS: Performed by: INTERNAL MEDICINE

## 2024-06-02 PROCEDURE — 83735 ASSAY OF MAGNESIUM: CPT | Performed by: FAMILY MEDICINE

## 2024-06-02 PROCEDURE — 63600175 PHARM REV CODE 636 W HCPCS: Performed by: FAMILY MEDICINE

## 2024-06-02 PROCEDURE — 25000003 PHARM REV CODE 250: Performed by: FAMILY MEDICINE

## 2024-06-02 PROCEDURE — 85025 COMPLETE CBC W/AUTO DIFF WBC: CPT | Performed by: FAMILY MEDICINE

## 2024-06-02 PROCEDURE — 27000221 HC OXYGEN, UP TO 24 HOURS

## 2024-06-02 PROCEDURE — 63600175 PHARM REV CODE 636 W HCPCS

## 2024-06-02 PROCEDURE — 93010 ELECTROCARDIOGRAM REPORT: CPT | Mod: ,,, | Performed by: INTERNAL MEDICINE

## 2024-06-02 PROCEDURE — 80053 COMPREHEN METABOLIC PANEL: CPT | Performed by: FAMILY MEDICINE

## 2024-06-02 PROCEDURE — 82803 BLOOD GASES ANY COMBINATION: CPT

## 2024-06-02 PROCEDURE — 94761 N-INVAS EAR/PLS OXIMETRY MLT: CPT | Mod: XB

## 2024-06-02 PROCEDURE — 25000003 PHARM REV CODE 250: Performed by: INTERNAL MEDICINE

## 2024-06-02 PROCEDURE — 51798 US URINE CAPACITY MEASURE: CPT

## 2024-06-02 PROCEDURE — 51701 INSERT BLADDER CATHETER: CPT

## 2024-06-02 PROCEDURE — 94640 AIRWAY INHALATION TREATMENT: CPT

## 2024-06-02 PROCEDURE — 99900035 HC TECH TIME PER 15 MIN (STAT)

## 2024-06-02 RX ORDER — METOPROLOL TARTRATE 25 MG/1
25 TABLET, FILM COATED ORAL 3 TIMES DAILY
Status: DISCONTINUED | OUTPATIENT
Start: 2024-06-02 | End: 2024-06-02

## 2024-06-02 RX ORDER — DILTIAZEM HCL 1 MG/ML
0-15 INJECTION, SOLUTION INTRAVENOUS CONTINUOUS
Status: DISCONTINUED | OUTPATIENT
Start: 2024-06-02 | End: 2024-06-03

## 2024-06-02 RX ORDER — DILTIAZEM HCL/D5W 125 MG/125
5 PLASTIC BAG, INJECTION (ML) INTRAVENOUS CONTINUOUS
Status: DISCONTINUED | OUTPATIENT
Start: 2024-06-02 | End: 2024-06-02

## 2024-06-02 RX ORDER — METOPROLOL TARTRATE 25 MG/1
25 TABLET, FILM COATED ORAL 2 TIMES DAILY
Status: DISCONTINUED | OUTPATIENT
Start: 2024-06-02 | End: 2024-06-06

## 2024-06-02 RX ORDER — METOPROLOL TARTRATE 1 MG/ML
2.5 INJECTION, SOLUTION INTRAVENOUS ONCE
Status: COMPLETED | OUTPATIENT
Start: 2024-06-02 | End: 2024-06-02

## 2024-06-02 RX ORDER — ACETAMINOPHEN 325 MG/1
650 TABLET ORAL EVERY 6 HOURS PRN
Status: DISCONTINUED | OUTPATIENT
Start: 2024-06-02 | End: 2024-06-07

## 2024-06-02 RX ORDER — DIAZEPAM 10 MG/2ML
5 INJECTION INTRAMUSCULAR ONCE
Status: COMPLETED | OUTPATIENT
Start: 2024-06-02 | End: 2024-06-02

## 2024-06-02 RX ORDER — MAGNESIUM SULFATE 1 G/100ML
1 INJECTION INTRAVENOUS ONCE
Status: COMPLETED | OUTPATIENT
Start: 2024-06-02 | End: 2024-06-02

## 2024-06-02 RX ORDER — DILTIAZEM HYDROCHLORIDE 5 MG/ML
10 INJECTION INTRAVENOUS ONCE
Status: COMPLETED | OUTPATIENT
Start: 2024-06-02 | End: 2024-06-02

## 2024-06-02 RX ORDER — MAGNESIUM SULFATE HEPTAHYDRATE 40 MG/ML
2 INJECTION, SOLUTION INTRAVENOUS ONCE
Status: COMPLETED | OUTPATIENT
Start: 2024-06-02 | End: 2024-06-02

## 2024-06-02 RX ORDER — LEVALBUTEROL INHALATION SOLUTION 0.63 MG/3ML
0.63 SOLUTION RESPIRATORY (INHALATION) EVERY 8 HOURS PRN
Status: DISCONTINUED | OUTPATIENT
Start: 2024-06-02 | End: 2024-06-08

## 2024-06-02 RX ORDER — METOPROLOL TARTRATE 1 MG/ML
5 INJECTION, SOLUTION INTRAVENOUS EVERY 6 HOURS PRN
Status: DISCONTINUED | OUTPATIENT
Start: 2024-06-02 | End: 2024-06-08 | Stop reason: HOSPADM

## 2024-06-02 RX ORDER — ENOXAPARIN SODIUM 100 MG/ML
1 INJECTION SUBCUTANEOUS EVERY 12 HOURS
Status: DISCONTINUED | OUTPATIENT
Start: 2024-06-02 | End: 2024-06-08 | Stop reason: HOSPADM

## 2024-06-02 RX ORDER — METOPROLOL TARTRATE 1 MG/ML
2.5 INJECTION, SOLUTION INTRAVENOUS EVERY 6 HOURS PRN
Status: DISCONTINUED | OUTPATIENT
Start: 2024-06-02 | End: 2024-06-08 | Stop reason: HOSPADM

## 2024-06-02 RX ORDER — SODIUM CHLORIDE 9 MG/ML
INJECTION, SOLUTION INTRAVENOUS CONTINUOUS
Status: DISCONTINUED | OUTPATIENT
Start: 2024-06-02 | End: 2024-06-08 | Stop reason: HOSPADM

## 2024-06-02 RX ADMIN — LABETALOL HYDROCHLORIDE 20 MG: 5 INJECTION INTRAVENOUS at 09:06

## 2024-06-02 RX ADMIN — VANCOMYCIN HYDROCHLORIDE 1000 MG: 1 INJECTION, POWDER, LYOPHILIZED, FOR SOLUTION INTRAVENOUS at 11:06

## 2024-06-02 RX ADMIN — SODIUM CHLORIDE: 9 INJECTION, SOLUTION INTRAVENOUS at 04:06

## 2024-06-02 RX ADMIN — METOPROLOL TARTRATE 2.5 MG: 1 INJECTION, SOLUTION INTRAVENOUS at 10:06

## 2024-06-02 RX ADMIN — HYDRALAZINE HYDROCHLORIDE 25 MG: 25 TABLET, FILM COATED ORAL at 04:06

## 2024-06-02 RX ADMIN — MUPIROCIN: 20 OINTMENT TOPICAL at 09:06

## 2024-06-02 RX ADMIN — PIPERACILLIN SODIUM AND TAZOBACTAM SODIUM 4.5 G: 4; .5 INJECTION, POWDER, LYOPHILIZED, FOR SOLUTION INTRAVENOUS at 03:06

## 2024-06-02 RX ADMIN — HYDRALAZINE HYDROCHLORIDE 25 MG: 25 TABLET, FILM COATED ORAL at 05:06

## 2024-06-02 RX ADMIN — MAGNESIUM SULFATE 1 G: 1 INJECTION INTRAVENOUS at 04:06

## 2024-06-02 RX ADMIN — DILTIAZEM HYDROCHLORIDE 10 MG: 5 INJECTION INTRAVENOUS at 11:06

## 2024-06-02 RX ADMIN — HYDRALAZINE HYDROCHLORIDE 25 MG: 25 TABLET, FILM COATED ORAL at 09:06

## 2024-06-02 RX ADMIN — METOPROLOL TARTRATE 2.5 MG: 1 INJECTION, SOLUTION INTRAVENOUS at 11:06

## 2024-06-02 RX ADMIN — METOPROLOL TARTRATE 25 MG: 25 TABLET, FILM COATED ORAL at 09:06

## 2024-06-02 RX ADMIN — IPRATROPIUM BROMIDE AND ALBUTEROL SULFATE 3 ML: .5; 3 SOLUTION RESPIRATORY (INHALATION) at 11:06

## 2024-06-02 RX ADMIN — Medication 5 MG/HR: at 12:06

## 2024-06-02 RX ADMIN — METOPROLOL TARTRATE 5 MG: 1 INJECTION, SOLUTION INTRAVENOUS at 04:06

## 2024-06-02 RX ADMIN — ENOXAPARIN SODIUM 50 MG: 60 INJECTION, SOLUTION SUBCUTANEOUS at 09:06

## 2024-06-02 RX ADMIN — MAGNESIUM SULFATE HEPTAHYDRATE 2 G: 40 INJECTION, SOLUTION INTRAVENOUS at 11:06

## 2024-06-02 RX ADMIN — DOXYCYCLINE HYCLATE 100 MG: 100 TABLET, COATED ORAL at 06:06

## 2024-06-02 RX ADMIN — DIAZEPAM 5 MG: 10 INJECTION, SOLUTION INTRAMUSCULAR; INTRAVENOUS at 10:06

## 2024-06-02 RX ADMIN — LEVALBUTEROL HYDROCHLORIDE 0.63 MG: 0.63 SOLUTION RESPIRATORY (INHALATION) at 10:06

## 2024-06-02 RX ADMIN — DOXYCYCLINE HYCLATE 100 MG: 100 TABLET, COATED ORAL at 05:06

## 2024-06-02 NOTE — PLAN OF CARE
Problem: Dysrhythmia  Goal: Normalized Cardiac Rhythm  Outcome: Progressing   - Stepped up from Telemetry to ICU for HR control   - Diltiazem drip at 15 mg/hr   - Lopressor IVP 2.5 vs 5 mg PRN depending on BP and patient's willingness to take oral Lopressor doses    - HR goal < 120    Problem: Comorbidity Management  Goal: Maintenance of COPD Symptom Control  Outcome: Progressing   - 2L O2 per NC   - ABG obtained upon arrival to ICU     Problem: Confusion Chronic  Goal: Optimal Cognitive Function  Outcome: Progressing   - Oriented to self only   - Does not follow commands    Problem: COPD (Chronic Obstructive Pulmonary Disease)  Goal: Improved Oral Intake  Outcome: Progressing   - Decreased appetite    Problem: Infection  Goal: Absence of Infection Signs and Symptoms  Outcome: Progressing   - Receiving antibiotics as ordered    Problem: Restraint, Nonviolent  Goal: Absence of Harm or Injury  Outcome: Progressing   - Bilateral wrist restraints maintained    Problem: Adult Inpatient Plan of Care  Goal: Plan of Care Review  Outcome: Progressing   - Plan of care reviewed with patient's daughter, Alexandria, at time of arrival to ICU   - Patient's granddaughter, Caro, updated over telephone at ~1700   - Magnesium 1 gm given as ordered

## 2024-06-02 NOTE — NURSING
RAPID RESPONSE NURSE PROACTIVE ROUNDING NOTE       Time of Visit: 1020    Admit Date: 2024  LOS: 2  Code Status: DNR   Date of Visit: 2024  : 1955  Age: 69 y.o.  Sex: female  Race: White  Bed: K476/K476 A:   MRN: [id number]  Was the patient discharged from an ICU this admission? Yes   Was the patient discharged from a PACU within last 24 hours? No   Did the patient receive conscious sedation/general anesthesia in last 24 hours? No   Was the patient in the ED within the past 24 hours? No   Was the patient on NIPPV within the past 24 hours? No   Attending Physician: Mariella Hobbs  Primary Service: Hospitalist,Hospice and Palliative Medicine   Time spent at the bedside: 30 - 45 min    SITUATION    Notified by bedside RN via phone call  Reason for alert: Increased HR    Diagnosis: Acute hypercapnic respiratory failure   has a past medical history of Abdominal pain, Acid reflux, ACP (advance care planning), Anxiety disorder, unspecified, At high risk for falls, Emphysema, unspecified, History of opioid abuse, Hypertension, On home oxygen therapy, Wears partial dentures, and Weight loss.    Last Vitals:  Temp: 101.2 °F (38.4 °C) (1008)  Pulse: 154 (8)  Resp: 20 (1008)  BP: 137/97 (8)  SpO2: 96 % (1008)    24 Hour Vitals Range:  Temp:  [98.2 °F (36.8 °C)-101.2 °F (38.4 °C)]   Pulse:  []   Resp:  [16-59]   BP: (137-207)/()   SpO2:  [85 %-100 %]     Clinical Issues: Circulatory    ASSESSMENT/INTERVENTIONS    Pt drowsy but withdraws from pain. . VS: BP - 197/97, -160s, temp 101.2. Stat CXR and EKG. Metoprolol 2.5mg x2 Ivp and valium 1mg IVP ordered and given w/ no relief. Diltiazem 10mg IVP given w/ some affect. Non-titrating Diltiazem gtt to be ordered. Tylenol ordered for fever.    RECOMMENDATIONS    Monitor VSS.  Discussed plan of care with bedside RNMary    PROVIDER ESCALATION    Physician escalation: Yes    Orders received and case  discussed with Dr. Fraser .    Disposition:Remain in room 476    FOLLOW UP    Call back the Rapid Response NurseEstevan at [phone] for additional questions or concerns.

## 2024-06-02 NOTE — PROGRESS NOTES
Pharmacokinetic Initial Assessment: IV Vancomycin    Assessment/Plan:    Initiate intravenous vancomycin with loading dose of 1000 mg once followed by a maintenance dose of vancomycin 1000 mg IV every 24 hours  Desired empiric serum trough concentration is 15 to 20 mcg/mL  Draw vancomycin trough level 60 min prior to third dose on 6-4-24 at approximately 1100  Pharmacy will continue to follow and monitor vancomycin.      Please contact pharmacy at extension [phone] with any questions regarding this assessment.     Thank you for the consult,   Jose Elias Elena       Patient brief summary:  June Boykin is a 69 y.o. female initiated on antimicrobial therapy with IV Vancomycin for treatment of suspected lower respiratory infection/Pneumonia    Drug Allergies:   Review of patient's allergies indicates:  No Known Allergies    Actual Body Weight:   45 kg    Renal Function:   Estimated Creatinine Clearance: 61.1 mL/min (based on SCr of 0.6 mg/dL).,     Dialysis Method (if applicable):  N/A    CBC (last 72 hours):  Recent Labs   Lab Result Units 05/30/24 2046 05/31/24 0832 06/01/24  0705 06/02/24  0626   WBC K/uL 9.33 7.74 15.77* 9.30   Hemoglobin g/dL 12.9 11.7* 12.3 13.2   Hematocrit % 44.0 39.9 43.0 43.3   Platelets K/uL 182 122* 199 173   Gran % % 73.9* 100.0* 70.3 72.1   Lymph % % 12.6* 0.0* 17.0* 15.1*   Mono % % 9.6 0.0* 12.0 12.3   Eosinophil % % 2.3 0.0 0.0 0.0   Basophil % % 0.5 0.0 0.2 0.1   Differential Method  Automated Manual Automated Automated       Metabolic Panel (last 72 hours):  Recent Labs   Lab Result Units 05/30/24 2046 05/30/24 2053 05/31/24  0832 05/31/24  0842 06/01/24  0705 06/02/24  0626   Sodium mmol/L 135*  --  133*  --  134* 130*   Potassium mmol/L 3.8  --  3.8  --  4.0 3.5   Chloride mmol/L 99  --  98  --  92* 90*   CO2 mmol/L 33*  --  21*  --  31* 29   Glucose mg/dL 129*  --  103  --  120* 112*   Glucose, UA   --  Negative  --  Negative  --   --    BUN mg/dL 5*  --  7*  --  19 20  "  Creatinine mg/dL 0.6  --  0.5  --  0.7 0.6   Creatinine, Urine mg/dL  --  92.5  --   --   --   --    Albumin g/dL 3.3*  --  2.4*  --  3.2* 2.9*   Total Bilirubin mg/dL 0.8  --  0.8  --  0.7 1.0   Alkaline Phosphatase U/L 48*  --  32*  --  41* 39*   AST U/L 30  --  21  --  33 36   ALT U/L 37  --  19  --  24 19   Magnesium mg/dL  --   --   --   --  1.6 1.5*   Phosphorus mg/dL  --   --   --   --  3.0 2.1*       Drug levels (last 3 results):  No results for input(s): "VANCOMYCINRA", "VANCORANDOM", "VANCOMYCINPE", "VANCOPEAK", "VANCOMYCINTR", "VANCOTROUGH" in the last 72 hours.    Microbiologic Results:  Microbiology Results (last 7 days)       Procedure Component Value Units Date/Time    Blood culture [[id number]]     Order Status: Sent Specimen: Blood             "

## 2024-06-02 NOTE — PLAN OF CARE
06/02/24 1045   Nurse Notification   Charge Nurse Notified? Yes   Name of Charge Nurse Sarah   Bedside Nurse Notified? Yes   Name of Bedside Nurse Garima   Nurse Notfication Method Secure Chat   Nurse Notified Of Other  (VS, MEWS 5, HR and rythm)   Provider Notification   Provider Notified? Yes   Name of Provider Dr olivarez   Provider Notification Method Secure Chat

## 2024-06-02 NOTE — ASSESSMENT & PLAN NOTE
Patient with Paroxysmal (<7 days) atrial fibrillation which is uncontrolled currently with Beta Blocker and Calcium Channel Blocker. Patient is currently in atrial fibrillation.[id number] Score: 2. HASBLED Score: . Anticoagulation indicated. Anticoagulation done with lovenox .  Consult cardiology -therapeutic Lovenox, continue Cardizem gtt  Continue Metoprolol bid - uptitrate as needed

## 2024-06-02 NOTE — SUBJECTIVE & OBJECTIVE
"Interval History: awake, confused, sitting up in bed, does not follow command,   Reported tachycardia- EKG with afib. Did not respond to BB and a molina of Cardizem , started on Cardizem drip and transferred to ICU    Left a voice message    CXR, blood bx, Vanc/zosyn, therapeutic OAC     Review of Systems   Unable to perform ROS: Mental status change     Objective:     Vital Signs (Most Recent):  Temp: (!) 101.2 °F (38.4 °C) (06/02/24 1008)  Pulse: (!) 154 (06/02/24 1008)  Resp: 20 (06/02/24 1008)  BP: (!) 137/97 (06/02/24 1008)  SpO2: 96 % (06/02/24 1008) Vital Signs (24h Range):  Temp:  [98.2 °F (36.8 °C)-101.2 °F (38.4 °C)] 101.2 °F (38.4 °C)  Pulse:  [] 154  Resp:  [16-59] 20  SpO2:  [85 %-100 %] 96 %  BP: (137-207)/() 137/97     Weight: 45 kg (99 lb 3.3 oz)  Body mass index is 20.73 kg/m².    Intake/Output Summary (Last 24 hours) at 6/2/2024 1023  Last data filed at 6/2/2024 0553  Gross per 24 hour   Intake 510 ml   Output 626 ml   Net -116 ml         Physical Exam  Constitutional:       Appearance: She is ill-appearing.      Comments:  Frail, thin   HENT:      Head: Normocephalic.   Cardiovascular:      Rate and Rhythm: Normal rate.   Pulmonary:      Breath sounds: Rales present. No wheezing.   Abdominal:      General: Bowel sounds are normal. There is no distension.      Palpations: Abdomen is soft.   Musculoskeletal:      Cervical back: Normal range of motion.      Right lower leg: No edema.      Left lower leg: No edema.   Neurological:      Comments: confuse           Significant Labs: A1C: No results for input(s): "HGBA1C" in the last 4320 hours.  ABGs:   No results for input(s): "PH", "PCO2", "HCO3", "POCSATURATED", "BE", "TOTALHB", "COHB", "METHB", "O2HB", "POCFIO2", "PO2" in the last 48 hours.    Blood Culture:   No results for input(s): "LABBLOO" in the last 48 hours.    CBC:   Recent Labs   Lab 06/01/24  0705 06/02/24  0626   WBC 15.77* 9.30   HGB 12.3 13.2   HCT 43.0 43.3    173 " "    CMP:   Recent Labs   Lab 06/01/24  0705 06/02/24  0626   * 130*   K 4.0 3.5   CL 92* 90*   CO2 31* 29   * 112*   BUN 19 20   CREATININE 0.7 0.6   CALCIUM 9.5 9.1   PROT 7.9 7.3   ALBUMIN 3.2* 2.9*   BILITOT 0.7 1.0   ALKPHOS 41* 39*   AST 33 36   ALT 24 19   ANIONGAP 11 11     Lipase:   No results for input(s): "LIPASE" in the last 48 hours.    Lipid Panel: No results for input(s): "CHOL", "HDL", "LDLCALC", "TRIG", "CHOLHDL" in the last 48 hours.  Magnesium:   Recent Labs   Lab 06/01/24  0705 06/02/24  0626   MG 1.6 1.5*     Troponin:   No results for input(s): "TROPONINI", "TROPONINIHS" in the last 48 hours.    TSH: No results for input(s): "TSH" in the last 4320 hours.  Urine Culture: No results for input(s): "LABURIN" in the last 48 hours.  Urine Studies:   No results for input(s): "COLORU", "APPEARANCEUA", "PHUR", "SPECGRAV", "PROTEINUA", "GLUCUA", "KETONESU", "BILIRUBINUA", "OCCULTUA", "NITRITE", "UROBILINOGEN", "LEUKOCYTESUR", "RBCUA", "WBCUA", "BACTERIA", "SQUAMEPITHEL", "HYALINECASTS" in the last 48 hours.    Invalid input(s): "WRIGHTSUR"      Significant Imaging: I have reviewed all pertinent imaging results/findings within the past 24 hours.  "

## 2024-06-02 NOTE — NURSING
RAPID RESPONSE NURSE PROACTIVE ROUNDING NOTE       Time of Visit: 1315    Admit Date: 2024  LOS: 2  Code Status: DNR   Date of Visit: 2024  : 1955  Age: 69 y.o.  Sex: female  Race: White  Bed: K566/K566 A:   MRN: [id number]  Was the patient discharged from an ICU this admission? Yes   Was the patient discharged from a PACU within last 24 hours? No   Did the patient receive conscious sedation/general anesthesia in last 24 hours? No   Was the patient in the ED within the past 24 hours? No   Was the patient on NIPPV within the past 24 hours? No   Attending Physician: Mariella Hobbs*  Primary Service: Hospitalist,Hospice and Palliative Medicine   Time spent at the bedside: 15 -30 min    SITUATION      Diagnosis: Acute hypercapnic respiratory failure   has a past medical history of Abdominal pain, Acid reflux, ACP (advance care planning), Anxiety disorder, unspecified, At high risk for falls, Emphysema, unspecified, History of opioid abuse, Hypertension, On home oxygen therapy, Wears partial dentures, and Weight loss.    Last Vitals:  Temp: 99.4 °F (37.4 °C) ( 1515)  Pulse: 120 ( 1540)  Resp: 69 ( 1540)  BP: 162/106 ( 1632)  SpO2: 93 % ( 1540)    24 Hour Vitals Range:  Temp:  [98.2 °F (36.8 °C)-101.2 °F (38.4 °C)]   Pulse:  []   Resp:  [16-69]   BP: (137-207)/()   SpO2:  [92 %-98 %]     Clinical Issues: Circulatory    ASSESSMENT/INTERVENTIONS    Pt HR remains in 160-170s despite Diltiazem infusion. Dr. Fraser and Dr. Agustin made aware of situation. Pt to be tx to Icu 566. Family present at bedside during transfer.     RECOMMENDATIONS    Consult cardiology. Monitor HR  Discussed plan of care with bedside RNMary and Anabelle RN    PROVIDER ESCALATION    Physician escalation: Yes    Orders received and case discussed with Dr. Fraser and Dr. Agustin .    Disposition:Tx in ICU bed 566    FOLLOW UP    Call back the Rapid Response NurseEstevan at [phone] for  additional questions or concerns.

## 2024-06-02 NOTE — PATIENT INSTRUCTIONS
Thank you for allowing me to participate as part of your health care team, and thank you for choosing [facility].    DESIREE CARDOZA MD  Board Certified in Psychiatry & Addiction Medicine      IN CASE OF SUICIDAL THINKING, call the National Suicide Hotline Number: 988    988 Suicide & Crisis Lifeline: 988 , 3-697-795-TALK (8255)  https://eeGeo.Planitax           AFTER VISIT INSTRUCTIONS:     [x] Take all medication, from all providers, as prescribed.  [x] If questions or concerns arise, or if experiencing side effects, adverse reactions or worsening symptoms, contact your provider through the [facility] portal at [email or web address], or call [phone] to reach the [facility] main line.  [x] In cases of emergencies, call 101 or 176, or present directly to the emergency department for immediate assistance.      INFORMATION ON MENTAL HEALTH MEDICATIONS:     National [place] of Mental Health:   https://www.nimh.nih.gov/health/topics/mental-health-medications     Web MD:   https://www.[employer]       RESOURCES:     IN CASE OF SUICIDAL THINKING, call the Rep Suicide Hotline Number: 988    988 Suicide & Crisis Lifeline: 988 , 2-709-681-TALK (8255)  Provides 24/7, free and confidential support for people in distress, prevention and crisis resources for you or your loved ones, and best practices for professionals.    Call, text or chat.  https://eeGeo.org     National Action [place] for Suicide Prevention: the National [place] for Suicide Prevention ([place]) is the nations public-private partnership for suicide prevention, working with more than 250 national partners.   https://theactionalliance.org     National Strategy for Suicide Prevention & Risk Mitigation:  https://theactionalliance.org/our-strategy/national-strategy-suicide-prevention     [x] Fact Sheet:   https://www.hhs.gov/sites/default/files/national-strategy-for-suicide-prevention-factsheet.pdf     [x] Report:    https://www.ncbi.nlm.nih.gov/books/POW045662/pdf/Bookshelf_NBK109917.pdf     Suicide Prevention Resource Center: The Suicide Prevention Resource Center (SPR) is the only federally supported resource center devoted to advancing the implementation of the National Strategy for Suicide Prevention. [facility] is funded by the U.S. Department of Health and Human Services' Substance Abuse and Mental Health Services Administration (SAMA).  https://www.[facility].org     [x] Safety Plan:   https://CrayonPixel/wp-content/uploads/2021/08/Lenny-Safety-Plan-8-6-21.pdf     [x] Suicide Risk Curve:  https://CrayonPixel/wp-content/uploads/2021/08/Zwwxblf-lqzy-jburh-8-6-21.pdf     [place] Mental Health Advocacy Service: the state agency tasked with protecting the legal rights of people with behavioral health diagnoses.  https://mhas.[place].[facility]     Alcoholics Anonymous (AA): find a meeting near you.  https://www.aa.org     SMI Adviser: resources for individuals and families with serious mental illness.  https://smiadviser.org     National [place] for the Mentally Ill (AGUSTIN): the nation's largest grassroots organization dedicated to building better lives for individuals with mental illness.  https://www.agustin.org/Home     U.S. Department of Health and Human Services (HHS): the mission of HHS is to enhance the health and well-being of all Americans, by providing for effective health and human services and by fostering sound, sustained advances in the sciences underlying medicine, public health, and .   https://www.hhs.gov     Substance Abuse and Mental Health Services Administration (SAMHSA): SAMHSA is the agency within [facility] that leads public health efforts to advance the behavioral health of the nation. SAMHSA's mission is to reduce the impact of substance abuse and mental illness on [place] communities.   https://www.samhsa.gov     National Institutes of Health (NIH): a part of [facility], [facility] is  the largest biomedical research agency in the world.   https://www.nih.gov     National [place] on Drug Abuse (NAYELI): sponsored by the NIH, the mission of NAYELI is to advance science on drug use and addiction and to apply that knowledge to improve individual and public health.  https://nayeli.nih.gov     National [place] on Alcohol Abuse and Alcoholism (NIAAA): sponsored by the NIH, the mission of NIAA is to generate and disseminate fundamental knowledge about the effects of alcohol on health and well-being, and apply that knowledge to improve diagnosis, prevention, and treatment of alcohol-related problems, including alcohol use disorder, across the lifespan.   https://www.niaaa.nih.gov     National Harm Reduction Coalition: resources for harm reduction, including techniques, strategies, policy, and advocacy.  https://harmreduction.org     The SHARE Approach - A Model for Shared Decision Making:  [x] Fact Sheet  https://www.[facility].gov/sites/default/files/publications/files/share-approach_factsheet.pdf     AMA Principles of Medical Ethics - Informed Consent & Shared Decision Making:  [x] Chapter  https://www.ama-assn.org/system/files/2019-06/code-of-medical-gskqzh-tinpyme-4.pdf     Safety Netting for Primary Care:  [x] Article  https://www.ncbi.nlm.nih.gov/pmc/articles/FDD1596326/pdf/kpclfek-9975--e70.pdf       MEDICATION MANAGEMENT:     [x] In addition to the potential beneficial effects, the use of any medication or drug (prescribed, over the counter or otherwise) carries with it the risk of potential adverse effects.  Each has a set of typical adverse effects - some common, some rare - but idiosyncratic and unanticipated reactions unique to you are always possible.      [x] It is important to remember that untreated illness can also pose a risk, which must be taken into account when weighing the pros and cons of a medication trial.    [x] Medications and drugs can sometimes interact with each other in the  body, leading to adverse effects - it is important that all your providers know all the medications and drugs you take - prescribed, over the counter, or otherwise.  Keep all your practitioners up to date with any changes.  It's always a good idea to keep an up-to-date list in an easily accessible location.    [x] There is an inherent unpredictability to all treatment, including the use of medication.  Unexpected outcomes can occur - keep me up to date with any difficulties you encounter.    [x] It is important to take medication as directed, and to comply fully with the instructions.  Check with the appropriate provider first before adjusting or stopping your medication on your own.    If you require further information pertaining to the issues outlined above, please reach out to your providers through the [facility] portal at [email or web address], or call [phone] to discuss.  See resource list for additional material.     Additional information can be provided pertaining to your diagnosis, intended outcomes, target symptoms for treatment, and possible benefits and risks of medication - you can also access this information through the provided resources.  Possible alternatives to the current treatment plan (including no treatment) can also be reviewed.      GENERAL HEALTH & WELLNESS:     [x] Establish and follow regularly with a primary care physician for routine health maintenance and management of any medical comorbidities.  [x] Follow a healthy diet, exercise routinely, and monitor weight and metabolic parameters.  [x] Allow adequate time for sleep and practice good sleep hygiene.  [x] Do not operate a motor vehicle or heavy machinery if the effects of medications or the symptoms underlying your condition impair the ability for you to do so safely.    Dietary Guidelines for Americans, 1489-5147:  U.S. Department of Agriculture  (USDA)  https://www.dietaryguidelines.gov/sites/default/files/2020-12/Dietary_Guidelines_for_Americans_2020-2025.pdf#page=31     The Nutrition Source:  [facility]  https://www.[facility].[place].[facility]/nutritionsource       SLEEP HYGIENE:     Follow these tips to establish healthy sleep habits:  [x] Keep a consistent sleep schedule. Get up at the same time every day, even on weekends or during vacations.  [x] Set a bedtime that is early enough for you to get at least 7-8 hours of sleep.  [x] Don't go to bed unless you are sleepy.  [x] If you don't fall asleep after 20 minutes, get out of bed. Go do a quiet activity without a lot of light exposure. It is especially important to not get on electronics.  [x] Establish a relaxing bedtime routine.  [x] Use your bed only for sleep and sex.  [x] Make your bedroom quiet and relaxing. Keep the room at a comfortable, cool temperature.  [x] Limit exposure to bright light in the evenings.  [x] Turn off electronic devices at least 30 minutes before bedtime.  [x] Don't eat a large meal before bedtime. If you are hungry at night, eat a light, healthy snack.  [x] Exercise regularly and maintain a healthy diet.  [x] Avoid consuming caffeine in the afternoon or evening.  [x] Avoid consuming alcohol before bedtime.  [x] Reduce your fluid intake before bedtime.    QUICK TIPS FOR BETTER SLEEP  Reduce smartphone usage Create and maintain a nightly ritual Avoid caffeine 4-6 hours before sleeping Don't eat or drink too much at bedtime Sleep at the same time every night        American Academy of Sleep Medicine - Healthy Sleep Habits:  https://sleepeducation.org/healthy-sleep/healthy-sleep-habits     American Academy of Sleep Medicine - Bedtime Calculator:  https://sleepeducation.org/healthy-sleep/bedtime-calculator     American Academy of Sleep Medicine - Cognitive Behavioral Therapy for Insomnia (CBT-I):  https://sleepeducation.org/patients/cognitive-behavioral-therapy      American Academy of Sleep Medicine - Insomnia:  https://sleepeducation.org/sleep-disorders/insomnia       ALCOHOL & DRUG USE COUNSELING:     Preventing Excessive Alcohol Use (CDC):  https://www.cdc.gov/alcohol/fact-sheets/moderate-drinking.htm#:~:text=To%20reduce%20the%20risk%20of,days%20when%20alcohol%20is%20consumed.     [x] Alcohol consumption is associated with a variety of short- and long-term health risks, including motor vehicle crashes, violence, sexual risk behaviors, high blood pressure, and various cancers (e.g., breast cancer).  [x] The risk of these harms increases with the amount of alcohol you drink. For some conditions, like some cancers, the risk increases even at very low levels of alcohol consumption (less than 1 drink).  [x] To reduce the risk of alcohol-related harms, the 3145-1526 Dietary Guidelines for Americans recommends that adults of legal drinking age can choose not to drink, or to drink in moderation by limiting intake to 2 drinks or less in a day for men or 1 drink or less in a day for women, on days when alcohol is consumed.  [x] The Guidelines also do not recommend that individuals who do not drink alcohol start drinking for any reason and that if adults of legal drinking age choose to drink alcoholic beverages, drinking less is better for health than drinking more.  [x] The Guidelines note that some people should not drink alcohol at all, such as:  - If they are pregnant or might be pregnant.  - If they are younger than age 21.  - If they have certain medical conditions or are taking certain medications that can interact with alcohol.  - If they are recovering from an alcohol use disorder or if they are unable to control the amount they drink.  [x] The Guidelines also note that not drinking alcohol is the safest option for women who are lactating. Generally, moderate consumption of alcoholic beverages by a woman who is lactating (up to 1 standard drink in a day) is not known to  "be harmful to the infant, especially if the woman waits at least 2 hours after a single drink before nursing or expressing breast milk. Women considering consuming alcohol during lactation should talk to their healthcare provider.  [x] The Guidelines note, Emerging evidence suggests that even drinking within the recommended limits may increase the overall risk of death from various causes, such as from several types of cancer and some forms of cardiovascular disease. Alcohol has been found to increase risk for cancer, and for some types of cancer, the risk increases even at low levels of alcohol consumption (less than 1 drink in a day).  [x] Although past studies have indicated that moderate alcohol consumption has protective health benefits (e.g., reducing risk of heart disease), recent studies show this may not be true.  [x] Its important to focus on the amount people drink on the days that they drink. Even if women consume an average of 1 drink per day or men consume an average of 2 drinks per day, binge drinking increases the risk of experiencing alcohol-related harm in the short-term and in the future.    Drinking Levels Defined (NIAAA):  https://www.niaaa.nih.gov/alcohol-health/overview-alcohol-consumption/moderate-binge-drinking     Drinking in Moderation:  According to the "Dietary Guidelines for Americans 5924-3360, U.S. Department of Health and Human Services and U.S. Department of Agriculture, adults of legal drinking age can choose not to drink or to drink in moderation by limiting intake to 2 drinks or less in a day for men and 1 drink or less in a day for women, when alcohol is consumed. Drinking less is better for health than drinking more.    Binge Drinking:  NIAAA defines binge drinking as a pattern of drinking alcohol that brings blood alcohol concentration (ALLI) to 0.08 percent - or 0.08 grams of alcohol per deciliter - or higher.  For a typical adult, this pattern corresponds to consuming 5 " or more drinks (male), or 4 or more drinks (female), in about 2 hours.    The Substance Abuse and Mental Health Services Administration (SAMHSA), which conducts the annual National Survey on Drug Use and Health (NSDUH), defines binge drinking as 5 or more alcoholic drinks for males or 4 or more alcoholic drinks for females on the same occasion (i.e., at the same time or within a couple of hours of each other) on at least 1 day in the past month.    Heavy Alcohol Use:  NIAAA defines heavy drinking as follows:  - For men, consuming more than 4 drinks on any day or more than 14 drinks per week.  - For women, consuming more than 3 drinks on any day or more than 7 drinks per week.     [facility] defines heavy alcohol use as binge drinking on 5 or more days in the past month.    Patterns of Drinking Associated with Alcohol Use Disorder:  Binge drinking and heavy alcohol use can increase an individual's risk of alcohol use disorder.    Certain people should avoid alcohol completely, including those who:  - Plan to drive or operate machinery, or participate in activities that require skill, coordination, and alertness.  - Take certain over-the-counter or prescription medications.  - Have certain medical conditions.  - Are recovering from alcohol use disorder or are unable to control the amount that they drink.  - Are younger than age 21.  - Are pregnant or may become pregnant.    U.S. Standard Drink  12 oz beer   (5% ABV) 8 oz malt liquor   (7% ABV) 5 oz wine   (12% ABV) 1.5 oz 80-proof distilled spirit  (40% ABV)        Heroin use harm reduction:  1. Carry naloxone. When using heroin, make sure you have at least one dose of naloxone - the overdose reversal drug - and have it in plain view. Understand how to give it.  2. Try a small dose first. It is best to first try a small amount of the heroin to check the effect.  3. Dont use heroin alone. Always use heroin with someone else and take turns while using.    It is possible to  overdose with heroin whether you are snorting, injecting or using it in another form.    Signs of an overdose or emergency:   - The person is awake but unable to talk.  - Their body is limp.  - Their breathing is shallow or slow or stopped.  - Their skin is pale, ashen or clammy/sweaty.  - They are unconscious.    In case of emergency, give naloxone. If you suspect the heroin may contain fentanyl, administer more than one dose. Seek medical help even if naloxone has been given. Call 911 for help.      ADHD TREATMENT AND STIMULANT MEDICATIONS:     [facility] Prescription Stimulants Drug Facts  CMS Stimulant and Related Medications: Use in Adults  ISABELLA Drug Fact Sheets: Stimulants  FDA Drug Safety Communication: Stimulants  [facility] ADHD  WebMD ADHD Medications and Side Effects  [facility]: ADHD Medication      SHARED DECISION MAKING & INFORMED CONSENT:     Shared medical decision making and informed consent are the hallmark and bedrock of excellent clinical care.  During the encounter, shared medical decision making was employed and informed consent was obtained, to the degree possible, whenever feasible, appropriate and relevant. Those interventions are supplemented here with written materials, detailing the topics in more depth.       PSYCHOEDUCATION:     Psychoeducation pertaining to the following -     Diagnosis Etiology Disease Processes Natural Progression   Treatment Options Time Course Safety Netting Informed Consent   Intended Benefits of Medication Expectable Adverse Effects Target Symptoms for Treatment Alternatives to Current Treatment   Shared   Decision Making Risk Mitigation Strategies Harm Reduction Techniques Associated Bio-Med Complications     - can be further discussed and reviewed (you can also access additional information through the provided resources in this document).      Effective communication is essential in order to engage in shared medical decision making.  If you had difficulty understanding  anything during your encounter or in this supplementary document, please contact your providers through the [facility] portal at [email or web address] or call [phone].     [place] Dictionary  https://dictionary.marley.org/us       It can be easy to miss, forget, or misremember important important information that was discussed during the session - especially when you're stressed, upset, or don't feel well.  If you or a representative have any additional questions, concerns, or topics to discuss - please contact your providers through the [facility] portal at [email or web address] or call [phone].    Memory Loss  https://www.[employer].OptTown/brain/memory-loss    Causes of Memory Loss  https://www.Interactivo/what-causes-memory-loss-7519001    Memory loss: When to seek help  https://www.[facility].org/diseases-conditions/alzheimers-disease/in-depth/memory-loss/art-24563781    Memory, Forgetfulness, and Aging: What's Normal and What's Not?  https://www.yonas.nih.gov/health/memory-forgetfulness-and-aging-whats-normal-and-whats-not    Depression and Memory Loss  https://www.Encap/health/depression/depression-and-memory-loss    The Relationship Between Anxiety and Memory Loss  https://www.[facility]/academics/blog-posts/the-relationship-between-anxiety-and-memory-loss     PRESCRIPTION DRUG MANAGEMENT:     Prescription Drug Management entails the following:  [x] The review, recommendation, or consideration without recommendation of medications during the encounter.  [x] Discussion (to the extent possible) with the patient and/or other interested parties of the diagnosis, target symptoms, intended outcomes, and possible benefits and risks of medication, as well as alternatives (including no treatment), if not otherwise known or stated prior.  [x] Discussion (to the extent possible) with the patient and/or other interested parties of possible expectable adverse effects of any proposed individual  psychotropic agents, as well as the inherent unpredictability of treatment, if not otherwise known or stated prior.  [x] Informed consent is sought from the patient (and/or guardian/designated decision maker, if applicable) after a thorough discussion (to the extent possible) of the aforementioned points outlined above.  [x] The provision of counseling (to the extent possible) to the patient and/or other interested parties on the importance of full compliance with any prescribed medication, if not otherwise known or stated prior.    Information on psychotropic medication can be found at:   National [place] of Mental Health: Information on Mental Health Medications      RISK MITIGATION, HARM REDUCTION & SAFETY NETTING:     Risk Mitigation Strategies, Harm Reduction Techniques, and Safety Netting are important interventions that can reduce acute and chronic risk.  As such, opportunities were sought to incorporate psychoeducation and practical advice pertaining to these topics into the encounter, to the degree possible, whenever feasible, appropriate and relevant.  Those interventions are supplemented here with written materials, detailing the topics in more depth.       RISK MITIGATION STRATEGIES:     Risk mitigation strategies are used to reduce the likelihood of future episodes of suicide, homicide, violence, and/or other problematic behaviors (e.g. self-injurious, risky, addictive, compulsive, impulsive). The following are examples of risk mitigation strategies which you can employ in order to reduce your overall burden of risk.     [x] Treatment of underlying psychopathology driving acute and chronic risk to the extent possible.  [x] Use of self administered rating scales and journaling to assist in risk tracking.  [x] Exploration of protective factors to potentially counterbalance risk.  [x] Identification and avoidance of triggers and situations that increase risk, including excessive alcohol and drug  use.  [x] Timely follow up and ongoing treatment of mental health issues moving forward.  [x] Full compliance with medication regimen.  [x] A good working knowledge of your medication regimen, including specific instructions on the administration of the medications.  [x] Consultation with an appropriate medical provider prior to altering or deviating from these instructions on your own.  [x] Active involvement and participation of family and natural support wherever feasible and possible.  [x] Development and review of coping strategies that can be immediately deployed in times of acute crisis.  [x] Implementation of home safety practices and the removal/reduction of access to lethal means (including, but not limited to, firearms, certain types and quantities of medication, poisons, or other methods you may have contemplated or identified).  [x] Collaborative development of a written safety plan with your treatment team and loved ones that can be immediately referred to in times of acute crisis.  [x] Utilization of a safety contract to engage your treatment team and further assess/manage risk.  [x] A good working knowledge of how to access emergency treatment in times of acute crisis.  [x] Utilization of suicide hotlines number (988) and resources in times of crisis.    If you require further information pertaining to the issues outlined above, please reach out to your providers through the [facility] portal at [email or web address], or call [phone] to discuss.  See resource list for additional material.      SAFETY NETTING:     In healthcare, safety netting refers to the provision of information to help patients or carers identify the need to consult a health care professional if a health concern arises or changes.  The relevance of this advice is most obvious with chronic mental illnesses, as their dynamic nature, with symptoms and signs emerging at different times and in different combinations, makes safety  netting particularly important.  Specific safety net advice for you includes the following:    [x] The existence of uncertainty. Mental health diagnoses and conditions contain at least some degree of uncertainty - knowing this, you should feel empowered to reconsult if necessary.  [x] What exactly to look out for. Given the recognised risk of possible deterioration or the development of complications, you should become familiar with the specific clinical features (including red flags) to look out for.    [x] How exactly to seek further help. You should know how and where to seek further help if needed.  Make a plan in advance and keep it handy.  It's also a good idea to share the plan with your treatment providers and loved ones.  [x] What to expect about time course. Mental health diagnoses and conditions often have an expected time course, which is important information for you to know.  However, if your difficulties do not conform to this time line and concerns arise, do not delay seeking further medical advice.    If you require further information pertaining to the issues outlined above, please reach out to your providers through the [facility] portal at [email or web address], or call [phone] to discuss.  See resource list for additional material.      HARM REDUCTION:     Harm Reduction techniques are used in an effort to reduce negative consequences associated with risky and maladaptive behaviors, until cessation of the problematic behaviors can be established.  Harm reduction is best thought of as a journey and not a destination; it is not an endorsement of problematic behavior, but an acknowledgement and recognition of the step-by-step nature of recovery.      Although commonly employed in working with people who suffer with drug addiction, harm reduction can be more broadly applied to any problematic behavior.    Harm Reduction and Substance Abuse:  [x] Incorporates a spectrum of strategies that  includes safer use, managed use, abstinence, meeting people who use drugs where theyre at, and addressing conditions of use along with the use itself.  [x] Accepts, for better or worse, that licit and illicit drug use is part of our world and chooses to work to minimize its harmful effects rather than simply ignore or condemn them.  [x] Understands drug use as a complex, multi-faceted phenomenon that encompasses a continuum of behaviors from severe use to total abstinence, and acknowledges that some ways of using drugs are clearly safer than others.  [x] Calls for the non-judgmental, non-coercive provision of services and resources to people who use drugs and the communities in which they live in order to assist them in reducing attendant harm.  [x] Affirms people who use drugs themselves as the primary agents of reducing the harms of their drug use and seeks to empower them to share information and support each other in strategies which meet their actual conditions of use.  [x] Does not attempt to minimize or ignore the real and tragic harm and danger that can be associated with illicit drug use.  [x] Meets people where they are, but seeks to not leave them there.  [x] Examples of specific interventions include, but are not limited to, narcan (naloxone), medication assisted treatment, syringe access, overdose prevention, and safer drug use techniques.    Key Harm Reduction Strategies: Opioid Use Disorder  [x] Safe Injection Sites & Equipment  [x] Managed Use  [x] Syringe Exchange Programs  [x] Fentanyl Test Strips  [x] Pharmacotherapy/Medication Assisted Treatment  [x] Narcan  [x] Good Anabaptist Laws  [x] Treatment Instead of [facility]  [x] Diversion Programs  [x] Overdose Education  [x] Abstinence    Whether or not you struggle with substance abuse, any and all opportunities to employ harm reduction techniques to address difficult to change problematic behaviors should be sought and implemented - whenever and  "wherever feasible, relevant and applicable. Additionally, harm reduction techniques can be applied broadly, and are relevant for a multitude of situations - even those that do not involve problematic or maladaptive behaviors.     EXAMPLES OF HARM REDUCTION IN OTHER AREAS  SUN SCREEN SEAT BELTS SPEED LIMITS BIRTH CONTROL        If you require further information pertaining to the issues outlined above, please reach out to your providers through the [facility] portal at [email or web address], or call [phone] to discuss.  See resource list for additional material.      FIREARM SAFETY:     THE SIX BASIC GUN SAFETY RULES  There are six basic gun safety rules for gun owners to understand and practice at all times:  Treat all guns as if they are loaded. Always assume that a gun is loaded even if you think it is unloaded. Every time a gun is handled for any reason, check to see that it is unloaded. If you are unable to check a gun to see if it is unloaded, leave it alone and seek help from someone more knowledgeable about guns.  Keep the gun pointed in the safest possible direction. Always be aware of where a gun is pointing. A "safe direction" is one where an accidental discharge of the gun will not cause injury or damage. Only point a gun at an object you intend to shoot. Never point a gun toward yourself or another person.  Keep your finger off the trigger until you are ready to shoot. Always keep your finger off the trigger and outside the trigger guard until you are ready to shoot. Even though it may be comfortable to rest your finger on the trigger, it also is unsafe. If you are moving around with your finger on the trigger and stumble or fall, you could inadvertently pull the trigger. Sudden loud noises or movements can result in an accidental discharge because there is a natural tendency to tighten the muscles when startled. The trigger is for firing and the handle is for handling.  Know your target, its " surroundings and beyond. Check that the areas in front of and behind your target are safe before shooting. Be aware that if the bullet misses or completely passes through the target, it could strike a person or object. Identify the target and make sure it is what you intend to shoot. If you are in doubt, DON'T SHOOT! Never fire at a target that is only a movement, color, sound or unidentifiable shape. Be aware of all the people around you before you shoot.  Know how to properly operate your gun. It is important to become thoroughly familiar with your gun. You should know its mechanical characteristics including how to properly load, unload and clear a malfunction from your gun. Obviously, not all guns are mechanically the same. Never assume that what applies to one make or model is exactly applicable to another. You should direct questions regarding the operation of your gun to your firearms dealer, or contact the  directly.  Store your gun safely and securely to prevent unauthorized use. Guns and ammunition should be stored separately. When the gun is not in your hands, you must still think of safety. Use an approved firearms safety device on the gun, such as a trigger lock or cable lock, so it cannot be fired. Store it unloaded in a locked container, such as an approved lock box or a gun safe. Store your gun in a different location than the ammunition. For maximum safety you should use both a locking device and a storage container.    ADDITIONAL SAFETY POINTS  The six basic safety rules are the foundational rules for gun safety. However, there are additional safety points that must not be overlooked.  [x] Never handle a gun when you are in an emotional state such as anger or depression. Your judgment may be impaired. If you have acute or chronic suicidal ideation, a suicide plan, or suicidal intent, have firearms removed and your access restricted by a trusted loved one or other responsible individual  "or agency.  [x] Never shoot a gun in celebration (the Fourth of July or New Year's Gricelda, for example). Not only is this unsafe, but it is generally illegal. A bullet fired into the air will return to the ground with enough speed to cause injury or death.  [x] Do not shoot at water, flat or hard surfaces. The bullet can ricochet and hit someone or something other than the target.  [x] Hand your gun to someone only after you verify that it is unloaded and the cylinder or action is open. Take a gun from someone only after you verify that it is unloaded and the cylinder or action is open.  [x] Guns, alcohol and drugs don't mix. Alcohol and drugs can negatively affect judgment as well as physical coordination. Alcohol and any other substance likely to impair normal mental or physical functions should not be used before or while handling guns. Avoid handling and using your gun when you are taking medications that cause drowsiness or include a warning to not operate machinery while taking this drug.   [x] The loud noise from a fired gun can cause hearing damage, and the debris and hot gas that is often emitted can result in eye injury. Always wear ear and eye protection when shooting a gun.      GUNS AND CHILDREN - FIREARM OWNER RESPONSIBILITIES    You Cannot Be Too Careful with Children and Guns  [x] There is no such thing as being too careful with children and guns. Never assume that simply because a toddler may lack finger strength, they can't pull the trigger. A child's thumb has twice the strength of the other fingers. When a toddler's thumb "pushes" against a trigger, invariably the barrel of the gun is pointing directly at the child's face. NEVER leave a firearm lying around the house.  [x] Child safety precautions still apply even if you have no children or if your children have grown to adulthood and left home. A nephew, niece, neighbor's child or a grandchild may come to visit. Practice gun safety at all " "times.  [x] To prevent injury or death caused by improper storage of guns in a home where children are likely to be present, you should store all guns unloaded, lock them with a firearms safety device and store them in a locked container. Ammunition should be stored in a location separate from the gun.    Talking to Children About Guns  [x] Children are naturally curious about things they don't know about or think are "forbidden." When a child asks questions or begins to act out "gun play," you may want to address his or her curiosity by answering the questions as honestly and openly as possible. This will remove the mystery and reduce the natural curiosity. Also, it is important to remember to talk to children in a manner they can relate to and understand. This is very important, especially when teaching children about the difference between "real" and "make-believe." Let children know that, even though they may look the same, real guns are very different than toy guns. A real gun will hurt or kill someone who is shot.    Instill a Mind Set of Safety and Responsibility  [x] The American Academy of Pediatrics reports that adolescence is a highly vulnerable stage in life for teenagers struggling to develop traits of identity, independence and autonomy. Children, of course, are both naturally curious and innocently unaware of many dangers around them. Thus, adolescents as well as children may not be sufficiently safeguarded by cautionary words, however frequent. Contrary actions can completely undermine good advice. A "Do as I say and not as I do" approach to gun safety is both irresponsible and dangerous.  [x] Remember that actions speak louder than words. Children learn most by observing the adults around them. By practicing safe conduct you will also be teaching safe conduct.    Safety and Storage Devices  [x] If you decide to keep a firearm in your home you must consider the issue of how to store the firearm in a " safe and secure manner. There are a variety of safety and storage devices currently available to the public in a wide range of prices. Some devices are locking mechanisms designed to keep the firearm from being loaded or fired, but don't prevent the firearm from being handled or stolen. There are also locking storage containers that hold the firearm out of sight. For maximum safety you should use both a firearm safety device and a locking storage container to store your unloaded firearm.   Two of the most common locking mechanisms are trigger locks and cable locks. Trigger locks are typically two-piece devices that fit around the trigger and trigger guard to prevent access to the trigger. One side has a post that fits into a hole in the other side. They are locked by a key or combination locking mechanism. Cable locks typically work by looping a strong steel cable through the action of the firearm to block the firearm's operation and prevent accidental firing. However, neither trigger locks nor cable locks are designed to prevent access to the firearm.   [x] Smaller lock boxes and larger gun safes are two of the most common types of locking storage containers. One advantage of lock boxes and gun safes is that they are designed to completely prevent unintended handling and removal of a firearm. Lock boxes are generally constructed of sturdy, high-grade metal opened by either a key or combination lock. Gun safes are quite heavy, usually weighing at least 50 pounds. While gun safes are typically the most expensive firearm storage devices, they are generally more reliable and secure.     Remember: Safety and storage devices are only as secure as the precautions you take to protect the key or combination to the lock.    RULES FOR KIDS  Adults should be aware that a child could discover a gun when a parent or another adult is not present. This could happen in the child's own home; the home of a neighbor, friend or  relative; or in a public place such as a school or park. If this should happen, a child should know the following rules and be taught to practice them.   Stop  The first rule for a child to follow if he/she finds or sees a gun is to stop what he/she is doing.  Don't Touch!  The second rule is for a child not to touch a gun he/she finds or sees. A child may think the best thing to do if he/she finds a gun is to pick it up and take it to an adult. A child needs to know he/she should NEVER touch a gun he/she may find or see.  Leave the Area  The third rule is to immediately leave the area. This would include never taking a gun away from another child or trying to stop someone from using gun.  Tell an Adult  The last rule is for a child to tell an adult about the gun he/she has seen. This includes times when other kids are playing with or shooting a gun.     METHODS OF CHILDPROOFING YOUR FIREARM  As a responsible handgun owner, you must recognize the need and be aware of the methods of childproofing your handgun, whether or not you have children.  Whenever children could be around, whether your own, or a friend's, relative's or neighbor's, additional safety steps should be taken when storing firearms and ammunition in your home.  [x] Always store your firearm unloaded.  [x] Use a firearms safety device AND store the firearm in a locked container.  [x] Store the ammunition separately in a locked container.  Always storing your firearm securely is the best method of childproofing your firearm; however, your choice of a storage place can add another element of safety. Carefully choose the storage place in your home especially if children may be around.  [x] Do not store your firearm where it is visible.  [x] Do not store your firearm in a bedside table, under your mattress or pillow, or on a closet shelf.  [x] Do not store your firearm among your valuables (such as jewelry or cameras) unless it is locked in a secure  container.  [x] Consider storing firearms not possessed for self-defense in a safe and secure manner away from the home.    [facility] Safety:  https://www.everytown.org       Gun Violence: Prediction, Prevention and Policy  American Psychological Association Panel of Experts Report  https://www.apa.org/pubs/reports/gun-violence-report.pdf     If you require further information pertaining to any of the issues outlined above, please reach out to your providers through the [facility] portal at [email or web address], or call [phone] to discuss.  See resource list for additional material.      IN CASE OF SUICIDAL THINKING, call the [employer] Suicide Hotline Number: 988    988 Suicide & Crisis Lifeline: 988 , 1-193-155-TALK (8255)  Provides 24/7, free and confidential support for people in distress, prevention and crisis resources for you or your loved ones, and best practices for professionals.    Call, text or chat.  https://howsimple.[employer]              REFERRAL RECOMMENDATIONS FOR SUBSTANCE ABUSE & MENTAL HEALTH      IN CASE OF SUICIDAL THINKING, call the [employer] Suicide Jaxtrline Number: 988    988 Suicide & Crisis Lifeline: 988 , 9-768-316-TALK (8255)  https://howsimple.[employer]       SUBSTANCE ABUSE:     [facility] (formerly known as the ABU)  [x] [phone], Option 2  [x] [address], [place] [address]  [x] [email or web address]  [x] [facility] delivers comprehensive and collaborative treatment for alcohol and substance use disorders.  Excellent program for working professionals or anyone else seeking recovery.  [x] Requires insurance approval prior to starting program, call number above for more information.  [x] Intensive Outpatient Rehabilitation Program - M-F 9am-3pm - daily groups with psychologists and social workers, sessions with MDs 3x per week   [x] Ambulatory detox and dual diagnosis  available      SUBOXONE:     NOTE: some Suboxone clinics require their clients to participate in a structured program (such as an IOP) in order to be prescribed Suboxone.  Some clinics have a long waiting list.  Most of these clinics do not accept walk-in clients, so call first to to learn what must be done to get started on Suboxone.    [facility] - [phone] (can do Sublocade)  [address], [place] [address]    [facility]  [address], [place]  [phone]    [facility] - [phone] (can do Sublocade)  [address] [place] [address]    [facility]  [address] [place]  [phone]     [facility] (very short waiting list, may accept some walk-in's but call first if possible)  [address], [place] [id number] [phone]; [phone]    [facility]   [address] [place]    [phone]    [facility] (can usually be seen within a week but is cash only for appointment)  [address] [place] [address] [place], [place]    [facility])  [address] [place]  [phone]    [facility] ([facility])  [address], [place] [id number] [phone]    [place], [place]:    [facility] - [address] [place] [place] [address] - Tel: [phone]    Jonathon Yung - [address] [place] [address] - Tel: [phone]    Francisco Javier Alford - [address] [place] [place] [address] - Tel: [phone]    Simeon Engel - [address] [place] [address] - Tel: [phone]    oJnathan Rogers - [address] [place] [address] - Tel: [phone]    [place], [place]:     Dr. Jerry Berkowitz and Dr. Primo Jones - [address] [facility] Tel: [phone]    Dr. Mojgan Drew - [address] [place] [address] [place] - Tel: [phone]    Dr. Darrick Santiago - Tel: [phone]    Dr. Ulisses Obando - [facility] - [phone]      METHADONE:     [facility] (the only methadone clinic in the  city, has two locations)  [x] [place] [facility] [place] [place] [address], [phone]  [x] [facility] [place] [address], [phone]      12 STEP PROGRAMS (and similar):     Alcoholics Anonymous (local)  [x] [phone]  [x] www.[place].org for schedules for in-person and online meetings  [x] There are AA meetings throughout the day all over town  [x] AA costs nothing to attend; they pass a basket for donations but this is not required    Narcotics Anonymous  [x] [phone]  [x] www.noana.org  [x] There are NA meetings throughout the day all over [facility]  [x] NA costs nothing to attend; they pass a basket for donations but this is not required    Alcoholics Anonymous Online Intergroup (national)  [x] www.aa-intergroup.org  [x] Good resource for large, nation-wide meetings  [x] Can also attend smaller, local meetings in other cities  [x] Countless meetings all day and all night  [x] AA costs nothing to attend; they pass a basket for donations but this is not required    [facility] - 24/7 zoom meetings for women and coed - sign on anytime, anywhere!  https://[employer] and Servicessober[employer] Bit [email or web address]-yitdoloy/    Online Intergroup of AA - [address] [place] Meeting - 24/7 zoom meetings  https://aa-intergroup.org/meetings/    LOOKING FOR AN ALTERNATIVE TO 12 STEP PROGRAMS - check out:  [facility]: https://www.smartrecovery.org/about-us  [facility]: https://recoverydharma.org      DETOX UNITS (USUALLY 5-7 DAYS):     [facility]: [address], [place]  [phone], call first to ensure bed availability    [facility]: [address] [place]  [phone], Option 1, call first to ensure bed availability    [facility]: [address], [place]  [phone] (intake by appointment only)    [facility]: [address], [place]  [phone]      INTENSIVE OUTPATIENT PROGRAMS:     [facility] (formerly known as the [facility])  [x]  [phone], Option 2  [x] [address], [facility] [address]  [x] [email or web address]  [x] [facility] delivers comprehensive and collaborative treatment for alcohol and substance use disorders.  Excellent program for working professionals or anyone else seeking recovery.  [x] Requires insurance approval prior to starting program, call number above for more information.  [x] Intensive Outpatient Rehabilitation Program - M-F 9am-3pm - daily groups with psychologists and social workers, sessions with MDs 3x per week   [x] Ambulatory detox and dual diagnosis available    [facility] Intensive [facility]patient [facility]  [x] [phone]  [x] [facility] (the clinic not on [facility] main [facility])  [x] Call number above for more info and to check insurance requirements    [facility]  [address] [place] [address] [phone]    [facility]:  [address] [place], [place] [address]?, [phone]  [address] [facility] [place], [place] [address]?, [phone]    RESIDENTIAL REHABS (USUALLY 28 DAYS):     [facility]: [address], [phone]    [facility]: Formerly named [facility] [place] , [facility]  [phone] (intake by appointment only)    [facility] (men only) [address] [facility], [phone]    [facility] (Female only) [address] [facility], [phone]    [facility]: [address], [facility], men's program [phone], women's program [phone]    [facility]: [address], [facility], [phone]    [facility]: [address], [place], [phone]    [facility]: Men only, [phone], [address] [place]    [facility]: [address], [place], [place], [phone]    [facility]: [address] [place] [address],  [phone]  [place] Location: [address] [place] [address] [address],  [place] [address], [phone]    [facility]:   ?[address]?[place], [place] [address]?[phone]    Victor Manuel: [address], [place] [address], [phone]    [place]: [place], [phone]     [facility]: [place], [place], [phone]    [facility]: [place], [place], [phone]    [facility]: [place], [place], [phone]    [place]: [place], [phone]    [facility]: [address] [place] [address], [phone]    COMMUNITY ADDICTION CLINICS:     ACER: [address] [facility], [facility], [phone] -or- [address] [place] [address]    [facility] [place]: [address] [facility] [place]  [address]     [facility]: Clinics [phone]; Crisis [phone]    [facility]: [address] [place] [address]    [facility]: [address] [place] [address]    [facility]: [address], [place] [address] Center: 2nd Floor [address] [place] [address]    [facility]: [address], [place] [address]    [facility], [place] [address]    [facility]: [address] [place] [address] [phone]  (serves youth 16-23 years old)    Psychiatric hospital Center: [facility] [phone]    [facility]: [address] [facility], [place] [phone]    [facility]: [address], [facility] [phone]    [facility]: [address] [facility], [address], [phone]     [facility]: [address] [facility] [place], [phone], [phone]    RESOURCES IN OTHER [facility]:     [facility]: [facility] F. Ezekiel [place], [place]  "[place], [phone], [phone]    [facility]: [address] [facility], [address], [phone]    [facility] [place], [address] [place] [address], [phone]    [facility]: [address] [facility], [phone]    [facility] Health, [address] [facility], [phone] ([facility])    [facility], [address] [facility], [phone] ([facility])    [facility], [address] [facility], [address], [place], [phone] ([facility], and [facility])    [facility], [address] [facility], [phone] ([facility])    [facility] Hotline [phone], [phone]    [facility] Center, [address] [facility], [facility], [address] [facility], [address], [facility]al [facility], [address] [facility] Behavioral [facility], [address] [facility] [facility] Behavioral [facility], [address], [facility] Human [facility], [address], [address] [phone]    [facility], [address] [facility] [phone]    [facility], [address] [facility], [phone]    [facility] for [facility] Recovery, 85 [facility], [phone]      Japanese SPEAKING (en español):     Información de la reunión de Alcohólicos Anónimos  Colt [facility], 10:00 am  Habla español  Esta reunión está abierta y cualquiera puede asistir.    Swedish speaking Alcoholics anonymous meetings:  El "Colt [place] AA Skype" es un colt on line de Alcohólicos Anónimos en español. El colt es christina, gratuito y virtual a través de Skype Audio. El colt funciona mediante emanuel llamada grupal de " voz, por lo que no se utiliza la videollamada, ni se pueden meeta las imágenes o rostros de los participantes. Hace ivan años y medio abrimos el primer Colt de AA por [place] en [place], alo actualmente asisten personas desde [place], [place], [place], [place], [place], [place], [place], [place], [place], [place], [place] y [place], entre otros.    El colt es muy útil para los alcohólicos que residen en lugares donde no se celebran reuniones de AA, o residen en lugares donde las reuniones de AA son un número limitado de días a la semana, o para aquellos compañeros que se hayan de viaje o que, por cualquier motivo, se hayan convalecientes y no pueden desplazarse. Todos los días nos reuniones a las 21:00 (hora española)    Podéis obtener más información sobre el colt y smith sesiones en la página web [email or web address]      MENTAL HEALTH:     [facility] Outpatient Clinic  [phone]    [facility] - General Psychiatry Intensive Outpatient Program  [facility] (formerly known as the BMU)  [phone], option 3    [facility] - Dual Diagnosis Intensive Outpatient Program  [facility] (formerly known as the ABU)  [phone], option 2      COMMUNITY MENTAL HEALTH CENTERS:     [facility]  (aka [facility] [[email or web address]], aka [facility])  Serves [facility], and [facility] residents.  Serves uninsured patients & those with Medicaid.  Main location: [address] [place] [address]  [phone]  Walk-in's available during regular business hours.  24/7 Crisis Line: [phone]    [facility]  (aka [facility], [facility])  Serves [facility].  Serves uninsured patients, those with Medicaid and some private plans.  Walk-in's available during regular business hours.  Primary care services available  as well.  [facility]: [address] [facility] [address] [place] [address];  [phone]  [place]: [address] [place] [address];  [phone]  24/7 Crisis Line: [phone]    [facility]  Serves uninsured patients & those with Medicaid, call for more info.  Primary care, pediatrics, HIV treatment, and dentistry services available as well.  Three locations.  [phone]    [facility] [place]?Corporate Office  Serves patients with Medicaid, Medicare, and private insurance  [address]  [place],?[place] [address]  [phone]    [facility]  Serves uninsured on a sliding scale, as well as Medicaid, Medicare, and private plans.  Eight locations around the [facility].  [phone]    [facility]  Serves uninsured patients & those with Medicaid, private insurances.  Primary care available as well.  [phone]  [address] [place] [address]    [facility]  Serves veterans who were honorably discharged.  [address] [place] [address]  [phone]  24/7 Veterans Crisis Line: [phone] (Press 1)    If you have private insurance and need to find a specialist, please contact your insurance network to request a list of providers covered by your benefits.      MENTAL HEALTH/ADDICTIVE DISORDERS:     AA ([phone]), NA ([phone])   National Suicide Prevention Lifeline- Call 1-676.283.9762 Available 24 hours everyday  [facility] [phone]; Crisis Line 845-8596 - Call for options A-F:  Intensive Outpatient Treatment/ Day programs   [facility], please contact   [facility], please contact [phone] or [phone] to speak with an admissions counselor.  [facility] (Methadone Maintenance)   [address] [place] [address], [phone]  [address] [place] [address] [phone]  [facility], [address] [place] [address], [phone]  [facility] [phone]  [facility] please contact [phone]  [facility], [address], 4th [place] [address] Phone: [phone]   [facility]cer  [place] Office: [address] [place] [address], [phone]  [place] Office: [address] [facility], [address], [place] [address], [phone]  [place] Office: [address] [place], [place] [address] [phone]    Outpatient Substance Abuse Treatment   [facility] (Methadone Maintenance)   [address] [place] [address], [phone]  [address] [place] [place] [address] [phone]  [facility], [address] [address], [phone]  Acer  [place] Office: [address] [address], [phone]  [place] Office: [address] [facility], [address], [place] [address], [phone]  [place] Office: [address] [place] [address] [phone]  [facility], [address] [place] [address] [phone]   [facility], [address] [facility], [address], [phone]  [facility], [address] [place], [place] [phone]    Residential Substance Abuse Treatment   [facility] [address] [facility], [phone] or [phone]  [facility], [address] [facility], [phone]  [facility] (men only) [address] [place] [address], [place] [address], [phone]  Women at the [facility] (women only) [address] [place] [address] [phone]  [facility], [address] [place] [address] [phone]  [facility] (women only), intakes at [address] [facility], [phone]  [facility] (7-day program, $100, [address], [phone], [phone], [phone])  [facility]  (Men only, [phone]), [address] (Vets*/Non-Vets)  [facility] (Men only, $400/month program fee) [address], [phone]  [facility] (Women over age 39 only), [address] [facility], [phone]    Out of Area:    [place], [address], [place], [place] ([phone])  [facility] Recovery Program (men only), [address] [place] [address], [phone]  [facility], [address] [place], [place] [phone])  [place], [facility] [address] [place], [place] ([phone])  [facility], [address] [facility], [phone]  [facility] (Women only, has to have mental illness, can be homeless or substance abuser), [phone]        DOMESTIC VIOLENCE RESOURCES:     Advocacy  [facility] (NOFJC)  [address] [address]    [facility] ? [phone]  Services provided: emergency shelter, individual advocacy, information and referrals, group support, children's program, medical advocacy, forensic medical exams, primary care, legal assistance, counseling, safety planning, and caregiver support    [facility] HEALING AND EMPOWERMENT [place]  Confidential location  [facility] ? [phone]  Services provided: short term emergency shelter, all services provided are free of charge    [facility] CENTERS FOR COMMUNITY ADVOCACY  Multiple locations in [place], [facility], [place], [facility], [place], and [facility] ([place], [place], and [place])    KAI ? [phone]  Services provided: emergency shelter, individual advocacy, information and referrals, group support, children's program, medical advocacy, legal assistance in obtaining restraining orders, counseling, safety planning, and caregiver support    [facility]   Emergency Shelter   [phone]  Emergency Services ,Legal and Financial Assistance Services ,Housing Services ,Support Services      [place] [facility] custodial   [phone]  Emergency Services ,Counseling Services , Housing Services ,Support Services ,Children's Services     [facility]OMEN WITH A VISION  [address] [place] [address]  WWAV ? [phone]  Services provided: advocacy, health education and supportive services, specializing in free healing services for marginalized groups, including LGBTQ individuals and sex workers    SEXUAL TRAUMA AWARENESS AND RESPONSE (STAR)  [address] [place] [address]    STAR ? (994) 044-STAR  Services provided: individual advocacy, information and referrals, group support, medical advocacy, legal assistance, counseling, and safety planning for survivors of sexual assault    [facility] ([facility])  [address] [place] [address]  [facility] Forensic Program ? [phone]  Services provided: free forensic medical exams for sexual assault and domestic violence, which can be performed up to 5 days after an incident. It is not necessary to make a police report to receive a forensic medical exam    Legal  [facility]  [address] [address]  Project [facility] ? [phone]  Services provided: free emergency legal representation for survivors of doemstic violence residing in [facility]. Legal services may include temporary restraining orders, temporary child support, custody, and use of property    [facility] LEGAL SERVICES (LS)  [address] [address]  SLLS ? [phone]  Services provided: free legal representation for survivors of domestic violence residing in [facility]. Legal services may include temporary child support, custody, and divorce      HOTLINES:     [facility] DOMESTIC VIOLENCE HOTLINE  [phone]    Services provided: free and confidential hotline for victims and survivors of domestic violence. All calls will be routed to a domestic violence service provided in the  victim or survivor's area    NATIONAL HUMAN TRAFFICKING HOTLINE  [phone]    Services provided: national anti-trafficking hotline serving victims and survivors of human trafficking. Provides information about local resources, and access to safe space to report tips, seek services, and ask for help    VIA LINK  211 or [phone]    Service provided: counselors can provide crisis counseling. Counselors can also provide information and referrals to programs which can help with needs such as food, shelter, medical care, financial assistance, mental health services, substance abuse treatment, senior services, childcare, and more      HOMELESS SHELTERS:      Homeless shelters  [facility]  Emergency shelter for individuals and families  [address] [facility]  [phone]  [facility]  Emergency shelter for men only  Meals daily 6am, 2pm, & 6pm  Clothing, case management M-F by appointment (ID/job/housing/legal assistance), mail  [address] [facility]  [phone]  [facility]  Emergency shelter for men  [address] Judith [facility]  [phone]  Emergency shelter for women  [address] [facility]  [phone]  Breakfast & lunch daily, dinner M-F  Case management, job counseling services   [facility]  Emergency shelter for teens and young adults up to 20yo  [address] [facility]  [phone]  [place] [facility]  Emergency shelter for women over 17yo and their kids  [address] [place] [address]  [phone]  [facility]  Day program, meals M-F 1PM (arrive early)  Showers, laundry, hygiene kits, showers, phones, , notary services, case management, ID assistance  [address] [facility]  [phone] M-F 8am-2:30pm  [facility]  Day program  MTWF 7:30am-3:30pm,  8:30am-3:30pm  Crisis intervention, employment assistance, food/clothing, hygiene kits, bus tokens, mail  [address] [facility]  [phone]  [facility]  Mobile outreach for homeless persons in  [facility]  [phone]  Healthcare for the Homeless  Primary healthcare, case management, dental services, TB placement  Call ahead  [address]  2nd Floor  [phone]  [facility] at the [facility]  Connects homeless people with their loved ones in other cities by providing transportation costs   [phone]      [place] RESOURCES:     [place] Mobile Mental Health Crisis Response Team:    Region 12 ([place], [place], [place], and [facility]) ([facility])  [phone]      Outpatient Mental Health & Addiction Clinic Resources for both [facility]:    [facility] Mental Healthcare Resources  Website: www.[facility] Health[facility].org  Main Number: 618-451-0334    [facility] ([facility] Area)  [address] ([address]) [place] [address]  [phone]    [facility] ([facility])  [address] ([address] [facility] [place]) [place] [address]  [phone]    [facility]  [address] ([address]) [place] [address]  [phone]    [facility]  [address] ([address] [facility] [place] [address]  [phone]      Addiction Treatment Resources for both [facility]:    [facility] (Detox, Residential, PHP, IOP, and Aftercare Programs)  [address] [place] [address]  [phone]    [facility] (Residential, IOP, Transitional Living, and Aftercare Programs)  [address] [place] [address]  [phone]    [place] [facility] (Inpatient, Residential, Detox, IOP, Outpatient, and Aftercare Programs)  [address] [facility], [place] [address] [phone] or toll free at [phone]      Outpatient Mental Health Psychotherapy Resources for both [facility]:    Cheryl Clarke, [facility]  [address]  [place] [address]  [phone]  Specialties: Depression, Anxiety, and Life Transitions    Josette Patel, PhD  [address]  [place] [address]  [phone]  Specialties: Testing and Evaluation, Education and Learning Disabilities, and ADHD    Carol Arroyo, [facility] [address] [place] [address]  [phone]  Specialties: Obsessive-Compulsive (OCD), Depression, and Relationship Issues    Rachel Tiwari [facility] [address] [facility] [place] [address]  [phone]  Specialties: Trauma & PTSD, Mood Disorders, and Anxiety    Rachel Shipley, PhD, [facility]  [facility] [address] [place] [address]  [phone]  Specialties: Family Conflict, Child, and Relationship Issues    Adina Mccabe [facility] Counseling [place] [address] [phone]  Specialties: Anxiety, Depression, and Anger Management        IN CASE OF SUICIDAL THINKING, call the National Suicide Hotline Number: 988    988 Suicide & Crisis Lifeline: 988 , 7-342-949-TALK (8255)  Provides 24/7, free and confidential support for people in distress, prevention and crisis resources for you or your loved ones, and best practices for professionals.    Call, text or chat.  https://988Enerneticsline.org

## 2024-06-02 NOTE — NURSING
Patient transferred from Telemetry to ICU Rm 566.  Patient on 2L O2 per NC.  Bilateral wrist restraints noted.  Patient connected to central monitor.  Diltiazem drip titrated up.  A-Fib, -160's noted on monitor.  Bed alarm on.  Michelle Agustin & Linnea aware of patient arrival.  Incontinence care provided.  Abdominal muscle use, tachypnea, and decreased bilateral lung sounds/wheezing noted.  Orders received for continuous BiPAP and VBG STAT.  Plan of care ongoing.

## 2024-06-02 NOTE — PROGRESS NOTES
[facility] Medicine  Progress Note    Patient Name: June Boykin  MRN: [id number]  Patient Class: IP- Inpatient   Admission Date: 5/31/2024  Length of Stay: 2 days  Attending Physician: Mariella Hobbs*  Primary Care Provider: Mauricio Pierre MD        Subjective:     Principal Problem:Acute hypercapnic respiratory failure        HPI:  Per     June Boykin is a 69-year-old female who presents with past medical history significant for hypertension, COPD  on 2 L home oxygen, anxiety, previous methamphetamine abuse, chronic benzo use who presented last week with diarrhea, dehydration, frequent falls of 2 days. She was very somnolent on initial exam and subsequently found to be hypercapnic and hypotensive with a respiratory rate of 30. She was treated empirically for a COPD exacerbation caused by pneumonia. She required 3 days of BiPAP with eventual improvement. During her last hospitalization she had a very similar presentation with her initial pH being 7.2, pCO2 of 94, PO2 of 77.8, bicarb of 29.9 saturating 92% on venti mask 40% FiO2.     She is now re presenting with altered mental status after being found by her granddaughter for unresponsiveness. Her granddaughter called EMS and found that she was not responding to sternal rub. Initial pulse ox showed an SpO2 in the low 70s she was placed on nasal cannula with rapid improvement in her oxygenation. This was escalated to a non-rebreather in route to emergency department. Following her arrival she is unable to provide any history and her ABG showed profound respiratory acidosis with hypercapnia greater than 90. Due to her altered mental status and concern for inability to protect airway, the decision was made to intubate her and treat her empirically for a COPD exacerbation. Her daughter does note that the patient took her diazepam earlier today and this was a concern during previous hospitalization. On  arrival, her pupils were noted to be pinpoint and she received naloxone without any improvement.  Patient's initial vitals were notable for a temperature of 100.3°, heart rate of 112, respiratory rate of 29, blood pressure of 179/92. Initial GCS was 8. She was saturating 91% on 4 L of oxygen.  ABG shows acute worsening acidosis with a pH of 7.178, pCO2 of 95.2, bicarbonate of 27.6, PO2 of 73.6 prior to intubation.  Following intubation she was pulling at her tube and so she was initiated on propofol continuously at 35 mcg per kg per hour.  She was being transferred to [facility] for pulmonology/ICU level of care. At outside hospital she received 125 mg of methylprednisone, DuoNeb x1, 1 L of LR as well as piperacillin/tazobactam with blood cultures drawn x2.        Overview/Hospital Course:  No notes on file    Interval History: awake, confused, sitting up in bed, does not follow command,   Reported tachycardia- EKG with afib. Did not respond to BB and a molina of Cardizem , started on Cardizem drip and transferred to ICU    Left a voice message    CXR, blood bx, Vanc/zosyn, therapeutic OAC     Review of Systems   Unable to perform ROS: Mental status change     Objective:     Vital Signs (Most Recent):  Temp: (!) 101.2 °F (38.4 °C) (06/02/24 1008)  Pulse: (!) 154 (06/02/24 1008)  Resp: 20 (06/02/24 1008)  BP: (!) 137/97 (06/02/24 1008)  SpO2: 96 % (06/02/24 1008) Vital Signs (24h Range):  Temp:  [98.2 °F (36.8 °C)-101.2 °F (38.4 °C)] 101.2 °F (38.4 °C)  Pulse:  [] 154  Resp:  [16-59] 20  SpO2:  [85 %-100 %] 96 %  BP: (137-207)/() 137/97     Weight: 45 kg (99 lb 3.3 oz)  Body mass index is 20.73 kg/m².    Intake/Output Summary (Last 24 hours) at 6/2/2024 1023  Last data filed at 6/2/2024 0553  Gross per 24 hour   Intake 510 ml   Output 626 ml   Net -116 ml         Physical Exam  Constitutional:       Appearance: She is ill-appearing.      Comments:  Frail, thin   HENT:      Head: Normocephalic.  "  Cardiovascular:      Rate and Rhythm: Normal rate.   Pulmonary:      Breath sounds: Rales present. No wheezing.   Abdominal:      General: Bowel sounds are normal. There is no distension.      Palpations: Abdomen is soft.   Musculoskeletal:      Cervical back: Normal range of motion.      Right lower leg: No edema.      Left lower leg: No edema.   Neurological:      Comments: confuse           Significant Labs: A1C: No results for input(s): "HGBA1C" in the last 4320 hours.  ABGs:   No results for input(s): "PH", "PCO2", "HCO3", "POCSATURATED", "BE", "TOTALHB", "COHB", "METHB", "O2HB", "POCFIO2", "PO2" in the last 48 hours.    Blood Culture:   No results for input(s): "LABBLOO" in the last 48 hours.    CBC:   Recent Labs   Lab 06/01/24  0705 06/02/24  0626   WBC 15.77* 9.30   HGB 12.3 13.2   HCT 43.0 43.3    173     CMP:   Recent Labs   Lab 06/01/24  0705 06/02/24  0626   * 130*   K 4.0 3.5   CL 92* 90*   CO2 31* 29   * 112*   BUN 19 20   CREATININE 0.7 0.6   CALCIUM 9.5 9.1   PROT 7.9 7.3   ALBUMIN 3.2* 2.9*   BILITOT 0.7 1.0   ALKPHOS 41* 39*   AST 33 36   ALT 24 19   ANIONGAP 11 11     Lipase:   No results for input(s): "LIPASE" in the last 48 hours.    Lipid Panel: No results for input(s): "CHOL", "HDL", "LDLCALC", "TRIG", "CHOLHDL" in the last 48 hours.  Magnesium:   Recent Labs   Lab 06/01/24  0705 06/02/24  0626   MG 1.6 1.5*     Troponin:   No results for input(s): "TROPONINI", "TROPONINIHS" in the last 48 hours.    TSH: No results for input(s): "TSH" in the last 4320 hours.  Urine Culture: No results for input(s): "LABURIN" in the last 48 hours.  Urine Studies:   No results for input(s): "COLORU", "APPEARANCEUA", "PHUR", "SPECGRAV", "PROTEINUA", "GLUCUA", "KETONESU", "BILIRUBINUA", "OCCULTUA", "NITRITE", "UROBILINOGEN", "LEUKOCYTESUR", "RBCUA", "WBCUA", "BACTERIA", "SQUAMEPITHEL", "HYALINECASTS" in the last 48 hours.    Invalid input(s): "WRIGHTSUR"      Significant Imaging: I have " reviewed all pertinent imaging results/findings within the past 24 hours.     Assessment/Plan:      * Acute hypercapnic respiratory failure  Patient with Hypercapnic Respiratory failure which is Acute on chronic.  she is not on home oxygen. Supplemental oxygen was provided and noted-      .   Signs/symptoms of respiratory failure include- tachypnea, increased work of breathing, respiratory distress, and lethargy. Contributing diagnoses includes - COPD and Pneumonia Labs and images were reviewed. Patient Has recent ABG, which has been reviewed. Will treat underlying causes and adjust management of respiratory failure as follows-  intubated on arrival  Consult Pulmonary critical for vent management   Appreciate pulmonary team extubated on 05/31 2 BiPAP -continue to wean    AMS (altered mental status)    Intubated in the ED  Extubated 5/31 to BiPAP and then to NC  Persistent confusion    Atrial fibrillation  Patient with Paroxysmal (<7 days) atrial fibrillation which is uncontrolled currently with Beta Blocker and Calcium Channel Blocker. Patient is currently in atrial fibrillation.[id number] Score: 2. HASBLED Score: . Anticoagulation indicated. Anticoagulation done with lovenox .  Consult cardiology -therapeutic Lovenox, continue Cardizem gtt  Continue Metoprolol bid - uptitrate as needed    ACP (advance care planning)    Advance Care Planning    Date: 06/01/2024    Code Status  In light of the patients advanced and life limiting illness,I engaged the the family in a voluntary conversation about the patient's preferences for care  at the very end of life. The patient wishes to have a natural, peaceful death.  Along those lines, the patient does not wish to have CPR or other invasive treatments performed when her heart and/or breathing stops. I communicated to the family that a DNR order would be placed in her medical record to reflect this preference.  I spent a total of 20 minutes engaging the patient in this  advance care planning discussion.   Patient grand daughter Caro  reported patient sister had updated family patient is a DNR.           Hypertension  Chronic,    Resume home meds as BP permits    Temp:  [79 °F (26.1 °C)-100.3 °F (37.9 °C)]   Pulse:  []   Resp:  [8-49]   BP: ()/()   SpO2:  [77 %-100 %] .   Home meds for hypertension were reviewed and noted below.   Hypertension Medications               lisinopriL-hydrochlorothiazide (PRINZIDE,ZESTORETIC) 20-25 mg Tab             While in the hospital, will manage blood pressure as follows; Continue home antihypertensive regimen    Will utilize p.r.n. blood pressure medication only if patient's blood pressure greater than 140/90 and she develops symptoms such as worsening chest pain or shortness of breath.    COPD exacerbation  Patient's COPD is with exacerbation noted by continued dyspnea and worsening of baseline hypoxia currently.  Patient is currently off COPD Pathway. Continue scheduled inhalers Steroids, Antibiotics, and Supplemental oxygen and monitor respiratory status closely.    Checks x-ray:No acute findings.    Continue IV steroid  Intubated  DuoNebs  Antibiotics    Pneumonia   History of recent pneumonia treatment     Checks x-ray with no acute finding    Antibiotics (From admission, onward)      Start     Stop Route Frequency Ordered    05/31/24 1130  doxycycline tablet 100 mg         -- OG Every 12 hours 05/31/24 1020    05/31/24 0915  mupirocin 2 % ointment         06/05/24 0859 Nasl 2 times daily 05/31/24 0807            Microbiology Results (last 7 days)       ** No results found for the last 168 hours. **            Polypharmacy   History of substance abuse  UDS 7 days ago positive for benzos, amphetamines  Consult psych once medically stable   Daughter reported patient takes lot of Valium -monitor for benzos withdrawal      Schizoaffective disorder   Recurrent major depression   Consult psych once patient is medically  stable        VTE Risk Mitigation (From admission, onward)           Ordered     enoxaparin injection 50 mg  Every 12 hours         06/02/24 1551     IP VTE HIGH RISK PATIENT  Once         05/31/24 0747     Place sequential compression device  Until discontinued         05/31/24 0747                    Discharge Planning   LACY:      Code Status: DNR   Is the patient medically ready for discharge?:     Reason for patient still in hospital (select all that apply): Patient trending condition  Discharge Plan A: Home Health            Critical care time spent on the evaluation and treatment of severe organ dysfunction, review of pertinent labs and imaging studies, discussions with consulting providers and discussions with patient/family: 35 minutes.      Mariella Hobbs MD  Department of Hospital Medicine   [facility] - Intensive Care

## 2024-06-02 NOTE — ASSESSMENT & PLAN NOTE
Patient with Hypercapnic Respiratory failure which is Acute on chronic.  she is not on home oxygen. Supplemental oxygen was provided and noted-      .   Signs/symptoms of respiratory failure include- tachypnea, increased work of breathing, respiratory distress, and lethargy. Contributing diagnoses includes - COPD and Pneumonia Labs and images were reviewed. Patient Has recent ABG, which has been reviewed. Will treat underlying causes and adjust management of respiratory failure as follows-  intubated on arrival  Consult Pulmonary critical for vent management   Appreciate pulmonary team extubated on 05/31 2 BiPAP -continue to wean

## 2024-06-02 NOTE — CONSULTS
Consult acknowledged. This is a note to link the order. Please see consult note dated 5/31.     Medina Agustin MD  PCCM Fellow
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
## 2024-06-03 PROBLEM — I48.0 PAROXYSMAL ATRIAL FIBRILLATION: Status: ACTIVE | Noted: 2024-06-02

## 2024-06-03 LAB
ALBUMIN SERPL BCP-MCNC: 2.8 G/DL (ref 3.5–5.2)
ALP SERPL-CCNC: 34 U/L (ref 55–135)
ALT SERPL W/O P-5'-P-CCNC: 18 U/L (ref 10–44)
ANION GAP SERPL CALC-SCNC: 9 MMOL/L (ref 8–16)
ASCENDING AORTA: 2.9 CM
AST SERPL-CCNC: 27 U/L (ref 10–40)
AV INDEX (PROSTH): 0.62
AV MEAN GRADIENT: 4 MMHG
AV PEAK GRADIENT: 7 MMHG
AV VALVE AREA BY VELOCITY RATIO: 2 CM²
AV VALVE AREA: 2 CM²
AV VELOCITY RATIO: 0.62
BASOPHILS # BLD AUTO: 0.01 K/UL (ref 0–0.2)
BASOPHILS NFR BLD: 0.1 % (ref 0–1.9)
BILIRUB SERPL-MCNC: 1 MG/DL (ref 0.1–1)
BSA FOR ECHO PROCEDURE: 1.34 M2
BUN SERPL-MCNC: 22 MG/DL (ref 8–23)
CALCIUM SERPL-MCNC: 9 MG/DL (ref 8.7–10.5)
CHLORIDE SERPL-SCNC: 89 MMOL/L (ref 95–110)
CO2 SERPL-SCNC: 36 MMOL/L (ref 23–29)
CREAT SERPL-MCNC: 0.6 MG/DL (ref 0.5–1.4)
CV ECHO LV RWT: 0.64 CM
DIFFERENTIAL METHOD BLD: ABNORMAL
DOP CALC AO PEAK VEL: 1.28 M/S
DOP CALC AO VTI: 26.1 CM
DOP CALC LVOT AREA: 3.2 CM2
DOP CALC LVOT DIAMETER: 2.03 CM
DOP CALC LVOT PEAK VEL: 0.79 M/S
DOP CALC LVOT STROKE VOLUME: 52.08 CM3
DOP CALC MV VTI: 18.4 CM
DOP CALCLVOT PEAK VEL VTI: 16.1 CM
E WAVE DECELERATION TIME: 305.72 MSEC
E/A RATIO: 0.63
E/E' RATIO: 15.43 M/S
ECHO LV POSTERIOR WALL: 1.12 CM (ref 0.6–1.1)
EOSINOPHIL # BLD AUTO: 0 K/UL (ref 0–0.5)
EOSINOPHIL NFR BLD: 0 % (ref 0–8)
ERYTHROCYTE [DISTWIDTH] IN BLOOD BY AUTOMATED COUNT: 18.2 % (ref 11.5–14.5)
EST. GFR  (NO RACE VARIABLE): >60 ML/MIN/1.73 M^2
FRACTIONAL SHORTENING: 28 % (ref 28–44)
GLUCOSE SERPL-MCNC: 117 MG/DL (ref 70–110)
HCT VFR BLD AUTO: 46.3 % (ref 37–48.5)
HGB BLD-MCNC: 14 G/DL (ref 12–16)
IMM GRANULOCYTES # BLD AUTO: 0.02 K/UL (ref 0–0.04)
IMM GRANULOCYTES NFR BLD AUTO: 0.3 % (ref 0–0.5)
INTERVENTRICULAR SEPTUM: 1.04 CM (ref 0.6–1.1)
IVC DIAMETER: 1.49 CM
LA MAJOR: 4.53 CM
LA MINOR: 4.35 CM
LA WIDTH: 3.4 CM
LEFT ATRIUM SIZE: 2.68 CM
LEFT ATRIUM VOLUME INDEX MOD: 17.5 ML/M2
LEFT ATRIUM VOLUME INDEX: 25.7 ML/M2
LEFT ATRIUM VOLUME MOD: 23.41 CM3
LEFT ATRIUM VOLUME: 34.37 CM3
LEFT INTERNAL DIMENSION IN SYSTOLE: 2.53 CM (ref 2.1–4)
LEFT VENTRICLE DIASTOLIC VOLUME INDEX: 37.93 ML/M2
LEFT VENTRICLE DIASTOLIC VOLUME: 50.82 ML
LEFT VENTRICLE MASS INDEX: 86 G/M2
LEFT VENTRICLE SYSTOLIC VOLUME INDEX: 17.2 ML/M2
LEFT VENTRICLE SYSTOLIC VOLUME: 23.07 ML
LEFT VENTRICULAR INTERNAL DIMENSION IN DIASTOLE: 3.5 CM (ref 3.5–6)
LEFT VENTRICULAR MASS: 115.79 G
LV LATERAL E/E' RATIO: 13.5 M/S
LV SEPTAL E/E' RATIO: 18 M/S
LVOT MG: 1.47 MMHG
LVOT MV: 0.58 CM/S
LYMPHOCYTES # BLD AUTO: 1.3 K/UL (ref 1–4.8)
LYMPHOCYTES NFR BLD: 19.6 % (ref 18–48)
MAGNESIUM SERPL-MCNC: 1.8 MG/DL (ref 1.6–2.6)
MCH RBC QN AUTO: 22.3 PG (ref 27–31)
MCHC RBC AUTO-ENTMCNC: 30.2 G/DL (ref 32–36)
MCV RBC AUTO: 74 FL (ref 82–98)
MONOCYTES # BLD AUTO: 1.2 K/UL (ref 0.3–1)
MONOCYTES NFR BLD: 18.4 % (ref 4–15)
MV A" WAVE DURATION": 121.79 MSEC
MV MEAN GRADIENT: 1 MMHG
MV PEAK A VEL: 0.86 M/S
MV PEAK E VEL: 0.54 M/S
MV PEAK GRADIENT: 3 MMHG
MV STENOSIS PRESSURE HALF TIME: 88.66 MS
MV VALVE AREA BY CONTINUITY EQUATION: 2.83 CM2
MV VALVE AREA P 1/2 METHOD: 2.48 CM2
NEUTROPHILS # BLD AUTO: 4.1 K/UL (ref 1.8–7.7)
NEUTROPHILS NFR BLD: 61.6 % (ref 38–73)
NRBC BLD-RTO: 0 /100 WBC
NT-PROBNP SERPL IA-MCNC: 4838 PG/ML
OHS LV EJECTION FRACTION SIMPSONS BIPLANE MOD: 70 %
PHOSPHATE SERPL-MCNC: 1.8 MG/DL (ref 2.7–4.5)
PISA TR MAX VEL: 3.11 M/S
PLATELET # BLD AUTO: 177 K/UL (ref 150–450)
PMV BLD AUTO: 10 FL (ref 9.2–12.9)
POTASSIUM SERPL-SCNC: 2.8 MMOL/L (ref 3.5–5.1)
PROT SERPL-MCNC: 6.7 G/DL (ref 6–8.4)
PV MV: 0.74 M/S
PV PEAK GRADIENT: 5 MMHG
PV PEAK VELOCITY: 1.09 M/S
RA MAJOR: 4.27 CM
RA PRESSURE ESTIMATED: 3 MMHG
RA WIDTH: 3.46 CM
RBC # BLD AUTO: 6.27 M/UL (ref 4–5.4)
RV TB RVSP: 6 MMHG
RV TISSUE DOPPLER FREE WALL SYSTOLIC VELOCITY 1 (APICAL 4 CHAMBER VIEW): 13.03 CM/S
SINUS: 3 CM
SODIUM SERPL-SCNC: 134 MMOL/L (ref 136–145)
STJ: 2.74 CM
TDI LATERAL: 0.04 M/S
TDI SEPTAL: 0.03 M/S
TDI: 0.04 M/S
TR MAX PG: 39 MMHG
TRICUSPID ANNULAR PLANE SYSTOLIC EXCURSION: 1.6 CM
TV REST PULMONARY ARTERY PRESSURE: 42 MMHG
WBC # BLD AUTO: 6.67 K/UL (ref 3.9–12.7)
Z-SCORE OF LEFT VENTRICULAR DIMENSION IN END DIASTOLE: -2.07
Z-SCORE OF LEFT VENTRICULAR DIMENSION IN END SYSTOLE: -0.43

## 2024-06-03 PROCEDURE — 63600175 PHARM REV CODE 636 W HCPCS: Performed by: STUDENT IN AN ORGANIZED HEALTH CARE EDUCATION/TRAINING PROGRAM

## 2024-06-03 PROCEDURE — 51798 US URINE CAPACITY MEASURE: CPT

## 2024-06-03 PROCEDURE — 99900035 HC TECH TIME PER 15 MIN (STAT)

## 2024-06-03 PROCEDURE — 51701 INSERT BLADDER CATHETER: CPT

## 2024-06-03 PROCEDURE — 97535 SELF CARE MNGMENT TRAINING: CPT

## 2024-06-03 PROCEDURE — 36415 COLL VENOUS BLD VENIPUNCTURE: CPT | Performed by: FAMILY MEDICINE

## 2024-06-03 PROCEDURE — 80053 COMPREHEN METABOLIC PANEL: CPT | Performed by: FAMILY MEDICINE

## 2024-06-03 PROCEDURE — 20000000 HC ICU ROOM

## 2024-06-03 PROCEDURE — 27000221 HC OXYGEN, UP TO 24 HOURS

## 2024-06-03 PROCEDURE — 94660 CPAP INITIATION&MGMT: CPT

## 2024-06-03 PROCEDURE — 25000003 PHARM REV CODE 250: Performed by: FAMILY MEDICINE

## 2024-06-03 PROCEDURE — 94761 N-INVAS EAR/PLS OXIMETRY MLT: CPT

## 2024-06-03 PROCEDURE — 85025 COMPLETE CBC W/AUTO DIFF WBC: CPT | Performed by: FAMILY MEDICINE

## 2024-06-03 PROCEDURE — 92610 EVALUATE SWALLOWING FUNCTION: CPT

## 2024-06-03 PROCEDURE — 99222 1ST HOSP IP/OBS MODERATE 55: CPT | Mod: ,,, | Performed by: NURSE PRACTITIONER

## 2024-06-03 PROCEDURE — 25000003 PHARM REV CODE 250: Performed by: STUDENT IN AN ORGANIZED HEALTH CARE EDUCATION/TRAINING PROGRAM

## 2024-06-03 PROCEDURE — 93005 ELECTROCARDIOGRAM TRACING: CPT

## 2024-06-03 PROCEDURE — 99223 1ST HOSP IP/OBS HIGH 75: CPT | Mod: ,,, | Performed by: STUDENT IN AN ORGANIZED HEALTH CARE EDUCATION/TRAINING PROGRAM

## 2024-06-03 PROCEDURE — 84100 ASSAY OF PHOSPHORUS: CPT | Performed by: FAMILY MEDICINE

## 2024-06-03 PROCEDURE — 94640 AIRWAY INHALATION TREATMENT: CPT

## 2024-06-03 PROCEDURE — 63600175 PHARM REV CODE 636 W HCPCS: Performed by: FAMILY MEDICINE

## 2024-06-03 PROCEDURE — 25000242 PHARM REV CODE 250 ALT 637 W/ HCPCS: Performed by: FAMILY MEDICINE

## 2024-06-03 PROCEDURE — 83735 ASSAY OF MAGNESIUM: CPT | Performed by: FAMILY MEDICINE

## 2024-06-03 PROCEDURE — 93010 ELECTROCARDIOGRAM REPORT: CPT | Mod: ,,, | Performed by: INTERNAL MEDICINE

## 2024-06-03 PROCEDURE — 25000003 PHARM REV CODE 250: Performed by: REGISTERED NURSE

## 2024-06-03 RX ORDER — MAGNESIUM SULFATE HEPTAHYDRATE 40 MG/ML
2 INJECTION, SOLUTION INTRAVENOUS ONCE
Status: COMPLETED | OUTPATIENT
Start: 2024-06-03 | End: 2024-06-03

## 2024-06-03 RX ORDER — DILTIAZEM HYDROCHLORIDE 30 MG/1
30 TABLET, FILM COATED ORAL EVERY 6 HOURS
Status: DISCONTINUED | OUTPATIENT
Start: 2024-06-03 | End: 2024-06-03

## 2024-06-03 RX ORDER — POTASSIUM CHLORIDE 7.45 MG/ML
10 INJECTION INTRAVENOUS
Status: COMPLETED | OUTPATIENT
Start: 2024-06-03 | End: 2024-06-03

## 2024-06-03 RX ORDER — HYDRALAZINE HYDROCHLORIDE 25 MG/1
25 TABLET, FILM COATED ORAL ONCE
Status: DISCONTINUED | OUTPATIENT
Start: 2024-06-03 | End: 2024-06-06

## 2024-06-03 RX ORDER — DIAZEPAM 10 MG/2ML
5 INJECTION INTRAMUSCULAR EVERY 8 HOURS PRN
Status: DISCONTINUED | OUTPATIENT
Start: 2024-06-03 | End: 2024-06-06

## 2024-06-03 RX ORDER — POTASSIUM CHLORIDE 20 MEQ/1
40 TABLET, EXTENDED RELEASE ORAL EVERY 4 HOURS
Status: DISCONTINUED | OUTPATIENT
Start: 2024-06-03 | End: 2024-06-03

## 2024-06-03 RX ORDER — CYPROHEPTADINE HYDROCHLORIDE 4 MG/1
4 TABLET ORAL 2 TIMES DAILY
Status: DISCONTINUED | OUTPATIENT
Start: 2024-06-03 | End: 2024-06-07

## 2024-06-03 RX ORDER — DIAZEPAM 5 MG/1
5 TABLET ORAL DAILY
Status: DISCONTINUED | OUTPATIENT
Start: 2024-06-04 | End: 2024-06-03

## 2024-06-03 RX ORDER — HYDRALAZINE HYDROCHLORIDE 25 MG/1
50 TABLET, FILM COATED ORAL EVERY 8 HOURS
Status: DISCONTINUED | OUTPATIENT
Start: 2024-06-03 | End: 2024-06-07

## 2024-06-03 RX ORDER — DIAZEPAM 5 MG/1
5 TABLET ORAL DAILY
Status: DISCONTINUED | OUTPATIENT
Start: 2024-06-03 | End: 2024-06-03

## 2024-06-03 RX ADMIN — PIPERACILLIN SODIUM AND TAZOBACTAM SODIUM 4.5 G: 4; .5 INJECTION, POWDER, LYOPHILIZED, FOR SOLUTION INTRAVENOUS at 06:06

## 2024-06-03 RX ADMIN — POTASSIUM PHOSPHATE, MONOBASIC POTASSIUM PHOSPHATE, DIBASIC 15 MMOL: 224; 236 INJECTION, SOLUTION, CONCENTRATE INTRAVENOUS at 06:06

## 2024-06-03 RX ADMIN — METOPROLOL TARTRATE 25 MG: 25 TABLET, FILM COATED ORAL at 08:06

## 2024-06-03 RX ADMIN — MUPIROCIN: 20 OINTMENT TOPICAL at 09:06

## 2024-06-03 RX ADMIN — MUPIROCIN: 20 OINTMENT TOPICAL at 08:06

## 2024-06-03 RX ADMIN — LEVALBUTEROL HYDROCHLORIDE 0.63 MG: 0.63 SOLUTION RESPIRATORY (INHALATION) at 08:06

## 2024-06-03 RX ADMIN — POTASSIUM CHLORIDE 10 MEQ: 7.46 INJECTION, SOLUTION INTRAVENOUS at 12:06

## 2024-06-03 RX ADMIN — LABETALOL HYDROCHLORIDE 20 MG: 5 INJECTION INTRAVENOUS at 08:06

## 2024-06-03 RX ADMIN — CYPROHEPTADINE HYDROCHLORIDE 4 MG: 4 TABLET ORAL at 08:06

## 2024-06-03 RX ADMIN — LABETALOL HYDROCHLORIDE 20 MG: 5 INJECTION INTRAVENOUS at 02:06

## 2024-06-03 RX ADMIN — DOXYCYCLINE 100 MG: 100 INJECTION, POWDER, LYOPHILIZED, FOR SOLUTION INTRAVENOUS at 12:06

## 2024-06-03 RX ADMIN — ENOXAPARIN SODIUM 50 MG: 60 INJECTION, SOLUTION SUBCUTANEOUS at 09:06

## 2024-06-03 RX ADMIN — MAGNESIUM SULFATE HEPTAHYDRATE 2 G: 40 INJECTION, SOLUTION INTRAVENOUS at 10:06

## 2024-06-03 RX ADMIN — POTASSIUM CHLORIDE 10 MEQ: 7.46 INJECTION, SOLUTION INTRAVENOUS at 11:06

## 2024-06-03 RX ADMIN — HYDRALAZINE HYDROCHLORIDE 50 MG: 25 TABLET ORAL at 01:06

## 2024-06-03 RX ADMIN — ENOXAPARIN SODIUM 50 MG: 60 INJECTION, SOLUTION SUBCUTANEOUS at 08:06

## 2024-06-03 RX ADMIN — POTASSIUM CHLORIDE 10 MEQ: 7.46 INJECTION, SOLUTION INTRAVENOUS at 09:06

## 2024-06-03 RX ADMIN — HYDRALAZINE HYDROCHLORIDE 25 MG: 25 TABLET, FILM COATED ORAL at 06:06

## 2024-06-03 RX ADMIN — POTASSIUM CHLORIDE 10 MEQ: 7.46 INJECTION, SOLUTION INTRAVENOUS at 10:06

## 2024-06-03 NOTE — PLAN OF CARE
Patient on oxygen with documented flow. Pt did not complete breathing tx, agitation and head shaking verbalizing to take it off, will continue to monitor.

## 2024-06-03 NOTE — SUBJECTIVE & OBJECTIVE
Past Medical History:   Diagnosis Date    Abdominal pain 05/2024    Acid reflux 05/2024    ACP (advance care planning) 6/1/2024    Anxiety disorder, unspecified     At high risk for falls     Emphysema, unspecified     History of opioid abuse     Hypertension     On home oxygen therapy     2l/nc    Wears partial dentures     upper-none on lower    Weight loss 05/2024       Past Surgical History:   Procedure Laterality Date    ANKLE SURGERY Left     HYSTERECTOMY      TONSILLECTOMY         Review of patient's allergies indicates:  No Known Allergies    No current facility-administered medications on file prior to encounter.     Current Outpatient Medications on File Prior to Encounter   Medication Sig    albuterol (PROVENTIL/VENTOLIN HFA) 90 mcg/actuation inhaler Inhale into the lungs.    amoxicillin-clavulanate 875-125mg (AUGMENTIN) 875-125 mg per tablet Take 1 tablet by mouth every 12 (twelve) hours.    amoxicillin-clavulanate 875-125mg (AUGMENTIN) 875-125 mg per tablet Take 1 tablet by mouth 2 (two) times daily.    aspirin 81 MG Chew Take 81 mg by mouth once daily. Stop 05/20/2024 per [facility] office    budesonide-formoterol 160-4.5 mcg (SYMBICORT) 160-4.5 mcg/actuation HFAA 2 puffs Inhalation Twice a day    diazePAM (VALIUM) 10 MG Tab Take 10 mg by mouth every 6 (six) hours as needed.    docusate sodium (COLACE) 100 MG capsule Take 100 mg by mouth 2 (two) times daily.    fluticasone propionate (FLONASE) 50 mcg/actuation nasal spray     guaiFENesin (MUCINEX) 1,200 mg Ta12 Take 1,200 mg by mouth 2 (two) times a day. for 10 days    lisinopriL-hydrochlorothiazide (PRINZIDE,ZESTORETIC) 20-25 mg Tab     potassium chloride (MICRO-K) 10 MEQ CpSR Take 10 mEq by mouth once.    pravastatin (PRAVACHOL) 20 MG tablet     QUEtiapine (SEROQUEL) 50 MG tablet Take 1 tablet (50 mg total) by mouth every evening.    QUEtiapine (SEROQUEL) 50 MG tablet Take 1 tablet (50 mg total) by mouth nightly.     Family History        Problem Relation (Age of Onset)    Diabetes Father    No Known Problems Mother, Brother, Brother, Brother    Pancreatic cancer Father    Parkinsonism Sister    Stroke Father          Tobacco Use    Smoking status: Former     Current packs/day: 1.00     Types: Cigarettes    Smokeless tobacco: Never    Tobacco comments:     Quit 2 years ago   Substance and Sexual Activity    Alcohol use: No    Drug use: Not Currently     Types: Oxycodone, Hydrocodone    Sexual activity: Not on file     Comment:      Review of Systems   Unable to perform ROS: Other     Objective:     Vital Signs (Most Recent):  Temp: 98.7 °F (37.1 °C) (06/03/24 0800)  Pulse: 73 (06/03/24 0945)  Resp: (!) 42 (06/03/24 0945)  BP: (!) 185/95 (06/03/24 0940)  SpO2: 97 % (06/03/24 0945) Vital Signs (24h Range):  Temp:  [98.2 °F (36.8 °C)-101.2 °F (38.4 °C)] 98.7 °F (37.1 °C)  Pulse:  [] 73  Resp:  [20-77] 42  SpO2:  [84 %-100 %] 97 %  BP: (133-202)/() 185/95     Weight: 44 kg (97 lb)  Body mass index is 20.27 kg/m².    SpO2: 97 %         Intake/Output Summary (Last 24 hours) at 6/3/2024 0952  Last data filed at 6/3/2024 0950  Gross per 24 hour   Intake 789.9 ml   Output 780 ml   Net 9.9 ml       Lines/Drains/Airways       Drain  Duration             Female External Urinary Catheter w/ Suction 05/31/24 1113 2 days              Peripheral Intravenous Line  Duration                  Peripheral IV - Single Lumen 05/31/24 0022 20 G Anterior;Right Forearm 3 days         Peripheral IV - Single Lumen 06/02/24 1500 20 G;1 in Anterior;Right;Proximal Forearm <1 day                     Physical Exam  Constitutional:       General: She is not in acute distress.     Appearance: She is well-developed. She is ill-appearing.   Cardiovascular:      Rate and Rhythm: Normal rate and regular rhythm.      Heart sounds: No murmur heard.     No gallop.   Pulmonary:      Effort: Pulmonary effort is normal. No respiratory distress.      Breath sounds: Normal breath  "sounds. No wheezing.   Abdominal:      General: Bowel sounds are normal. There is no distension.      Palpations: Abdomen is soft.      Tenderness: There is no abdominal tenderness.   Skin:     General: Skin is warm and dry.          Significant Labs: BMP:   Recent Labs   Lab 06/02/24 0626 06/03/24  0404   * 117*   * 134*   K 3.5 2.8*   CL 90* 89*   CO2 29 36*   BUN 20 22   CREATININE 0.6 0.6   CALCIUM 9.1 9.0   MG 1.5* 1.8   , CBC   Recent Labs   Lab 06/02/24 0626 06/03/24  0404   WBC 9.30 6.67   HGB 13.2 14.0   HCT 43.3 46.3    177   , and Troponin No results for input(s): "TROPONINI" in the last 48 hours.    Significant Imaging: Echocardiogram: Transthoracic echo (TTE) complete (Cupid Only): No results found for this or any previous visit.  "

## 2024-06-03 NOTE — ASSESSMENT & PLAN NOTE
Patient with Paroxysmal (<7 days) atrial fibrillation which is uncontrolled currently with Beta Blocker and Calcium Channel Blocker. Patient is currently in atrial fibrillation.[id number] Score: 2. HASBLED Score: . Anticoagulation indicated. Anticoagulation done with lovenox .  Consult cardiology -therapeutic Lovenox, continue Cardizem gtt  Continue Metoprolol bid - uptitrate as needed

## 2024-06-03 NOTE — PROGRESS NOTES
Therapy with vancomycin complete and/or consult discontinued by provider.  Pharmacy will sign off, please re-consult as needed.    Sasha Odell, PharmD, BCPS, BCCCP  Clinical Pharmacist  [phone]

## 2024-06-03 NOTE — PROGRESS NOTES
"[facility] Progress Note    Subjective:      Overnight, on dilt drip for RVR. This morning in sinus rhythm, dilt drip off. Not oriented, refusing meds but pleasantly confused. Denies pain.      Objective:     Last 24 Hour Vital Signs:  BP  Min: 110/70  Max: 196/95  Temp  Av.4 °F (37.4 °C)  Min: 98.2 °F (36.8 °C)  Max: 101.2 °F (38.4 °C)  Pulse  Av.5  Min: 62  Max: 165  Resp  Av.2  Min: 18  Max: 77  SpO2  Av.1 %  Min: 90 %  Max: 100 %  Height  Av' 10" (147.3 cm)  Min: 4' 10" (147.3 cm)  Max: 4' 10" (147.3 cm)  Weight  Av kg (97 lb)  Min: 44 kg (97 lb)  Max: 44 kg (97 lb)  I/O last 3 completed shifts:  In: 381.9 [I.V.:216.3; IV Piggyback:165.6]  Out: 425 [Urine:425]    Physical Examination:  Physical Exam  Vitals and nursing note reviewed.   Constitutional:       General: She is not in acute distress.     Appearance: She is not toxic-appearing.   HENT:      Head: Normocephalic and atraumatic.      Nose: Nose normal.      Mouth/Throat:      Mouth: Mucous membranes are moist.   Eyes:      General:         Right eye: No discharge.         Left eye: No discharge.      Extraocular Movements: Extraocular movements intact.   Cardiovascular:      Rate and Rhythm: Normal rate and regular rhythm.   Pulmonary:      Effort: Pulmonary effort is normal. No respiratory distress.      Breath sounds: Wheezing present.      Comments: Dec breath sounds b/l with scattered wheezing  Abdominal:      Palpations: Abdomen is soft.   Skin:     General: Skin is warm and dry.      Capillary Refill: Capillary refill takes less than 2 seconds.   Neurological:      General: No focal deficit present.      Mental Status: She is alert.      Comments: Not oriented to person, place or time. No focal deficits. Perseverates on questions asked.            Laboratory:  Trended Lab Data:  Recent Labs     24  0956 24  0705 24  0626 24  0404   WBC  --  15.77* 9.30 6.67 "   HGB  --  12.3 13.2 14.0   HCT  --  43.0 43.3 46.3   PLT  --  199 173 177   NA  --  134* 130* 134*   K  --  4.0 3.5 2.8*   CL  --  92* 90* 89*   CO2  --  31* 29 36*   BUN  --  19 20 22   CREATININE  --  0.7 0.6 0.6   GLU  --  120* 112* 117*   BILITOT  --  0.7 1.0 1.0   AST  --  33 36 27   ALT  --  24 19 18   ALKPHOS  --  41* 39* 34*   CALCIUM  --  9.5 9.1 9.0   ALBUMIN  --  3.2* 2.9* 2.8*   PROT  --  7.9 7.3 6.7   MG  --  1.6 1.5* 1.8   PHOS  --  3.0 2.1* 1.8*   INR 1.3*  --   --   --        Cardiac:   Recent Labs   Lab 05/31/24  0832   TROPONINI 0.012       Urinalysis:   Lab Results   Component Value Date    LABURIN No growth 05/23/2024    COLORU Yellow 05/31/2024    SPECGRAV 1.025 05/31/2024    NITRITE Negative 05/31/2024    KETONESU 1+ (A) 05/31/2024    UROBILINOGEN Negative 05/31/2024       Microbiology:  Microbiology Results (last 7 days)       Procedure Component Value Units Date/Time    Blood culture [[id number]] Collected: 06/02/24 1154    Order Status: Completed Specimen: Blood Updated: 06/03/24 0115     Blood Culture, Routine No Growth to date            Radiology:  CXR 6/2: FINDINGS:  Monitoring leads over the chest.  Grossly unchanged cardiomediastinal contours.  Status post discontinuation of endotracheal tube.  Grossly unchanged cardiac interstitial coarsening, similar to 05/31/2012, 00:06 hours examination.  No definite focal airspace consolidation is since the prior study is identified.  No definite pneumothorax or large volume pleural effusion.  No acute change in the visualized abdomen.  Remainder study essentially unchanged.    I have personally reviewed the above labs and imaging.      Assessment:     June Boykin is a 69 y.o.female with h/o COPD and chronic hypoxemic RF (on 2L NC at home), anxiety on chronic benzos who presented with acute encephalopathy and hypercapnic RF requiring intubation. She was extubated and stepped down to the floor and then went into Afib with RVR requiring  diltiazem drip and she was stepped back up. This morning, remains altered and refusing medications. Will engage palliative care team and reach out to family for further collateral.      Plan:     Neuro:  Acute (on Chronic?) Encephalopathy   - Persistent encephalopathy   - Attempting to reach family regarding prior baseline   - Concern for possible component of benzo OD vs withdrawals sx - yesterday received 2 IV doses of valium   - Can consider low dose valium taper if pt requires it - would start with 5mg daily as pt consistently fills 10mg PRN every 30 days with 30 pills (would possibly be taking 10mg daily at home) but as pt is refusing po meds will do symptom based PRN for now and monitor need  - Tox screen with benzos +, previously positive for amphetamines but not positive this admission     Cardiovascular/Hemodynamics:  Hypertension, Tachycardia, pAfib   - Persistent hypertension but tachycardia/RVR improved   - S/p diltiazem drip overnight, this morning taken off and transitioned to metoprolol 25mg BID as well as home lisinopril/HCTZ  - CHADSVas 5 if h/o stroke is true, 3 if not - continue full dose lovenox for now   - TTE with normal EF and indeterminate diastolic dysfunction     Respiratory:   Acute on Chronic Hypoxic and Hypercapnic RF, improved   COPD Exacerbation (?)   - Intubated PTA, extubated the next morning after hypercapnia resolved   - Continue home 2L NC   - Started on steroids and doxy for COPD exacerbation but pt is refusing medication - will complete 5 day course of doxy   - Continue duonebs as tolerated     GI/FEN:  F: None   E: K>4, Mg>2  N: Boost Plus TID with meals     Protein Calorie Malnutrition   - Add Boost to meals TID     ID:   HCV Ab positive   - Will send HCV quant with next lab draw   - Will need fu outpatient if positive     Renal:   No acute issues   - Renally dose all medication, avoid nephrotoxic agents  - Strict I/Os, daily weights    Heme/Onc:   No acute issues      Endocrine:  No acute issues   - SSI + Accuchecks  - Goal glucose 140-180 while in ICU    TSH: normal 1 year ago, recheck tomorrow        Critical Care Daily Checklist:    A: Awake: RASS Goal/Actual Goal: Awake  Actual: Salguero Agitation Sedation Scale (RASS): 0-1   B: Spontaneous Breathing Trial Performed? N/a   C: SAT & SBT Coordinated?  N/a                      D: Delirium: CAM-ICU Overall CAM-ICU:    E: Early Mobility Performed?    F: Feeding Goal: Po feeds with boost     AS: Analgesia/Sedation N/a   T: Thromboembolic Prophylaxis Lovenox (full dose)    H: HOB > 300 Yes   U: Stress Ulcer Prophylaxis (if needed) N /a   G: Glucose Control Controlled    B: Bowel Function Stool Occurrence: daily ; Regimen:    I: Indwelling Catheter (Lines & [place]) Necessity PIVx2, external urinary cath    D: De-escalation of Antimicrobials/Pharmacotherapies Doxy x5 days for copd exacerbation     Plan for the day/ETD Continued goals of care and monitoring     Code Status:  Family/Goals of Care: DNR          Patsy Mcneal MD  [facility]   06/03/2024 7:13 AM

## 2024-06-03 NOTE — PLAN OF CARE
Care Plan    Pt remains in ICU at this time. No acute events overnight. Pt oriented to self follows some commands. NSR on telemtry pt converted from Afib at 1930. Pt wore BiPAP until 0300, tolerated well. Straight cath'd overnight, 780ml output. K-phos ordered due to low K and phos on AM labs.  POC reviewed with pt and family. Questions and concerns addressed. Safety and infection precautions in place. See below and flowsheets for full assessment and VS info.     Neuro:  Alix Coma Scale  Best Eye Response: 3-->(E3) to speech  Best Motor Response: 5-->(M5) localizes pain  Best Verbal Response: 4-->(V4) confused  Alix Coma Scale Score: 12  Assessment Qualifiers: patient not sedated/intubated, no eye obstruction present  Pupil PERRLA: yes  24 hr Temp:  [98.2 °F (36.8 °C)-101.2 °F (38.4 °C)]      CV:  Rhythm: normal sinus rhythm  DVT prophylaxis: VTE Required Core Measure: Pharmacological prophylaxis initiated/maintained    Resp:     Vent Mode: A/CMV-VC  Set Rate: 24 BPM  Oxygen Concentration (%): 28  Vt Set: 300 mL  PEEP/CPAP: 4.7 cmH20    GI/:  GI prophylaxis: no  Diet/Nutrition Received: mechanical/dental soft, low saturated fat/low cholesterol, no added salt, 2 gram sodium  Last Bowel Movement: 06/02/24  Voiding Characteristics: external catheter, due to void   Intake/Output Summary (Last 24 hours) at 6/3/2024 0820  Last data filed at 6/3/2024 0630  Gross per 24 hour   Intake 452.2 ml   Output 780 ml   Net -327.8 ml       Labs/Accuchecks:  Recent Labs   Lab 06/03/24  0404   WBC 6.67   RBC 6.27*   HGB 14.0   HCT 46.3         Recent Labs   Lab 06/03/24  0404   *   K 2.8*   CO2 36*   CL 89*   BUN 22   CREATININE 0.6   ALKPHOS 34*   ALT 18   AST 27   BILITOT 1.0      Recent Labs   Lab 05/30/24 2046 05/31/24  0956   INR 1.1 1.3*   APTT 25.6  --       Recent Labs   Lab 05/30/24 2046 05/31/24  0832   CPK 57  --    TROPONINI  --  0.012       Electrolytes: Electrolytes replaced  Accuchecks:  "none    Gtts/LDAs:   sodium chloride 0.9%   Intravenous Continuous   Stopped at 06/03/24 0613    dilTIAZem  0-15 mg/hr Intravenous Continuous   Stopped at 06/02/24 2009       Lines/Drains/Airways       Drain  Duration             Female External Urinary Catheter w/ Suction 05/31/24 1113 2 days              Peripheral Intravenous Line  Duration                  Peripheral IV - Single Lumen 05/31/24 0022 20 G Anterior;Right Forearm 3 days         Peripheral IV - Single Lumen 06/02/24 1500 20 G;1 in Anterior;Right;Proximal Forearm <1 day                    Skin/Wounds  Bathing/Skin Care: incontinence care;linen changed (06/02/24 1345)  Wounds: No  Wound care consulted: No    Consults  Consults (From admission, onward)          Status Ordering Provider     Inpatient consult to Cardiology-[facility]  Once        Provider:  (Not yet assigned)    Acknowledged INNOCENT-RICHAR PIPER [place]     Pharmacy to dose Vancomycin consult  Once        Provider:  (Not yet assigned)   Placed in "And" Linked Group    Acknowledged INNOCENT-RICHAR PIPER [place]     Inpatient consult to Telemedicine - Psyc  Once        Provider:  (Not yet assigned)    Completed SURAJ-RICHAR PIPER [place]     Inpatient consult to Pulmonology  Once        Provider:  (Not yet assigned)    Completed INNOCENT-RICHAR PIPER N.          "

## 2024-06-03 NOTE — PLAN OF CARE
Pt seen in ICU, pt needs encouragement to participate in PO trials. Pt able to swallow but is confused. Will benefit from boost in between meals for added caloric support. Primary MD team may concern appetite stimulant.

## 2024-06-03 NOTE — PT/OT/SLP EVAL
Speech Language Pathology Evaluation  Bedside Swallow    Patient Name:  June Boykin   MRN:  [id number]  Admitting Diagnosis: Acute hypercapnic respiratory failure    Recommendations:              Diet recommendations:  Soft & Bite Sized Diet - IDDSI Level 6, Thin liquids - IDDSI Level 0   Aspiration Precautions: standard precautions, slow rate, encourage PO, boost in between meals    General Precautions: Standard, fall  Communication strategies:  redirect    Assessment:     June Boykin is a 69 y.o. female admitted with Acute hypercapnic respiratory failure who presents with an SLP diagnosis of ability to continue on current diet level.    Pt is confused which impacts how much intake she will consume.    History per MD      HPI: Per PILOT Boykin, June Lopez is a 69-year-old female who presents with past medical history significant for hypertension, COPD  on 2 L home oxygen, anxiety, previous methamphetamine abuse, chronic benzo use who presented last week with diarrhea, dehydration, frequent falls of 2 days. She was very somnolent on initial exam and subsequently found to be hypercapnic and hypotensive with a respiratory rate of 30. She was treated empirically for a COPD exacerbation caused by pneumonia. She required 3 days of BiPAP with eventual improvement. During her last hospitalization she had a very similar presentation with her initial pH being 7.2, pCO2 of 94, PO2 of 77.8, bicarb of 29.9 saturating 92% on venti mask 40% FiO2.     She is now re presenting with altered mental status after being found by her granddaughter for unresponsiveness. Her granddaughter called EMS and found that she was not responding to sternal rub. Initial pulse ox showed an SpO2 in the low 70s she was placed on nasal cannula with rapid improvement in her oxygenation. This was escalated to a non-rebreather in route to emergency department. Following her arrival she is unable to provide any history and her ABG  showed profound respiratory acidosis with hypercapnia greater than 90. Due to her altered mental status and concern for inability to protect airway, the decision was made to intubate her and treat her empirically for a COPD exacerbation. Her daughter does note that the patient took her diazepam earlier today and this was a concern during previous hospitalization. On arrival, her pupils were noted to be pinpoint and she received naloxone without any improvement.  Patient's initial vitals were notable for a temperature of 100.3°, heart rate of 112, respiratory rate of 29, blood pressure of 179/92. Initial GCS was 8. She was saturating 91% on 4 L of oxygen.  ABG shows acute worsening acidosis with a pH of 7.178, pCO2 of 95.2, bicarbonate of 27.6, PO2 of 73.6 prior to intubation.  Following intubation she was pulling at her tube and so she was initiated on propofol continuously at 35 mcg per kg per hour.  She was being transferred to [facility] for pulmonology/ICU level of care. At outside hospital she received 125 mg of methylprednisone, DuoNeb x1, 1 L of LR as well as piperacillin/tazobactam with blood cultures drawn x2.           Past Medical History:   Diagnosis Date    Abdominal pain 05/2024    Acid reflux 05/2024    ACP (advance care planning) 6/1/2024    Anxiety disorder, unspecified     At high risk for falls     Emphysema, unspecified     History of opioid abuse     Hypertension     On home oxygen therapy     2l/nc    Wears partial dentures     upper-none on lower    Weight loss 05/2024       Past Surgical History:   Procedure Laterality Date    ANKLE SURGERY Left     HYSTERECTOMY      TONSILLECTOMY         Social History: sharyn  Intubated 5/30, extubated 5/31  Modified Barium Swallow: none per EMR    Chest X-Rays: No definite focal airspace consolidation is since the prior study is identified.     Prior diet: unknown.    Subjective     Pt seen in room for swallow eval. Pt is awake and alert, states her name  "only.   Patient goals: "I don't want any of that." Re: food       Pain/Comfort:  Pain Rating 1: 0/10    Respiratory Status: room air     Objective:   Pt seen with breakfast tray.   Pt consumed a 4 oz cup of apple juice prior to entry.     Oral Musculature Evaluation  Oral Musculature: unable to assess due to poor participation/comprehension  Structural Abnormalities: pt is confused  Dentition: edentulous  Secretion Management: adequate  Mucosal Quality: good  Mandibular Strength and Mobility: WFL  Oral Labial Strength and Mobility: WFL  Volitional Cough: elicited  Volitional Swallow: timely swallow  Voice Prior to PO Intake: clear voice    Bedside Swallow Eval:   Consistencies Assessed:  Thin liquids apple juice   Puree cream of wheat  Solids scrambled eggs and diced sausage       Oral Phase:   WFL    Pharyngeal Phase:   no overt clinical signs/symptoms of aspiration  no overt clinical signs/symptoms of pharyngeal dysphagia  multiple spontaneous swallows    Compensatory Strategies  Multiple swallows    Treatment: SLP provided patient education on SLP recommendations, SLP role, s/s and risks of aspiration, safe swallow precautions, and POC. The patient did not v/u of all discussed due to her confusion.   Secure chat sent to team.     Goals:   Multidisciplinary Problems       SLP Goals       Not on file                    Plan:     Patient to be seen:      Plan of Care expires:     Plan of Care reviewed with:  patient   SLP Follow-Up:  No       Discharge recommendations:   (pending progress)   Barriers to Discharge:  none    Time Tracking:     SLP Treatment Date:   06/03/24  Speech Start Time:  0940  Speech Stop Time:  1005     Speech Total Time (min):  25 min    Billable Minutes: Eval Swallow and Oral Function 14 and Self Care/Home Management Training 11    06/03/2024         "

## 2024-06-03 NOTE — SUBJECTIVE & OBJECTIVE
"Interval History: awake, less confused today, tracking and following some command  Stepped up to ICU on 6/2  for atrial fib  and started on Cardizem drip and beta-blocker   Reported patient was off the drip overnight- added Cardizem p.o.-patient declining medication  Replace potassium   Patient stating, " I do not want to take many pills, I just want to go to my mother", [facility].   Requesting to see her sister    Discussed with granddaughter Caro  and updated her patient request to see her mother and sister and Caro confirm both at bed.  She also confirm patient's code status as DNR and open to discharge with hospice maybe by tomorrow since they have to fix patient's present abode today.  Questions and concerns were addressed      Review of Systems   Unable to perform ROS: Mental status change     Objective:     Vital Signs (Most Recent):  Temp: 98.1 °F (36.7 °C) (06/03/24 1345)  Pulse: 76 (06/03/24 1400)  Resp: (!) 45 (06/03/24 1400)  BP: (!) 194/89 (06/03/24 1400)  SpO2: 99 % (06/03/24 1400) Vital Signs (24h Range):  Temp:  [98.1 °F (36.7 °C)-98.7 °F (37.1 °C)] 98.1 °F (36.7 °C)  Pulse:  [] 76  Resp:  [20-77] 45  SpO2:  [83 %-100 %] 99 %  BP: (133-202)/() 194/89     Weight: 44 kg (97 lb)  Body mass index is 20.27 kg/m².    Intake/Output Summary (Last 24 hours) at 6/3/2024 1546  Last data filed at 6/3/2024 1400  Gross per 24 hour   Intake 1446.05 ml   Output 780 ml   Net 666.05 ml         Physical Exam  Constitutional:       Appearance: She is ill-appearing.      Comments:  Frail, thin   HENT:      Head: Normocephalic.   Cardiovascular:      Rate and Rhythm: Normal rate.   Pulmonary:      Breath sounds: Rales present. No wheezing.   Abdominal:      General: Bowel sounds are normal. There is no distension.      Palpations: Abdomen is soft.   Musculoskeletal:      Cervical back: Normal range of motion.      Right lower leg: No edema.      " "Left lower leg: No edema.   Neurological:      Comments: confuse             Significant Labs: A1C: No results for input(s): "HGBA1C" in the last 4320 hours.  ABGs:   Recent Labs   Lab 06/02/24  1430   PH 7.454*   PCO2 56.1*   HCO3 39.3*   POCSATURATED 94.9*   PO2 72.9*       Blood Culture:   Recent Labs   Lab 06/02/24  1154   LABBLOO No Growth to date       CBC:   Recent Labs   Lab 06/02/24  0626 06/03/24  0404   WBC 9.30 6.67   HGB 13.2 14.0   HCT 43.3 46.3    177     CMP:   Recent Labs   Lab 06/02/24  0626 06/03/24  0404   * 134*   K 3.5 2.8*   CL 90* 89*   CO2 29 36*   * 117*   BUN 20 22   CREATININE 0.6 0.6   CALCIUM 9.1 9.0   PROT 7.3 6.7   ALBUMIN 2.9* 2.8*   BILITOT 1.0 1.0   ALKPHOS 39* 34*   AST 36 27   ALT 19 18   ANIONGAP 11 9     Lipase:   No results for input(s): "LIPASE" in the last 48 hours.    Lipid Panel: No results for input(s): "CHOL", "HDL", "LDLCALC", "TRIG", "CHOLHDL" in the last 48 hours.  Magnesium:   Recent Labs   Lab 06/02/24 0626 06/03/24  0404   MG 1.5* 1.8     Troponin:   No results for input(s): "TROPONINI", "TROPONINIHS" in the last 48 hours.    TSH: No results for input(s): "TSH" in the last 4320 hours.  Urine Culture: No results for input(s): "LABURIN" in the last 48 hours.  Urine Studies:   No results for input(s): "COLORU", "APPEARANCEUA", "PHUR", "SPECGRAV", "PROTEINUA", "GLUCUA", "KETONESU", "BILIRUBINUA", "OCCULTUA", "NITRITE", "UROBILINOGEN", "LEUKOCYTESUR", "RBCUA", "WBCUA", "BACTERIA", "SQUAMEPITHEL", "HYALINECASTS" in the last 48 hours.    Invalid input(s): "WRIGHTSUR"      Significant Imaging: I have reviewed all pertinent imaging results/findings within the past 24 hours.  "

## 2024-06-03 NOTE — PLAN OF CARE
"Problem: Coping Ineffective  Goal: Effective Coping  Outcome: Not Progressing   - Patient asking for mother & sister majority of day (my understanding is that both of these family members are )   - Requesting family to visit; granddaughter plans to come tonight    Problem: Dysrhythmia  Goal: Normalized Cardiac Rhythm  Outcome: Progressing   - NSR    Problem: Comorbidity Management  Goal: Blood Pressure in Desired Range  Outcome: Not Progressing   - Labetalol IVP x 2 given today for SBP > 180    Problem: Comorbidity Management  Goal: Maintenance of COPD Symptom Control  Outcome: Not Progressing   - 0.5-1L O2 per NC   - Briefly required BiPAP this morning when asleep; however, patient woke up and removed mask    Problem: Confusion Chronic  Goal: Optimal Cognitive Function  Outcome: Not Progressing   - Remains confused    Problem: COPD (Chronic Obstructive Pulmonary Disease)  Goal: Improved Oral Intake  Outcome: Not Progressing   - Able to take a few bites of food throughout the day; but very poor appetite   - Able to get patient to drink 2-3 juices/lemonade over the course of the day    Problem: Infection  Goal: Absence of Infection Signs and Symptoms  Outcome: Progressing   - Received Zosyn as scheduled   - Doxycycline given IVPB given patient's poor PO intake & refusal to take pills (frequently states "please, no more medicines")    Problem: Restraint, Nonviolent  Goal: Absence of Harm or Injury  Outcome: Not Progressing   - Bilateral wrist restraints maintained for patient safety (patient pulled out PIV yesterday)   - Attempted to climb out of bed    Problem: Adult Inpatient Plan of Care  Goal: Plan of Care Review  Outcome: Progressing   - Electrolytes replaced this AM   - Labs tomorrow morning   - Planning for hospice/goals of care discussions tomorrow   - Patient did not urinate today --> bladder scan revealed 328 mL --> straight cath performed (300 mL urine output)      "

## 2024-06-03 NOTE — PLAN OF CARE
Recommendation:  1. Add Boost Plus TID  2. Encourage intake at meals  3. Monitor Weight/labs  4. RD to follow to moniotr PO intake    Goals:  Pt to consume 25-50% intake at meals by RD follow-up

## 2024-06-03 NOTE — PROGRESS NOTES
[facility] - Intensive Care  Adult Nutrition  Progress Note    SUMMARY       Recommendations    Recommendation:  1. Add Boost Plus TID  2. Encourage intake at meals  3. Monitor Weight/labs  4. RD to follow to moniotr PO intake    Goals:  Pt to consume 25-50% intake at meals by RD follow-up  Nutrition Goal Status: new  Communication of RD Recs:  (POC)    Assessment and Plan  Nutrition Problem  Inadequate energy intake    Related to (etiology):   Altered mental status    Signs and Symptoms (as evidenced by):   >10% wt loss x 6 months   Poor PO intake    Interventions:  Collaboration with other providers  Commercial beverage  Increased protein diet    Nutrition Diagnosis Status:   New    Malnutrition Assessment  Weight Loss (Malnutrition): greater than 10% in 6 months  Energy Intake (Malnutrition): less than 75% for greater than 7 days     Reason for Assessment  Reason For Assessment: identified at risk by screening criteria (MST)  Diagnosis:  (acute hypercapnic respiratory failure)  Relevant Medical History: acid reflux, emphysema, HTN, anxiety, hx of opiod abuse, upper dentures, wt loss  General Information Comments: Pt currently on cardiac soft & bite sized diet. Spoke with SLP, pt able to eat but is very confused- only ate 4 bites andd rank 1 juice cup. When asked a questrion, pt just repeats her name. Jean Marie 11- skin intact. Recetn wt change of 21 lb wt loss x 6 months. Unable to assess NPFE at this time, pt with SLP.  Nutrition Discharge Planning: d/c to be determined    Nutrition Risk Screen  Nutrition Risk Screen: reduced oral intake over the last month    Nutrition/Diet History  Food Preferences: no cultural or [employer]ism food preferences identified at this time  Spiritual, Cultural Beliefs, Druze Practices, Values that Affect Care: no  Factors Affecting Nutritional Intake: behavioral (mealtime), impaired cognitive status/motor control    Anthropometrics  Temp: 98.7 °F (37.1 °C)  Height Method:  "Estimated  Height: 4' 10" (147.3 cm)  Height (inches): 58 in  Weight Method: Bed Scale  Weight: 44 kg (97 lb)  Weight (lb): 97 lb  Ideal Body Weight (IBW), Female: 90 lb  % Ideal Body Weight, Female (lb): 107.78 %  BMI (Calculated): 20.3  BMI Grade: 18.5-24.9 - normal  Usual Body Weight (UBW), k.5 kg  % Usual Body Weight: 82.41  % Weight Change From Usual Weight: -17.76 %     Lab/Procedures/Meds  Pertinent Labs Reviewed: reviewed  Pertinent Labs Comments: RBC 6.27H, MCV 74L, MCH 22.3L, MCHC 30.2L, RDW 18.2L, Sodium 134L, potassium 2.8L, Chloride 89L, CO2 36H, Glucose 117H, Phosphorus 1.8L, ALP 34L, Albumin 2.8L  Pertinent Medications Reviewed: reviewed  Pertinent Medications Comments: doxycycline, hydralazine, lisinopril, metoprolol, zosyn, prednisone, vancocin    Estimated/Assessed Needs  Weight Used For Calorie Calculations: 44 kg (97 lb)  Energy Calorie Requirements (kcal): 3174-9200 kcal (30-35 kcal/kg)  Energy Need Method: Kcal/kg  Protein Requirements: 44-57 g (1.0-1.3 g/kg)  Weight Used For Protein Calculations: 44 kg (97 lb)  Estimated Fluid Requirement Method: RDA Method  RDA Method (mL): 1319     Nutrition Prescription Ordered  Current Diet Order: cadiac soft & bite sized    Evaluation of Received Nutrient/Fluid Intake  I/O: 217.7/0  Energy Calories Required: not meeting needs  Protein Required: not meeting needs  Fluid Required: not meeting needs  Comments: LBM: 6/2  % Intake of Estimated Energy Needs: 0 - 25 %  % Meal Intake: 0 - 25 %    Nutrition Risk  Level of Risk/Frequency of Follow-up:  (2x weekly)     Monitor and Evaluation  Food and Nutrient Intake: energy intake, food and beverage intake  Food and Nutrient Adminstration: diet order  Physical Activity and Function: nutrition-related ADLs and IADLs  Anthropometric Measurements: weight change, body mass index  Biochemical Data, Medical Tests and Procedures: electrolyte and renal panel, gastrointestinal profile, glucose/endocrine profile, " inflammatory profile, lipid profile  Nutrition-Focused Physical Findings: overall appearance     Nutrition Follow-Up  RD Follow-up?: Yes

## 2024-06-03 NOTE — PT/OT/SLP PROGRESS
"Occupational Therapy      Patient Name:  June Boykin   MRN:  [id number]    Patient not seen today secondary to  pt unable to actively participate in evaluation (confused and agitated) and resisting getting OOB ("I want to wait for my sister".  Pt is not aware of place or situation.). Will follow-up at next scheduled visit.    Cailin Antonio OT  6/3/2024  "

## 2024-06-03 NOTE — ASSESSMENT & PLAN NOTE
- presented with AMS; initial EKG SHEA with subsequent EKG with afib with RVR with HR up to 150s  - started on IV Cardizem drip and on oral Metoprolol 25mg po BID; reports of spontaneous conversion yesterday per bedside RN; Cardizem drip off currently and remains NSR with HR 60s-70s  - recent use of IV Labetalol due to elevated SBP  - remain off IV Cardizem for now; continue Metoprolol 25mg po BID; monitor on telemetry; echo pending; RVR likely related to stress/infectious etiology as well as polypharmacy; lying flat with no acute distress  - CHADSVAS score 3-5 (HTN, age, female ?CVA) currently on DVT prophylaxis; not certain best candidate for OAC given falls as well as other non cardiac issues
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
## 2024-06-03 NOTE — CONSULTS
[facility] - Intensive Care  Cardiology  Consult Note    Patient Name: June Boykin  MRN: [id number]  Admission Date: 5/31/2024  Hospital Length of Stay: 3 days  Code Status: DNR   Attending Provider: Mariella Hobbs*   Consulting Provider: LAMONTE Diop ANP  Primary Care Physician: Mauricio Pierre MD  Principal Problem:Acute hypercapnic respiratory failure    Patient information was obtained from past medical records and ER records.     Inpatient consult to Cardiology-[facility]  Consult performed by: Dayami Flores APRN, ANP  Consult ordered by: Mariella Hobbs MD  Reason for consult: afib with RVR        Subjective:     Chief Complaint:  AMS      HPI:   70yo female with polypharmacy, schizoaffective disorder, HLP, MDD, HTN, COPD on home O2, PNA, afib, acute hypercapneic respiratory failure who presented to the ER with AMS. She was recently discharged following an admission for dehydration, falls, COPD exacerbation and PNA. She was seen on AM rounds with Dr. Lim while resting in bed with eyes closed. She was arousable but unstimulated falls asleep therefore her HPI was obtained from her chart. She returned to the ER on 5/31 due to AMS per her granddaughter. EMS was called and her sats were noted to be in the 70s with improvement of O2 although up titration to NRB needed. Her granddaughter reported frequent use of Valium at home. Tox screen +benzos ABG with pCO2 96 with improvement to 56.1 on repeat. Initial EKG SHEA with subsequent EKG with afib with RVR with  placed on IV Cardizem drip with conversion to NSR and remains in NSR. Labs CBC WNL this AM. BMp with K+ 2.8 replacement in process BUN 22 creatinine 0.6 Lipase 84 Troponin 0.012 Blood cultures NGTD CT head done and reviewed. Admitted to [facility] and Cardiology consulted for evaluation of afib with RVR     Past Medical History:   Diagnosis Date    Abdominal pain 05/2024    Acid reflux 05/2024     ACP (advance care planning) 6/1/2024    Anxiety disorder, unspecified     At high risk for falls     Emphysema, unspecified     History of opioid abuse     Hypertension     On home oxygen therapy     2l/nc    Wears partial dentures     upper-none on lower    Weight loss 05/2024       Past Surgical History:   Procedure Laterality Date    ANKLE SURGERY Left     HYSTERECTOMY      TONSILLECTOMY         Review of patient's allergies indicates:  No Known Allergies    No current facility-administered medications on file prior to encounter.     Current Outpatient Medications on File Prior to Encounter   Medication Sig    albuterol (PROVENTIL/VENTOLIN HFA) 90 mcg/actuation inhaler Inhale into the lungs.    amoxicillin-clavulanate 875-125mg (AUGMENTIN) 875-125 mg per tablet Take 1 tablet by mouth every 12 (twelve) hours.    amoxicillin-clavulanate 875-125mg (AUGMENTIN) 875-125 mg per tablet Take 1 tablet by mouth 2 (two) times daily.    aspirin 81 MG Chew Take 81 mg by mouth once daily. Stop 05/20/2024 per [facility] office    budesonide-formoterol 160-4.5 mcg (SYMBICORT) 160-4.5 mcg/actuation HFAA 2 puffs Inhalation Twice a day    diazePAM (VALIUM) 10 MG Tab Take 10 mg by mouth every 6 (six) hours as needed.    docusate sodium (COLACE) 100 MG capsule Take 100 mg by mouth 2 (two) times daily.    fluticasone propionate (FLONASE) 50 mcg/actuation nasal spray     guaiFENesin (MUCINEX) 1,200 mg Ta12 Take 1,200 mg by mouth 2 (two) times a day. for 10 days    lisinopriL-hydrochlorothiazide (PRINZIDE,ZESTORETIC) 20-25 mg Tab     potassium chloride (MICRO-K) 10 MEQ CpSR Take 10 mEq by mouth once.    pravastatin (PRAVACHOL) 20 MG tablet     QUEtiapine (SEROQUEL) 50 MG tablet Take 1 tablet (50 mg total) by mouth every evening.    QUEtiapine (SEROQUEL) 50 MG tablet Take 1 tablet (50 mg total) by mouth nightly.     Family History       Problem Relation (Age of Onset)    Diabetes Father    No Known Problems Mother, Brother,  Brother, Brother    Pancreatic cancer Father    Parkinsonism Sister    Stroke Father          Tobacco Use    Smoking status: Former     Current packs/day: 1.00     Types: Cigarettes    Smokeless tobacco: Never    Tobacco comments:     Quit 2 years ago   Substance and Sexual Activity    Alcohol use: No    Drug use: Not Currently     Types: Oxycodone, Hydrocodone    Sexual activity: Not on file     Comment:      Review of Systems   Unable to perform ROS: Other     Objective:     Vital Signs (Most Recent):  Temp: 98.7 °F (37.1 °C) (06/03/24 0800)  Pulse: 73 (06/03/24 0945)  Resp: (!) 42 (06/03/24 0945)  BP: (!) 185/95 (06/03/24 0940)  SpO2: 97 % (06/03/24 0945) Vital Signs (24h Range):  Temp:  [98.2 °F (36.8 °C)-101.2 °F (38.4 °C)] 98.7 °F (37.1 °C)  Pulse:  [] 73  Resp:  [20-77] 42  SpO2:  [84 %-100 %] 97 %  BP: (133-202)/() 185/95     Weight: 44 kg (97 lb)  Body mass index is 20.27 kg/m².    SpO2: 97 %         Intake/Output Summary (Last 24 hours) at 6/3/2024 0952  Last data filed at 6/3/2024 0950  Gross per 24 hour   Intake 789.9 ml   Output 780 ml   Net 9.9 ml       Lines/Drains/Airways       Drain  Duration             Female External Urinary Catheter w/ Suction 05/31/24 1113 2 days              Peripheral Intravenous Line  Duration                  Peripheral IV - Single Lumen 05/31/24 0022 20 G Anterior;Right Forearm 3 days         Peripheral IV - Single Lumen 06/02/24 1500 20 G;1 in Anterior;Right;Proximal Forearm <1 day                     Physical Exam  Constitutional:       General: She is not in acute distress.     Appearance: She is well-developed. She is ill-appearing.   Cardiovascular:      Rate and Rhythm: Normal rate and regular rhythm.      Heart sounds: No murmur heard.     No gallop.   Pulmonary:      Effort: Pulmonary effort is normal. No respiratory distress.      Breath sounds: Normal breath sounds. No wheezing.   Abdominal:      General: Bowel sounds are normal. There is no  "distension.      Palpations: Abdomen is soft.      Tenderness: There is no abdominal tenderness.   Skin:     General: Skin is warm and dry.          Significant Labs: BMP:   Recent Labs   Lab 06/02/24  0626 06/03/24  0404   * 117*   * 134*   K 3.5 2.8*   CL 90* 89*   CO2 29 36*   BUN 20 22   CREATININE 0.6 0.6   CALCIUM 9.1 9.0   MG 1.5* 1.8   , CBC   Recent Labs   Lab 06/02/24  0626 06/03/24  0404   WBC 9.30 6.67   HGB 13.2 14.0   HCT 43.3 46.3    177   , and Troponin No results for input(s): "TROPONINI" in the last 48 hours.    Significant Imaging: Echocardiogram: Transthoracic echo (TTE) complete (Cupid Only): No results found for this or any previous visit.  Assessment and Plan:     Paroxysmal atrial fibrillation  - presented with AMS; initial EKG SHEA with subsequent EKG with afib with RVR with HR up to 150s  - started on IV Cardizem drip and on oral Metoprolol 25mg po BID; reports of spontaneous conversion yesterday per bedside RN; Cardizem drip off currently and remains NSR with HR 60s-70s  - recent use of IV Labetalol due to elevated SBP  - remain off IV Cardizem for now; continue Metoprolol 25mg po BID; monitor on telemetry; echo pending; RVR likely related to stress/infectious etiology as well as polypharmacy; lying flat with no acute distress  - CHADSVAS score 3-5 (HTN, age, female ?CVA) currently on DVT prophylaxis; not certain best candidate for OAC given falls as well as other non cardiac issues     Hypertension  - SBO 140s-160s overnight  - on Lisinopril HCTZ as an outpatient and continued while admitted  - BP upon admission 170s-190s; added Metoprolol and Hydralazine to home medication regimen  - RN reports intermittent taking of medications; goal BP less than 130/80  - BP not fully controlled; continue oral medication regimen and monitor BP if remains above goal despite consistent administration then will need to adjust regimen         VTE Risk Mitigation (From admission, onward) "           Ordered     enoxaparin injection 50 mg  Every 12 hours         06/02/24 1551     IP VTE HIGH RISK PATIENT  Once         05/31/24 0747     Place sequential compression device  Until discontinued         05/31/24 0747                    Thank you for your consult. I will follow-up with patient. Please contact us if you have any additional questions.    LAMONTE Diop, ANP  Cardiology   [facility] - Intensive Care

## 2024-06-03 NOTE — PROGRESS NOTES
[facility] Medicine  Progress Note    Patient Name: June Boykin  MRN: [id number]  Patient Class: IP- Inpatient   Admission Date: 5/31/2024  Length of Stay: 3 days  Attending Physician: Mariella Hobbs*  Primary Care Provider: Mauricio Pierre MD        Subjective:     Principal Problem:Acute hypercapnic respiratory failure        HPI:  Per     June Boykin is a 69-year-old female who presents with past medical history significant for hypertension, COPD  on 2 L home oxygen, anxiety, previous methamphetamine abuse, chronic benzo use who presented last week with diarrhea, dehydration, frequent falls of 2 days. She was very somnolent on initial exam and subsequently found to be hypercapnic and hypotensive with a respiratory rate of 30. She was treated empirically for a COPD exacerbation caused by pneumonia. She required 3 days of BiPAP with eventual improvement. During her last hospitalization she had a very similar presentation with her initial pH being 7.2, pCO2 of 94, PO2 of 77.8, bicarb of 29.9 saturating 92% on venti mask 40% FiO2.     She is now re presenting with altered mental status after being found by her granddaughter for unresponsiveness. Her granddaughter called EMS and found that she was not responding to sternal rub. Initial pulse ox showed an SpO2 in the low 70s she was placed on nasal cannula with rapid improvement in her oxygenation. This was escalated to a non-rebreather in route to emergency department. Following her arrival she is unable to provide any history and her ABG showed profound respiratory acidosis with hypercapnia greater than 90. Due to her altered mental status and concern for inability to protect airway, the decision was made to intubate her and treat her empirically for a COPD exacerbation. Her daughter does note that the patient took her diazepam earlier today and this was a concern during previous hospitalization. On  "arrival, her pupils were noted to be pinpoint and she received naloxone without any improvement.  Patient's initial vitals were notable for a temperature of 100.3°, heart rate of 112, respiratory rate of 29, blood pressure of 179/92. Initial GCS was 8. She was saturating 91% on 4 L of oxygen.  ABG shows acute worsening acidosis with a pH of 7.178, pCO2 of 95.2, bicarbonate of 27.6, PO2 of 73.6 prior to intubation.  Following intubation she was pulling at her tube and so she was initiated on propofol continuously at 35 mcg per kg per hour.  She was being transferred to [facility] for pulmonology/ICU level of care. At outside hospital she received 125 mg of methylprednisone, DuoNeb x1, 1 L of LR as well as piperacillin/tazobactam with blood cultures drawn x2.        Overview/Hospital Course:  No notes on file    Interval History: awake, less confused today, tracking and following some command  Stepped up to ICU on 6/2  for atrial fib  and started on Cardizem drip and beta-blocker   Reported patient was off the drip overnight- added Cardizem p.o.-patient declining medication  Replace potassium   Patient stating, " I do not want to take many pills, I just want to go to my mother", [facility] her day.   Requesting to see her sister    Discussed with granddaughter Caro  and updated her patient request to see her mother and sister and Caro confirm both at bed.  She also confirm patient's code status as DNR and open to discharge with hospice maybe by tomorrow since they have to fix patient's present abode today.  Questions and concerns were addressed      Review of Systems   Unable to perform ROS: Mental status change     Objective:     Vital Signs (Most Recent):  Temp: 98.1 °F (36.7 °C) (06/03/24 1345)  Pulse: 76 (06/03/24 1400)  Resp: (!) 45 (06/03/24 1400)  BP: (!) 194/89 (06/03/24 1400)  SpO2: 99 % (06/03/24 1400) Vital Signs (24h Range):  Temp:  [98.1 °F (36.7 " "°C)-98.7 °F (37.1 °C)] 98.1 °F (36.7 °C)  Pulse:  [] 76  Resp:  [20-77] 45  SpO2:  [83 %-100 %] 99 %  BP: (133-202)/() 194/89     Weight: 44 kg (97 lb)  Body mass index is 20.27 kg/m².    Intake/Output Summary (Last 24 hours) at 6/3/2024 1546  Last data filed at 6/3/2024 1400  Gross per 24 hour   Intake 1446.05 ml   Output 780 ml   Net 666.05 ml         Physical Exam  Constitutional:       Appearance: She is ill-appearing.      Comments:  Frail, thin   HENT:      Head: Normocephalic.   Cardiovascular:      Rate and Rhythm: Normal rate.   Pulmonary:      Breath sounds: Rales present. No wheezing.   Abdominal:      General: Bowel sounds are normal. There is no distension.      Palpations: Abdomen is soft.   Musculoskeletal:      Cervical back: Normal range of motion.      Right lower leg: No edema.      Left lower leg: No edema.   Neurological:      Comments: confuse             Significant Labs: A1C: No results for input(s): "HGBA1C" in the last 4320 hours.  ABGs:   Recent Labs   Lab 06/02/24  1430   PH 7.454*   PCO2 56.1*   HCO3 39.3*   POCSATURATED 94.9*   PO2 72.9*       Blood Culture:   Recent Labs   Lab 06/02/24  1154   LABBLOO No Growth to date       CBC:   Recent Labs   Lab 06/02/24  0626 06/03/24  0404   WBC 9.30 6.67   HGB 13.2 14.0   HCT 43.3 46.3    177     CMP:   Recent Labs   Lab 06/02/24  0626 06/03/24  0404   * 134*   K 3.5 2.8*   CL 90* 89*   CO2 29 36*   * 117*   BUN 20 22   CREATININE 0.6 0.6   CALCIUM 9.1 9.0   PROT 7.3 6.7   ALBUMIN 2.9* 2.8*   BILITOT 1.0 1.0   ALKPHOS 39* 34*   AST 36 27   ALT 19 18   ANIONGAP 11 9     Lipase:   No results for input(s): "LIPASE" in the last 48 hours.    Lipid Panel: No results for input(s): "CHOL", "HDL", "LDLCALC", "TRIG", "CHOLHDL" in the last 48 hours.  Magnesium:   Recent Labs   Lab 06/02/24  0626 06/03/24  0404   MG 1.5* 1.8     Troponin:   No results for input(s): "TROPONINI", "TROPONINIHS" in the last 48 hours.    TSH: No " "results for input(s): "TSH" in the last 4320 hours.  Urine Culture: No results for input(s): "LABURIN" in the last 48 hours.  Urine Studies:   No results for input(s): "COLORU", "APPEARANCEUA", "PHUR", "SPECGRAV", "PROTEINUA", "GLUCUA", "KETONESU", "BILIRUBINUA", "OCCULTUA", "NITRITE", "UROBILINOGEN", "LEUKOCYTESUR", "RBCUA", "WBCUA", "BACTERIA", "SQUAMEPITHEL", "HYALINECASTS" in the last 48 hours.    Invalid input(s): "WRIGHTSUR"      Significant Imaging: I have reviewed all pertinent imaging results/findings within the past 24 hours.    Assessment/Plan:      * Acute hypercapnic respiratory failure  Patient with Hypercapnic Respiratory failure which is Acute on chronic.  she is not on home oxygen. Supplemental oxygen was provided and noted-      .   Signs/symptoms of respiratory failure include- tachypnea, increased work of breathing, respiratory distress, and lethargy. Contributing diagnoses includes - COPD and Pneumonia Labs and images were reviewed. Patient Has recent ABG, which has been reviewed. Will treat underlying causes and adjust management of respiratory failure as follows-  intubated on arrival  Consult Pulmonary critical for vent management   Appreciate pulmonary team extubated on 05/31 2 BiPAP -continue to wean    AMS (altered mental status)    Intubated in the ED  Extubated 5/31 to BiPAP and then to NC  Persistent confusion    Paroxysmal atrial fibrillation  Patient with Paroxysmal (<7 days) atrial fibrillation which is uncontrolled currently with Beta Blocker and Calcium Channel Blocker. Patient is currently in atrial fibrillation.[id number] Score: 2. HASBLED Score: . Anticoagulation indicated. Anticoagulation done with lovenox .  Consult cardiology -therapeutic Lovenox, continue Cardizem gtt  Continue Metoprolol bid - uptitrate as needed    ACP (advance care planning)    Advance Care Planning    Date: 06/01/2024    Code Status  In light of the patients advanced and life limiting illness,I engaged " the the family in a voluntary conversation about the patient's preferences for care  at the very end of life. The patient wishes to have a natural, peaceful death.  Along those lines, the patient does not wish to have CPR or other invasive treatments performed when her heart and/or breathing stops. I communicated to the family that a DNR order would be placed in her medical record to reflect this preference.  I spent a total of 20 minutes engaging the patient in this advance care planning discussion.   Patient grand daughter Caro  reported patient sister had updated family patient is a DNR.      Advance Care Planning    Date: 06/03/2024    [facility]  I engaged the family in a voluntary conversation about advance care planning and we specifically addressed what the goals of care would be moving forward, in light of the patient's change in clinical status, specifically  patient declining treatment and progressive clinical decline.  We did specifically address the patient's likely prognosis, which is poor.  We explored the patient's values and preferences for future care.  The family endorses that what is most important right now is to focus on spending time at home, quality of life, even if it means sacrificing a little time, and comfort and QOL     Accordingly, we have decided that the best plan to meet the patient's goals includes continuing with treatment    I did explain the role for hospice care at this stage of the patient's illness, including its ability to help the patient live with the best quality of life possible.  We will be making a hospice referral.    I spent a total of 25 minutes engaging the patient in this advance care planning discussion.       Discussed with granddaughter Caro  and updated her patient request to see her mother and sister and Caro confirm both at bed.  She also confirm patient's code status as DNR and open to discharge with hospice maybe by tomorrow since they have to fix  patient's present abode today.  Questions and concerns were addressed        Hypertension  Chronic,    Resume home meds as BP permits    Temp:  [79 °F (26.1 °C)-100.3 °F (37.9 °C)]   Pulse:  []   Resp:  [8-49]   BP: ()/()   SpO2:  [77 %-100 %] .   Home meds for hypertension were reviewed and noted below.   Hypertension Medications               lisinopriL-hydrochlorothiazide (PRINZIDE,ZESTORETIC) 20-25 mg Tab             While in the hospital, will manage blood pressure as follows; Continue home antihypertensive regimen    Will utilize p.r.n. blood pressure medication only if patient's blood pressure greater than 140/90 and she develops symptoms such as worsening chest pain or shortness of breath.    COPD exacerbation  Patient's COPD is with exacerbation noted by continued dyspnea and worsening of baseline hypoxia currently.  Patient is currently off COPD Pathway. Continue scheduled inhalers Steroids, Antibiotics, and Supplemental oxygen and monitor respiratory status closely.    Checks x-ray:No acute findings.    Continue IV steroid  Intubated  DuoNebs  Antibiotics    Pneumonia   History of recent pneumonia treatment     Checks x-ray with no acute finding    Antibiotics (From admission, onward)      Start     Stop Route Frequency Ordered    05/31/24 1130  doxycycline tablet 100 mg         -- OG Every 12 hours 05/31/24 1020    05/31/24 0915  mupirocin 2 % ointment         06/05/24 0859 Nasl 2 times daily 05/31/24 0807            Microbiology Results (last 7 days)       ** No results found for the last 168 hours. **            Polypharmacy   History of substance abuse  UDS 7 days ago positive for benzos, amphetamines  Consult psych once medically stable   Daughter reported patient takes lot of Valium -monitor for benzos withdrawal      Schizoaffective disorder   Recurrent major depression   Consult psych once patient is medically stable        VTE Risk Mitigation (From admission, onward)            Ordered     enoxaparin injection 50 mg  Every 12 hours         06/02/24 1551     IP VTE HIGH RISK PATIENT  Once         05/31/24 0747     Place sequential compression device  Until discontinued         05/31/24 0747                    Discharge Planning   LACY:      Code Status: DNR   Is the patient medically ready for discharge?:     Reason for patient still in hospital (select all that apply): Patient trending condition  Discharge Plan A: Home Health            Critical care time spent on the evaluation and treatment of severe organ dysfunction, review of pertinent labs and imaging studies, discussions with consulting providers and discussions with patient/family: 35 minutes.      Mariella Hobbs MD  Department of Hospital Medicine   [place] - Intensive Care

## 2024-06-03 NOTE — ASSESSMENT & PLAN NOTE
Advance Care Planning     Date: 06/01/2024    Code Status  In light of the patients advanced and life limiting illness,I engaged the the family in a voluntary conversation about the patient's preferences for care  at the very end of life. The patient wishes to have a natural, peaceful death.  Along those lines, the patient does not wish to have CPR or other invasive treatments performed when her heart and/or breathing stops. I communicated to the family that a DNR order would be placed in her medical record to reflect this preference.  I spent a total of 20 minutes engaging the patient in this advance care planning discussion.   Patient grand daughter Caro  reported patient sister had updated family patient is a DNR.      Advance Care Planning     Date: 06/03/2024    [facility]  I engaged the family in a voluntary conversation about advance care planning and we specifically addressed what the goals of care would be moving forward, in light of the patient's change in clinical status, specifically  patient declining treatment and progressive clinical decline.  We did specifically address the patient's likely prognosis, which is poor.  We explored the patient's values and preferences for future care.  The family endorses that what is most important right now is to focus on spending time at home, quality of life, even if it means sacrificing a little time, and comfort and QOL     Accordingly, we have decided that the best plan to meet the patient's goals includes continuing with treatment    I did explain the role for hospice care at this stage of the patient's illness, including its ability to help the patient live with the best quality of life possible.  We will be making a hospice referral.    I spent a total of 25 minutes engaging the patient in this advance care planning discussion.       Discussed with granddaughter Caro  and updated her patient request to see her mother and sister and Caro confirm both at  bed.  She also confirm patient's code status as DNR and open to discharge with hospice maybe by tomorrow since they have to fix patient's present abode today.  Questions and concerns were addressed

## 2024-06-03 NOTE — NURSING
Patient requesting family, tearful.  Granddaughter, Caro, called & patient spoke with her via telephone.  States she will be coming to see the patient tonight.  Patient aware, but confused.

## 2024-06-03 NOTE — HPI
68yo female with polypharmacy, schizoaffective disorder, HLP, MDD, HTN, COPD on home O2, PNA, afib, acute hypercapneic respiratory failure who presented to the ER with AMS. She was recently discharged following an admission for dehydration, falls, COPD exacerbation and PNA. She was seen on AM rounds with Dr. Lim while resting in bed with eyes closed. She was arousable but unstimulated falls asleep therefore her HPI was obtained from her chart. She returned to the ER on 5/31 due to AMS per her granddaughter. EMS was called and her sats were noted to be in the 70s with improvement of O2 although up titration to NRB needed. Her granddaughter reported frequent use of Valium at home. Tox screen +benzos ABG with pCO2 96 with improvement to 56.1 on repeat. Initial EKG SHEA with subsequent EKG with afib with RVR with  placed on IV Cardizem drip with conversion to NSR and remains in NSR. Labs CBC WNL this AM. BMp with K+ 2.8 replacement in process BUN 22 creatinine 0.6 Lipase 84 Troponin 0.012 Blood cultures NGTD CT head done and reviewed. Admitted to [facility] Medicine and Cardiology consulted for evaluation of afib with RVR

## 2024-06-03 NOTE — ASSESSMENT & PLAN NOTE
- SBO 140s-160s overnight  - on Lisinopril HCTZ as an outpatient and continued while admitted  - BP upon admission 170s-190s; added Metoprolol and Hydralazine to home medication regimen  - RN reports intermittent taking of medications; goal BP less than 130/80  - BP not fully controlled; continue oral medication regimen and monitor BP if remains above goal despite consistent administration then will need to adjust regimen

## 2024-06-04 LAB
ALBUMIN SERPL BCP-MCNC: 3.3 G/DL (ref 3.5–5.2)
ALP SERPL-CCNC: 39 U/L (ref 55–135)
ALT SERPL W/O P-5'-P-CCNC: 16 U/L (ref 10–44)
ANION GAP SERPL CALC-SCNC: 10 MMOL/L (ref 8–16)
AST SERPL-CCNC: 34 U/L (ref 10–40)
BASOPHILS # BLD AUTO: 0 K/UL (ref 0–0.2)
BASOPHILS NFR BLD: 0 % (ref 0–1.9)
BILIRUB SERPL-MCNC: 1 MG/DL (ref 0.1–1)
BUN SERPL-MCNC: 16 MG/DL (ref 8–23)
CALCIUM SERPL-MCNC: 9.6 MG/DL (ref 8.7–10.5)
CHLORIDE SERPL-SCNC: 87 MMOL/L (ref 95–110)
CO2 SERPL-SCNC: 34 MMOL/L (ref 23–29)
CREAT SERPL-MCNC: 0.6 MG/DL (ref 0.5–1.4)
DIFFERENTIAL METHOD BLD: ABNORMAL
EOSINOPHIL # BLD AUTO: 0 K/UL (ref 0–0.5)
EOSINOPHIL NFR BLD: 0 % (ref 0–8)
ERYTHROCYTE [DISTWIDTH] IN BLOOD BY AUTOMATED COUNT: 18.1 % (ref 11.5–14.5)
EST. GFR  (NO RACE VARIABLE): >60 ML/MIN/1.73 M^2
GLUCOSE SERPL-MCNC: 105 MG/DL (ref 70–110)
HCT VFR BLD AUTO: 46.1 % (ref 37–48.5)
HGB BLD-MCNC: 13.8 G/DL (ref 12–16)
IMM GRANULOCYTES # BLD AUTO: 0.02 K/UL (ref 0–0.04)
IMM GRANULOCYTES NFR BLD AUTO: 0.4 % (ref 0–0.5)
LYMPHOCYTES # BLD AUTO: 1.2 K/UL (ref 1–4.8)
LYMPHOCYTES NFR BLD: 26.4 % (ref 18–48)
MAGNESIUM SERPL-MCNC: 2 MG/DL (ref 1.6–2.6)
MCH RBC QN AUTO: 22.3 PG (ref 27–31)
MCHC RBC AUTO-ENTMCNC: 29.9 G/DL (ref 32–36)
MCV RBC AUTO: 75 FL (ref 82–98)
MONOCYTES # BLD AUTO: 1.1 K/UL (ref 0.3–1)
MONOCYTES NFR BLD: 23.2 % (ref 4–15)
NEUTROPHILS # BLD AUTO: 2.3 K/UL (ref 1.8–7.7)
NEUTROPHILS NFR BLD: 50 % (ref 38–73)
NRBC BLD-RTO: 0 /100 WBC
OHS QRS DURATION: 74 MS
OHS QRS DURATION: 78 MS
OHS QRS DURATION: 80 MS
OHS QTC CALCULATION: 440 MS
OHS QTC CALCULATION: 457 MS
OHS QTC CALCULATION: 459 MS
PHOSPHATE SERPL-MCNC: 2.9 MG/DL (ref 2.7–4.5)
PLATELET # BLD AUTO: 157 K/UL (ref 150–450)
PMV BLD AUTO: ABNORMAL FL (ref 9.2–12.9)
POTASSIUM SERPL-SCNC: 3.3 MMOL/L (ref 3.5–5.1)
PROT SERPL-MCNC: 7.8 G/DL (ref 6–8.4)
RBC # BLD AUTO: 6.18 M/UL (ref 4–5.4)
SODIUM SERPL-SCNC: 131 MMOL/L (ref 136–145)
TSH SERPL DL<=0.005 MIU/L-ACNC: 2.21 UIU/ML (ref 0.4–4)
WBC # BLD AUTO: 4.69 K/UL (ref 3.9–12.7)

## 2024-06-04 PROCEDURE — 51798 US URINE CAPACITY MEASURE: CPT

## 2024-06-04 PROCEDURE — 99498 ADVNCD CARE PLAN ADDL 30 MIN: CPT | Mod: ,,, | Performed by: STUDENT IN AN ORGANIZED HEALTH CARE EDUCATION/TRAINING PROGRAM

## 2024-06-04 PROCEDURE — 25000003 PHARM REV CODE 250: Performed by: FAMILY MEDICINE

## 2024-06-04 PROCEDURE — 99900035 HC TECH TIME PER 15 MIN (STAT)

## 2024-06-04 PROCEDURE — 99233 SBSQ HOSP IP/OBS HIGH 50: CPT | Mod: ,,, | Performed by: STUDENT IN AN ORGANIZED HEALTH CARE EDUCATION/TRAINING PROGRAM

## 2024-06-04 PROCEDURE — 63600175 PHARM REV CODE 636 W HCPCS: Performed by: STUDENT IN AN ORGANIZED HEALTH CARE EDUCATION/TRAINING PROGRAM

## 2024-06-04 PROCEDURE — 80053 COMPREHEN METABOLIC PANEL: CPT | Performed by: FAMILY MEDICINE

## 2024-06-04 PROCEDURE — 63600175 PHARM REV CODE 636 W HCPCS: Performed by: FAMILY MEDICINE

## 2024-06-04 PROCEDURE — 84100 ASSAY OF PHOSPHORUS: CPT | Performed by: FAMILY MEDICINE

## 2024-06-04 PROCEDURE — 27000221 HC OXYGEN, UP TO 24 HOURS

## 2024-06-04 PROCEDURE — 94660 CPAP INITIATION&MGMT: CPT

## 2024-06-04 PROCEDURE — 25000003 PHARM REV CODE 250: Performed by: STUDENT IN AN ORGANIZED HEALTH CARE EDUCATION/TRAINING PROGRAM

## 2024-06-04 PROCEDURE — 84443 ASSAY THYROID STIM HORMONE: CPT | Performed by: STUDENT IN AN ORGANIZED HEALTH CARE EDUCATION/TRAINING PROGRAM

## 2024-06-04 PROCEDURE — 25000003 PHARM REV CODE 250: Performed by: INTERNAL MEDICINE

## 2024-06-04 PROCEDURE — 21400001 HC TELEMETRY ROOM

## 2024-06-04 PROCEDURE — 1158F ADVNC CARE PLAN TLK DOCD: CPT | Mod: CPTII,,, | Performed by: STUDENT IN AN ORGANIZED HEALTH CARE EDUCATION/TRAINING PROGRAM

## 2024-06-04 PROCEDURE — 94761 N-INVAS EAR/PLS OXIMETRY MLT: CPT

## 2024-06-04 PROCEDURE — 99497 ADVNCD CARE PLAN 30 MIN: CPT | Mod: ,,, | Performed by: STUDENT IN AN ORGANIZED HEALTH CARE EDUCATION/TRAINING PROGRAM

## 2024-06-04 PROCEDURE — 87522 HEPATITIS C REVRS TRNSCRPJ: CPT | Performed by: STUDENT IN AN ORGANIZED HEALTH CARE EDUCATION/TRAINING PROGRAM

## 2024-06-04 PROCEDURE — 83735 ASSAY OF MAGNESIUM: CPT | Performed by: FAMILY MEDICINE

## 2024-06-04 PROCEDURE — 85025 COMPLETE CBC W/AUTO DIFF WBC: CPT | Performed by: FAMILY MEDICINE

## 2024-06-04 PROCEDURE — 1152F DOC ADVNCD DIS COMFORT 1ST: CPT | Mod: CPTII,,, | Performed by: STUDENT IN AN ORGANIZED HEALTH CARE EDUCATION/TRAINING PROGRAM

## 2024-06-04 RX ORDER — LISINOPRIL 20 MG/1
20 TABLET ORAL ONCE
Status: COMPLETED | OUTPATIENT
Start: 2024-06-04 | End: 2024-06-04

## 2024-06-04 RX ORDER — AMLODIPINE BESYLATE 5 MG/1
5 TABLET ORAL DAILY
Status: DISCONTINUED | OUTPATIENT
Start: 2024-06-04 | End: 2024-06-04

## 2024-06-04 RX ORDER — LISINOPRIL 20 MG/1
40 TABLET ORAL DAILY
Status: DISCONTINUED | OUTPATIENT
Start: 2024-06-05 | End: 2024-06-07

## 2024-06-04 RX ADMIN — LISINOPRIL 20 MG: 20 TABLET ORAL at 04:06

## 2024-06-04 RX ADMIN — LABETALOL HYDROCHLORIDE 20 MG: 5 INJECTION INTRAVENOUS at 07:06

## 2024-06-04 RX ADMIN — LABETALOL HYDROCHLORIDE 20 MG: 5 INJECTION INTRAVENOUS at 03:06

## 2024-06-04 RX ADMIN — DOXYCYCLINE 100 MG: 100 INJECTION, POWDER, LYOPHILIZED, FOR SOLUTION INTRAVENOUS at 11:06

## 2024-06-04 RX ADMIN — METOPROLOL TARTRATE 25 MG: 25 TABLET, FILM COATED ORAL at 08:06

## 2024-06-04 RX ADMIN — ENOXAPARIN SODIUM 50 MG: 60 INJECTION, SOLUTION SUBCUTANEOUS at 08:06

## 2024-06-04 RX ADMIN — HYDRALAZINE HYDROCHLORIDE 50 MG: 25 TABLET ORAL at 09:06

## 2024-06-04 RX ADMIN — HYDRALAZINE HYDROCHLORIDE 50 MG: 25 TABLET ORAL at 02:06

## 2024-06-04 RX ADMIN — LABETALOL HYDROCHLORIDE 20 MG: 5 INJECTION INTRAVENOUS at 11:06

## 2024-06-04 RX ADMIN — HYDRALAZINE HYDROCHLORIDE 50 MG: 25 TABLET ORAL at 05:06

## 2024-06-04 RX ADMIN — METOPROLOL TARTRATE 25 MG: 25 TABLET, FILM COATED ORAL at 09:06

## 2024-06-04 RX ADMIN — LISINOPRIL 20 MG: 20 TABLET ORAL at 08:06

## 2024-06-04 RX ADMIN — ENOXAPARIN SODIUM 50 MG: 60 INJECTION, SOLUTION SUBCUTANEOUS at 09:06

## 2024-06-04 RX ADMIN — POTASSIUM BICARBONATE 40 MEQ: 391 TABLET, EFFERVESCENT ORAL at 10:06

## 2024-06-04 RX ADMIN — CYPROHEPTADINE HYDROCHLORIDE 4 MG: 4 TABLET ORAL at 09:06

## 2024-06-04 RX ADMIN — PREDNISONE 40 MG: 20 TABLET ORAL at 08:06

## 2024-06-04 RX ADMIN — DOXYCYCLINE 100 MG: 100 INJECTION, POWDER, LYOPHILIZED, FOR SOLUTION INTRAVENOUS at 01:06

## 2024-06-04 RX ADMIN — CYPROHEPTADINE HYDROCHLORIDE 4 MG: 4 TABLET ORAL at 08:06

## 2024-06-04 RX ADMIN — HYDROCHLOROTHIAZIDE 25 MG: 25 TABLET ORAL at 08:06

## 2024-06-04 RX ADMIN — MUPIROCIN: 20 OINTMENT TOPICAL at 09:06

## 2024-06-04 RX ADMIN — MUPIROCIN: 20 OINTMENT TOPICAL at 08:06

## 2024-06-04 NOTE — ASSESSMENT & PLAN NOTE
Chronic,    Resume home meds as BP permits    Temp:  [97.9 °F (36.6 °C)-98.4 °F (36.9 °C)]   Pulse:  [61-82]   Resp:  [18-65]   BP: (143-217)/()   SpO2:  [89 %-100 %] .   Home meds for hypertension were reviewed and noted below.   Hypertension Medications               lisinopriL-hydrochlorothiazide (PRINZIDE,ZESTORETIC) 20-25 mg Tab             While in the hospital, will manage blood pressure as follows; Continue home antihypertensive regimen    Will utilize p.r.n. blood pressure medication only if patient's blood pressure greater than 140/90 and she develops symptoms such as worsening chest pain or shortness of breath.    -lisinopril increased to 40 mg daily

## 2024-06-04 NOTE — ASSESSMENT & PLAN NOTE
History of recent pneumonia treatment     Checks x-ray with no acute finding    Antibiotics (From admission, onward)    Start     Stop Route Frequency Ordered    06/03/24 1215  doxycycline 100 mg in dextrose 5 % in water (D5W) 100 mL IVPB (MB+)         06/08/24 1214 IV Every 12 hours (non-standard times) 06/03/24 1106    05/31/24 0915  mupirocin 2 % ointment         06/05/24 0859 Nasl 2 times daily 05/31/24 0807          Microbiology Results (last 7 days)     Procedure Component Value Units Date/Time    Blood culture [[id number]] Collected: 06/02/24 1154    Order Status: Completed Specimen: Blood Updated: 06/03/24 2012     Blood Culture, Routine No Growth to date      No Growth to date

## 2024-06-04 NOTE — ASSESSMENT & PLAN NOTE
Patient with Hypercapnic Respiratory failure which is Acute on chronic.  she is not on home oxygen. Supplemental oxygen was provided and noted- Oxygen Concentration (%):  [24-25] 24    .   Signs/symptoms of respiratory failure include- tachypnea, increased work of breathing, respiratory distress, and lethargy. Contributing diagnoses includes - COPD and Pneumonia Labs and images were reviewed. Patient Has recent ABG, which has been reviewed. Will treat underlying causes and adjust management of respiratory failure as follows-  intubated on arrival  Consult Pulmonary critical for vent management   Appreciate pulmonary team extubated on 05/31 2 BiPAP -continue to wean

## 2024-06-04 NOTE — PROGRESS NOTES
Palliative Care Daily Progress Note     S: Medical record reviewed, followed up with family regarding patient's condition. Pt remains confused and often perseverates on single word answers if pressed, however she does maintain eye contact and makes relevant comments on conversations; overall impression is of baseline mild aphasia more so than delirium.    Pt's daughter Alexandria and son-in-law Angel are at bedside and report pt has been living alone in a small senior living apartment. They inquired about assisted living which unfortunately I do not believe pt is appropriate for as she appears to require assistance with all ADLs more compatible with assisted nursing care. Pt's severity of COPD and dementia in combination carry a prognosis of < 6 months qualifying to access hospice benefits and family is in agreement with transitioning to hospice care. They agree NIV is painful for pt and her baseline has deteriorated with each admission for hypercapnic respiratory failure. They accept pt will require 24/7 supervision and that Co2 narcosis is certain to recur at which point pt can receive end of life care at home as an alternative to repeated ICU trips for NIV with attendant delirium and severe deconditioning.    Family is understandably hopeful to avoid assisted nursing and are hopeful that a new environment can return pt closer to what they report as her recent baseline (independently ambulatory). I advised them that pt has already lost strength from several days in the ICU however she has not been participating with attempts at rehab and does not appear eligible for PT placement at a SNF.    Pt would qualify for home hospice services at either a private residence (would need to move in with one of her adult children and at least part time private sitter support; not safe for her independent living apt) or a nursing facility (questionable finances, likely to worsen pt's mental status).    B: Code Status: DNR   Advanced  Directives:   None; family has filed EPS report against pt's other son who is not present; daughter Alexandria is consensus decision maker and an appropriate surrogate at this time  Family/Support: Supportive and at bedside   Physician's Plan of Care Goal is eval for NH placement with hospice, potentially home hospice but social support is questionable  Labs: noncontributory   Diagnostics: noncontributory    Physical Exam  Constitutional:       General: She is not in acute distress.     Appearance: She is ill-appearing (chronically). She is not toxic-appearing.   HENT:      Head: Normocephalic and atraumatic.      Mouth/Throat:      Mouth: Mucous membranes are moist.   Eyes:      Pupils: Pupils are equal, round, and reactive to light.   Cardiovascular:      Rate and Rhythm: Normal rate and regular rhythm.      Pulses: Normal pulses.      Heart sounds: No murmur heard.  Pulmonary:      Effort: No accessory muscle usage or respiratory distress.      Breath sounds: Decreased air movement present. Examination of the right-middle field reveals wheezing. Examination of the left-middle field reveals wheezing. Examination of the right-lower field reveals wheezing. Examination of the left-lower field reveals wheezing. Wheezing present.   Skin:     General: Skin is warm and dry.      Coloration: Skin is sallow.   Neurological:      Mental Status: She is alert.       A: Patient's current condition fair, planned for stepdown.   Patient Symptom Assessment Flowsheet has been completed.   Family is at bedside and involved.  Discharge Planning: home hospice at setting TBD, most likely NH     R: Support offered at this time.   Palliative Care Team will continue to follow patient.     Quinten Parnell MD  Hospice and Palliative Medicine  Palliative Care Pager: [phone]    Total time spent: 100 minutes  > 50% of 51 minute visit spent in chart review, face to face discussion of symptoms assessment, coordination of care with other  specialties, discharge planning.    49 min ACP time spent: goals of care, emotional support, formulating and communicating prognosis and exploring burden/benefit of various approaches of treatment and various settings for accessing hospice benefits.

## 2024-06-04 NOTE — ASSESSMENT & PLAN NOTE
Advance Care Planning     Date: 06/01/2024    Code Status  In light of the patients advanced and life limiting illness,I engaged the the family in a voluntary conversation about the patient's preferences for care  at the very end of life. The patient wishes to have a natural, peaceful death.  Along those lines, the patient does not wish to have CPR or other invasive treatments performed when her heart and/or breathing stops. I communicated to the family that a DNR order would be placed in her medical record to reflect this preference.  I spent a total of 20 minutes engaging the patient in this advance care planning discussion.   Patient grand daughter Caro  reported patient sister had updated family patient is a DNR.      Advance Care Planning     Date: 06/03/2024    [facility]  I engaged the family in a voluntary conversation about advance care planning and we specifically addressed what the goals of care would be moving forward, in light of the patient's change in clinical status, specifically  patient declining treatment and progressive clinical decline.  We did specifically address the patient's likely prognosis, which is poor.  We explored the patient's values and preferences for future care.  The family endorses that what is most important right now is to focus on spending time at home, quality of life, even if it means sacrificing a little time, and comfort and QOL     Accordingly, we have decided that the best plan to meet the patient's goals includes continuing with treatment    I did explain the role for hospice care at this stage of the patient's illness, including its ability to help the patient live with the best quality of life possible.  We will be making a hospice referral.    I spent a total of 25 minutes engaging the patient in this advance care planning discussion.       Discussed with granddaughter Caro  and updated her patient request to see her mother and sister and Caro confirm both at  bed.  She also confirm patient's code status as DNR and open to discharge with hospice maybe by tomorrow since they have to fix patient's present abode today.  Questions and concerns were addressed    -family has decided that patient will be discharged to skilled nurse facility.  They do not want hospice at this time

## 2024-06-04 NOTE — ASSESSMENT & PLAN NOTE
Patient has persistent depression which is unknown and is currently controlled. Will discontinue anti-depressant medications. We will consult psychiatry at this time. Patient does not display psychosis at this time. Continue to monitor closely and adjust plan of care as needed.    -Seroquel 25 mg as needed for agitation

## 2024-06-04 NOTE — SUBJECTIVE & OBJECTIVE
Interval History:  Patient was examined in her bed.  She was alert and oriented x1.  She does not appear to be in distress or pain.  It appears family has decided that patient may be discharged to SNF.    Review of Systems   Unable to perform ROS: Mental status change     Objective:     Vital Signs (Most Recent):  Temp: 98.4 °F (36.9 °C) (06/04/24 0715)  Pulse: 70 (06/04/24 1532)  Resp: (!) 26 (06/04/24 1530)  BP: (!) 184/90 (06/04/24 1530)  SpO2: 97 % (06/04/24 1532) Vital Signs (24h Range):  Temp:  [97.9 °F (36.6 °C)-98.4 °F (36.9 °C)] 98.4 °F (36.9 °C)  Pulse:  [61-82] 70  Resp:  [18-65] 26  SpO2:  [89 %-100 %] 97 %  BP: (143-217)/() 184/90     Weight: 44 kg (97 lb)  Body mass index is 20.27 kg/m².    Intake/Output Summary (Last 24 hours) at 6/4/2024 1551  Last data filed at 6/4/2024 1256  Gross per 24 hour   Intake 521.8 ml   Output 401 ml   Net 120.8 ml         Physical Exam  Constitutional:       Appearance: She is not ill-appearing.      Comments:  Frail, thin   HENT:      Head: Normocephalic.   Cardiovascular:      Rate and Rhythm: Normal rate.   Pulmonary:      Breath sounds: Rales present. No wheezing.   Abdominal:      General: Bowel sounds are normal. There is no distension.      Palpations: Abdomen is soft.   Musculoskeletal:      Cervical back: Normal range of motion.      Right lower leg: No edema.      Left lower leg: No edema.   Neurological:      Comments: confuse             Significant Labs: All pertinent labs within the past 24 hours have been reviewed.    Significant Imaging: I have reviewed all pertinent imaging results/findings within the past 24 hours.

## 2024-06-04 NOTE — CARE UPDATE
Monitored on telemetry overnight. Remains in NSR with no recurrent afib noted. On Metoprolol 25mg po BID along with Lisinopril HCTZ and Hydralazine with SBP 170s-200s. Echo with normal LVEF. Feel afib related to stress/infectious etiology. Continue BB and continue to monitor on telemetry. Not certain best candidate for long term OAC given falls risk and other non cardiac issues

## 2024-06-04 NOTE — ASSESSMENT & PLAN NOTE
Patient's COPD is with exacerbation noted by continued dyspnea and worsening of baseline hypoxia currently.  Patient is currently off COPD Pathway. Continue scheduled inhalers Steroids, Antibiotics, and Supplemental oxygen and monitor respiratory status closely.    Checks x-ray:No acute findings.   Tapered off IV steroid  Extubated  DuoNebs  Antibiotics

## 2024-06-04 NOTE — PLAN OF CARE
The sw met with the pt's dtr Alexandria [phone],her boyfriend and her 2 sons after they met with Dr. Parnell. They aren't interested in home hospice or any type of hospice currently. They were interested in snf for the pt. The sw informed them if the pt doesn't or isn't able to participate with therapy she may not meet criteria for snf b/c PHN will have to give the auth. The family acknowledged understanding,so the sw gave them a list of the nh's within a 40 mile radius. The sw asked them to choose 3 preferences in order of preferences. Alexandria states she will review the list and call the sw tomorrow with her choices. Alexandria states the pt has been declining in her mobility for the past few months and knows they can't care for her in their homes.        06/04/24 1545   Post-Acute Status   Post-Acute Authorization Placement   Post-Acute Placement Status Pending state direction/certification   Discharge Plan   Discharge Plan A New Nursing Home placement - senior living care facility   Discharge Plan B Skilled Nursing Facility

## 2024-06-04 NOTE — PLAN OF CARE
Pulmonary & Critical Care Medicine Plan of Care Note    70 yo F with h/o severe COPD and chronic hypoxemic RF on 2L NC at home, anxiety on chronic benzos who presented with acute encephalopathy and hypercapnic RF requiring intubation. She was extubated and stepped down to the floor and then went into Afib with RVR requiring diltiazem drip and she was stepped back up. Today, continues to be in normal sinus and hypertensive with encephalopathy. Granddaughter today is supposed to come to the hospital this afternoon to discuss home hospice.   Agree with evaluation for home hospice. Pt is hemodynamically stable for step down to the floor and remains encephalopathic at this time.       Patsy Mcneal MD  [facility]  06/04/2024 12:45 PM

## 2024-06-04 NOTE — PLAN OF CARE
Bp elevated at times, prn meds in MAR, labetalol given x 1 today. All other vss. Nadn, no c/o pain. Pt refusing to eat, only taking a couple of bites of meals throughout the day. Did take all PO meds today w/o complications. Mult BMs today, incont of urine and stool, cleaned, changed, brief on pt now. Afebrile. Family came today, also palliative care MD,  working on getting pt transferred to a nursing home close to family's home. Possibly for tomorrow. Pt remains in restraints d/t confusion, pulling at lines, iv's, fall risk, no family in room with pt currently. See flow sheet for padmini info.

## 2024-06-04 NOTE — PT/OT/SLP PROGRESS
Physical Therapy      Patient Name:  June Boykin   MRN:  [id number]    Patient not seen today secondary to Therapist assessment (pending hospice evaluation; patient with BP 180s/100s in AM at rest). Will follow-up as able.  6/4/2024

## 2024-06-04 NOTE — PT/OT/SLP PROGRESS
Occupational Therapy      Patient Name:  June Boykin   MRN:  [id number]    Patient not seen today secondary to Therapist assessment (pending hospice evaluation; on earlier AM attempt BP in 180s /100s at rest). Will follow-up as able.    6/4/2024

## 2024-06-04 NOTE — ASSESSMENT & PLAN NOTE
Recurrent major depression   -seen by psychiatrist.  Appreciate recommendation  -benzodiazepines discontinued.  Seroquel as needed for agitation

## 2024-06-04 NOTE — ASSESSMENT & PLAN NOTE
Patient with Paroxysmal (<7 days) atrial fibrillation which is uncontrolled currently with Beta Blocker and Calcium Channel Blocker. Patient is currently in atrial fibrillation.[id number] Score: 2. HASBLED Score: . Anticoagulation indicated. Anticoagulation done with lovenox .  Consult cardiology -therapeutic Lovenox, off Cardizem gtt  Continue Metoprolol bid - uptitrate as needed

## 2024-06-05 LAB
ALBUMIN SERPL BCP-MCNC: 2.9 G/DL (ref 3.5–5.2)
ALP SERPL-CCNC: 33 U/L (ref 55–135)
ALT SERPL W/O P-5'-P-CCNC: 15 U/L (ref 10–44)
ANION GAP SERPL CALC-SCNC: 10 MMOL/L (ref 8–16)
AST SERPL-CCNC: 28 U/L (ref 10–40)
BACTERIA BLD CULT: NORMAL
BACTERIA BLD CULT: NORMAL
BASOPHILS # BLD AUTO: 0.01 K/UL (ref 0–0.2)
BASOPHILS NFR BLD: 0.2 % (ref 0–1.9)
BILIRUB SERPL-MCNC: 0.7 MG/DL (ref 0.1–1)
BUN SERPL-MCNC: 13 MG/DL (ref 8–23)
CALCIUM SERPL-MCNC: 9.1 MG/DL (ref 8.7–10.5)
CHLORIDE SERPL-SCNC: 86 MMOL/L (ref 95–110)
CO2 SERPL-SCNC: 32 MMOL/L (ref 23–29)
CREAT SERPL-MCNC: 0.6 MG/DL (ref 0.5–1.4)
DIFFERENTIAL METHOD BLD: ABNORMAL
EOSINOPHIL # BLD AUTO: 0 K/UL (ref 0–0.5)
EOSINOPHIL NFR BLD: 0 % (ref 0–8)
ERYTHROCYTE [DISTWIDTH] IN BLOOD BY AUTOMATED COUNT: 17.2 % (ref 11.5–14.5)
EST. GFR  (NO RACE VARIABLE): >60 ML/MIN/1.73 M^2
GLUCOSE SERPL-MCNC: 96 MG/DL (ref 70–110)
HCT VFR BLD AUTO: 43.1 % (ref 37–48.5)
HCV RNA SERPL NAA+PROBE-LOG IU: 6.66 LOGIU/ML
HCV RNA SERPL QL NAA+PROBE: DETECTED
HCV RNA SPEC NAA+PROBE-ACNC: ABNORMAL IU/ML
HGB BLD-MCNC: 13.2 G/DL (ref 12–16)
IMM GRANULOCYTES # BLD AUTO: 0 K/UL (ref 0–0.04)
IMM GRANULOCYTES NFR BLD AUTO: 0 % (ref 0–0.5)
LYMPHOCYTES # BLD AUTO: 1.8 K/UL (ref 1–4.8)
LYMPHOCYTES NFR BLD: 41.4 % (ref 18–48)
MAGNESIUM SERPL-MCNC: 1.6 MG/DL (ref 1.6–2.6)
MCH RBC QN AUTO: 22.6 PG (ref 27–31)
MCHC RBC AUTO-ENTMCNC: 30.6 G/DL (ref 32–36)
MCV RBC AUTO: 74 FL (ref 82–98)
MONOCYTES # BLD AUTO: 0.9 K/UL (ref 0.3–1)
MONOCYTES NFR BLD: 19.4 % (ref 4–15)
NEUTROPHILS # BLD AUTO: 1.7 K/UL (ref 1.8–7.7)
NEUTROPHILS NFR BLD: 39 % (ref 38–73)
NRBC BLD-RTO: 0 /100 WBC
PHOSPHATE SERPL-MCNC: 3.1 MG/DL (ref 2.7–4.5)
PLATELET # BLD AUTO: 159 K/UL (ref 150–450)
PMV BLD AUTO: ABNORMAL FL (ref 9.2–12.9)
POTASSIUM SERPL-SCNC: 3.5 MMOL/L (ref 3.5–5.1)
PROT SERPL-MCNC: 6.7 G/DL (ref 6–8.4)
RBC # BLD AUTO: 5.84 M/UL (ref 4–5.4)
SODIUM SERPL-SCNC: 128 MMOL/L (ref 136–145)
WBC # BLD AUTO: 4.44 K/UL (ref 3.9–12.7)

## 2024-06-05 PROCEDURE — 25000242 PHARM REV CODE 250 ALT 637 W/ HCPCS: Performed by: FAMILY MEDICINE

## 2024-06-05 PROCEDURE — 63600175 PHARM REV CODE 636 W HCPCS: Performed by: STUDENT IN AN ORGANIZED HEALTH CARE EDUCATION/TRAINING PROGRAM

## 2024-06-05 PROCEDURE — 94640 AIRWAY INHALATION TREATMENT: CPT

## 2024-06-05 PROCEDURE — 63600175 PHARM REV CODE 636 W HCPCS: Performed by: FAMILY MEDICINE

## 2024-06-05 PROCEDURE — 94660 CPAP INITIATION&MGMT: CPT

## 2024-06-05 PROCEDURE — 99900035 HC TECH TIME PER 15 MIN (STAT)

## 2024-06-05 PROCEDURE — 85025 COMPLETE CBC W/AUTO DIFF WBC: CPT | Performed by: FAMILY MEDICINE

## 2024-06-05 PROCEDURE — 94761 N-INVAS EAR/PLS OXIMETRY MLT: CPT

## 2024-06-05 PROCEDURE — 83735 ASSAY OF MAGNESIUM: CPT | Performed by: FAMILY MEDICINE

## 2024-06-05 PROCEDURE — 11000001 HC ACUTE MED/SURG PRIVATE ROOM

## 2024-06-05 PROCEDURE — 84100 ASSAY OF PHOSPHORUS: CPT | Performed by: FAMILY MEDICINE

## 2024-06-05 PROCEDURE — 25000003 PHARM REV CODE 250: Performed by: FAMILY MEDICINE

## 2024-06-05 PROCEDURE — 36415 COLL VENOUS BLD VENIPUNCTURE: CPT | Performed by: FAMILY MEDICINE

## 2024-06-05 PROCEDURE — 27000221 HC OXYGEN, UP TO 24 HOURS

## 2024-06-05 PROCEDURE — 97165 OT EVAL LOW COMPLEX 30 MIN: CPT

## 2024-06-05 PROCEDURE — 97161 PT EVAL LOW COMPLEX 20 MIN: CPT

## 2024-06-05 PROCEDURE — 25000003 PHARM REV CODE 250: Performed by: STUDENT IN AN ORGANIZED HEALTH CARE EDUCATION/TRAINING PROGRAM

## 2024-06-05 PROCEDURE — 80053 COMPREHEN METABOLIC PANEL: CPT | Performed by: FAMILY MEDICINE

## 2024-06-05 PROCEDURE — 25000003 PHARM REV CODE 250: Performed by: INTERNAL MEDICINE

## 2024-06-05 RX ADMIN — ENOXAPARIN SODIUM 50 MG: 60 INJECTION, SOLUTION SUBCUTANEOUS at 09:06

## 2024-06-05 RX ADMIN — DIAZEPAM 5 MG: 5 INJECTION, SOLUTION INTRAMUSCULAR; INTRAVENOUS at 08:06

## 2024-06-05 RX ADMIN — LEVALBUTEROL HYDROCHLORIDE 0.63 MG: 0.63 SOLUTION RESPIRATORY (INHALATION) at 11:06

## 2024-06-05 RX ADMIN — METOPROLOL TARTRATE 25 MG: 25 TABLET, FILM COATED ORAL at 09:06

## 2024-06-05 RX ADMIN — DOXYCYCLINE 100 MG: 100 INJECTION, POWDER, LYOPHILIZED, FOR SOLUTION INTRAVENOUS at 12:06

## 2024-06-05 RX ADMIN — CYPROHEPTADINE HYDROCHLORIDE 4 MG: 4 TABLET ORAL at 09:06

## 2024-06-05 RX ADMIN — LISINOPRIL 40 MG: 20 TABLET ORAL at 09:06

## 2024-06-05 RX ADMIN — HYDRALAZINE HYDROCHLORIDE 50 MG: 25 TABLET ORAL at 01:06

## 2024-06-05 RX ADMIN — ACETAMINOPHEN 650 MG: 325 TABLET ORAL at 02:06

## 2024-06-05 RX ADMIN — HYDROCHLOROTHIAZIDE 25 MG: 25 TABLET ORAL at 09:06

## 2024-06-05 RX ADMIN — ENOXAPARIN SODIUM 50 MG: 60 INJECTION, SOLUTION SUBCUTANEOUS at 08:06

## 2024-06-05 RX ADMIN — HYDRALAZINE HYDROCHLORIDE 50 MG: 25 TABLET ORAL at 06:06

## 2024-06-05 NOTE — ASSESSMENT & PLAN NOTE
History of substance abuse  UDS 7 days ago positive for benzos, amphetamines  Psych consulted, agrees with discontinuing benzodiazepines   Daughter reported patient takes lot of Valium -monitor for benzos withdrawal

## 2024-06-05 NOTE — PLAN OF CARE
The sw received an email to fax info to [facility] [phone] b/c a Level 2 is required for this pt due to her having a mental illness. The sw faxed the requested info to [facility]. The sw spoke to Alexandria and she chose [facility] and [facility] in [place]. The sw faxed the pt's info to 2 of the nh's listed above b/c one couldn't be located(name probably changed) via [employer]. The sw informed Alexandria the pt may be declined at the nh's due to her mental illness and she may have to choose more facilities. She acknowledged understanding and states she will.        06/05/24 1642   Post-Acute Status   Post-Acute Authorization Placement   Post-Acute Placement Status Pending state direction/certification   Discharge Plan   Discharge Plan A New Nursing Home placement - long term care facility

## 2024-06-05 NOTE — ASSESSMENT & PLAN NOTE
Patient with Paroxysmal (<7 days) atrial fibrillation which is uncontrolled currently with Beta Blocker and Calcium Channel Blocker. Patient is currently in atrial fibrillation.[id number] Score: 2. HASBLED Score: . Anticoagulation indicated. Anticoagulation done with lovenox .  Consult cardiology -therapeutic Lovenox, off Cardizem gtt  Continue Metoprolol bid - uptitrate as needed

## 2024-06-05 NOTE — PLAN OF CARE
Problem: Adult Inpatient Plan of Care  Goal: Plan of Care Review  Outcome: Progressing     Problem: Fall Injury Risk  Goal: Absence of Fall and Fall-Related Injury  Outcome: Progressing  Free from fall/injury, BUE restraints     Problem: Restraint, Nonviolent  Goal: Absence of Harm or Injury  Outcome: Progressing     Problem: Skin Injury Risk Increased  Goal: Skin Health and Integrity  Outcome: Progressing  Turned Q2, Barrier cream applied     Problem: Infection  Goal: Absence of Infection Signs and Symptoms  Outcome: Progressing  No s/s of infection noted     Problem: Confusion Chronic  Goal: Optimal Cognitive Function  Outcome: Progressing  Remains confused, but more alert     Problem: Dysrhythmia  Goal: Normalized Cardiac Rhythm  Outcome: Progressing  NSR all shift

## 2024-06-05 NOTE — PLAN OF CARE
Problem: Physical Therapy  Goal: Physical Therapy Goal  Outcome: Not Progressing   Co-eval performed by PT/OT in context of ICU; per chart review, pt's PLOF was downtrending in function but was managing at home with documented decrease in medication management; during today's session, pt exhibited agitation, confusion, and was not responsive to questions asked or communicate needs except to voice being cold; skilled acute PT services not indicated at this time as pt is a poor candidate 2/2 poor participation; will d/c acute PT services; pt will benefit from 24/7 assistance and hospital bed.

## 2024-06-05 NOTE — ASSESSMENT & PLAN NOTE
History of recent pneumonia treatment     Checks x-ray with no acute finding    Antibiotics (From admission, onward)      Start     Stop Route Frequency Ordered    06/03/24 1215  doxycycline 100 mg in dextrose 5 % in water (D5W) 100 mL IVPB (MB+)         06/08/24 1214 IV Every 12 hours (non-standard times) 06/03/24 1106    05/31/24 0915  mupirocin 2 % ointment         06/05/24 0859 Nasl 2 times daily 05/31/24 0807            Microbiology Results (last 7 days)       Procedure Component Value Units Date/Time    Blood culture [[id number]] Collected: 06/02/24 1154    Order Status: Completed Specimen: Blood Updated: 06/04/24 2012     Blood Culture, Routine No Growth to date      No Growth to date      No Growth to date          -continue with doxycycline

## 2024-06-05 NOTE — PT/OT/SLP EVAL
"Occupational Therapy   Evaluation and Discharge Note    Name: June Boykin  MRN: [id number]  Admitting Diagnosis: Acute hypercapnic respiratory failure  Recent Surgery: * No surgery found *      Recommendations:     Discharge Recommendations: No Therapy Indicated  Discharge Equipment Recommendations: hospital bed  Barriers to discharge:  Other (Comment) (impaired cognition)    Assessment:     June Boykin is a 69 y.o. female with a medical diagnosis of Acute hypercapnic respiratory failure. At this time, patient is functioning at their prior level of function and does not require further acute OT services.   OT /PT coeval in ICU context.  On evaluation this date, patient primarily agitated, confused, and not responsive to questions asked and with exception of vocalizing temperature (cold) does not communicate needs. Skilled acute OT services not indicated at this time as patient is a poor candidate given poor participation. D/c acute OT; patient will benefit from 24/7 assist and hospital bed.   Plan:     During this hospitalization, patient does not require further acute OT services.  Please re-consult if situation changes.    Plan of Care Reviewed with: other (see comments) (MD,nursing staff,social work, PT)    Subjective     Chief Complaint: "I'm cold" throughout session  Patient/Family Comments/goals: responded with "thank you" when given water to drink and an extra blanket    Occupational Profile:  Living Environment: per chart review, patient lives alone but gets help from daughter and granddaughter who are the caregivers.   Previous level of function: Patient with PLOF per chart review noted as down trending function but was managing at home with documented decreased medication management.  Equipment Used at home: oxygen, nebulizer, walker, rolling, shower chair  Assistance upon Discharge: daughter and granddaughter     Pain/Comfort:  Pain Rating 1: other (see comments) (not rated/doesn't show " signs of pain)      Objective:     Communicated with: MD and nursing staff prior to session.  Patient found HOB elevated with blood pressure cuff, PureWick, telemetry, oxygen, restraints (R restraint; 2LSpO2) upon OT entry to room.    General Precautions: Standard, fall  Orthopedic Precautions: N/A  Braces: N/A  Respiratory Status: Nasal cannula, flow 2 L/min     Occupational Performance:    Bed Mobility:    Patient completed scooting up in bed with dependence x2    Functional Mobility/Transfers:  N/A    Activities of Daily Living:  Feeding:  accepts one sip of water, grasps cup, brings to mouth; however anticipate max/total assist (poor PLO intake as noted by RN) ; grossly requiring total assist for all other ADLs at bed mobility.     Cognitive/Visual Perceptual:  Cognitive/Psychosocial Skills:     -not oriented to month or year when asked  -Pt unable to state name or birthday     Physical Exam:  Not assessed    AMPA 6 Click ADL:  AMPAC Total Score: 7    Treatment & Education:  Brief OT/PT co-evaluation completed in ICU. MD walked in when starting evaluation and mentioned cognitive deficits were the same as previous day. Lights on to improve orientation to day time.   Patient utters single word repetitively but word is unclear. Expresses she is cold throughout session.   Attempted to use bedside stuffed animal for scanning but patient responded with agitation.   Bed mobility and ADL activity as stated above.   Unable to progress with treatment due to confusion and agitation.   BP elevated throughout at 190s/90s    Patient left HOB elevated with all lines intact, call button in reach, and nursing notified    GOALS:   Multidisciplinary Problems       Occupational Therapy Goals          Problem: Occupational Therapy    Goal Priority Disciplines Outcome Interventions   Occupational Therapy Goal     OT, PT/OT Not Progressing                        History:     Past Medical History:   Diagnosis Date    Abdominal pain  05/2024    Acid reflux 05/2024    ACP (advance care planning) 6/1/2024    Anxiety disorder, unspecified     At high risk for falls     Emphysema, unspecified     History of opioid abuse     Hypertension     On home oxygen therapy     2l/nc    Wears partial dentures     upper-none on lower    Weight loss 05/2024         Past Surgical History:   Procedure Laterality Date    ANKLE SURGERY Left     HYSTERECTOMY      TONSILLECTOMY         Time Tracking:     OT Date of Treatment: 06/05/24  OT Start Time: 1005  OT Stop Time: 1020  OT Total Time (min): 15 min    Billable Minutes:Evaluation 15 min    6/5/2024

## 2024-06-05 NOTE — PROGRESS NOTES
[facility] Medicine  Progress Note    Patient Name: June Boykin  MRN: [id number]  Patient Class: IP- Inpatient   Admission Date: 5/31/2024  Length of Stay: 5 days  Attending Physician: Kolby Fried MD  Primary Care Provider: Mauricio Pierre MD        Subjective:     Principal Problem:Acute hypercapnic respiratory failure        HPI:  June Anderson is a 69-year-old female who presents with past medical history significant for hypertension, COPD  on 2 L home oxygen, anxiety, previous methamphetamine abuse, chronic benzo use who presented last week with diarrhea, dehydration, frequent falls of 2 days. She was very somnolent on initial exam and subsequently found to be hypercapnic and hypotensive with a respiratory rate of 30. She was treated empirically for a COPD exacerbation caused by pneumonia. She required 3 days of BiPAP with eventual improvement. During her last hospitalization she had a very similar presentation with her initial pH being 7.2, pCO2 of 94, PO2 of 77.8, bicarb of 29.9 saturating 92% on venti mask 40% FiO2.     She is now re presenting with altered mental status after being found by her granddaughter for unresponsiveness. Her granddaughter called EMS and found that she was not responding to sternal rub. Initial pulse ox showed an SpO2 in the low 70s she was placed on nasal cannula with rapid improvement in her oxygenation. This was escalated to a non-rebreather in route to emergency department. Following her arrival she is unable to provide any history and her ABG showed profound respiratory acidosis with hypercapnia greater than 90. Due to her altered mental status and concern for inability to protect airway, the decision was made to intubate her and treat her empirically for a COPD exacerbation. Her daughter does note that the patient took her diazepam earlier today and this was a concern during previous hospitalization. On arrival, her  pupils were noted to be pinpoint and she received naloxone without any improvement.  Patient's initial vitals were notable for a temperature of 100.3°, heart rate of 112, respiratory rate of 29, blood pressure of 179/92. Initial GCS was 8. She was saturating 91% on 4 L of oxygen.  ABG shows acute worsening acidosis with a pH of 7.178, pCO2 of 95.2, bicarbonate of 27.6, PO2 of 73.6 prior to intubation.  Following intubation she was pulling at her tube and so she was initiated on propofol continuously at 35 mcg per kg per hour.  She was being transferred to [facility] for pulmonology/ICU level of care. At outside hospital she received 125 mg of methylprednisone, DuoNeb x1, 1 L of LR as well as piperacillin/tazobactam with blood cultures drawn x2.        Overview/Hospital Course:  No notes on file    Interval History:  Patient was examined in her bed.  She appeared calm and not in distress.  She was confused and unable to follow instructions.    Review of Systems   Unable to perform ROS: Mental status change     Objective:     Vital Signs (Most Recent):  Temp: 97.8 °F (36.6 °C) (06/05/24 1145)  Pulse: 69 (06/05/24 1115)  Resp: (!) 28 (06/05/24 1115)  BP: (!) 193/93 (06/05/24 1100)  SpO2: 100 % (06/05/24 1115) Vital Signs (24h Range):  Temp:  [97.7 °F (36.5 °C)-98.2 °F (36.8 °C)] 97.8 °F (36.6 °C)  Pulse:  [62-82] 69  Resp:  [15-64] 28  SpO2:  [94 %-100 %] 100 %  BP: (133-204)/() 193/93     Weight: 44 kg (97 lb)  Body mass index is 20.27 kg/m².    Intake/Output Summary (Last 24 hours) at 6/5/2024 1225  Last data filed at 6/5/2024 0235  Gross per 24 hour   Intake 828.55 ml   Output 2 ml   Net 826.55 ml         Physical Exam  Constitutional:       Appearance: She is not ill-appearing.      Comments:  Frail, thin   HENT:      Head: Normocephalic.   Cardiovascular:      Rate and Rhythm: Normal rate.   Pulmonary:      Breath sounds: Rales present. No wheezing.   Abdominal:      General: Bowel sounds are normal. There  is no distension.      Palpations: Abdomen is soft.   Musculoskeletal:      Cervical back: Normal range of motion.      Right lower leg: No edema.      Left lower leg: No edema.   Neurological:      Comments: confused             Significant Labs: All pertinent labs within the past 24 hours have been reviewed.    Significant Imaging: I have reviewed all pertinent imaging results/findings within the past 24 hours.    Assessment/Plan:      * Acute hypercapnic respiratory failure  Patient with Hypercapnic Respiratory failure which is Acute on chronic.  she is not on home oxygen. Supplemental oxygen was provided and noted- Oxygen Concentration (%):  [28] 28    .   Signs/symptoms of respiratory failure include- tachypnea, increased work of breathing, respiratory distress, and lethargy. Contributing diagnoses includes - COPD and Pneumonia Labs and images were reviewed. Patient Has recent ABG, which has been reviewed. Will treat underlying causes and adjust management of respiratory failure as follows-  intubated on arrival  Consult Pulmonary critical for vent management   Appreciate pulmonary team extubated on 05/31 2 s/p BiPAP  -now on nasal cannula, continue to wean    AMS (altered mental status)    Intubated in the ED  Extubated 5/31 to BiPAP and then to NC  Persistent confusion    Paroxysmal atrial fibrillation  Patient with Paroxysmal (<7 days) atrial fibrillation which is uncontrolled currently with Beta Blocker and Calcium Channel Blocker. Patient is currently in atrial fibrillation.[id number] Score: 2. HASBLED Score: . Anticoagulation indicated. Anticoagulation done with lovenox .  Consult cardiology -therapeutic Lovenox, off Cardizem gtt  Continue Metoprolol bid - uptitrate as needed    ACP (advance care planning)    Advance Care Planning    Date: 06/01/2024    Code Status  In light of the patients advanced and life limiting illness,I engaged the the family in a voluntary conversation about the patient's  preferences for care  at the very end of life. The patient wishes to have a natural, peaceful death.  Along those lines, the patient does not wish to have CPR or other invasive treatments performed when her heart and/or breathing stops. I communicated to the family that a DNR order would be placed in her medical record to reflect this preference.  I spent a total of 20 minutes engaging the patient in this advance care planning discussion.   Patient grand daughter Caro  reported patient sister had updated family patient is a DNR.     Advance Care Planning    Date: 06/03/2024    [facility]  I engaged the family in a voluntary conversation about advance care planning and we specifically addressed what the goals of care would be moving forward, in light of the patient's change in clinical status, specifically  patient declining treatment and progressive clinical decline.  We did specifically address the patient's likely prognosis, which is poor.  We explored the patient's values and preferences for future care.  The family endorses that what is most important right now is to focus on spending time at home, quality of life, even if it means sacrificing a little time, and comfort and QOL     Accordingly, we have decided that the best plan to meet the patient's goals includes continuing with treatment    I did explain the role for hospice care at this stage of the patient's illness, including its ability to help the patient live with the best quality of life possible.  We will be making a hospice referral.    I spent a total of 25 minutes engaging the patient in this advance care planning discussion.       Discussed with granddaughter Caro  and updated her patient request to see her mother and sister and Caro confirm both at bed.  She also confirm patient's code status as DNR and open to discharge with hospice maybe by tomorrow since they have to fix patient's present abode today.  Questions and concerns were  addressed    -family has decided that patient will be discharged to skilled nurse facility.  They do not want hospice at this time    Hypertension  Chronic,    Resume home meds as BP permits    Temp:  [97.7 °F (36.5 °C)-98.2 °F (36.8 °C)]   Pulse:  [62-80]   Resp:  [15-64]   BP: (133-204)/()   SpO2:  [94 %-100 %] .   Home meds for hypertension were reviewed and noted below.   Hypertension Medications               lisinopriL-hydrochlorothiazide (PRINZIDE,ZESTORETIC) 20-25 mg Tab             While in the hospital, will manage blood pressure as follows; Continue home antihypertensive regimen    Will utilize p.r.n. blood pressure medication only if patient's blood pressure greater than 140/90 and she develops symptoms such as worsening chest pain or shortness of breath.    -lisinopril increased to 40 mg daily  -blood pressure still elevated, we will consider adding amlodipine    COPD exacerbation  Patient's COPD is with exacerbation noted by continued dyspnea and worsening of baseline hypoxia currently.  Patient is currently off COPD Pathway. Continue scheduled inhalers Steroids, Antibiotics, and Supplemental oxygen and monitor respiratory status closely.    Checks x-ray:No acute findings.   Tapered off IV steroid  Extubated  DuoNebs  Antibiotics    Pneumonia   History of recent pneumonia treatment     Checks x-ray with no acute finding    Antibiotics (From admission, onward)      Start     Stop Route Frequency Ordered    06/03/24 1215  doxycycline 100 mg in dextrose 5 % in water (D5W) 100 mL IVPB (MB+)         06/08/24 1214 IV Every 12 hours (non-standard times) 06/03/24 1106    05/31/24 0915  mupirocin 2 % ointment         06/05/24 0859 Nasl 2 times daily 05/31/24 0807            Microbiology Results (last 7 days)       Procedure Component Value Units Date/Time    Blood culture [[id number]] Collected: 06/02/24 1154    Order Status: Completed Specimen: Blood Updated: 06/04/24 2012     Blood Culture, Routine No  Growth to date      No Growth to date      No Growth to date          -continue with doxycycline    Polypharmacy   History of substance abuse  UDS 7 days ago positive for benzos, amphetamines  Psych consulted, agrees with discontinuing benzodiazepines   Daughter reported patient takes lot of Valium -monitor for benzos withdrawal      Other hyperlipidemia        Schizoaffective disorder   Recurrent major depression   -seen by psychiatrist.  Appreciate recommendation  -benzodiazepines discontinued.  Seroquel as needed for agitation      Moderate recurrent major depression  Patient has persistent depression which is unknown and is currently controlled. Will discontinue anti-depressant medications. We will consult psychiatry at this time. Patient does not display psychosis at this time. Continue to monitor closely and adjust plan of care as needed.    -Seroquel 25 mg as needed for agitation          VTE Risk Mitigation (From admission, onward)           Ordered     enoxaparin injection 50 mg  Every 12 hours         06/02/24 1551     IP VTE HIGH RISK PATIENT  Once         05/31/24 0747     Place sequential compression device  Until discontinued         05/31/24 0747                    Discharge Planning   LACY:      Code Status: DNR   Is the patient medically ready for discharge?:     Reason for patient still in hospital (select all that apply): Patient trending condition and Pending disposition  Discharge Plan A: New Nursing Home placement - care home care facility            Critical care time spent on the evaluation and treatment of severe organ dysfunction, review of pertinent labs and imaging studies, discussions with consulting providers and discussions with patient/family: 32 minutes.      Kolby Fried MD  Department of Hospital Medicine   [place] - Intensive Care

## 2024-06-05 NOTE — SUBJECTIVE & OBJECTIVE
Interval History:  Patient was examined in her bed.  She appeared calm and not in distress.  She was confused and unable to follow instructions.    Review of Systems   Unable to perform ROS: Mental status change     Objective:     Vital Signs (Most Recent):  Temp: 97.8 °F (36.6 °C) (06/05/24 1145)  Pulse: 69 (06/05/24 1115)  Resp: (!) 28 (06/05/24 1115)  BP: (!) 193/93 (06/05/24 1100)  SpO2: 100 % (06/05/24 1115) Vital Signs (24h Range):  Temp:  [97.7 °F (36.5 °C)-98.2 °F (36.8 °C)] 97.8 °F (36.6 °C)  Pulse:  [62-82] 69  Resp:  [15-64] 28  SpO2:  [94 %-100 %] 100 %  BP: (133-204)/() 193/93     Weight: 44 kg (97 lb)  Body mass index is 20.27 kg/m².    Intake/Output Summary (Last 24 hours) at 6/5/2024 1225  Last data filed at 6/5/2024 0235  Gross per 24 hour   Intake 828.55 ml   Output 2 ml   Net 826.55 ml         Physical Exam  Constitutional:       Appearance: She is not ill-appearing.      Comments:  Frail, thin   HENT:      Head: Normocephalic.   Cardiovascular:      Rate and Rhythm: Normal rate.   Pulmonary:      Breath sounds: Rales present. No wheezing.   Abdominal:      General: Bowel sounds are normal. There is no distension.      Palpations: Abdomen is soft.   Musculoskeletal:      Cervical back: Normal range of motion.      Right lower leg: No edema.      Left lower leg: No edema.   Neurological:      Comments: confused             Significant Labs: All pertinent labs within the past 24 hours have been reviewed.    Significant Imaging: I have reviewed all pertinent imaging results/findings within the past 24 hours.

## 2024-06-05 NOTE — PROGRESS NOTES
[facility] - Intensive Care  Adult Nutrition  Progress Note    SUMMARY       Recommendations    Recommendation:  1. Continue to encourage intake of meals and Boost  2. Monitor weight/labs  3. RD to follow to monitor PO intake    Goals:  Pt to consume 25-50% intake at meals by RD follow-up  Nutrition Goal Status: goal not met  Communication of RD Recs:  (POC)    Assessment and Plan  Nutrition Problem  Inadequate energy intake    Related to (etiology):   Altered mental status    Signs and Symptoms (as evidenced by):   17% wt loss x 10 months  Poor PO intake     Interventions:  Collaboration with other providers  Commercial beverage  Increased protein diet    Nutrition Diagnosis Status:   Continues    Malnutrition Assessment  Weight Loss (Malnutrition):  (17.76% wt loss x 10 months)  Energy Intake (Malnutrition): less than 75% for greater than 7 days     Reason for Assessment  Reason For Assessment: RD follow-up  Diagnosis:  (acute hypercapnic respiratory failure)  Relevant Medical History: acid reflux, emphysema, HTN, anxiety, hx of opiod abuse, upper dentures, wt loss  General Information Comments: Pt currently on cardiac soft & bite sized diet. Spoke with nurse, pt only ate 4 bites of food, with assistance. Pt not interested in eating. Boost on tray, but pt not drinking. PO intake reported as less than 25%, per OZZY Aj 16-skin intact. Recent wt change of 21 lb wt loss x 6 months. Unable to assess NFPE at this time, pt with AMS.  Nutrition Discharge Planning: d/c on soft & bite sized cardiac    Nutrition Risk Screen  Nutrition Risk Screen: reduced oral intake over the last month    Nutrition/Diet History  Food Preferences: no cultural or Voodoo food preferences identified at this time  Spiritual, Cultural Beliefs, Confucianism Practices, Values that Affect Care: no  Factors Affecting Nutritional Intake: behavioral (mealtime), impaired cognitive status/motor control    Anthropometrics  Temp: 97.8 °F (36.6 °C)  Height  "Method: Estimated  Height: 4' 10" (147.3 cm)  Height (inches): 58 in  Weight Method: Bed Scale  Weight: 44 kg (97 lb)  Weight (lb): 97 lb  Ideal Body Weight (IBW), Female: 90 lb  % Ideal Body Weight, Female (lb): 107.78 %  BMI (Calculated): 20.3  BMI Grade: 18.5-24.9 - normal  Usual Body Weight (UBW), k.5 kg (23)  Weight Change Amount: 20 lb 15.1 oz  % Usual Body Weight: 82.42  % Weight Change From Usual Weight: -17.76 %       Lab/Procedures/Meds  Pertinent Labs Reviewed: reviewed  Pertinent Labs Comments: Sodium 128L, Chloride 86L, CO2 32H, Albumin 2.9L, Alkaline Phosphatase 33L, RBC 5.84H, MCV 74L, MCH 22.6L, MCHC 30.6L, RDW 17.2H  Pertinent Medications Reviewed: reviewed  Pertinent Medications Comments: cyproheptadine, enoxaparin, lisinopril, lopressor, prednisone    Estimated/Assessed Needs  Weight Used For Calorie Calculations: 44 kg (97 lb)  Energy Calorie Requirements (kcal): 2250-0695 kcal (30-25 kcal/kg)  Energy Need Method: Kcal/kg  Protein Requirements: 44-57 g (1.0-1.3 g/kg)  Weight Used For Protein Calculations: 44 kg (97 lb)  Estimated Fluid Requirement Method: RDA Method  RDA Method (mL): 1320     Nutrition Prescription Ordered  Current Diet Order: cadiac soft & bite sized  Oral Nutrition Supplement: Boost Plus    Evaluation of Received Nutrient/Fluid Intake  I/O: 918/103  Energy Calories Required: not meeting needs  Protein Required: not meeting needs  Fluid Required: not meeting needs  Comments: LBM: 6/4  % Intake of Estimated Energy Needs: 0 - 25 %  % Meal Intake: 0 - 25 %    Nutrition Risk  Level of Risk/Frequency of Follow-up:  (2x weekly)     Monitor and Evaluation  Food and Nutrient Intake: energy intake, food and beverage intake  Food and Nutrient Adminstration: diet order  Physical Activity and Function: nutrition-related ADLs and IADLs  Anthropometric Measurements: weight change, body mass index  Biochemical Data, Medical Tests and Procedures: electrolyte and renal panel, " gastrointestinal profile, glucose/endocrine profile, inflammatory profile, lipid profile  Nutrition-Focused Physical Findings: overall appearance     Nutrition Follow-Up  RD Follow-up?: Yes

## 2024-06-05 NOTE — PLAN OF CARE
Problem: Sepsis/Septic Shock  Goal: Optimal Nutrition Intake  Outcome: Not Progressing     Problem: Fall Injury Risk  Goal: Absence of Fall and Fall-Related Injury  Outcome: Not Progressing  Intervention: Identify and Manage Contributors  Flowsheets (Taken 6/5/2024 1715)  Medication Review/Management: medications reviewed     Problem: Restraint, Nonviolent  Goal: Absence of Harm or Injury  Outcome: Not Progressing  Intervention: Implement Least Restrictive Safety Strategies  Flowsheets (Taken 6/5/2024 1715)  Less Restrictive Alternative:   positive reinforcement provided   spiritual support provided  Intervention: Protect Dignity, Rights and Personal Wellbeing  Flowsheets (Taken 6/5/2024 1715)  Trust Relationship/Rapport:   care explained   choices provided   emotional support provided     Problem: Confusion Chronic  Goal: Optimal Cognitive Function  Outcome: Not Progressing  Intervention: Minimize Injury Risk and Provide Safety  Flowsheets (Taken 6/5/2024 1715)  Enhanced Safety Measures: bed alarm set     Problem: Adult Inpatient Plan of Care  Goal: Plan of Care Review  Flowsheets (Taken 6/5/2024 1715)  Plan of Care Reviewed With: patient   -pt remains confused  -pt incontinent   -2L NC

## 2024-06-05 NOTE — ASSESSMENT & PLAN NOTE
Advance Care Planning     Date: 06/01/2024    Code Status  In light of the patients advanced and life limiting illness,I engaged the the family in a voluntary conversation about the patient's preferences for care  at the very end of life. The patient wishes to have a natural, peaceful death.  Along those lines, the patient does not wish to have CPR or other invasive treatments performed when her heart and/or breathing stops. I communicated to the family that a DNR order would be placed in her medical record to reflect this preference.  I spent a total of 20 minutes engaging the patient in this advance care planning discussion.   Patient grand daughter Caro  reported patient sister had updated family patient is a DNR.      Advance Care Planning     Date: 06/03/2024    [facility]  I engaged the family in a voluntary conversation about advance care planning and we specifically addressed what the goals of care would be moving forward, in light of the patient's change in clinical status, specifically  patient declining treatment and progressive clinical decline.  We did specifically address the patient's likely prognosis, which is poor.  We explored the patient's values and preferences for future care.  The family endorses that what is most important right now is to focus on spending time at home, quality of life, even if it means sacrificing a little time, and comfort and QOL     Accordingly, we have decided that the best plan to meet the patient's goals includes continuing with treatment    I did explain the role for hospice care at this stage of the patient's illness, including its ability to help the patient live with the best quality of life possible.  We will be making a hospice referral.    I spent a total of 25 minutes engaging the patient in this advance care planning discussion.       Discussed with granddaughter Caro  and updated her patient request to see her mother and sister and Caro confirm both at  bed.  She also confirm patient's code status as DNR and open to discharge with hospice maybe by tomorrow since they have to fix patient's present abode today.  Questions and concerns were addressed    -family has decided that patient will be discharged to skilled nurse facility.  They do not want hospice at this time

## 2024-06-05 NOTE — PLAN OF CARE
Recommendation:  1. Continue to encourage intake of meals and Boost  2. Monitor weight/labs  3. RD to follow to monitor PO intake    Goals:  Pt to consume 25-50% intake at meals by RD follow-up

## 2024-06-05 NOTE — PLAN OF CARE
OT /PT coeval in ICU context. Patient with PLOF per chart review noted as down trending function but was managing at home with documented decreased medication management. On evaluation this date, patient primarily  agitated, confused, and not responsive to questions asked and with exception of vocalizing temperature (cold) does not communicate needs. Skilled acute OT services not indicated at this time as patient is a poor candidate given poor participation. D/c acute OT; patient will benefit from 24/7 assist and hospital bed.       Problem: Occupational Therapy  Goal: Occupational Therapy Goal  Outcome: Not Progressing

## 2024-06-05 NOTE — PT/OT/SLP EVAL
"Physical Therapy Evaluation and Discharge    Patient Name:  June Boykin   MRN:  [id number]    Recommendations:     Discharge Recommendations: No Therapy Indicated (24/7 assistance)   Discharge Equipment Recommendations: hospital bed   Barriers to discharge:  impaired cognition    Assessment:     June Boykin is a 69 y.o. female admitted with a medical diagnosis of Acute hypercapnic respiratory failure. At this time, patient is functioning at her prior level of function and does not require further acute PT services.  Co-eval performed by PT/OT in context of ICU; per chart review, pt's PLOF was downtrending in function but was managing at home with documented decrease in medication management; during today's session, pt exhibited agitation, confusion, and was not responsive to questions asked or communicate needs except to voice being cold; skilled acute PT services not indicated at this time as pt is a poor candidate 2/2 poor participation; will d/c acute PT services; pt will benefit from 24/7 assistance and hospital bed.      Recent Surgery: * No surgery found *      Plan:     During this hospitalization, patient does not require further acute PT services. Please re-consult if situation changes:    Plan of Care Expires:  06/05/24    Subjective     Chief Complaint: "I'm cold" said throughout session  Patient/Family Comments/goals: responded with "thank you" when given water to drink and an extra blanket   Pain/Comfort:  Pain Rating 1:  (no s/s of pain; no vocalization of pain)    Patients cultural, spiritual, Islam conflicts given the current situation: no    Living Environment:  per chart review, patient lives alone but gets help from daughter and granddaughter who are the caregivers.   Previous level of function: Patient with PLOF per chart review noted as down trending function but was managing at home with documented decreased medication management.  Equipment Used at home: oxygen, " nebulizer, walker, rolling, shower chair  Assistance upon Discharge: daughter and granddaughter    Objective:     Communicated with nurse prior to session.  Patient found HOB elevated with blood pressure cuff, PureWick, telemetry, oxygen, restraints  upon PT entry to room.    General Precautions: Standard, fall  Orthopedic Precautions:N/A   Braces: N/A  Respiratory Status: Nasal cannula, flow 2 L/min    Exams:  Cognitive Exam:  Patient not oriented to month or year when asked; unable to state name or birthday; does not follow motor commands    Functional Mobility:  Bed Mobility:  scooted up toward HOB with dependence x 2  Transfers: n/a  Pt accepts one sip of water, grasps cup, brings to mouth; however anticipate max/total assist (poor PLO intake as noted by RN) ; grossly requiring total assist for all other ADLs at bed mobility.     AM-PAC 6 CLICK MOBILITY  Total Score:6       Treatment & Education:  Brief PT/OT co-evaluation performed in ICU; MD presented to room when starting eval and commented that the pt's cognitive deficits were the same as yesterday; patient repeating a non topic phrase to orientation questions asked; pt did not follow motor commands involving the LEs; pt responded with agitation to attempts at scanning using a stuffed animal; voices that she is cold throughout session; bed mobility and ADL activity as above; unable to progress treatment further 2/2 confusion and agitation; BP elevated throughout session at 190s/90s.    Patient left HOB elevated with all lines intact, call button in reach, restraints reapplied at end of session, and nurse notified.    GOALS:   Multidisciplinary Problems       Physical Therapy Goals          Problem: Physical Therapy    Goal Priority Disciplines Outcome Goal Variances Interventions   Physical Therapy Goal     PT, PT/OT Not Progressing                         History:     Past Medical History:   Diagnosis Date    Abdominal pain 05/2024    Acid reflux 05/2024     ACP (advance care planning) 6/1/2024    Anxiety disorder, unspecified     At high risk for falls     Emphysema, unspecified     History of opioid abuse     Hypertension     On home oxygen therapy     2l/nc    Wears partial dentures     upper-none on lower    Weight loss 05/2024       Past Surgical History:   Procedure Laterality Date    ANKLE SURGERY Left     HYSTERECTOMY      TONSILLECTOMY         Time Tracking:     PT Received On: 06/05/24  PT Start Time: 1005     PT Stop Time: 1020  PT Total Time (min): 15 min     Billable Minutes: Evaluation 15      06/05/2024

## 2024-06-05 NOTE — PLAN OF CARE
The sw called the pt's LOCET in to [employer].    1:45pm The joleen met with the dr to sign the PASRR due to the pt having a psych dx. The sw faxed the pt's PASRR to [facility] along with the required documentation for a hospital exemption for this pt due to her severe debility for nhp.        06/05/24 1117   Post-Acute Status   Post-Acute Authorization Placement   Post-Acute Placement Status Pending state direction/certification   Discharge Plan   Discharge Plan A New Nursing Home placement - care home care facility   Discharge Plan B Other

## 2024-06-05 NOTE — ASSESSMENT & PLAN NOTE
Chronic,    Resume home meds as BP permits    Temp:  [97.7 °F (36.5 °C)-98.2 °F (36.8 °C)]   Pulse:  [62-80]   Resp:  [15-64]   BP: (133-204)/()   SpO2:  [94 %-100 %] .   Home meds for hypertension were reviewed and noted below.   Hypertension Medications               lisinopriL-hydrochlorothiazide (PRINZIDE,ZESTORETIC) 20-25 mg Tab             While in the hospital, will manage blood pressure as follows; Continue home antihypertensive regimen    Will utilize p.r.n. blood pressure medication only if patient's blood pressure greater than 140/90 and she develops symptoms such as worsening chest pain or shortness of breath.    -lisinopril increased to 40 mg daily  -blood pressure still elevated, we will consider adding amlodipine

## 2024-06-05 NOTE — ASSESSMENT & PLAN NOTE
Patient with Hypercapnic Respiratory failure which is Acute on chronic.  she is not on home oxygen. Supplemental oxygen was provided and noted- Oxygen Concentration (%):  [28] 28    .   Signs/symptoms of respiratory failure include- tachypnea, increased work of breathing, respiratory distress, and lethargy. Contributing diagnoses includes - COPD and Pneumonia Labs and images were reviewed. Patient Has recent ABG, which has been reviewed. Will treat underlying causes and adjust management of respiratory failure as follows-  intubated on arrival  Consult Pulmonary critical for vent management   Appreciate pulmonary team extubated on 05/31 2 s/p BiPAP  -now on nasal cannula, continue to wean

## 2024-06-06 PROBLEM — R53.1 RIGHT SIDED WEAKNESS: Status: ACTIVE | Noted: 2024-06-06

## 2024-06-06 LAB
ALBUMIN SERPL BCP-MCNC: 3.1 G/DL (ref 3.5–5.2)
ALLENS TEST: YES
ALLENS TEST: YES
ALP SERPL-CCNC: 37 U/L (ref 55–135)
ALT SERPL W/O P-5'-P-CCNC: 21 U/L (ref 10–44)
AMMONIA PLAS-SCNC: 46 UMOL/L (ref 10–50)
ANION GAP SERPL CALC-SCNC: 15 MMOL/L (ref 8–16)
AST SERPL-CCNC: 40 U/L (ref 10–40)
BACTERIA #/AREA URNS HPF: ABNORMAL /HPF
BASOPHILS # BLD AUTO: 0.02 K/UL (ref 0–0.2)
BASOPHILS NFR BLD: 0.2 % (ref 0–1.9)
BILIRUB SERPL-MCNC: 0.9 MG/DL (ref 0.1–1)
BILIRUB UR QL STRIP: NEGATIVE
BUN SERPL-MCNC: 14 MG/DL (ref 8–23)
CALCIUM SERPL-MCNC: 9 MG/DL (ref 8.7–10.5)
CHLORIDE SERPL-SCNC: 85 MMOL/L (ref 95–110)
CLARITY UR: CLEAR
CO2 SERPL-SCNC: 27 MMOL/L (ref 23–29)
COLOR UR: YELLOW
CREAT SERPL-MCNC: 0.7 MG/DL (ref 0.5–1.4)
DIFFERENTIAL METHOD BLD: ABNORMAL
EOSINOPHIL # BLD AUTO: 0 K/UL (ref 0–0.5)
EOSINOPHIL NFR BLD: 0.1 % (ref 0–8)
ERYTHROCYTE [DISTWIDTH] IN BLOOD BY AUTOMATED COUNT: 18.2 % (ref 11.5–14.5)
EST. GFR  (NO RACE VARIABLE): >60 ML/MIN/1.73 M^2
ESTIMATED AVG GLUCOSE: 108 MG/DL (ref 68–131)
FIO2: 21 %
FIO2: 21 %
GLUCOSE SERPL-MCNC: 134 MG/DL (ref 70–110)
GLUCOSE UR QL STRIP: NEGATIVE
HBA1C MFR BLD: 5.4 % (ref 4–5.6)
HCT VFR BLD AUTO: 47.2 % (ref 37–48.5)
HGB BLD-MCNC: 14.7 G/DL (ref 12–16)
HGB UR QL STRIP: ABNORMAL
HYALINE CASTS #/AREA URNS LPF: 0 /LPF
IMM GRANULOCYTES # BLD AUTO: 0.05 K/UL (ref 0–0.04)
IMM GRANULOCYTES NFR BLD AUTO: 0.5 % (ref 0–0.5)
KETONES UR QL STRIP: NEGATIVE
LEUKOCYTE ESTERASE UR QL STRIP: ABNORMAL
LYMPHOCYTES # BLD AUTO: 4.1 K/UL (ref 1–4.8)
LYMPHOCYTES NFR BLD: 39.9 % (ref 18–48)
MAGNESIUM SERPL-MCNC: 1.5 MG/DL (ref 1.6–2.6)
MCH RBC QN AUTO: 22.8 PG (ref 27–31)
MCHC RBC AUTO-ENTMCNC: 31.1 G/DL (ref 32–36)
MCV RBC AUTO: 73 FL (ref 82–98)
MICROSCOPIC COMMENT: ABNORMAL
MONOCYTES # BLD AUTO: 1.2 K/UL (ref 0.3–1)
MONOCYTES NFR BLD: 11.2 % (ref 4–15)
NEUTROPHILS # BLD AUTO: 5 K/UL (ref 1.8–7.7)
NEUTROPHILS NFR BLD: 48.1 % (ref 38–73)
NITRITE UR QL STRIP: NEGATIVE
NRBC BLD-RTO: 0 /100 WBC
PCO2 BLDA: 56.9 MMHG (ref 35–45)
PCO2 BLDA: 58.5 MMHG (ref 35–45)
PH SMN: 7.44 [PH] (ref 7.35–7.45)
PH SMN: 7.45 [PH] (ref 7.35–7.45)
PH UR STRIP: 7 [PH] (ref 5–8)
PHOSPHATE SERPL-MCNC: 2.9 MG/DL (ref 2.7–4.5)
PLATELET # BLD AUTO: 209 K/UL (ref 150–450)
PMV BLD AUTO: ABNORMAL FL (ref 9.2–12.9)
PO2 BLDA: 66.8 MMHG (ref 80–100)
PO2 BLDA: 67.8 MMHG (ref 80–100)
POC BASE DEFICIT: 13 MMOL/L (ref -2–2)
POC BASE DEFICIT: 13.2 MMOL/L (ref -2–2)
POC HCO3: 39.8 MMOL/L (ref 24–28)
POC HCO3: 39.9 MMOL/L (ref 24–28)
POC PERFORMED BY: ABNORMAL
POC PERFORMED BY: ABNORMAL
POC SATURATED O2: 93.2 % (ref 95–100)
POC SATURATED O2: 93.8 % (ref 95–100)
POCT GLUCOSE: 116 MG/DL (ref 70–110)
POCT GLUCOSE: 126 MG/DL (ref 70–110)
POCT GLUCOSE: 146 MG/DL (ref 70–110)
POTASSIUM SERPL-SCNC: 3.8 MMOL/L (ref 3.5–5.1)
PROT SERPL-MCNC: 7.1 G/DL (ref 6–8.4)
PROT UR QL STRIP: ABNORMAL
RBC # BLD AUTO: 6.46 M/UL (ref 4–5.4)
RBC #/AREA URNS HPF: 10 /HPF (ref 0–4)
SODIUM SERPL-SCNC: 127 MMOL/L (ref 136–145)
SP GR UR STRIP: >1.03 (ref 1–1.03)
SPECIMEN SOURCE: ABNORMAL
SPECIMEN SOURCE: ABNORMAL
SQUAMOUS #/AREA URNS HPF: 1 /HPF
UNIDENT CRYS URNS QL MICRO: 3
URN SPEC COLLECT METH UR: ABNORMAL
UROBILINOGEN UR STRIP-ACNC: NEGATIVE EU/DL
WBC # BLD AUTO: 10.32 K/UL (ref 3.9–12.7)
WBC #/AREA URNS HPF: 4 /HPF (ref 0–5)
YEAST URNS QL MICRO: ABNORMAL

## 2024-06-06 PROCEDURE — 81000 URINALYSIS NONAUTO W/SCOPE: CPT

## 2024-06-06 PROCEDURE — 36600 WITHDRAWAL OF ARTERIAL BLOOD: CPT

## 2024-06-06 PROCEDURE — 83036 HEMOGLOBIN GLYCOSYLATED A1C: CPT | Performed by: INTERNAL MEDICINE

## 2024-06-06 PROCEDURE — 82746 ASSAY OF FOLIC ACID SERUM: CPT | Performed by: INTERNAL MEDICINE

## 2024-06-06 PROCEDURE — 93005 ELECTROCARDIOGRAM TRACING: CPT

## 2024-06-06 PROCEDURE — 82607 VITAMIN B-12: CPT | Performed by: INTERNAL MEDICINE

## 2024-06-06 PROCEDURE — 83735 ASSAY OF MAGNESIUM: CPT | Performed by: FAMILY MEDICINE

## 2024-06-06 PROCEDURE — 25000003 PHARM REV CODE 250: Performed by: FAMILY MEDICINE

## 2024-06-06 PROCEDURE — 63600175 PHARM REV CODE 636 W HCPCS: Performed by: STUDENT IN AN ORGANIZED HEALTH CARE EDUCATION/TRAINING PROGRAM

## 2024-06-06 PROCEDURE — 63600175 PHARM REV CODE 636 W HCPCS: Performed by: INTERNAL MEDICINE

## 2024-06-06 PROCEDURE — 95718 EEG PHYS/QHP 2-12 HR W/VEEG: CPT | Mod: ,,, | Performed by: STUDENT IN AN ORGANIZED HEALTH CARE EDUCATION/TRAINING PROGRAM

## 2024-06-06 PROCEDURE — 82140 ASSAY OF AMMONIA: CPT | Performed by: INTERNAL MEDICINE

## 2024-06-06 PROCEDURE — 20000000 HC ICU ROOM

## 2024-06-06 PROCEDURE — 25500020 PHARM REV CODE 255: Performed by: INTERNAL MEDICINE

## 2024-06-06 PROCEDURE — 63600175 PHARM REV CODE 636 W HCPCS

## 2024-06-06 PROCEDURE — 27100245 HC EEG CAPS, DISPOSABLE

## 2024-06-06 PROCEDURE — 93010 ELECTROCARDIOGRAM REPORT: CPT | Mod: ,,, | Performed by: INTERNAL MEDICINE

## 2024-06-06 PROCEDURE — 25000003 PHARM REV CODE 250: Performed by: STUDENT IN AN ORGANIZED HEALTH CARE EDUCATION/TRAINING PROGRAM

## 2024-06-06 PROCEDURE — 99900035 HC TECH TIME PER 15 MIN (STAT)

## 2024-06-06 PROCEDURE — 85025 COMPLETE CBC W/AUTO DIFF WBC: CPT | Performed by: FAMILY MEDICINE

## 2024-06-06 PROCEDURE — 25000003 PHARM REV CODE 250: Performed by: INTERNAL MEDICINE

## 2024-06-06 PROCEDURE — 80053 COMPREHEN METABOLIC PANEL: CPT | Performed by: FAMILY MEDICINE

## 2024-06-06 PROCEDURE — 63600175 PHARM REV CODE 636 W HCPCS: Performed by: FAMILY MEDICINE

## 2024-06-06 PROCEDURE — 94761 N-INVAS EAR/PLS OXIMETRY MLT: CPT

## 2024-06-06 PROCEDURE — 27000221 HC OXYGEN, UP TO 24 HOURS

## 2024-06-06 PROCEDURE — 82803 BLOOD GASES ANY COMBINATION: CPT

## 2024-06-06 PROCEDURE — 84100 ASSAY OF PHOSPHORUS: CPT | Performed by: FAMILY MEDICINE

## 2024-06-06 PROCEDURE — 36415 COLL VENOUS BLD VENIPUNCTURE: CPT | Performed by: INTERNAL MEDICINE

## 2024-06-06 RX ORDER — LEVETIRACETAM 500 MG/5ML
500 INJECTION, SOLUTION, CONCENTRATE INTRAVENOUS EVERY 12 HOURS
Status: DISCONTINUED | OUTPATIENT
Start: 2024-06-06 | End: 2024-06-07

## 2024-06-06 RX ORDER — DILTIAZEM HYDROCHLORIDE 5 MG/ML
0.25 INJECTION INTRAVENOUS ONCE
Status: COMPLETED | OUTPATIENT
Start: 2024-06-06 | End: 2024-06-06

## 2024-06-06 RX ORDER — ASPIRIN 81 MG/1
81 TABLET ORAL DAILY
Status: DISCONTINUED | OUTPATIENT
Start: 2024-06-06 | End: 2024-06-07

## 2024-06-06 RX ORDER — DILTIAZEM HCL 1 MG/ML
0-15 INJECTION, SOLUTION INTRAVENOUS CONTINUOUS
Status: DISCONTINUED | OUTPATIENT
Start: 2024-06-06 | End: 2024-06-07

## 2024-06-06 RX ORDER — METOPROLOL TARTRATE 1 MG/ML
5 INJECTION, SOLUTION INTRAVENOUS ONCE
Status: COMPLETED | OUTPATIENT
Start: 2024-06-06 | End: 2024-06-06

## 2024-06-06 RX ORDER — LORAZEPAM 2 MG/ML
1 INJECTION INTRAMUSCULAR ONCE
Status: COMPLETED | OUTPATIENT
Start: 2024-06-06 | End: 2024-06-06

## 2024-06-06 RX ORDER — SODIUM CHLORIDE 0.9 % (FLUSH) 0.9 %
10 SYRINGE (ML) INJECTION
Status: DISCONTINUED | OUTPATIENT
Start: 2024-06-06 | End: 2024-06-08 | Stop reason: HOSPADM

## 2024-06-06 RX ORDER — BISACODYL 10 MG/1
10 SUPPOSITORY RECTAL DAILY PRN
Status: DISCONTINUED | OUTPATIENT
Start: 2024-06-06 | End: 2024-06-08 | Stop reason: HOSPADM

## 2024-06-06 RX ORDER — MAGNESIUM SULFATE HEPTAHYDRATE 40 MG/ML
2 INJECTION, SOLUTION INTRAVENOUS ONCE
Status: COMPLETED | OUTPATIENT
Start: 2024-06-06 | End: 2024-06-06

## 2024-06-06 RX ORDER — LORAZEPAM 2 MG/ML
2 INJECTION INTRAMUSCULAR ONCE
Status: COMPLETED | OUTPATIENT
Start: 2024-06-06 | End: 2024-06-06

## 2024-06-06 RX ORDER — METOPROLOL TARTRATE 25 MG/1
25 TABLET, FILM COATED ORAL ONCE
Status: COMPLETED | OUTPATIENT
Start: 2024-06-06 | End: 2024-06-06

## 2024-06-06 RX ORDER — DICYCLOMINE HYDROCHLORIDE 10 MG/1
10 CAPSULE ORAL EVERY 6 HOURS
Status: DISCONTINUED | OUTPATIENT
Start: 2024-06-06 | End: 2024-06-07

## 2024-06-06 RX ORDER — ATORVASTATIN CALCIUM 40 MG/1
40 TABLET, FILM COATED ORAL DAILY
Status: DISCONTINUED | OUTPATIENT
Start: 2024-06-06 | End: 2024-06-07

## 2024-06-06 RX ORDER — METOPROLOL TARTRATE 50 MG/1
50 TABLET ORAL 2 TIMES DAILY
Status: DISCONTINUED | OUTPATIENT
Start: 2024-06-06 | End: 2024-06-07

## 2024-06-06 RX ADMIN — LORAZEPAM 1 MG: 2 INJECTION INTRAMUSCULAR; INTRAVENOUS at 05:06

## 2024-06-06 RX ADMIN — LEVETIRACETAM 500 MG: 100 INJECTION, SOLUTION INTRAVENOUS at 06:06

## 2024-06-06 RX ADMIN — METOPROLOL TARTRATE 5 MG: 1 INJECTION, SOLUTION INTRAVENOUS at 12:06

## 2024-06-06 RX ADMIN — DOXYCYCLINE 100 MG: 100 INJECTION, POWDER, LYOPHILIZED, FOR SOLUTION INTRAVENOUS at 12:06

## 2024-06-06 RX ADMIN — ENOXAPARIN SODIUM 50 MG: 60 INJECTION, SOLUTION SUBCUTANEOUS at 10:06

## 2024-06-06 RX ADMIN — Medication 2.5 MG/HR: at 05:06

## 2024-06-06 RX ADMIN — DOXYCYCLINE 100 MG: 100 INJECTION, POWDER, LYOPHILIZED, FOR SOLUTION INTRAVENOUS at 02:06

## 2024-06-06 RX ADMIN — IOHEXOL 100 ML: 350 INJECTION, SOLUTION INTRAVENOUS at 12:06

## 2024-06-06 RX ADMIN — DEXTROSE MONOHYDRATE 1350 MG: 50 INJECTION, SOLUTION INTRAVENOUS at 11:06

## 2024-06-06 RX ADMIN — DILTIAZEM HYDROCHLORIDE 11.5 MG: 5 INJECTION INTRAVENOUS at 02:06

## 2024-06-06 RX ADMIN — DIAZEPAM 5 MG: 5 INJECTION, SOLUTION INTRAMUSCULAR; INTRAVENOUS at 06:06

## 2024-06-06 RX ADMIN — HYDRALAZINE HYDROCHLORIDE 50 MG: 25 TABLET ORAL at 12:06

## 2024-06-06 RX ADMIN — METOPROLOL TARTRATE 5 MG: 1 INJECTION, SOLUTION INTRAVENOUS at 01:06

## 2024-06-06 RX ADMIN — LEVETIRACETAM 2600 MG: 100 INJECTION, SOLUTION INTRAVENOUS at 06:06

## 2024-06-06 RX ADMIN — METOPROLOL TARTRATE 25 MG: 25 TABLET, FILM COATED ORAL at 02:06

## 2024-06-06 RX ADMIN — METOPROLOL TARTRATE 25 MG: 25 TABLET, FILM COATED ORAL at 12:06

## 2024-06-06 RX ADMIN — LORAZEPAM 2 MG: 2 INJECTION INTRAMUSCULAR; INTRAVENOUS at 10:06

## 2024-06-06 RX ADMIN — MAGNESIUM SULFATE HEPTAHYDRATE 2 G: 40 INJECTION, SOLUTION INTRAVENOUS at 10:06

## 2024-06-06 RX ADMIN — SODIUM CHLORIDE 500 ML: 9 INJECTION, SOLUTION INTRAVENOUS at 02:06
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
## 2024-06-06 NOTE — ASSESSMENT & PLAN NOTE
Advance Care Planning     Date: 06/01/2024    Code Status  In light of the patients advanced and life limiting illness,I engaged the the family in a voluntary conversation about the patient's preferences for care  at the very end of life. The patient wishes to have a natural, peaceful death.  Along those lines, the patient does not wish to have CPR or other invasive treatments performed when her heart and/or breathing stops. I communicated to the family that a DNR order would be placed in her medical record to reflect this preference.  I spent a total of 20 minutes engaging the patient in this advance care planning discussion.   Patient grand daughter Caro  reported patient sister had updated family patient is a DNR.      Advance Care Planning     Date: 06/03/2024    [facility]  I engaged the family in a voluntary conversation about advance care planning and we specifically addressed what the goals of care would be moving forward, in light of the patient's change in clinical status, specifically  patient declining treatment and progressive clinical decline.  We did specifically address the patient's likely prognosis, which is poor.  We explored the patient's values and preferences for future care.  The family endorses that what is most important right now is to focus on spending time at home, quality of life, even if it means sacrificing a little time, and comfort and QOL     Accordingly, we have decided that the best plan to meet the patient's goals includes continuing with treatment    I did explain the role for hospice care at this stage of the patient's illness, including its ability to help the patient live with the best quality of life possible.  We will be making a hospice referral.    I spent a total of 25 minutes engaging the patient in this advance care planning discussion.       Discussed with granddaughter Caro  and updated her patient request to see her mother and sister and Caro confirm both at  bed.  She also confirm patient's code status as DNR and open to discharge with hospice maybe by tomorrow since they have to fix patient's present abode today.  Questions and concerns were addressed    -family has decided that patient will be discharged to skilled nurse facility.  They do not want hospice at this time.  Pending disposition

## 2024-06-06 NOTE — NURSING
Pt is extremely anxious, increased RR, on/off crying. She is also still having involuntary movement/jerking of RUE. Consulted with Dr. Hernadez and asked if giving her Valium would be ok, he agrees to giving Valium 5 mg IVP that is already ordered

## 2024-06-06 NOTE — NURSING
Pt arrived to unit via bed with KAYLENE Kiser, KAYLENE Calloway, and two other nurses. Upon arrival to unit pt's right arm was making a jerking/up and down movements. Pt is unable to control movement. She is has a right gaze preference, but not fixed as she can look at me when direct to, she is talking and able to answer questions appropriately, although orientation wise, still only oriented to self. She is asking for food and drink, which is not like she was the last few days. Kaylene Kiser notified Dr. Hernadez

## 2024-06-06 NOTE — NURSING
Message sent by ICU nurse Jong [address]., We gave this pt the 5mg of metoprolol IV and HR is still sustaining 140-170. she also got metoprolol 25mg PO at 0005. Orders given  to give the 2nd dose of 5 ivp now.
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
## 2024-06-06 NOTE — NURSING
Patient's HR continues to remains in the 130-150's. ALEX Story ICU on unit for proactive rounding, secure chatted with Dr. Hernadez and orders were given for a IV dose of Diltiazem 11.5 mg and if still elevated given oral dose of Metoprolol 25 mg as well as a bolus of  ml.

## 2024-06-06 NOTE — NURSING
"Pt trying to get out of bed, telling me to "move, get out of my way" unable to redirect as easily as last night. Called another RN to help get back into position in bed. Will give PRN Valium  "

## 2024-06-06 NOTE — NURSING
Phoned Denver Hernadez MD Patient is in A-fib with RVR -180's. Patient was NSR. Patient just got P.O. Metoprolol and Hydralazine. Orders given for a stat EKG and IV 5 MG dose of Metoprolol. He will come up to see patient.

## 2024-06-06 NOTE — PLAN OF CARE
Problem: Adult Inpatient Plan of Care  Goal: Plan of Care Review  Outcome: Progressing     Problem: Adult Inpatient Plan of Care  Goal: Absence of Hospital-Acquired Illness or Injury  Outcome: Progressing     Problem: Sepsis/Septic Shock  Goal: Absence of Infection Signs and Symptoms  Outcome: Progressing     Problem: Pneumonia  Goal: Fluid Balance  Outcome: Progressing     Problem: Fall Injury Risk  Goal: Absence of Fall and Fall-Related Injury  Outcome: Progressing     Problem: Restraint, Nonviolent  Goal: Absence of Harm or Injury  Outcome: Progressing     Problem: Skin Injury Risk Increased  Goal: Skin Health and Integrity  Outcome: Progressing     Problem: Dysrhythmia  Goal: Normalized Cardiac Rhythm  Outcome: Progressing
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
## 2024-06-06 NOTE — NURSING
RAPID RESPONSE NURSE PROACTIVE ROUNDING NOTE       Time of Visit: 40    Admit Date: 2024  LOS: 6  Code Status: DNR   Date of Visit: 2024  : 1955  Age: 69 y.o.  Sex: female  Race: White  Bed: K515/K515 A:   MRN: [id number]  Was the patient discharged from an ICU this admission? Yes   Was the patient discharged from a PACU within last 24 hours? No   Did the patient receive conscious sedation/general anesthesia in last 24 hours? No   Was the patient in the ED within the past 24 hours? No   Was the patient on NIPPV within the past 24 hours? No   Attending Physician: Mariella Hobbs  Primary Service: Hospitalist,Hospice and Palliative Medicine   Time spent at the bedside: 15 -30 min    SITUATION    Notified by bedside RN via phone call  Reason for alert: Afib RVR    Diagnosis: Acute hypercapnic respiratory failure   has a past medical history of Abdominal pain, Acid reflux, ACP (advance care planning), Anxiety disorder, unspecified, At high risk for falls, Emphysema, unspecified, History of opioid abuse, Hypertension, On home oxygen therapy, Wears partial dentures, and Weight loss.    Last Vitals:  Temp: 97.9 °F (36.6 °C) (2345)  Pulse: 180 ( 2355)  Resp: 24 (2349)  BP: 173/89 (2345)  SpO2: 92 % (2349)    24 Hour Vitals Range:  Temp:  [96.1 °F (35.6 °C)-98.1 °F (36.7 °C)]   Pulse:  []   Resp:  [15-60]   BP: (134-204)/()   SpO2:  [86 %-100 %]     Clinical Issues: Dysrhythmia    ASSESSMENT/INTERVENTIONS    5 - Notified by bedside Rere JOHNSON of pt's -170's. Bedside RN gave scheduled metoprolol 25mg PO @ 0005 (med was held at scheduled time of 2100 d/t pt being too drowsy to swallow at the time). EBONY Hernadez MD notified by bedside RN. Orders to obtain EKG and give PRN Metoprolol 5mg IVP; med given by bedside RN w/ minimal change. HR remains 140-170, /76 (93); MD notified and orders placed for a second dose of metoprolol 5mg IVP and BID  metoprolol increased to 50mg.     0130 - HR remains 130-150; MD Kimmie notified. Orders placed for 1x dose Diltiazem 11.5mg IVP, PO metoprolol, and IVF bolus.     0215 - diltiazem IVP given w/ rated 's. metoprolol 25mg PO given per order. NS 500cc bolus started.     0330 - remains in Afib , predominately . POC reviewed w/ EBONY Hernadez MD and ALEX Jernigan     RECOMMENDATIONS  - EKG   - metoprolol IVP   - Diltiazem   - IVF    Discussed plan of care with bedside RNRere    PROVIDER ESCALATION    Physician escalation: Yes    Orders received and case discussed with Dr. Hernadez .    Disposition:Remain in room 515    FOLLOW UP    Call back the Rapid Response NursePorsha at [phone] for additional questions or concerns.

## 2024-06-06 NOTE — NURSING
Rapid Response Nurse Follow-up Note     Followed up with patient for proactive rounding. -150's. D. MD Satya notified. Pt seen at bedside; new twitching noted to left upper extremity. MD to do bedside eval. Pt t be transferred to ICU for Diltiazem gtt.   No acute issues at this time. Reviewed plan of care with Bedside RNMateo .   Team will continue to follow.  Please call Rapid Response RN, JOEL TRIMBLE RN with any questions or concerns at [phone].

## 2024-06-06 NOTE — PLAN OF CARE
Problem: Adult Inpatient Plan of Care  Goal: Plan of Care Review  Outcome: Not Progressing  Goal: Optimal Comfort and Wellbeing  Outcome: Not Progressing     Problem: Sepsis/Septic Shock  Goal: Optimal Coping  Outcome: Not Progressing     Problem: Fall Injury Risk  Goal: Absence of Fall and Fall-Related Injury  Outcome: Not Progressing     Problem: Restraint, Nonviolent  Goal: Absence of Harm or Injury  Outcome: Not Progressing   - Code Stroke activated for patient due to R sided weakness.   - PT received CTA and Head and Neck  -Keppra loaded  -Diltiazem gtt titrated off  -pt remained in NSR  -02 titrated  -pt still requiring restaints  Pt received ativan x2  -EEG ordered

## 2024-06-06 NOTE — NURSING
Pt transferred to [facility] with ALEX Story via bed, with tele monitor, chart, 1L NC, and meds. Nurse made aware of arrival by unit secretary. Pt in bed, still lethargic, restraints intact, bed in lowest position, alarm set, call bell in reach.
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
## 2024-06-06 NOTE — NURSING
Patient calm and quiet, heart rate still in the 124-150 a -fib. Dr Hernadez will be coming to see her.

## 2024-06-06 NOTE — EICU
EICU BRIEF ADMIT NOTE:    HISTORY:  Atrial Fibrillation with RVR Please refer to H/P and ER notes for detail    CAMERA ASSESSMENT: Two way audiovisual assessment was done: Yes    Telemetry was reviewed. Medical records including notes, labs and imaging were reviewed.Yes    DISCUSSED with bedside nurse.Yes    ASSESSMENT AND PLAN:    # Atrial Fibrillation with RVR: IV Cardizem, Converted to NSR  # COPD: On Oxygen      BEST PRACTICES REVIEW:    INTUBATED: No  GLYCEMIN CONTROL:  Diabetes:NO  STRESS ULCER PROPHYLAXIS:   Not indicated   DVT PROPHYLAXIS:  Pharmacological    Thank You for allowing EICU to participate in the care of the patient. Please call as needed      Madi Ambrosio MD  EICU  Critical Care Medicine

## 2024-06-06 NOTE — NURSING
"Attempted to give pt 2100 meds, pt is too lethargic from valium. Awakens with stimulation and grimaces saying "ouch" to lovenox shot. Will hold oral meds for now. /75, HR 66, SpO2 99% 2L  "

## 2024-06-06 NOTE — SUBJECTIVE & OBJECTIVE
Interval History:  Patient was examined in her bed.  She appeared calm and not in distress.  She is confused and unable to follow instructions. She appears to have right upper extremity weakness.  It was endorsed that patient had jerking movements of her right upper extremity last night.    Review of Systems   Unable to perform ROS: Mental status change     Objective:     Vital Signs (Most Recent):  Temp: 97.4 °F (36.3 °C) (06/06/24 1530)  Pulse: 79 (06/06/24 1700)  Resp: (!) 47 (06/06/24 1700)  BP: (!) 165/81 (06/06/24 1700)  SpO2: (!) 92 % (06/06/24 1700) Vital Signs (24h Range):  Temp:  [97.4 °F (36.3 °C)-99.5 °F (37.5 °C)] 97.4 °F (36.3 °C)  Pulse:  [] 79  Resp:  [16-95] 47  SpO2:  [86 %-99 %] 92 %  BP: (106-190)/() 165/81     Weight: 44 kg (97 lb)  Body mass index is 20.27 kg/m².    Intake/Output Summary (Last 24 hours) at 6/6/2024 1713  Last data filed at 6/6/2024 1400  Gross per 24 hour   Intake 413.73 ml   Output 150 ml   Net 263.73 ml         Physical Exam  Constitutional:       Appearance: She is not ill-appearing.      Comments:  Frail, thin   HENT:      Head: Normocephalic.   Cardiovascular:      Rate and Rhythm: Normal rate.   Pulmonary:      Breath sounds: Rales present. No wheezing.   Abdominal:      General: Bowel sounds are normal. There is no distension.      Palpations: Abdomen is soft.   Musculoskeletal:      Cervical back: Normal range of motion.      Right lower leg: No edema.      Left lower leg: No edema.      Comments:   Right-sided weakness, right arm weaker than right lower extremity.   Neurological:      Comments: confused             Significant Labs: All pertinent labs within the past 24 hours have been reviewed.    Significant Imaging: I have reviewed all pertinent imaging results/findings within the past 24 hours.
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
## 2024-06-06 NOTE — NURSING
Telemetry tech phoned patient's HR elevated to 180. Oral Metoprolol 25 mg and Hydralazine 50 mg due and giving it now.

## 2024-06-06 NOTE — ASSESSMENT & PLAN NOTE
Patient with Paroxysmal (<7 days) atrial fibrillation which is uncontrolled currently with Beta Blocker and Calcium Channel Blocker. Patient is currently in atrial fibrillation.[id number] Score: 2. HASBLED Score: . Anticoagulation indicated. Anticoagulation done with lovenox .  Consult cardiology -therapeutic Lovenox, off Cardizem gtt  Continue Metoprolol bid - uptitrate as needed

## 2024-06-07 PROBLEM — R56.9 FOCAL SEIZURES: Status: ACTIVE | Noted: 2024-06-07

## 2024-06-07 LAB
ALBUMIN SERPL BCP-MCNC: 2.9 G/DL (ref 3.5–5.2)
ALP SERPL-CCNC: 34 U/L (ref 55–135)
ALT SERPL W/O P-5'-P-CCNC: 22 U/L (ref 10–44)
ANION GAP SERPL CALC-SCNC: 9 MMOL/L (ref 8–16)
ANION GAP SERPL CALC-SCNC: 9 MMOL/L (ref 8–16)
AST SERPL-CCNC: 40 U/L (ref 10–40)
BACTERIA BLD CULT: NORMAL
BASOPHILS # BLD AUTO: 0.01 K/UL (ref 0–0.2)
BASOPHILS NFR BLD: 0.2 % (ref 0–1.9)
BILIRUB SERPL-MCNC: 0.6 MG/DL (ref 0.1–1)
BUN SERPL-MCNC: 7 MG/DL (ref 8–23)
BUN SERPL-MCNC: 9 MG/DL (ref 8–23)
CALCIUM SERPL-MCNC: 8.9 MG/DL (ref 8.7–10.5)
CALCIUM SERPL-MCNC: 9.3 MG/DL (ref 8.7–10.5)
CHLORIDE SERPL-SCNC: 86 MMOL/L (ref 95–110)
CHLORIDE SERPL-SCNC: 92 MMOL/L (ref 95–110)
CHOLEST SERPL-MCNC: 191 MG/DL (ref 120–199)
CHOLEST/HDLC SERPL: 4.3 {RATIO} (ref 2–5)
CO2 SERPL-SCNC: 33 MMOL/L (ref 23–29)
CO2 SERPL-SCNC: 38 MMOL/L (ref 23–29)
CREAT SERPL-MCNC: 0.6 MG/DL (ref 0.5–1.4)
CREAT SERPL-MCNC: 0.6 MG/DL (ref 0.5–1.4)
DIFFERENTIAL METHOD BLD: ABNORMAL
EOSINOPHIL # BLD AUTO: 0 K/UL (ref 0–0.5)
EOSINOPHIL NFR BLD: 0 % (ref 0–8)
ERYTHROCYTE [DISTWIDTH] IN BLOOD BY AUTOMATED COUNT: 18.1 % (ref 11.5–14.5)
EST. GFR  (NO RACE VARIABLE): >60 ML/MIN/1.73 M^2
EST. GFR  (NO RACE VARIABLE): >60 ML/MIN/1.73 M^2
FOLATE SERPL-MCNC: 11.5 NG/ML (ref 4–24)
GLUCOSE SERPL-MCNC: 104 MG/DL (ref 70–110)
GLUCOSE SERPL-MCNC: 109 MG/DL (ref 70–110)
HCT VFR BLD AUTO: 46.3 % (ref 37–48.5)
HDLC SERPL-MCNC: 44 MG/DL (ref 40–75)
HDLC SERPL: 23 % (ref 20–50)
HGB BLD-MCNC: 14.1 G/DL (ref 12–16)
IMM GRANULOCYTES # BLD AUTO: 0.02 K/UL (ref 0–0.04)
IMM GRANULOCYTES NFR BLD AUTO: 0.4 % (ref 0–0.5)
LDLC SERPL CALC-MCNC: 121 MG/DL (ref 63–159)
LYMPHOCYTES # BLD AUTO: 2.2 K/UL (ref 1–4.8)
LYMPHOCYTES NFR BLD: 41.5 % (ref 18–48)
MAGNESIUM SERPL-MCNC: 1.8 MG/DL (ref 1.6–2.6)
MCH RBC QN AUTO: 22.5 PG (ref 27–31)
MCHC RBC AUTO-ENTMCNC: 30.5 G/DL (ref 32–36)
MCV RBC AUTO: 74 FL (ref 82–98)
MONOCYTES # BLD AUTO: 0.5 K/UL (ref 0.3–1)
MONOCYTES NFR BLD: 8.5 % (ref 4–15)
NEUTROPHILS # BLD AUTO: 2.6 K/UL (ref 1.8–7.7)
NEUTROPHILS NFR BLD: 49.4 % (ref 38–73)
NONHDLC SERPL-MCNC: 147 MG/DL
NRBC BLD-RTO: 0 /100 WBC
PHOSPHATE SERPL-MCNC: 2.3 MG/DL (ref 2.7–4.5)
PLATELET # BLD AUTO: 175 K/UL (ref 150–450)
PMV BLD AUTO: ABNORMAL FL (ref 9.2–12.9)
POCT GLUCOSE: 106 MG/DL (ref 70–110)
POCT GLUCOSE: 106 MG/DL (ref 70–110)
POCT GLUCOSE: 109 MG/DL (ref 70–110)
POCT GLUCOSE: 124 MG/DL (ref 70–110)
POCT GLUCOSE: 164 MG/DL (ref 70–110)
POTASSIUM SERPL-SCNC: 2.8 MMOL/L (ref 3.5–5.1)
POTASSIUM SERPL-SCNC: 3.8 MMOL/L (ref 3.5–5.1)
PROT SERPL-MCNC: 6.9 G/DL (ref 6–8.4)
RBC # BLD AUTO: 6.28 M/UL (ref 4–5.4)
SODIUM SERPL-SCNC: 133 MMOL/L (ref 136–145)
SODIUM SERPL-SCNC: 134 MMOL/L (ref 136–145)
TRIGL SERPL-MCNC: 130 MG/DL (ref 30–150)
VIT B12 SERPL-MCNC: 1000 PG/ML (ref 210–950)
WBC # BLD AUTO: 5.3 K/UL (ref 3.9–12.7)

## 2024-06-07 PROCEDURE — 25000003 PHARM REV CODE 250: Performed by: INTERNAL MEDICINE

## 2024-06-07 PROCEDURE — 63600175 PHARM REV CODE 636 W HCPCS: Performed by: STUDENT IN AN ORGANIZED HEALTH CARE EDUCATION/TRAINING PROGRAM

## 2024-06-07 PROCEDURE — 63600175 PHARM REV CODE 636 W HCPCS: Performed by: FAMILY MEDICINE

## 2024-06-07 PROCEDURE — 25000003 PHARM REV CODE 250: Performed by: FAMILY MEDICINE

## 2024-06-07 PROCEDURE — 95720 EEG PHY/QHP EA INCR W/VEEG: CPT | Mod: ,,, | Performed by: PSYCHIATRY & NEUROLOGY

## 2024-06-07 PROCEDURE — 84100 ASSAY OF PHOSPHORUS: CPT | Performed by: FAMILY MEDICINE

## 2024-06-07 PROCEDURE — 27000221 HC OXYGEN, UP TO 24 HOURS

## 2024-06-07 PROCEDURE — 99900035 HC TECH TIME PER 15 MIN (STAT)

## 2024-06-07 PROCEDURE — 20000000 HC ICU ROOM

## 2024-06-07 PROCEDURE — 63600175 PHARM REV CODE 636 W HCPCS: Performed by: INTERNAL MEDICINE

## 2024-06-07 PROCEDURE — 80048 BASIC METABOLIC PNL TOTAL CA: CPT | Mod: XB | Performed by: INTERNAL MEDICINE

## 2024-06-07 PROCEDURE — 94761 N-INVAS EAR/PLS OXIMETRY MLT: CPT

## 2024-06-07 PROCEDURE — 83735 ASSAY OF MAGNESIUM: CPT | Performed by: FAMILY MEDICINE

## 2024-06-07 PROCEDURE — 80053 COMPREHEN METABOLIC PANEL: CPT | Performed by: FAMILY MEDICINE

## 2024-06-07 PROCEDURE — 80061 LIPID PANEL: CPT | Performed by: INTERNAL MEDICINE

## 2024-06-07 PROCEDURE — 25000003 PHARM REV CODE 250: Performed by: STUDENT IN AN ORGANIZED HEALTH CARE EDUCATION/TRAINING PROGRAM

## 2024-06-07 PROCEDURE — 85025 COMPLETE CBC W/AUTO DIFF WBC: CPT | Performed by: FAMILY MEDICINE

## 2024-06-07 PROCEDURE — 36415 COLL VENOUS BLD VENIPUNCTURE: CPT | Performed by: INTERNAL MEDICINE

## 2024-06-07 PROCEDURE — 63600175 PHARM REV CODE 636 W HCPCS

## 2024-06-07 PROCEDURE — 36415 COLL VENOUS BLD VENIPUNCTURE: CPT | Performed by: FAMILY MEDICINE

## 2024-06-07 PROCEDURE — 25000003 PHARM REV CODE 250

## 2024-06-07 PROCEDURE — 99233 SBSQ HOSP IP/OBS HIGH 50: CPT | Mod: ,,, | Performed by: STUDENT IN AN ORGANIZED HEALTH CARE EDUCATION/TRAINING PROGRAM

## 2024-06-07 RX ORDER — LISINOPRIL 20 MG/1
20 TABLET ORAL DAILY
Status: DISCONTINUED | OUTPATIENT
Start: 2024-06-08 | End: 2024-06-08 | Stop reason: HOSPADM

## 2024-06-07 RX ORDER — MAGNESIUM SULFATE HEPTAHYDRATE 40 MG/ML
2 INJECTION, SOLUTION INTRAVENOUS ONCE
Status: COMPLETED | OUTPATIENT
Start: 2024-06-07 | End: 2024-06-07

## 2024-06-07 RX ORDER — THIAMINE HCL 100 MG
100 TABLET ORAL DAILY
Status: DISCONTINUED | OUTPATIENT
Start: 2024-06-11 | End: 2024-06-08 | Stop reason: HOSPADM

## 2024-06-07 RX ORDER — LORAZEPAM 2 MG/ML
2 INJECTION INTRAMUSCULAR
Status: DISCONTINUED | OUTPATIENT
Start: 2024-06-07 | End: 2024-06-08 | Stop reason: HOSPADM

## 2024-06-07 RX ORDER — LEVETIRACETAM 500 MG/5ML
750 INJECTION, SOLUTION, CONCENTRATE INTRAVENOUS EVERY 12 HOURS
Status: DISCONTINUED | OUTPATIENT
Start: 2024-06-07 | End: 2024-06-07

## 2024-06-07 RX ORDER — METOPROLOL TARTRATE 50 MG/1
50 TABLET ORAL 2 TIMES DAILY
Status: DISCONTINUED | OUTPATIENT
Start: 2024-06-07 | End: 2024-06-08 | Stop reason: HOSPADM

## 2024-06-07 RX ORDER — ATORVASTATIN CALCIUM 40 MG/1
40 TABLET, FILM COATED ORAL DAILY
Status: DISCONTINUED | OUTPATIENT
Start: 2024-06-08 | End: 2024-06-08 | Stop reason: HOSPADM

## 2024-06-07 RX ORDER — POTASSIUM CHLORIDE 7.45 MG/ML
10 INJECTION INTRAVENOUS
Status: COMPLETED | OUTPATIENT
Start: 2024-06-07 | End: 2024-06-07

## 2024-06-07 RX ORDER — NAPROXEN SODIUM 220 MG/1
81 TABLET, FILM COATED ORAL DAILY
Status: DISCONTINUED | OUTPATIENT
Start: 2024-06-07 | End: 2024-06-08 | Stop reason: HOSPADM

## 2024-06-07 RX ORDER — LEVETIRACETAM 500 MG/5ML
1500 INJECTION, SOLUTION, CONCENTRATE INTRAVENOUS EVERY 12 HOURS
Status: DISCONTINUED | OUTPATIENT
Start: 2024-06-07 | End: 2024-06-08

## 2024-06-07 RX ORDER — ACETAMINOPHEN 325 MG/1
650 TABLET ORAL EVERY 6 HOURS PRN
Status: DISCONTINUED | OUTPATIENT
Start: 2024-06-07 | End: 2024-06-08 | Stop reason: HOSPADM

## 2024-06-07 RX ADMIN — DEXTROSE MONOHYDRATE 250 MG: 50 INJECTION, SOLUTION INTRAVENOUS at 06:06

## 2024-06-07 RX ADMIN — POTASSIUM CHLORIDE 10 MEQ: 7.46 INJECTION, SOLUTION INTRAVENOUS at 08:06

## 2024-06-07 RX ADMIN — LABETALOL HYDROCHLORIDE 20 MG: 5 INJECTION INTRAVENOUS at 12:06

## 2024-06-07 RX ADMIN — POTASSIUM CHLORIDE 10 MEQ: 7.46 INJECTION, SOLUTION INTRAVENOUS at 09:06

## 2024-06-07 RX ADMIN — LEVETIRACETAM 500 MG: 100 INJECTION, SOLUTION INTRAVENOUS at 08:06

## 2024-06-07 RX ADMIN — SODIUM CHLORIDE, POTASSIUM CHLORIDE, SODIUM LACTATE AND CALCIUM CHLORIDE 1000 ML: 600; 310; 30; 20 INJECTION, SOLUTION INTRAVENOUS at 11:06

## 2024-06-07 RX ADMIN — LABETALOL HYDROCHLORIDE 20 MG: 5 INJECTION INTRAVENOUS at 04:06

## 2024-06-07 RX ADMIN — DEXTROSE MONOHYDRATE 500 MG: 50 INJECTION, SOLUTION INTRAVENOUS at 09:06

## 2024-06-07 RX ADMIN — MAGNESIUM SULFATE HEPTAHYDRATE 2 G: 40 INJECTION, SOLUTION INTRAVENOUS at 07:06

## 2024-06-07 RX ADMIN — SODIUM CHLORIDE: 9 INJECTION, SOLUTION INTRAVENOUS at 07:06

## 2024-06-07 RX ADMIN — LEVETIRACETAM 1500 MG: 100 INJECTION, SOLUTION INTRAVENOUS at 09:06

## 2024-06-07 RX ADMIN — THIAMINE HYDROCHLORIDE 500 MG: 100 INJECTION, SOLUTION INTRAMUSCULAR; INTRAVENOUS at 09:06

## 2024-06-07 RX ADMIN — ENOXAPARIN SODIUM 50 MG: 60 INJECTION, SOLUTION SUBCUTANEOUS at 08:06

## 2024-06-07 RX ADMIN — POTASSIUM CHLORIDE 10 MEQ: 7.46 INJECTION, SOLUTION INTRAVENOUS at 11:06

## 2024-06-07 RX ADMIN — ENOXAPARIN SODIUM 50 MG: 60 INJECTION, SOLUTION SUBCUTANEOUS at 09:06

## 2024-06-07 RX ADMIN — POTASSIUM CHLORIDE 10 MEQ: 7.46 INJECTION, SOLUTION INTRAVENOUS at 07:06

## 2024-06-07 RX ADMIN — POTASSIUM PHOSPHATE, MONOBASIC POTASSIUM PHOSPHATE, DIBASIC 15 MMOL: 224; 236 INJECTION, SOLUTION, CONCENTRATE INTRAVENOUS at 12:06

## 2024-06-07 RX ADMIN — ASPIRIN 81 MG: 81 TABLET, CHEWABLE ORAL at 02:06

## 2024-06-07 RX ADMIN — THIAMINE HYDROCHLORIDE 500 MG: 100 INJECTION, SOLUTION INTRAMUSCULAR; INTRAVENOUS at 03:06

## 2024-06-07 RX ADMIN — METOPROLOL TARTRATE 50 MG: 50 TABLET, FILM COATED ORAL at 09:06

## 2024-06-07 RX ADMIN — THIAMINE HYDROCHLORIDE 100 MG: 100 INJECTION, SOLUTION INTRAMUSCULAR; INTRAVENOUS at 10:06

## 2024-06-07 RX ADMIN — POTASSIUM CHLORIDE 10 MEQ: 7.46 INJECTION, SOLUTION INTRAVENOUS at 12:06

## 2024-06-07 RX ADMIN — DEXTROSE MONOHYDRATE 500 MG: 50 INJECTION, SOLUTION INTRAVENOUS at 02:06

## 2024-06-07 RX ADMIN — POTASSIUM CHLORIDE 10 MEQ: 7.46 INJECTION, SOLUTION INTRAVENOUS at 10:06

## 2024-06-07 NOTE — PROCEDURES
[facility] EEG/VIDEO MONITORING REPORT  June Boykin  [id number]  1955    DATE OF SERVICE: 6/7/24  DATE OF ADMISSION: 5/31/2024  6:50 AM    ADMITTING/REQUESTING PROVIDER: Mariella Hobbs MD    REASON FOR CONSULT: 69-year-old female who presents with past medical history significant for hypertension, COPD on 2 L home oxygen, anxiety, previous methamphetamine abuse, chronic benzo use who presented last week with diarrhea, dehydration, frequent falls, respiraotry failure, and afib with RVR, hyponatremia.  She then developed right sided weakness as well as right sided jerking movements concerning for seizure activity.     METHODOLOGY   Electroencephalographic (EEG) recording is with electrodes placed according to the International 10-20 placement system.  Thirty two (32) channels of digital signal (sampling rate of 512/sec) including T1 and T2 was simultaneously recorded from the scalp and may include  EKG, EMG, and/or eye monitors.  Recording band pass was 0.1 to 512 hz.  Digital video recording of the patient is simultaneously recorded with the EEG.  The patient is instructed report clinical symptoms which may occur during the recording session.  EEG and video recording is stored and archived in digital format.  Activation procedures which include photic stimulation, hyperventilation and instructing patients to perform simple task are done in selected patients.   The EEG is displayed on a monitor screen and can be reviewed using different montages.  Computer assisted analysis is employed to detect spike and electrographic seizure activity.   The entire record is submitted for computer analysis.  The entire recording is visually reviewed and the times identified by computer analysis as being spikes or seizures are reviewed again.  Compresses spectral analysis (CSA) is also performed on the activity recorded from each individual channel.  This is displayed as a power display of frequencies from 0 to 30 Hz  over time.   The CSA is reviewed looking for asymmetries in power between homologous areas of the scalp and then compared with the original EEG recording.     [employer] software is also utilized in the review of this study.  This software suite analyzes the EEG recording in multiple domains.  Coherence and rhythmicity is computed to identify EEG sections which may contain organized seizures.  Each channel undergoes analysis to detect presence of spike and sharp waves which have special and morphological characteristic of epileptic activity.  The routine EEG recording is converted from spacial into frequency domain.  This is then displayed comparing homologous areas to identify areas of significant asymmetry.  Algorithm to identify non-cortically generated artifact is used to separate eye movement, EMG and other artifact from the EEG.      RECORDING TIMES  Start on 6/6/24 at 19:29  Stop on 6/7/24 at 07:00  A total of 11 hours 31 minutes of EEG recording is obtained.    EEG FINDINGS  Background activity:   There are periodic discharges occurring at a frequency of 1 hz throughout the record, highest amplitude over the left hemisphere with a broad field.  The rest of the background is composed of generalized delta and theta activity with some overriding faster frequencies.  There are several instances where these discharges build in frequency (up to 2 hz) with overriding fast activity.  Additionally, these discharges are often time locked to clonic jerking of the RUE (mainly right shoulder).  The event button is pushed at 21:23 for this clonic activity.      Please note this is a cap study with a moderate amount of movement/lead artifact at the onset that does worsen as the study progresses limiting the interpretation of the record at times.    Sleep:  Normal stage II sleep architecture is not seen.     Activation procedures:   Hyperventilation is not performed  Photic stimulation is not performed    Cardiac Monitor:    Electrode caps do not have EKG capabilities    Impression:   Abnormal study due to continuous lateralized periodic discharges in the left hemisphere consistent with a highly irritable epileptogenic focus in this region.  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  Due to degree of artifact it is difficult to assess for electrographic improvement and evaluation with standard EEG electrodes is indicated when possible.      Celena Simental MD  [facility]   Department of Neurology
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
## 2024-06-07 NOTE — ASSESSMENT & PLAN NOTE
Advance Care Planning     Date: 06/01/2024    Code Status  In light of the patients advanced and life limiting illness,I engaged the the family in a voluntary conversation about the patient's preferences for care  at the very end of life. The patient wishes to have a natural, peaceful death.  Along those lines, the patient does not wish to have CPR or other invasive treatments performed when her heart and/or breathing stops. I communicated to the family that a DNR order would be placed in her medical record to reflect this preference.  I spent a total of 20 minutes engaging the patient in this advance care planning discussion.   Patient grand daughter Caro  reported patient sister had updated family patient is a DNR.      Advance Care Planning     Date: 06/03/2024    [facility]  I engaged the family in a voluntary conversation about advance care planning and we specifically addressed what the goals of care would be moving forward, in light of the patient's change in clinical status, specifically  patient declining treatment and progressive clinical decline.  We did specifically address the patient's likely prognosis, which is poor.  We explored the patient's values and preferences for future care.  The family endorses that what is most important right now is to focus on spending time at home, quality of life, even if it means sacrificing a little time, and comfort and QOL     Accordingly, we have decided that the best plan to meet the patient's goals includes continuing with treatment    I did explain the role for hospice care at this stage of the patient's illness, including its ability to help the patient live with the best quality of life possible.  We will be making a hospice referral.    I spent a total of 25 minutes engaging the patient in this advance care planning discussion.       Discussed with granddaughter Caro  and updated her patient request to see her mother and sister and Caro confirm both at  bed.  She also confirm patient's code status as DNR and open to discharge with hospice maybe by tomorrow since they have to fix patient's present abode today.  Questions and concerns were addressed    -family has decided that patient will be discharged to skilled nurse facility.  They do not want hospice at this time.  Pending disposition

## 2024-06-07 NOTE — ASSESSMENT & PLAN NOTE
- endorsement of right-sided jerking movements last night. This morning patient had weakness in her right upper extremity greater than right lower extremity.  -  Stroke code was called by neurologist  - CT head results reviewed  - neurology recommends  - Pt was stroke activated for RUE weakness (focal neurologic deficit occurring within the past 24 hrs)  - MRI brain ordered  - TTE  - A1c, B12, folate, TSH, lipid panel  - Continue therapeutic lovenox     RUE jerking - concern for focal seizure  - continue valproic acid and Keppra.  Follow up BP levels  - currently on Spot EEG, neurology recommends continuous EEG  - MRI brain  - Discuss seizure precautions when pt is more alert:     Discussed that it is illegal to drive for 6 months following a seizure.  Also advised to avoid operating heavy machinery, swimming alone, taking baths instead of showers, using front burners on the stove, climbing ladders, or other activities that would place himself or someone else at risk should he have a seizure.      Encephalopathy  - Most likely due to a combination of hyponatremia, as well as receiving anticholinergic medications. Also consider post ictal state as part of clinical picture, given reports of early AM RUE jerking  - Correction of electrolyte abnormalities per primary team  - Labs: Repeat ammonia level, B12, Folate, ABG, TSH all normal  - history of heavy drinker, also at risk for Wernickes  - IV thiamine 500 mg q8 hr for 3 days, followed by thiamine 100 mg PO daily

## 2024-06-07 NOTE — PLAN OF CARE
Problem: Adult Inpatient Plan of Care  Goal: Plan of Care Review  6/7/2024 1746 by Mahogany Palacios RN  Outcome: Progressing  6/7/2024 1745 by Mahogany Palacios RN  Outcome: Progressing  Goal: Patient-Specific Goal (Individualized)  6/7/2024 1746 by Mahogany Palacios RN  Outcome: Progressing  6/7/2024 1745 by Mahogany Palacios RN  Outcome: Progressing  Goal: Absence of Hospital-Acquired Illness or Injury  6/7/2024 1746 by Mahogany Palacios RN  Outcome: Progressing  6/7/2024 1745 by Mahogany Palacios RN  Outcome: Progressing  Goal: Optimal Comfort and Wellbeing  6/7/2024 1746 by Mahogany Palacios RN  Outcome: Progressing  6/7/2024 1745 by Mahogany Palacios RN  Outcome: Progressing     Problem: Pneumonia  Goal: Fluid Balance  6/7/2024 1746 by Mahogany Palacios RN  Outcome: Progressing  6/7/2024 1745 by Mahogany Palacios RN  Outcome: Progressing  Goal: Resolution of Infection Signs and Symptoms  6/7/2024 1746 by Mahogany Palacios RN  Outcome: Progressing  6/7/2024 1745 by Mahogany Palacios RN  Outcome: Progressing  Goal: Effective Oxygenation and Ventilation  6/7/2024 1746 by Mahogany Palacios RN  Outcome: Progressing  6/7/2024 1745 by Mahogany Palacios RN  Outcome: Progressing     Problem: Restraint, Nonviolent  Goal: Absence of Harm or Injury  6/7/2024 1746 by Mahogany Palacios RN  Outcome: Progressing  6/7/2024 1745 by Mahogany Palacios RN  Outcome: Progressing     Problem: Skin Injury Risk Increased  Goal: Skin Health and Integrity  6/7/2024 1746 by Mahogany Palacios RN  Outcome: Progressing  6/7/2024 1745 by Mahogany Palacios RN  Outcome: Progressing     Problem: COPD (Chronic Obstructive Pulmonary Disease)  Goal: Optimal Chronic Illness Coping  6/7/2024 1746 by Mahogany Palacios RN  Outcome: Progressing  6/7/2024 1745 by Mahogany Palacios RN  Outcome: Progressing  Goal: Optimal Level of Functional [place]  6/7/2024 1746 by Barrosse, Shahira, RN  Outcome: Progressing  6/7/2024 1745 by  Mahogany Palacios RN  Outcome: Progressing  Goal: Absence of Infection Signs and Symptoms  6/7/2024 1746 by Mahogany Palacios RN  Outcome: Progressing  6/7/2024 1745 by Mahogany Palacios RN  Outcome: Progressing  Goal: Improved Oral Intake  6/7/2024 1746 by Mahogany Palacios RN  Outcome: Progressing  6/7/2024 1745 by Mahogany Palacios RN  Outcome: Progressing  Goal: Effective Oxygenation and Ventilation  6/7/2024 1746 by Mahogany Palacios RN  Outcome: Progressing  6/7/2024 1745 by Mahogany Palacios RN  Outcome: Progressing     Problem: Stroke, Ischemic (Includes Transient Ischemic Attack)  Goal: Optimal Coping  6/7/2024 1746 by Mahogany Palacios RN  Outcome: Progressing  6/7/2024 1745 by Mahogany Palacios RN  Outcome: Progressing  Goal: Effective Bowel Elimination  6/7/2024 1746 by Mahogany Palacios RN  Outcome: Progressing  6/7/2024 1745 by Mahogany Palacios RN  Outcome: Progressing  Goal: Optimal Cerebral Tissue Perfusion  6/7/2024 1746 by Mahogany Palacios RN  Outcome: Progressing  6/7/2024 1745 by Mahogany Palacios RN  Outcome: Progressing  Goal: Optimal Cognitive Function  6/7/2024 1746 by Mahogany Palacios RN  Outcome: Progressing  6/7/2024 1745 by Mahogany Palacios RN  Outcome: Progressing  Goal: Improved Communication Skills  6/7/2024 1746 by Mahogany Palacios RN  Outcome: Progressing  6/7/2024 1745 by Mahogany Palacios RN  Outcome: Progressing  Goal: Optimal Functional Ability  6/7/2024 1746 by Mahogany Palacios RN  Outcome: Progressing  6/7/2024 1745 by Mahogany Palacios RN  Outcome: Progressing  Goal: Optimal Nutrition Intake  6/7/2024 1746 by Mahogany Palacios RN  Outcome: Progressing  6/7/2024 1745 by Mahogany Palacios RN  Outcome: Progressing  Goal: Effective Oxygenation and Ventilation  6/7/2024 1746 by Mahogany Palacios RN  Outcome: Progressing  6/7/2024 1745 by Mahogany Palacios RN  Outcome: Progressing  Goal: Improved Sensorimotor Function  6/7/2024 1746 by Mahogany Palacios,  RN  Outcome: Progressing  6/7/2024 1745 by Mahogany Palacios RN  Outcome: Progressing  Goal: Safe and Effective Swallow  6/7/2024 1746 by Mahogany Palacios RN  Outcome: Progressing  6/7/2024 1745 by Mahogany Palacios RN  Outcome: Progressing  Goal: Effective Urinary Elimination  6/7/2024 1746 by Mahogany Palacios RN  Outcome: Progressing  6/7/2024 1745 by Mahogany Palacios RN  Outcome: Progressing

## 2024-06-07 NOTE — ASSESSMENT & PLAN NOTE
Patient has persistent depression which is unknown and is currently controlled. Will discontinue anti-depressant medications. We will consult psychiatry at this time. Patient does not display psychosis at this time. Continue to monitor closely and adjust plan of care as needed.    -Seroquel 25 mg discontinued
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
## 2024-06-07 NOTE — PROCEDURES
Preliminary EEG Read  EEG reviewed 19:30-20:15    Background:   There are periodic discharges occurring at a frequency of 1 hz throughout the record, highest amplitude over the left hemisphere with a broad field.  The rest of the background is composed of generalized delta and theta activity with some overriding faster frequencies.  There are several instances where these discharges build in frequency (2-2.5 hz) with overriding fast activity.  Additionally, these discharges are often time locked to clonic jerking of the RUE (mainly right shoulder).      Impression:   - Continuous lateralized periodic discharges left hemisphere (max left posterior quadrant) with a broad field.  This activity is frequently time locked with RUE clonic jerking making it ictal in nature (consistent with recurrent focal seizures).  There is also evidence of a moderate diffuse encephalopathy.    Please hit the EEG button with any clinical activity concerning for seizures and describe what you see.    Detailed report to follow.     Celena Simental MD  [facility]   Department of Neurology
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
## 2024-06-07 NOTE — ASSESSMENT & PLAN NOTE
Patient's COPD is with exacerbation noted by continued dyspnea and worsening of baseline hypoxia currently.  Patient is currently off COPD Pathway. Continue scheduled inhalers Steroids, Antibiotics, and Supplemental oxygen and monitor respiratory status closely.    Checks x-ray:No acute findings.   IV steroid completed  Extubated  DuoNebs  Antibiotics completed

## 2024-06-07 NOTE — ASSESSMENT & PLAN NOTE
Patient with Paroxysmal (<7 days) atrial fibrillation which is uncontrolled currently with Beta Blocker and Calcium Channel Blocker. Patient is currently in atrial fibrillation.[id number] Score: 2. HASBLED Score: . Anticoagulation indicated. Anticoagulation done with lovenox .  Consult cardiology -therapeutic Lovenox, off Cardizem gtt  Continue Metoprolol bid - uptitrate as needed

## 2024-06-07 NOTE — ASSESSMENT & PLAN NOTE
History of substance abuse  UDS 7 days ago positive for benzos, amphetamines  Psych consulted, agrees with discontinuing benzodiazepines   Daughter reported patient takes lot of Valium -monitor for benzos withdrawal  -06/10:  Spoke with patient's granddaughter Caro, she denies patient has a history of substance abuse

## 2024-06-07 NOTE — SUBJECTIVE & OBJECTIVE
Interval History:  Patient was examined in her bed today.  She appears more lethargic today.  She was still has weakness in her right upper extremity.  She was not respond to questions appropriately.  She does not appear to be in distress.    Review of Systems   Unable to perform ROS: Mental status change     Objective:     Vital Signs (Most Recent):  Temp: 99.2 °F (37.3 °C) (06/07/24 0730)  Pulse: 81 (06/07/24 1600)  Resp: (!) 47 (06/07/24 1600)  BP: (!) 189/88 (06/07/24 1600)  SpO2: (!) 94 % (06/07/24 1600) Vital Signs (24h Range):  Temp:  [97.7 °F (36.5 °C)-99.2 °F (37.3 °C)] 99.2 °F (37.3 °C)  Pulse:  [60-87] 81  Resp:  [25-66] 47  SpO2:  [81 %-100 %] 94 %  BP: (101-214)/() 189/88     Weight: 47.5 kg (104 lb 11.5 oz)  Body mass index is 21.89 kg/m².    Intake/Output Summary (Last 24 hours) at 6/7/2024 1645  Last data filed at 6/7/2024 1545  Gross per 24 hour   Intake 2339.89 ml   Output 2200 ml   Net 139.89 ml         Physical Exam  Constitutional:       Appearance: She is not ill-appearing.      Comments:  Frail, thin   HENT:      Head: Normocephalic.   Cardiovascular:      Rate and Rhythm: Normal rate.   Pulmonary:      Breath sounds: Rales present. No wheezing.   Abdominal:      General: Bowel sounds are normal. There is no distension.      Palpations: Abdomen is soft.   Musculoskeletal:      Cervical back: Normal range of motion.      Right lower leg: No edema.      Left lower leg: No edema.      Comments:   Right-sided weakness, right arm weaker than right lower extremity.   Neurological:      Comments: confused             Significant Labs: All pertinent labs within the past 24 hours have been reviewed.    Significant Imaging: I have reviewed all pertinent imaging results/findings within the past 24 hours.

## 2024-06-07 NOTE — ASSESSMENT & PLAN NOTE
Chronic,    Resume home meds as BP permits    Temp:  [97.7 °F (36.5 °C)-99.2 °F (37.3 °C)]   Pulse:  [60-87]   Resp:  [25-66]   BP: (101-214)/()   SpO2:  [81 %-100 %] .   Home meds for hypertension were reviewed and noted below.   Hypertension Medications               lisinopriL-hydrochlorothiazide (PRINZIDE,ZESTORETIC) 20-25 mg Tab             While in the hospital, will manage blood pressure as follows; Continue home antihypertensive regimen    Will utilize p.r.n. blood pressure medication only if patient's blood pressure greater than 140/90 and she develops symptoms such as worsening chest pain or shortness of breath.    -continue with lisinopril and metoprolol  -blood pressure still elevated, we will consider adding amlodipine

## 2024-06-07 NOTE — ASSESSMENT & PLAN NOTE
Recurrent major depression   -seen by psychiatrist.  Appreciate recommendation  -benzodiazepines discontinued.  Seroquel discontinued

## 2024-06-07 NOTE — ASSESSMENT & PLAN NOTE
History of recent pneumonia treatment     Checks x-ray with no acute finding    Antibiotics (From admission, onward)      Start     Stop Route Frequency Ordered    06/03/24 1215  doxycycline 100 mg in dextrose 5 % in water (D5W) 100 mL IVPB (MB+)         06/06/24 1329 IV Every 12 hours (non-standard times) 06/03/24 1106    05/31/24 0915  mupirocin 2 % ointment         06/05/24 0859 Nasl 2 times daily 05/31/24 0807            Microbiology Results (last 7 days)       Procedure Component Value Units Date/Time    Blood culture [[id number]] Collected: 06/02/24 1154    Order Status: Completed Specimen: Blood Updated: 06/06/24 2012     Blood Culture, Routine No Growth to date      No Growth to date      No Growth to date      No Growth to date      No Growth to date          -doxycycline completed

## 2024-06-07 NOTE — PROGRESS NOTES
[facility] Medicine  Progress Note    Patient Name: June Boykin  MRN: [id number]  Patient Class: IP- Inpatient   Admission Date: 5/31/2024  Length of Stay: 7 days  Attending Physician: Kolby Fried MD  Primary Care Provider: Mauricio Pierre MD        Subjective:     Principal Problem:Acute hypercapnic respiratory failure        HPI:  June Anderson is a 69-year-old female who presents with past medical history significant for hypertension, COPD  on 2 L home oxygen, anxiety, previous methamphetamine abuse, chronic benzo use who presented last week with diarrhea, dehydration, frequent falls of 2 days. She was very somnolent on initial exam and subsequently found to be hypercapnic and hypotensive with a respiratory rate of 30. She was treated empirically for a COPD exacerbation caused by pneumonia. She required 3 days of BiPAP with eventual improvement. During her last hospitalization she had a very similar presentation with her initial pH being 7.2, pCO2 of 94, PO2 of 77.8, bicarb of 29.9 saturating 92% on venti mask 40% FiO2.     She is now re presenting with altered mental status after being found by her granddaughter for unresponsiveness. Her granddaughter called EMS and found that she was not responding to sternal rub. Initial pulse ox showed an SpO2 in the low 70s she was placed on nasal cannula with rapid improvement in her oxygenation. This was escalated to a non-rebreather in route to emergency department. Following her arrival she is unable to provide any history and her ABG showed profound respiratory acidosis with hypercapnia greater than 90. Due to her altered mental status and concern for inability to protect airway, the decision was made to intubate her and treat her empirically for a COPD exacerbation. Her daughter does note that the patient took her diazepam earlier today and this was a concern during previous hospitalization. On arrival, her  pupils were noted to be pinpoint and she received naloxone without any improvement.  Patient's initial vitals were notable for a temperature of 100.3°, heart rate of 112, respiratory rate of 29, blood pressure of 179/92. Initial GCS was 8. She was saturating 91% on 4 L of oxygen.  ABG shows acute worsening acidosis with a pH of 7.178, pCO2 of 95.2, bicarbonate of 27.6, PO2 of 73.6 prior to intubation.  Following intubation she was pulling at her tube and so she was initiated on propofol continuously at 35 mcg per kg per hour.  She was being transferred to [facility] for pulmonology/ICU level of care. At outside hospital she received 125 mg of methylprednisone, DuoNeb x1, 1 L of LR as well as piperacillin/tazobactam with blood cultures drawn x2.        Overview/Hospital Course:  No notes on file    Interval History:  Patient was examined in her bed today.  She appears more lethargic today.  She was still has weakness in her right upper extremity.  She was not respond to questions appropriately.  She does not appear to be in distress.    Review of Systems   Unable to perform ROS: Mental status change     Objective:     Vital Signs (Most Recent):  Temp: 99.2 °F (37.3 °C) (06/07/24 0730)  Pulse: 81 (06/07/24 1600)  Resp: (!) 47 (06/07/24 1600)  BP: (!) 189/88 (06/07/24 1600)  SpO2: (!) 94 % (06/07/24 1600) Vital Signs (24h Range):  Temp:  [97.7 °F (36.5 °C)-99.2 °F (37.3 °C)] 99.2 °F (37.3 °C)  Pulse:  [60-87] 81  Resp:  [25-66] 47  SpO2:  [81 %-100 %] 94 %  BP: (101-214)/() 189/88     Weight: 47.5 kg (104 lb 11.5 oz)  Body mass index is 21.89 kg/m².    Intake/Output Summary (Last 24 hours) at 6/7/2024 1645  Last data filed at 6/7/2024 1545  Gross per 24 hour   Intake 2339.89 ml   Output 2200 ml   Net 139.89 ml         Physical Exam  Constitutional:       Appearance: She is not ill-appearing.      Comments:  Frail, thin   HENT:      Head: Normocephalic.   Cardiovascular:      Rate and Rhythm: Normal rate.    Pulmonary:      Breath sounds: Rales present. No wheezing.   Abdominal:      General: Bowel sounds are normal. There is no distension.      Palpations: Abdomen is soft.   Musculoskeletal:      Cervical back: Normal range of motion.      Right lower leg: No edema.      Left lower leg: No edema.      Comments:   Right-sided weakness, right arm weaker than right lower extremity.   Neurological:      Comments: confused             Significant Labs: All pertinent labs within the past 24 hours have been reviewed.    Significant Imaging: I have reviewed all pertinent imaging results/findings within the past 24 hours.    Assessment/Plan:      * Acute hypercapnic respiratory failure  Patient with Hypercapnic Respiratory failure which is Acute on chronic.  she is not on home oxygen. Supplemental oxygen was provided and noted- Oxygen Concentration (%):  [28] 28    .   Signs/symptoms of respiratory failure include- tachypnea, increased work of breathing, respiratory distress, and lethargy. Contributing diagnoses includes - COPD and Pneumonia Labs and images were reviewed. Patient Has recent ABG, which has been reviewed. Will treat underlying causes and adjust management of respiratory failure as follows-  intubated on arrival  Consult Pulmonary critical for vent management   Appreciate pulmonary team extubated on 05/31 2 s/p BiPAP  -now on nasal cannula, continue to wean    Focal seizures  -affecting right upper extremity  -neurology following.  Recommends valproic acid and Keppra. 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  Differential:  CVA, nonconvulsive status epilepticus  -patient pending transfer to [facility] for continuous EEG monitoring      Right sided weakness  - endorsement of right-sided jerking movements last night. This morning patient had weakness in her right upper  extremity greater than right lower extremity.  -  Stroke code was called by neurologist  - CT head results reviewed  - neurology recommends  - Pt was stroke activated for RUE weakness (focal neurologic deficit occurring within the past 24 hrs)  - MRI brain ordered  - TTE  - A1c, B12, folate, TSH, lipid panel  - Continue therapeutic lovenox     RUE jerking - concern for focal seizure  - continue valproic acid and Keppra.  Follow up BP levels  - currently on Spot EEG, neurology recommends continuous EEG  - MRI brain  - Discuss seizure precautions when pt is more alert:     Discussed that it is illegal to drive for 6 months following a seizure.  Also advised to avoid operating heavy machinery, swimming alone, taking baths instead of showers, using front burners on the stove, climbing ladders, or other activities that would place himself or someone else at risk should he have a seizure.      Encephalopathy  - Most likely due to a combination of hyponatremia, as well as receiving anticholinergic medications. Also consider post ictal state as part of clinical picture, given reports of early AM RUE jerking  - Correction of electrolyte abnormalities per primary team  - Labs: Repeat ammonia level, B12, Folate, ABG, TSH all normal  - history of heavy drinker, also at risk for Wernickes  - IV thiamine 500 mg q8 hr for 3 days, followed by thiamine 100 mg PO daily      AMS (altered mental status)    Intubated in the ED  Extubated 5/31 to BiPAP and then to NC  Persistent confusion possibly postictal    Paroxysmal atrial fibrillation  Patient with Paroxysmal (<7 days) atrial fibrillation which is uncontrolled currently with Beta Blocker and Calcium Channel Blocker. Patient is currently in atrial fibrillation.[id number] Score: 2. HASBLED Score: . Anticoagulation indicated. Anticoagulation done with lovenox .  Consult cardiology -therapeutic Lovenox, off Cardizem gtt  Continue Metoprolol bid - uptitrate as needed    ACP (advance  care planning)    Advance Care Planning    Date: 06/01/2024    Code Status  In light of the patients advanced and life limiting illness,I engaged the the family in a voluntary conversation about the patient's preferences for care  at the very end of life. The patient wishes to have a natural, peaceful death.  Along those lines, the patient does not wish to have CPR or other invasive treatments performed when her heart and/or breathing stops. I communicated to the family that a DNR order would be placed in her medical record to reflect this preference.  I spent a total of 20 minutes engaging the patient in this advance care planning discussion.   Patient grand daughter Caro  reported patient sister had updated family patient is a DNR.     Advance Care Planning    Date: 06/03/2024    [facility]  I engaged the family in a voluntary conversation about advance care planning and we specifically addressed what the goals of care would be moving forward, in light of the patient's change in clinical status, specifically  patient declining treatment and progressive clinical decline.  We did specifically address the patient's likely prognosis, which is poor.  We explored the patient's values and preferences for future care.  The family endorses that what is most important right now is to focus on spending time at home, quality of life, even if it means sacrificing a little time, and comfort and QOL     Accordingly, we have decided that the best plan to meet the patient's goals includes continuing with treatment    I did explain the role for hospice care at this stage of the patient's illness, including its ability to help the patient live with the best quality of life possible.  We will be making a hospice referral.    I spent a total of 25 minutes engaging the patient in this advance care planning discussion.       Discussed with granddaughter Caro  and updated her patient request to see her mother and sister and Caro  confirm both at bed.  She also confirm patient's code status as DNR and open to discharge with hospice maybe by tomorrow since they have to fix patient's present abode today.  Questions and concerns were addressed    -family has decided that patient will be discharged to skilled nurse facility.  They do not want hospice at this time.  Pending disposition    Hypertension  Chronic,    Resume home meds as BP permits    Temp:  [97.7 °F (36.5 °C)-99.2 °F (37.3 °C)]   Pulse:  [60-87]   Resp:  [25-66]   BP: (101-214)/()   SpO2:  [81 %-100 %] .   Home meds for hypertension were reviewed and noted below.   Hypertension Medications               lisinopriL-hydrochlorothiazide (PRINZIDE,ZESTORETIC) 20-25 mg Tab             While in the hospital, will manage blood pressure as follows; Continue home antihypertensive regimen    Will utilize p.r.n. blood pressure medication only if patient's blood pressure greater than 140/90 and she develops symptoms such as worsening chest pain or shortness of breath.    -continue with lisinopril and metoprolol  -blood pressure still elevated, we will consider adding amlodipine    COPD exacerbation  Patient's COPD is with exacerbation noted by continued dyspnea and worsening of baseline hypoxia currently.  Patient is currently off COPD Pathway. Continue scheduled inhalers Steroids, Antibiotics, and Supplemental oxygen and monitor respiratory status closely.    Checks x-ray:No acute findings.   IV steroid completed  Extubated  DuoNebs  Antibiotics completed    Pneumonia   History of recent pneumonia treatment     Checks x-ray with no acute finding    Antibiotics (From admission, onward)      Start     Stop Route Frequency Ordered    06/03/24 1215  doxycycline 100 mg in dextrose 5 % in water (D5W) 100 mL IVPB (MB+)         06/06/24 5100 IV Every 12 hours (non-standard times) 06/03/24 1106    05/31/24 0915  mupirocin 2 % ointment         06/05/24 0859 Nasl 2 times daily 05/31/24 0807             Microbiology Results (last 7 days)       Procedure Component Value Units Date/Time    Blood culture [[id number]] Collected: 06/02/24 1154    Order Status: Completed Specimen: Blood Updated: 06/06/24 2012     Blood Culture, Routine No Growth to date      No Growth to date      No Growth to date      No Growth to date      No Growth to date          -doxycycline completed    Polypharmacy   History of substance abuse  UDS 7 days ago positive for benzos, amphetamines  Psych consulted, agrees with discontinuing benzodiazepines   Daughter reported patient takes lot of Valium -monitor for benzos withdrawal  -06/10:  Spoke with patient's granddaughter Caro, she denies patient has a history of substance abuse      Other hyperlipidemia        Schizoaffective disorder   Recurrent major depression   -seen by psychiatrist.  Appreciate recommendation  -benzodiazepines discontinued.  Seroquel discontinued      Moderate recurrent major depression  Patient has persistent depression which is unknown and is currently controlled. Will discontinue anti-depressant medications. We will consult psychiatry at this time. Patient does not display psychosis at this time. Continue to monitor closely and adjust plan of care as needed.    -Seroquel 25 mg discontinued          VTE Risk Mitigation (From admission, onward)           Ordered     enoxaparin injection 50 mg  Every 12 hours         06/02/24 1551     IP VTE HIGH RISK PATIENT  Once         05/31/24 0747     Place sequential compression device  Until discontinued         05/31/24 0747                    Discharge Planning   LACY:      Code Status: DNR   Is the patient medically ready for discharge?:     Reason for patient still in hospital (select all that apply): Patient trending condition, Treatment, Imaging, and Consult recommendations  Discharge Plan A: New Nursing Home placement - long-term care facility            Critical care time spent on the evaluation and treatment of  severe organ dysfunction, review of pertinent labs and imaging studies, discussions with consulting providers and discussions with patient/family: 34 minutes.      Kolby Fried MD  Department of Hospital Medicine   [place] - Intensive Care

## 2024-06-07 NOTE — ASSESSMENT & PLAN NOTE
-affecting right upper extremity  -neurology following.  Recommends valproic acid and Keppra. 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  Differential:  CVA, nonconvulsive status epilepticus  -patient pending transfer to main campus for continuous EEG monitoring
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
## 2024-06-07 NOTE — NURSING
Patient transferred from ICU Rm 565 to Rm 559.  Continuous EEG reconnected.  Report given to ALEX Vides.

## 2024-06-07 NOTE — ASSESSMENT & PLAN NOTE
Intubated in the ED  Extubated 5/31 to BiPAP and then to NC  Persistent confusion possibly postictal

## 2024-06-07 NOTE — PLAN OF CARE
The sw faxed more nh referrals for this pt via [employer] b/c the ones Alexandria chose declined the pt stating they can't meet her needs. The sw spoke to the pt's granddaughter Caro [phone] who states she has been receiving a lot of missed calls but no messages have been left. The sw informed her the dr's have danny trying to get in touch with her an [place] to discuss the pt's medical condition and how to move forward with d/c planning. She states she has 4 children all under the ages of 12 and finding a  is very challenging for her. The sw acknowledged understanding and asked her to be available by phone and that will be a huge help. She agreed to attempt to answer the calls. The sw sent a chat to the team and Dr. Parnell states he will call her. The sw hung up with Caro so Dr. Parnell could call her.        06/07/24 3860   Post-Acute Status   Post-Acute Placement Status Referrals Sent   Discharge Plan   Discharge Plan A New Nursing Home placement - [facility] care facility   Discharge Plan B Hospice/home

## 2024-06-08 ENCOUNTER — DOCUMENTATION ONLY (OUTPATIENT)
Dept: NEUROLOGY | Facility: CLINIC | Age: 69
End: 2024-06-08

## 2024-06-08 VITALS
OXYGEN SATURATION: 98 % | HEART RATE: 75 BPM | HEIGHT: 58 IN | DIASTOLIC BLOOD PRESSURE: 74 MMHG | SYSTOLIC BLOOD PRESSURE: 153 MMHG | BODY MASS INDEX: 21.75 KG/M2 | RESPIRATION RATE: 30 BRPM | WEIGHT: 103.63 LBS | TEMPERATURE: 99 F

## 2024-06-08 LAB
ALBUMIN SERPL BCP-MCNC: 3 G/DL (ref 3.5–5.2)
ALLENS TEST: ABNORMAL
ALP SERPL-CCNC: 35 U/L (ref 55–135)
ALT SERPL W/O P-5'-P-CCNC: 22 U/L (ref 10–44)
ANION GAP SERPL CALC-SCNC: 9 MMOL/L (ref 8–16)
AST SERPL-CCNC: 34 U/L (ref 10–40)
BASOPHILS # BLD AUTO: 0.01 K/UL (ref 0–0.2)
BASOPHILS NFR BLD: 0.1 % (ref 0–1.9)
BILIRUB SERPL-MCNC: 0.6 MG/DL (ref 0.1–1)
BUN SERPL-MCNC: 6 MG/DL (ref 8–23)
CALCIUM SERPL-MCNC: 9.2 MG/DL (ref 8.7–10.5)
CHLORIDE SERPL-SCNC: 92 MMOL/L (ref 95–110)
CO2 SERPL-SCNC: 34 MMOL/L (ref 23–29)
CREAT SERPL-MCNC: 0.6 MG/DL (ref 0.5–1.4)
DELSYS: ABNORMAL
DIFFERENTIAL METHOD BLD: ABNORMAL
EOSINOPHIL # BLD AUTO: 0.1 K/UL (ref 0–0.5)
EOSINOPHIL NFR BLD: 1.2 % (ref 0–8)
ERYTHROCYTE [DISTWIDTH] IN BLOOD BY AUTOMATED COUNT: 17.7 % (ref 11.5–14.5)
EST. GFR  (NO RACE VARIABLE): >60 ML/MIN/1.73 M^2
GLUCOSE SERPL-MCNC: 90 MG/DL (ref 70–110)
HCO3 UR-SCNC: 39 MMOL/L (ref 24–28)
HCT VFR BLD AUTO: 43.9 % (ref 37–48.5)
HGB BLD-MCNC: 13.2 G/DL (ref 12–16)
IMM GRANULOCYTES # BLD AUTO: 0.02 K/UL (ref 0–0.04)
IMM GRANULOCYTES NFR BLD AUTO: 0.3 % (ref 0–0.5)
LYMPHOCYTES # BLD AUTO: 1.9 K/UL (ref 1–4.8)
LYMPHOCYTES NFR BLD: 25.1 % (ref 18–48)
MAGNESIUM SERPL-MCNC: 1.9 MG/DL (ref 1.6–2.6)
MCH RBC QN AUTO: 22.4 PG (ref 27–31)
MCHC RBC AUTO-ENTMCNC: 30.1 G/DL (ref 32–36)
MCV RBC AUTO: 75 FL (ref 82–98)
MONOCYTES # BLD AUTO: 0.7 K/UL (ref 0.3–1)
MONOCYTES NFR BLD: 9 % (ref 4–15)
NEUTROPHILS # BLD AUTO: 4.9 K/UL (ref 1.8–7.7)
NEUTROPHILS NFR BLD: 64.3 % (ref 38–73)
NRBC BLD-RTO: 0 /100 WBC
PCO2 BLDA: 52.9 MMHG (ref 35–45)
PH SMN: 7.47 [PH] (ref 7.35–7.45)
PHOSPHATE SERPL-MCNC: 2.9 MG/DL (ref 2.7–4.5)
PLATELET # BLD AUTO: 169 K/UL (ref 150–450)
PMV BLD AUTO: ABNORMAL FL (ref 9.2–12.9)
PO2 BLDA: 104 MMHG (ref 80–100)
POC BE: 15 MMOL/L
POC SATURATED O2: 98 % (ref 95–100)
POC TCO2: 41 MMOL/L (ref 23–27)
POCT GLUCOSE: 106 MG/DL (ref 70–110)
POCT GLUCOSE: 110 MG/DL (ref 70–110)
POCT GLUCOSE: 115 MG/DL (ref 70–110)
POTASSIUM SERPL-SCNC: 3.1 MMOL/L (ref 3.5–5.1)
PROT SERPL-MCNC: 6.7 G/DL (ref 6–8.4)
RBC # BLD AUTO: 5.89 M/UL (ref 4–5.4)
SAMPLE: ABNORMAL
SITE: ABNORMAL
SODIUM SERPL-SCNC: 135 MMOL/L (ref 136–145)
WBC # BLD AUTO: 7.57 K/UL (ref 3.9–12.7)

## 2024-06-08 PROCEDURE — 63600175 PHARM REV CODE 636 W HCPCS: Performed by: INTERNAL MEDICINE

## 2024-06-08 PROCEDURE — 94761 N-INVAS EAR/PLS OXIMETRY MLT: CPT

## 2024-06-08 PROCEDURE — 25000003 PHARM REV CODE 250: Performed by: STUDENT IN AN ORGANIZED HEALTH CARE EDUCATION/TRAINING PROGRAM

## 2024-06-08 PROCEDURE — 99900035 HC TECH TIME PER 15 MIN (STAT)

## 2024-06-08 PROCEDURE — 84425 ASSAY OF VITAMIN B-1: CPT | Performed by: INTERNAL MEDICINE

## 2024-06-08 PROCEDURE — 80053 COMPREHEN METABOLIC PANEL: CPT | Performed by: FAMILY MEDICINE

## 2024-06-08 PROCEDURE — 36600 WITHDRAWAL OF ARTERIAL BLOOD: CPT

## 2024-06-08 PROCEDURE — 94660 CPAP INITIATION&MGMT: CPT

## 2024-06-08 PROCEDURE — 63600175 PHARM REV CODE 636 W HCPCS: Performed by: STUDENT IN AN ORGANIZED HEALTH CARE EDUCATION/TRAINING PROGRAM

## 2024-06-08 PROCEDURE — 84100 ASSAY OF PHOSPHORUS: CPT | Performed by: FAMILY MEDICINE

## 2024-06-08 PROCEDURE — 83735 ASSAY OF MAGNESIUM: CPT | Performed by: FAMILY MEDICINE

## 2024-06-08 PROCEDURE — 36415 COLL VENOUS BLD VENIPUNCTURE: CPT | Performed by: INTERNAL MEDICINE

## 2024-06-08 PROCEDURE — 25000003 PHARM REV CODE 250

## 2024-06-08 PROCEDURE — 27000221 HC OXYGEN, UP TO 24 HOURS

## 2024-06-08 PROCEDURE — 95718 EEG PHYS/QHP 2-12 HR W/VEEG: CPT | Mod: ,,, | Performed by: PSYCHIATRY & NEUROLOGY

## 2024-06-08 PROCEDURE — 63600175 PHARM REV CODE 636 W HCPCS: Performed by: FAMILY MEDICINE

## 2024-06-08 PROCEDURE — 85025 COMPLETE CBC W/AUTO DIFF WBC: CPT | Performed by: FAMILY MEDICINE

## 2024-06-08 PROCEDURE — 82803 BLOOD GASES ANY COMBINATION: CPT

## 2024-06-08 RX ORDER — LEVETIRACETAM 100 MG/ML
1500 SOLUTION ORAL 2 TIMES DAILY
Status: DISCONTINUED | OUTPATIENT
Start: 2024-06-08 | End: 2024-06-08 | Stop reason: HOSPADM

## 2024-06-08 RX ORDER — POTASSIUM CHLORIDE 7.45 MG/ML
40 INJECTION INTRAVENOUS
Status: DISCONTINUED | OUTPATIENT
Start: 2024-06-08 | End: 2024-06-08

## 2024-06-08 RX ORDER — MAGNESIUM SULFATE HEPTAHYDRATE 40 MG/ML
2 INJECTION, SOLUTION INTRAVENOUS
Status: DISCONTINUED | OUTPATIENT
Start: 2024-06-08 | End: 2024-06-08

## 2024-06-08 RX ORDER — POTASSIUM CHLORIDE 7.45 MG/ML
80 INJECTION INTRAVENOUS
Status: DISCONTINUED | OUTPATIENT
Start: 2024-06-08 | End: 2024-06-08

## 2024-06-08 RX ORDER — POTASSIUM CHLORIDE 7.45 MG/ML
10 INJECTION INTRAVENOUS
Status: DISCONTINUED | OUTPATIENT
Start: 2024-06-08 | End: 2024-06-08

## 2024-06-08 RX ORDER — CALCIUM GLUCONATE 20 MG/ML
3 INJECTION, SOLUTION INTRAVENOUS
Status: DISCONTINUED | OUTPATIENT
Start: 2024-06-08 | End: 2024-06-08

## 2024-06-08 RX ORDER — IPRATROPIUM BROMIDE AND ALBUTEROL SULFATE 2.5; .5 MG/3ML; MG/3ML
3 SOLUTION RESPIRATORY (INHALATION) EVERY 4 HOURS PRN
Status: DISCONTINUED | OUTPATIENT
Start: 2024-06-08 | End: 2024-06-08 | Stop reason: HOSPADM

## 2024-06-08 RX ORDER — CALCIUM GLUCONATE 20 MG/ML
1 INJECTION, SOLUTION INTRAVENOUS
Status: DISCONTINUED | OUTPATIENT
Start: 2024-06-08 | End: 2024-06-08

## 2024-06-08 RX ORDER — MAGNESIUM SULFATE HEPTAHYDRATE 40 MG/ML
4 INJECTION, SOLUTION INTRAVENOUS
Status: DISCONTINUED | OUTPATIENT
Start: 2024-06-08 | End: 2024-06-08

## 2024-06-08 RX ORDER — CALCIUM GLUCONATE 20 MG/ML
2 INJECTION, SOLUTION INTRAVENOUS
Status: DISCONTINUED | OUTPATIENT
Start: 2024-06-08 | End: 2024-06-08

## 2024-06-08 RX ORDER — POTASSIUM CHLORIDE 7.45 MG/ML
60 INJECTION INTRAVENOUS
Status: DISCONTINUED | OUTPATIENT
Start: 2024-06-08 | End: 2024-06-08

## 2024-06-08 RX ORDER — MAGNESIUM SULFATE 1 G/100ML
1 INJECTION INTRAVENOUS ONCE
Status: COMPLETED | OUTPATIENT
Start: 2024-06-08 | End: 2024-06-08

## 2024-06-08 RX ADMIN — POTASSIUM BICARBONATE 25 MEQ: 978 TABLET, EFFERVESCENT ORAL at 10:06

## 2024-06-08 RX ADMIN — ASPIRIN 81 MG: 81 TABLET, CHEWABLE ORAL at 08:06

## 2024-06-08 RX ADMIN — MAGNESIUM SULFATE IN DEXTROSE 1 G: 10 INJECTION, SOLUTION INTRAVENOUS at 10:06

## 2024-06-08 RX ADMIN — ATORVASTATIN CALCIUM 40 MG: 40 TABLET, FILM COATED ORAL at 08:06

## 2024-06-08 RX ADMIN — POTASSIUM BICARBONATE 25 MEQ: 978 TABLET, EFFERVESCENT ORAL at 12:06

## 2024-06-08 RX ADMIN — LEVETIRACETAM 1500 MG: 100 INJECTION, SOLUTION INTRAVENOUS at 08:06

## 2024-06-08 RX ADMIN — DEXTROSE MONOHYDRATE 500 MG: 50 INJECTION, SOLUTION INTRAVENOUS at 05:06

## 2024-06-08 RX ADMIN — LABETALOL HYDROCHLORIDE 20 MG: 5 INJECTION INTRAVENOUS at 12:06

## 2024-06-08 RX ADMIN — ENOXAPARIN SODIUM 50 MG: 60 INJECTION, SOLUTION SUBCUTANEOUS at 08:06

## 2024-06-08 RX ADMIN — THIAMINE HYDROCHLORIDE 500 MG: 100 INJECTION, SOLUTION INTRAMUSCULAR; INTRAVENOUS at 09:06

## 2024-06-08 RX ADMIN — METOPROLOL TARTRATE 50 MG: 50 TABLET, FILM COATED ORAL at 08:06

## 2024-06-08 RX ADMIN — LISINOPRIL 20 MG: 20 TABLET ORAL at 08:06

## 2024-06-08 RX ADMIN — POTASSIUM CHLORIDE 60 MEQ: 7.46 INJECTION, SOLUTION INTRAVENOUS at 05:06

## 2024-06-08 NOTE — ASSESSMENT & PLAN NOTE
History of recent pneumonia treatment     Checks x-ray with no acute finding    Antibiotics (From admission, onward)    Start     Stop Route Frequency Ordered    06/03/24 1215  doxycycline 100 mg in dextrose 5 % in water (D5W) 100 mL IVPB (MB+)         06/06/24 2399 IV Every 12 hours (non-standard times) 06/03/24 1106    05/31/24 0915  mupirocin 2 % ointment         06/05/24 0859 Nasl 2 times daily 05/31/24 0807          Microbiology Results (last 7 days)     Procedure Component Value Units Date/Time    Blood culture [[id number]] Collected: 06/02/24 1154    Order Status: Completed Specimen: Blood Updated: 06/07/24 2012     Blood Culture, Routine No growth after 5 days.        -doxycycline completed
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
## 2024-06-08 NOTE — PLAN OF CARE
Problem: Adult Inpatient Plan of Care  Goal: Plan of Care Review  Outcome: Progressing  Goal: Patient-Specific Goal (Individualized)  Outcome: Progressing  Goal: Optimal Comfort and Wellbeing  Outcome: Progressing     Problem: Pneumonia  Goal: Resolution of Infection Signs and Symptoms  Outcome: Progressing  Goal: Effective Oxygenation and Ventilation  Outcome: Progressing     Problem: Restraint, Nonviolent  Goal: Absence of Harm or Injury  Outcome: Progressing     Problem: Skin Injury Risk Increased  Goal: Skin Health and Integrity  Outcome: Progressing     Problem: Dysrhythmia  Goal: Normalized Cardiac Rhythm  Outcome: Progressing

## 2024-06-08 NOTE — ASSESSMENT & PLAN NOTE
- Thought to be due to a combination of hyponatremia, as well as receiving anticholinergic medications. Also consider post ictal state as part of clinical picture, given reports of RUE jerking  - Correction of electrolyte abnormalities   - Labs: Repeat ammonia level, B12, Folate, ABG, TSH obtained at [facility], reported to be all normal  - history of heavy drinker, also at risk for Wernickes  - IV thiamine 500 mg q8 hr for 3 days, followed by thiamine 100 mg PO daily  -Bin's Paralysis

## 2024-06-08 NOTE — PLAN OF CARE
Pulmonary & Critical Care Medicine Plan of Care Note    Discussed with epilepsy, single wire from cap EEG is dislodged/disconnected. Maintaining airway without issues, mentation wax and wanes but seemingly improved from yesterday. From their standpoint, will be okay to transfer to step down @ and obtain spot EEG with more accuracy due to motion artifact from cap which makes it difficult to see if there's any true underlying pathology.    Discussed with  transfer team, okay from our standpoint to stepdown unit. Only in ICU at [place] at this time due to EEG.    Carrington Gale MD  Pulmonary & Critical Care Medicine Fellow

## 2024-06-08 NOTE — ASSESSMENT & PLAN NOTE
Resolved  - S/p extubation at [facility] on 05/31. s/p BiPAP  -now on nasal cannula, continue to wean

## 2024-06-08 NOTE — ASSESSMENT & PLAN NOTE
- endorsement of right-sided jerking movements last night. This morning patient had weakness in her right upper extremity greater than right lower extremity.  -  Stroke code was called by neurologist  - CT head results reviewed  - neurology recommends  - Pt was stroke activated for RUE weakness (focal neurologic deficit occurring within the past 24 hrs)  - MRI brain ordered  - TTE  - A1c, B12, folate, TSH, lipid panel  - Continue therapeutic lovenox     RUE jerking - concern for focal seizure  - continue valproic acid and Keppra.  Follow up BP levels  - currently on Spot EEG, neurology recommends continuous EEG  - MRI brain  - Discuss seizure precautions when pt is more alert:     Discussed that it is illegal to drive for 6 months following a seizure.  Also advised to avoid operating heavy machinery, swimming alone, taking baths instead of showers, using front burners on the stove, climbing ladders, or other activities that would place himself or someone else at risk should he have a seizure.      Encephalopathy  - Most likely due to a combination of hyponatremia, as well as receiving anticholinergic medications. Also consider post ictal state as part of clinical picture, given reports of early AM RUE jerking  - Correction of electrolyte abnormalities per primary team  - Labs: Repeat ammonia level, B12, Folate, ABG, TSH all normal  - history of heavy drinker, also at risk for Wernickes  - IV thiamine 500 mg q8 hr for 3 days, followed by thiamine 100 mg PO daily  -Bin's Paralysis

## 2024-06-08 NOTE — ASSESSMENT & PLAN NOTE
History of recent pneumonia treatment      Antibiotics (From admission, onward)        Start     Stop Route Frequency Ordered     06/03/24 1215   doxycycline 100 mg in dextrose 5 % in water (D5W) 100 mL IVPB (MB+)         06/06/24 2359 IV Every 12 hours (non-standard times) 06/03/24 1106     05/31/24 0915   mupirocin 2 % ointment         06/05/24 0859 Nasl 2 times daily 05/31/24 0807                Microbiology Results (last 7 days)         Procedure Component Value Units Date/Time     Blood culture [[id number]] Collected: 06/02/24 1154     Order Status: Completed Specimen: Blood Updated: 06/07/24 2012       Blood Culture, Routine No growth after 5 days.             -doxycycline completed

## 2024-06-08 NOTE — ASSESSMENT & PLAN NOTE
Recurrent major depression   -seen by psychiatrist at [facility].  Appreciate recommendation  -benzodiazepines discontinued.  Seroquel discontinued
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
## 2024-06-08 NOTE — PROCEDURES
ICU EEG/VIDEO MONITORING REPORT    June Boykin  [id number]  1955    DATE OF SERVICE: 6/7-8/2024    DATE OF ADMISSION: 5/31/2024  6:50 AM    ADMITTING PROVIDER: Mariella Hobbs MD    METHODOLOGY-cap   Electroencephalographic (EEG) recording is with electrodes placed according to the International 10-20 placement system.  Thirty two (32) channels of digital signal are simultaneously recorded from the scalp and may include EKG, EMG, and/or eye monitors.   Recording band pass was 0.1 to 512 hz.  Digital video recording of the patient is simultaneously recorded with the EEG.  The nursing staff report clinical symptoms and may press an event button when the patient has symptoms of clinical interest to the treating physicians.  EEG and video recording is stored and archived in digital format.  The entire recording is visually reviewed and the times identified by computer analysis as being spikes or seizures are reviewed again.  Activation procedures which include photic stimulation, hyperventilation and instructing patients to perform simple task are done in selected patients.   Compresses spectral analysis (CSA) is also performed on the activity recorded from each individual channel.  This is displayed as a power display of frequencies from 0 to 30 Hz over time.   The CSA analysis is done and displayed continuously.  This is reviewed for asymmetries in power between homologous areas of the scalp and for presence of changes in power which canbe seen when seizures occur.  Sections of suspected abnormalities on the CSA is then compared with the original EEG recording.     BERD software was also utilized in the review of this study.  This software suite analyzes the EEG recording in multiple domains.  Coherence and rhythmicity is computed to identify EEG sections which may contain organized seizures.  Each channel undergoes analysis to detect presence of spike and sharp waves which have special and  morphological characteristic of epileptic activity.  The routine EEG recording is converted from spacial into frequency domain.  This is then displayed comparing homologous areas to identify areas of significant asymmetry.  Algorithm to identify non-cortically generated artifact is used to separate eye movement, EMG and other artifact from the EEG.      Recording Times  Start on 6/7/2024, 07:00  Stop on 6/8/2024, 14:28    A total of 31 hours and 28 minutes of EEG was recorded.    EEG FINDINGS - Interpretation of this cap study is extremely limited due to significant artifacts.  Interpretable portions suggest:  Background activity:   The background rhythm was characterized by possible delta (2-4 Hz) activity   No posterior dominant rhythm seen.   Symmetry and continuity: the background was continuous; unable to comment on symmetry.    Sleep:   - unable to assess.    Activation procedures:   Not done    Abnormal activity:   Possible high amplitude (>100uV) epileptiform (1 Hz spike/wave) periodic discharges are seen. However, due to the significant amount of artifacts unable to comment on extent of presence or any evolution into electrographic seizures.     EKG:   - unable to assess.    IMPRESSION: Interpretation of this cap-EEG is extremely limited.  Interpretable portions of the study suggest underlying bilateral/generalized epileptiform activity with potential to evolve into status epilepticus.    CLINICAL CORRELATION IS RECOMMENDED.  Recommend a repeat study with proper electrode placement for definitive interpretation.    Findings were conveyed to the ICU team (Dr. Mondragon)    Rosenda Anthony MD, EMEKA(), SHANIA, SHAAN.  Neurology-Epilepsy.  [facility].

## 2024-06-08 NOTE — ASSESSMENT & PLAN NOTE
Resolved. S/p extubation at [facility]. Received  scheduled inhalers Steroids, Antibiotics, and Supplemental oxygen.  Continue home inhalers.   Can use prn albuterol or nebs

## 2024-06-08 NOTE — ASSESSMENT & PLAN NOTE
History of substance abuse   UDS at [facility] positive for benzos, amphetamines   Psych consulted at [facility], agrees with discontinuing benzodiazepines   Daughter reported patient takes lot of Valium -monitor for benzos withdrawal

## 2024-06-08 NOTE — ASSESSMENT & PLAN NOTE
Patient with Paroxysmal (<7 days) atrial fibrillation which is uncontrolled currently with Beta Blocker and Calcium Channel Blocker. Patient is currently in atrial fibrillation.[id number] Score: 2. HASBLED Score: . Anticoagulation indicated. Anticoagulation done with lovenox .  Continue Metoprolol bid - uptitrate as needed
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
## 2024-06-08 NOTE — PROGRESS NOTES
"[facility] / Critical Care Progress Note     Primary Attending:  Kolby Fried MD   Consultant Attending: Kolby Parrish MD       Date of Admit: 5/31/2024  Hospital day: 8    Brief Summary of Hospitalization:       June Boykin is a 69-year-old female who presents with past medical history significant for hypertension, COPD on 2 L home oxygen, anxiety, previous methamphetamine abuse, chronic benzo use who presented last week with diarrhea, dehydration, frequent falls. She has been intermittently in the ICU over the past several days for A-fib with RVR requiring a diltiazem drip. Now off infusion, but on EEG due to recurring RUE clonic motion concerning for partial nonconvulsive status.      24h Events:      Continues to have RUE jerking motion. Intermittently following commands, still on cap EEG. Pending transfer to [facility] for higher level of care. NGT in place    OBJECTIVE DATA:      Vital Signs:  Temp:  [96.6 °F (35.9 °C)-98.7 °F (37.1 °C)]   Pulse:  [64-87]   Resp:  [20-69]   BP: (103-209)/()   SpO2:  [84 %-100 %]     No results found for: "HTIN", "WEIGHT"    Intake / Output:    Intake/Output Summary (Last 24 hours) at 6/8/2024 0857  Last data filed at 6/8/2024 0600  Gross per 24 hour   Intake 2404.38 ml   Output 2850 ml   Net -445.62 ml     General: no acute distress, AOx0  Head: Normocephalic, atraumatic, PERRL, anicteric sclerae  Lungs: respirations unlabored, CTA B/L  Heart: regular rate and rhythm,  no murmur appreciated  Abdomen: soft, non-tender, normal bowel sounds  Extremities: no edema  Skin: warm and dry, no rashes appreciated  Neuro:not alert and oriented, confused      Laboratory, Microbiology, ECG(s), and Imaging:  Reviewed.     Active Medications:     aspirin  81 mg Oral Daily    atorvastatin  40 mg Per NG tube Daily    enoxparin  1 mg/kg (Dosing Weight) Subcutaneous Q12H (prophylaxis, 0900/2100)    levETIRAcetam (Keppra) IV (PEDS and ADULTS)  1,500 mg Intravenous Q12H    " lisinopriL  20 mg Per NG tube Daily    magnesium sulfate IVPB  1 g Intravenous Once    metoprolol tartrate  50 mg Per NG tube BID    potassium chloride  10 mEq Intravenous Q1H    thiamine (B-1) 500 mg in dextrose 5 % (D5W) 100 mL IVPB  500 mg Intravenous TID    [START ON 6/11/2024] thiamine  100 mg Oral Daily    valproate sodium (DEPACON) IVPB  500 mg Intravenous Q8H         sodium chloride 0.9%   Intravenous Continuous 5 mL/hr at 06/08/24 0400 Rate Verify at 06/08/24 0400          Current Facility-Administered Medications:     acetaminophen, 650 mg, Per NG tube, Q6H PRN    bisacodyL, 10 mg, Rectal, Daily PRN    calcium gluconate IVPB, 1 g, Intravenous, PRN    calcium gluconate IVPB, 2 g, Intravenous, PRN    calcium gluconate IVPB, 3 g, Intravenous, PRN    labetalol, 20 mg, Intravenous, Q4H PRN    levalbuterol, 0.63 mg, Nebulization, Q8H PRN    lorazepam, 2 mg, Intravenous, Q15 Min PRN    magnesium sulfate IVPB, 2 g, Intravenous, PRN    magnesium sulfate IVPB, 4 g, Intravenous, PRN    metoprolol, 2.5 mg, Intravenous, Q6H PRN    metoprolol, 5 mg, Intravenous, Q6H PRN    potassium chloride, 40 mEq, Intravenous, PRN **AND** potassium chloride, 60 mEq, Intravenous, PRN **AND** potassium chloride, 80 mEq, Intravenous, PRN    sodium chloride 0.9%, 10 mL, Intravenous, PRN    sodium chloride 0.9%, 10 mL, Intravenous, PRN     Assessment/Plan:     Neuro:  Acute (on Chronic?) Encephalopathy   New Focal Neurological Deficits   Reccurent Focal Seizures  - Stat CT head: No acute intracranial pathology   - Neurology consulted, stroke activated LKN: 7:00PM per neuro  - MRI Pending  - Currently on keppra 1500mg per NGT BID, valproate IV 500mg q8  - On cap EEG, pending transfer to [facility]    Cardiovascular/Hemodynamics:  Hypertension, Tachycardia, pAfib   - Patient stepped up for A Fib with RVR -180's. She received P.O. Metoprolol 25mg x1, IV lopressor x2, diltiazem IVP, and . Was on dilt gtt  - Transitioned to  metoprolol 50mg BID. On lisinopril 20mg daily; titrate as needed  - CHADSVas 5 if h/o stroke is true, 3 if not - continue full dose lovenox for now   - TTE with normal EF and indeterminate diastolic dysfunction      Respiratory:   Acute on Chronic Hypoxic and Hypercapnic RF, improved   COPD Exacerbation (?)   - Intubated PTA, extubated the next morning after hypercapnia resolved   - completed course of prednisone and doxy for exacerbation  - Check ABG this morning  - Continue duonebs prn     GI/FEN:  F: None   E: K>4, Mg>2  N: Boost Plus TID with meals      Chronic Hyponatremia   - resolved  - monitor labs    Hypokalemia  Hypomagnesemia  Hypophosphatemia   - Replete as needed  - Goal K>4, Phos>3, Mg>2    Protein Calorie Malnutrition   - Boost to meals TID      ID:   Chronic Hep C  - Needs outpatient follow-up for treatment    Renal:   No acute issues   - Renally dose all medication, avoid nephrotoxic agents  - Strict I/Os, daily weights     Heme/Onc:   No acute issues      Endocrine:  No acute issues   - SSI + Accuchecks  - Goal glucose 140-180 while in ICU    Feeding: Tube feeds  Analgesia: tylenol  Sedation: None  VTE Ppx: Full dose Lovenox  Head of Bed: 30 degrees to prevent VAP  Ulcer PPX: None  Glucose: Hypoglycemic precautions, low dose sliding scale insulin  SBT/SAT: None  Bowels: None  Indwelling Lines: PIV Left AC and PIV Right forearm  Deescalation Abx: None     Code Status: DNR    Dispo: Continue ICU care, continue cap EEG. Titrate AEDs as tolerated. Check ABG today to ensure not hypercapnic. Pending transfer to [facility] for higher level of care    Carrington Gale MD  [facility] Medicine Fellow

## 2024-06-08 NOTE — PROGRESS NOTES
Pt admit from local hospital. 2L NC baseline. A/O x1 self only. Unable to follow commands. VSS. EEG set up by tech. Upper soft wrist restraints in place.         Stacia Talley RN

## 2024-06-08 NOTE — ASSESSMENT & PLAN NOTE
Chronic, controlled. Latest blood pressure and vitals reviewed-     Temp:  [96.6 °F (35.9 °C)-98.7 °F (37.1 °C)]   Pulse:  [64-85]   Resp:  [20-69]   BP: (103-191)/()   SpO2:  [88 %-100 %] .   Home meds for hypertension were reviewed and noted below.   Hypertension Medications               lisinopriL-hydrochlorothiazide (PRINZIDE,ZESTORETIC) 20-25 mg Tab             While in the hospital, will manage blood pressure as follows; Continue home antihypertensive regimen    Will utilize p.r.n. blood pressure medication only if patient's blood pressure greater than 180/110 and she develops symptoms such as worsening chest pain or shortness of breath.

## 2024-06-08 NOTE — ASSESSMENT & PLAN NOTE
-affecting right upper extremity  -neurology at [facility] Faustina recommended valproic acid and Keppra. Goal VPA level    -possible Bin's paralysis  -follow up valproic acid level and liver functions  --MRI brain Differential:  CVA, nonconvulsive status epilepticus  -Patient transferred to [facility] on 6/8 for neurology eval (consulted) and continuous EEG monitoring     Rec giving patient 1000 mg of depakote stat then 1000 q12.      Continue keppra 1500 q12.
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
## 2024-06-08 NOTE — SUBJECTIVE & OBJECTIVE
Past Medical History:   Diagnosis Date    Abdominal pain 05/2024    Acid reflux 05/2024    ACP (advance care planning) 6/1/2024    Anxiety disorder, unspecified     At high risk for falls     Emphysema, unspecified     Focal seizures 6/7/2024    History of opioid abuse     Hypertension     On home oxygen therapy     2l/nc    Wears partial dentures     upper-none on lower    Weight loss 05/2024       Past Surgical History:   Procedure Laterality Date    ANKLE SURGERY Left     HYSTERECTOMY      TONSILLECTOMY         Review of patient's allergies indicates:  No Known Allergies    Current Facility-Administered Medications on File Prior to Encounter   Medication    [COMPLETED] magnesium sulfate in dextrose IVPB (premix) 1 g    [COMPLETED] potassium phosphate 15 mmol in dextrose 5 % (D5W) 250 mL infusion    [DISCONTINUED] 0.9%  NaCl infusion    [DISCONTINUED] acetaminophen tablet 650 mg    [DISCONTINUED] albuterol-ipratropium 2.5 mg-0.5 mg/3 mL nebulizer solution 3 mL    [DISCONTINUED] aspirin chewable tablet 81 mg    [DISCONTINUED] atorvastatin tablet 40 mg    [DISCONTINUED] bisacodyL suppository 10 mg    [DISCONTINUED] calcium gluconate 1 g in NS IVPB (premixed)    [DISCONTINUED] calcium gluconate 1 g in NS IVPB (premixed)    [DISCONTINUED] calcium gluconate 1 g in NS IVPB (premixed)    [DISCONTINUED] enoxaparin injection 50 mg    [DISCONTINUED] labetaloL injection 20 mg    [DISCONTINUED] levalbuterol nebulizer solution 0.63 mg    [DISCONTINUED] levetiracetam 500 mg/5 mL (5 mL) liquid Soln 1,500 mg    [DISCONTINUED] levETIRAcetam injection 1,500 mg    [DISCONTINUED] lisinopriL tablet 20 mg    [DISCONTINUED] LORazepam injection 2 mg    [DISCONTINUED] magnesium sulfate 2g in water 50mL IVPB (premix)    [DISCONTINUED] magnesium sulfate 2g in water 50mL IVPB (premix)    [DISCONTINUED] metoprolol injection 2.5 mg    [DISCONTINUED] metoprolol injection 5 mg    [DISCONTINUED] metoprolol tartrate (LOPRESSOR) tablet 50 mg     [DISCONTINUED] potassium bicarbonate disintegrating tablet 25 mEq    [DISCONTINUED] potassium chloride 10 mEq in 100 mL IVPB    [DISCONTINUED] potassium chloride 10 mEq in 100 mL IVPB    [DISCONTINUED] potassium chloride 10 mEq in 100 mL IVPB    [DISCONTINUED] potassium chloride 10 mEq in 100 mL IVPB    [DISCONTINUED] sodium chloride 0.9% flush 10 mL    [DISCONTINUED] sodium chloride 0.9% flush 10 mL    [DISCONTINUED] thiamine (B-1) 500 mg in dextrose 5 % (D5W) 100 mL IVPB    [DISCONTINUED] thiamine tablet 100 mg    [DISCONTINUED] valproate (DEPACON) 500 mg in dextrose 5 % (D5W) 50 mL IVPB     Current Outpatient Medications on File Prior to Encounter   Medication Sig    albuterol (PROVENTIL/VENTOLIN HFA) 90 mcg/actuation inhaler Inhale into the lungs.    amoxicillin-clavulanate 875-125mg (AUGMENTIN) 875-125 mg per tablet Take 1 tablet by mouth every 12 (twelve) hours.    amoxicillin-clavulanate 875-125mg (AUGMENTIN) 875-125 mg per tablet Take 1 tablet by mouth 2 (two) times daily.    aspirin 81 MG Chew Take 81 mg by mouth once daily. Stop 05/20/2024 per [facility] office    budesonide-formoterol 160-4.5 mcg (SYMBICORT) 160-4.5 mcg/actuation HFAA 2 puffs Inhalation Twice a day    diazePAM (VALIUM) 10 MG Tab Take 10 mg by mouth every 6 (six) hours as needed.    docusate sodium (COLACE) 100 MG capsule Take 100 mg by mouth 2 (two) times daily.    fluticasone propionate (FLONASE) 50 mcg/actuation nasal spray     lisinopriL-hydrochlorothiazide (PRINZIDE,ZESTORETIC) 20-25 mg Tab     potassium chloride (MICRO-K) 10 MEQ CpSR Take 10 mEq by mouth once.    pravastatin (PRAVACHOL) 20 MG tablet     QUEtiapine (SEROQUEL) 50 MG tablet Take 1 tablet (50 mg total) by mouth every evening.    QUEtiapine (SEROQUEL) 50 MG tablet Take 1 tablet (50 mg total) by mouth nightly.     Family History       Problem Relation (Age of Onset)    Diabetes Father    No Known Problems Mother, Brother, Brother, Brother    Pancreatic cancer  Father    Parkinsonism Sister    Stroke Father          Tobacco Use    Smoking status: Former     Current packs/day: 1.00     Types: Cigarettes    Smokeless tobacco: Never    Tobacco comments:     Quit 2 years ago   Substance and Sexual Activity    Alcohol use: No    Drug use: Not Currently     Types: Oxycodone, Hydrocodone    Sexual activity: Not on file     Comment:      ROS:  Unable to obtain. Patient non verbal iso of AMS/continuous seizures.  Objective:     Vital Signs (Most Recent):    Vital Signs (24h Range):  Temp:  [96.6 °F (35.9 °C)-98.7 °F (37.1 °C)] 98.7 °F (37.1 °C)  Pulse:  [64-85] 75  Resp:  [20-69] 30  SpO2:  [88 %-100 %] 98 %  BP: (103-191)/() 153/74     Weight: 47 kg (103 lb 9.9 oz)  Body mass index is 21.66 kg/m².     Physical Exam  Constitutional:       Appearance: She is ill-appearing.      Comments: Elderly female, non verbal, getting continuous EEG   Cardiovascular:      Rate and Rhythm: Normal rate.   Pulmonary:      Effort: Pulmonary effort is normal. No respiratory distress.      Breath sounds: No wheezing.   Abdominal:      General: Abdomen is flat. There is no distension.      Tenderness: There is no abdominal tenderness.   Neurological:      Comments: Unable to obtain as patient is not following commands. Moves limbs spontaneously                Significant Labs: All pertinent labs within the past 24 hours have been reviewed.  CBC:   Recent Labs   Lab 06/07/24  0432 06/08/24  0400   WBC 5.30 7.57   HGB 14.1 13.2   HCT 46.3 43.9    169     CMP:   Recent Labs   Lab 06/07/24  0432 06/07/24  1618 06/08/24  0400   * 134* 135*   K 2.8* 3.8 3.1*   CL 86* 92* 92*   CO2 38* 33* 34*    104 90   BUN 9 7* 6*   CREATININE 0.6 0.6 0.6   CALCIUM 9.3 8.9 9.2   PROT 6.9  --  6.7   ALBUMIN 2.9*  --  3.0*   BILITOT 0.6  --  0.6   ALKPHOS 34*  --  35*   AST 40  --  34   ALT 22  --  22   ANIONGAP 9 9 9     Magnesium:   Recent Labs   Lab 06/07/24  0432 06/08/24  0400   MG 1.8  1.9     Significant Imaging: I have reviewed all pertinent imaging results/findings within the past 24 hours.

## 2024-06-08 NOTE — ASSESSMENT & PLAN NOTE
Chronic,    Resume home meds as BP permits    Temp:  [96.6 °F (35.9 °C)-98.7 °F (37.1 °C)]   Pulse:  [64-87]   Resp:  [20-69]   BP: (103-199)/()   SpO2:  [88 %-100 %] .   Home meds for hypertension were reviewed and noted below.   Hypertension Medications               lisinopriL-hydrochlorothiazide (PRINZIDE,ZESTORETIC) 20-25 mg Tab             While in the hospital, will manage blood pressure as follows; Continue home antihypertensive regimen    Will utilize p.r.n. blood pressure medication only if patient's blood pressure greater than 140/90 and she develops symptoms such as worsening chest pain or shortness of breath.    -continue with lisinopril and metoprolol  -blood pressure still elevated, we will consider adding amlodipine  -please follow up blood pressure under proper conditions, patient tends to tense up her muscles while blood pressure reading is in process

## 2024-06-08 NOTE — ASSESSMENT & PLAN NOTE
Advance Care Planning     Date: 06/01/2024    Code Status  In light of the patients advanced and life limiting illness,I engaged the the family in a voluntary conversation about the patient's preferences for care  at the very end of life. The patient wishes to have a natural, peaceful death.  Along those lines, the patient does not wish to have CPR or other invasive treatments performed when her heart and/or breathing stops. I communicated to the family that a DNR order would be placed in her medical record to reflect this preference.  I spent a total of 20 minutes engaging the patient in this advance care planning discussion.   Patient grand daughter Caro  reported patient sister had updated family patient is a DNR.      Advance Care Planning     Date: 06/03/2024    [facility]  I engaged the family in a voluntary conversation about advance care planning and we specifically addressed what the goals of care would be moving forward, in light of the patient's change in clinical status, specifically  patient declining treatment and progressive clinical decline.  We did specifically address the patient's likely prognosis, which is poor.  We explored the patient's values and preferences for future care.  The family endorses that what is most important right now is to focus on spending time at home, quality of life, even if it means sacrificing a little time, and comfort and QOL     Accordingly, we have decided that the best plan to meet the patient's goals includes continuing with treatment    I did explain the role for hospice care at this stage of the patient's illness, including its ability to help the patient live with the best quality of life possible.  We will be making a hospice referral.    I spent a total of 25 minutes engaging the patient in this advance care planning discussion.       Discussed with granddaughter Caro  and updated her patient request to see her mother and sister and Caro confirm both at  bed.  She also confirm patient's code status as DNR and open to discharge with hospice maybe by tomorrow since they have to fix patient's present abode today.  Questions and concerns were addressed    -family has decided that patient will be discharged to skilled nurse facility.  They do not want hospice at this time.  Pending disposition  -patient to be transferred to [facility] for continuous EEG monitoring
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
## 2024-06-08 NOTE — NURSING
Reported called to ALEX Palomo at [facility] [id number]. Nurse notified son Jason of patient transfer status.

## 2024-06-08 NOTE — PLAN OF CARE
[facility] plan of care note:    Pt pending transfer. EEG cap getting removed as study is limited due to artifact. Despite limitations of the study, Interpretable portions of the study suggest underlying bilateral/generalized epileptiform activity with potential to evolve into status epilepticus.     - Would Continue increased dose of keppra - 1500mg BID and  TID  - await transfer to [facility]  - Rest of the recs as per consult note from 6/8    Anne Menendez MD  [facility] - IV

## 2024-06-08 NOTE — ASSESSMENT & PLAN NOTE
History of substance abuse  UDS 7 days ago positive for benzos, amphetamines  Psych consulted, agrees with discontinuing benzodiazepines   Daughter reported patient takes lot of Valium -monitor for benzos withdrawal  -06/07:  Spoke with patient's granddaughter Caro, she denies patient has a history of substance abuse

## 2024-06-08 NOTE — NURSING
RN called Stacia and informed her patient was pickup by [facility] and was on her way. RN also notified son Jason and daughter nadeem of transfer. Patient stable no noted distress at this time.

## 2024-06-08 NOTE — EICU
EICU FOLLOWUP NOTE:    Called for:  Hypokalemia    ASSESSMENT AND PLAN:    Potassium is 3.1.  I will order electrolyte replacement protocol.  Bedside nurse have also requested to renew bilateral wrist restraints which will be ordered.    Thank You for allowing EICU to participate in the care of the patient. Please call as needed    Yael Deng MD  [facility]  [phone]

## 2024-06-08 NOTE — PROGRESS NOTES
EEG Hook up   Electrodes had to be fixed.No    Skin Integrity: Normal      Transfer from Faustina Heard   06/08/2024 4:22 PM

## 2024-06-08 NOTE — ASSESSMENT & PLAN NOTE
Patient with Paroxysmal (<7 days) atrial fibrillation which is uncontrolled currently with Beta Blocker and Calcium Channel Blocker. Patient is currently in atrial fibrillation.[id number] Score: 2. HASBLED Score: . Anticoagulation indicated. Anticoagulation done with lovenox .  Consult cardiology -therapeutic Lovenox, off Cardizem gtt  Continue Metoprolol bid - uptitrate as needed

## 2024-06-09 PROBLEM — E44.0 MODERATE MALNUTRITION: Status: ACTIVE | Noted: 2024-01-01
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
## 2024-06-09 NOTE — CARE UPDATE
Requested ALEX Whittaker to call to get formula feed delivered and start at 10 cc/h when it arrives  HOB at 15 deg at all times while feeding  Dietitian to give further recs

## 2024-06-09 NOTE — PLAN OF CARE
Problem: Adult Inpatient Plan of Care  Goal: Plan of Care Review  Outcome: Progressing  Goal: Patient-Specific Goal (Individualized)  Outcome: Progressing  Goal: Absence of Hospital-Acquired Illness or Injury  Outcome: Progressing  Goal: Optimal Comfort and Wellbeing  Outcome: Progressing  Goal: Readiness for Transition of Care  Outcome: Progressing     Problem: Sepsis/Septic Shock  Goal: Optimal Coping  Outcome: Progressing  Goal: Absence of Bleeding  Outcome: Progressing  Goal: Blood Glucose Level Within Targeted Range  Outcome: Progressing  Goal: Absence of Infection Signs and Symptoms  Outcome: Progressing  Goal: Optimal Nutrition Intake  Outcome: Progressing     Problem: Pneumonia  Goal: Fluid Balance  Outcome: Progressing  Goal: Resolution of Infection Signs and Symptoms  Outcome: Progressing  Goal: Effective Oxygenation and Ventilation  Outcome: Progressing     Problem: Fall Injury Risk  Goal: Absence of Fall and Fall-Related Injury  Outcome: Progressing     Problem: Restraint, Nonviolent  Goal: Absence of Harm or Injury  Outcome: Progressing     Problem: Skin Injury Risk Increased  Goal: Skin Health and Integrity  Outcome: Progressing
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
## 2024-06-09 NOTE — CONSULTS
Chris Wilson - Neurosurgery ([facility])  Adult Nutrition  Consult Note    SUMMARY     Recommendations    TF recs: [facility] [employer] Standard 1.4 @ 45ml/hr to provide 1512 kcal, 67g protein, 778 ml FW.  ml QID or per MD    When medically able, ADAT Regular diet with texture per SLP    RD to monitor and follow    Goals: Meet % EEN, EPN by RD f/u  Nutrition Goal Status: new  Communication of RD Recs:  (POC)    Assessment and Plan    Endocrine  Moderate malnutrition  Malnutrition Type:  Context: chronic illness  Level: moderate    Related to (etiology):   Inadequate energy intake    Signs and Symptoms (as evidenced by):   Malnutrition Characteristic Summary:  Weight Loss (Malnutrition): 10% in 6 months  Energy Intake (Malnutrition): less than 75% for greater than 7 days    Interventions/Recommendations (treatment strategy):  TF recs: [employer] Standard 1.4 @ 45ml/hr to provide 1512 kcal, 67g protein, 778 ml FW.  ml QID or per MD    When medically able, ADAT Regular diet with texture per SLP    Nutrition Diagnosis Status:   New     Malnutrition Assessment  Malnutrition Context: chronic illness  Malnutrition Level: moderate      Weight Loss (Malnutrition): 10% in 6 months  Energy Intake (Malnutrition): less than 75% for greater than 7 days     Reason for Assessment    Reason For Assessment: consult  Diagnosis:  (Focal seizures)  Relevant Medical History: schizoaffective disorder, acute hypercapnic respiratory failure, COPD, pneumonia, metabolic encephalopathy, HTN  Interdisciplinary Rounds: did not attend    General Information Comments:   Pt present with concern for nonconvulsive status epilepticus. Hx of heavy drinker. NG tube in place. TF ordered. Per wt hx, noted wt loss of 11 lb (10%) x 4 months. Pt with poor PO intake per previous RD assessment. TF runs @ 30ml/hr during RD visits, goal @ 45ml/hr. NFPE not appropriate at this time. Pt meet criteria for moderate malnutrition.     Wt Readings from Last 10  "Encounters:   06/08/24 47 kg (103 lb 9.9 oz)   06/08/24 47 kg (103 lb 9.9 oz)   05/30/24 49.9 kg (110 lb)   05/22/24 49.4 kg (109 lb)   05/21/24 46.3 kg (102 lb)   02/01/24 51.7 kg (114 lb)   08/09/23 53.5 kg (118 lb)   04/30/23 54 kg (119 lb)   01/31/23 52.2 kg (115 lb)   12/16/22 53.5 kg (118 lb)     Nutrition Discharge Planning: Pending medical course    Nutrition Risk Screen    Nutrition Risk Screen: no indicators present    Anthropometrics    Temp: 98 °F (36.7 °C)  Height: 4' 10" (147.3 cm)  Height (inches): 58 in  Weight Method: Bed Scale  Weight: 47 kg (103 lb 9.9 oz)  Weight (lb): 103.62 lb  Ideal Body Weight (IBW), Female: 90 lb  % Ideal Body Weight, Female (lb): 115.13 %  BMI (Calculated): 21.7       Lab/Procedures/Meds    Pertinent Labs Reviewed: reviewed  Pertinent Labs Comments: albumin 3.1, ALP 35  Pertinent Medications Reviewed: reviewed  Pertinent Medications Comments: .   [START ON 6/10/2024] aspirin  81 mg Per NG tube Daily    [START ON 6/10/2024] atorvastatin  40 mg Per NG tube Daily    budesonide-formoterol 160-4.5 mcg  2 puff Inhalation Q12H    docusate sodium  100 mg Oral BID    enoxparin  1 mg/kg Subcutaneous Q12H (prophylaxis, 0900/2100)    fluticasone propionate  1 spray Each Nostril Daily    lacosamide  100 mg Per NG tube Q12H    levETIRAcetam (Keppra) IV (PEDS and ADULTS)  1,500 mg Intravenous Q12H    [START ON 6/10/2024] lisinopriL-hydrochlorothiazide  1 tablet Per NG tube Daily    metoprolol tartrate  50 mg Per NG tube BID    [START ON 6/10/2024] thiamine  100 mg Per NG tube Daily    valproate sodium (DEPACON) IVPB  1,000 mg Intravenous BID     Estimated/Assessed Needs    Weight Used For Calorie Calculations: 46.7 kg (103 lb)  Energy Calorie Requirements (kcal): 8243-7087 kcal  Energy Need Method: Kcal/kg (30-35)  Protein Requirements: 70g (1.5g/kg)  Weight Used For Protein Calculations: 46.7 kg (103 lb)  Fluid Requirements (mL): 1500ml or 30ml/kg  Estimated Fluid Requirement Method: RDA " Method  RDA Method (mL): 1401     Nutrition Prescription Ordered    Current Diet Order: NPO  Current Nutrition Support Formula Ordered: [facility] Standard 1.4  Current Nutrition Support Rate Ordered: 45 (ml)  Current Nutrition Support Frequency Ordered: ml/hr    Evaluation of Received Nutrient/Fluid Intake    I/O: + 60 ml since admit  Energy Calories Required: not meeting needs  Protein Required: not meeting needs  Comments: LBM 6/5  Tolerance: tolerating    Nutrition Risk    Level of Risk/Frequency of Follow-up:  (1x/week)     Monitor and Evaluation    Food and Nutrient Intake: energy intake, food and beverage intake, enteral nutrition intake  Food and Nutrient Adminstration: diet order, enteral and parenteral nutrition administration  Physical Activity and Function: nutrition-related ADLs and IADLs  Anthropometric Measurements: height/length, weight, weight change, body mass index  Biochemical Data, Medical Tests and Procedures: electrolyte and renal panel, gastrointestinal profile, glucose/endocrine profile, inflammatory profile, lipid profile  Nutrition-Focused Physical Findings: overall appearance     Nutrition Follow-Up    RD Follow-up?: Yes

## 2024-06-09 NOTE — SUBJECTIVE & OBJECTIVE
Past Medical History:   Diagnosis Date    Abdominal pain 05/2024    Acid reflux 05/2024    ACP (advance care planning) 6/1/2024    Anxiety disorder, unspecified     At high risk for falls     Emphysema, unspecified     Focal seizures 6/7/2024    History of opioid abuse     Hypertension     On home oxygen therapy     2l/nc    Wears partial dentures     upper-none on lower    Weight loss 05/2024       Past Surgical History:   Procedure Laterality Date    ANKLE SURGERY Left     HYSTERECTOMY      TONSILLECTOMY         Review of patient's allergies indicates:  No Known Allergies    Current Neurological Medications: vimpat 100mg BID, keppra 1500mg BID, depakote 1000mg BID    No current facility-administered medications on file prior to encounter.     Current Outpatient Medications on File Prior to Encounter   Medication Sig    albuterol (PROVENTIL/VENTOLIN HFA) 90 mcg/actuation inhaler Inhale into the lungs.    amoxicillin-clavulanate 875-125mg (AUGMENTIN) 875-125 mg per tablet Take 1 tablet by mouth every 12 (twelve) hours.    amoxicillin-clavulanate 875-125mg (AUGMENTIN) 875-125 mg per tablet Take 1 tablet by mouth 2 (two) times daily.    aspirin 81 MG Chew Take 81 mg by mouth once daily. Stop 05/20/2024 per lucero Dominguez office    budesonide-formoterol 160-4.5 mcg (SYMBICORT) 160-4.5 mcg/actuation HFAA 2 puffs Inhalation Twice a day    diazePAM (VALIUM) 10 MG Tab Take 10 mg by mouth every 6 (six) hours as needed.    docusate sodium (COLACE) 100 MG capsule Take 100 mg by mouth 2 (two) times daily.    fluticasone propionate (FLONASE) 50 mcg/actuation nasal spray     lisinopriL-hydrochlorothiazide (PRINZIDE,ZESTORETIC) 20-25 mg Tab     potassium chloride (MICRO-K) 10 MEQ CpSR Take 10 mEq by mouth once.    pravastatin (PRAVACHOL) 20 MG tablet     QUEtiapine (SEROQUEL) 50 MG tablet Take 1 tablet (50 mg total) by mouth every evening.    QUEtiapine (SEROQUEL) 50 MG tablet Take 1 tablet (50 mg total) by mouth nightly.      Family History       Problem Relation (Age of Onset)    Diabetes Father    No Known Problems Mother, Brother, Brother, Brother    Pancreatic cancer Father    Parkinsonism Sister    Stroke Father          Tobacco Use    Smoking status: Former     Current packs/day: 1.00     Types: Cigarettes    Smokeless tobacco: Never    Tobacco comments:     Quit 2 years ago   Substance and Sexual Activity    Alcohol use: No    Drug use: Not Currently     Types: Oxycodone, Hydrocodone    Sexual activity: Not on file     Comment:      Review of Systems   Unable to perform ROS: Mental status change   Neurological:  Positive for seizures.     Objective:     Vital Signs (Most Recent):  Temp: 97.8 °F (36.6 °C) (06/09/24 1642)  Pulse: 63 (06/09/24 1642)  Resp: 20 (06/09/24 1642)  BP: 133/67 (recheck) (06/09/24 1757)  SpO2: 99 % (06/09/24 1642) Vital Signs (24h Range):  Temp:  [97 °F (36.1 °C)-98 °F (36.7 °C)] 97.8 °F (36.6 °C)  Pulse:  [62-83] 63  Resp:  [14-20] 20  SpO2:  [96 %-100 %] 99 %  BP: ()/(50-87) 133/67     Weight: 47 kg (103 lb 9.9 oz)  Body mass index is 21.66 kg/m².     Physical Exam  Constitutional:       General: She is not in acute distress.     Appearance: She is underweight. She is ill-appearing.   Neurological:      Mental Status: She is lethargic.      GCS: GCS eye subscore is 3. GCS verbal subscore is 3. GCS motor subscore is 5.      Cranial Nerves: Cranial nerves 2-12 are intact.      Deep Tendon Reflexes:      Reflex Scores:       Bicep reflexes are 1+ on the right side and 2+ on the left side.       Brachioradialis reflexes are 1+ on the right side and 2+ on the left side.       Patellar reflexes are 2+ on the right side and 2+ on the left side.         NEUROLOGICAL EXAMINATION:     MENTAL STATUS   Level of consciousness: drowsy, responsive to painful stimuli ,  arousable by verbal stimuli    CRANIAL NERVES   Cranial nerves II through XII intact.     MOTOR EXAM   Right arm tone: decreased  Left arm  tone: normal  Right leg tone: normal  Left leg tone: normal       Withdraws LUE and BLE  No movement in RUE but does localize to pain there     REFLEXES     Reflexes   Right brachioradialis: 1+  Left brachioradialis: 2+  Right biceps: 1+  Left biceps: 2+  Right patellar: 2+  Left patellar: 2+  Right plantar: upgoing  Left plantar: upgoing  Right ankle clonus: absent  Left ankle clonus: absent      Significant Labs: All pertinent lab results from the past 24 hours have been reviewed.    Significant Imaging: I have reviewed all pertinent imaging results/findings within the past 24 hours.

## 2024-06-09 NOTE — PLAN OF CARE
Recommendations    TF recs: [facility] Standard 1.4 @ 45ml/hr to provide 1512 kcal, 67g protein, 778 ml FW.  ml QID or per MD    When medically able, ADAT Regular diet with texture per SLP    RD to monitor and follow    Goals: Meet % EEN, EPN by RD f/u  Nutrition Goal Status: new  Communication of RD Recs:  (POC)

## 2024-06-09 NOTE — ASSESSMENT & PLAN NOTE
Malnutrition Type:  Context: chronic illness  Level: moderate    Related to (etiology):   Inadequate energy intake    Signs and Symptoms (as evidenced by):   Malnutrition Characteristic Summary:  Weight Loss (Malnutrition): 10% in 6 months  Energy Intake (Malnutrition): less than 75% for greater than 7 days    Interventions/Recommendations (treatment strategy):  TF recs: [employer] Standard 1.4 @ 45ml/hr to provide 1512 kcal, 67g protein, 778 ml FW.  ml QID or per MD    When medically able, ADAT Regular diet with texture per SLP    Nutrition Diagnosis Status:   New

## 2024-06-09 NOTE — SUBJECTIVE & OBJECTIVE
Interval History: Patient is non-verbal. Per neuro, EEG still w/ seizures. Will add vimpat 200 now then 100mg BID starting tonight.      Objective:     Vital Signs (Most Recent):  Temp: 98 °F (36.7 °C) (06/09/24 1146)  Pulse: 62 (06/09/24 1146)  Resp: 20 (06/09/24 1146)  BP: (!) 102/55 (06/09/24 1146)  SpO2: 100 % (06/09/24 1146) Vital Signs (24h Range):  Temp:  [97 °F (36.1 °C)-98.5 °F (36.9 °C)] 98 °F (36.7 °C)  Pulse:  [62-83] 62  Resp:  [14-30] 20  SpO2:  [96 %-100 %] 100 %  BP: (102-186)/(55-87) 102/55     Weight: 47 kg (103 lb 9.9 oz)  Body mass index is 21.66 kg/m².    Intake/Output Summary (Last 24 hours) at 6/9/2024 1228  Last data filed at 6/9/2024 0800  Gross per 24 hour   Intake 70 ml   Output --   Net 70 ml         Physical Exam  Constitutional:       Appearance: She is ill-appearing.      Comments: Elderly female, non verbal, getting continuous EEG   Cardiovascular:      Rate and Rhythm: Normal rate.   Pulmonary:      Effort: Pulmonary effort is normal. No respiratory distress.      Breath sounds: No wheezing.   Abdominal:      General: Abdomen is flat. There is no distension.      Tenderness: There is no abdominal tenderness.   Neurological:      Comments: Unable to obtain as patient is not following commands. Moves limbs spontaneously             Significant Labs: All pertinent labs within the past 24 hours have been reviewed.  CBC:   Recent Labs   Lab 06/08/24  0400 06/09/24  0413   WBC 7.57 6.12   HGB 13.2 14.4   HCT 43.9 50.9*    195     CMP:   Recent Labs   Lab 06/08/24  0400 06/08/24  1647 06/09/24  0413   * 136 136   K 3.1* 5.2* 4.5   CL 92* 95 91*   CO2 34* 36* 38*   GLU 90 81 74   BUN 6* 5* 8   CREATININE 0.6 0.6 0.6   CALCIUM 9.2 9.7 10.0   PROT 6.7  --  7.4   ALBUMIN 3.0*  --  3.1*   BILITOT 0.6  --  0.6   ALKPHOS 35*  --  35*   AST 34  --  34   ALT 22  --  25   ANIONGAP 9 5* 7*       Significant Imaging: I have reviewed all pertinent imaging results/findings within the past 24  hours.   38.6

## 2024-06-09 NOTE — ASSESSMENT & PLAN NOTE
-affecting right upper extremity  -neurology at [facility] recommended valproic acid and Keppra. 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      Continue keppra 1500 q12.      Continuous EEG     6/9: EEG still w/ seizures. Add vimpat 200 now then 100mg BID starting tonight.

## 2024-06-09 NOTE — ASSESSMENT & PLAN NOTE
June Boykin is a 69 y.o. female w/ PMH of schizoaffective disorder, COPD, HTN, pAF, substance abuse (meth, benzo) and HLD who initially presented to [place] on 5/23 w/ AMS after being found unresponsive by family. Transferred to [facility]. June 6, there was concern for right upper extremity jerking/involuntary movement. EEG  concerning for continuous lateralized periodic discharges in the left hemisphere consistent with recurrent focal seizures. She has been maintained on keppra 1500 BID and depakote 500mg TID w/o improvement. [place] [facility] [place]. On arrival EEG continued to demonstrate focal seizures therefore depakote was increased to 1000mg BID.  Morning of 6/9 EEG w/ continued frequent focal seizures relatively unchanged. Given 200mg vimpat at that time.    Recommendations:  -continue vimpat 100mg BID, depakote 1000mg BID, and keppra 1500mg BID  -continue EEG monitoring  -will f/u EEG and provide further recommendations based on course

## 2024-06-09 NOTE — PLAN OF CARE
End of Shift Note    Pertinent Events this Shift: VSS, Pt on 2L NC, No acute events. Tolerating Tube feedings.     Nursing Concerns: None     Pt/Family concerns: Pt family updated by MD and nurse today.     Mental Status: Alert to voice, unable to follow commands.     Mobility: Q2 turn, float heels. Upper soft wrist restraints in place due to pt pulling at lines.     Barriers towards goals/Discharge: Not medically ready.         Stacia Talley RN

## 2024-06-09 NOTE — NURSING
Nurses Note -- 4 Eyes      6/8/24   8:15 PM      Skin assessed during: Q Shift Change      [] No Altered Skin Integrity Present    []Prevention Measures Documented      [x] Yes- Altered Skin Integrity Present or Discovered   [x] LDA Added if Not in Epic (Describe Wound)   [] New Altered Skin Integrity was Present on Admit and Documented in LDA   [] Wound Image Taken    Wound Care Consulted? No    Attending Nurse:  Nichole Archer RN/Staff Member:  Stacia

## 2024-06-09 NOTE — PROCEDURES
24 hr. Video EEG Monitoring    Date/Time: 6/9/2024 10:31 AM    Performed by: Martín Stewart MD  Authorized by: Quinten Haji MD      ICU EEG/VIDEO MONITORING REPORT    DATE OF SERVICE: 6/8/24-6/9/24  EEG NUMBER: FH -1,2  REQUESTED BY: Adithya  LOCATION OF SERVICE: [facility]    METHODOLOGY   Electroencephalographic (EEG) recording is with electrodes placed according to the International 10-20 placement system.  Thirty two (32) channels of digital signal are simultaneously recorded from the scalp and may include EKG, EMG, and/or eye monitors.   Recording band pass was 0.1 to 512 hz.  Digital video recording of the patient is simultaneously recorded with the EEG.  The nursing staff report clinical symptoms and may press an event button when the patient has symptoms of clinical interest to the treating physicians.  EEG and video recording is stored and archived in digital format.  The entire recording is visually reviewed and the times identified by computer analysis as being spikes or seizures are reviewed again.  Activation procedures which include photic stimulation, hyperventilation and instructing patients to perform simple task are done in selected patients.   Compresses spectral analysis (CSA) is also performed on the activity recorded from each individual channel.  This is displayed as a power display of frequencies from 0 to 30 Hz over time.   The CSA analysis is done and displayed continuously.  This is reviewed for asymmetries in power between homologous areas of the scalp and for presence of changes in power which canbe seen when seizures occur.  Sections of suspected abnormalities on the CSA is then compared with the original EEG recording.     Agent Partner software was also utilized in the review of this study.  This software suite analyzes the EEG recording in multiple domains.  Coherence and rhythmicity is computed to identify EEG sections which may contain organized seizures.  Each channel  undergoes analysis to detect presence of spike and sharp waves which have special and morphological characteristic of epileptic activity.  The routine EEG recording is converted from spacial into frequency domain.  This is then displayed comparing homologous areas to identify areas of significant asymmetry.  Algorithm to identify non-cortically generated artifact is used to separate eye movement, EMG and other artifact from the EEG.      Recording Times  Start on 6/8/24  at 07:00:49  Stop on 6/9/24 at 14:28:39  Start on 6/9/24 at 15:33:50  Stop on 6/9/24 at 07:00:12  A total of 22 hours of EEG was recorded.    EEG FINDINGS  The record shows a poor organization at rest, with no discernible posterior dominant rhythm with poor reactivity. There is mild bilateral beta activity.  There is continuous diffuse delta and theta range background slowing.  There is further focal delta range slowing over the left hemisphere.  There are abundant 0.5-1 Hz lateralized periodic epileptiform discharges over the left hemisphere phase reversing over P3/T5.    Drowsiness is characterized by attenuation of the background, vertex waves, and bilateral theta slowing. Stage II sleep is characterized by slowing, vertex waves, and symmetric sleep spindles. Slow wave and REM sleep are recorded.    Provocative maneuvers including hyperventilation and photic stimulation were performed.     EKG recording shows a regular rhythm .    LP these frequently evolve into ignacio  2-3 hertz spike and wave with associated right upper extremity jerking consistent with focal seizure.    IMPRESSION:  Abnormal study due to the presence of moderate  diffuse background slowing consistent with diffuse cerebral dysfunction and encephalopathy which may be on the basis of toxic, metabolic, or primary neuronal disorder. The slowing over the left hemisphere is consistent with underlying structural defect such as mass or infarct.  Periodic lateralized epileptiform  discharges over the left hemisphere consistent with significant focal cortical irritability and epileptic potential.  Frequent focal seizures are seen as noted above with onset over the left hemisphere.       6/9/2024
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
## 2024-06-10 PROBLEM — G83.30: Status: ACTIVE | Noted: 2024-06-06

## 2024-06-10 PROBLEM — E87.5 HYPERKALEMIA: Status: ACTIVE | Noted: 2024-01-01

## 2024-06-10 PROBLEM — Z74.09 REDUCED MOBILITY: Status: ACTIVE | Noted: 2024-06-10

## 2024-06-10 PROBLEM — E87.1 HYPONATREMIA: Status: ACTIVE | Noted: 2024-01-01

## 2024-06-10 NOTE — ASSESSMENT & PLAN NOTE
-affecting right upper extremity  -neurology at [facility] recommended valproic acid and Keppra. 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      Continue keppra 1500 q12.      Continuous EEG     6/9: EEG still w/ seizures. Add vimpat 200 now then 100mg BID starting tonight.

## 2024-06-10 NOTE — PROGRESS NOTES
EEG Hook up  AM Check Electrodes had to be fixed.No    Skin Integrity: Normal     Ulices Villanueva   06/10/2024 10:46 AM

## 2024-06-10 NOTE — NURSING
.Nurses Note -- 4 Eyes      6/9/2024   11:18 PM      Skin assessed during: Q Shift Change      [x] No Altered Skin Integrity Present    [x]Prevention Measures Documented      [] Yes- Altered Skin Integrity Present or Discovered   [] LDA Added if Not in Epic (Describe Wound)   [] New Altered Skin Integrity was Present on Admit and Documented in LDA   [] Wound Image Taken    Wound Care Consulted? No    Attending Nurse:  Romi Archer RN/Staff Member:  Stacia

## 2024-06-10 NOTE — ASSESSMENT & PLAN NOTE
June Boykin is a 69 y.o. female w/ PMH of schizoaffective disorder, COPD, HTN, pAF, substance abuse (meth, benzo) and HLD who initially presented to [facility] on 5/23 w/ AMS after being found unresponsive by family. Transferred to [facility]. June 6, there was concern for right upper extremity jerking/involuntary movement. EEG  concerning for continuous lateralized periodic discharges in the left hemisphere consistent with recurrent focal seizures. She has been maintained on keppra 1500 BID and depakote 500mg TID w/o improvement. [place] [facility] [place]. On arrival EEG continued to demonstrate focal seizures therefore depakote was increased to 1000mg BID.  Morning of 6/9 EEG w/ continued frequent focal seizures relatively unchanged. Given 200mg vimpat at that time.    Recommendations:  -continue vimpat 100mg BID, depakote 1000mg BID, and keppra 1500mg BID  -continue EEG monitoring  -ativan trial today per epilepsy recommendations  -will continue to f/u EEG and provide further recommendations at that time

## 2024-06-10 NOTE — SUBJECTIVE & OBJECTIVE
Interval History: Patient is non-verbal. Getting continuous EEG. Neuro following.      Objective:     Vital Signs (Most Recent):  Temp: 98.6 °F (37 °C) (06/10/24 1206)  Pulse: 98 (06/10/24 1206)  Resp: 16 (06/10/24 1206)  BP: (!) 147/84 (06/10/24 1206)  SpO2: 95 % (06/10/24 1206) Vital Signs (24h Range):  Temp:  [97.8 °F (36.6 °C)-98.6 °F (37 °C)] 98.6 °F (37 °C)  Pulse:  [63-99] 98  Resp:  [16-22] 16  SpO2:  [94 %-99 %] 95 %  BP: ()/(50-84) 147/84     Weight: 47 kg (103 lb 9.9 oz)  Body mass index is 21.66 kg/m².  No intake or output data in the 24 hours ending 06/10/24 1221        Physical Exam  Constitutional:       Appearance: She is ill-appearing.      Comments: Elderly female, non verbal, getting continuous EEG   Cardiovascular:      Rate and Rhythm: Normal rate.   Pulmonary:      Effort: Pulmonary effort is normal. No respiratory distress.      Breath sounds: No wheezing.   Abdominal:      General: Abdomen is flat. There is no distension.      Tenderness: There is no abdominal tenderness.   Neurological:      Comments: Unable to obtain as patient is not following commands. Moves limbs spontaneously             Significant Labs: All pertinent labs within the past 24 hours have been reviewed.  CBC:   Recent Labs   Lab 06/09/24  0413 06/10/24  0437   WBC 6.12 7.60   HGB 14.4 14.7   HCT 50.9* 47.5    158     CMP:   Recent Labs   Lab 06/08/24  1647 06/09/24  0413 06/10/24  0437    136 135*   K 5.2* 4.5 4.6   CL 95 91* 91*   CO2 36* 38* 33*   GLU 81 74 78   BUN 5* 8 14   CREATININE 0.6 0.6 0.6   CALCIUM 9.7 10.0 9.6   PROT  --  7.4 7.1   ALBUMIN  --  3.1* 2.9*   BILITOT  --  0.6 0.4   ALKPHOS  --  35* 45*   AST  --  34 35   ALT  --  25 23   ANIONGAP 5* 7* 11       Significant Imaging: I have reviewed all pertinent imaging results/findings within the past 24 hours.

## 2024-06-10 NOTE — ASSESSMENT & PLAN NOTE
June Boykin is a 69 y.o. female w/ PMH of schizoaffective disorder, COPD, HTN, pAF, substance abuse (meth, benzo) and HLD who initially presented to [place] on 5/23 w/ AMS after being found unresponsive by family. Transferred to [facility]. June 6, there was concern for right upper extremity jerking/involuntary movement. EEG  concerning for continuous lateralized periodic discharges in the left hemisphere consistent with recurrent focal seizures. She has been maintained on keppra 1500 BID and depakote 500mg TID w/o improvement. She was [place] [facility] [place]. On arrival EEG continued to demonstrate focal seizures therefore depakote was increased to 1000mg BID.  Morning of 6/9 EEG w/ continued frequent focal seizures relatively unchanged. Given 200mg vimpat at that time.    Recommendations:  -continue vimpat 100mg BID, depakote 1000mg BID, and keppra 1500mg BID  -continue EEG monitoring  -will f/u EEG and provide further recommendations at that time
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
## 2024-06-10 NOTE — SUBJECTIVE & OBJECTIVE
Subjective:     Interval History: Mental status remains the same today. Mutters few words.    Current Neurological Medications: vimpat 100mg BID, keppra 1500mg BID, depakote 1000mg BID    Current Facility-Administered Medications   Medication Dose Route Frequency Provider Last Rate Last Admin    acetaminophen tablet 650 mg  650 mg Oral Q4H PRN Марина Flores MD        albuterol inhaler 1 puff  1 puff Inhalation Q4H PRN Марина Flores MD        aspirin chewable tablet 81 mg  81 mg Per NG tube Daily Марина Flores MD   81 mg at 06/10/24 0825    atorvastatin tablet 40 mg  40 mg Per NG tube Daily Марина Flores MD   40 mg at 06/10/24 0825    budesonide-formoterol 160-4.5 mcg inhaler 2 puff  2 puff Inhalation Q12H Марина Flores MD   2 puff at 06/10/24 1042    dextrose 10% bolus 125 mL 125 mL  12.5 g Intravenous PRN Марина Flores MD        dextrose 10% bolus 250 mL 250 mL  25 g Intravenous PRN Марина Flores MD        docusate sodium capsule 100 mg  100 mg Oral BID Марина Flores MD   100 mg at 06/09/24 0918    enoxaparin injection 50 mg  1 mg/kg Subcutaneous Q12H (prophylaxis, 0900/2100) Марина Flores MD   50 mg at 06/10/24 0825    fluticasone propionate 50 mcg/actuation nasal spray 50 mcg  1 spray Each Nostril Daily Марина Flores MD   50 mcg at 06/10/24 1213    glucagon (human recombinant) injection 1 mg  1 mg Intramuscular PRN Марина Flores MD        glucose chewable tablet 16 g  16 g Oral PRN Марина Flores MD        glucose chewable tablet 24 g  24 g Oral PRN Марина Flores MD        lacosamide tablet 100 mg  100 mg Per NG tube Q12H Марина Flores MD   100 mg at 06/10/24 0825    levETIRAcetam injection 1,500 mg  1,500 mg Intravenous Q12H Quinten Haji MD   1,500 mg at 06/10/24 0825    metoprolol tartrate (LOPRESSOR) tablet 50 mg  50 mg Per NG tube BID Марина Flores MD   50 mg at 06/10/24 1111    naloxone 0.4 mg/mL injection 0.02 mg  0.02 mg Intravenous PRN Марина Flores MD         ondansetron injection 4 mg  4 mg Intravenous Q8H PRN Марина Flores MD        sodium chloride 0.9% flush 10 mL  10 mL Intravenous Q12H PRN Марина Flores MD        thiamine tablet 100 mg  100 mg Per NG tube Daily Марина Flores MD   100 mg at 06/10/24 0825    valproate (DEPACON) 1,000 mg in dextrose 5 % (D5W) 100 mL IVPB  1,000 mg Intravenous BID Quinten Haji MD   Stopped at 06/10/24 1314       Review of Systems   Unable to perform ROS: Mental status change   Neurological:  Positive for seizures.     Objective:     Vital Signs (Most Recent):  Temp: 98.6 °F (37 °C) (06/10/24 1206)  Pulse: 98 (06/10/24 1206)  Resp: 16 (06/10/24 1206)  BP: (!) 147/84 (06/10/24 1206)  SpO2: 95 % (06/10/24 1206) Vital Signs (24h Range):  Temp:  [97.8 °F (36.6 °C)-98.6 °F (37 °C)] 98.6 °F (37 °C)  Pulse:  [63-99] 98  Resp:  [16-22] 16  SpO2:  [94 %-99 %] 95 %  BP: ()/(50-84) 147/84     Weight: 47 kg (103 lb 9.9 oz)  Body mass index is 21.66 kg/m².     Physical Exam  Constitutional:       General: She is not in acute distress.     Appearance: She is underweight. She is ill-appearing.   Neurological:      Mental Status: She is lethargic.      GCS: GCS eye subscore is 3. GCS verbal subscore is 3. GCS motor subscore is 5.      Cranial Nerves: Cranial nerves 2-12 are intact.      Deep Tendon Reflexes:      Reflex Scores:       Bicep reflexes are 1+ on the right side and 2+ on the left side.       Brachioradialis reflexes are 1+ on the right side and 2+ on the left side.       Patellar reflexes are 2+ on the right side and 2+ on the left side.         NEUROLOGICAL EXAMINATION:     MENTAL STATUS   Level of consciousness: drowsy, responsive to painful stimuli ,  arousable by verbal stimuli    CRANIAL NERVES   Cranial nerves II through XII intact.     MOTOR EXAM   Right arm tone: decreased  Left arm tone: normal  Right leg tone: normal  Left leg tone: normal       Withdraws LUE and BLE  No movement in RUE but does localize to  pain there     REFLEXES     Reflexes   Right brachioradialis: 1+  Left brachioradialis: 2+  Right biceps: 1+  Left biceps: 2+  Right patellar: 2+  Left patellar: 2+  Right plantar: upgoing  Left plantar: upgoing  Right ankle clonus: absent  Left ankle clonus: absent      Significant Labs: All pertinent lab results from the past 24 hours have been reviewed.    Significant Imaging: I have reviewed all pertinent imaging results/findings within the past 24 hours.

## 2024-06-10 NOTE — PLAN OF CARE
[facility] - Neurosurgery (Hospital)  Initial Discharge Assessment       Primary Care Provider: Mauricio Pierre MD    Admission Diagnosis: Seizures [R56.9]    Admission Date: 6/8/2024  Expected Discharge Date:     Transition of Care Barriers: Substance Abuse, Mental illness    Payor: [employer] [facility] / Plan: [employer] / Product Type: Medicare Advantage /     Extended Emergency Contact Information  Primary Emergency Contact: Alexandria Cleveland  Home Phone: [phone]  Mobile Phone: [phone]  Relation: Daughter  Secondary Emergency Contact: GARCIAALEK  Mobile Phone: [phone]  Relation: Grandchild  Preferred language: English   needed? No    Discharge Plan A: New Nursing Home placement - [facility] care facility         [id number] [address] [address]  [address] [address]  Phone: [phone] Fax: [phone]    [facility] [place] [id number] [place] [id number]  Phone: [phone] Fax: [phone]    CM obtained discharge planning assessment from medical record as patient is unable to respond and daughter and granddaughter do not answer telephone. Patient transferred [facility].  Initial Assessment (most recent)       Adult Discharge Assessment - 06/10/24 1457          Discharge Assessment    Assessment Type Discharge Planning Assessment     Confirmed/corrected address, phone number and insurance No     Reason No family to provide information/patient unable to answer     Source of Information health record     If unable to respond/provide information was family/caregiver contacted? Other (see comments)   no answer on either phone    Contact Name/Number --     Communicated LACY with patient/caregiver Date not available/Unable to determine     Reason For Admission Focal seizures     Facility Arrived From: [facility]     Do you expect to return to your current living situation? No      Do you have help at home or someone to help you manage your care at home? Yes     Who are your caregiver(s) and their phone number(s)? Alexandria Cleveland- daughter [phone] or Caro Modi - granddaughter [phone]     Prior to hospitilization cognitive status: Unable to Assess     Current cognitive status: Unable to Assess     Walking or Climbing Stairs Difficulty yes     Walking or Climbing Stairs ambulation difficulty, requires equipment     Mobility Management walker     Dressing/Bathing bathing difficulty, assistance 1 person     Equipment Currently Used at Home oxygen;walker, rolling;shower chair     Readmission within 30 days? No   transfer [facility] to [facility]    Patient currently being followed by outpatient case management? No     Do you currently have service(s) that help you manage your care at home? No     Do you take prescription medications? Yes     Do you have prescription coverage? Yes     Coverage "[employer] MGD [facility] SECURE SNP     Do you have any problems affording any of your prescribed medications? No     Is the patient taking medications as prescribed? yes     How do you get to doctors appointments? family or friend will provide     Are you on dialysis? No     Do you take coumadin? No     Discharge Plan A New Nursing Home placement - nursing home care facility     DME Needed Upon Discharge  none     Transition of Care Barriers Substance Abuse;Mental illness        Physical Activity    On average, how many days per week do you engage in moderate to strenuous exercise (like a brisk walk)? Patient unable to answer     On average, how many minutes do you engage in exercise at this level? Patient unable to answer        Financial Resource Strain    How hard is it for you to pay for the very basics like food, housing, medical care, and heating? Patient unable to answer        Housing Stability    In the last 12 months, was there a time when you were not  able to pay the mortgage or rent on time? Patient unable to answer     At any time in the past 12 months, were you homeless or living in a shelter (including now)? Patient unable to answer        Transportation Needs    Has the lack of transportation kept you from medical appointments, meetings, work or from getting things needed for daily living? Patient unable to answer        Food Insecurity    Within the past 12 months, you worried that your food would run out before you got the money to buy more. Patient unable to answer     Within the past 12 months, the food you bought just didn't last and you didn't have money to get more. Patient unable to answer        Stress    Do you feel stress - tense, restless, nervous, or anxious, or unable to sleep at night because your mind is troubled all the time - these days? Patient unable to answer        Social Isolation    How often do you feel lonely or isolated from those around you?  Patient unable to answer        Alcohol Use    Q1: How often do you have a drink containing alcohol? Patient unable to answer     Q2: How many drinks containing alcohol do you have on a typical day when you are drinking? Patient unable to answer     Q3: How often do you have six or more drinks on one occasion? Patient unable to answer        Utilities    In the past 12 months has the electric, gas, oil, or water company threatened to shut off services in your home? Patient unable to answer        Health Literacy    How often do you need to have someone help you when you read instructions, pamphlets, or other written material from your doctor or pharmacy? Patient unable to respond

## 2024-06-10 NOTE — CARE UPDATE
I have reviewed the chart of June Boykin who is hospitalized for the following:    Active Hospital Problems    Diagnosis    *Focal seizures    Hyperkalemia    Hyponatremia    Reduced mobility    Moderate malnutrition    Monoplegia    Paroxysmal atrial fibrillation    COPD exacerbation    Hypertension    Encephalopathy, metabolic    Acute hypercapnic respiratory failure    Pneumonia    Polypharmacy    Schizoaffective disorder        Michelle Olguin NP  Unit [facility]
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
## 2024-06-11 ENCOUNTER — DOCUMENTATION ONLY (OUTPATIENT)
Dept: NEUROLOGY | Facility: CLINIC | Age: 69
End: 2024-06-11
Payer: MEDICARE

## 2024-06-11 PROBLEM — R65.10 SIRS (SYSTEMIC INFLAMMATORY RESPONSE SYNDROME): Status: ACTIVE | Noted: 2024-06-11

## 2024-06-11 LAB
OHS QRS DURATION: 76 MS
OHS QTC CALCULATION: 467 MS

## 2024-06-11 NOTE — NURSING
Patient arrived to [facility] from [facility]  by bed    Report received from:  ALEX Varela from [facility]    Type of stroke/diagnosis: focal seizures    Current symptoms: nonverbal, does not open eyes, RUE, RLE, & LLE localizes to sternal rubbing, LUE no movement, does not follow commands  -BiPAP placed   -Vimpat 300mg IVPB initiated  -cEEG monitoring restarted     Skin Assessment done: Yes  Wounds noted: redness to sacrum/groin area, systemic bruising  *If wounds noted, was Wound Care consulted? Yes  *If wounds noted, LDA placed? Yes  Skin Assessment Verified by: ALEX Cruz Completed? Yes - failed, NGT in place    Patient Belongings on Admit: 2 cards, blue & white blanket, stuffed animal, grey plaid hoodie, guanazole nasal spray, Symbicort inhaler, Lorne SimpliVity Optihaler     NCC notified: YADIRA Damon

## 2024-06-11 NOTE — ASSESSMENT & PLAN NOTE
Nutrition consulted. Most recent weight and BMI monitored-     Measurements:  Wt Readings from Last 1 Encounters:   06/08/24 47 kg (103 lb 9.9 oz)   Body mass index is 21.66 kg/m².    Patient has been screened and assessed by RD.    Malnutrition Type:  Context: chronic illness  Level: moderate    Malnutrition Characteristic Summary:  Weight Loss (Malnutrition): 10% in 6 months  Energy Intake (Malnutrition): less than 75% for greater than 7 days    Interventions/Recommendations (treatment strategy):  RD consult for TF recs
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
## 2024-06-11 NOTE — ASSESSMENT & PLAN NOTE
Patient with Hypercapnic and Hypoxic Respiratory failure which is Acute.  she is not on home oxygen. Supplemental oxygen was provided and noted- Vent Mode: A/C  Oxygen Concentration (%):  [40-60] 60  Resp Rate Total:  [18 br/min] 18 br/min  Vt Set:  [300 mL] 300 mL  PEEP/CPAP:  [5 cmH20] 5 cmH20  Mean Airway Pressure:  [9.7 wdX07-86 cmH20] 9.7 cmH20    .   Signs/symptoms of respiratory failure include- tachypnea, increased work of breathing, respiratory distress, use of accessory muscles, and lethargy. Contributing diagnoses includes - COPD Labs and images were reviewed. Patient Has recent ABG, which has been reviewed. Will treat underlying causes and adjust management of respiratory failure as follows-   Intubated (after discussion with and approval from pt's family)  Propofol   Daily cxr and abg

## 2024-06-11 NOTE — SUBJECTIVE & OBJECTIVE
Interval History:  See hospital course.     Review of Systems   Unable to perform ROS: Intubated     Objective:     Vitals:  Temp: 97.3 °F (36.3 °C)  Pulse: 72  Rhythm: normal sinus rhythm  BP: (!) 151/65  MAP (mmHg): 94  Resp: 18  SpO2: 98 %  Oxygen Concentration (%): 40  Vent Mode: A/C  Set Rate: 18 BPM  Vt Set: 320 mL  PEEP/CPAP: 5 cmH20  Peak Airway Pressure: 21 cmH20  Mean Airway Pressure: 9.5 cmH20  Plateau Pressure: 15 cmH20    Temp  Min: 92.1 °F (33.4 °C)  Max: 98.4 °F (36.9 °C)  Pulse  Min: 57  Max: 114  BP  Min: 100/62  Max: 196/74  MAP (mmHg)  Min: 77  Max: 107  Resp  Min: 18  Max: 32  SpO2  Min: 82 %  Max: 100 %  Oxygen Concentration (%)  Min: 40  Max: 60    06/10 0701 - 06/11 0700  In: 2558.1 [I.V.:224.2]  Out: 460 [Urine:460]   Unmeasured Output  Urine Occurrence: 2  Stool Occurrence: 1  Pad Count: 1        Physical Exam  Constitutional:       Appearance: She is ill-appearing.      Interventions: She is sedated and intubated.   HENT:      Head: Normocephalic and atraumatic.   Eyes:      Pupils: Pupils are equal, round, and reactive to light.   Cardiovascular:      Rate and Rhythm: Normal rate and regular rhythm.      Pulses: Normal pulses.   Pulmonary:      Effort: Pulmonary effort is normal. She is intubated.   Abdominal:      Palpations: Abdomen is soft.   Musculoskeletal:         General: Normal range of motion.   Skin:     General: Skin is warm and dry.      Capillary Refill: Capillary refill takes less than 2 seconds.   Neurological:      GCS: GCS eye subscore is 1. GCS verbal subscore is 1. GCS motor subscore is 4.      Comments:   -Exam completed on propofol @ 25  -E1 V1T M4  -PERRL  -Withdraws all extremities to central noxious stimuli  -Cough/gag/corneal reflexes intact       Unable to test orientation, language, memory, judgment, insight, fund of knowledge, tongue protrusion, coordination, gait due to level of consciousness.       Medications:  Continuoussodium chloride 0.9%, Last Rate: 100  mL/hr at 06/11/24 1515  NORepinephrine bitartrate-D5W, Last Rate: 0.1 mcg/kg/min (06/11/24 1515)  propofol    Scheduledalbuterol-ipratropium, 3 mL, Q4H  aspirin, 81 mg, Daily  atorvastatin, 40 mg, Daily  enoxparin, 1 mg/kg, Q12H (prophylaxis, 0900/2100)  famotidine (PF), 20 mg, BID  fluticasone propionate, 1 spray, Daily  lacosamide, 200 mg, Q12H  levETIRAcetam (Keppra) IV (PEDS and ADULTS), 1,500 mg, Q12H  [START ON 6/12/2024] perampaneL, 4 mg, Daily  piperacillin-tazobactam (Zosyn) IV (PEDS and ADULTS) (extended infusion is not appropriate), 4.5 g, Q8H  [START ON 6/12/2024] polyethylene glycol, 17 g, Daily  thiamine, 100 mg, Daily  valproate sodium (DEPACON) IVPB, 1,000 mg, BID  [START ON 6/12/2024] vancomycin (VANCOCIN) IV (PEDS and ADULTS), 750 mg, Q24H    PRNacetaminophen, 650 mg, Q4H PRN  albuterol, 1 puff, Q4H PRN  dextrose 10%, 12.5 g, PRN  dextrose 10%, 25 g, PRN  fentaNYL, 25 mcg, Q1H PRN  glucagon (human recombinant), 1 mg, PRN  glucose, 16 g, PRN  glucose, 24 g, PRN  magnesium oxide, 800 mg, PRN  magnesium oxide, 800 mg, PRN  naloxone, 0.02 mg, PRN  ondansetron, 4 mg, Q8H PRN  potassium bicarbonate, 35 mEq, PRN  potassium bicarbonate, 50 mEq, PRN  potassium bicarbonate, 60 mEq, PRN  potassium, sodium phosphates, 2 packet, PRN  potassium, sodium phosphates, 2 packet, PRN  potassium, sodium phosphates, 2 packet, PRN  sodium chloride 0.9%, 10 mL, Q12H PRN  vancomycin - pharmacy to dose, , pharmacy to manage frequency      Today I personally reviewed pertinent medications, lines/drains/airways, imaging, cardiology results, laboratory results, notably: CBC; CMP; CXR; ABGs    Diet  No diet orders on file  No diet orders on file
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
## 2024-06-11 NOTE — PROCEDURES
"June Boykin is a 69 y.o. female patient.    Temp: (!) 92.1 °F (33.4 °C) (06/11/24 0201)  Pulse: 68 (06/11/24 0201)  Resp: (!) 28 (06/10/24 2301)  BP: (!) 140/70 (06/11/24 0201)  SpO2: 100 % (06/11/24 0201)  Weight: 47 kg (103 lb 9.9 oz) (06/08/24 2356)  Height: 4' 10" (147.3 cm) (06/11/24 0145)       Arterial Line    Date/Time: 6/11/2024 3:00 AM  Location procedure was performed: [facility] NEURO CRITICAL CARE    Performed by: Christiano Jensen PA-C  Authorized by: Christiano Jensen PA-C  Consent Done: Emergent Situation  Preparation: Patient was prepped and draped in the usual sterile fashion.  Indications: multiple ABGs, respiratory failure and hemodynamic monitoring  Location: left radial  Augustine's test normal: yes  Needle gauge: 20  Seldinger technique: Seldinger technique used  Number of attempts: 1  Complications: No  Post-procedure: line sutured and dressing applied  Post-procedure CMS: normal  Patient tolerance: Patient tolerated the procedure well with no immediate complications      Jimena Damon PA-C  Neurocritical Care  6/11/2024    "

## 2024-06-11 NOTE — PROCEDURES
24 hr. Video EEG Monitoring    Date/Time: 6/11/2024 11:44 AM    Performed by: Martín Stewart MD  Authorized by: Quinten Haji MD        ICU EEG/VIDEO MONITORING REPORT    DATE OF SERVICE: 6/10/24-6/11/24  EEG NUMBER: [id number]  REQUESTED BY: Adithya  [place] [facility] [place]OLOGY   Electroencephalographic (EEG) recording is with electrodes placed according to the International 10-20 placement system.  Thirty two (32) channels of digital signal are simultaneously recorded from the scalp and may include EKG, EMG, and/or eye monitors.   Recording band pass was 0.1 to 512 hz.  Digital video recording of the patient is simultaneously recorded with the EEG.  The nursing staff report clinical symptoms and may press an event button when the patient has symptoms of clinical interest to the treating physicians.  EEG and video recording is stored and archived in digital format.  The entire recording is visually reviewed and the times identified by computer analysis as being spikes or seizures are reviewed again.  Activation procedures which include photic stimulation, hyperventilation and instructing patients to perform simple task are done in selected patients.   Compresses spectral analysis (CSA) is also performed on the activity recorded from each individual channel.  This is displayed as a power display of frequencies from 0 to 30 Hz over time.   The CSA analysis is done and displayed continuously.  This is reviewed for asymmetries in power between homologous areas of the scalp and for presence of changes in power which canbe seen when seizures occur.  Sections of suspected abnormalities on the CSA is then compared with the original EEG recording.     M[employer] software was also utilized in the review of this study.  This software suite analyzes the EEG recording in multiple domains.  Coherence and rhythmicity is computed to identify EEG sections which may contain organized seizures.  Each channel  undergoes analysis to detect presence of spike and sharp waves which have special and morphological characteristic of epileptic activity.  The routine EEG recording is converted from spacial into frequency domain.  This is then displayed comparing homologous areas to identify areas of significant asymmetry.  Algorithm to identify non-cortically generated artifact is used to separate eye movement, EMG and other artifact from the EEG.      Recording Times  Start on 6/10/24 at 07:00:59  Stop on 6/10/24 at 22:38:25  Start on 6/10/24 at 22:58:31  Stop on 6/11/24 at 07:00:06  A total of 22 hours of EEG was recorded.    EEG FINDINGS  The record shows a poor organization at rest, with no discernible posterior dominant rhythm with poor reactivity. There is mild bilateral beta activity.  There is continuous diffuse delta and theta range background slowing.  There is further focal delta range slowing over the left hemisphere.  There are abundant 0.5-1 Hz lateralized periodic epileptiform discharges over the left hemisphere phase reversing over P3/T5.    Drowsiness is characterized by attenuation of the background, vertex waves, and bilateral theta slowing. Stage II sleep is characterized by slowing, vertex waves, and symmetric sleep spindles.     Provocative maneuvers including hyperventilation and photic stimulation were not performed.     EKG recording shows a regular rhythm .    LPDs  frequently evolve into ignacio  2-3 hertz spike and wave with associated right upper extremity jerking consistent with focal seizure up to 5-6x per hour.    IMPRESSION:  Abnormal study due to the presence of moderate  diffuse background slowing consistent with diffuse cerebral dysfunction and encephalopathy which may be on the basis of toxic, metabolic, or primary neuronal disorder. The slowing over the left hemisphere is consistent with underlying structural defect such as mass or infarct.  Periodic lateralized epileptiform discharges over the  left hemisphere consistent with significant focal cortical irritability and epileptic potential.  Frequent focal seizures up to 6 per hour are seen as noted above with onset over the left hemisphere.       6/11/2024

## 2024-06-11 NOTE — ASSESSMENT & PLAN NOTE
Temp was 33.9C, RR 30s, and HR 90s. Became hypotensive to MAP 40 after intubation and took several hours to improve w pressors and fluids   Lactic  Procal   Gaxiola cultured   CXR   Vanc and zosyn   Warming blanket to achieve normothermia   Fluid resuscitated 30cc/kg with LR and an additional 1L NS given for further significant hypotension

## 2024-06-11 NOTE — PROCEDURES
"June Boykin is a 69 y.o. female patient.    Temp: (!) 92.7 °F (33.7 °C) (06/11/24 0410)  Pulse: (!) 57 (06/11/24 0410)  Resp: 18 (06/11/24 0145)  BP: 100/62 (06/11/24 0301)  SpO2: 100 % (06/11/24 0410)  Weight: 47 kg (103 lb 9.9 oz) (06/08/24 2356)  Height: 4' 10" (147.3 cm) (06/11/24 0145)       Intubation    Date/Time: 6/11/2024 5:55 AM  Location procedure was performed: [facility] NEURO CRITICAL CARE    Performed by: Jonathon Richey MD  Authorized by: Jonathon Richey MD  Consent Done: Emergent Situation  Indications: respiratory distress, respiratory failure, airway protection and hypercapnia  Intubation method: video-assisted  Patient status: paralyzed (RSI)  Preoxygenation: bag valve mask  Sedatives: etomidate  Paralytic: rocuronium  Laryngoscope size: Glide 3  Tube size: 7.5 mm  Tube type: cuffed  Number of attempts: 1  Cords visualized: yes  Post-procedure assessment: chest rise and ETCO2 monitor  Breath sounds: clear and equal and absent over the epigastrium  Cuff inflated: yes  ETT to lip: 23 cm  Tube secured with: ETT keyes  Chest x-ray interpreted by me and radiologist.  Chest x-ray findings: endotracheal tube too high  Tube repositioned: tube repositioned successfully  Patient tolerance: Patient tolerated the procedure well with no immediate complications          6/11/2024    "

## 2024-06-11 NOTE — ASSESSMENT & PLAN NOTE
Hypothermic to 33.9C; hypotensive to MAP 40 following intubation. Slight improvement w/ fluid resuscitation and use of pressors.     -Gaxiola cultured overnight  -Sputum gram stain w/ rare gram + cocci  -Continue vanc/zosyn  -Additional 1L NS bolus x 1  -Kia kay w/ goal normothermia  -Levophed to keep MAP > 65

## 2024-06-11 NOTE — ASSESSMENT & PLAN NOTE
-A-fib w/ RVR @ OSH requiring diltiazem infusion  -Currently NSR on telemetry  -Anti-XA 0.45  -Continue lovenox 1mg/kg q12  -Metoprolol held 2/2 current pressor need

## 2024-06-11 NOTE — NURSING
0108: Pt intubated d/t acidotic ABG not corrected by BiPAP. MD Rossi performed intubation. Etomidate 20mg and Rocuronium 50mg administered prior intubation. Color change noted and bilateral breath sounds auscultated.  Propofol gtt initiated. Pending CXR for confirmation.     0110: Neri bumps administered to correct severe hypotension (MAPs 40s). Total on 16 neri bumps administered over 10-20mins. Levo gtt then initiated. Rapidly titrating Levo up and down to correct labial pressures. Propfol gtt turned on and off in response to hypotension.     ~2162-3714: After 2 new PIVs placed, 2 amps of bicarb administered, 1L LR bolus initiated, followed by a 500cc LR bolus. Continued to titrate Levo.     ~0300: Labile pressures continued. LR gtt initiated @50ml/hr.     0320: SBP dropped to 90s, MAPs <65 with minimal to no titrations of Levo and after LR bolus was completed. BP responded to leg raising. [facility] team notified. 1L NS bolus verbally ordered and administered.     Blood clx, UA, CBC, CMP, Mg, Phos, BNP, lactic acid, prolactin, T&S collected and sent throughout these events. See Results for details.

## 2024-06-11 NOTE — ASSESSMENT & PLAN NOTE
69 year old woman admitted from OSH for management of recurrent seizures, stepped up to [facility] 6/10 for further management in setting of respiratory failure. Initially admitted to OSH 5/23 after found unresponsive by family, complicated hospital course since that time with development of right upper extremity jerking movements prompting EEG.     Recommendations:  - Continuous vEEG monitoring - recurrent focal seizures overnight  - Propofol increased with goal of burst suppression - currently on 35 mKm, recommend to continue this rate overnight  - Perampanel 12 mg x1 followed by 4 mg daily  - Recommend to continue Lacosamide 200 mg BID, Levetiracetam 1500 mg BID, VPA 1000 mg BID  - Avoid agents that lower seizure threshold  - Management of infectious/metabolic abnormalities per [facility]  - Seizure precautions  - Per [place] Law, patient must refrain from driving for 6 months after having a seizure or cleared by a neurologist to legally resume driving    Discussed plan of care with [facility] team. Will follow, please call with questions.    Patient

## 2024-06-11 NOTE — ASSESSMENT & PLAN NOTE
Patient with Paroxysmal (<7 days) atrial fibrillation which is uncontrolled currently with Beta Blocker and Calcium Channel Blocker. Patient is currently in atrial fibrillation.[id number] Score: 2. HASBLED Score: . Anticoagulation indicated. Anticoagulation done with lovenox .  Metoprolol bid
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
## 2024-06-11 NOTE — HOSPITAL COURSE
06/11/2024 Stepped up to NCC last night d/t hypoxia and increased frequency of focal seizures. Hypotensive following intubation requiring pressors, remains on levo. Frequent focal seizures up to 6 per hour with onset over the L hemisphere on EEG. Propofol increased to 35, perampanel started. Currently burst suppressed per Epilepsy. 1L NS bolus x 1.   06/12/2024 Blood noted in stool overnight, subsequent Hgb drop from 14 to 10. Started on PPI, trend CBC q8. EEG w/ no discrete seizures, but continues to have lateralized periodic epileptiform discharges over L hemisphere. Increased propofol to 50 and started ketamine gtt. Additional 4mg perampanel given, dose adjusted to 8mg daily. 1L LR bolus x 1.   06/13/2024 Burst suppression on EEG but continues to have periodic lateralized discharges over L hemisphere consistent w/ significant cortical irritability. Perampanel and ketamine increased. Hgb stable on CBC. Restart TF.  06/14/2024: approx 2 bursts per recording measuring 3-5 sec, on propofol 50/ketamine 50, max doses of peramprel, keppra, vimpat, low dose pressors, as long as dose less then 0.1mcg/kg, can be fed  06/15/2024: NAEON, EEG remains with attenuated burst/suppression pattern, decrease propofol to 25mcg/kg  06/16/2024: no sustained discharges seen on EEG, propofol D/C'd, begin wean of ketamine in am  6/17/2024 EEG stable, ketamine weaned down to 10. Labile BP overnight. Continue AEDs  6/18: vEEG: dc ketamine drip, cont aeds; TF to goal, holding ac 2/2 afib hx  6/19/2024 Psyllium 1 pk tid for liquid stools, Failed SBT, EEG with discreet seizures therefore added onfi 20 mg nightly and 20 mg x 1 now  6/20/2024 EEG with discharges but no seizures and Increased onfi to 10 mg in am and 20 mg qpm, added Na tabs 2 g tid  6/21/2024: increase Onfi to 20 mg BID per epilepsy  6/22 moving spont;tf to goal, awaiting trach/peg  6/23 failed pst, apnea   6/24: post Trach and peg this morning, advance TF, CXR post op, dispo  pending  6/25: will try and set up family meeting this week to determine GOC and POA as there seems to be much disagreement with family, hypotension overnight after receiving AEDs.will spread out more to avoid further hypotension.   6/26: gen surg to address PEG, family meeting pending  6/27: instructed family to discuss PEG tube amongst themselves then to reach back out to confirm POC. No acute events.   06/29/2024 Pt w downward gaze on rounds. EEG w L posterior maximum and lateralized pseudo periodic epileptiform discharges. Dc EEG. CTH negative for acute processes. Ucx w citrobacter braaki, extended rocephin to 7d course. Hypotension requiring 500 NS bolus.   06/30/2024 Continued episodes of hypotension requiring IVF 500ml bolus. Increased mIVF to NS @ 75. Pt more restless today. Started gabapentin 100 q8h. Attempting to coordinate family meeting for 2pm tomorrow.   07/01/2024 Family meeting held with plan to transition to comfort focused care this afternoon. DNR placed in chart.
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
## 2024-06-11 NOTE — CODE/ RAPID DOCUMENTATION
[facility] Medicine Rapid Response Note      Admit Date: 6/8/2024  LOS: 3  Code Status: DNR   CC: Focal seizures  Date of Consult: 06/11/2024  RRT Indication(s): hypoxia  Attending Physician: Mitchel Resendez DO  Primary Service: [facility] HOSP MED B    SUBJECTIVE:     Interval history: Notified by bedside nurse of acute hypoxia. Discussed notifying respiratory therapist and increasing oxygen. Upon my arrival to the room the nurse is at the bedside. Patient unresponsive with EEG monitor in place. SpO2 noted to be 79-82% on monitor despite nursing increasing pt to 5-6L high flow nasal cannula.    OBJECTIVE:     Vital Signs (Most Recent):  Temp: (!) 92.1 °F (33.4 °C) (06/11/24 0301)  Pulse: (!) 58 (06/11/24 0301)  Resp: 18 (06/11/24 0145)  BP: 100/62 (06/11/24 0301)  SpO2: 100 % (06/11/24 0301) Vital Signs (24h Range):  Temp:  [92.1 °F (33.4 °C)-98.6 °F (37 °C)] 92.1 °F (33.4 °C)  Pulse:  [] 58  Resp:  [16-32] 18  SpO2:  [82 %-100 %] 100 %  BP: (100-196)/(62-85) 100/62  Arterial Line BP: (151-161)/(68-75) 151/68     I & O (24h):    Intake/Output Summary (Last 24 hours) at 6/11/2024 0522  Last data filed at 6/11/2024 0001  Gross per 24 hour   Intake 340 ml   Output 460 ml   Net -120 ml        Physical Exam: Physical Exam   Constitutional: She appears ill.   HENT:   Mouth/Throat: Mucous membranes are dry.   Head: EEG monitor in place  Nose: NGT R nare  Cardiovascular: Regular rhythm. Tachycardia present. Pulmonary:      Effort: Tachypnea present.      Breath sounds: Decreased breath sounds present. No wheezing or rales.      Comments: Pt noted to be taking very shallow breaths, decreased air movement.    Abdominal: Soft. Bowel sounds are normal. There is no abdominal tenderness.   Neurological: She is unresponsive. GCS eye subscore is 1. GCS verbal subscore is 1. GCS motor subscore is 4.   Withdrawals to pain. Noted to move all 4 extremities spontaneously although moving RUE very little.   Skin: Skin is dry. Capillary  refill takes less than 2 seconds.   Nursing note and vitals reviewed.      Lines/Drains/Airway:          NG/OG Tube 06/07/24 1900 Right nostril (Active)   Placement Check placement verified by x-ray 06/11/24 0301   Tolerance no signs/symptoms of discomfort 06/11/24 0301   Securement secured to cheek w/ adhesive device 06/11/24 0301   Clamp Status/Tolerance clamped 06/11/24 0301   Intake (mL) 10 mL 06/09/24 0800   Water Bolus (mL) 240 mL 06/10/24 0829   Rate Formula Tube Feeding (mL/hr) 45 mL/hr 06/10/24 0829   Formula Name Bertha mace peptide 1.5 06/09/24 0215   Residual Amount (ml) 0 ml 06/10/24 0829       Female External Urinary Catheter w/ Suction 06/10/24 2348 (Active)   Skin no redness;no breakdown 06/11/24 0301   Tolerance no signs/symptoms of discomfort 06/11/24 0301   Suction Continuous suction at 60 mmHg 06/10/24 2301   Date of last wick change 06/11/24 06/10/24 2301   Time of last wick change 2348 06/10/24 2301        Nutrition:  Current Diet Order   No orders of the defined types were placed in this encounter.         Labs/Imaging- All pertinent labs within the past 24 hours have been reviewed.  ABGs:   Recent Labs   Lab 06/11/24  0058   PH 7.275*   PCO2 107.2*   HCO3 49.8*   POCSATURATED 85   BE 23*   PO2 61*       Diagnostics/Interventions during Rapid response     -Notified rapid response team.  -Stat CXR  -Duo neb tx  -Patient was placed on high flow oxygen.  -Stat ABG and lactic  -Vimpat 100mg x1 ordered  -NCC consult.    ASSESSMENT/PLAN:     Active Hospital Problems    Diagnosis    *Focal seizures    Hyperkalemia    Hyponatremia    Reduced mobility    Moderate malnutrition    Monoplegia    Paroxysmal atrial fibrillation    COPD exacerbation    Hypertension    Encephalopathy, metabolic    Acute hypercapnic respiratory failure    Pneumonia    Polypharmacy    Schizoaffective disorder          I called and discussed case with epilepsy as pt is on continuous EEG. Per epileptologist pt has a relative  increase in the frequency of seizures since this morning. Recommended loading with 100mg of vimpat and increasing maintenance dose. Called and discussed patient with NCC and placed consult. ABG resulted with significant hypercapnia and hypoxia. NCC accepted patient for transfer for BiPAP and management of increased seizure activity. Plan of care discussed with bedside nurse, charge nurse, and rapid response nurses.      Uninterrupted Critical Care time (not including procedures): 40 minutes

## 2024-06-11 NOTE — ASSESSMENT & PLAN NOTE
Advance Care Planning     Date: 06/11/2024    Code Status  Called all the numbers in pts chart and pt's daughter in law answered. Spoke w pt's son (All Higginbotham) and daughter-in-law (Agnes Hubbard) to explain current situation of hypercarbia, hypoxia, increased frequency of seizures, poor mental status, and options for intervention. Explained that pt was being transferred to the ICU for respiratory failure and mental status. Explained risk of aspiration w BiPAP given current mental status. Inquired about possible option for intubation given DNR status and recent intubation. Pt's family stated that they would like to trial BiPAP and are okay with intubation if necessary. Pt's son stated that he would like to go through with any interventions to help her to breathe and improve her mental status given that her current state is likely due to hypercarbia and seizures. Discussed a plan to continue w BiPAP and if failed, we would go forward with intubation. Family was agreeable to this plan.       Pt remains DNR but okay for intubation.     Pt failed bipap w CO2 continuing to increase to 107. Pt was intubated. Called family to provide update;however, no answer at that time.

## 2024-06-11 NOTE — ASSESSMENT & PLAN NOTE
-NCC consulted for hypoxia (SpO2 79%)  -Initial ABGs w/ severe hypercapnia/hypoxemia - pH 7.235, pCO2 100, pO2 47  -BiPAP trial per family's request; ABGs after 1h - pH 7.25, pCO2 107, pO2 61  -Intubated following failed BiPAP trial  -Current vent settings below  Vent Mode: A/C  Oxygen Concentration (%):  [40-60] 40  Resp Rate Total:  [18 br/min] 18 br/min  Vt Set:  [300 mL-320 mL] 320 mL  PEEP/CPAP:  [5 cmH20] 5 cmH20  Mean Airway Pressure:  [9.5 qnK91-49 cmH20] 9.5 cmH20    -Famotidine BID for ulcer PPX  -VAP prevention per protocol  -Non-titrating propofol @ 35  -CXR/ABGs daily

## 2024-06-11 NOTE — CODE/ RAPID DOCUMENTATION
RAPID RESPONSE NURSE NOTE        Admit Date: 2024  LOS: 2  Code Status: DNR   Date of Consult: 06/10/2024  : 1955  Age: 69 y.o.  Weight:   Wt Readings from Last 1 Encounters:   24 47 kg (103 lb 9.9 oz)     Sex: female  Race: White   Bed: [address] [place] [address] A:   MRN: [id number]  Time Rapid Response Team page Received: called by JULIEN Canela at   Time Rapid Response Team at Bedside:   Time Rapid Response Team left Bedside:   Was the patient discharged from an ICU this admission? No   Was the patient discharged from a PACU within last 24 hours? No   Did the patient receive conscious sedation/general anesthesia in last 24 hours? No  Was the patient in the ED within the past 24 hours? No  Was the patient on NIPPV within the past 24 hours? No   Did this progress into an ARC or CPA: No  Attending Physician: Марина Flores MD  Primary Service: [facility] HOSP MED B       SITUATION    Notified by PARRISH.  Reason for alert: AMS and hypoxia   Called to evaluate the patient for Neuro & Respiratory     BACKGROUND     Why is the patient in the hospital?: Focal seizures    Patient has a past medical history of Abdominal pain, Acid reflux, ACP (advance care planning), Anxiety disorder, unspecified, At high risk for falls, Emphysema, unspecified, Focal seizures, History of opioid abuse, Hypertension, On home oxygen therapy, Wears partial dentures, and Weight loss.    Last Vitals:  Temp: 98 °F (36.7 °C) (06/10 1918)  Pulse: 114 (06/10 1918)  Resp: 18 (06/10 1918)  BP: 142/83 (06/10 1918)  SpO2: 95 % (06/10 1520)    24 Hours Vitals Range:  Temp:  [98 °F (36.7 °C)-98.6 °F (37 °C)]   Pulse:  []   Resp:  [16-22]   BP: (142-167)/(79-84)   SpO2:  [95 %-96 %]     Labs:  Recent Labs     24  0400 24  0413 06/10/24  0437 06/10/24  2037   WBC 7.57 6.12 7.60  --    HGB 13.2 14.4 14.7  --    HCT 43.9 50.9* 47.5 51    195 158  --        Recent Labs     24  0400 24  1647 24  0413 06/10/24  0437   NA  135* 136 136 135*   K 3.1* 5.2* 4.5 4.6   CL 92* 95 91* 91*   CO2 34* 36* 38* 33*   BUN 6* 5* 8 14   CREATININE 0.6 0.6 0.6 0.6   GLU 90 81 74 78   PHOS 2.9  --   --   --    MG 1.9  --  2.2 1.8        Recent Labs     06/08/24  0950 06/10/24  2037   PH 7.475* 7.285*   PCO2 52.9* 99.9*   PO2 104* 42*   HCO3 39.0* 47.5*   POCSATURATED 98 67   BE 15* 21*        ASSESSMENT  Called to bedside by Deborah Canela NP. On arrival to bedside, Hilton reports that the patient was 79% on her 5L NC and she was called to bedside by nursing. On RRT arrival, patient now currently on 15L HCNC with saturation from 95-98%. Patient only arousing to sternal rub at this time, opening her eyes and groaning. Hilton states she spoke with epilepsy who reports increased seizure activity since the day time. PERRL. [facility] team leaving bedside on arrival, states they will transfer the patient to the ICU.     Physical Exam  Constitutional:       Appearance: She is ill-appearing.   HENT:      Mouth/Throat:      Mouth: Mucous membranes are moist.      Pharynx: Oropharynx is clear.   Eyes:      Pupils: Pupils are equal, round, and reactive to light.   Cardiovascular:      Rate and Rhythm: Regular rhythm. Tachycardia present.   Pulmonary:      Effort: Tachypnea present.      Breath sounds: Decreased air movement present.   Chest:      Chest wall: Tenderness present.   Abdominal:      General: Abdomen is flat. There is no distension.      Palpations: Abdomen is soft.   Musculoskeletal:         General: No swelling.   Skin:     General: Skin is warm.      Findings: Bruising present.      Comments: Bruising noted to sternum, suspected from sternal rubbing.      Neurological:      Mental Status: She is lethargic and disoriented.      GCS: GCS eye subscore is 2. GCS verbal subscore is 2. GCS motor subscore is 5.      Cranial Nerves: No facial asymmetry.         INTERVENTIONS    The patient was seen for Respiratory problem. Staff concerns included oxygen  saturation < 90% despite supplemental oxygen. The following interventions were performed: continuous pulse ox monitoring, continuous cardiac monitoring continued, and POCT venous blood gas and lactate. VBG showed pH of 7.285 and CO2 of 99. At this time the patient is a DNR/DNI per JULIEN Canela so plan to forgo intubation at this time. Patient is not a candidate for BiPAP at this time d/t neurological status with GCS of 9.    RECOMMENDATIONS    We recommend: transfer to [facility]    PROVIDER ESCALATION    Orders received and case discussed with  Deborah Canela NP & [facility] team    Primary team arrival time: unknown - at bedside upon my arrival     Disposition: Tx in ICU bed in [facility]. Per bed planner, patients must be stepped down and the room cleaned prior to transport to ICU room.     FOLLOW UP    charge Medina JOHNSON  updated on plan of care. Instructed to call the Rapid Response Nurse, Herminio Figueroa RN at [phone] for additional questions or concerns.

## 2024-06-11 NOTE — ACP (ADVANCE CARE PLANNING)
Advance Care Planning     Date: 06/11/2024    Code Status  Called all the numbers in pts chart and pt's daughter in law answered. Spoke w pt's son (All Higginbotham) and daughter-in-law (Agnes Hubbard) to explain current situation of hypercarbia, hypoxia, increased frequency of seizures, poor mental status, and options for intervention. Explained that pt was being transferred to the ICU for respiratory failure and mental status. Explained risk of aspiration w BiPAP given current mental status. Inquired about possible option for intubation given DNR status and recent intubation. Pt's family stated that they would like to trial BiPAP and are okay with intubation if necessary. Pt's son stated that he would like to go through with any interventions to help her to breathe and improve her mental status given that her current state is likely due to hypercarbia and seizures. Discussed a plan to continue w BiPAP and if failed, we would go forward with intubation. Family was agreeable to this plan.       Pt remains DNR but okay for intubation; pt changed to partial code.    Pt failed bipap w CO2 continuing to increase to 107. Pt was intubated. Called family to provide update;however, no answer at that time.     Cct 20min     Jimena Damon, PA-C  Neurocritical Care

## 2024-06-11 NOTE — HOSPITAL COURSE
6/6>6/7: EEG at OSH - continuous lateralized periodic discharges in the left hemisphere consistent with a highly irritable epileptogenic focus in this region, often time locked with clonic jerking of the RUE making it ictal in nature  6/7>6/8: EEG CAP at OSH - underlying bilateral/generalized epileptiform activity with potential to evolve into status epilepticus  6/8>6/9: Periodic lateralized epileptiform discharges over the left hemisphere consistent with significant focal cortical irritability and epileptic potential, frequent focal seizures with onset over the left hemisphere  6/9>6/10: EEG report pending  6/10>6/11: Frequent focal seizures up to 6 per hour with onset over the left hemisphere  6/11>6/12: Periodic lateralized epileptiform discharges over the left hemisphere consistent with significant cortical irritability and epileptic potential, burst suppression pattern due to anesthetics  6/12>6/13: Periodic lateralized epileptiform discharges over the left hemisphere consistent with significant cortical irritability and epileptic potential and burst suppression pattern due to anesthetics  6/13>6/14: Burst suppression pattern  6/14>6/15: burst suppression pattern, no electrographic seizures  6/15>6/16: left interictals seen more frequently after Propofol weaned, no electrographic seizures  6/16>6/17: left interictals, no clinical or electrographic seizures  6/17>6/18: left posterior quadrant lateralized periodic discharges, no discrete seizures  6/18>6/19: severe encephalopathy, left posterior quadrant LPDs on IIC; slightly improved compared to record on 6/9, however epileptiform activity has increased since cessation of anesthetics   6/19>6/20: IIC pattern with several instances of evolution meeting criteria for subclinical seizures  6/20>6/21: IIC pattern with evolution meeting criteria for subclinical seizures, last @~2200    See EEG reports for details.

## 2024-06-11 NOTE — PROGRESS NOTES
[facility]  Neurology-Epilepsy  Progress Note    Patient Name: June Boykin  MRN: [id number]  Admission Date: 6/8/2024  Hospital Length of Stay: 3 days  Code Status: Partial Code   Attending Provider: Savage Reyes MD  Primary Care Physician: Mauricio Pierre MD   Principal Problem:Focal seizures    Subjective:     Hospital Course:   6/6>6/7: EEG at OSH - continuous lateralized periodic discharges in the left hemisphere consistent with a highly irritable epileptogenic focus in this region, often time locked with clonic jerking of the RUE making it ictal in nature  6/7>6/8: EEG CAP at OSH - underlying bilateral/generalized epileptiform activity with potential to evolve into status epilepticus  6/8>6/9: Periodic lateralized epileptiform discharges over the left hemisphere consistent with significant focal cortical irritabilitya nd epileptic potential, frequent focal seizures with onset over the left hemisphere  6/9>6/10: EEG report pending  6/10>6/11: Frequent focal seizures up to 6 per hour with onset over the left hemisphere    Interval History: Stepped up to [facility] overnight, intubated and sedated on Propofol. EEG with frequent focal seizures up to 6 per hour with onset over the left hemisphere. Episodes of hypotension overnight after intubation, blood pressure this morning stable on Propofol and norepinephrine.     Current Facility-Administered Medications   Medication Dose Route Frequency Provider Last Rate Last Admin    0.9%  NaCl infusion   Intravenous Continuous Carolee Oconnell  mL/hr at 06/11/24 1515 Rate Verify at 06/11/24 1515    acetaminophen tablet 650 mg  650 mg Oral Q4H PRN Марина Flores MD        albuterol inhaler 1 puff  1 puff Inhalation Q4H PRN Марина Flores MD        albuterol-ipratropium 2.5 mg-0.5 mg/3 mL nebulizer solution 3 mL  3 mL Nebulization Q4H Christiano Jensen PA-C   3 mL at 06/11/24 1553    aspirin chewable tablet 81 mg  81 mg Per NG tube  Daily Марина Flores MD   81 mg at 06/11/24 0923    atorvastatin tablet 40 mg  40 mg Per NG tube Daily Марина Flores MD   40 mg at 06/11/24 0923    dextrose 10% bolus 125 mL 125 mL  12.5 g Intravenous PRN Марина Flores MD        dextrose 10% bolus 250 mL 250 mL  25 g Intravenous PRN Марина Flores MD        enoxaparin injection 50 mg  1 mg/kg Subcutaneous Q12H (prophylaxis, 0900/2100) Марина Flores MD   50 mg at 06/11/24 1000    famotidine (PF) injection 20 mg  20 mg Intravenous BID Carolee Oconnell NP   20 mg at 06/11/24 0923    fentaNYL 50 mcg/mL injection 25 mcg  25 mcg Intravenous Q1H PRN Christiano Jensen PA-C        fluticasone propionate 50 mcg/actuation nasal spray 50 mcg  1 spray Each Nostril Daily Марина Flores MD   50 mcg at 06/10/24 1213    glucagon (human recombinant) injection 1 mg  1 mg Intramuscular PRN Марина Flores MD        glucose chewable tablet 16 g  16 g Oral PRN Марина Flores MD        glucose chewable tablet 24 g  24 g Oral PRN Марина Flores MD        lacosamide tablet 200 mg  200 mg Per NG tube Q12H Christiano Jensen PA-C   200 mg at 06/11/24 0923    levETIRAcetam injection 1,500 mg  1,500 mg Intravenous Q12H Quinten Haji MD   1,500 mg at 06/11/24 0923    magnesium oxide tablet 800 mg  800 mg Per NG tube PRN Carolee Oconnell NP        magnesium oxide tablet 800 mg  800 mg Per NG tube PRN Carolee Oconnell NP        naloxone 0.4 mg/mL injection 0.02 mg  0.02 mg Intravenous PRN Марина Flores MD        NORepinephrine bitartrate-D5W 4 mg/250 mL (16 mcg/mL) PERIPHERAL access infusion  0-0.2 mcg/kg/min Intravenous Continuous Christiano Jensen PA-C 17.6 mL/hr at 06/11/24 1515 0.1 mcg/kg/min at 06/11/24 1515    ondansetron injection 4 mg  4 mg Intravenous Q8H PRN Марина Flores MD        [START ON 6/12/2024] perampaneL tablet 4 mg  4 mg Per OG tube Daily Carolee Oconnell, NP        piperacillin-tazobactam (ZOSYN) 4.5 g in dextrose 5 % in water (D5W) 100 mL IVPB  (MB+)  4.5 g Intravenous Q8H Christiano Jensen PA-C 25 mL/hr at 06/11/24 1515 Rate Verify at 06/11/24 1515    [START ON 6/12/2024] polyethylene glycol packet 17 g  17 g Per NG tube Daily Savage Reyes MD        potassium bicarbonate disintegrating tablet 35 mEq  35 mEq Per NG tube PRN Carolee Oconnell, NP        potassium bicarbonate disintegrating tablet 50 mEq  50 mEq Per NG tube PRN Carolee Oconnell, NP        potassium bicarbonate disintegrating tablet 60 mEq  60 mEq Per NG tube PRN Carolee Oconnell, NP        potassium, sodium phosphates 280-160-250 mg packet 2 packet  2 packet Per NG tube PRN Carolee Oconnell, NP        potassium, sodium phosphates 280-160-250 mg packet 2 packet  2 packet Per NG tube PRN Carolee Oconnell, NP        potassium, sodium phosphates 280-160-250 mg packet 2 packet  2 packet Per NG tube PRN Carolee Oconnell, JULIEN        propofol (DIPRIVAN) 10 mg/mL infusion  0-50 mcg/kg/min Intravenous Continuous Christiano Jensen PA-C 9.9 mL/hr at 06/11/24 1515 35 mcg/kg/min at 06/11/24 1515    sodium chloride 0.9% flush 10 mL  10 mL Intravenous Q12H PRN Марина Flores MD        thiamine tablet 100 mg  100 mg Per NG tube Daily Марина Flores MD   100 mg at 06/11/24 0923    valproate (DEPACON) 1,000 mg in dextrose 5 % (D5W) 100 mL IVPB  1,000 mg Intravenous BID Quinten Haji MD   Stopped at 06/11/24 1045    vancomycin - pharmacy to dose   Intravenous pharmacy to manage frequency Christiano Jensen PA-C        [START ON 6/12/2024] vancomycin 750 mg in dextrose 5 % (D5W) 250 mL IVPB (Vial-Mate)  750 mg Intravenous Q24H Mitchel Resendez DO         Continuous Infusions:   sodium chloride 0.9%   Intravenous Continuous 100 mL/hr at 06/11/24 1515 Rate Verify at 06/11/24 1515    NORepinephrine bitartrate-D5W  0-0.2 mcg/kg/min Intravenous Continuous 17.6 mL/hr at 06/11/24 1515 0.1 mcg/kg/min at 06/11/24 1515    propofoL  0-50 mcg/kg/min Intravenous Continuous 9.9 mL/hr at 06/11/24 1515  35 mcg/kg/min at 06/11/24 1515       Review of Systems   Unable to perform ROS: Intubated     Objective:     Vital Signs (Most Recent):  Temp: 97.2 °F (36.2 °C) (06/11/24 1502)  Pulse: 61 (06/11/24 1553)  Resp: 18 (06/11/24 1553)  BP: 129/65 (06/11/24 1502)  SpO2: 100 % (06/11/24 1553) Vital Signs (24h Range):  Temp:  [92.1 °F (33.4 °C)-98.4 °F (36.9 °C)] 97.2 °F (36.2 °C)  Pulse:  [] 61  Resp:  [18-32] 18  SpO2:  [82 %-100 %] 100 %  BP: (100-196)/(56-85) 129/65  Arterial Line BP: ()/(54-80) 131/66     Weight: 47 kg (103 lb 9.9 oz)  Body mass index is 21.66 kg/m².     Physical Exam  Constitutional:       General: She is not in acute distress.     Appearance: She is ill-appearing. She is not diaphoretic.      Interventions: She is sedated and intubated.   HENT:      Head: Normocephalic and atraumatic.      Comments: EEG in place  Eyes:      General: No scleral icterus.     Conjunctiva/sclera: Conjunctivae normal.      Pupils: Pupils are equal, round, and reactive to light.   Cardiovascular:      Rate and Rhythm: Normal rate.   Pulmonary:      Effort: Pulmonary effort is normal. No respiratory distress. She is intubated.      Comments: Intubated  Musculoskeletal:         General: No deformity or signs of injury.   Skin:     General: Skin is warm and dry.   Psychiatric:      Comments: Unable to assess            NEUROLOGICAL EXAMINATION:     CRANIAL NERVES     CN III, IV, VI   Pupils are equal, round, and reactive to light.       Comatose   CN:   No blink to threat OU   JUAN RAMON OU   Corneal absent OU  No cough, gag   No spontaneous eye opening   No withdrawal to noxious stimuli in extremities    Exam findings to suggest seizures:  Myoclonus - no   eye twitching - no   Nystagmus - no   gaze deviation - no   waxy rigidity - no        Significant Labs: All pertinent lab results from the past 24 hours have been reviewed.    Significant Studies: I have reviewed and interpreted all pertinent imaging results/findings  within the past 24 hours.  Assessment and Plan:     * Focal seizures  69 year old woman admitted from OSH for management of recurrent seizures, stepped up to [facility] 6/10 for further management in setting of respiratory failure. Initially admitted to OSH 5/23 after found unresponsive by family, complicated hospital course since that time with development of right upper extremity jerking movements prompting EEG.     Recommendations:  - Continuous vEEG monitoring - recurrent focal seizures overnight  - Propofol increased with goal of burst suppression - currently on 35 mKm, recommend to continue this rate overnight  - Perampanel 12 mg x1 followed by 4 mg daily  - Recommend to continue Lacosamide 200 mg BID, Levetiracetam 1500 mg BID, VPA 1000 mg BID  - Avoid agents that lower seizure threshold  - Management of infectious/metabolic abnormalities per [facility]  - Seizure precautions  - Per [place] Law, patient must refrain from driving for 6 months after having a seizure or cleared by a neurologist to legally resume driving    Discussed plan of care with [facility] team. Will follow, please call with questions.     Paroxysmal atrial fibrillation  - Management per [facility], on Metoprolol BID    COPD exacerbation  - Management per [facility], nebs, ABG/CXR    Acute hypercapnic respiratory failure  - Intubated, vent management per [facility]  - Daily CXR/ABG        VTE Risk Mitigation (From admission, onward)           Ordered     enoxaparin injection 50 mg  Every 12 hours         06/08/24 1645     IP VTE HIGH RISK PATIENT  Once         06/08/24 1629     Place sequential compression device  Until discontinued         06/08/24 1629                    Pallavi Noel PA-C  Neurology-Epilepsy  [facility]  Staff: Dr. Stewart

## 2024-06-11 NOTE — CONSULTS
Nutrition consult received regarding TF recommendations.  RD following, please see note from 6/9 for full assessment.  Noted pt intubated yesterday.    Recommendations:  1) Initiate TFs when able - Rec'd Impact Peptide @ 35 mL/hr to provide 1260 kcals, 79 g of protein, 647 mL fluid.  2) RD to monitor & follow-up.    Thanks!  Radha MS, RD, LDN
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
## 2024-06-11 NOTE — SUBJECTIVE & OBJECTIVE
Interval History: Stepped up to [facility] overnight, intubated and sedated on Propofol. EEG with frequent focal seizures up to 6 per hour with onset over the left hemisphere. Episodes of hypotension overnight after intubation, blood pressure this morning stable on Propofol and norepinephrine.     Current Facility-Administered Medications   Medication Dose Route Frequency Provider Last Rate Last Admin    0.9%  NaCl infusion   Intravenous Continuous Carolee Oconnell  mL/hr at 06/11/24 1515 Rate Verify at 06/11/24 1515    acetaminophen tablet 650 mg  650 mg Oral Q4H PRN Марина Flores MD        albuterol inhaler 1 puff  1 puff Inhalation Q4H PRN Марина Flores MD        albuterol-ipratropium 2.5 mg-0.5 mg/3 mL nebulizer solution 3 mL  3 mL Nebulization Q4H Christiano Jensen PA-C   3 mL at 06/11/24 1553    aspirin chewable tablet 81 mg  81 mg Per NG tube Daily Марина Flores MD   81 mg at 06/11/24 0923    atorvastatin tablet 40 mg  40 mg Per NG tube Daily Марина Flores MD   40 mg at 06/11/24 0923    dextrose 10% bolus 125 mL 125 mL  12.5 g Intravenous PRN Марина Flores MD        dextrose 10% bolus 250 mL 250 mL  25 g Intravenous PRN Марина Flores MD        enoxaparin injection 50 mg  1 mg/kg Subcutaneous Q12H (prophylaxis, 0900/2100) Марина Flores MD   50 mg at 06/11/24 1000    famotidine (PF) injection 20 mg  20 mg Intravenous BID Carolee Oconnell NP   20 mg at 06/11/24 0923    fentaNYL 50 mcg/mL injection 25 mcg  25 mcg Intravenous Q1H PRN Christiano Jensen PA-C        fluticasone propionate 50 mcg/actuation nasal spray 50 mcg  1 spray Each Nostril Daily Марина Flores MD   50 mcg at 06/10/24 1213    glucagon (human recombinant) injection 1 mg  1 mg Intramuscular PRN Марина Flores MD        glucose chewable tablet 16 g  16 g Oral PRN Марина Flores MD        glucose chewable tablet 24 g  24 g Oral PRN Марина Flores MD        lacosamide tablet 200 mg  200 mg Per NG tube Q12H Christiano Jensen PA-C    200 mg at 06/11/24 0923    levETIRAcetam injection 1,500 mg  1,500 mg Intravenous Q12H Quinten Haji MD   1,500 mg at 06/11/24 0923    magnesium oxide tablet 800 mg  800 mg Per NG tube PRN Caorlee Oconnell NP        magnesium oxide tablet 800 mg  800 mg Per NG tube PRN Carolee Oconnell NP        naloxone 0.4 mg/mL injection 0.02 mg  0.02 mg Intravenous PRN Марина Flores MD        NORepinephrine bitartrate-D5W 4 mg/250 mL (16 mcg/mL) PERIPHERAL access infusion  0-0.2 mcg/kg/min Intravenous Continuous MickalChristiano PA-C 17.6 mL/hr at 06/11/24 1515 0.1 mcg/kg/min at 06/11/24 1515    ondansetron injection 4 mg  4 mg Intravenous Q8H PRN Марина Flores MD        [START ON 6/12/2024] perampaneL tablet 4 mg  4 mg Per OG tube Daily Carolee Oconnell NP        piperacillin-tazobactam (ZOSYN) 4.5 g in dextrose 5 % in water (D5W) 100 mL IVPB (MB+)  4.5 g Intravenous Q8H MickaChristiano cole PA-C 25 mL/hr at 06/11/24 1515 Rate Verify at 06/11/24 1515    [START ON 6/12/2024] polyethylene glycol packet 17 g  17 g Per NG tube Daily Savage Reyes MD        potassium bicarbonate disintegrating tablet 35 mEq  35 mEq Per NG tube PRN Carolee Oconnell NP        potassium bicarbonate disintegrating tablet 50 mEq  50 mEq Per NG tube PRN Carolee Oconnell NP        potassium bicarbonate disintegrating tablet 60 mEq  60 mEq Per NG tube PRN Carolee Oconnell NP        potassium, sodium phosphates 280-160-250 mg packet 2 packet  2 packet Per NG tube PRCarolee Diaz NP        potassium, sodium phosphates 280-160-250 mg packet 2 packet  2 packet Per NG tube PRN Carolee Oconnell NP        potassium, sodium phosphates 280-160-250 mg packet 2 packet  2 packet Per NG tube PRN Carolee Oconnell NP        propofol (DIPRIVAN) 10 mg/mL infusion  0-50 mcg/kg/min Intravenous Continuous Mickal, LONA Alvarado 9.9 mL/hr at 06/11/24 1515 35 mcg/kg/min at 06/11/24 1515    sodium chloride 0.9% flush 10 mL  10 mL  Intravenous Q12H PRN Марина Flores MD        thiamine tablet 100 mg  100 mg Per NG tube Daily Марина Flores MD   100 mg at 06/11/24 0923    valproate (DEPACON) 1,000 mg in dextrose 5 % (D5W) 100 mL IVPB  1,000 mg Intravenous BID Quinten Haji MD   Stopped at 06/11/24 1045    vancomycin - pharmacy to dose   Intravenous pharmacy to manage frequency Christiano Jensen PA-C        [START ON 6/12/2024] vancomycin 750 mg in dextrose 5 % (D5W) 250 mL IVPB (Vial-Mate)  750 mg Intravenous Q24H Mitchel Resendez DO         Continuous Infusions:   sodium chloride 0.9%   Intravenous Continuous 100 mL/hr at 06/11/24 1515 Rate Verify at 06/11/24 1515    NORepinephrine bitartrate-D5W  0-0.2 mcg/kg/min Intravenous Continuous 17.6 mL/hr at 06/11/24 1515 0.1 mcg/kg/min at 06/11/24 1515    propofoL  0-50 mcg/kg/min Intravenous Continuous 9.9 mL/hr at 06/11/24 1515 35 mcg/kg/min at 06/11/24 1515       Review of Systems   Unable to perform ROS: Intubated     Objective:     Vital Signs (Most Recent):  Temp: 97.2 °F (36.2 °C) (06/11/24 1502)  Pulse: 61 (06/11/24 1553)  Resp: 18 (06/11/24 1553)  BP: 129/65 (06/11/24 1502)  SpO2: 100 % (06/11/24 1553) Vital Signs (24h Range):  Temp:  [92.1 °F (33.4 °C)-98.4 °F (36.9 °C)] 97.2 °F (36.2 °C)  Pulse:  [] 61  Resp:  [18-32] 18  SpO2:  [82 %-100 %] 100 %  BP: (100-196)/(56-85) 129/65  Arterial Line BP: ()/(54-80) 131/66     Weight: 47 kg (103 lb 9.9 oz)  Body mass index is 21.66 kg/m².     Physical Exam  Constitutional:       General: She is not in acute distress.     Appearance: She is ill-appearing. She is not diaphoretic.      Interventions: She is sedated and intubated.   HENT:      Head: Normocephalic and atraumatic.      Comments: EEG in place  Eyes:      General: No scleral icterus.     Conjunctiva/sclera: Conjunctivae normal.      Pupils: Pupils are equal, round, and reactive to light.   Cardiovascular:      Rate and Rhythm: Normal rate.   Pulmonary:      Effort:  Pulmonary effort is normal. No respiratory distress. She is intubated.      Comments: Intubated  Musculoskeletal:         General: No deformity or signs of injury.   Skin:     General: Skin is warm and dry.   Psychiatric:      Comments: Unable to assess            NEUROLOGICAL EXAMINATION:     CRANIAL NERVES     CN III, IV, VI   Pupils are equal, round, and reactive to light.       Comatose   CN:   No blink to threat OU   JUAN RAMON OU   Corneal absent OU  No cough, gag   No spontaneous eye opening   No withdrawal to noxious stimuli in extremities    Exam findings to suggest seizures:  Myoclonus - no   eye twitching - no   Nystagmus - no   gaze deviation - no   waxy rigidity - no        Significant Labs: All pertinent lab results from the past 24 hours have been reviewed.    Significant Studies: I have reviewed and interpreted all pertinent imaging results/findings within the past 24 hours.

## 2024-06-11 NOTE — PROGRESS NOTES
.EEG   AM Check Electrodes had to be fixed.No    Skin Integrity: Normal     Shashank Heard   06/11/2024 10:35 AM

## 2024-06-11 NOTE — PLAN OF CARE
"[facility] Care Plan    POC reviewed with June Boykin and family at 0300. Pt verbalized understanding. Questions and concerns addressed. No acute events overnight. Pt progressing toward goals. Will continue to monitor. See below and flowsheets for full assessment and VS info.     -Currently on Levo & Propofol gtts  -[place] placed   -2 IVs placed  -Vancomycin and Zosyn administered  -Warming blanket continued   -cEEG monitoring continued  -1L NS bolus administered  -1.5L LR boluses administered   -LR gtt @50ml/hr stopped  -See Nursing Note for details       Is this a stroke patient? no    Neuro:  [place] Coma Scale  Best Eye Response: 1-->(E1) none  Best Motor Response: 5-->(M5) localizes pain  Best Verbal Response: 1-->(V1) none  Alix Coma Scale Score: 7  Assessment Qualifiers: patient not sedated/intubated, no eye obstruction present  Pupil PERRLA: yes     24hr Temp:  [92.1 °F (33.4 °C)-98.6 °F (37 °C)]     CV:   Rhythm: normal sinus rhythm  BP goals:   SBP < 180  MAP > 65    Resp:      Vent Mode: A/C  Set Rate: 18 BPM  Oxygen Concentration (%): 60  Vt Set: 300 mL  PEEP/CPAP: 5 cmH20    Plan: wean to extubate    GI/:     Diet/Nutrition Received: NPO  Last Bowel Movement: 06/10/24  Voiding Characteristics: incontinence, external catheter    Intake/Output Summary (Last 24 hours) at 6/11/2024 0609  Last data filed at 6/11/2024 0001  Gross per 24 hour   Intake 340 ml   Output 460 ml   Net -120 ml     Unmeasured Output  Urine Occurrence: 2  Stool Occurrence: 0    Labs/Accuchecks:  Recent Labs   Lab 06/11/24  0059   WBC 10.82   RBC 6.34*   HGB 14.3   HCT 49.8*         Recent Labs   Lab 06/11/24  0059      K 4.4   CO2 40*   CL 88*   BUN 15   CREATININE 0.7   ALKPHOS 40*   ALT 20   AST 32   BILITOT 0.4    No results for input(s): "PROTIME", "INR", "APTT", "HEPANTIXA" in the last 168 hours. No results for input(s): "CPK", "CPKMB", "TROPONINI", "MB" in the last 168 hours.    Electrolytes: N/A - " electrolytes WDL  Accuchecks: none    Gtts:   NORepinephrine bitartrate-D5W  0-0.2 mcg/kg/min Intravenous Continuous        propofoL  0-50 mcg/kg/min Intravenous Continuous 8.5 mL/hr at 06/11/24 0548 30 mcg/kg/min at 06/11/24 0548       LDA/Wounds:    Nurses Note -- 4 Eyes    Is there altered skin present? yes     Please check the following boxes that apply:   [x] LDA Added if Not in Epic (Describe Wound)   [] New Altered Skin Integrity was Present on Admit and Documented in LDA   [] Wound Image Taken    Wound Care Consulted? Yes    Second RN/Staff Member:  ALEX Cruz        [facility]

## 2024-06-11 NOTE — SUBJECTIVE & OBJECTIVE
Past Medical History:   Diagnosis Date    Abdominal pain 05/2024    Acid reflux 05/2024    ACP (advance care planning) 6/1/2024    Anxiety disorder, unspecified     At high risk for falls     Emphysema, unspecified     Focal seizures 6/7/2024    History of opioid abuse     Hypertension     On home oxygen therapy     2l/nc    Wears partial dentures     upper-none on lower    Weight loss 05/2024     Past Surgical History:   Procedure Laterality Date    ANKLE SURGERY Left     HYSTERECTOMY      TONSILLECTOMY        No current facility-administered medications on file prior to encounter.     Current Outpatient Medications on File Prior to Encounter   Medication Sig Dispense Refill    albuterol (PROVENTIL/VENTOLIN HFA) 90 mcg/actuation inhaler Inhale into the lungs.      amoxicillin-clavulanate 875-125mg (AUGMENTIN) 875-125 mg per tablet Take 1 tablet by mouth every 12 (twelve) hours. 7 tablet 0    amoxicillin-clavulanate 875-125mg (AUGMENTIN) 875-125 mg per tablet Take 1 tablet by mouth 2 (two) times daily. 9 tablet 0    aspirin 81 MG Chew Take 81 mg by mouth once daily. Stop 05/20/2024 per kathleen@ Dr. Dominguez office      budesonide-formoterol 160-4.5 mcg (SYMBICORT) 160-4.5 mcg/actuation HFAA 2 puffs Inhalation Twice a day      diazePAM (VALIUM) 10 MG Tab Take 10 mg by mouth every 6 (six) hours as needed.      docusate sodium (COLACE) 100 MG capsule Take 100 mg by mouth 2 (two) times daily.      fluticasone propionate (FLONASE) 50 mcg/actuation nasal spray       lisinopriL-hydrochlorothiazide (PRINZIDE,ZESTORETIC) 20-25 mg Tab       potassium chloride (MICRO-K) 10 MEQ CpSR Take 10 mEq by mouth once.      pravastatin (PRAVACHOL) 20 MG tablet       QUEtiapine (SEROQUEL) 50 MG tablet Take 1 tablet (50 mg total) by mouth every evening. 30 tablet 0    QUEtiapine (SEROQUEL) 50 MG tablet Take 1 tablet (50 mg total) by mouth nightly. 30 tablet 0      Allergies: Patient has no known allergies.  Family History   Problem  Relation Name Age of Onset    No Known Problems Mother          @90    Diabetes Father      Pancreatic cancer Father      Stroke Father      No Known Problems Brother      No Known Problems Brother      No Known Problems Brother      Parkinsonism Sister       Social History     Tobacco Use    Smoking status: Former     Current packs/day: 1.00     Types: Cigarettes    Smokeless tobacco: Never    Tobacco comments:     Quit 2 years ago   Substance Use Topics    Alcohol use: No    Drug use: Not Currently     Types: Oxycodone, Hydrocodone     Review of Systems   Unable to perform ROS: Intubated     Objective:     Vitals:    Temp: (!) 92.1 °F (33.4 °C)  Pulse: (!) 58  Rhythm: normal sinus rhythm  BP: 100/62  MAP (mmHg): 77  Resp: 18  SpO2: 100 %  Oxygen Concentration (%): 60  Vent Mode: A/C  Set Rate: 18 BPM  Vt Set: 300 mL  PEEP/CPAP: 5 cmH20  Peak Airway Pressure: 36 cmH20  Mean Airway Pressure: 13 cmH20  Plateau Pressure: 0 cmH20    Temp  Min: 92.1 °F (33.4 °C)  Max: 98.6 °F (37 °C)  Pulse  Min: 58  Max: 114  BP  Min: 100/62  Max: 196/74  MAP (mmHg)  Min: 77  Max: 112  Resp  Min: 16  Max: 32  SpO2  Min: 82 %  Max: 100 %  Oxygen Concentration (%)  Min: 40  Max: 60    06/10 0701 - 06/11 0700  In: 340   Out: 460 [Urine:460]   Unmeasured Output  Urine Occurrence: 2  Stool Occurrence: 0        Physical Exam  Vitals and nursing note reviewed.   Constitutional:       Appearance: She is ill-appearing.   HENT:      Head: Normocephalic and atraumatic.      Right Ear: External ear normal.      Left Ear: External ear normal.      Nose: Nose normal.      Mouth/Throat:      Comments: No teeth   Eyes:      Extraocular Movements: Extraocular movements intact.      Pupils: Pupils are equal, round, and reactive to light.   Cardiovascular:      Rate and Rhythm: Regular rhythm. Tachycardia present.   Pulmonary:      Breath sounds: No rhonchi or rales.   Abdominal:      General: There is no distension.      Palpations: Abdomen is soft.       Tenderness: There is no abdominal tenderness. There is no guarding.   Musculoskeletal:      Right lower leg: No edema.      Left lower leg: No edema.   Skin:     Capillary Refill: Capillary refill takes less than 2 seconds.      Comments: Cool, on warming blanket    Neurological:      Comments: Propofol paused     E2 V1T M5  Lethargic. Not following commands.   CN II-XII grossly intact, specifically:  PERRL.   Eyes cross midline with OCR   No facial asymmetry  + cough, gag, corneals  Motor:  SIOMARA, localizing (Right side moving less and weaker than Left side)  Sensation intact to noxious stimuli x4         Unable to test orientation, language, memory, judgment, insight, fund of knowledge, hearing, shoulder shrug, tongue protrusion, coordination, gait due to level of consciousness.       Today I personally reviewed pertinent medications, lines/drains/airways, imaging, cardiology results, laboratory results, microbiology results, notably:    ABG 7.429/65/138/43      Lactic 2.2

## 2024-06-11 NOTE — PROGRESS NOTES
Pharmacokinetic Initial Assessment: IV Vancomycin    Assessment/Plan:    Initiate intravenous vancomycin with loading dose of 1000 mg once followed by a maintenance dose of vancomycin 750 mg IV every 24 hours.  - Ms. Boykin's renal function appears stable and at baseline.  - Desired empiric serum trough concentration is 10 to 20 mcg/mL.  - Draw vancomycin trough level 60 min prior to third dose on 6/13 at approximately 0000.  - Please draw random level sooner than scheduled trough if renal function changes significantly.    Pharmacy will continue to follow and monitor vancomycin.      Please contact pharmacy at extension [phone] with any questions regarding this assessment.     Thank you for the consult,   Rhoda Foss       Patient brief summary:  June Boykin is a 69 y.o. female initiated on antimicrobial therapy with IV Vancomycin for treatment of suspected sepsis    Actual Body Weight:   47 kg    Renal Function:   Estimated Creatinine Clearance: 63.8 mL/min (based on SCr of 0.6 mg/dL).     Dialysis Method (if applicable):  N/A

## 2024-06-12 PROBLEM — K92.2 GI BLEED: Status: ACTIVE | Noted: 2024-06-12
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
## 2024-06-12 NOTE — SUBJECTIVE & OBJECTIVE
Interval History:  See hospital course.     Review of Systems   Unable to perform ROS: Intubated     Objective:     Vitals:  Temp: 97.7 °F (36.5 °C)  Pulse: 79  Rhythm: normal sinus rhythm  BP: 135/60  MAP (mmHg): 87  Resp: 18  SpO2: 99 %  Oxygen Concentration (%): 40  Vent Mode: A/C  Set Rate: 18 BPM  Vt Set: 320 mL  PEEP/CPAP: 5 cmH20  Peak Airway Pressure: 19 cmH20  Mean Airway Pressure: 8.7 cmH20  Plateau Pressure: 15 cmH20    Temp  Min: 95.7 °F (35.4 °C)  Max: 99.5 °F (37.5 °C)  Pulse  Min: 61  Max: 88  BP  Min: 111/62  Max: 194/87  MAP (mmHg)  Min: 80  Max: 125  Resp  Min: 18  Max: 20  SpO2  Min: 96 %  Max: 100 %  Oxygen Concentration (%)  Min: 40  Max: 40    06/11 0701 - 06/12 0700  In: 5075 [I.V.:2256.6]  Out: 2200 [Urine:2200]   Unmeasured Output  Urine Occurrence: 2  Stool Occurrence: 1  Pad Count: 1        Physical Exam  Vitals and nursing note reviewed.   Constitutional:       Appearance: She is ill-appearing.      Interventions: She is sedated and intubated.   HENT:      Head: Normocephalic and atraumatic.   Eyes:      Pupils: Pupils are equal, round, and reactive to light.   Cardiovascular:      Rate and Rhythm: Normal rate and regular rhythm.      Pulses: Normal pulses.   Pulmonary:      Effort: Pulmonary effort is normal. She is intubated.   Abdominal:      Palpations: Abdomen is soft.   Musculoskeletal:         General: Normal range of motion.   Skin:     General: Skin is warm and dry.      Capillary Refill: Capillary refill takes less than 2 seconds.   Neurological:      GCS: GCS eye subscore is 1. GCS verbal subscore is 1. GCS motor subscore is 4.      Comments:   -Exam completed on propofol @ 40  -E1 V1T M4  -PERRL  -Withdraws all extremities to central noxious stimuli  -Cough/corneal reflexes intact; no gag       Unable to test orientation, language, memory, judgment, insight, fund of knowledge, tongue protrusion, coordination, gait due to level of consciousness.        Medications:  Continuoussodium chloride 0.9%, Last Rate: 100 mL/hr at 06/12/24 1801  ketamine 5 mg/mL infusion (non-titrating), Last Rate: 25 mcg/kg/min (06/12/24 1801)  NORepinephrine bitartrate-D5W, Last Rate: 0.04 mcg/kg/min (06/12/24 1801)  propofol, Last Rate: 50 mcg/kg/min (06/12/24 1811)    Scheduledalbuterol-ipratropium, 3 mL, Q4H  atorvastatin, 40 mg, Daily  lacosamide, 200 mg, Q12H  levETIRAcetam (Keppra) IV (PEDS and ADULTS), 1,500 mg, Q12H  pantoprazole, 40 mg, BID  [START ON 6/13/2024] perampaneL, 8 mg, Daily  piperacillin-tazobactam (Zosyn) IV (PEDS and ADULTS) (extended infusion is not appropriate), 4.5 g, Q8H  polyethylene glycol, 17 g, Daily  thiamine, 100 mg, Daily  vancomycin (VANCOCIN) IV (PEDS and ADULTS), 750 mg, Q24H    PRNacetaminophen, 650 mg, Q4H PRN  dextrose 10%, 12.5 g, PRN  dextrose 10%, 25 g, PRN  fentaNYL, 25 mcg, Q1H PRN  glucagon (human recombinant), 1 mg, PRN  glucose, 16 g, PRN  glucose, 24 g, PRN  magnesium oxide, 800 mg, PRN  magnesium oxide, 800 mg, PRN  naloxone, 0.02 mg, PRN  ondansetron, 4 mg, Q8H PRN  potassium bicarbonate, 35 mEq, PRN  potassium bicarbonate, 50 mEq, PRN  potassium bicarbonate, 60 mEq, PRN  potassium, sodium phosphates, 2 packet, PRN  potassium, sodium phosphates, 2 packet, PRN  potassium, sodium phosphates, 2 packet, PRN  sodium chloride 0.9%, 10 mL, Q12H PRN  vancomycin - pharmacy to dose, , pharmacy to manage frequency      Today I personally reviewed pertinent medications, lines/drains/airways, imaging, cardiology results, laboratory results, notably: CBC; CMP; CXR; ABGs    Diet  No diet orders on file  No diet orders on file

## 2024-06-12 NOTE — SUBJECTIVE & OBJECTIVE
Interval History: EEG with lateralized periodic epileptiform discharges over left hemisphere, no discrete seizures. Escalate AED regimen, continue current rate anesthetics.     Current Facility-Administered Medications   Medication Dose Route Frequency Provider Last Rate Last Admin    0.9%  NaCl infusion   Intravenous Continuous Carolee Oconnell  mL/hr at 06/12/24 1324 Restarted at 06/12/24 1324    acetaminophen tablet 650 mg  650 mg Per NG tube Q4H PRN Savage Reyes MD        albuterol-ipratropium 2.5 mg-0.5 mg/3 mL nebulizer solution 3 mL  3 mL Nebulization Q4H Christiano Jensen PA-C   3 mL at 06/12/24 1159    atorvastatin tablet 40 mg  40 mg Per NG tube Daily Марина Flores MD   40 mg at 06/12/24 0802    dextrose 10% bolus 125 mL 125 mL  12.5 g Intravenous PRN Марина Flores MD        dextrose 10% bolus 250 mL 250 mL  25 g Intravenous PRN Марина Flores MD        fentaNYL 50 mcg/mL injection 25 mcg  25 mcg Intravenous Q1H PRN Christiano Jensen PA-C        glucagon (human recombinant) injection 1 mg  1 mg Intramuscular PRN Марина Flores MD        glucose chewable tablet 16 g  16 g Per NG tube PRN Savage Reyes MD        glucose chewable tablet 24 g  24 g Per NG tube PRN Savage Reyes MD        lacosamide tablet 200 mg  200 mg Per NG tube Q12H Christiano Jensen PA-C   200 mg at 06/12/24 0802    lactated ringers bolus 1,000 mL  1,000 mL Intravenous Once Savage Reyes  mL/hr at 06/12/24 1332 1,000 mL at 06/12/24 1332    levETIRAcetam injection 1,500 mg  1,500 mg Intravenous Q12H Quinten Haji MD   1,500 mg at 06/12/24 0803    magnesium oxide tablet 800 mg  800 mg Per NG tube PRN Carolee Oconnell NP   800 mg at 06/12/24 0803    magnesium oxide tablet 800 mg  800 mg Per NG tube PRN Caorlee Oconnell NP        naloxone 0.4 mg/mL injection 0.02 mg  0.02 mg Intravenous PRN Марина Flores MD        NORepinephrine bitartrate-D5W 4 mg/250 mL (16 mcg/mL) PERIPHERAL access  infusion  0-0.2 mcg/kg/min Intravenous Continuous Carolee Oconnell NP 7.1 mL/hr at 06/12/24 1311 0.04 mcg/kg/min at 06/12/24 1311    ondansetron injection 4 mg  4 mg Intravenous Q8H PRN Марина Flores MD        pantoprazole injection 40 mg  40 mg Intravenous BID Christiano Jensen PA-C        [START ON 6/13/2024] perampaneL tablet 8 mg  8 mg Per NG tube Daily Carolee Oconnell NP        piperacillin-tazobactam (ZOSYN) 4.5 g in dextrose 5 % in water (D5W) 100 mL IVPB (MB+)  4.5 g Intravenous Q8H Christiano Jensen PA-C 25 mL/hr at 06/12/24 1214 4.5 g at 06/12/24 1214    polyethylene glycol packet 17 g  17 g Per NG tube Daily Savage Reyes MD   17 g at 06/12/24 0802    potassium bicarbonate disintegrating tablet 35 mEq  35 mEq Per NG tube PRN Carolee Oconnell NP        potassium bicarbonate disintegrating tablet 50 mEq  50 mEq Per NG tube PRN Carolee Oconnell NP        potassium bicarbonate disintegrating tablet 60 mEq  60 mEq Per NG tube PRN Carolee Oconnell NP   60 mEq at 06/12/24 0516    potassium, sodium phosphates 280-160-250 mg packet 2 packet  2 packet Per NG tube PRN Carolee Oconnell NP        potassium, sodium phosphates 280-160-250 mg packet 2 packet  2 packet Per NG tube PRN Carolee Oconnell NP   2 packet at 06/12/24 1211    potassium, sodium phosphates 280-160-250 mg packet 2 packet  2 packet Per NG tube PRN Carolee Oconnell NP        propofol (DIPRIVAN) 10 mg/mL infusion (NON-TITRATING)  40 mcg/kg/min Intravenous Continuous Christiano Jensen PA-C 11.3 mL/hr at 06/12/24 1306 40 mcg/kg/min at 06/12/24 1306    sodium chloride 0.9% flush 10 mL  10 mL Intravenous Q12H PRN Марина Flores MD        thiamine tablet 100 mg  100 mg Per NG tube Daily Марина Flores MD   100 mg at 06/12/24 0802    valproate (DEPACON) 1,000 mg in dextrose 5 % (D5W) 100 mL IVPB  1,000 mg Intravenous BID Quinten Haji MD   Stopped at 06/12/24 0959    vancomycin - pharmacy to dose   Intravenous pharmacy  to manage frequency Mickal, LONA Alvarado        vancomycin 750 mg in dextrose 5 % (D5W) 250 mL IVPB (Vial-Mate)  750 mg Intravenous Q24H Mitchel Resendez DO   Stopped at 06/12/24 0242     Continuous Infusions:   sodium chloride 0.9%   Intravenous Continuous 100 mL/hr at 06/12/24 1324 Restarted at 06/12/24 1324    NORepinephrine bitartrate-D5W  0-0.2 mcg/kg/min Intravenous Continuous 7.1 mL/hr at 06/12/24 1311 0.04 mcg/kg/min at 06/12/24 1311    propofol  40 mcg/kg/min Intravenous Continuous 11.3 mL/hr at 06/12/24 1306 40 mcg/kg/min at 06/12/24 1306       Review of Systems   Unable to perform ROS: Intubated     Objective:     Vital Signs (Most Recent):  Temp: 98.4 °F (36.9 °C) (06/12/24 1159)  Pulse: 83 (06/12/24 1159)  Resp: 20 (06/12/24 1159)  BP: (!) 181/81 (06/12/24 1101)  SpO2: 96 % (06/12/24 1159) Vital Signs (24h Range):  Temp:  [95.7 °F (35.4 °C)-99.5 °F (37.5 °C)] 98.4 °F (36.9 °C)  Pulse:  [61-84] 83  Resp:  [18-20] 20  SpO2:  [96 %-100 %] 96 %  BP: (111-181)/(56-81) 181/81  Arterial Line BP: ()/(48-70) 151/70     Weight: 47 kg (103 lb 9.9 oz)  Body mass index is 21.66 kg/m².     Physical Exam  Constitutional:       General: She is not in acute distress.     Appearance: She is ill-appearing. She is not diaphoretic.      Interventions: She is sedated and intubated.   HENT:      Head: Normocephalic and atraumatic.      Comments: EEG in place  Eyes:      General: No scleral icterus.     Conjunctiva/sclera: Conjunctivae normal.      Pupils: Pupils are equal, round, and reactive to light.   Cardiovascular:      Rate and Rhythm: Normal rate.   Pulmonary:      Effort: Pulmonary effort is normal. No respiratory distress. She is intubated.      Comments: Intubated  Musculoskeletal:         General: No deformity or signs of injury.   Skin:     General: Skin is warm and dry.   Psychiatric:      Comments: Unable to assess            NEUROLOGICAL EXAMINATION:     CRANIAL NERVES     CN III, IV, VI   Pupils are  equal, round, and reactive to light.       Comatose   CN:   No blink to threat OU   JUAN RAMON OU   Corneal absent OU  No cough, gag   No spontaneous eye opening   No withdrawal to noxious stimuli in extremities    Exam findings to suggest seizures:  Myoclonus - no   eye twitching - no   Nystagmus - no   gaze deviation - no   waxy rigidity - no        Significant Labs: All pertinent lab results from the past 24 hours have been reviewed.    Significant Studies: I have reviewed all pertinent imaging results/findings within the past 24 hours.

## 2024-06-12 NOTE — PLAN OF CARE
SW contact pt daughter in law Stefany.    SW  introduced role and left number in case of concerns.   SW will continue to follow pt needs.      Harmony Damon MSW, TERRYW  [facility]  Case Management Dept.

## 2024-06-12 NOTE — PROGRESS NOTES
[facility] - Neuro Critical Care  Neurology-Epilepsy  Progress Note    Patient Name: June Boykin  MRN: [id number]  Admission Date: 6/8/2024  Hospital Length of Stay: 4 days  Code Status: Partial Code   Attending Provider: Savage Reyes MD  Primary Care Physician: Mauricio Pierre MD   Principal Problem:Focal seizures    Subjective:     Hospital Course:   6/6>6/7: EEG at OSH - continuous lateralized periodic discharges in the left hemisphere consistent with a highly irritable epileptogenic focus in this region, often time locked with clonic jerking of the RUE making it ictal in nature  6/7>6/8: EEG CAP at OSH - underlying bilateral/generalized epileptiform activity with potential to evolve into status epilepticus  6/8>6/9: Periodic lateralized epileptiform discharges over the left hemisphere consistent with significant focal cortical irritability and epileptic potential, frequent focal seizures with onset over the left hemisphere  6/9>6/10: EEG report pending  6/10>6/11: Frequent focal seizures up to 6 per hour with onset over the left hemisphere  6/11>6/12: Periodic lateralized epileptiform discharges over the left hemisphere consistent with significant cortical irritability and epileptic potential, burst suppression pattern due to anesthetics    Interval History: EEG with lateralized periodic epileptiform discharges over left hemisphere, no discrete seizures. Escalate AED regimen, continue current rate anesthetics.     Current Facility-Administered Medications   Medication Dose Route Frequency Provider Last Rate Last Admin    0.9%  NaCl infusion   Intravenous Continuous Carolee Oconnell  mL/hr at 06/12/24 1324 Restarted at 06/12/24 1324    acetaminophen tablet 650 mg  650 mg Per NG tube Q4H PRN Savage Reyes MD        albuterol-ipratropium 2.5 mg-0.5 mg/3 mL nebulizer solution 3 mL  3 mL Nebulization Q4H Christiano Jensen PA-C   3 mL at 06/12/24 1159    atorvastatin tablet 40 mg  40 mg  Per NG tube Daily Марина Flores MD   40 mg at 06/12/24 0802    dextrose 10% bolus 125 mL 125 mL  12.5 g Intravenous PRN Марина Flores MD        dextrose 10% bolus 250 mL 250 mL  25 g Intravenous PRN Марина Flores MD        fentaNYL 50 mcg/mL injection 25 mcg  25 mcg Intravenous Q1H PRN Christiano Jensen PA-C        glucagon (human recombinant) injection 1 mg  1 mg Intramuscular PRN Марина Flores MD        glucose chewable tablet 16 g  16 g Per NG tube PRN Savage Reyes MD        glucose chewable tablet 24 g  24 g Per NG tube PRN Savage Reyes MD        lacosamide tablet 200 mg  200 mg Per NG tube Q12H Chrsitiano Jensen PA-C   200 mg at 06/12/24 0802    lactated ringers bolus 1,000 mL  1,000 mL Intravenous Once Savage Reyes  mL/hr at 06/12/24 1332 1,000 mL at 06/12/24 1332    levETIRAcetam injection 1,500 mg  1,500 mg Intravenous Q12H Quinten Haji MD   1,500 mg at 06/12/24 0803    magnesium oxide tablet 800 mg  800 mg Per NG tube PRN Carolee Oconnell NP   800 mg at 06/12/24 0803    magnesium oxide tablet 800 mg  800 mg Per NG tube PRN Carolee Oconnell NP        naloxone 0.4 mg/mL injection 0.02 mg  0.02 mg Intravenous PRN Марина Flores MD        NORepinephrine bitartrate-D5W 4 mg/250 mL (16 mcg/mL) PERIPHERAL access infusion  0-0.2 mcg/kg/min Intravenous Continuous Carolee Oconnell NP 7.1 mL/hr at 06/12/24 1311 0.04 mcg/kg/min at 06/12/24 1311    ondansetron injection 4 mg  4 mg Intravenous Q8H PRN Марина Flores MD        pantoprazole injection 40 mg  40 mg Intravenous BID Christiano Jensen PA-C        [START ON 6/13/2024] perampaneL tablet 8 mg  8 mg Per NG tube Daily Carolee Oconnell NP        piperacillin-tazobactam (ZOSYN) 4.5 g in dextrose 5 % in water (D5W) 100 mL IVPB (MB+)  4.5 g Intravenous Q8H Christiano Jensen PA-C 25 mL/hr at 06/12/24 1214 4.5 g at 06/12/24 1214    polyethylene glycol packet 17 g  17 g Per NG tube Daily Savage Reyes MD   17 g at  06/12/24 0802    potassium bicarbonate disintegrating tablet 35 mEq  35 mEq Per NG tube PRN Carolee Oconnell, NP        potassium bicarbonate disintegrating tablet 50 mEq  50 mEq Per NG tube PRN Carolee Oconnell, NP        potassium bicarbonate disintegrating tablet 60 mEq  60 mEq Per NG tube PRN Carolee Oconnell, NP   60 mEq at 06/12/24 0516    potassium, sodium phosphates 280-160-250 mg packet 2 packet  2 packet Per NG tube PRN Carolee Oconnell, NP        potassium, sodium phosphates 280-160-250 mg packet 2 packet  2 packet Per NG tube PRN Carolee Oconnell, NP   2 packet at 06/12/24 1211    potassium, sodium phosphates 280-160-250 mg packet 2 packet  2 packet Per NG tube PRN Carolee Oconnell, JULIEN        propofol (DIPRIVAN) 10 mg/mL infusion (NON-TITRATING)  40 mcg/kg/min Intravenous Continuous Christiano Jensen PA-C 11.3 mL/hr at 06/12/24 1306 40 mcg/kg/min at 06/12/24 1306    sodium chloride 0.9% flush 10 mL  10 mL Intravenous Q12H PRN Марина Flores MD        thiamine tablet 100 mg  100 mg Per NG tube Daily Марниа Flores MD   100 mg at 06/12/24 0802    valproate (DEPACON) 1,000 mg in dextrose 5 % (D5W) 100 mL IVPB  1,000 mg Intravenous BID Quinten Haji MD   Stopped at 06/12/24 0959    vancomycin - pharmacy to dose   Intravenous pharmacy to manage frequency Christiano Jensen PA-C        vancomycin 750 mg in dextrose 5 % (D5W) 250 mL IVPB (Vial-Mate)  750 mg Intravenous Q24H Mitchel Resendez DO   Stopped at 06/12/24 0242     Continuous Infusions:   sodium chloride 0.9%   Intravenous Continuous 100 mL/hr at 06/12/24 1324 Restarted at 06/12/24 1324    NORepinephrine bitartrate-D5W  0-0.2 mcg/kg/min Intravenous Continuous 7.1 mL/hr at 06/12/24 1311 0.04 mcg/kg/min at 06/12/24 1311    propofol  40 mcg/kg/min Intravenous Continuous 11.3 mL/hr at 06/12/24 1306 40 mcg/kg/min at 06/12/24 1306       Review of Systems   Unable to perform ROS: Intubated     Objective:     Vital Signs (Most  Recent):  Temp: 98.4 °F (36.9 °C) (06/12/24 1159)  Pulse: 83 (06/12/24 1159)  Resp: 20 (06/12/24 1159)  BP: (!) 181/81 (06/12/24 1101)  SpO2: 96 % (06/12/24 1159) Vital Signs (24h Range):  Temp:  [95.7 °F (35.4 °C)-99.5 °F (37.5 °C)] 98.4 °F (36.9 °C)  Pulse:  [61-84] 83  Resp:  [18-20] 20  SpO2:  [96 %-100 %] 96 %  BP: (111-181)/(56-81) 181/81  Arterial Line BP: ()/(48-70) 151/70     Weight: 47 kg (103 lb 9.9 oz)  Body mass index is 21.66 kg/m².     Physical Exam  Constitutional:       General: She is not in acute distress.     Appearance: She is ill-appearing. She is not diaphoretic.      Interventions: She is sedated and intubated.   HENT:      Head: Normocephalic and atraumatic.      Comments: EEG in place  Eyes:      General: No scleral icterus.     Conjunctiva/sclera: Conjunctivae normal.      Pupils: Pupils are equal, round, and reactive to light.   Cardiovascular:      Rate and Rhythm: Normal rate.   Pulmonary:      Effort: Pulmonary effort is normal. No respiratory distress. She is intubated.      Comments: Intubated  Musculoskeletal:         General: No deformity or signs of injury.   Skin:     General: Skin is warm and dry.   Psychiatric:      Comments: Unable to assess            NEUROLOGICAL EXAMINATION:     CRANIAL NERVES     CN III, IV, VI   Pupils are equal, round, and reactive to light.       Comatose   CN:   No blink to threat OU   JUAN RAMON OU   Corneal absent OU  No cough, gag   No spontaneous eye opening   No withdrawal to noxious stimuli in extremities    Exam findings to suggest seizures:  Myoclonus - no   eye twitching - no   Nystagmus - no   gaze deviation - no   waxy rigidity - no        Significant Labs: All pertinent lab results from the past 24 hours have been reviewed.    Significant Studies: I have reviewed all pertinent imaging results/findings within the past 24 hours.  Assessment and Plan:     * Focal seizures  69 year old woman admitted from OSH for management of recurrent  seizures, stepped up to [facility] 6/10 for further management in setting of respiratory failure. Initially admitted to OSH 5/23 after found unresponsive by family, complicated hospital course since that time with development of right upper extremity jerking movements prompting EEG.     Recommendations:  - Continuous vEEG monitoring - periodic lateralized epileptiform discharges over the left hemisphere consistent with significant focal cortical irritability and epileptic potential  - Recommend to increase Perampanel to 8 mg daily  - Continue Propofol 40 mKm overnight  - Recommend to continue Lacosamide 200 mg BID, Levetiracetam 1500 mg BID, VPA 1000 mg BID  - Consider LP for further workup of new onset refractory seizures  - Avoid agents that lower seizure threshold  - Management of infectious/metabolic abnormalities per [facility]  - Seizure precautions  - Per [place] Law, patient must refrain from driving for 6 months after having a seizure or cleared by a neurologist to legally resume driving    Discussed plan of care with [facility] team. Will follow, please call with questions.     Paroxysmal atrial fibrillation  - Management per [facility], on Metoprolol BID    COPD exacerbation  - Management per [facility], nebs, ABG/CXR    Acute hypercapnic respiratory failure  - Intubated, vent management per [facility]  - Daily CXR/ABG        VTE Risk Mitigation (From admission, onward)           Ordered     IP VTE HIGH RISK PATIENT  Once         06/08/24 1629     Place sequential compression device  Until discontinued         06/08/24 1629                    Pallavi Noel PA-C  Neurology-Epilepsy  [facility] Neuro Critical Care  Staff: Dr. Stewart

## 2024-06-12 NOTE — ASSESSMENT & PLAN NOTE
-A-fib w/ RVR @ OSH requiring diltiazem infusion  -Currently NSR on telemetry  -Continue to hold metoprolol 2/2 current pressor need  -Therapeutic lovenox discontinued 2/2 GI bleed

## 2024-06-12 NOTE — NURSING
0336: Red button pressed on EEG monitor. LUE tensing, shaking, and relaxing movement noted after suctioning the pt to elicit a cough reflex. Movement lasted for >30secs to ~1min. Movement was not rhythmic, but it occurred frequent intervals. YADIRA Jensen notified and arrived to bedside. After reviewing the EEG reading, a verbal order for Propofol gtt to be increased to 40mcg/kg/min.     0352: Red button pressed again after same tensing, shaking and relaxing movement was noted after removing the x-ray board from the pt's back after obtaining a chest x-ray. Movement ceased after about 50secs. YADIRA Jensen notified once more on second incident. Pt left in supine position and decreasing stimulation in the room to prevent movement. Propofol gtt continued @40mcg/kg/min. Monitoring continued.
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
## 2024-06-12 NOTE — ASSESSMENT & PLAN NOTE
-Blood noted in stool overnight; Hgb w/ subsequent drop from 14 to 10  -Pantoprazole load w/ 80mg this AM; now 40 BID  -Lovenox, ASA discontinued  -CBC q8

## 2024-06-12 NOTE — PROCEDURES
24 hr. Video EEG Monitoring    Date/Time: 6/12/2024 9:52 AM    Performed by: Martín Stewart MD  Authorized by: Quinten Haji MD        ICU EEG/VIDEO MONITORING REPORT    DATE OF SERVICE: 6/11/24-6/12/24  EEG NUMBER: [id number]  REQUESTED BY: Adithya  LOCATION OF SERVICE: [facility]    METHODOLOGY   Electroencephalographic (EEG) recording is with electrodes placed according to the International 10-20 placement system.  Thirty two (32) channels of digital signal are simultaneously recorded from the scalp and may include EKG, EMG, and/or eye monitors.   Recording band pass was 0.1 to 512 hz.  Digital video recording of the patient is simultaneously recorded with the EEG.  The nursing staff report clinical symptoms and may press an event button when the patient has symptoms of clinical interest to the treating physicians.  EEG and video recording is stored and archived in digital format.  The entire recording is visually reviewed and the times identified by computer analysis as being spikes or seizures are reviewed again.  Activation procedures which include photic stimulation, hyperventilation and instructing patients to perform simple task are done in selected patients.   Compresses spectral analysis (CSA) is also performed on the activity recorded from each individual channel.  This is displayed as a power display of frequencies from 0 to 30 Hz over time.   The CSA analysis is done and displayed continuously.  This is reviewed for asymmetries in power between homologous areas of the scalp and for presence of changes in power which canbe seen when seizures occur.  Sections of suspected abnormalities on the CSA is then compared with the original EEG recording.     [employer] software was also utilized in the review of this study.  This software suite analyzes the EEG recording in multiple domains.  Coherence and rhythmicity is computed to identify EEG sections which may contain organized seizures.  Each channel  undergoes analysis to detect presence of spike and sharp waves which have special and morphological characteristic of epileptic activity.  The routine EEG recording is converted from spacial into frequency domain.  This is then displayed comparing homologous areas to identify areas of significant asymmetry.  Algorithm to identify non-cortically generated artifact is used to separate eye movement, EMG and other artifact from the EEG.      Recording Times  Start on 6/11/24 at 07:00:21  Stop on 6/12/24 at 07;00;05    A total of 23 hours of EEG was recorded.    EEG FINDINGS  The record initially shows a poor organization at rest with diffusely suppressed delta and theta range background slowing with t 0.5-1 Hz lateralized periodic epileptiform discharges over the left hemisphere phase reversing over P3/T5 ultimately progressing to ignacio burst suppression with bursts of 2-5 seconds predominated by persistent perioidic discharges over P3/T5.     State changes are not seen    Provocative maneuvers including hyperventilation and photic stimulation were not performed.     EKG recording shows a regular rhythm .    IMPRESSION:  Abnormal study due eriodic lateralized epileptiform discharges over the left hemisphere consistent with significant focal cortical irritability and epileptic potential and burst suppression pattern due to anesthetics.     6/12/2024
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
## 2024-06-12 NOTE — PLAN OF CARE
[facility] Care Plan    POC reviewed with June Boykin and family at 0300. Pt unable to verbalize understanding. No acute events overnight. Pt progressing toward goals. Will continue to monitor. See below and flowsheets for full assessment and VS info.     -Neuro exam: Pupils 2mm, round, brisk, reactive; minimal corneals, weak cough, absent gag; localizes with LUE, LLE, and RLE to sternal rubbing; minimal WD RUE; afebrile   -Propofol gtt increased to 40mcg/kg/min after noting abnormal shaking motion in LUE. See Nursing Note from 6/12 for details.   -Levo gtt continued. Titrated between 0.06-0.14.   -100cc/hr NS gtt continued   -Straight cath total overnight: 575cc   -K, Mg, and Phos replaced   -Lab called concerning drop in H/H from 14.3/49.8 to 10.6/35.2. All daily labs recollected and new H/H was 10.4/35.2. RyankaYADIRA cole made aware. Monitoring continued.   -Bath given and linens changed        Is this a stroke patient? no    Neuro:  [place] Coma Scale  Best Eye Response: 1-->(E1) none  Best Motor Response: 5-->(M5) localizes pain  Best Verbal Response: 1-->(V1) none  [place] Coma Scale Score: 7  Assessment Qualifiers: patient not sedated/intubated, no eye obstruction present  Pupil PERRLA: yes     24hr Temp:  [93.2 °F (34 °C)-99.5 °F (37.5 °C)]     CV:   Rhythm: normal sinus rhythm  BP goals:   SBP < 160  MAP > 65    Resp:      Vent Mode: A/C  Set Rate: 18 BPM  Oxygen Concentration (%): 40  Vt Set: 320 mL  PEEP/CPAP: 5 cmH20    Plan: wean to extubate    GI/:     Diet/Nutrition Received: NPO  Last Bowel Movement: 06/11/24  Voiding Characteristics: incontinence, external catheter    Intake/Output Summary (Last 24 hours) at 6/12/2024 0426  Last data filed at 6/12/2024 0301  Gross per 24 hour   Intake 6605.88 ml   Output 1825 ml   Net 4780.88 ml     Unmeasured Output  Urine Occurrence: 2  Stool Occurrence: 1  Pad Count: 1    Labs/Accuchecks:  Recent Labs   Lab 06/12/24  0211   WBC 6.67   RBC 4.61   HGB 10.4*   HCT  "35.2*         Recent Labs   Lab 24  0211      K 3.0*   CO2 33*      BUN 6*   CREATININE 0.6   ALKPHOS 29*   ALT 12   AST 28   BILITOT 0.3    No results for input(s): "PROTIME", "INR", "APTT", "HEPANTIXA" in the last 168 hours. No results for input(s): "CPK", "CPKMB", "TROPONINI", "MB" in the last 168 hours.    Electrolytes: Electrolytes replaced  Accuchecks: none    Gtts:   sodium chloride 0.9%   Intravenous Continuous 100 mL/hr at 24 0301 Rate Verify at 24 0301    NORepinephrine bitartrate-D5W  0-0.2 mcg/kg/min Intravenous Continuous 10.6 mL/hr at 24 0301 0.06 mcg/kg/min at 24 0301    propofol  40 mcg/kg/min Intravenous Continuous 11.3 mL/hr at 24 0353 40 mcg/kg/min at 24 0353       LDA/Wounds:    Nurses Note -- 4 Eyes    Is there altered skin present? yes     Please check the following boxes that apply:   [] LDA Added if Not in Epic (Describe Wound)   [x] New Altered Skin Integrity was Present on Admit and Documented in LDA   [] Wound Image Taken    Wound Care Consulted? yes    Second RN/Staff Member:  ALEX Banks    Restraints:   Restraint Order  Length of Order: Order good for next 24 hours or when removed.  Date that the current order will : 24  Time that the current order will : 901  Order Upon Application: Yes    WCTM   "

## 2024-06-12 NOTE — ASSESSMENT & PLAN NOTE
Hypothermic to 33.9C; hypotensive to MAP 40 following intubation. Slight improvement w/ fluid resuscitation and use of pressors.     -Pan cultured 6/11  -Sputum gram stain w/ rare gram + cocci  -Blood cultures w/ NGTD  -Continue vanc/zosyn  -Kia kay w/ goal normothermia  -Levophed to keep MAP > 65

## 2024-06-12 NOTE — PROCEDURES
"June Boykin is a 69 y.o. female patient.    Temp: 98.4 °F (36.9 °C) (06/12/24 1159)  Pulse: 83 (06/12/24 1159)  Resp: 20 (06/12/24 1159)  BP: (!) 181/81 (06/12/24 1101)  SpO2: 96 % (06/12/24 1159)  Weight: 47 kg (103 lb 9.9 oz) (06/08/24 1676)  Height: 4' 10" (147.3 cm) (06/12/24 1159)       Central Line    Date/Time: 6/12/2024 11:40 AM    Performed by: Savage Reyes MD  Authorized by: Savage Reyes MD    Location procedure was performed:  [facility] NEURO CRITICAL CARE  Consent Done ?:  Yes  Time out complete?: Verified correct patient, procedure, equipment, staff, and site/side    Indications:  Hemodynamic monitoring and med administration  Preparation:  Skin prepped with ChloraPrep  Skin prep agent dried: Skin prep agent completely dried prior to procedure    Sterile barriers: All five maximal sterile barriers used - gloves, gown, cap, mask and large sterile sheet    Hand hygiene: Hand hygiene performed immediately prior to central venous catheter insertion    Location:  Right internal jugular  Catheter type:  Triple lumen  Catheter size:  7.5 Fr  Inserted Catheter Length (cm):  15  Ultrasound guidance: Yes    Vessel Caliber:  Large  Comprressibility:  Normal  Needle advanced into vessel with real time ultrasound guidance.    Guidewire confirmed in vessel.    Steril sheath on probe.    Sterile gel used.  Manometry: Yes    Complications: No    XRay:  Placement verified by x-ray  Adverse Events:  NoneTermination Site: superior vena cava      6/12/2024    "

## 2024-06-12 NOTE — ASSESSMENT & PLAN NOTE
-Colleen TREJON   PRINCIPAL PROCEDURE  Procedure: Anterior cervical diskectomy  Findings and Treatment:

## 2024-06-12 NOTE — ASSESSMENT & PLAN NOTE
69 year old woman admitted from OSH for management of recurrent seizures, stepped up to [facility] 6/10 for further management in setting of respiratory failure. Initially admitted to OSH 5/23 after found unresponsive by family, complicated hospital course since that time with development of right upper extremity jerking movements prompting EEG.     Recommendations:  - Continuous vEEG monitoring - periodic lateralized epileptiform discharges over the left hemisphere consistent with significant focal cortical irritability and epileptic potential  - Recommend to increase Perampanel to 8 mg daily  - Continue Propofol 40 mKm overnight  - Recommend to continue Lacosamide 200 mg BID, Levetiracetam 1500 mg BID, VPA 1000 mg BID  - Avoid agents that lower seizure threshold  - Management of infectious/metabolic abnormalities per [facility]  - Seizure precautions  - Per [place] Law, patient must refrain from driving for 6 months after having a seizure or cleared by a neurologist to legally resume driving    Discussed plan of care with [facility] team. Will follow, please call with questions.
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
## 2024-06-13 PROBLEM — I95.2 HYPOTENSION DUE TO DRUGS: Status: ACTIVE | Noted: 2024-02-02

## 2024-06-13 PROBLEM — G40.901 STATUS EPILEPTICUS: Status: ACTIVE | Noted: 2024-06-13

## 2024-06-13 LAB — VIT B1 BLD-MCNC: 149 UG/L (ref 38–122)
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
## 2024-06-13 NOTE — SUBJECTIVE & OBJECTIVE
Interval History:  See hospital course.     Review of Systems   Unable to perform ROS: Intubated     Objective:     Vitals:  Temp: 99.1 °F (37.3 °C)  Pulse: 88  Rhythm: normal sinus rhythm  BP: (!) 113/57  MAP (mmHg): 80  CVP (mean): 321 mmHg  Resp: 18  SpO2: 97 %  Oxygen Concentration (%): 40  Vent Mode: A/C  Set Rate: 18 BPM  Vt Set: 320 mL  PEEP/CPAP: 5 cmH20  Peak Airway Pressure: 21 cmH20  Mean Airway Pressure: 9.2 cmH20  Plateau Pressure: 15 cmH20    Temp  Min: 96.1 °F (35.6 °C)  Max: 99.7 °F (37.6 °C)  Pulse  Min: 65  Max: 91  BP  Min: 113/57  Max: 184/82  MAP (mmHg)  Min: 80  Max: 118  CVP (mean)  Min: 319 mmHg  Max: 325 mmHg  Resp  Min: 18  Max: 19  SpO2  Min: 97 %  Max: 100 %  Oxygen Concentration (%)  Min: 40  Max: 40    06/12 0701 - 06/13 0700  In: 6984.9 [I.V.:4343.4]  Out: 2950 [Urine:2950]   Unmeasured Output  Urine Occurrence: 2  Stool Occurrence: 0  Pad Count: 1        Physical Exam  Vitals and nursing note reviewed.   Constitutional:       Appearance: She is ill-appearing.      Interventions: She is sedated and intubated.   HENT:      Head: Normocephalic and atraumatic.   Eyes:      Pupils: Pupils are equal, round, and reactive to light.   Cardiovascular:      Rate and Rhythm: Normal rate and regular rhythm.      Pulses: Normal pulses.   Pulmonary:      Effort: Pulmonary effort is normal. She is intubated.   Abdominal:      Palpations: Abdomen is soft.   Musculoskeletal:         General: Normal range of motion.   Skin:     General: Skin is warm and dry.      Capillary Refill: Capillary refill takes less than 2 seconds.   Neurological:      GCS: GCS eye subscore is 1. GCS verbal subscore is 1. GCS motor subscore is 1.      Comments:   -Exam completed on propofol @ 50 and ketamine @ 25  -E1 V1T M1  -PERRL  -No response to noxious stimuli  -No cough/gag/corneal reflexes       Unable to test orientation, language, memory, judgment, insight, fund of knowledge, tongue protrusion, coordination, gait due  to level of consciousness.       Medications:  Continuoussodium chloride 0.9%, Last Rate: 100 mL/hr at 06/13/24 1054  ketamine 5,000 mg in sodium chloride 0.9% 500 mL infusion, Last Rate: 50 mcg/kg/min (06/13/24 1208)  NORepinephrine bitartrate-D5W, Last Rate: 0.02 mcg/kg/min (06/13/24 1351)  propofol, Last Rate: 50 mcg/kg/min (06/13/24 1100)    Scheduledalbuterol-ipratropium, 3 mL, Q6H  atorvastatin, 40 mg, Daily  heparin (porcine), 5,000 Units, Q12H  lacosamide, 200 mg, Q12H  lactated ringers, 1,000 mL, Once  levETIRAcetam (Keppra) IV (PEDS and ADULTS), 1,500 mg, Q12H  pantoprazole, 40 mg, BID   Followed by  [START ON 6/16/2024] pantoprazole, 40 mg, Daily  perampaneL, 12 mg, Daily  piperacillin-tazobactam (Zosyn) IV (PEDS and ADULTS) (extended infusion is not appropriate), 4.5 g, Q8H  polyethylene glycol, 17 g, Daily  thiamine, 100 mg, Daily  vancomycin (VANCOCIN) IV (PEDS and ADULTS), 750 mg, Q12H    PRNacetaminophen, 650 mg, Q4H PRN  dextrose 10%, 12.5 g, PRN  dextrose 10%, 25 g, PRN  fentaNYL, 25 mcg, Q1H PRN  glucagon (human recombinant), 1 mg, PRN  glucose, 16 g, PRN  glucose, 24 g, PRN  magnesium oxide, 800 mg, PRN  magnesium oxide, 800 mg, PRN  naloxone, 0.02 mg, PRN  ondansetron, 4 mg, Q8H PRN  potassium bicarbonate, 35 mEq, PRN  potassium bicarbonate, 50 mEq, PRN  potassium bicarbonate, 60 mEq, PRN  potassium, sodium phosphates, 2 packet, PRN  potassium, sodium phosphates, 2 packet, PRN  potassium, sodium phosphates, 2 packet, PRN  sodium chloride 0.9%, 10 mL, Q12H PRN  vancomycin - pharmacy to dose, , pharmacy to manage frequency      Today I personally reviewed pertinent medications, lines/drains/airways, imaging, cardiology results, laboratory results, notably: CBC; CMP; CXR; ABGs    Diet  No diet orders on file  No diet orders on file
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
## 2024-06-13 NOTE — PROCEDURES
24 hr. Video EEG Monitoring    Date/Time: 6/8/2024 2:54 PM    Performed by: Martín Stewart MD  Authorized by: Quinten Haji MD        ICU EEG/VIDEO MONITORING REPORT    DATE OF SERVICE: 6/11/24-6/12/24  EEG NUMBER: [id number]  REQUESTED BY: Adithya  LOCATION OF SERVICE: [facility]    METHODOLOGY   Electroencephalographic (EEG) recording is with electrodes placed according to the International 10-20 placement system.  Thirty two (32) channels of digital signal are simultaneously recorded from the scalp and may include EKG, EMG, and/or eye monitors.   Recording band pass was 0.1 to 512 hz.  Digital video recording of the patient is simultaneously recorded with the EEG.  The nursing staff report clinical symptoms and may press an event button when the patient has symptoms of clinical interest to the treating physicians.  EEG and video recording is stored and archived in digital format.  The entire recording is visually reviewed and the times identified by computer analysis as being spikes or seizures are reviewed again.  Activation procedures which include photic stimulation, hyperventilation and instructing patients to perform simple task are done in selected patients.   Compresses spectral analysis (CSA) is also performed on the activity recorded from each individual channel.  This is displayed as a power display of frequencies from 0 to 30 Hz over time.   The CSA analysis is done and displayed continuously.  This is reviewed for asymmetries in power between homologous areas of the scalp and for presence of changes in power which canbe seen when seizures occur.  Sections of suspected abnormalities on the CSA is then compared with the original EEG recording.     [employer] software was also utilized in the review of this study.  This software suite analyzes the EEG recording in multiple domains.  Coherence and rhythmicity is computed to identify EEG sections which may contain organized seizures.  Each channel  undergoes analysis to detect presence of spike and sharp waves which have special and morphological characteristic of epileptic activity.  The routine EEG recording is converted from spacial into frequency domain.  This is then displayed comparing homologous areas to identify areas of significant asymmetry.  Algorithm to identify non-cortically generated artifact is used to separate eye movement, EMG and other artifact from the EEG.      Recording Times  Start on 6/12/24 at 07:01:0  Stop on 6/13/24 at 07:00:04    A total of 23 hours of EEG was recorded.    EEG FINDINGS  The record initially shows a poor organization at rest with nearly continuous burst suppression with bursts of 2-5 seconds predominated by persistent perioidic discharges over C3/P3/T5.     State changes are not seen    Provocative maneuvers including hyperventilation and photic stimulation were not performed.     EKG recording shows a regular rhythm .    IMPRESSION:  Abnormal study due eriodic lateralized epileptiform discharges over the left hemisphere consistent with significant focal cortical irritability and epileptic potential and burst suppression pattern due to anesthetics.     6/13/2024
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
## 2024-06-13 NOTE — PLAN OF CARE
06/13/24 1122   Discharge Reassessment   Assessment Type Discharge Planning Reassessment   Did the patient's condition or plan change since previous assessment? No   Discharge Plan discussed with:   (no answer)   Communicated LACY with patient/caregiver Other (see comments)  (placed a call no respons)   Discharge Plan A   (TBD)   Discharge Plan B   (TBD)   DME Needed Upon Discharge  none   Transition of Care Barriers None   Why the patient remains in the hospital Requires continued medical care   Post-Acute Status   Coverage Payor: [employer] -   Discharge Delays None known at this time      Pt is not medically ready.   SW attempted to contact Stefany (daughter n ) , no response.   SW  will continue to monitor pt needs.     Discharge Plan A and Plan B have been determined by review of patient's clinical status, future medical and therapeutic needs, and coverage/benefits for post-acute care in coordination with multidisciplinary team members.    Harmony Damon MSW, LCSW  [facility]  Case Management Dept.

## 2024-06-13 NOTE — PLAN OF CARE
"[facility] Care Plan    POC reviewed with June Boykin at 0530. Pt intubated/sedated and is unable to verbalize understanding. Questions and concerns addressed. No acute events overnight. Pt progressing toward goals. Will continue to monitor. See below and flowsheets for full assessment and VS info.     - cEEG remains in place  - Propfol gtt remains @ 50mcg  - Ketamine gtt remains @ 25mcg; New bag spiked by Mickal, PA  - Levo gtt remains @ 0.06  - Mag replaced  - K replaced  - Bath given, linens changed        Is this a stroke patient? no    Neuro:  [place] Coma Scale  Best Eye Response: 1-->(E1) none  Best Motor Response: 1-->(M1) none  Best Verbal Response: 1-->(V1) none  [place] Coma Scale Score: 3  Assessment Qualifiers: patient chemically sedated or paralyzed, patient intubated  Pupil PERRLA: yes     24hr Temp:  [95.7 °F (35.4 °C)-99.7 °F (37.6 °C)]     CV:   Rhythm: normal sinus rhythm  BP goals:   SBP < 160  MAP > 65    Resp:      Vent Mode: A/C  Set Rate: 18 BPM  Oxygen Concentration (%): 40  Vt Set: 320 mL  PEEP/CPAP: 5 cmH20    Plan: wean to extubate    GI/:     Diet/Nutrition Received: NPO  Last Bowel Movement: 06/12/24  Voiding Characteristics: incontinence, external catheter    Intake/Output Summary (Last 24 hours) at 6/13/2024 0642  Last data filed at 6/13/2024 0505  Gross per 24 hour   Intake 6735.81 ml   Output 2950 ml   Net 3785.81 ml     Unmeasured Output  Urine Occurrence: 2  Stool Occurrence: 0  Pad Count: 1    Labs/Accuchecks:  Recent Labs   Lab 06/13/24  0028   WBC 5.60   RBC 4.55   HGB 10.1*   HCT 35.0*         Recent Labs   Lab 06/13/24  0028      K 3.6   CO2 30*      BUN 3*   CREATININE 0.6   ALKPHOS 26*   ALT 12   AST 26   BILITOT 0.3    No results for input(s): "PROTIME", "INR", "APTT", "HEPANTIXA" in the last 168 hours. No results for input(s): "CPK", "CPKMB", "TROPONINI", "MB" in the last 168 hours.    Electrolytes: Electrolytes replaced  Accuchecks: " none    Gtts:   sodium chloride 0.9%   Intravenous Continuous 100 mL/hr at 24 0505 Rate Verify at 24 0505    ketamine 5 mg/mL infusion (non-titrating)  25 mcg/kg/min Intravenous Continuous 14.1 mL/hr at 24 0505 25 mcg/kg/min at 24 0505    NORepinephrine bitartrate-D5W  0-0.2 mcg/kg/min Intravenous Continuous 10.6 mL/hr at 24 0505 0.06 mcg/kg/min at 24 0505    propofol  50 mcg/kg/min Intravenous Continuous 14.1 mL/hr at 24 0632 50 mcg/kg/min at 24 0632       LDA/Wounds:    Nurses Note -- 4 Eyes    Is there altered skin present? yes     Please check the following boxes that apply:   [] LDA Added if Not in Epic (Describe Wound)   [] New Altered Skin Integrity was Present on Admit and Documented in LDA   [] Wound Image Taken    Wound Care Consulted? No    Second RN/Staff Member:  ALEX Prescott    Restraints:   Restraint Order  Length of Order: Order good for next 24 hours or when removed.  Date that the current order will : 24  Time that the current order will : 901  Order Upon Application: Yes    [facility]

## 2024-06-13 NOTE — PROGRESS NOTES
Pharmacokinetic Assessment Follow Up: IV Vancomycin    Vancomycin serum concentration assessment(s):    The trough level was drawn correctly and can be used to guide therapy at this time. The measurement is below the desired definitive target range of 10 to 20 mcg/mL.    Vancomycin Regimen Plan:    Change regimen to Vancomycin 750 mg IV every 12 hours with next serum trough concentration measured at 1400 prior to 4th dose on 6/14.    Drug levels (last 3 results):  Recent Labs   Lab Result Units 06/13/24  0028   Vancomycin-Trough ug/mL 5.3*       Pharmacy will continue to follow and monitor vancomycin.    Please contact pharmacy at extension [phone] for questions regarding this assessment.    Thank you for the consult,   Ella Etienne       Patient brief summary:  June Boykin is a 69 y.o. female initiated on antimicrobial therapy with IV Vancomycin for treatment of sepsis    The patient's current regimen is 750 mg q12h    Drug Allergies:   Review of patient's allergies indicates:  No Known Allergies    Actual Body Weight:   47 kg    Renal Function:   Estimated Creatinine Clearance: 63.8 mL/min (based on SCr of 0.6 mg/dL).,     Dialysis Method (if applicable):  N/A    CBC (last 72 hours):  Recent Labs   Lab Result Units 06/10/24  0437 06/11/24  0059 06/12/24  0016 06/12/24  0211 06/12/24  1218 06/12/24  1551 06/13/24  0028   WBC K/uL 7.60 10.82 8.03 6.67 5.30 4.78 5.60   Hemoglobin g/dL 14.7 14.3 10.6* 10.4* 10.9* 9.4* 10.1*   Hematocrit % 47.5 49.8* 35.2* 35.2* 37.2 31.9* 35.0*   Platelets K/uL 158 272 209 191 191 168 170   Gran % % 51.3 73.4* 54.1 51.9 51.1 57.0 55.6   Lymph % % 31.7 18.2 33.7 36.9 32.3 28.0 27.5   Mono % % 11.4 7.1 9.6 8.2 13.0 10.9 13.0   Eosinophil % % 2.4 0.6 2.0 2.4 2.6 3.3 2.9   Basophil % % 0.8 0.1 0.2 0.3 0.2 0.4 0.5   Differential Method  Automated Automated Automated Automated Automated Automated Automated       Metabolic Panel (last 72 hours):  Recent Labs   Lab Result Units  06/10/24  0437 06/11/24  0022 06/11/24  0059 06/12/24  0016 06/12/24  0211 06/13/24  0028   Sodium mmol/L 135*  --  139 140 138 145   Potassium mmol/L 4.6  --  4.4 3.2* 3.0* 3.6   Chloride mmol/L 91*  --  88* 101 100 108   CO2 mmol/L 33*  --  40* 32* 33* 30*   Glucose mg/dL 78  --  204* 97 130* 86   Glucose, UA   --  Negative  --   --   --   --    BUN mg/dL 14  --  15 7* 6* 3*   Creatinine mg/dL 0.6  --  0.7 0.6 0.6 0.6   Albumin g/dL 2.9*  --  3.1* 2.1* 2.0* 2.0*   Total Bilirubin mg/dL 0.4  --  0.4 0.3 0.3 0.3   Alkaline Phosphatase U/L 45*  --  40* 29* 29* 26*   AST U/L 35  --  32 29 28 26   ALT U/L 23  --  20 13 12 12   Magnesium mg/dL 1.8  --  2.1 1.6 1.5* 1.6   Phosphorus mg/dL  --   --  4.8* 2.4* 2.1* 3.6       Vancomycin Administrations:  vancomycin given in the last 96 hours                     vancomycin 750 mg in dextrose 5 % (D5W) 250 mL IVPB (Vial-Mate) (mg) 750 mg New Bag 06/12/24 0112    vancomycin (VANCOCIN) 1,000 mg in dextrose 5 % (D5W) 250 mL IVPB (Vial-Mate) (mg) 1,000 mg New Bag 06/11/24 0518                    Microbiologic Results:  Microbiology Results (last 7 days)       Procedure Component Value Units Date/Time    Culture, Respiratory with Gram Stain [[id number]] Collected: 06/11/24 0637    Order Status: Completed Specimen: Respiratory from Tracheal Aspirate Updated: 06/12/24 1001     Respiratory Culture No Growth     Gram Stain (Respiratory) <10 epithelial cells per low power field.     Gram Stain (Respiratory) No WBC's     Gram Stain (Respiratory) Rare Gram positive cocci    Urine culture [[id number]]  (Abnormal) Collected: 06/11/24 0022    Order Status: Completed Specimen: Urine Updated: 06/12/24 0635     Urine Culture, Routine MOLLY ALBICANS  10,000 - 49,999 cfu/ml  Treatment of asymptomatic candiduria is not recommended (except for   specific populations). Candida isolated in the urine typically   represents colonization. If an indwelling urinary catheter is present  it should be  removed or replaced.      Narrative:      Specimen Source->Urine    Blood culture [[id number]] Collected: 06/11/24 0238    Order Status: Completed Specimen: Blood from Peripheral, Foot, Right Updated: 06/12/24 0613     Blood Culture, Routine No Growth to date      No Growth to date    Blood culture [[id number]] Collected: 06/11/24 0028    Order Status: Completed Specimen: Blood from Peripheral, Foot, Left Updated: 06/12/24 0613     Blood Culture, Routine No Growth to date      No Growth to date    Culture, Respiratory with Gram Stain [[id number]] Collected: 06/11/24 0123    Order Status: Canceled Specimen: Respiratory from Endotracheal Aspirate

## 2024-06-13 NOTE — PROGRESS NOTES
EEG Hook up  AM Check Electrodes had to be fixed.No    Skin Integrity: Normal     Ulices Villanueva   06/13/2024 9:46 AM

## 2024-06-13 NOTE — PROGRESS NOTES
[facility]  Neurology-Epilepsy  Progress Note    Patient Name: June Boykin  MRN: [id number]  Admission Date: 6/8/2024  Hospital Length of Stay: 5 days  Code Status: Partial Code   Attending Provider: Savage Reyes MD  Primary Care Physician: Mauricio Pierre MD   Principal Problem:Focal seizures    Subjective:     Hospital Course:   6/6>6/7: EEG at OSH - continuous lateralized periodic discharges in the left hemisphere consistent with a highly irritable epileptogenic focus in this region, often time locked with clonic jerking of the RUE making it ictal in nature  6/7>6/8: EEG CAP at OSH - underlying bilateral/generalized epileptiform activity with potential to evolve into status epilepticus  6/8>6/9: Periodic lateralized epileptiform discharges over the left hemisphere consistent with significant focal cortical irritability and epileptic potential, frequent focal seizures with onset over the left hemisphere  6/9>6/10: EEG report pending  6/10>6/11: Frequent focal seizures up to 6 per hour with onset over the left hemisphere  6/11>6/12: Periodic lateralized epileptiform discharges over the left hemisphere consistent with significant cortical irritability and epileptic potential, burst suppression pattern due to anesthetics  6/12>6/13: Periodic lateralized epileptiform discharges over the left hemisphere consistent with significant cortical irritability and epileptic potential and burst suppression pattern due to anesthetics    Interval History: Ketamine added overnight, EEG with burst suppression pattern with periodic lateralized epileptiform discharges over the left. Continue to optimize AED/anesthetic regimen.     Current Facility-Administered Medications   Medication Dose Route Frequency Provider Last Rate Last Admin    0.9%  NaCl infusion   Intravenous Continuous Carolee Oconnell  mL/hr at 06/13/24 1054 New Bag at 06/13/24 1054    acetaminophen tablet 650 mg  650 mg  Per NG tube Q4H PRN Savage Reyes MD        albuterol-ipratropium 2.5 mg-0.5 mg/3 mL nebulizer solution 3 mL  3 mL Nebulization Q6H Carolee Oconnell NP   3 mL at 06/13/24 1202    atorvastatin tablet 40 mg  40 mg Per NG tube Daily Марина Flores MD   40 mg at 06/13/24 1024    dextrose 10% bolus 125 mL 125 mL  12.5 g Intravenous PRN Марина Flores MD        dextrose 10% bolus 250 mL 250 mL  25 g Intravenous PRN Марина Flores MD        fentaNYL 50 mcg/mL injection 25 mcg  25 mcg Intravenous Q1H PRN Christiano Jensen PA-C        glucagon (human recombinant) injection 1 mg  1 mg Intramuscular PRN Марина Flores MD        glucose chewable tablet 16 g  16 g Per NG tube PRN Savage Reyes MD        glucose chewable tablet 24 g  24 g Per NG tube PRN Savage Reyes MD        heparin (porcine) injection 5,000 Units  5,000 Units Subcutaneous Q12H Carolee Oconnell NP   5,000 Units at 06/13/24 1025    ketamine 5,000 mg in sodium chloride 0.9% 500 mL infusion  50 mcg/kg/min Intravenous Continuous Carolee Oconnell NP 14.1 mL/hr at 06/13/24 1208 50 mcg/kg/min at 06/13/24 1208    lacosamide tablet 200 mg  200 mg Per NG tube Q12H Christiano Jensen PA-C   200 mg at 06/13/24 1024    lactated ringers bolus 1,000 mL  1,000 mL Intravenous Once Carolee Oconnell  mL/hr at 06/13/24 1314 1,000 mL at 06/13/24 1314    levETIRAcetam injection 1,500 mg  1,500 mg Intravenous Q12H Quinten Haji MD   1,500 mg at 06/13/24 0914    magnesium oxide tablet 800 mg  800 mg Per NG tube PRN Carolee Oconnell NP   800 mg at 06/13/24 1029    magnesium oxide tablet 800 mg  800 mg Per NG tube PRN Carolee Oconnell NP        naloxone 0.4 mg/mL injection 0.02 mg  0.02 mg Intravenous PRN Марина Flores MD        ondansetron injection 4 mg  4 mg Intravenous Q8H PRN Марина Flores MD        pantoprazole injection 40 mg  40 mg Intravenous BID Carolee Oconnell NP        Followed by    [START ON 6/16/2024]  pantoprazole suspension 40 mg  40 mg Per NG tube Daily Carolee Oconnell NP        perampaneL tablet 12 mg  12 mg Per NG tube Daily Carolee Oconnell NP   12 mg at 06/13/24 1054    piperacillin-tazobactam (ZOSYN) 4.5 g in dextrose 5 % in water (D5W) 100 mL IVPB (MB+)  4.5 g Intravenous Q8H MickalChristiano PA-C 25 mL/hr at 06/13/24 1227 4.5 g at 06/13/24 1227    polyethylene glycol packet 17 g  17 g Per NG tube Daily Savage Reyes MD   17 g at 06/13/24 1024    potassium bicarbonate disintegrating tablet 35 mEq  35 mEq Per NG tube PRN Carolee Oconnell NP        potassium bicarbonate disintegrating tablet 50 mEq  50 mEq Per NG tube PRN Carolee Oconnell NP   50 mEq at 06/13/24 0519    potassium bicarbonate disintegrating tablet 60 mEq  60 mEq Per NG tube PRN Carolee Oconnell NP   60 mEq at 06/12/24 0516    potassium, sodium phosphates 280-160-250 mg packet 2 packet  2 packet Per NG tube PRN Carolee Oconnell NP        potassium, sodium phosphates 280-160-250 mg packet 2 packet  2 packet Per NG tube PRN Carolee Oconnell NP   2 packet at 06/12/24 1211    potassium, sodium phosphates 280-160-250 mg packet 2 packet  2 packet Per NG tube PRN Carolee Oconnell NP        propofol (DIPRIVAN) 10 mg/mL infusion (NON-TITRATING)  50 mcg/kg/min Intravenous Continuous Carolee Oconnell NP 14.1 mL/hr at 06/13/24 1100 50 mcg/kg/min at 06/13/24 1100    sodium chloride 0.9% flush 10 mL  10 mL Intravenous Q12H PRN Марина Flores MD        thiamine tablet 100 mg  100 mg Per NG tube Daily Марина Flores MD   100 mg at 06/13/24 1025    vancomycin - pharmacy to dose   Intravenous pharmacy to manage frequency RyankalChristiano PA-C        vancomycin 750 mg in dextrose 5 % (D5W) 250 mL IVPB (Vial-Mate)  750 mg Intravenous Q12H Savage Reyes MD 5 mL/hr at 06/13/24 0605 Rate Verify at 06/13/24 0605     Continuous Infusions:   sodium chloride 0.9%   Intravenous Continuous 100 mL/hr at 06/13/24 1054 New Bag at  06/13/24 1054    ketamine 5,000 mg in sodium chloride 0.9% 500 mL infusion  50 mcg/kg/min Intravenous Continuous 14.1 mL/hr at 06/13/24 1208 50 mcg/kg/min at 06/13/24 1208    propofol  50 mcg/kg/min Intravenous Continuous 14.1 mL/hr at 06/13/24 1100 50 mcg/kg/min at 06/13/24 1100       Review of Systems   Unable to perform ROS: Intubated     Objective:     Vital Signs (Most Recent):  Temp: 99.3 °F (37.4 °C) (06/13/24 1205)  Pulse: 85 (06/13/24 1205)  Resp: 18 (06/13/24 1205)  BP: 139/66 (06/13/24 1205)  SpO2: 97 % (06/13/24 1205) Vital Signs (24h Range):  Temp:  [96.1 °F (35.6 °C)-99.7 °F (37.6 °C)] 99.3 °F (37.4 °C)  Pulse:  [65-91] 85  Resp:  [18-19] 18  SpO2:  [97 %-100 %] 97 %  BP: (135-184)/(60-82) 139/66  Arterial Line BP: (105-178)/(47-73) 111/51     Weight: 47 kg (103 lb 9.9 oz)  Body mass index is 21.66 kg/m².     Physical Exam  Constitutional:       General: She is not in acute distress.     Appearance: She is ill-appearing. She is not diaphoretic.      Interventions: She is sedated and intubated.   HENT:      Head: Normocephalic and atraumatic.      Comments: EEG in place  Eyes:      General: No scleral icterus.     Conjunctiva/sclera: Conjunctivae normal.      Pupils: Pupils are equal, round, and reactive to light.   Cardiovascular:      Rate and Rhythm: Normal rate.   Pulmonary:      Effort: Pulmonary effort is normal. No respiratory distress. She is intubated.      Comments: Intubated  Musculoskeletal:         General: No deformity or signs of injury.   Skin:     General: Skin is warm and dry.   Psychiatric:      Comments: Unable to assess            NEUROLOGICAL EXAMINATION:     CRANIAL NERVES     CN III, IV, VI   Pupils are equal, round, and reactive to light.       Comatose   CN:   No blink to threat OU   JUAN RAMON OU   Corneal absent OU  No cough, gag   No spontaneous eye opening   No withdrawal to noxious stimuli in extremities    Exam findings to suggest seizures:  Myoclonus - no   eye twitching - no    Nystagmus - no   gaze deviation - no   waxy rigidity - no        Significant Labs: All pertinent lab results from the past 24 hours have been reviewed.    Significant Studies: I have reviewed all pertinent imaging results/findings within the past 24 hours.  Assessment and Plan:     * Focal seizures  69 year old woman admitted from OSH for management of recurrent seizures, stepped up to [facility] 6/10 for further management in setting of respiratory failure. Initially admitted to OSH 5/23 after found unresponsive by family, complicated hospital course since that time with development of right upper extremity jerking movements prompting EEG.     Recommendations:  - Continuous vEEG monitoring - burst suppression pattern with periodic lateralized epileptiform discharges over the left hemisphere consistent with significant focal cortical irritability and epileptic potential  - Recommend to increase Perampanel to 12 mg daily  - Continue Propofol 50 mKm, Ketamine added 6/12 pm, increased to 50 mKm today  - Recommend to continue Lacosamide 200 mg BID, Levetiracetam 1500 mg BID  - Avoid agents that lower seizure threshold  - Management of infectious/metabolic abnormalities per [facility]  - Seizure precautions  - Per [place] Law, patient must refrain from driving for 6 months after having a seizure or cleared by a neurologist to legally resume driving    Discussed plan of care with [facility] team. Will follow, please call with questions.     Paroxysmal atrial fibrillation  - Management per [facility], on Metoprolol BID    COPD exacerbation  - Management per [facility], nebs, ABG/CXR    Acute hypercapnic respiratory failure  - Intubated, vent management per [facility]  - Daily CXR/ABG        VTE Risk Mitigation (From admission, onward)           Ordered     heparin (porcine) injection 5,000 Units  Every 12 hours         06/13/24 1012     IP VTE HIGH RISK PATIENT  Once         06/08/24 1629     Place sequential compression device  Until discontinued          06/08/24 1629                    Pallavi Noel PA-C  Neurology-Epilepsy  [facility]  Staff: Dr. Stewart

## 2024-06-13 NOTE — ASSESSMENT & PLAN NOTE
-Intubated 2/2 hypoxemia + hypercapnia   -Current vent settings below  Vent Mode: A/C  Oxygen Concentration (%):  [40] 40  Resp Rate Total:  [18 br/min-20 br/min] 18 br/min  Vt Set:  [320 mL] 320 mL  PEEP/CPAP:  [5 cmH20] 5 cmH20  Mean Airway Pressure:  [8.2 cmH20-9.2 cmH20] 8.7 cmH20    -Famotidine BID for ulcer PPX  -VAP prevention per protocol  -CXR/ABGs daily

## 2024-06-13 NOTE — SUBJECTIVE & OBJECTIVE
Interval History: Ketamine added overnight, EEG with burst suppression pattern with periodic lateralized epileptiform discharges over the left. Continue to optimize AED/anesthetic regimen.     Current Facility-Administered Medications   Medication Dose Route Frequency Provider Last Rate Last Admin    0.9%  NaCl infusion   Intravenous Continuous Carolee Oconnell  mL/hr at 06/13/24 1054 New Bag at 06/13/24 1054    acetaminophen tablet 650 mg  650 mg Per NG tube Q4H PRN Savage Reyes MD        albuterol-ipratropium 2.5 mg-0.5 mg/3 mL nebulizer solution 3 mL  3 mL Nebulization Q6H Carolee Oconnell NP   3 mL at 06/13/24 1202    atorvastatin tablet 40 mg  40 mg Per NG tube Daily Марина Flores MD   40 mg at 06/13/24 1024    dextrose 10% bolus 125 mL 125 mL  12.5 g Intravenous PRN Марина Flores MD        dextrose 10% bolus 250 mL 250 mL  25 g Intravenous PRN Марина Flores MD        fentaNYL 50 mcg/mL injection 25 mcg  25 mcg Intravenous Q1H PRN Christiano Jensen PA-C        glucagon (human recombinant) injection 1 mg  1 mg Intramuscular PRN Марина Flores MD        glucose chewable tablet 16 g  16 g Per NG tube PRN Savage Reyes MD        glucose chewable tablet 24 g  24 g Per NG tube PRN Savage Reyes MD        heparin (porcine) injection 5,000 Units  5,000 Units Subcutaneous Q12H Carolee Oconnell NP   5,000 Units at 06/13/24 1025    ketamine 5,000 mg in sodium chloride 0.9% 500 mL infusion  50 mcg/kg/min Intravenous Continuous Carolee Oconnell NP 14.1 mL/hr at 06/13/24 1208 50 mcg/kg/min at 06/13/24 1208    lacosamide tablet 200 mg  200 mg Per NG tube Q12H Christiano Jensen PA-C   200 mg at 06/13/24 1024    lactated ringers bolus 1,000 mL  1,000 mL Intravenous Once Carolee Oconnell  mL/hr at 06/13/24 1314 1,000 mL at 06/13/24 1314    levETIRAcetam injection 1,500 mg  1,500 mg Intravenous Q12H Quinten Haji MD   1,500 mg at 06/13/24 0914    magnesium oxide tablet  800 mg  800 mg Per NG tube PRN Carolee Oconnell NP   800 mg at 06/13/24 1029    magnesium oxide tablet 800 mg  800 mg Per NG tube PRN Carolee Oconnell NP        naloxone 0.4 mg/mL injection 0.02 mg  0.02 mg Intravenous PRN Марина Flores MD        ondansetron injection 4 mg  4 mg Intravenous Q8H PRN Марина Flores MD        pantoprazole injection 40 mg  40 mg Intravenous BID Carolee Oconnell NP        Followed by    [START ON 6/16/2024] pantoprazole suspension 40 mg  40 mg Per NG tube Daily Carolee Oconnell NP        perampaneL tablet 12 mg  12 mg Per NG tube Daily Carolee Oconnell NP   12 mg at 06/13/24 1054    piperacillin-tazobactam (ZOSYN) 4.5 g in dextrose 5 % in water (D5W) 100 mL IVPB (MB+)  4.5 g Intravenous Q8H MickalChristiano PA-C 25 mL/hr at 06/13/24 1227 4.5 g at 06/13/24 1227    polyethylene glycol packet 17 g  17 g Per NG tube Daily Savage Reyes MD   17 g at 06/13/24 1024    potassium bicarbonate disintegrating tablet 35 mEq  35 mEq Per NG tube PRN Carolee Oconnell NP        potassium bicarbonate disintegrating tablet 50 mEq  50 mEq Per NG tube PRN Carolee Oconnell NP   50 mEq at 06/13/24 0519    potassium bicarbonate disintegrating tablet 60 mEq  60 mEq Per NG tube PRN Carolee Oconnell NP   60 mEq at 06/12/24 0516    potassium, sodium phosphates 280-160-250 mg packet 2 packet  2 packet Per NG tube PRN Carolee Oconnell NP        potassium, sodium phosphates 280-160-250 mg packet 2 packet  2 packet Per NG tube PRN Carolee Oconnell NP   2 packet at 06/12/24 1211    potassium, sodium phosphates 280-160-250 mg packet 2 packet  2 packet Per NG tube PRN Carolee Oconnell NP        propofol (DIPRIVAN) 10 mg/mL infusion (NON-TITRATING)  50 mcg/kg/min Intravenous Continuous Carolee Oconnell NP 14.1 mL/hr at 06/13/24 1100 50 mcg/kg/min at 06/13/24 1100    sodium chloride 0.9% flush 10 mL  10 mL Intravenous Q12H PRN Марина Flores MD        thiamine tablet 100 mg  100  mg Per NG tube Daily Марина Flores MD   100 mg at 06/13/24 1025    vancomycin - pharmacy to dose   Intravenous pharmacy to manage frequency Christiano Jensen PA-C        vancomycin 750 mg in dextrose 5 % (D5W) 250 mL IVPB (Vial-Mate)  750 mg Intravenous Q12H Savage Reyes MD 5 mL/hr at 06/13/24 0605 Rate Verify at 06/13/24 0605     Continuous Infusions:   sodium chloride 0.9%   Intravenous Continuous 100 mL/hr at 06/13/24 1054 New Bag at 06/13/24 1054    ketamine 5,000 mg in sodium chloride 0.9% 500 mL infusion  50 mcg/kg/min Intravenous Continuous 14.1 mL/hr at 06/13/24 1208 50 mcg/kg/min at 06/13/24 1208    propofol  50 mcg/kg/min Intravenous Continuous 14.1 mL/hr at 06/13/24 1100 50 mcg/kg/min at 06/13/24 1100       Review of Systems   Unable to perform ROS: Intubated     Objective:     Vital Signs (Most Recent):  Temp: 99.3 °F (37.4 °C) (06/13/24 1205)  Pulse: 85 (06/13/24 1205)  Resp: 18 (06/13/24 1205)  BP: 139/66 (06/13/24 1205)  SpO2: 97 % (06/13/24 1205) Vital Signs (24h Range):  Temp:  [96.1 °F (35.6 °C)-99.7 °F (37.6 °C)] 99.3 °F (37.4 °C)  Pulse:  [65-91] 85  Resp:  [18-19] 18  SpO2:  [97 %-100 %] 97 %  BP: (135-184)/(60-82) 139/66  Arterial Line BP: (105-178)/(47-73) 111/51     Weight: 47 kg (103 lb 9.9 oz)  Body mass index is 21.66 kg/m².     Physical Exam  Constitutional:       General: She is not in acute distress.     Appearance: She is ill-appearing. She is not diaphoretic.      Interventions: She is sedated and intubated.   HENT:      Head: Normocephalic and atraumatic.      Comments: EEG in place  Eyes:      General: No scleral icterus.     Conjunctiva/sclera: Conjunctivae normal.      Pupils: Pupils are equal, round, and reactive to light.   Cardiovascular:      Rate and Rhythm: Normal rate.   Pulmonary:      Effort: Pulmonary effort is normal. No respiratory distress. She is intubated.      Comments: Intubated  Musculoskeletal:         General: No deformity or signs of injury.   Skin:      General: Skin is warm and dry.   Psychiatric:      Comments: Unable to assess            NEUROLOGICAL EXAMINATION:     CRANIAL NERVES     CN III, IV, VI   Pupils are equal, round, and reactive to light.       Comatose   CN:   No blink to threat OU   JUAN RAMON OU   Corneal absent OU  No cough, gag   No spontaneous eye opening   No withdrawal to noxious stimuli in extremities    Exam findings to suggest seizures:  Myoclonus - no   eye twitching - no   Nystagmus - no   gaze deviation - no   waxy rigidity - no        Significant Labs: All pertinent lab results from the past 24 hours have been reviewed.    Significant Studies: I have reviewed all pertinent imaging results/findings within the past 24 hours.

## 2024-06-13 NOTE — ASSESSMENT & PLAN NOTE
HPI  Via COVID-19 Telephone Visit      Emani Carolina is a 42 year old female presenting via televisit for f/u regarding Postsurgical hypothyroidism. Last TSH was 0.748 on 5/8/2020. Pt is currently taking Levothyroxine 125mcg daily at [facility].   Pt does not report any fatigue, cold intolerance, drowsiness, weight gain, n/v/d/c, voice change, or weakness.      Pt has a history of Vit D deficiancy and is currently taking cholecalciferol.      Denies Sx of COVID-19 including fever, cough, SOB, and denies any recent travels. Pt denies having come in contact with anyone who has tested positive.     No results found for this visit on 05/13/20.    Past Medical History:   Diagnosis Date   • Thyroid cancer (CMS/HCC)    • Vitamin D deficiency        Current Outpatient Medications   Medication Sig Dispense Refill   • levothyroxine (SYNTHROID, LEVOTHROID) 125 MCG tablet Take 1 tablet by mouth daily. 30 tablet 3   • VITAMIN D, CHOLECALCIFEROL, PO Take 5,000 Units by mouth daily.      • HYDROcodone-acetaminophen (NORCO) 5-325 MG per tablet TK 1 T PO Q 4 H PRN P  0   • fluticasone (FLONASE) 50 MCG/ACT nasal spray Spray 1 spray in each nostril daily. 16 g 12   • predniSONE (DELTASONE) 10 MG tablet Take 1 tablet by mouth 2 times daily. 10 tablet 0   • cyclobenzaprine (FLEXERIL) 10 MG tablet Take one at bedtime as needed for back spasms 30 tablet 0   • naproxen (NAPROSYN) 500 MG tablet Take 1 tablet by mouth 2 times daily. 60 tablet 5   • vitamin E 1000 units capsule Take 1,000 Units by mouth daily. Taking 2000 IU daily       No current facility-administered medications for this visit.        Family History   Problem Relation Age of Onset   • Stomach Cancer Mother    • Cancer, Pancreatic Brother        Social History     Socioeconomic History   • Marital status:      Spouse name: Not on file   • Number of children: Not on file   • Years of education: Not on file   • Highest education level: Not on file   Occupational History  Hypothermic to 33.9C; hypotensive to MAP 40 following intubation. Slight improvement w/ fluid resuscitation and use of pressors.     -Pan cultured 6/11  -Sputum gram stain w/ rare gram + cocci  -Blood cultures w/ NGTD  -Continue vanc/zosyn  -Kia kay w/ goal normothermia  -Levophed to keep MAP > 65     • Not on file   Social Needs   • Financial resource strain: Not on file   • Food insecurity:     Worry: Not on file     Inability: Not on file   • Transportation needs:     Medical: Not on file     Non-medical: Not on file   Tobacco Use   • Smoking status: Never Smoker   • Smokeless tobacco: Never Used   Substance and Sexual Activity   • Alcohol use: No     Frequency: Never   • Drug use: Never   • Sexual activity: Yes     Partners: Male   Lifestyle   • Physical activity:     Days per week: 0 days     Minutes per session: 0 min   • Stress: Not on file   Relationships   • Social connections:     Talks on phone: Not on file     Gets together: Not on file     Attends Rastafari service: Not on file     Active member of club or organization: Not on file     Attends meetings of clubs or organizations: Not on file     Relationship status: Not on file   • Intimate partner violence:     Fear of current or ex partner: Not on file     Emotionally abused: Not on file     Physically abused: Not on file     Forced sexual activity: Not on file   Other Topics Concern   • Not on file   Social History Narrative   • Not on file     Via Telephone  Review of Systems   Constitutional: Negative for fever.   Respiratory: Negative for cough.    All other systems reviewed and are negative.    Physical Exam:  Via telephone visit, pt reports no change from previous exam      Lab Results   Component Value Date    TSH 0.748 05/08/2020    T4FREE 0.9 02/01/2020       No orders of the defined types were placed in this encounter.      Problem List Items Addressed This Visit     None           A/P:  Emani Carolina is a 42 year old female  referred by Rachel Jaramillo PA-C. Presents via telephone for follow up for Isthmus Thyroid Nodules    Postsurgical hypothyroidism/Papillary thyroid cancer/ s/p total thyroidectomy in 1/2020  Thyroid US on 10/28/2019 showed:   - isthmus nodule measuring (1.8 x 0.3 x 1.6 cm) - TR4.   [facility] Bx on 12/10/2019 reported:    - isthmus nodule suspicious for malignancy. 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  Pt had total thyroidectomy performed by Dr Joe Campoverde on 1/2/2020  Surgical pathology reported papillary thyroid carcinoma however pattern of sclerosis raises possibility of amyloid--medullary thyroid carcinoma. No lymphatic invasion identified   Discussed report with pathologist to clarify that there was not any extracapsular invasion-- no lymph nodes were removed    Previously recommended neck stretching exercises due to feeling in her neck  Previously discussed treatment options of STEIN to prevent recurrence  Because pt has stage 1 thyroid cancer and there is no extracapsular invasion and apparently no lymph node was dissected, pt unlikely needs STEIN tx.   Will cont to monitor blood tests  TG 1.4, TG AB <0.9 on 5/8/2020  TSH Goal is 0.5 - 0.1  Last TSH was 0.748 on 5/8/2020  FT4 0.9 on 2/1/2020   Will check thyroglobulin and calcitonin   Recommend Mederma cream for surgical scar  Continue levothyroxine 125 mcg daily   Follow-up in 6 weeks with blood tests completed and thyroid US    Vitamin D Deficiency  Vit D 32.7 on 5/8/2020  Ca 8.5 on 5/8/2020  On Vitamin D supplement daily   No need for calcium supplement at this time  Will monitor calcium and VitD  Recommend fersulfate supplement OTC   Follow up in 2 months via video visit with blood tests completed    Via COVID-19 Telephone Visit     Pt agreed to have telephone visit and scribe.   Spoke with Emani Carolina   Pt is speaking from home  Physician is speaking from home office   Pt was called at: 1:40  Call lasted: 15 min  Billing code: [id number] GT modifier    Thank you Rachel Jaramillo PA-C for allowing me to participate in the care of the patient and for consultation. If any of the questions are unanswered please don't hesitate to give me a call.    Hira Blanchard MD, F.A.C.E;F.A.C.P  Assoc. Professor of Medicine, [facility]  Staff Endocrinologist,  [facility]  [place], [place]     Note: a copy of this note will be sent to referring physician.    Scribe Attestation: Entered by Unruly Glover, acting as scribe for Dr. Blanchard.    Provider Attestation: The documentation recorded by the scribe accurately reflects the service I personally performed and the decisions made by me, Jae Blanchard

## 2024-06-13 NOTE — ASSESSMENT & PLAN NOTE
-Intubated 2/2 hypoxemia + hypercapnia   -Current vent settings below  Vent Mode: A/C  Oxygen Concentration (%):  [40] 40  Resp Rate Total:  [18 br/min-31 br/min] 18 br/min  Vt Set:  [320 mL] 320 mL  PEEP/CPAP:  [5 cmH20] 5 cmH20  Mean Airway Pressure:  [8.7 cmH20-9.2 cmH20] 9.2 cmH20    -Famotidine BID for ulcer PPX  -VAP prevention per protocol  -CXR/ABGs daily

## 2024-06-13 NOTE — ASSESSMENT & PLAN NOTE
69 year old woman admitted from OSH for management of recurrent seizures, stepped up to [facility] 6/10 for further management in setting of respiratory failure. Initially admitted to OSH 5/23 after found unresponsive by family, complicated hospital course since that time with development of right upper extremity jerking movements prompting EEG.     Recommendations:  - Continuous vEEG monitoring - burst suppression pattern with periodic lateralized epileptiform discharges over the left hemisphere consistent with significant focal cortical irritability and epileptic potential  - Recommend to increase Perampanel to 12 mg daily  - Continue Propofol 50 mKm, Ketamine 35 mKm overnight  - Recommend to continue Lacosamide 200 mg BID, Levetiracetam 1500 mg BID  - Avoid agents that lower seizure threshold  - Management of infectious/metabolic abnormalities per [facility]  - Seizure precautions  - Per [place] Law, patient must refrain from driving for 6 months after having a seizure or cleared by a neurologist to legally resume driving    Discussed plan of care with [facility] team. Will follow, please call with questions.

## 2024-06-13 NOTE — ASSESSMENT & PLAN NOTE
-Blood noted in stool AM of 6/12; Hgb w/ subsequent drop from 14 to 10  -Pantoprazole load w/ 80mg; now 40 BID  -Hgb stable on CBC; serial CBC discontinued  -Continue to hold therapeutic lovenox  -SQH for DVT PPX

## 2024-06-14 PROBLEM — T68.XXXA HYPOTHERMIA: Status: ACTIVE | Noted: 2024-06-14

## 2024-06-14 NOTE — PROCEDURES
24 hr. Video EEG Monitoring    Date/Time: 6/8/2024 2:54 PM    Performed by: Martín Stewart MD  Authorized by: Quinten Haji MD        ICU EEG/VIDEO MONITORING REPORT    DATE OF SERVICE: 6/13/24-6/14/24  EEG NUMBER: [id number]  REQUESTED BY: Adithya  LOCATION OF SERVICE: [facility] [place]    METHODOLOGY   Electroencephalographic (EEG) recording is with electrodes placed according to the International 10-20 placement system.  Thirty two (32) channels of digital signal are simultaneously recorded from the scalp and may include EKG, EMG, and/or eye monitors.   Recording band pass was 0.1 to 512 hz.  Digital video recording of the patient is simultaneously recorded with the EEG.  The nursing staff report clinical symptoms and may press an event button when the patient has symptoms of clinical interest to the treating physicians.  EEG and video recording is stored and archived in digital format.  The entire recording is visually reviewed and the times identified by computer analysis as being spikes or seizures are reviewed again.  Activation procedures which include photic stimulation, hyperventilation and instructing patients to perform simple task are done in selected patients.   Compresses spectral analysis (CSA) is also performed on the activity recorded from each individual channel.  This is displayed as a power display of frequencies from 0 to 30 Hz over time.   The CSA analysis is done and displayed continuously.  This is reviewed for asymmetries in power between homologous areas of the scalp and for presence of changes in power which canbe seen when seizures occur.  Sections of suspected abnormalities on the CSA is then compared with the original EEG recording.     [employer] software was also utilized in the review of this study.  This software suite analyzes the EEG recording in multiple domains.  Coherence and rhythmicity is computed to identify EEG sections which may contain organized seizures.  Each channel  undergoes analysis to detect presence of spike and sharp waves which have special and morphological characteristic of epileptic activity.  The routine EEG recording is converted from spacial into frequency domain.  This is then displayed comparing homologous areas to identify areas of significant asymmetry.  Algorithm to identify non-cortically generated artifact is used to separate eye movement, EMG and other artifact from the EEG.      Recording Times  Start on 6/13/24 at 07:01:00  Stop on 6/14/24 at 07:00:04    A total of 23 hours of EEG was recorded.    EEG FINDINGS  The record initially shows a poor organization at rest with nearly continuous burst suppression with bursts of 2-5 seconds of low amplitude, sharply contoured delta and theta range activity.     State changes are not seen    Provocative maneuvers including hyperventilation and photic stimulation were not performed.     EKG recording shows a regular rhythm .    IMPRESSION:  Abnormal study due to burst suppression pattern consistent with current anesthetics.     6/14/2024

## 2024-06-14 NOTE — PLAN OF CARE
"[facility] Care Plan    POC reviewed with June Boykin at 0300. Pt  is confused and aphasic but verbalized understanding. See below and flowsheets for full assessment and VS info.     - See nursing note regarding home medications brought by family.  - amoi @0.5 - sinus malinda to nsr  - Partial bath and complete linen change performed.    Is this a stroke patient? yes- Stroke booklet reviewed with patient, risk factors identified for patient and stroke booklet remains at bedside for ongoing education.     Care individualization: Pt needs additional time to respond to questions. Pt using communication board    Neuro:  [place] Coma Scale  Best Eye Response: 1-->(E1) none  Best Motor Response: 1-->(M1) none  Best Verbal Response: 1-->(V1) none  [place] Coma Scale Score: 3  Assessment Qualifiers: patient chemically sedated or paralyzed, patient intubated, no eye obstruction present  Pupil PERRLA: no     24hr Temp:  [96.6 °F (35.9 °C)-99.3 °F (37.4 °C)]     CV:   Rhythm: normal sinus rhythm  BP goals:   SBP < 160  MAP > 65    Resp:      Vent Mode: A/C  Set Rate: 18 BPM  Oxygen Concentration (%): 40  Vt Set: 320 mL  PEEP/CPAP: 5 cmH20    Plan: N/A    GI/:     Diet/Nutrition Received: NPO  Last Bowel Movement: 06/12/24  Voiding Characteristics: due to void (not voiding spontaneously requiring straight caths multiple times a day)    Intake/Output Summary (Last 24 hours) at 6/14/2024 0713  Last data filed at 6/14/2024 0712  Gross per 24 hour   Intake 4964.12 ml   Output 1945 ml   Net 3019.12 ml     Unmeasured Output  Urine Occurrence: 2  Stool Occurrence: 0  Pad Count: 1    Labs/Accuchecks:  Recent Labs   Lab 06/14/24  0026   WBC 4.85   RBC 3.76*   HGB 8.6*   HCT 28.3*   *      Recent Labs   Lab 06/14/24  0026      K 3.3*   CO2 27      BUN 4*   CREATININE 0.6   ALKPHOS 27*   ALT 10   AST 25   BILITOT 0.3    No results for input(s): "PROTIME", "INR", "APTT", "HEPANTIXA" in the last 168 hours. No results " "for input(s): "CPK", "CPKMB", "TROPONINI", "MB" in the last 168 hours.    Electrolytes: Electrolytes replaced  Accuchecks: none    Gtts:   sodium chloride 0.9%   Intravenous Continuous 100 mL/hr at 24 07 Rate Verify at 24    ketamine 5,000 mg in sodium chloride 0.9% 500 mL infusion  50 mcg/kg/min Intravenous Continuous 14.1 mL/hr at 24 0712 50 mcg/kg/min at 24    NORepinephrine bitartrate-D5W  0-3 mcg/kg/min Intravenous Continuous 3.5 mL/hr at 24 07 0.02 mcg/kg/min at 24    propofol  50 mcg/kg/min Intravenous Continuous 14.1 mL/hr at 24 07 50 mcg/kg/min at 24       LDA/Wounds:    Nurses Note -- 4 Eyes    Is there altered skin present? yes     Please check the following boxes that apply:   [] LDA Added if Not in Epic (Describe Wound)   [] New Altered Skin Integrity was Present on Admit and Documented in LDA   [] Wound Image Taken    Wound Care Consulted? Yes yes - triad cream applied    Second RN/Staff Member: Rome JOHNSON    Restraints:   Restraint Order  Length of Order: Order good for next 24 hours or when removed.  Date that the current order will : 24  Time that the current order will : 807  Order Upon Application: Yes    WC   "

## 2024-06-14 NOTE — PLAN OF CARE
Pt is not medically ready to discharge.   SW will continue to monitor pt needs .    Discharge Plan A and Plan B have been determined by review of patient's clinical status, future medical and therapeutic needs, and coverage/benefits for post-acute care in coordination with multidisciplinary team members.    Harmony Damon MSW, TERRYW  [facility]  Case Management Dept.
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
## 2024-06-14 NOTE — PROGRESS NOTES
[facility] - [facility] Critical Care  Neurology-Epilepsy  Progress Note    Patient Name: June Boykin  MRN: [id number]  Admission Date: 6/8/2024  Hospital Length of Stay: 6 days  Code Status: Partial Code   Attending Provider: Savage Reyes MD  Primary Care Physician: Mauricio Pierre MD   Principal Problem:Status epilepticus    Subjective:     Hospital Course:   6/6>6/7: EEG at OSH - continuous lateralized periodic discharges in the left hemisphere consistent with a highly irritable epileptogenic focus in this region, often time locked with clonic jerking of the RUE making it ictal in nature  6/7>6/8: EEG CAP at OSH - underlying bilateral/generalized epileptiform activity with potential to evolve into status epilepticus  6/8>6/9: Periodic lateralized epileptiform discharges over the left hemisphere consistent with significant focal cortical irritability and epileptic potential, frequent focal seizures with onset over the left hemisphere  6/9>6/10: EEG report pending  6/10>6/11: Frequent focal seizures up to 6 per hour with onset over the left hemisphere  6/11>6/12: Periodic lateralized epileptiform discharges over the left hemisphere consistent with significant cortical irritability and epileptic potential, burst suppression pattern due to anesthetics  6/12>6/13: Periodic lateralized epileptiform discharges over the left hemisphere consistent with significant cortical irritability and epileptic potential and burst suppression pattern due to anesthetics  6/13>6/14: Burst suppression pattern    Interval History: EEG with burst suppression pattern overnight, plan to continue current rate anesthetics overnight, begin anesthetic wean tomorrow pending EEG stability.    Current Facility-Administered Medications   Medication Dose Route Frequency Provider Last Rate Last Admin    0.9%  NaCl infusion   Intravenous Continuous Carolee Oconnell  mL/hr at 06/14/24 1301 Rate Verify at 06/14/24 1301     acetaminophen tablet 650 mg  650 mg Per NG tube Q4H PRN Savage Reyes MD        albuterol-ipratropium 2.5 mg-0.5 mg/3 mL nebulizer solution 3 mL  3 mL Nebulization Q6H PRN Lucila Elena PA-C        atorvastatin tablet 40 mg  40 mg Per NG tube Daily Марина Flores MD   40 mg at 06/14/24 0842    calcium gluconate 1 g in NS IVPB (premixed)  1 g Intravenous PRN Savage Reyes MD   Stopped at 06/14/24 1032    calcium gluconate 1 g in NS IVPB (premixed)  2 g Intravenous PRN Savage Reyes MD        calcium gluconate 1 g in NS IVPB (premixed)  3 g Intravenous PRN Savage Reyes MD        dextrose 10% bolus 125 mL 125 mL  12.5 g Intravenous PRN Марина Flores MD        dextrose 10% bolus 250 mL 250 mL  25 g Intravenous PRN Марина Flores MD        fentaNYL 50 mcg/mL injection 25 mcg  25 mcg Intravenous Q1H PRN Christiano Jensen PA-C        glucagon (human recombinant) injection 1 mg  1 mg Intramuscular PRN Марина Flores MD        glucose chewable tablet 16 g  16 g Per NG tube PRN Savage Reyes MD        glucose chewable tablet 24 g  24 g Per NG tube PRN Savage Reyes MD        heparin (porcine) injection 5,000 Units  5,000 Units Subcutaneous Q12H Carolee Oconnell NP   5,000 Units at 06/14/24 0843    ketamine 5,000 mg in sodium chloride 0.9% 500 mL infusion  50 mcg/kg/min Intravenous Continuous Carolee Oconnell NP 14.1 mL/hr at 06/14/24 1301 50 mcg/kg/min at 06/14/24 1301    lacosamide tablet 200 mg  200 mg Per NG tube Q12H Christiano Jensen PA-C   200 mg at 06/14/24 0843    levETIRAcetam injection 1,500 mg  1,500 mg Intravenous Q12H Quinten Haji MD   1,500 mg at 06/14/24 0842    magnesium oxide tablet 800 mg  800 mg Per NG tube PRN Carolee Oconnell NP   800 mg at 06/13/24 1029    magnesium oxide tablet 800 mg  800 mg Per NG tube PRN Carolee Oconnell NP        naloxone 0.4 mg/mL injection 0.02 mg  0.02 mg Intravenous PRN Марина Flores MD         NORepinephrine 4 mg in dextrose 5% 250 mL infusion (premix)  0-3 mcg/kg/min Intravenous Continuous Carolee Oconnell NP 1.8 mL/hr at 06/14/24 1301 0.01 mcg/kg/min at 06/14/24 1301    ondansetron injection 4 mg  4 mg Intravenous Q8H PRN Марина Flores MD        pantoprazole injection 40 mg  40 mg Intravenous BID Lucila Elena PA-C        pantoprazole suspension 40 mg  40 mg Per NG tube BID Lucila Elena PA-C        perampaneL tablet 12 mg  12 mg Per NG tube Daily Carolee Oconnell NP   12 mg at 06/14/24 0855    polyethylene glycol packet 17 g  17 g Per NG tube Daily Savage Reyes MD   17 g at 06/14/24 0842    potassium bicarbonate disintegrating tablet 35 mEq  35 mEq Per NG tube PRN Carolee Oconnell NP   35 mEq at 06/14/24 0434    potassium bicarbonate disintegrating tablet 50 mEq  50 mEq Per NG tube PRN Carolee Oconnell NP   50 mEq at 06/13/24 0519    potassium bicarbonate disintegrating tablet 60 mEq  60 mEq Per NG tube PRN Carolee Oconnell NP   60 mEq at 06/12/24 0516    potassium, sodium phosphates 280-160-250 mg packet 2 packet  2 packet Per NG tube PRN Carolee Oconnell NP        potassium, sodium phosphates 280-160-250 mg packet 2 packet  2 packet Per NG tube PRN Carolee Oconnell NP   2 packet at 06/12/24 1211    potassium, sodium phosphates 280-160-250 mg packet 2 packet  2 packet Per NG tube PRN Carolee Oconnell NP        propofol (DIPRIVAN) 10 mg/mL infusion (NON-TITRATING)  50 mcg/kg/min Intravenous Continuous Carolee Oconnell NP 14.1 mL/hr at 06/14/24 1301 50 mcg/kg/min at 06/14/24 1301    sodium chloride 0.9% flush 10 mL  10 mL Intravenous Q12H PRN Марина Flores MD        thiamine tablet 100 mg  100 mg Per NG tube Daily Марина Flores MD   100 mg at 06/14/24 0842     Continuous Infusions:   sodium chloride 0.9%   Intravenous Continuous 100 mL/hr at 06/14/24 1301 Rate Verify at 06/14/24 1301    ketamine 5,000 mg in sodium chloride 0.9% 500 mL infusion   50 mcg/kg/min Intravenous Continuous 14.1 mL/hr at 06/14/24 1301 50 mcg/kg/min at 06/14/24 1301    NORepinephrine bitartrate-D5W  0-3 mcg/kg/min Intravenous Continuous 1.8 mL/hr at 06/14/24 1301 0.01 mcg/kg/min at 06/14/24 1301    propofol  50 mcg/kg/min Intravenous Continuous 14.1 mL/hr at 06/14/24 1301 50 mcg/kg/min at 06/14/24 1301       Review of Systems   Unable to perform ROS: Intubated     Objective:     Vital Signs (Most Recent):  Temp: 98.4 °F (36.9 °C) (06/14/24 1301)  Pulse: 67 (06/14/24 1301)  Resp: 18 (06/14/24 1137)  BP: 139/62 (06/14/24 1205)  SpO2: 98 % (06/14/24 1301) Vital Signs (24h Range):  Temp:  [96.6 °F (35.9 °C)-99 °F (37.2 °C)] 98.4 °F (36.9 °C)  Pulse:  [61-80] 67  Resp:  [18] 18  SpO2:  [95 %-100 %] 98 %  BP: (119-166)/(55-74) 139/62  Arterial Line BP: (101-138)/(24-63) 114/53     Weight: 47 kg (103 lb 9.9 oz)  Body mass index is 21.66 kg/m².     Physical Exam  Constitutional:       General: She is not in acute distress.     Appearance: She is ill-appearing. She is not diaphoretic.      Interventions: She is sedated and intubated.   HENT:      Head: Normocephalic and atraumatic.      Comments: EEG in place  Eyes:      General: No scleral icterus.     Conjunctiva/sclera: Conjunctivae normal.      Pupils: Pupils are equal, round, and reactive to light.   Cardiovascular:      Rate and Rhythm: Normal rate.   Pulmonary:      Effort: Pulmonary effort is normal. No respiratory distress. She is intubated.      Comments: Intubated  Musculoskeletal:         General: No deformity or signs of injury.   Skin:     General: Skin is warm and dry.   Psychiatric:      Comments: Unable to assess       NEUROLOGICAL EXAMINATION:     CRANIAL NERVES     CN III, IV, VI   Pupils are equal, round, and reactive to light.       Comatose   CN:   No blink to threat OU   JUAN RAMON OU   Corneal absent OU  No cough, gag   No spontaneous eye opening   No withdrawal to noxious stimuli in extremities    Exam findings to suggest  seizures:  Myoclonus - no   eye twitching - no   Nystagmus - no   gaze deviation - no   waxy rigidity - no        Significant Labs: All pertinent lab results from the past 24 hours have been reviewed.    Significant Studies: I have reviewed all pertinent imaging results/findings within the past 24 hours.  Assessment and Plan:     Focal seizures  Status epilepticus  69 year old woman admitted from OSH for management of recurrent seizures, stepped up to [facility] 6/10 for further management in setting of respiratory failure. Initially admitted to OSH 5/23 after found unresponsive by family, complicated hospital course since that time with development of right upper extremity jerking movements prompting EEG.     Recommendations:  - Continuous vEEG monitoring - burst suppression pattern overnight  - Continue Propofol 50 mKm, Ketamine 50 mKm overnight  - Recommend to continue Lacosamide 200 mg BID, Levetiracetam 1500 mg BID, Perampanel 12 mg daily  - Avoid agents that lower seizure threshold  - Management of infectious/metabolic abnormalities per [facility]  - Seizure precautions  - Per [place] Law, patient must refrain from driving for 6 months after having a seizure or cleared by a neurologist to legally resume driving    Discussed plan of care with [facility] team. Will follow, please call with questions.     Paroxysmal atrial fibrillation  - Management per [facility]    COPD exacerbation  - Management per [facility], nebs, ABG/CXR    Acute hypercapnic respiratory failure  - Intubated, vent management per [facility]  - Daily CXR/ABG        VTE Risk Mitigation (From admission, onward)           Ordered     heparin (porcine) injection 5,000 Units  Every 12 hours         06/13/24 1012     IP VTE HIGH RISK PATIENT  Once         06/08/24 1629     Place sequential compression device  Until discontinued         06/08/24 1629                    Pallavi Noel PA-C  Neurology-Epilepsy  [facility] - Neuro Critical Care  Staff: Dr. Stewart

## 2024-06-14 NOTE — PLAN OF CARE
[facility] Care Plan    POC reviewed with June Lopez Ashutosh and family at 1400. Pt verbalized understanding. Questions and concerns addressed. No acute events today. Pt progressing toward goals. Will continue to monitor. See below and flowsheets for full assessment and VS info.     - Ketamine continued at set rate of 50mcg/kg/min  - Propofol gtt continued at set rate of 50 mcg/kg/min  - Levo gtt titrated to maintain MAP > 65 and currently infusing at 0.01 mcg/kg/min  - 500 ml NS bolus administered per order  - ionized calcium 1.05; 1 gram Calcium gluconate administered  - TF advanced to 45 ml/hr per order  - Flexi placed d/t 3 episodes of liquid stool  - Patient transferred to immerse bed per wound care order  - linen changed/wound care completed          Is this a stroke patient? no    Neuro:  Carlyle Coma Scale  Best Eye Response: 1-->(E1) none  Best Motor Response: 1-->(M1) none  Best Verbal Response: 1-->(V1) none  Carlyle Coma Scale Score: 3  Assessment Qualifiers: patient chemically sedated or paralyzed, patient intubated  Pupil PERRLA: no     24 hr Temp:  [96.6 °F (35.9 °C)-99.3 °F (37.4 °C)]     CV:   Rhythm: normal sinus rhythm  BP goals:   SBP < 160  MAP > 65    Resp:      Vent Mode: A/C  Set Rate: 18 BPM  Oxygen Concentration (%): 40  Vt Set: 320 mL  PEEP/CPAP: 5 cmH20    Plan: status epilepticus    GI/:     Diet/Nutrition Received: tube feeding, NPO  Last Bowel Movement: 06/14/24  Voiding Characteristics: unable to void    Intake/Output Summary (Last 24 hours) at 6/14/2024 1757  Last data filed at 6/14/2024 1705  Gross per 24 hour   Intake 6945.15 ml   Output 1195 ml   Net 5750.15 ml     Unmeasured Output  Urine Occurrence: 2  Stool Occurrence: 1  Pad Count: 1    Labs/Accuchecks:  Recent Labs   Lab 06/14/24  0026   WBC 4.85   RBC 3.76*   HGB 8.6*   HCT 28.3*   *      Recent Labs   Lab 06/14/24  0026 06/14/24  0817     --    K 3.3* 4.2   CO2 27  --      --    BUN 4*  --    CREATININE  "0.6  --    ALKPHOS 27*  --    ALT 10  --    AST 25  --    BILITOT 0.3  --     No results for input(s): "PROTIME", "INR", "APTT", "HEPANTIXA" in the last 168 hours. No results for input(s): "CPK", "CPKMB", "TROPONINI", "MB" in the last 168 hours.    Electrolytes: Electrolytes replaced  Accuchecks: none    Gtts:   sodium chloride 0.9%   Intravenous Continuous 100 mL/hr at 06/14/24 1716 New Bag at 06/14/24 1716    ketamine 5,000 mg in sodium chloride 0.9% 500 mL infusion  50 mcg/kg/min Intravenous Continuous 14.1 mL/hr at 06/14/24 1705 50 mcg/kg/min at 06/14/24 1705    NORepinephrine bitartrate-D5W  0-3 mcg/kg/min Intravenous Continuous 1.8 mL/hr at 06/14/24 1705 0.01 mcg/kg/min at 06/14/24 1705    propofol  50 mcg/kg/min Intravenous Continuous 14.1 mL/hr at 06/14/24 1705 50 mcg/kg/min at 06/14/24 1705       LDA/Wounds:      Nurses Note -- 4 Eyes      Is there altered skin present? yes     Please check the following boxes that apply:   [] LDA Added if Not in Epic (Describe Wound)   [] New Altered Skin Integrity was Present on Admit and Documented in LDA   [] Wound Image Taken    Wound Care Consulted? Yes    Second RN/Staff Member:  Terrell JOHNSON      Restraints:       DOLORES    "

## 2024-06-14 NOTE — CONSULTS
[facility] - Neuro Critical Care  Wound Care    Patient Name:  June Boykin   MRN:  [id number]  Date: 6/14/2024  Diagnosis: Status epilepticus    History:     Past Medical History:   Diagnosis Date    Abdominal pain 05/2024    Acid reflux 05/2024    ACP (advance care planning) 6/1/2024    Anxiety disorder, unspecified     At high risk for falls     Emphysema, unspecified     Focal seizures 6/7/2024    History of opioid abuse     Hypertension     On home oxygen therapy     2l/nc    Wears partial dentures     upper-none on lower    Weight loss 05/2024       Social History     Socioeconomic History    Marital status:    Tobacco Use    Smoking status: Former     Current packs/day: 1.00     Types: Cigarettes    Smokeless tobacco: Never    Tobacco comments:     Quit 2 years ago   Substance and Sexual Activity    Alcohol use: No    Drug use: Not Currently     Types: Oxycodone, Hydrocodone     Social Determinants of Health     Financial Resource Strain: Patient Unable To Answer (6/10/2024)    Overall Financial Resource Strain (CARDIA)     Difficulty of Paying Living Expenses: Patient unable to answer   Food Insecurity: Patient Unable To Answer (6/10/2024)    Hunger Vital Sign     Worried About Running Out of Food in the Last Year: Patient unable to answer     Ran Out of Food in the Last Year: Patient unable to answer   Transportation Needs: Patient Unable To Answer (6/10/2024)    TRANSPORTATION NEEDS     Transportation : Patient unable to answer   Physical Activity: Patient Unable To Answer (6/10/2024)    Exercise Vital Sign     Days of Exercise per Week: Patient unable to answer     Minutes of Exercise per Session: Patient unable to answer   Stress: Patient Unable To Answer (6/10/2024)    [employer] - Occupational Stress Questionnaire     Feeling of Stress : Patient unable to answer   Housing Stability: Patient Unable To Answer (6/10/2024)    Housing Stability Vital Sign     Unable  to Pay for Housing in the Last Year: Patient unable to answer     Homeless in the Last Year: Patient unable to answer       Precautions:     Allergies as of 06/07/2024    (No Known Allergies)       [facility] Assessment Details/Treatment     Patient seen for wound care consultation for sacrum.   Reviewed chart for this encounter.   See Flow Sheet for findings.    Pt found lying in bed, OK for care at this time. Pt turned into lateral position and cleansed w/ no rinse bath wipes d/t liquid stool incontinence. Pt w/ incontinence associated dermatitis to andrew-rectal region, will order Triad for moisture barrier protection.  Sacral foam dressing removed - skin is intact, however, non-blanchable w/ purple discoloration. Will order BPCO/foam dressings for continued care.  Pt returned to supine position and turned w/ wedge use for pressure offloading. Heel lift boots in place. Immerse surface ordered.    RECOMMENDATIONS:  BID/PRN - andrew-rectal region - cleanse gently w/ soap and water, pat dry and apply Triad to affected area, leave DIGNA.    BID/PRN - sacrum - cleanse gently w/ soap and water, pat dry and apply BPCO to areas of purple/maroon discoloration. Cover w/ foam dressing.    Q2hr turns.    Immerse.     Discussed POC with patient and primary nurse.   See EMR for orders & patient education.    Bedside nursing to continue care & monitoring.  Bedside nursing to maintain pressure injury prevention interventions. Skin integrity PARRISH notified.       06/14/24 1033   WOCN Assessment   WOCN Total Time (mins) 30   Visit Date 06/14/24   Visit Time 1033   Consult Type New   WOCN Speciality Wound   Intervention assessed;applied;changed;chart review;coordination of care;orders   Teaching on-going        Wound 06/10/24 2250 Moisture associated dermatitis midline Perirectal #1   Date First Assessed/Time First Assessed: 06/10/24 2250   Present on Original Admission: Yes  Primary Wound Type: Moisture associated dermatitis  Orientation:  midline  Location: (c) Perirectal  Wound Number: #1   Drainage Amount None   Drainage Characteristics/Odor No odor   Appearance Pink;Red;Moist   Tissue loss description Partial thickness   Periwound Area Intact;Moist   Wound Edges Irregular;Open   Care Cleansed with:;Soap and water        Wound 06/14/24 1030 Pressure Injury Sacral spine   Date First Assessed/Time First Assessed: 06/14/24 1030   Primary Wound Type: Pressure Injury  Location: Sacral spine   Wound Image    Pressure Injury Stage DTPI   Dressing Appearance Dry;Intact;Clean   Drainage Amount None   Drainage Characteristics/Odor No odor   Appearance Intact;Maroon;Purple   Tissue loss description Not applicable   Periwound Area Intact;Moist   Care Cleansed with:;Soap and water   Dressing Applied;Foam

## 2024-06-14 NOTE — ASSESSMENT & PLAN NOTE
Status epilepticus  69 year old woman admitted from OSH for management of recurrent seizures, stepped up to [facility] 6/10 for further management in setting of respiratory failure. Initially admitted to OSH 5/23 after found unresponsive by family, complicated hospital course since that time with development of right upper extremity jerking movements prompting EEG.     Recommendations:  - Continuous vEEG monitoring - burst suppression pattern overnight  - Continue Propofol 50 mKm, Ketamine 50 mKm overnight  - Recommend to continue Lacosamide 200 mg BID, Levetiracetam 1500 mg BID, Perampanel 12 mg daily  - Avoid agents that lower seizure threshold  - Management of infectious/metabolic abnormalities per [facility]  - Seizure precautions  - Per [place] Law, patient must refrain from driving for 6 months after having a seizure or cleared by a neurologist to legally resume driving    Discussed plan of care with [facility] team. Will follow, please call with questions.

## 2024-06-14 NOTE — ASSESSMENT & PLAN NOTE
-2/2 propofol and ketamine  -Levophed to keep MAP > 65  06/14: bolus fluids periodically if has to restart levo

## 2024-06-14 NOTE — ASSESSMENT & PLAN NOTE
-A-fib w/ RVR @ OSH requiring diltiazem infusion  -Currently NSR on telemetry  -Continue to hold metoprolol 2/2 current pressor need  -Therapeutic lovenox discontinued 2/2 GI bleed  Presently with reasonable rate control off meds, 06/14

## 2024-06-14 NOTE — SUBJECTIVE & OBJECTIVE
Interval History: EEG with burst suppression pattern overnight, plan to continue current rate anesthetics overnight, begin anesthetic wean tomorrow pending EEG stability.    Current Facility-Administered Medications   Medication Dose Route Frequency Provider Last Rate Last Admin    0.9%  NaCl infusion   Intravenous Continuous Carolee Oconnell  mL/hr at 06/14/24 1301 Rate Verify at 06/14/24 1301    acetaminophen tablet 650 mg  650 mg Per NG tube Q4H PRN Savage Reyes MD        albuterol-ipratropium 2.5 mg-0.5 mg/3 mL nebulizer solution 3 mL  3 mL Nebulization Q6H PRN Lucila Elena PA-C        atorvastatin tablet 40 mg  40 mg Per NG tube Daily Марина Flores MD   40 mg at 06/14/24 0842    calcium gluconate 1 g in NS IVPB (premixed)  1 g Intravenous PRN Savage Reyes MD   Stopped at 06/14/24 1032    calcium gluconate 1 g in NS IVPB (premixed)  2 g Intravenous PRN Savage Reyes MD        calcium gluconate 1 g in NS IVPB (premixed)  3 g Intravenous PRN Savage Reyes MD        dextrose 10% bolus 125 mL 125 mL  12.5 g Intravenous PRN Марина Flores MD        dextrose 10% bolus 250 mL 250 mL  25 g Intravenous PRN Марина Flores MD        fentaNYL 50 mcg/mL injection 25 mcg  25 mcg Intravenous Q1H PRN MickalChristiano PA-C        glucagon (human recombinant) injection 1 mg  1 mg Intramuscular PRN Марина Flores MD        glucose chewable tablet 16 g  16 g Per NG tube PRN Savage Reyes MD        glucose chewable tablet 24 g  24 g Per NG tube PRN Savage Reyes MD        heparin (porcine) injection 5,000 Units  5,000 Units Subcutaneous Q12H Carolee Oconnell NP   5,000 Units at 06/14/24 0843    ketamine 5,000 mg in sodium chloride 0.9% 500 mL infusion  50 mcg/kg/min Intravenous Continuous Carolee Oconnell NP 14.1 mL/hr at 06/14/24 1301 50 mcg/kg/min at 06/14/24 1301    lacosamide tablet 200 mg  200 mg Per NG tube Q12H Christiano Jensen PA-C   200 mg at 06/14/24 0817     levETIRAcetam injection 1,500 mg  1,500 mg Intravenous Q12H Quinten Haji MD   1,500 mg at 06/14/24 0842    magnesium oxide tablet 800 mg  800 mg Per NG tube PRN Carolee Oconnell NP   800 mg at 06/13/24 1029    magnesium oxide tablet 800 mg  800 mg Per NG tube PRN Carolee Oconnell NP        naloxone 0.4 mg/mL injection 0.02 mg  0.02 mg Intravenous PRN Марина Flores MD        NORepinephrine 4 mg in dextrose 5% 250 mL infusion (premix)  0-3 mcg/kg/min Intravenous Continuous Carolee Oconnell NP 1.8 mL/hr at 06/14/24 1301 0.01 mcg/kg/min at 06/14/24 1301    ondansetron injection 4 mg  4 mg Intravenous Q8H PRN Марина Flores MD        pantoprazole injection 40 mg  40 mg Intravenous BID Lucila Elena PA-C        pantoprazole suspension 40 mg  40 mg Per NG tube BID Lucila Elena PA-C        perampaneL tablet 12 mg  12 mg Per NG tube Daily Carolee Oconnell NP   12 mg at 06/14/24 0855    polyethylene glycol packet 17 g  17 g Per NG tube Daily Savage Reyes MD   17 g at 06/14/24 0842    potassium bicarbonate disintegrating tablet 35 mEq  35 mEq Per NG tube PRN Carolee Oconnell NP   35 mEq at 06/14/24 0434    potassium bicarbonate disintegrating tablet 50 mEq  50 mEq Per NG tube PRN Carolee Oconnell NP   50 mEq at 06/13/24 0519    potassium bicarbonate disintegrating tablet 60 mEq  60 mEq Per NG tube PRN Carolee Oconnell NP   60 mEq at 06/12/24 0516    potassium, sodium phosphates 280-160-250 mg packet 2 packet  2 packet Per NG tube PRN Carolee Oconnell NP        potassium, sodium phosphates 280-160-250 mg packet 2 packet  2 packet Per NG tube PRN Carolee Oconnell NP   2 packet at 06/12/24 1211    potassium, sodium phosphates 280-160-250 mg packet 2 packet  2 packet Per NG tube PRN Oconnell, Carolee T, NP        propofol (DIPRIVAN) 10 mg/mL infusion (NON-TITRATING)  50 mcg/kg/min Intravenous Continuous Carolee Oconnell NP 14.1 mL/hr at 06/14/24 3161 60  mcg/kg/min at 06/14/24 1301    sodium chloride 0.9% flush 10 mL  10 mL Intravenous Q12H PRN Марина Flores MD        thiamine tablet 100 mg  100 mg Per NG tube Daily Марина Flores MD   100 mg at 06/14/24 0842     Continuous Infusions:   sodium chloride 0.9%   Intravenous Continuous 100 mL/hr at 06/14/24 1301 Rate Verify at 06/14/24 1301    ketamine 5,000 mg in sodium chloride 0.9% 500 mL infusion  50 mcg/kg/min Intravenous Continuous 14.1 mL/hr at 06/14/24 1301 50 mcg/kg/min at 06/14/24 1301    NORepinephrine bitartrate-D5W  0-3 mcg/kg/min Intravenous Continuous 1.8 mL/hr at 06/14/24 1301 0.01 mcg/kg/min at 06/14/24 1301    propofol  50 mcg/kg/min Intravenous Continuous 14.1 mL/hr at 06/14/24 1301 50 mcg/kg/min at 06/14/24 1301       Review of Systems   Unable to perform ROS: Intubated     Objective:     Vital Signs (Most Recent):  Temp: 98.4 °F (36.9 °C) (06/14/24 1301)  Pulse: 67 (06/14/24 1301)  Resp: 18 (06/14/24 1137)  BP: 139/62 (06/14/24 1205)  SpO2: 98 % (06/14/24 1301) Vital Signs (24h Range):  Temp:  [96.6 °F (35.9 °C)-99 °F (37.2 °C)] 98.4 °F (36.9 °C)  Pulse:  [61-80] 67  Resp:  [18] 18  SpO2:  [95 %-100 %] 98 %  BP: (119-166)/(55-74) 139/62  Arterial Line BP: (101-138)/(24-63) 114/53     Weight: 47 kg (103 lb 9.9 oz)  Body mass index is 21.66 kg/m².     Physical Exam  Constitutional:       General: She is not in acute distress.     Appearance: She is ill-appearing. She is not diaphoretic.      Interventions: She is sedated and intubated.   HENT:      Head: Normocephalic and atraumatic.      Comments: EEG in place  Eyes:      General: No scleral icterus.     Conjunctiva/sclera: Conjunctivae normal.      Pupils: Pupils are equal, round, and reactive to light.   Cardiovascular:      Rate and Rhythm: Normal rate.   Pulmonary:      Effort: Pulmonary effort is normal. No respiratory distress. She is intubated.      Comments: Intubated  Musculoskeletal:         General: No deformity or signs of injury.    Skin:     General: Skin is warm and dry.   Psychiatric:      Comments: Unable to assess            NEUROLOGICAL EXAMINATION:     CRANIAL NERVES     CN III, IV, VI   Pupils are equal, round, and reactive to light.       Comatose   CN:   No blink to threat OU   JUAN RAMON OU   Corneal absent OU  No cough, gag   No spontaneous eye opening   No withdrawal to noxious stimuli in extremities    Exam findings to suggest seizures:  Myoclonus - no   eye twitching - no   Nystagmus - no   gaze deviation - no   waxy rigidity - no        Significant Labs: All pertinent lab results from the past 24 hours have been reviewed.    Significant Studies: I have reviewed all pertinent imaging results/findings within the past 24 hours.

## 2024-06-14 NOTE — SUBJECTIVE & OBJECTIVE
Interval History:      Review of Systems   Unable to perform ROS: Intubated       Objective:     Vitals:  Temp: 98.4 °F (36.9 °C)  Pulse: 67  Rhythm: normal sinus rhythm  BP: 139/62  MAP (mmHg): 90  Resp: 18  SpO2: 98 %  Oxygen Concentration (%): 40  Vent Mode: A/C  Set Rate: 18 BPM  Vt Set: 320 mL  PEEP/CPAP: 5 cmH20  Peak Airway Pressure: 24 cmH20  Mean Airway Pressure: 10 cmH20  Plateau Pressure: 15 cmH20    Temp  Min: 96.6 °F (35.9 °C)  Max: 99 °F (37.2 °C)  Pulse  Min: 61  Max: 77  BP  Min: 119/64  Max: 166/74  MAP (mmHg)  Min: 78  Max: 106  Resp  Min: 18  Max: 18  SpO2  Min: 95 %  Max: 100 %  Oxygen Concentration (%)  Min: 40  Max: 40    06/13 0701 - 06/14 0700  In: 4790.3 [I.V.:2704.5]  Out: 1945 [Urine:1945]   Unmeasured Output  Urine Occurrence: 2  Stool Occurrence: 1  Pad Count: 1        Physical Exam  Constitutional:       Comments: Coma, GCS 3   HENT:      Head: Normocephalic.   Eyes:      Comments: Pupils 3mm and fixed, limited horizontal eye movements   Cardiovascular:      Rate and Rhythm: Normal rate.   Pulmonary:      Breath sounds: Normal breath sounds.   Abdominal:      Palpations: Abdomen is soft.   Musculoskeletal:         General: No swelling.      Cervical back: Neck supple.   Neurological:      Comments: No movement in extremities              Medications:  Continuoussodium chloride 0.9%, Last Rate: 100 mL/hr at 06/14/24 1301  ketamine 5,000 mg in sodium chloride 0.9% 500 mL infusion, Last Rate: 50 mcg/kg/min (06/14/24 1301)  NORepinephrine bitartrate-D5W, Last Rate: 0.01 mcg/kg/min (06/14/24 1301)  propofol, Last Rate: 50 mcg/kg/min (06/14/24 1301)    Scheduledatorvastatin, 40 mg, Daily  balsam peru-castor oiL, , BID  heparin (porcine), 5,000 Units, Q12H  lacosamide, 200 mg, Q12H  levETIRAcetam (Keppra) IV (PEDS and ADULTS), 1,500 mg, Q12H  pantoprazole, 40 mg, BID  pantoprazole, 40 mg, BID  perampaneL, 12 mg, Daily  polyethylene glycol, 17 g, Daily  thiamine, 100 mg,  Daily    PRNacetaminophen, 650 mg, Q4H PRN  albuterol-ipratropium, 3 mL, Q6H PRN  calcium gluconate IVPB, 1 g, PRN  calcium gluconate IVPB, 2 g, PRN  calcium gluconate IVPB, 3 g, PRN  dextrose 10%, 12.5 g, PRN  dextrose 10%, 25 g, PRN  fentaNYL, 25 mcg, Q1H PRN  glucagon (human recombinant), 1 mg, PRN  glucose, 16 g, PRN  glucose, 24 g, PRN  magnesium oxide, 800 mg, PRN  magnesium oxide, 800 mg, PRN  naloxone, 0.02 mg, PRN  ondansetron, 4 mg, Q8H PRN  potassium bicarbonate, 35 mEq, PRN  potassium bicarbonate, 50 mEq, PRN  potassium bicarbonate, 60 mEq, PRN  potassium, sodium phosphates, 2 packet, PRN  potassium, sodium phosphates, 2 packet, PRN  potassium, sodium phosphates, 2 packet, PRN  sodium chloride 0.9%, 10 mL, Q12H PRN      Today I personally reviewed pertinent medications, lines/drains/airways, imaging, laboratory results, notably:    Diet  No diet orders on file  No diet orders on file

## 2024-06-14 NOTE — PROGRESS NOTES
VANCOMYCIN DOSING BY PHARMACY DISCONTINUATION NOTE    June Boykin is a 69 y.o. female who had been consulted for vancomycin dosing.    The pharmacy consult for vancomycin dosing has been discontinued.     Vancomycin Dosing by Pharmacy Consult will sign-off. Please reconsult if necessary. Thank you for allowing us to participate in this patient's care.       Haley Alcala, PharmD  [phone]
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
## 2024-06-14 NOTE — PLAN OF CARE
"[facility] Care Plan    POC reviewed with June Boykin  at 0300. See below and flowsheets for full assessment and VS info.     - cEEG  - Prop & levo gtt  - IV mag x1  - Straight cath x1  - Complete bed bath and linen change      Is this a stroke patient? no    Neuro:  [place] Coma Scale  Best Eye Response: 1-->(E1) none  Best Motor Response: 1-->(M1) none  Best Verbal Response: 1-->(V1) none  [place] Coma Scale Score: 3  Assessment Qualifiers: patient chemically sedated or paralyzed, patient intubated, no eye obstruction present  Pupil PERRLA: no     24hr Temp:  [96.6 °F (35.9 °C)-99.3 °F (37.4 °C)]     CV:   Rhythm: normal sinus rhythm  BP goals:   SBP < 160  MAP > 65    Resp:      Vent Mode: A/C  Set Rate: 18 BPM  Oxygen Concentration (%): 40  Vt Set: 320 mL  PEEP/CPAP: 5 cmH20    Plan: wean to extubate    GI/:     Diet/Nutrition Received: NPO  Last Bowel Movement: 06/14/24  Voiding Characteristics: due to void (not voiding spontaneously requiring straight caths multiple times a day)    Intake/Output Summary (Last 24 hours) at 6/14/2024 0726  Last data filed at 6/14/2024 0712  Gross per 24 hour   Intake 4964.12 ml   Output 1945 ml   Net 3019.12 ml     Unmeasured Output  Urine Occurrence: 2  Stool Occurrence: 0  Pad Count: 1    Labs/Accuchecks:  Recent Labs   Lab 06/14/24  0026   WBC 4.85   RBC 3.76*   HGB 8.6*   HCT 28.3*   *      Recent Labs   Lab 06/14/24  0026      K 3.3*   CO2 27      BUN 4*   CREATININE 0.6   ALKPHOS 27*   ALT 10   AST 25   BILITOT 0.3    No results for input(s): "PROTIME", "INR", "APTT", "HEPANTIXA" in the last 168 hours. No results for input(s): "CPK", "CPKMB", "TROPONINI", "MB" in the last 168 hours.    Electrolytes: Electrolytes replaced  Accuchecks: none    Gtts:   sodium chloride 0.9%   Intravenous Continuous 100 mL/hr at 06/14/24 0712 Rate Verify at 06/14/24 0712    ketamine 5,000 mg in sodium chloride 0.9% 500 mL infusion  50 mcg/kg/min Intravenous Continuous " 14.1 mL/hr at 24 0712 50 mcg/kg/min at 24    NORepinephrine bitartrate-D5W  0-3 mcg/kg/min Intravenous Continuous 3.5 mL/hr at 24 0712 0.02 mcg/kg/min at 24 07    propofol  50 mcg/kg/min Intravenous Continuous 14.1 mL/hr at 24 0712 50 mcg/kg/min at 24       LDA/Wounds:    Nurses Note -- 4 Eyes    Is there altered skin present? yes     Please check the following boxes that apply:   [] LDA Added if Not in Epic (Describe Wound)   [] New Altered Skin Integrity was Present on Admit and Documented in LDA   [] Wound Image Taken    Wound Care Consulted? Yes    Second RN/Staff Member:  ALEX Peter    Restraints:   Restraint Order  Length of Order: Order good for next 24 hours or when removed.  Date that the current order will : 24  Time that the current order will : 807  Order Upon Application: Yes    [facility]

## 2024-06-15 ENCOUNTER — DOCUMENTATION ONLY (OUTPATIENT)
Dept: NEUROLOGY | Facility: CLINIC | Age: 69
End: 2024-06-15
Payer: MEDICARE

## 2024-06-15 LAB
OHS QRS DURATION: 84 MS
OHS QTC CALCULATION: 463 MS

## 2024-06-15 NOTE — PROGRESS NOTES
EEG   AM Check Electrodes had to be fixed.No    Skin Integrity: Normal     Shea Bruno   06/15/2024 11:09 AM

## 2024-06-15 NOTE — PROCEDURES
ICU EEG/VIDEO MONITORING REPORT    June Boykin  [id number]  1955    DATE OF SERVICE: 6/14-15/2024    DATE OF ADMISSION: 6/8/2024  2:54 PM    ADMITTING PROVIDER: Марина Flores MD    METHODOLOGY   Electroencephalographic (EEG) recording is with electrodes placed according to the International 10-20 placement system.  Thirty two (32) channels of digital signal are simultaneously recorded from the scalp and may include EKG, EMG, and/or eye monitors.   Recording band pass was 0.1 to 512 hz.  Digital video recording of the patient is simultaneously recorded with the EEG.  The nursing staff report clinical symptoms and may press an event button when the patient has symptoms of clinical interest to the treating physicians.  EEG and video recording is stored and archived in digital format.  The entire recording is visually reviewed and the times identified by computer analysis as being spikes or seizures are reviewed again.  Activation procedures which include photic stimulation, hyperventilation and instructing patients to perform simple task are done in selected patients.   Compresses spectral analysis (CSA) is also performed on the activity recorded from each individual channel.  This is displayed as a power display of frequencies from 0 to 30 Hz over time.   The CSA analysis is done and displayed continuously.  This is reviewed for asymmetries in power between homologous areas of the scalp and for presence of changes in power which canbe seen when seizures occur.  Sections of suspected abnormalities on the CSA is then compared with the original EEG recording.     [employer] software was also utilized in the review of this study.  This software suite analyzes the EEG recording in multiple domains.  Coherence and rhythmicity is computed to identify EEG sections which may contain organized seizures.  Each channel undergoes analysis to detect presence of spike and sharp waves which have special and morphological  characteristic of epileptic activity.  The routine EEG recording is converted from spacial into frequency domain.  This is then displayed comparing homologous areas to identify areas of significant asymmetry.  Algorithm to identify non-cortically generated artifact is used to separate eye movement, EMG and other artifact from the EEG.      Recording Times  Start on 6/14/2024, 07:00  Stop on 6/15/2024, 07:00    A total of 24 hours of EEG was recorded.    EEG FINDINGS - the study is done under sedation: Ketamine 50 mKm and Propofol 50 mKm    Burst suppression pattern is seen throughout with bursts lasting 1-3 seconds, comprising of sharply contoured delta-theta (2-6 Hz) with superimposed faster activity mixed with occasional left sided epileptiform sharp waves, alternating with periods of generalized suppression.  Nearly continuous background as described above with epileptiform activity and without periods of suppression is noted towards the end of the study (after ~04:30)  No evolution into electrographic seizures seen.    EKG:   Irregular rhythm seen.    IMPRESSION:   This C-EEG done under sedation is abnormal due to the burst suppression pattern seen as described, suggestive of severe diffuse cerebral dysfunction with underlying epileptiform potential.  No electrographic seizures seen.  Irregular heart rhythm was noted on EKG.    CLINICAL CORRELATION IS RECOMMENDED.    Rosenda Anthony MD, EMEKA(), SHANIA, SHAAN.  Neurology-Epilepsy.  [facility].
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
## 2024-06-15 NOTE — PLAN OF CARE
[facility] Care Plan    POC reviewed with June Boykin at 0300. Pt is intubated and sedated. Pt cannot verbalize understanding. See below and flowsheets for full assessment and VS info.     - cEEG in place. No overt signs of seizure like activity overnight.  - Levo gtt for MAP> 65.   - NS at 100 ccs/hr  - Prop @ 50 mcg non-titrating   - Ketamine @ 50 mcg non-titrating  - Bladder scan q6. Straight cath for greater than 300. Cath x1.   - L ankle PIV . Removed. R FA PIV started.  - Flexi seal became dislodged. Complete bed bath and linen change performed. Flexi put out 105 ccs of liquid stool and soaked 2 pads.Flexi replaced.  bed in place. Castor opil cream applied to sacral DTI as ordered.  - R nare NGT- [place] tf at goal rate of 45 ccs/hr.  - PROM exercises performed      Is this a stroke patient? no    Neuro:  [place] Coma Scale  Best Eye Response: 1-->(E1) none  Best Motor Response: 1-->(M1) none  Best Verbal Response: 1-->(V1) none  [place] Coma Scale Score: 3  Assessment Qualifiers: patient chemically sedated or paralyzed, patient intubated, no eye obstruction present  Pupil PERRLA: no     24hr Temp:  [95.2 °F (35.1 °C)-99.3 °F (37.4 °C)]     CV:   Rhythm: sinus arrhythmia  BP goals:   SBP < 160  MAP > 65    Resp:      Vent Mode: A/C  Set Rate: 18 BPM  Oxygen Concentration (%): 40  Vt Set: 320 mL  PEEP/CPAP: 5 cmH20    Plan: status epilepticus    GI/:     Diet/Nutrition Received: tube feeding  Last Bowel Movement: 24  Voiding Characteristics: due to void (not voiding spontaneously requiring straight caths multiple times a day)    Intake/Output Summary (Last 24 hours) at 6/15/2024 0726  Last data filed at 6/15/2024 0700  Gross per 24 hour   Intake 4518.13 ml   Output 1650 ml   Net 2868.13 ml     Unmeasured Output  Urine Occurrence: 2  Stool Occurrence: 1  Pad Count: 1    Labs/Accuchecks:  Recent Labs   Lab 06/15/24  0046 06/15/24  0520   WBC 4.53  --    RBC 3.78*  --    HGB 8.8*  --    HCT  "28.6* 29*   *  --       Recent Labs   Lab 06/15/24  0046      K 3.9   CO2 24      BUN 6*   CREATININE 0.6   ALKPHOS 33*   ALT 11   AST 34   BILITOT 0.2    No results for input(s): "PROTIME", "INR", "APTT", "HEPANTIXA" in the last 168 hours. No results for input(s): "CPK", "CPKMB", "TROPONINI", "MB" in the last 168 hours.    Electrolytes: Electrolytes replaced  Accuchecks: none    Gtts:   sodium chloride 0.9%   Intravenous Continuous 100 mL/hr at 06/15/24 0700 Rate Verify at 06/15/24 0700    ketamine 5,000 mg in sodium chloride 0.9% 500 mL infusion  50 mcg/kg/min Intravenous Continuous 14.1 mL/hr at 06/15/24 0700 50 mcg/kg/min at 06/15/24 0700    NORepinephrine bitartrate-D5W  0-3 mcg/kg/min Intravenous Continuous   Stopped at 06/15/24 0612    propofol  50 mcg/kg/min Intravenous Continuous 14.1 mL/hr at 06/15/24 0700 50 mcg/kg/min at 06/15/24 0700       LDA/Wounds:    Nurses Note -- 4 Eyes    Is there altered skin present? yes     Please check the following boxes that apply:   [] LDA Added if Not in Epic (Describe Wound)   [] New Altered Skin Integrity was Present on Admit and Documented in LDA   [] Wound Image Taken    Wound Care Consulted? Yes    Second RN/Staff Member:  ALEX Gomez    Restraints:   Restraint Order  Length of Order: Order good for next 24 hours or when removed.  Date that the current order will : 24  Time that the current order will : 807  Order Upon Application: Yes    WC   "
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
## 2024-06-15 NOTE — SUBJECTIVE & OBJECTIVE
Interval History:      Review of Systems   Unable to perform ROS: Intubated       Objective:     Vitals:  Temp: 99.1 °F (37.3 °C)  Pulse: 81  Rhythm: sinus arrhythmia  BP: (!) 142/63  MAP (mmHg): 91  Resp: 18  SpO2: 98 %  Oxygen Concentration (%): 40  Vent Mode: A/C  Set Rate: 18 BPM  Vt Set: 320 mL  PEEP/CPAP: 5 cmH20  Peak Airway Pressure: 24 cmH20  Mean Airway Pressure: 10 cmH20  Plateau Pressure: 15 cmH20    Temp  Min: 95.2 °F (35.1 °C)  Max: 99.9 °F (37.7 °C)  Pulse  Min: 60  Max: 89  BP  Min: 112/55  Max: 211/103  MAP (mmHg)  Min: 78  Max: 148  Resp  Min: 15  Max: 18  SpO2  Min: 94 %  Max: 99 %  Oxygen Concentration (%)  Min: 40  Max: 40    06/14 0701 - 06/15 0700  In: 4735.3 [I.V.:3069.5]  Out: 1650 [Urine:1540]   Unmeasured Output  Urine Occurrence: 2  Stool Occurrence: 1  Pad Count: 2        Physical Exam  Constitutional:       Comments: Coma, GCS 3   HENT:      Head: Normocephalic.   Eyes:      Comments: Pupils 3mm and fixed, limited horizontal eye movements   Cardiovascular:      Rate and Rhythm: Normal rate.   Pulmonary:      Breath sounds: Normal breath sounds.   Abdominal:      Palpations: Abdomen is soft.   Musculoskeletal:         General: No swelling.      Cervical back: Neck supple.   Neurological:      Comments: No movement in extremities            Medications:  Continuoussodium chloride 0.9%, Last Rate: 100 mL/hr at 06/15/24 1400  ketamine 5,000 mg in sodium chloride 0.9% 500 mL infusion, Last Rate: 50 mcg/kg/min (06/15/24 1400)  NORepinephrine bitartrate-D5W, Last Rate: Stopped (06/15/24 0612)  propofol, Last Rate: 25 mcg/kg/min (06/15/24 1400)    Scheduledatorvastatin, 40 mg, Daily  balsam peru-castor oiL, , BID  heparin (porcine), 5,000 Units, Q12H  lacosamide, 200 mg, Q12H  levETIRAcetam (Keppra) IV (PEDS and ADULTS), 1,500 mg, Q12H  pantoprazole, 40 mg, BID  pantoprazole, 40 mg, BID  perampaneL, 12 mg, Daily  polyethylene glycol, 17 g, Daily  thiamine, 100 mg, Daily    PRNacetaminophen, 650  mg, Q4H PRN  albuterol-ipratropium, 3 mL, Q6H PRN  calcium gluconate IVPB, 1 g, PRN  calcium gluconate IVPB, 2 g, PRN  calcium gluconate IVPB, 3 g, PRN  dextrose 10%, 12.5 g, PRN  dextrose 10%, 25 g, PRN  fentaNYL, 25 mcg, Q1H PRN  glucagon (human recombinant), 1 mg, PRN  glucose, 16 g, PRN  glucose, 24 g, PRN  magnesium oxide, 800 mg, PRN  magnesium oxide, 800 mg, PRN  naloxone, 0.02 mg, PRN  ondansetron, 4 mg, Q8H PRN  potassium bicarbonate, 35 mEq, PRN  potassium bicarbonate, 50 mEq, PRN  potassium bicarbonate, 60 mEq, PRN  potassium, sodium phosphates, 2 packet, PRN  potassium, sodium phosphates, 2 packet, PRN  potassium, sodium phosphates, 2 packet, PRN  sodium chloride 0.9%, 10 mL, Q12H PRN      Today I personally reviewed pertinent medications, lines/drains/airways, imaging, laboratory results, notably:    Diet  No diet orders on file  No diet orders on file

## 2024-06-15 NOTE — ASSESSMENT & PLAN NOTE
-Hypotensive currently  -Continue to hold antihypertensives while on pressors  06/15: bp remains soft, cont off scheduled bp meds

## 2024-06-16 NOTE — PROCEDURES
[facility]     PRELIMINARY C-EEG REPORT:  Review: 6/16/2024, 07:00 - 17:00     The study is done under sedation: Ketamine 50 mKm and Propofol 25 -> Off      Frequent to abundant left sided epileptiform activity (sharp waves) are seen without evolution into electrographic seizures - these persist without any change after d/c of Propofol.    Background of low amplitude delta-theta (2-6 Hz) with superimposed faster activity is noted, suggestive of severe diffuse cerebral dysfunction.    Full report to follow.  Will continue to monitor.     Thank you for involving us in the care of this patient.    Rosenda Anthony MD, EMEKA(), FACNS, FAES.  Neurology-Epilepsy.  [facility].
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
## 2024-06-16 NOTE — PROCEDURES
ICU EEG/VIDEO MONITORING REPORT    June Boykin  [id number]  1955    DATE OF SERVICE: 6/15-16/2024    DATE OF ADMISSION: 6/8/2024  2:54 PM    ADMITTING PROVIDER: Марина Flores MD    METHODOLOGY   Electroencephalographic (EEG) recording is with electrodes placed according to the International 10-20 placement system.  Thirty two (32) channels of digital signal are simultaneously recorded from the scalp and may include EKG, EMG, and/or eye monitors.   Recording band pass was 0.1 to 512 hz.  Digital video recording of the patient is simultaneously recorded with the EEG.  The nursing staff report clinical symptoms and may press an event button when the patient has symptoms of clinical interest to the treating physicians.  EEG and video recording is stored and archived in digital format.  The entire recording is visually reviewed and the times identified by computer analysis as being spikes or seizures are reviewed again.  Activation procedures which include photic stimulation, hyperventilation and instructing patients to perform simple task are done in selected patients.   Compresses spectral analysis (CSA) is also performed on the activity recorded from each individual channel.  This is displayed as a power display of frequencies from 0 to 30 Hz over time.   The CSA analysis is done and displayed continuously.  This is reviewed for asymmetries in power between homologous areas of the scalp and for presence of changes in power which canbe seen when seizures occur.  Sections of suspected abnormalities on the CSA is then compared with the original EEG recording.     Andela software was also utilized in the review of this study.  This software suite analyzes the EEG recording in multiple domains.  Coherence and rhythmicity is computed to identify EEG sections which may contain organized seizures.  Each channel undergoes analysis to detect presence of spike and sharp waves which have special and morphological  characteristic of epileptic activity.  The routine EEG recording is converted from spacial into frequency domain.  This is then displayed comparing homologous areas to identify areas of significant asymmetry.  Algorithm to identify non-cortically generated artifact is used to separate eye movement, EMG and other artifact from the EEG.      Recording Times  Start on 6/15/2024, 07:00  Stop on 6/16/2024, 07:00    A total of 24 hours of EEG was recorded.    EEG FINDINGS - the study is done under sedation: Ketamine 50 mKm and Propofol 50 > 25 mKm    At onset, burst suppression pattern is seen with bursts lasting 1-3 seconds, comprising of sharply contoured delta-theta (2-6 Hz) with superimposed faster activity mixed with occasional left sided epileptiform sharp waves, alternating with periods of generalized suppression.  No evolution into electrographic seizures seen.    After Propofol was decreased to 25mKm,  Near-continuous background with low amplitude delta-theta (2-6 Hz) with superimposed faster activity is noted   Frequent to abundant left sided epileptiform activity (sharp waves) are seen without evolution.    EKG:   Irregular rhythm seen.    IMPRESSION:   This C-EEG done under sedation is abnormal due to:  1) left sided epileptiform discharges are seen more frequently (but without evolution) after Propofol dose was decreased, suggestive of underlying epileptiform potential  2) at onset, burst suppression pattern is noted that transitions into continuous background as described, suggestive of severe diffuse cerebral dysfunction.  No electrographic seizures seen.  Irregular heart rhythm was noted on EKG.    CLINICAL CORRELATION IS RECOMMENDED.    Rosenda Anthony MD, EMEKA(), FACNS, SHAAN.  Neurology-Epilepsy.  [facility].

## 2024-06-16 NOTE — SUBJECTIVE & OBJECTIVE
Interval History:      Review of Systems   Unable to perform ROS: Intubated       Objective:     Vitals:  Temp: 99.1 °F (37.3 °C)  Pulse: 90  Rhythm: sinus arrhythmia  BP: (!) 160/75  MAP (mmHg): 108  Resp: 20  SpO2: 95 %  Oxygen Concentration (%): 40  Vent Mode: A/C  Set Rate: 20 BPM  Vt Set: 320 mL  PEEP/CPAP: 5 cmH20  Peak Airway Pressure: 31 cmH20  Mean Airway Pressure: 12 cmH20  Plateau Pressure: 15 cmH20    Temp  Min: 98.4 °F (36.9 °C)  Max: 100.2 °F (37.9 °C)  Pulse  Min: 80  Max: 96  BP  Min: 102/72  Max: 194/91  MAP (mmHg)  Min: 77  Max: 124  Resp  Min: 11  Max: 29  SpO2  Min: 94 %  Max: 98 %  Oxygen Concentration (%)  Min: 40  Max: 40    06/15 0701 - 06/16 0700  In: 4464.1 [I.V.:2919.1]  Out: 5540 [Urine:5125]   Unmeasured Output  Urine Occurrence: 2  Stool Occurrence: 1  Pad Count: 2        Physical Exam  Constitutional:       Comments: Coma, GCS 3   HENT:      Head: Normocephalic.   Eyes:      Comments: Pupils 3mm and fixed, limited horizontal eye movements   Cardiovascular:      Rate and Rhythm: Normal rate.   Pulmonary:      Breath sounds: Normal breath sounds.   Abdominal:      Palpations: Abdomen is soft.   Musculoskeletal:         General: No swelling.      Cervical back: Neck supple.   Neurological:      Comments: No movement in extremities              Medications:  Continuoussodium chloride 0.9%, Last Rate: 100 mL/hr at 06/16/24 1300  ketamine 5,000 mg in sodium chloride 0.9% 500 mL infusion, Last Rate: 50 mcg/kg/min (06/16/24 1300)    Scheduledatorvastatin, 40 mg, Daily  balsam peru-castor oiL, , BID  heparin (porcine), 5,000 Units, Q12H  lacosamide, 200 mg, Q12H  levETIRAcetam (Keppra) IV (PEDS and ADULTS), 1,500 mg, Q12H  [START ON 6/17/2024] pantoprazole, 40 mg, BID  perampaneL, 12 mg, Daily  polyethylene glycol, 17 g, Daily  thiamine, 100 mg, Daily    PRNacetaminophen, 650 mg, Q4H PRN  albuterol-ipratropium, 3 mL, Q6H PRN  calcium gluconate IVPB, 1 g, PRN  calcium gluconate IVPB, 2 g,  PRN  calcium gluconate IVPB, 3 g, PRN  dextrose 10%, 12.5 g, PRN  dextrose 10%, 25 g, PRN  fentaNYL, 25 mcg, Q1H PRN  glucagon (human recombinant), 1 mg, PRN  glucose, 16 g, PRN  glucose, 24 g, PRN  hydrALAZINE, 10 mg, Q4H PRN  magnesium oxide, 800 mg, PRN  magnesium oxide, 800 mg, PRN  naloxone, 0.02 mg, PRN  ondansetron, 4 mg, Q8H PRN  potassium bicarbonate, 35 mEq, PRN  potassium bicarbonate, 50 mEq, PRN  potassium bicarbonate, 60 mEq, PRN  potassium, sodium phosphates, 2 packet, PRN  potassium, sodium phosphates, 2 packet, PRN  potassium, sodium phosphates, 2 packet, PRN  sodium chloride 0.9%, 10 mL, Q12H PRN      Today I personally reviewed pertinent medications, lines/drains/airways, imaging, laboratory results, notably:    Diet  No diet orders on file  No diet orders on file
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
## 2024-06-17 ENCOUNTER — DOCUMENTATION ONLY (OUTPATIENT)
Dept: NEUROLOGY | Facility: CLINIC | Age: 69
End: 2024-06-17
Payer: MEDICARE

## 2024-06-17 NOTE — PROGRESS NOTES
[facility] - Neuro Critical Care  Neurology-Epilepsy  Progress Note    Patient Name: June Boykin  MRN: [id number]  Admission Date: 6/8/2024  Hospital Length of Stay: 9 days  Code Status: Partial Code   Attending Provider: Andrea Gutiérrez MD  Primary Care Physician: Mauricio Pierre MD   Principal Problem:Status epilepticus    Subjective:     Hospital Course:   6/6>6/7: EEG at OSH - continuous lateralized periodic discharges in the left hemisphere consistent with a highly irritable epileptogenic focus in this region, often time locked with clonic jerking of the RUE making it ictal in nature  6/7>6/8: EEG CAP at OSH - underlying bilateral/generalized epileptiform activity with potential to evolve into status epilepticus  6/8>6/9: Periodic lateralized epileptiform discharges over the left hemisphere consistent with significant focal cortical irritability and epileptic potential, frequent focal seizures with onset over the left hemisphere  6/9>6/10: EEG report pending  6/10>6/11: Frequent focal seizures up to 6 per hour with onset over the left hemisphere  6/11>6/12: Periodic lateralized epileptiform discharges over the left hemisphere consistent with significant cortical irritability and epileptic potential, burst suppression pattern due to anesthetics  6/12>6/13: Periodic lateralized epileptiform discharges over the left hemisphere consistent with significant cortical irritability and epileptic potential and burst suppression pattern due to anesthetics  6/13>6/14: Burst suppression pattern  6/14>6/15: burst suppression pattern, no electrographic seizures  6/15>6/16: left interictals seen more frequently after Propofol weaned, no electrographic seizures  6/16>6/17: left interictals, no clinical or electrographic seizures    See EEG reports for details.    Interval History: No seizures on EEG. Weaning Ketamine today. No family at bedside on rounds.    Current Facility-Administered Medications   Medication  Dose Route Frequency Provider Last Rate Last Admin    acetaminophen tablet 650 mg  650 mg Per NG tube Q4H PRN Savage Reyes MD        albuterol-ipratropium 2.5 mg-0.5 mg/3 mL nebulizer solution 3 mL  3 mL Nebulization Q6H PRN Lucila Elena PA-C   3 mL at 06/16/24 2201    atorvastatin tablet 40 mg  40 mg Per NG tube Daily Марина Flores MD   40 mg at 06/17/24 0928    balsam peru-castor oiL Oint   Topical (Top) BID Savage Reyes MD   Given at 06/17/24 0929    calcium gluconate 1 g in NS IVPB (premixed)  1 g Intravenous PRN Savage Reyes MD   Stopped at 06/14/24 1032    calcium gluconate 1 g in NS IVPB (premixed)  2 g Intravenous PRN Savage Reyes MD        calcium gluconate 1 g in NS IVPB (premixed)  3 g Intravenous PRN Savage Reyes MD        dextrose 10% bolus 125 mL 125 mL  12.5 g Intravenous PRN Марина Flores MD        dextrose 10% bolus 250 mL 250 mL  25 g Intravenous PRN Марина Flores MD        fentaNYL 50 mcg/mL injection 25 mcg  25 mcg Intravenous Q1H PRN Christiano Jensen PA-C        glucagon (human recombinant) injection 1 mg  1 mg Intramuscular PRN Марина Flores MD        glucose chewable tablet 16 g  16 g Per NG tube PRN Savage Reyes MD        glucose chewable tablet 24 g  24 g Per NG tube PRN Savage Reyes MD        heparin (porcine) injection 5,000 Units  5,000 Units Subcutaneous Q12H Carolee Oconnell NP   5,000 Units at 06/17/24 0928    hydrALAZINE injection 10 mg  10 mg Intravenous Q4H PRN Isa Ash NP   10 mg at 06/17/24 1246    ketamine 5,000 mg in sodium chloride 0.9% 500 mL infusion  25 mcg/kg/min Intravenous Continuous Lucila Elena PA-C 2.8 mL/hr at 06/17/24 1301 10 mcg/kg/min at 06/17/24 1301    lacosamide tablet 200 mg  200 mg Per NG tube Q12H Christiano Jensen PA-C   200 mg at 06/17/24 0929    levETIRAcetam injection 1,500 mg  1,500 mg Intravenous Q12H Quinten Haji MD   1,500 mg at 06/17/24 0928    magnesium  oxide tablet 800 mg  800 mg Per NG tube PRN Carolee Oconnell NP   800 mg at 06/17/24 0929    magnesium oxide tablet 800 mg  800 mg Per NG tube PRN Carolee Oconnell NP        metoprolol tartrate (LOPRESSOR) tablet 25 mg  25 mg Per NG tube BID Lucila Elena PA-C   25 mg at 06/17/24 1131    naloxone 0.4 mg/mL injection 0.02 mg  0.02 mg Intravenous PRN Марина Flores MD        ondansetron injection 4 mg  4 mg Intravenous Q8H PRN Марина Flores MD        pantoprazole suspension 40 mg  40 mg Per NG tube BID Savage Reyes MD   40 mg at 06/17/24 0929    perampaneL tablet 12 mg  12 mg Per NG tube Daily Carolee Oconnell NP   12 mg at 06/17/24 0931    potassium bicarbonate disintegrating tablet 35 mEq  35 mEq Per NG tube PRN Carolee Oconnell NP   35 mEq at 06/14/24 0434    potassium bicarbonate disintegrating tablet 50 mEq  50 mEq Per NG tube PRN Carolee Oconnell NP   50 mEq at 06/17/24 0644    potassium bicarbonate disintegrating tablet 60 mEq  60 mEq Per NG tube PRN Carolee Oconnell NP   60 mEq at 06/12/24 0516    potassium, sodium phosphates 280-160-250 mg packet 2 packet  2 packet Per NG tube PRN Carolee Oconnell NP   2 packet at 06/17/24 0929    potassium, sodium phosphates 280-160-250 mg packet 2 packet  2 packet Per NG tube PRN Carolee Oconnell NP   2 packet at 06/12/24 1211    potassium, sodium phosphates 280-160-250 mg packet 2 packet  2 packet Per NG tube PRN Carolee Oconnell NP        sodium chloride 0.9% flush 10 mL  10 mL Intravenous Q12H PRN Марина Flores MD        thiamine tablet 100 mg  100 mg Per NG tube Daily Марина Flores MD   100 mg at 06/17/24 0929     Continuous Infusions:   ketamine 5,000 mg in sodium chloride 0.9% 500 mL infusion  25 mcg/kg/min Intravenous Continuous 2.8 mL/hr at 06/17/24 1301 10 mcg/kg/min at 06/17/24 1301       Review of Systems  Objective:     Vital Signs (Most Recent):  Temp: 98.8 °F (37.1 °C) (06/17/24 1201)  Pulse: 101 (06/17/24  1201)  Resp: 20 (06/17/24 1201)  BP: (!) 189/88 (06/17/24 1201)  SpO2: (!) 94 % (06/17/24 1201) Vital Signs (24h Range):  Temp:  [97.7 °F (36.5 °C)-99.5 °F (37.5 °C)] 98.8 °F (37.1 °C)  Pulse:  [] 101  Resp:  [18-20] 20  SpO2:  [94 %-99 %] 94 %  BP: ()/(47-88) 189/88  Arterial Line BP: ()/(40-93) 176/81     Weight: 47 kg (103 lb 9.9 oz)  Body mass index is 21.66 kg/m².     Physical Exam  Constitutional:       General: She is not in acute distress.     Appearance: She is ill-appearing. She is not diaphoretic.      Interventions: She is sedated and intubated.   HENT:      Head: Normocephalic and atraumatic.      Comments: EEG in place  Eyes:      General: No scleral icterus.        Right eye: No discharge.         Left eye: No discharge.      Conjunctiva/sclera: Conjunctivae normal.      Pupils: Pupils are equal, round, and reactive to light.   Cardiovascular:      Rate and Rhythm: Normal rate.   Pulmonary:      Effort: Pulmonary effort is normal. No respiratory distress. She is intubated.      Comments: Intubated  Skin:     General: Skin is warm and dry.   Psychiatric:      Comments: Unable to assess            NEUROLOGICAL EXAMINATION:     CRANIAL NERVES     CN III, IV, VI   Pupils are equal, round, and reactive to light.       JUAN RAMON OU   Corneals absent  No spontaneous eye opening   Face symmetric   Tongue midline   No spontaneous movements  Withdraws to noxious stimuli    Exam findings to suggest seizures:  Myoclonus - no   eye twitching - no   Nystagmus - no   gaze deviation - no   waxy rigidity - no        Significant Labs: All pertinent lab results from the past 24 hours have been reviewed.    Significant Studies: I have reviewed all pertinent imaging results/findings within the past 24 hours.  Assessment and Plan:     * Status epilepticus  Focal seizures  69F initially admitted to OSH 5/23 after found unresponsive by family, hospital course c/b hypercapnia requiring intubation, afib RVR, RUE  jerking prompting EEG which revealed recurrent focal left seizures. Patient transferred to [facility] for higher level of care and cEEG. Course further complicated by status epilepticus requiring intubation and anesthetics.    Recommendations:  - Continuous vEEG monitoring  - Recommend weaning Ketamine 50>25>10  - Recommend to continue Lacosamide 200 mg BID, Levetiracetam 1500 mg BID, and Perampanel 12 mg daily  - Propofol weaned off 6/16 AM  - Consider LP to evaluate for etiology of focal status epilepticus  - Avoid agents that lower seizure threshold  - Management of infectious/metabolic abnormalities per [facility]  - Seizure precautions  - Per [place] Law, patient must refrain from driving for 6 months after having a seizure or cleared by a neurologist to legally resume driving      Discussed plan of care with [facility] team. No family at bedside. Will follow, please call with questions.    Focal seizures  See Status epilepticus    Paroxysmal atrial fibrillation  - Rate controlled on Lopressor  - Management per [facility]    Acute hypercapnic respiratory failure  - Intubated and sedated  - Vent management per [facility]        VTE Risk Mitigation (From admission, onward)           Ordered     heparin (porcine) injection 5,000 Units  Every 12 hours         06/13/24 1012     IP VTE HIGH RISK PATIENT  Once         06/08/24 1629     Place sequential compression device  Until discontinued         06/08/24 1629                    Patricia Stewart PA-C  Neurology-Epilepsy  [facility] - [facility]  Staff: Dr. Simental
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
## 2024-06-17 NOTE — PLAN OF CARE
[facility] - Neuro Critical Care  Discharge Reassessment    Primary Care Provider: aMuricio Pierre MD    Expected Discharge Date: 6/21/2024    Reassessment (most recent)       Discharge Reassessment - 06/17/24 1337          Discharge Reassessment    Assessment Type Discharge Planning Reassessment (P)      Did the patient's condition or plan change since previous assessment? No (P)      Communicated LACY with patient/caregiver No (P)    patient intubated and family did not answer phne    Discharge Plan A -- (P)    TBD    DME Needed Upon Discharge  none (P)      Transition of Care Barriers None (P)      Why the patient remains in the hospital Requires continued medical care (P)         Post-Acute Status    Discharge Delays None known at this time (P)                    Pt is not medically ready to discharge.  Pt is intubated.  SW attempted to contact Stefany ( daughter n ) [phone] to discuss discharge planning.   There was no answer.   SW will continue to monitor pt needs.       Discharge Plan A and Plan B have been determined by review of patient's clinical status, future medical and therapeutic needs, and coverage/benefits for post-acute care in coordination with multidisciplinary team members.    Harmony Damon MSW, LCSW  [facility]  Case Management Dept.
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
## 2024-06-17 NOTE — ASSESSMENT & PLAN NOTE
Focal seizures  69F initially admitted to OSH 5/23 after found unresponsive by family, hospital course c/b hypercapnia requiring intubation, afib RVR, RUE jerking prompting EEG which revealed recurrent focal left seizures. Patient transferred to [facility] for higher level of care and cEEG. Course further complicated by status epilepticus requiring intubation and anesthetics.    Recommendations:  - Continuous vEEG monitoring  - Recommend weaning Ketamine 50>25>10  - Recommend to continue Lacosamide 200 mg BID, Levetiracetam 1500 mg BID, and Perampanel 12 mg daily  - Propofol weaned off 6/16 AM  - Consider LP to evaluate for etiology of focal status epilepticus  - Avoid agents that lower seizure threshold  - Management of infectious/metabolic abnormalities per [facility]  - Seizure precautions  - Per [place] Law, patient must refrain from driving for 6 months after having a seizure or cleared by a neurologist to legally resume driving      Discussed plan of care with [facility] team. No family at bedside. Will follow, please call with questions.

## 2024-06-17 NOTE — PROCEDURES
ICU EEG/VIDEO MONITORING REPORT    DATE OF SERVICE: 6/16/24-6/17/24  EEG NUMBER: [id number]  [facility]    METHODOLOGY   Electroencephalographic (EEG) recording is with electrodes placed according to the International 10-20 placement system.  Thirty two (32) channels of digital signal are simultaneously recorded from the scalp and may include EKG, EMG, and/or eye monitors.   Recording band pass was 0.1 to 512 hz.  Digital video recording of the patient is simultaneously recorded with the EEG.  The nursing staff report clinical symptoms and may press an event button when the patient has symptoms of clinical interest to the treating physicians.  EEG and video recording is stored and archived in digital format.  The entire recording is visually reviewed and the times identified by computer analysis as being spikes or seizures are reviewed again.  Activation procedures which include photic stimulation, hyperventilation and instructing patients to perform simple task are done in selected patients.   Compresses spectral analysis (CSA) is also performed on the activity recorded from each individual channel.  This is displayed as a power display of frequencies from 0 to 30 Hz over time.   The CSA analysis is done and displayed continuously.  This is reviewed for asymmetries in power between homologous areas of the scalp and for presence of changes in power which canbe seen when seizures occur.  Sections of suspected abnormalities on the CSA is then compared with the original EEG recording.     [employer] software was also utilized in the review of this study.  This software suite analyzes the EEG recording in multiple domains.  Coherence and rhythmicity is computed to identify EEG sections which may contain organized seizures.  Each channel undergoes analysis to detect presence of spike and sharp waves which have special and morphological characteristic of epileptic activity.  The routine EEG recording is  converted from spacial into frequency domain.  This is then displayed comparing homologous areas to identify areas of significant asymmetry.  Algorithm to identify non-cortically generated artifact is used to separate eye movement, EMG and other artifact from the EEG.      Recording Times  Start on 6/16/24 at 07:00  Stop on 6/17/24 at 07:00  A total of 24 hours of EEG was recorded.    This EEG study is performed in the ICU with the patient on the following sedative medications: Propofol 25 -> off at 10:13, ketamine 50 mKm    Indication: 69F initially admitted to OSH 5/23 after found unresponsive by family, hospital course c/b hypercapnia requiring intubation, afib RVR, RUE jerking prompting EEG which revealed recurrent focal left hemispheric seizures.  Ultimately the patient required intubation and sedation for status epilepticus.  EEG to evaluate response to treatment.     State of Consciousness:   Coma    Background:   The background is composed of diffuse low amplitude theta and delta activity with a prominent amount of over-riding beta activity.  There are brief (1-2 second) periods of attenuation but the record is near-continuous.     Sleep:   The patient is in a comatose state throughout the record, with no normal sleep architecture appreciated    Epileptiform Abnormalities  Frequent left posterior quadrant (occipital/temporal/parietal - max 01/T5/P3) epileptiform discharges occur throughout the record, at times in a semi periodic fashion.     EKG:   Sinus rhythm    Seizures/Events:   No discrete electrographic seizures     Impression:   Abnormal vEEG consistent with a severe diffuse encephalopathy and a highly epileptogenic focus in the left posterior quadrant.  There are frequent left hemispheric form discharges however no evolution into discrete electrographic seizures.      Celena Simental MD  [facility]   Department of Neurology/Epilepsy
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
## 2024-06-17 NOTE — SUBJECTIVE & OBJECTIVE
Interval History: No seizures on EEG. Weaning Ketamine today. No family at bedside on rounds.    Current Facility-Administered Medications   Medication Dose Route Frequency Provider Last Rate Last Admin    acetaminophen tablet 650 mg  650 mg Per NG tube Q4H PRN Savage Reyes MD        albuterol-ipratropium 2.5 mg-0.5 mg/3 mL nebulizer solution 3 mL  3 mL Nebulization Q6H PRN Lucila Elena PA-C   3 mL at 06/16/24 2201    atorvastatin tablet 40 mg  40 mg Per NG tube Daily Марина Flores MD   40 mg at 06/17/24 0928    balsam peru-castor oiL Oint   Topical (Top) BID Savage Reyes MD   Given at 06/17/24 0929    calcium gluconate 1 g in NS IVPB (premixed)  1 g Intravenous PRN Savage Reyes MD   Stopped at 06/14/24 1032    calcium gluconate 1 g in NS IVPB (premixed)  2 g Intravenous PRN Savage Reyes MD        calcium gluconate 1 g in NS IVPB (premixed)  3 g Intravenous PRN Savage Reyes MD        dextrose 10% bolus 125 mL 125 mL  12.5 g Intravenous PRN Марина Flores MD        dextrose 10% bolus 250 mL 250 mL  25 g Intravenous PRN Марина Flores MD        fentaNYL 50 mcg/mL injection 25 mcg  25 mcg Intravenous Q1H PRN MickalChristiano PA-C        glucagon (human recombinant) injection 1 mg  1 mg Intramuscular PRN Марина Flores MD        glucose chewable tablet 16 g  16 g Per NG tube PRN Savgae Reyes MD        glucose chewable tablet 24 g  24 g Per NG tube PRN Savage Reyes MD        heparin (porcine) injection 5,000 Units  5,000 Units Subcutaneous Q12H Carolee Oconnell NP   5,000 Units at 06/17/24 0928    hydrALAZINE injection 10 mg  10 mg Intravenous Q4H PRN Isa Ash NP   10 mg at 06/17/24 1246    ketamine 5,000 mg in sodium chloride 0.9% 500 mL infusion  25 mcg/kg/min Intravenous Continuous Lucila Elena PA-C 2.8 mL/hr at 06/17/24 1301 10 mcg/kg/min at 06/17/24 1301    lacosamide tablet 200 mg  200 mg Per NG tube Q12H Christiano Jensen PA-C    200 mg at 06/17/24 0929    levETIRAcetam injection 1,500 mg  1,500 mg Intravenous Q12H Quinten Haji MD   1,500 mg at 06/17/24 0928    magnesium oxide tablet 800 mg  800 mg Per NG tube PRN Carolee Oconnell, NP   800 mg at 06/17/24 0929    magnesium oxide tablet 800 mg  800 mg Per NG tube PRN Carolee Oconnell NP        metoprolol tartrate (LOPRESSOR) tablet 25 mg  25 mg Per NG tube BID Lucila Elena PA-C   25 mg at 06/17/24 1131    naloxone 0.4 mg/mL injection 0.02 mg  0.02 mg Intravenous PRN Марина Flores MD        ondansetron injection 4 mg  4 mg Intravenous Q8H PRN Марина Flores MD        pantoprazole suspension 40 mg  40 mg Per NG tube BID Savage Reyes MD   40 mg at 06/17/24 0929    perampaneL tablet 12 mg  12 mg Per NG tube Daily Carolee Oconnell NP   12 mg at 06/17/24 0931    potassium bicarbonate disintegrating tablet 35 mEq  35 mEq Per NG tube PRN Carolee Oconnell NP   35 mEq at 06/14/24 0434    potassium bicarbonate disintegrating tablet 50 mEq  50 mEq Per NG tube PRN Carolee Oconnell NP   50 mEq at 06/17/24 0644    potassium bicarbonate disintegrating tablet 60 mEq  60 mEq Per NG tube PRN Carolee Oconnell NP   60 mEq at 06/12/24 0516    potassium, sodium phosphates 280-160-250 mg packet 2 packet  2 packet Per NG tube PRN Carolee Oconnell, NP   2 packet at 06/17/24 0929    potassium, sodium phosphates 280-160-250 mg packet 2 packet  2 packet Per NG tube PRN Carolee Oconnell NP   2 packet at 06/12/24 1211    potassium, sodium phosphates 280-160-250 mg packet 2 packet  2 packet Per NG tube PRN Carolee Oconnell NP        sodium chloride 0.9% flush 10 mL  10 mL Intravenous Q12H PRN Марина Flores MD        thiamine tablet 100 mg  100 mg Per NG tube Daily Марина Flores MD   100 mg at 06/17/24 0929     Continuous Infusions:   ketamine 5,000 mg in sodium chloride 0.9% 500 mL infusion  25 mcg/kg/min Intravenous Continuous 2.8 mL/hr at 06/17/24 1301 10  mcg/kg/min at 06/17/24 1301       Review of Systems  Objective:     Vital Signs (Most Recent):  Temp: 98.8 °F (37.1 °C) (06/17/24 1201)  Pulse: 101 (06/17/24 1201)  Resp: 20 (06/17/24 1201)  BP: (!) 189/88 (06/17/24 1201)  SpO2: (!) 94 % (06/17/24 1201) Vital Signs (24h Range):  Temp:  [97.7 °F (36.5 °C)-99.5 °F (37.5 °C)] 98.8 °F (37.1 °C)  Pulse:  [] 101  Resp:  [18-20] 20  SpO2:  [94 %-99 %] 94 %  BP: ()/(47-88) 189/88  Arterial Line BP: ()/(40-93) 176/81     Weight: 47 kg (103 lb 9.9 oz)  Body mass index is 21.66 kg/m².     Physical Exam  Constitutional:       General: She is not in acute distress.     Appearance: She is ill-appearing. She is not diaphoretic.      Interventions: She is sedated and intubated.   HENT:      Head: Normocephalic and atraumatic.      Comments: EEG in place  Eyes:      General: No scleral icterus.        Right eye: No discharge.         Left eye: No discharge.      Conjunctiva/sclera: Conjunctivae normal.      Pupils: Pupils are equal, round, and reactive to light.   Cardiovascular:      Rate and Rhythm: Normal rate.   Pulmonary:      Effort: Pulmonary effort is normal. No respiratory distress. She is intubated.      Comments: Intubated  Skin:     General: Skin is warm and dry.   Psychiatric:      Comments: Unable to assess            NEUROLOGICAL EXAMINATION:     CRANIAL NERVES     CN III, IV, VI   Pupils are equal, round, and reactive to light.       JUAN RAMON OU   Corneals absent  No spontaneous eye opening   Face symmetric   Tongue midline   No spontaneous movements  Withdraws to noxious stimuli    Exam findings to suggest seizures:  Myoclonus - no   eye twitching - no   Nystagmus - no   gaze deviation - no   waxy rigidity - no        Significant Labs: All pertinent lab results from the past 24 hours have been reviewed.    Significant Studies: I have reviewed all pertinent imaging results/findings within the past 24 hours.

## 2024-06-17 NOTE — SUBJECTIVE & OBJECTIVE
Interval History:  see hospital course    Review of Systems   Unable to perform ROS: Intubated       Objective:     Vitals:  Temp: 99 °F (37.2 °C)  Pulse: 94  Rhythm: sinus arrhythmia  BP: (!) 153/74  MAP (mmHg): 105  Resp: 20  SpO2: 96 %  Oxygen Concentration (%): 40  Vent Mode: A/C  Set Rate: 20 BPM  Vt Set: 320 mL  PEEP/CPAP: 5 cmH20  Peak Airway Pressure: 28 cmH20  Mean Airway Pressure: 11 cmH20  Plateau Pressure: 19 cmH20    Temp  Min: 97.7 °F (36.5 °C)  Max: 99.5 °F (37.5 °C)  Pulse  Min: 88  Max: 106  BP  Min: 82/47  Max: 189/88  MAP (mmHg)  Min: 61  Max: 127  Resp  Min: 18  Max: 20  SpO2  Min: 94 %  Max: 99 %  Oxygen Concentration (%)  Min: 40  Max: 40    06/16 0701 - 06/17 0700  In: 4851.7 [I.V.:2677.4]  Out: 3525 [Urine:3350]   Unmeasured Output  Urine Occurrence: 2  Stool Occurrence: 1  Pad Count: 2        Physical Exam  Constitutional:       Comments: Coma   HENT:      Head: Normocephalic.   Cardiovascular:      Rate and Rhythm: Normal rate.   Pulmonary:      Effort: Pulmonary effort is normal.      Comments: intubated  Abdominal:      Palpations: Abdomen is soft.   Musculoskeletal:         General: No swelling.      Cervical back: Neck supple.   Skin:     General: Skin is warm.   Neurological:      GCS: GCS eye subscore is 1. GCS verbal subscore is 1. GCS motor subscore is 3.      Comments: Minimal movement in lowers                 Medications:  Continuousketamine 5,000 mg in sodium chloride 0.9% 500 mL infusion, Last Rate: 10 mcg/kg/min (06/17/24 1501)    Scheduledatorvastatin, 40 mg, Daily  balsam peru-castor oiL, , BID  heparin (porcine), 5,000 Units, Q12H  lacosamide, 200 mg, Q12H  levETIRAcetam (Keppra) IV (PEDS and ADULTS), 1,500 mg, Q12H  metoprolol tartrate, 25 mg, BID  pantoprazole, 40 mg, BID  perampaneL, 12 mg, Daily  thiamine, 100 mg, Daily    PRNacetaminophen, 650 mg, Q4H PRN  albuterol-ipratropium, 3 mL, Q6H PRN  calcium gluconate IVPB, 1 g, PRN  calcium gluconate IVPB, 2 g, PRN  calcium  gluconate IVPB, 3 g, PRN  dextrose 10%, 12.5 g, PRN  dextrose 10%, 25 g, PRN  fentaNYL, 25 mcg, Q1H PRN  glucagon (human recombinant), 1 mg, PRN  glucose, 16 g, PRN  glucose, 24 g, PRN  hydrALAZINE, 10 mg, Q4H PRN  magnesium oxide, 800 mg, PRN  magnesium oxide, 800 mg, PRN  naloxone, 0.02 mg, PRN  ondansetron, 4 mg, Q8H PRN  potassium bicarbonate, 35 mEq, PRN  potassium bicarbonate, 50 mEq, PRN  potassium bicarbonate, 60 mEq, PRN  potassium, sodium phosphates, 2 packet, PRN  potassium, sodium phosphates, 2 packet, PRN  potassium, sodium phosphates, 2 packet, PRN  sodium chloride 0.9%, 10 mL, Q12H PRN      Today I personally reviewed pertinent medications, lines/drains/airways, imaging, laboratory results,     Diet  No diet orders on file  No diet orders on file

## 2024-06-17 NOTE — ASSESSMENT & PLAN NOTE
-Intubated 2/2 hypoxemia + hypercapnia   -Current vent settings below  Vent Mode: A/C  Oxygen Concentration (%):  [40] 40  Resp Rate Total:  [20 br/min] 20 br/min  Vt Set:  [320 mL] 320 mL  PEEP/CPAP:  [5 cmH20] 5 cmH20  Mean Airway Pressure:  [10 mvZ90-98 cmH20] 11 cmH20    -Famotidine BID for ulcer PPX  -VAP prevention per protocol  -CXR/ABGs daily

## 2024-06-17 NOTE — PROGRESS NOTES
"[facility]  Adult Nutrition  Progress Note    SUMMARY       Recommendations    Decrease TF rate (of Bertha Herr Peptide 1.5) to 35 mL/hr = 1292 kcals, 62 g of protein, 588 mL fluid.  RD to monitor & follow-up.    Goals: Meet % EEN, EPN by RD f/u  Nutrition Goal Status: goal not met  Communication of RD Recs: reviewed with RN    Assessment and Plan    Moderate malnutrition    Malnutrition Type:  Context: chronic illness  Level: moderate    Related to (etiology):   Inadequate energy intake    Signs and Symptoms (as evidenced by):   Malnutrition Characteristic Summary:  Weight Loss (Malnutrition): 10% in 6 months  Energy Intake (Malnutrition): less than 75% for greater than 7 days    Interventions/Recommendations (treatment strategy):  Collaboration of nutrition care w/ other providers  EN    Nutrition Diagnosis Status:   Continues    Malnutrition Assessment    Malnutrition Context: chronic illness  Malnutrition Level: moderate    Weight Loss (Malnutrition): 10% in 6 months  Energy Intake (Malnutrition): less than 75% for greater than 7 days     Reason for Assessment    Reason For Assessment: RD follow-up  Diagnosis:  (Focal seizures)  Relevant Medical History: schizoaffective disorder, acute hypercapnic respiratory failure, COPD, pneumonia, metabolic encephalopathy, HTN  Interdisciplinary Rounds: did not attend    General Information Comments: Intubated 6/11, tolerating TFs. Moderate malnutrition diagnosis continues; please see PES statement for details.  Nutrition Discharge Planning: Pending medical course    Nutrition/Diet History    Factors Affecting Nutritional Intake: NPO, on mechanical ventilation    Anthropometrics    Temp: 98.8 °F (37.1 °C)  Height: 4' 10" (147.3 cm)  Height (inches): 58 in  Weight Method: Bed Scale  Weight: 47 kg (103 lb 9.9 oz)  Weight (lb): 103.62 lb  Ideal Body Weight (IBW), Female: 90 lb  % Ideal Body Weight, Female (lb): 115.13 %  BMI (Calculated): 21.7  BMI " Grade: 18.5-24.9 - normal    Lab/Procedures/Meds    Pertinent Labs Reviewed: reviewed  Pertinent Labs Comments: Na 146  Pertinent Medications Reviewed: reviewed  Pertinent Medications Comments: Ketamine, Levophed    Estimated/Assessed Needs    Weight Used For Calorie Calculations: 47 kg (103 lb 9.9 oz)    Energy Calorie Requirements (kcal): 1232 kcal/d  Energy Need Method: [facility]    Protein Requirements: 57-71 g/d (1.2-1.5 g/kg)  Weight Used For Protein Calculations: 47 kg (103 lb 9.9 oz)    Estimated Fluid Requirement Method: RDA Method (Per MD)  RDA Method (mL): 1232    Nutrition Prescription Ordered    Current Diet Order: NPO  Current Nutrition Support Formula Ordered: everyArt Peptide 1.5  Current Nutrition Support Rate Ordered: 45 mL/hr    Evaluation of Received Nutrient/Fluid Intake    Enteral Calories (kcal): 1661  Enteral Protein (gm): 80  Enteral (Free Water) Fluid (mL): 756  Free Water Flush Fluid (mL): 600    % Kcal Needs: 135%  % Protein Needs: 113%    I/O: +15.4L since admit    Energy Calories Required: exceeds needs  Protein Required: exceeds needs  Fluid Required: other (see comments) (Per MD)    Comments: LBM: 6/16    Tolerance: tolerating    Nutrition Risk    Level of Risk/Frequency of Follow-up:  (1x/week)     Monitor and Evaluation    Food and Nutrient Intake: food and beverage intake, enteral nutrition intake, energy intake  Food and Nutrient Adminstration: diet order, enteral and parenteral nutrition administration  Physical Activity and Function: nutrition-related ADLs and IADLs  Anthropometric Measurements: weight, weight change  Biochemical Data, Medical Tests and Procedures: inflammatory profile, lipid profile, glucose/endocrine profile, gastrointestinal profile, electrolyte and renal panel  Nutrition-Focused Physical Findings: overall appearance     Nutrition Follow-Up    RD Follow-up?: Yes
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
## 2024-06-17 NOTE — PROGRESS NOTES
EEG   AM Check Electrodes had to be fixed.Yes    Skin Integrity: Mild      Facial redness    Provider notified     Shashank Heard   06/17/2024 9:25 AM

## 2024-06-18 ENCOUNTER — DOCUMENTATION ONLY (OUTPATIENT)
Dept: NEUROLOGY | Facility: CLINIC | Age: 69
End: 2024-06-18
Payer: MEDICARE

## 2024-06-18 NOTE — ASSESSMENT & PLAN NOTE
Focal seizures  69F initially admitted to OSH 5/23 after found unresponsive by family, hospital course c/b hypercapnia requiring intubation, afib RVR, RUE jerking prompting EEG which revealed recurrent focal left seizures. [place] [facility] [place] and cEEG. Course further complicated by status epilepticus requiring intubation and anesthetics.    Recommendations:  - Continuous vEEG monitoring  - Recommend weaning Ketamine 10>0  - Recommend to continue Lacosamide 200 mg BID, Levetiracetam 1500 mg BID, and Perampanel 12 mg daily  - Propofol weaned off 6/16 AM  - Consider LP to evaluate for etiology of focal status epilepticus  - Avoid agents that lower seizure threshold  - Management of infectious/metabolic abnormalities per [facility]  - Seizure precautions  - Per [place] Law, patient must refrain from driving for 6 months after having a seizure or cleared by a neurologist to legally resume driving      Discussed plan of care with [facility] team. No family at bedside. Will follow, please call with questions.

## 2024-06-18 NOTE — PROCEDURES
ICU EEG/VIDEO MONITORING REPORT    DATE OF SERVICE: 6/17/24-6/18/24  EEG NUMBER: [id number]  [facility] OF SERVICE[facility]    METHODOLOGY   Electroencephalographic (EEG) recording is with electrodes placed according to the International 10-20 placement system.  Thirty two (32) channels of digital signal are simultaneously recorded from the scalp and may include EKG, EMG, and/or eye monitors.   Recording band pass was 0.1 to 512 hz.  Digital video recording of the patient is simultaneously recorded with the EEG.  The nursing staff report clinical symptoms and may press an event button when the patient has symptoms of clinical interest to the treating physicians.  EEG and video recording is stored and archived in digital format.  The entire recording is visually reviewed and the times identified by computer analysis as being spikes or seizures are reviewed again.  Activation procedures which include photic stimulation, hyperventilation and instructing patients to perform simple task are done in selected patients.   Compresses spectral analysis (CSA) is also performed on the activity recorded from each individual channel.  This is displayed as a power display of frequencies from 0 to 30 Hz over time.   The CSA analysis is done and displayed continuously.  This is reviewed for asymmetries in power between homologous areas of the scalp and for presence of changes in power which canbe seen when seizures occur.  Sections of suspected abnormalities on the CSA is then compared with the original EEG recording.     [employer] software was also utilized in the review of this study.  This software suite analyzes the EEG recording in multiple domains.  Coherence and rhythmicity is computed to identify EEG sections which may contain organized seizures.  Each channel undergoes analysis to detect presence of spike and sharp waves which have special and morphological characteristic of epileptic activity.  The routine EEG recording is  converted from spacial into frequency domain.  This is then displayed comparing homologous areas to identify areas of significant asymmetry.  Algorithm to identify non-cortically generated artifact is used to separate eye movement, EMG and other artifact from the EEG.      Recording Times  Start on 6/17/24 at 07:00  Stop on 6/18/24 at 07:00  A total of 24 hours of EEG was recorded.    This EEG study is performed in the ICU with the patient on the following sedative medications:ketamine 50 mKm -> 25 at 10:26 -> 10 at 13:36    Indication: 69F initially admitted to OSH 5/23 after found unresponsive by family, hospital course c/b hypercapnia requiring intubation, afib RVR, RUE jerking prompting EEG which revealed recurrent focal left hemispheric seizures.  Ultimately the patient required intubation and sedation for status epilepticus.  EEG to evaluate response to treatment.     State of Consciousness:   Coma    Background:   The background is composed of diffuse low amplitude theta and delta activity with a prominent amount of over-riding beta activity.  The record is near continuous with occasional 1-2 second periods of suppression.      Sleep:   The patient is in a comatose state throughout the record, with no normal sleep architecture appreciated    Epileptiform Abnormalities  Lateralized periodic discharges occur in the left posterior quadrant (occipital/temporal/parietal - max 01/T5/P3).  As sedation is decreased, these discharges start to occur nearly continuously at a frequency of 1-2 hz, occasionally interrupted by brief periods of suppression.    EKG:   Sinus rhythm    Seizures/Events:   No discrete electrographic seizures     Impression:   Abnormal vEEG consistent with a severe diffuse encephalopathy and a highly epileptogenic focus in the left posterior quadrant.  During this record the overall frequency of epileptiform activity has increased as anesthetics have been decreased.  However, evolution into  discrete electrographic seizure is not seen.     Celena Simental MD  [facility]   Department of Neurology/Epilepsy
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
## 2024-06-18 NOTE — PLAN OF CARE
"[facility] Care Plan    POC reviewed with June Boykin and family at 0300. Pt verbalized understanding. Questions and concerns addressed. No acute events overnight. Pt progressing toward goals. Will continue to monitor. See below and flowsheets for full assessment and VS info.     -Ketamine gtt ongoing.   -Straight caths not performed. Pt now urinating on her own.  Purewick in place. 900 mL output with 3 pad counts.    -Continue 150 mL q4 H2O flushes.   -Hydralazine given 1x PRN for SBP >180.   -Potassium and Mag replaced overnight.       Is this a stroke patient? no    Neuro:  [place] Coma Scale  Best Eye Response: 1-->(E1) none  Best Motor Response: 3-->(M3) flexion to pain  Best Verbal Response: 1-->(V1) none  [place] Coma Scale Score: 5  Assessment Qualifiers: patient chemically sedated or paralyzed, patient intubated  Pupil PERRLA: yes     24hr Temp:  [97.9 °F (36.6 °C)-100 °F (37.8 °C)]     CV:   Rhythm: sinus arrhythmia  BP goals:   SBP < 180  MAP > 65    Resp:      Vent Mode: A/C  Set Rate: 20 BPM  Oxygen Concentration (%): 40  Vt Set: 320 mL  PEEP/CPAP: 5 cmH20    Plan: wean to extubate    GI/:     Diet/Nutrition Received: NPO, tube feeding  Last Bowel Movement: 06/16/24  Voiding Characteristics: unable to void    Intake/Output Summary (Last 24 hours) at 6/18/2024 0642  Last data filed at 6/18/2024 0601  Gross per 24 hour   Intake 2233.08 ml   Output 2970 ml   Net -736.92 ml     Unmeasured Output  Urine Occurrence: 1  Stool Occurrence: 1  Pad Count: 1    Labs/Accuchecks:  Recent Labs   Lab 06/18/24  0105   WBC 8.19   RBC 4.30   HGB 9.8*   HCT 32.2*         Recent Labs   Lab 06/18/24  0105      K 3.6   CO2 28      BUN 12   CREATININE 0.5   ALKPHOS 50*   ALT 10   AST 21   BILITOT 0.3    No results for input(s): "PROTIME", "INR", "APTT", "HEPANTIXA" in the last 168 hours. No results for input(s): "CPK", "CPKMB", "TROPONINI", "MB" in the last 168 hours.    Electrolytes: Electrolytes " replaced  Accuchecks: none    Gtts:   ketamine 5,000 mg in sodium chloride 0.9% 500 mL infusion  10 mcg/kg/min Intravenous Continuous 2.8 mL/hr at 24 06 10 mcg/kg/min at 24 06       LDA/Wounds:    Nurses Note -- 4 Eyes    Is there altered skin present? yes     Please check the following boxes that apply:   [] LDA Added if Not in Epic (Describe Wound)   [] New Altered Skin Integrity was Present on Admit and Documented in LDA   [] Wound Image Taken    Wound Care Consulted? No    Second RN/Staff Member:  ALEX Pierre    Restraints: N/A  Restraint Order  Length of Order: Order good for next 24 hours or when removed.  Date that the current order will : 24  Time that the current order will : 807  Order Upon Application: Yes    [facility]

## 2024-06-18 NOTE — PROGRESS NOTES
[facility] Critical Care  Neurology-Epilepsy  Progress Note    Patient Name: June Boykin  MRN: [id number]  Admission Date: 6/8/2024  Hospital Length of Stay: 10 days  Code Status: Partial Code   Attending Provider: Andrea Gutiérrez MD  Primary Care Physician: Mauricio Pierre MD   Principal Problem:Status epilepticus    Subjective:     Hospital Course:   6/6>6/7: EEG at OSH - continuous lateralized periodic discharges in the left hemisphere consistent with a highly irritable epileptogenic focus in this region, often time locked with clonic jerking of the RUE making it ictal in nature  6/7>6/8: EEG CAP at OSH - underlying bilateral/generalized epileptiform activity with potential to evolve into status epilepticus  6/8>6/9: Periodic lateralized epileptiform discharges over the left hemisphere consistent with significant focal cortical irritability and epileptic potential, frequent focal seizures with onset over the left hemisphere  6/9>6/10: EEG report pending  6/10>6/11: Frequent focal seizures up to 6 per hour with onset over the left hemisphere  6/11>6/12: Periodic lateralized epileptiform discharges over the left hemisphere consistent with significant cortical irritability and epileptic potential, burst suppression pattern due to anesthetics  6/12>6/13: Periodic lateralized epileptiform discharges over the left hemisphere consistent with significant cortical irritability and epileptic potential and burst suppression pattern due to anesthetics  6/13>6/14: Burst suppression pattern  6/14>6/15: burst suppression pattern, no electrographic seizures  6/15>6/16: left interictals seen more frequently after Propofol weaned, no electrographic seizures  6/16>6/17: left interictals, no clinical or electrographic seizures  6/17>6/18: left posterior quadrant lateralized periodic discharges, no discrete seizures    See EEG reports for details.    Interval History: NAEON. No discrete seizures on EEG. Stopping  Ketamine this morning. No family at bedside on rounds.    Current Facility-Administered Medications   Medication Dose Route Frequency Provider Last Rate Last Admin    acetaminophen tablet 650 mg  650 mg Per NG tube Q4H PRN Savage Reyes MD        albuterol-ipratropium 2.5 mg-0.5 mg/3 mL nebulizer solution 3 mL  3 mL Nebulization Q6H PRN Lucila Elena PA-C   3 mL at 06/16/24 2201    atorvastatin tablet 40 mg  40 mg Per NG tube Daily Марина Flores MD   40 mg at 06/18/24 0814    balsam peru-castor oiL Oint   Topical (Top) BID Savage Reyes MD   Given at 06/18/24 0814    calcium gluconate 1 g in NS IVPB (premixed)  1 g Intravenous PRN Savage Reyes MD   Stopped at 06/14/24 1032    calcium gluconate 1 g in NS IVPB (premixed)  2 g Intravenous PRN Savage Reyes MD        calcium gluconate 1 g in NS IVPB (premixed)  3 g Intravenous PRN Savage Reyes MD        dextrose 10% bolus 125 mL 125 mL  12.5 g Intravenous PRN Марина Flores MD        dextrose 10% bolus 250 mL 250 mL  25 g Intravenous PRN Марина Flores MD        fentaNYL 50 mcg/mL injection 25 mcg  25 mcg Intravenous Q1H PRN Christiano Jensen PA-C        glucagon (human recombinant) injection 1 mg  1 mg Intramuscular PRN Марина Flores MD        glucose chewable tablet 16 g  16 g Per NG tube PRN Savage Reyes MD        glucose chewable tablet 24 g  24 g Per NG tube PRN Savage Reyes MD        heparin (porcine) injection 5,000 Units  5,000 Units Subcutaneous Q12H Carolee Oconnell NP   5,000 Units at 06/18/24 0814    hydrALAZINE injection 10 mg  10 mg Intravenous Q4H PRN Isa Ash NP   10 mg at 06/17/24 2309    lacosamide tablet 200 mg  200 mg Per NG tube Q12H Christiano Jensen PA-C   200 mg at 06/18/24 0814    levETIRAcetam injection 1,500 mg  1,500 mg Intravenous Q12H Quinten Haji MD   1,500 mg at 06/18/24 0815    magnesium oxide tablet 800 mg  800 mg Per NG tube PRN Carolee Oconnell, NP   800  mg at 06/18/24 0600    magnesium oxide tablet 800 mg  800 mg Per NG tube PRN Carolee Oconnell NP        metoprolol tartrate (LOPRESSOR) tablet 25 mg  25 mg Per NG tube BID Lucila Elena PA-C   25 mg at 06/18/24 0814    naloxone 0.4 mg/mL injection 0.02 mg  0.02 mg Intravenous PRN Марина Flores MD        ondansetron injection 4 mg  4 mg Intravenous Q8H PRN Марина Flores MD        [START ON 6/19/2024] pantoprazole suspension 40 mg  40 mg Per NG tube Daily Betzaida Randall PA-C        perampaneL tablet 12 mg  12 mg Per NG tube Daily Carolee Oconnell NP   12 mg at 06/18/24 0929    potassium bicarbonate disintegrating tablet 35 mEq  35 mEq Per NG tube PRN Carolee Oconnell NP   35 mEq at 06/14/24 0434    potassium bicarbonate disintegrating tablet 50 mEq  50 mEq Per NG tube PRN Carolee Oconnell NP   50 mEq at 06/18/24 0559    potassium bicarbonate disintegrating tablet 60 mEq  60 mEq Per NG tube PRN Carolee Oconnell NP   60 mEq at 06/12/24 0516    potassium, sodium phosphates 280-160-250 mg packet 2 packet  2 packet Per NG tube PRN Carolee Oconnell, NP   2 packet at 06/17/24 0929    potassium, sodium phosphates 280-160-250 mg packet 2 packet  2 packet Per NG tube PRN Carolee Oconnell, NP   2 packet at 06/12/24 1211    potassium, sodium phosphates 280-160-250 mg packet 2 packet  2 packet Per NG tube PRN Carolee Oconnell, NP        sodium chloride 0.9% flush 10 mL  10 mL Intravenous Q12H PRN Марина Flores MD        thiamine tablet 100 mg  100 mg Per NG tube Daily Марина Flores MD   100 mg at 06/18/24 0814     Continuous Infusions:    Review of Systems  Objective:     Vital Signs (Most Recent):  Temp: 99.7 °F (37.6 °C) (06/18/24 1102)  Pulse: 87 (06/18/24 1102)  Resp: (!) 35 (06/18/24 1102)  BP: (!) 166/78 (06/18/24 1102)  SpO2: 98 % (06/18/24 1102) Vital Signs (24h Range):  Temp:  [97.9 °F (36.6 °C)-100 °F (37.8 °C)] 99.7 °F (37.6 °C)  Pulse:  [] 87  Resp:  [20-39] 35  SpO2:   [95 %-100 %] 98 %  BP: (126-185)/(46-91) 166/78  Arterial Line BP: ()/(50-80) 149/63     Weight: 47 kg (103 lb 9.9 oz)  Body mass index is 21.66 kg/m².     Physical Exam  Constitutional:       General: She is not in acute distress.     Appearance: She is ill-appearing. She is not diaphoretic.      Interventions: She is intubated.   HENT:      Head: Normocephalic and atraumatic.      Comments: EEG in place  Eyes:      General: No scleral icterus.        Right eye: No discharge.         Left eye: No discharge.      Conjunctiva/sclera: Conjunctivae normal.      Pupils: Pupils are equal, round, and reactive to light.   Cardiovascular:      Rate and Rhythm: Normal rate.   Pulmonary:      Effort: Pulmonary effort is normal. No respiratory distress. She is intubated.      Comments: Intubated  Skin:     General: Skin is warm and dry.   Psychiatric:      Comments: Unable to assess            NEUROLOGICAL EXAMINATION:     CRANIAL NERVES     CN III, IV, VI   Pupils are equal, round, and reactive to light.       JUAN RAMON OU   Corneals absent  No spontaneous eye opening   Face symmetric   Tongue midline   No spontaneous movements  Does not follow commands    Exam findings to suggest seizures:  Myoclonus - no   eye twitching - no   Nystagmus - no   gaze deviation - no   waxy rigidity - no        Significant Labs: All pertinent lab results from the past 24 hours have been reviewed.    Significant Studies: I have reviewed all pertinent imaging results/findings within the past 24 hours.  Assessment and Plan:     * Status epilepticus  Focal seizures  69F initially admitted to OSH 5/23 after found unresponsive by family, hospital course c/b hypercapnia requiring intubation, afib RVR, RUE jerking prompting EEG which revealed recurrent focal left seizures. Patient transferred to [facility] for higher level of care and cEEG. Course further complicated by status epilepticus requiring intubation and anesthetics.    Recommendations:  -  Continuous vEEG monitoring  - Recommend weaning Ketamine 10>0  - Recommend to continue Lacosamide 200 mg BID, Levetiracetam 1500 mg BID, and Perampanel 12 mg daily  - Propofol weaned off 6/16 AM  - Consider LP to evaluate for etiology of focal status epilepticus  - Avoid agents that lower seizure threshold  - Management of infectious/metabolic abnormalities per [facility]  - Seizure precautions  - Per [place] Law, patient must refrain from driving for 6 months after having a seizure or cleared by a neurologist to legally resume driving      Discussed plan of care with [facility] team. No family at bedside. Will follow, please call with questions.    Focal seizures  See Status epilepticus    Paroxysmal atrial fibrillation  - Rate controlled on Lopressor  - Management per [facility]    Acute hypercapnic respiratory failure  - Intubated   - Anesthetics weaned to off 6/18  - Vent management per [facility]        VTE Risk Mitigation (From admission, onward)           Ordered     heparin (porcine) injection 5,000 Units  Every 12 hours         06/13/24 1012     IP VTE HIGH RISK PATIENT  Once         06/08/24 1629     Place sequential compression device  Until discontinued         06/08/24 1629                    Patricia Stewart PA-C  Neurology-Epilepsy  [facility]  Staff: Dr. Simental

## 2024-06-18 NOTE — SUBJECTIVE & OBJECTIVE
Interval History: NAEON. No discrete seizures on EEG. Stopping Ketamine this morning. No family at bedside on rounds.    Current Facility-Administered Medications   Medication Dose Route Frequency Provider Last Rate Last Admin    acetaminophen tablet 650 mg  650 mg Per NG tube Q4H PRN Savage Reyes MD        albuterol-ipratropium 2.5 mg-0.5 mg/3 mL nebulizer solution 3 mL  3 mL Nebulization Q6H PRN Lucila Elena PA-C   3 mL at 06/16/24 2201    atorvastatin tablet 40 mg  40 mg Per NG tube Daily Марина Flores MD   40 mg at 06/18/24 0814    balsam peru-castor oiL Oint   Topical (Top) BID Savage Reyes MD   Given at 06/18/24 0814    calcium gluconate 1 g in NS IVPB (premixed)  1 g Intravenous PRN Savage Reyes MD   Stopped at 06/14/24 1032    calcium gluconate 1 g in NS IVPB (premixed)  2 g Intravenous PRN Savage Reyes MD        calcium gluconate 1 g in NS IVPB (premixed)  3 g Intravenous PRN Savage Reyes MD        dextrose 10% bolus 125 mL 125 mL  12.5 g Intravenous PRN Марина Flores MD        dextrose 10% bolus 250 mL 250 mL  25 g Intravenous PRN Марина Flores MD        fentaNYL 50 mcg/mL injection 25 mcg  25 mcg Intravenous Q1H PRN Christiano Jensen PA-C        glucagon (human recombinant) injection 1 mg  1 mg Intramuscular PRN Марина Flores MD        glucose chewable tablet 16 g  16 g Per NG tube PRN Savage Reyes MD        glucose chewable tablet 24 g  24 g Per NG tube PRN Savage Reyes MD        heparin (porcine) injection 5,000 Units  5,000 Units Subcutaneous Q12H Carolee Oconnell NP   5,000 Units at 06/18/24 0814    hydrALAZINE injection 10 mg  10 mg Intravenous Q4H PRN Isa Ash NP   10 mg at 06/17/24 2309    lacosamide tablet 200 mg  200 mg Per NG tube Q12H Christiano Jensen PA-C   200 mg at 06/18/24 0814    levETIRAcetam injection 1,500 mg  1,500 mg Intravenous Q12H Quinten Haji MD   1,500 mg at 06/18/24 0815    magnesium oxide  tablet 800 mg  800 mg Per NG tube PRN Carolee Oconnell NP   800 mg at 06/18/24 0600    magnesium oxide tablet 800 mg  800 mg Per NG tube PRN Carolee Oconnell NP        metoprolol tartrate (LOPRESSOR) tablet 25 mg  25 mg Per NG tube BID Lucila Elena PA-C   25 mg at 06/18/24 0814    naloxone 0.4 mg/mL injection 0.02 mg  0.02 mg Intravenous PRN Марина Flores MD        ondansetron injection 4 mg  4 mg Intravenous Q8H PRN Марина Flores MD        [START ON 6/19/2024] pantoprazole suspension 40 mg  40 mg Per NG tube Daily Betzaida Randall PA-C        perampaneL tablet 12 mg  12 mg Per NG tube Daily Carolee Oconnell, NP   12 mg at 06/18/24 0929    potassium bicarbonate disintegrating tablet 35 mEq  35 mEq Per NG tube PRN Carolee Oconnell NP   35 mEq at 06/14/24 0434    potassium bicarbonate disintegrating tablet 50 mEq  50 mEq Per NG tube PRN Carolee Oconnell, NP   50 mEq at 06/18/24 0559    potassium bicarbonate disintegrating tablet 60 mEq  60 mEq Per NG tube PRN Carolee Oconnell NP   60 mEq at 06/12/24 0516    potassium, sodium phosphates 280-160-250 mg packet 2 packet  2 packet Per NG tube PRN Carolee Oconnell, NP   2 packet at 06/17/24 0929    potassium, sodium phosphates 280-160-250 mg packet 2 packet  2 packet Per NG tube PRN Carolee Oconnell, NP   2 packet at 06/12/24 1211    potassium, sodium phosphates 280-160-250 mg packet 2 packet  2 packet Per NG tube PRN Carolee Oconnell, NP        sodium chloride 0.9% flush 10 mL  10 mL Intravenous Q12H PRN Марина Flores MD        thiamine tablet 100 mg  100 mg Per NG tube Daily Марина Flores MD   100 mg at 06/18/24 0814     Continuous Infusions:    Review of Systems  Objective:     Vital Signs (Most Recent):  Temp: 99.7 °F (37.6 °C) (06/18/24 1102)  Pulse: 87 (06/18/24 1102)  Resp: (!) 35 (06/18/24 1102)  BP: (!) 166/78 (06/18/24 1102)  SpO2: 98 % (06/18/24 1102) Vital Signs (24h Range):  Temp:  [97.9 °F (36.6 °C)-100 °F (37.8 °C)]  99.7 °F (37.6 °C)  Pulse:  [] 87  Resp:  [20-39] 35  SpO2:  [95 %-100 %] 98 %  BP: (126-185)/(46-91) 166/78  Arterial Line BP: ()/(50-80) 149/63     Weight: 47 kg (103 lb 9.9 oz)  Body mass index is 21.66 kg/m².     Physical Exam  Constitutional:       General: She is not in acute distress.     Appearance: She is ill-appearing. She is not diaphoretic.      Interventions: She is intubated.   HENT:      Head: Normocephalic and atraumatic.      Comments: EEG in place  Eyes:      General: No scleral icterus.        Right eye: No discharge.         Left eye: No discharge.      Conjunctiva/sclera: Conjunctivae normal.      Pupils: Pupils are equal, round, and reactive to light.   Cardiovascular:      Rate and Rhythm: Normal rate.   Pulmonary:      Effort: Pulmonary effort is normal. No respiratory distress. She is intubated.      Comments: Intubated  Skin:     General: Skin is warm and dry.   Psychiatric:      Comments: Unable to assess            NEUROLOGICAL EXAMINATION:     CRANIAL NERVES     CN III, IV, VI   Pupils are equal, round, and reactive to light.       JUAN RAMON OU   Corneals absent  No spontaneous eye opening   Face symmetric   Tongue midline   No spontaneous movements  Does not follow commands    Exam findings to suggest seizures:  Myoclonus - no   eye twitching - no   Nystagmus - no   gaze deviation - no   waxy rigidity - no        Significant Labs: All pertinent lab results from the past 24 hours have been reviewed.    Significant Studies: I have reviewed all pertinent imaging results/findings within the past 24 hours.

## 2024-06-18 NOTE — PLAN OF CARE
Patient is not medically ready to discharge.   Pt remains intubated.   Patient is progressing per medical team.   SW will continue to monitor pt needs.       Discharge Plan A and Plan B have been determined by review of patient's clinical status, future medical and therapeutic needs, and coverage/benefits for post-acute care in coordination with multidisciplinary team members.      Harmony Damon MSW, TERRYW  [facility]  Case Management Dept.

## 2024-06-18 NOTE — ASSESSMENT & PLAN NOTE
-Intubated 2/2 hypoxemia + hypercapnia     -Current vent settings below  Vent Mode: A/C  Oxygen Concentration (%):  [40] 40  Resp Rate Total:  [20 br/min-21 br/min] 20 br/min  Vt Set:  [320 mL] 320 mL  PEEP/CPAP:  [5 cmH20] 5 cmH20  Mean Airway Pressure:  [12 [id number] cmH20] 12 cmH20    -Famotidine BID for ulcer PPX  -VAP prevention per protocol  -CXR/ABGs daily
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
## 2024-06-18 NOTE — PLAN OF CARE
"[facility] Care Plan    POC reviewed with June Boykin at 1400. Pt unable to verbalize understanding. No acute events today. Pt progressing toward goals. Will continue to monitor. See below and flowsheets for full assessment and VS info.     -cEEG in progress  -Ketamine gtt off currently        Is this a stroke patient? no    Neuro:  [place] Coma Scale  Best Eye Response: 1-->(E1) none  Best Motor Response: 4-->(M4) withdraws from pain  Best Verbal Response: 1-->(V1) none  [place] Coma Scale Score: 6  Assessment Qualifiers: patient chemically sedated or paralyzed, patient intubated  Pupil PERRLA: yes     24 hr Temp:  [97.9 °F (36.6 °C)-99.9 °F (37.7 °C)]     CV:   Rhythm: normal sinus rhythm  BP goals:   SBP < 180  MAP > 65    Resp:      Vent Mode: A/C  Set Rate: 20 BPM  Oxygen Concentration (%): 40  Vt Set: 320 mL  PEEP/CPAP: 5 cmH20    Plan: wean to extubate and status epilepticus    GI/:     Diet/Nutrition Received: tube feeding  Last Bowel Movement: 06/18/24  Voiding Characteristics: external catheter    Intake/Output Summary (Last 24 hours) at 6/18/2024 1646  Last data filed at 6/18/2024 1602  Gross per 24 hour   Intake 1757.25 ml   Output 1870 ml   Net -112.75 ml     Unmeasured Output  Urine Occurrence: 1  Stool Occurrence: 1  Pad Count: 1    Labs/Accuchecks:  Recent Labs   Lab 06/18/24  0105   WBC 8.19   RBC 4.30   HGB 9.8*   HCT 32.2*         Recent Labs   Lab 06/18/24  0105      K 3.6   CO2 28      BUN 12   CREATININE 0.5   ALKPHOS 50*   ALT 10   AST 21   BILITOT 0.3    No results for input(s): "PROTIME", "INR", "APTT", "HEPANTIXA" in the last 168 hours. No results for input(s): "CPK", "CPKMB", "TROPONINI", "MB" in the last 168 hours.    Electrolytes: N/A - electrolytes WDL  Accuchecks: none    Gtts:      LDA/Wounds:      Nurses Note -- 4 Eyes      Is there altered skin present? yes     Please check the following boxes that apply:   [] LDA Added if Not in Epic (Describe Wound)   [] " New Altered Skin Integrity was Present on Admit and Documented in LDA   [] Wound Image Taken    Wound Care Consulted? Yes    Second RN/Staff Member:  Wound care NP      Restraints:   Restraint Order  Length of Order: Order good for next 24 hours or when removed.  Date that the current order will : 24  Time that the current order will : 807  Order Upon Application: Yes    [facility]

## 2024-06-18 NOTE — SUBJECTIVE & OBJECTIVE
Interval History:  see hospital course    Review of Systems   Unable to perform ROS: Intubated       Objective:     Vitals:  Temp: 99.1 °F (37.3 °C)  Pulse: 85  Rhythm: normal sinus rhythm  BP: (!) 148/68  MAP (mmHg): 98  Resp: 20  SpO2: 99 %  Oxygen Concentration (%): 40  Vent Mode: A/C  Set Rate: 20 BPM  Vt Set: 320 mL  PEEP/CPAP: 5 cmH20  Peak Airway Pressure: 33 cmH20  Mean Airway Pressure: 12 cmH20  Plateau Pressure: 16 cmH20    Temp  Min: 97.9 °F (36.6 °C)  Max: 99.9 °F (37.7 °C)  Pulse  Min: 80  Max: 101  BP  Min: 129/63  Max: 185/90  MAP (mmHg)  Min: 90  Max: 118  Resp  Min: 20  Max: 39  SpO2  Min: 95 %  Max: 100 %  Oxygen Concentration (%)  Min: 40  Max: 40    06/17 0701 - 06/18 0700  In: 2233.1 [I.V.:528.1]  Out: 2970 [Urine:2950]   Unmeasured Output  Urine Occurrence: 1  Stool Occurrence: 1  Pad Count: 1        Physical Exam  Vitals and nursing note reviewed.   Constitutional:       Comments: Coma   HENT:      Head: Normocephalic.      Mouth/Throat:      Mouth: Mucous membranes are moist.      Pharynx: Oropharynx is clear.   Cardiovascular:      Rate and Rhythm: Normal rate and regular rhythm.   Pulmonary:      Effort: Pulmonary effort is normal. No respiratory distress.      Comments: intubated  Abdominal:      General: Abdomen is flat. There is no distension.      Palpations: Abdomen is soft. There is no mass.      Tenderness: There is no abdominal tenderness. There is no guarding.   Musculoskeletal:         General: No swelling.      Cervical back: Neck supple.   Skin:     General: Skin is warm.   Neurological:      GCS: GCS eye subscore is 1. GCS verbal subscore is 1. GCS motor subscore is 3.      Comments: Minimal movement in lowers                 Medications:  Continuous   Scheduledatorvastatin, 40 mg, Daily  balsam peru-castor oiL, , BID  heparin (porcine), 5,000 Units, Q12H  lacosamide, 200 mg, Q12H  levETIRAcetam (Keppra) IV (PEDS and ADULTS), 1,500 mg, Q12H  metoprolol tartrate, 25 mg,  BID  [START ON 6/19/2024] pantoprazole, 40 mg, Daily  perampaneL, 12 mg, Daily  thiamine, 100 mg, Daily    PRNacetaminophen, 650 mg, Q4H PRN  albuterol-ipratropium, 3 mL, Q6H PRN  calcium gluconate IVPB, 1 g, PRN  calcium gluconate IVPB, 2 g, PRN  calcium gluconate IVPB, 3 g, PRN  dextrose 10%, 12.5 g, PRN  dextrose 10%, 25 g, PRN  fentaNYL, 25 mcg, Q1H PRN  glucagon (human recombinant), 1 mg, PRN  glucose, 16 g, PRN  glucose, 24 g, PRN  hydrALAZINE, 10 mg, Q4H PRN  magnesium oxide, 800 mg, PRN  magnesium oxide, 800 mg, PRN  naloxone, 0.02 mg, PRN  ondansetron, 4 mg, Q8H PRN  potassium bicarbonate, 35 mEq, PRN  potassium bicarbonate, 50 mEq, PRN  potassium bicarbonate, 60 mEq, PRN  potassium, sodium phosphates, 2 packet, PRN  potassium, sodium phosphates, 2 packet, PRN  potassium, sodium phosphates, 2 packet, PRN  sodium chloride 0.9%, 10 mL, Q12H PRN      Today I personally reviewed pertinent medications, lines/drains/airways, imaging, laboratory results,     Diet  No diet orders on file  No diet orders on file

## 2024-06-18 NOTE — PROGRESS NOTES
EEG Hook up  AM Check Electrodes had to be fixed.No    Skin Integrity: Normal   No signs of skin breakdown seen during AM check  Yamileth Braswell   06/18/2024 9:29 AM

## 2024-06-18 NOTE — ASSESSMENT & PLAN NOTE
Luiz Camacho is a 10 year old 0 month old male who is brought in for this Well Child Adolescent (age 12-17) visit.    Chief Complaint   Patient presents with   • Well Child     10 year [facility]  - HPV #2  - peds dental referral  - declined COVID and flu      Current concerns include:   BREAKFAST--granola bar  LUNCH--nachos, luncheable  SNACKS--none  DINNER--grandmothers house--pizza, chicken, tacos--doesn't eat any veggies  --mom cooks asparagus/spinach  --bananas  --8 oz bottle    Birth History   • Birth     Weight: 2.92 kg (6 lb 7 oz)   • Delivery Method: , Low Transverse   • Gestation Age: 40 wks   • Days in Hospital: 3.0   • Hospital Name: [facility]   • Hospital Location: [place], [place]     Past Medical History:   Diagnosis Date   • Acute cystitis with hematuria 10/07/2021   • Congenital stricture of urethra     meatotomy- sees peds urology [facility]   • Hematuria, gross 10/07/2021   • History of meatotomy of urethra 2022    w/peds urology Dr Faase     Past Surgical History:   Procedure Laterality Date   • Circumcision baby     • Urethral meatoplasty N/A 2022    w/peds urology     Family History   Problem Relation Age of Onset   • Patient is unaware of any medical problems Mother    • Patient is unaware of any medical problems Father    • Patient is unaware of any medical problems Brother    • Cancer, Prostate Maternal Grandfather    • Patient is unaware of any medical problems Paternal Grandmother    • Patient is unaware of any medical problems Paternal Grandfather    • Patient is unaware of any medical problems Half-Sister    • Patient is unaware of any medical problems Half-Sister    • Patient is unaware of any medical problems Half-Brother    • Patient is unaware of any medical problems Half-Brother      Social History     Socioeconomic History   • Marital status: Single     Spouse name: Not on file   • Number of children: Not on file   • Years of education: Not on file   • Highest education level:  If continues to be hypertensive will restart home meds     Not on file   Occupational History   • Occupation: 5th grade   Tobacco Use   • Smoking status: Never   • Smokeless tobacco: Never   • Tobacco comments:     NO HOUSEHOLD SMOKERS   Vaping Use   • Vaping Use: never used   Substance and Sexual Activity   • Alcohol use: Never   • Drug use: Never   • Sexual activity: Never   Other Topics Concern   •  Service No   • Blood Transfusions No   • Caffeine Concern No   • Occupational Exposure No   • Hobby Hazards No   • Sleep Concern No   • Stress Concern No   • Weight Concern No   • Special Diet No   • Back Care No   • Exercise Yes   • Bike Helmet Yes   • Seat Belt Yes   • Self-Exams No   Social History Narrative    Lives with mother, brother    Stays with grandmother sometimes    No pets in either household    3/26/2022: HM Assessments:    BREAKFAST--ward, waffles, likes bread, starch, pancakes, rarely eggs, milk--whole milk, almond milk (2-3 cups)    Snacks--all days if he could--after school    LUNCH--packed from home luncheable (crackers/cheese/meat)--no candy, snack crackers, pringles, fruit snacks    DINNER--has to force him to eat--meat, barely eats veggies, likes ramen -- no beans    --doesn't like beans or legumes    --few nuts    --good with meat    --loves cheese    --drinks water at school 1-2 bottles    --mom makes him drink water        DENTAL VISIT--hasn't been able to find a childrens dentist (2 years)    EYES--last vision at school     Social Determinants of Health     Financial Resource Strain: Not on file   Food Insecurity: Not on file   Transportation Needs: Not on file   Physical Activity: Not on file   Stress: Not on file   Social Connections: Not on file   Intimate Partner Violence: Not on file     Alcohol Use: Not on file     Audit C     Audit C Total Score    --            Administered Date(s) Administered   • DTaP(INFANRIX) 2011, 2011, 2011, 08/03/2012, 08/17/2016   • HIB (HbOC) 08/03/2012   • HIB, Unspecified Formulation  2011, 2011, 2011   • HPV 9-Valent 03/26/2022   • Hep A, Unspecified formulation 03/26/2012, 10/08/2012   • Hep A, ped/adol, 2 dose 03/26/2012, 10/08/2012   • Hep B, adolescent or pediatric 2011, 2011, 2011, 08/03/2012, 08/17/2016   • Hep B, adult 2011, 08/03/2012, 08/17/2016   • Hib (PRP-OMP) 2011, 2011, 2011, 08/03/2012   • MMR 03/26/2012, 08/17/2016   • Meningococcal Conjugate MCV4O (Menveo) 03/26/2022   • Pneumococcal Conjugate 7 Valent 2011, 2011, 2011, 03/26/2012   • Polio, INACTIVATED 2011, 2011, 2011, 08/17/2016   • Rotavirus - pentavalent 2011   • Tdap 03/26/2022   • Varicella 03/26/2012, 11/15/2017       Patient's medications, allergies, past medical, surgical, social and family histories were reviewed and updated as appropriate.    Review of Nutrition:  Current diet pattern: ***    Social Screening:  [facility] Adolescent Interview:  Home Environment:  Parental relations:***  Sibling relations: {siblings:03402}  Discipline concerns? {yes***/no:46806}  Education/Employment: School performance: {performance:67092}  Activities:  Participation in activities/sports? {yes***/no:20998}  Significant amount of technology screen time {yes***/no:13636}  Drugs: Curiosity/Peer use of drugs? ***; Secondhand smoke exposure? {yes***/no:74894}  Sexuality: Questions about sexuality, gender identity or activity? ***  Suicide/Depression: Concerns regarding behavior with peers? {yes***/no:96092}    Screening Questions:  Currently menstruating? {yes/no/not applicable:19512}  Does patient snore? NO   Risk factors for anemia: {yes***/no:80898::\"no\"}  Risk factors for tuberculosis: NO  Risk factors for dyslipidemia: {yes***/no:99427::\"no\"}     Objective:      Vitals:    03/29/23 0823   BP: 95/60   BP Location: LUE - Left upper extremity   Patient Position: Sitting   Cuff Size: Pediatric   Pulse: 95   Resp: 18   Temp: 98.3 °F (36.8 °C)    TempSrc: Other (comment)   SpO2: 99%   Weight: 39.8 kg (87 lb 11.9 oz)   Height: 5' 0.43\" (1.535 m)     46 %ile (Z= -0.11) based on [facility] (Boys, 2-20 Years) weight-for-age data using vitals from 3/29/2023.  71 %ile (Z= 0.56) based on [facility] (Boys, 2-20 Years) Stature-for-age data based on Stature recorded on 3/29/2023.  33 %ile (Z= -0.43) based on [facility] (Boys, 2-20 Years) BMI-for-age based on BMI available as of 3/29/2023.  General:  alert, appears stated age, cooperative and well hydrated   Gait:  normal no limp   Skin:  normal no obvious rash, good skin turgor   Mouth:  lips, mucosa, and tongue normal; teeth and gums normal   Eyes:  no strabismus, conjunctiva clear   Ears:  normal TM and canal bilaterally   Neck:  supple, symmetrical, trachea midline   Lungs: clear to auscultation bilaterally- no wheeze   Heart:  regular rate and rhythm, S1, S2 normal, no murmur   Abdomen: bowel sounds normal; soft, non-tender; no masses,  no organomegaly   : deferred   [place] stage:  ***   Extremities: extremities normal, atraumatic, no cyanosis or edema   Neuro: mental status, speech normal, alert and oriented x3 and muscle tone and strength normal and symmetric        Assessment:    Healthy 10 year old male for Well Adolescent Visit 12-17 Years.  No diagnosis found.    Plan:    There are no diagnoses linked to this encounter.    + Anticipatory guidance discussed.  Gave handout on well-adolescent issues at this age.  Specific topics reviewed: chores/jobs and other responsibilities, drugs, ETOH, and tobacco, importance of regular dental care, importance of regular exercise; limiting TV, media violence, minimize junk food, seat belts and bike safety, safety with peers and sexuality reviewed  -counseled on stress management and mindfulness  -counseled on safety: always wear seatbelts and helmets  -encouraged preventive care regular dental care/dental checkups  --created a supportive environment for patient to access his care and  answered all patient questions to satisfaction    +  Weight management:    -The patient was counseled regarding healthy Nutrition and Physical Activity.  -encouraged healthy fresh food meals and snacks--especially fresh whole fruits/veggies.  -encouraged adequate water daily and avoid soda/juice, avoid excessively sugary snacks/foods and sugar beverages  -maintain regular physical activity and encourage movement  -reviewed BMI percentile with patient/family and discussed healthy body image promotion    + Screen Time: Parent/family/patient encouraged to limit screen time and use of devices    + HEADSS Adolescent interview completed individually with patient with the following concerns noted: ***    + Immunization Assessment:  Comprehensive vaccine assessment performed.  Reviewed CDC recommended vaccines for age and discussed vaccine risks/benefits and possible side effects with parent/patient.  -Questions answered to parent/patient satisfaction  -encouraged yearly Influenza vaccination  Immunizations today: per orders/followup instructions  History of previous adverse reactions to immunizations? None    + Follow-up: No follow-ups on file.; for next Well Adolescent Check or sooner as needed.     No orders of the defined types were placed in this encounter.      Milly oGmez DO    Integrative Family Physician  (signed electronically)    **Parent/patient provided anticipatory guidance information appropriate for age**
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
## 2024-06-19 ENCOUNTER — DOCUMENTATION ONLY (OUTPATIENT)
Dept: NEUROLOGY | Facility: CLINIC | Age: 69
End: 2024-06-19
Payer: MEDICARE

## 2024-06-19 NOTE — PLAN OF CARE
Pt is not medically ready to discharge.   Pt remains intubated.  SW made attempt to contact family [place] without much success.   SW will continue to monitor pt progress.       Discharge Plan A and Plan B have been determined by review of patient's clinical status, future medical and therapeutic needs, and coverage/benefits for post-acute care in coordination with multidisciplinary team members.    Harmony Damon MSW, TERRYW  [facility]  Case Management Dept.

## 2024-06-19 NOTE — PROGRESS NOTES
[facility]  Neurology-Epilepsy  Progress Note    Patient Name: June Boykin  MRN: [id number]  Admission Date: 6/8/2024  Hospital Length of Stay: 11 days  Code Status: Partial Code   Attending Provider: Andrea Gutiérrez MD  Primary Care Physician: Mauricio Pierre MD   Principal Problem:Status epilepticus    Subjective:     Hospital Course:   6/6>6/7: EEG at OSH - continuous lateralized periodic discharges in the left hemisphere consistent with a highly irritable epileptogenic focus in this region, often time locked with clonic jerking of the RUE making it ictal in nature  6/7>6/8: EEG CAP at OSH - underlying bilateral/generalized epileptiform activity with potential to evolve into status epilepticus  6/8>6/9: Periodic lateralized epileptiform discharges over the left hemisphere consistent with significant focal cortical irritability and epileptic potential, frequent focal seizures with onset over the left hemisphere  6/9>6/10: EEG report pending  6/10>6/11: Frequent focal seizures up to 6 per hour with onset over the left hemisphere  6/11>6/12: Periodic lateralized epileptiform discharges over the left hemisphere consistent with significant cortical irritability and epileptic potential, burst suppression pattern due to anesthetics  6/12>6/13: Periodic lateralized epileptiform discharges over the left hemisphere consistent with significant cortical irritability and epileptic potential and burst suppression pattern due to anesthetics  6/13>6/14: Burst suppression pattern  6/14>6/15: burst suppression pattern, no electrographic seizures  6/15>6/16: left interictals seen more frequently after Propofol weaned, no electrographic seizures  6/16>6/17: left interictals, no clinical or electrographic seizures  6/17>6/18: left posterior quadrant lateralized periodic discharges, no discrete seizures  6/18>6/19: severe encephalopathy, left posterior quadrant LPDs on IIC; slightly improved  compared to record on 6/9, however epileptiform activity has increased since cessation of anesthetics     See EEG reports for details.    Interval History: Spontaneous eye opening and LUE movements overnight. EEG with IIC pattern. No family at bedside on rounds.    Current Facility-Administered Medications   Medication Dose Route Frequency Provider Last Rate Last Admin    acetaminophen tablet 650 mg  650 mg Per NG tube Q4H PRN Savage Reyes MD        albuterol-ipratropium 2.5 mg-0.5 mg/3 mL nebulizer solution 3 mL  3 mL Nebulization Q6H PRN Lucila Elena PA-C   3 mL at 06/16/24 2201    atorvastatin tablet 40 mg  40 mg Per NG tube Daily Марина Flores MD   40 mg at 06/19/24 0929    balsam peru-castor oiL Oint   Topical (Top) BID Savage Reyes MD   Given at 06/19/24 0930    calcium gluconate 1 g in NS IVPB (premixed)  1 g Intravenous PRN Savage Reyes MD   Stopped at 06/14/24 1032    calcium gluconate 1 g in NS IVPB (premixed)  2 g Intravenous PRN Savage Reyes MD        calcium gluconate 1 g in NS IVPB (premixed)  3 g Intravenous PRN Savage Reyes MD        dextrose 10% bolus 125 mL 125 mL  12.5 g Intravenous PRN Марина Flores MD        dextrose 10% bolus 250 mL 250 mL  25 g Intravenous PRN Марина Flores MD        fentaNYL 50 mcg/mL injection 25 mcg  25 mcg Intravenous Q1H PRN Christiano Jensen PA-C        glucagon (human recombinant) injection 1 mg  1 mg Intramuscular PRN Марина Flores MD        glucose chewable tablet 16 g  16 g Per NG tube PRN Savage Reyes MD        glucose chewable tablet 24 g  24 g Per NG tube PRN Savage Reyes MD        heparin (porcine) injection 5,000 Units  5,000 Units Subcutaneous Q12H Carolee Oconnell NP   5,000 Units at 06/19/24 0930    hydrALAZINE injection 10 mg  10 mg Intravenous Q4H PRN Isa Ash NP   10 mg at 06/17/24 2309    lacosamide tablet 200 mg  200 mg Per NG tube Q12H Christiano Jensen PA-C   200 mg at  06/19/24 0929    levETIRAcetam injection 1,500 mg  1,500 mg Intravenous Q12H Quinten Haji MD   1,500 mg at 06/19/24 0929    magnesium oxide tablet 800 mg  800 mg Per NG tube PRN Carolee Oconnell NP   800 mg at 06/19/24 0639    magnesium oxide tablet 800 mg  800 mg Per NG tube PRN Carolee Oconnell NP        metoprolol tartrate (LOPRESSOR) tablet 25 mg  25 mg Per NG tube BID Lucila Elena PA-C   25 mg at 06/19/24 0929    naloxone 0.4 mg/mL injection 0.02 mg  0.02 mg Intravenous PRN Марина Flores MD        ondansetron injection 4 mg  4 mg Intravenous Q8H PRN Марина Flores MD        pantoprazole suspension 40 mg  40 mg Per NG tube Daily Betzaida Randall PA-C   40 mg at 06/19/24 0930    perampaneL tablet 12 mg  12 mg Per NG tube Daily Carolee Oconnell NP   12 mg at 06/19/24 0929    potassium bicarbonate disintegrating tablet 35 mEq  35 mEq Per NG tube PRN Carolee Oconnell NP   35 mEq at 06/14/24 0434    potassium bicarbonate disintegrating tablet 50 mEq  50 mEq Per NG tube PRN Carolee Oconnell NP   50 mEq at 06/19/24 0218    potassium bicarbonate disintegrating tablet 60 mEq  60 mEq Per NG tube PRN Carolee Oconnell NP   60 mEq at 06/12/24 0516    potassium, sodium phosphates 280-160-250 mg packet 2 packet  2 packet Per NG tube PRN Carolee Oconnell NP   2 packet at 06/17/24 0929    potassium, sodium phosphates 280-160-250 mg packet 2 packet  2 packet Per NG tube PRN Carolee Oconnell NP   2 packet at 06/12/24 1211    potassium, sodium phosphates 280-160-250 mg packet 2 packet  2 packet Per NG tube PRN Carolee Oconnell NP        psyllium husk (aspartame) 3.4 gram packet 1 packet  1 packet Per NG tube TID Marek Quintanilla, JULIEN        sodium chloride 0.9% flush 10 mL  10 mL Intravenous Q12H PRN Марина Flores MD        thiamine tablet 100 mg  100 mg Per NG tube Daily Марина Flores MD   100 mg at 06/19/24 2644     Continuous Infusions:    Review of Systems  Objective:      Vital Signs (Most Recent):  Temp: 99.3 °F (37.4 °C) (06/19/24 1001)  Pulse: 70 (06/19/24 1001)  Resp: 20 (06/19/24 1001)  BP: 120/62 (06/19/24 1001)  SpO2: 100 % (06/19/24 1001) Vital Signs (24h Range):  Temp:  [98.4 °F (36.9 °C)-99.5 °F (37.5 °C)] 99.3 °F (37.4 °C)  Pulse:  [] 70  Resp:  [20-40] 20  SpO2:  [95 %-100 %] 100 %  BP: (120-172)/(62-86) 120/62  Arterial Line BP: ()/() 124/64     Weight: 47 kg (103 lb 9.9 oz)  Body mass index is 21.66 kg/m².     Physical Exam  Constitutional:       General: She is not in acute distress.     Appearance: She is ill-appearing. She is not diaphoretic.      Interventions: She is intubated.   HENT:      Head: Normocephalic and atraumatic.      Comments: EEG in place  Eyes:      General: No scleral icterus.        Right eye: No discharge.         Left eye: No discharge.      Conjunctiva/sclera: Conjunctivae normal.      Pupils: Pupils are equal, round, and reactive to light.   Cardiovascular:      Rate and Rhythm: Normal rate.   Pulmonary:      Effort: Pulmonary effort is normal. No respiratory distress. She is intubated.      Comments: Intubated  Skin:     General: Skin is warm and dry.   Psychiatric:      Comments: Unable to assess            NEUROLOGICAL EXAMINATION:     CRANIAL NERVES     CN III, IV, VI   Pupils are equal, round, and reactive to light.       JUAN RAMON OU   Corneals intact  No spontaneous eye opening   Face symmetric   Tongue midline   No spontaneous movements  Does not follow commands    Exam findings to suggest seizures:  Myoclonus - no   eye twitching - no   Nystagmus - no   gaze deviation - no   waxy rigidity - no        Significant Labs: All pertinent lab results from the past 24 hours have been reviewed.    Significant Studies: I have reviewed all pertinent imaging results/findings within the past 24 hours.  Assessment and Plan:     * Status epilepticus  Focal seizures  69F initially admitted to OSH 5/23 after found unresponsive by  family, hospital course c/b hypercapnia requiring intubation, afib RVR, RUE jerking prompting EEG which revealed recurrent focal left seizures. Patient transferred to [facility] for higher level of care and cEEG. Course further complicated by status epilepticus requiring intubation and anesthetics.    Recommendations:  - Continuous vEEG monitoring  - Recommend Clobazam 20 mg x1 now followed by 20 mg qhs  - Recommend to continue Lacosamide 200 mg BID, Levetiracetam 1500 mg BID, and Perampanel 12 mg daily  - Propofol weaned off 6/16 AM, Ketamine weaned to off 6/18 AM  - Consider LP and MRI (if able) to evaluate for etiology of focal status epilepticus  - Avoid agents that lower seizure threshold  - Management of infectious/metabolic abnormalities per [facility]  - Seizure precautions  - Per [place] Law, patient must refrain from driving for 6 months after having a seizure or cleared by a neurologist to legally resume driving      Discussed plan of care with [facility] team. No family at bedside. Will follow, please call with questions.    Focal seizures  See Status epilepticus    Paroxysmal atrial fibrillation  - Rate controlled on Lopressor  - Management per [facility]    Acute hypercapnic respiratory failure  - Intubated   - Anesthetics weaned to off 6/18  - Vent management per [facility]        VTE Risk Mitigation (From admission, onward)           Ordered     heparin (porcine) injection 5,000 Units  Every 12 hours         06/13/24 1012     IP VTE HIGH RISK PATIENT  Once         06/08/24 1629     Place sequential compression device  Until discontinued         06/08/24 1629                    Patricia Stewart PA-C  Neurology-Epilepsy  [facility]  Staff: Dr. Simental

## 2024-06-19 NOTE — SUBJECTIVE & OBJECTIVE
Interval History: Spontaneous eye opening and LUE movements overnight. EEG with IIC pattern. No family at bedside on rounds.    Current Facility-Administered Medications   Medication Dose Route Frequency Provider Last Rate Last Admin    acetaminophen tablet 650 mg  650 mg Per NG tube Q4H PRN Savage Reyes MD        albuterol-ipratropium 2.5 mg-0.5 mg/3 mL nebulizer solution 3 mL  3 mL Nebulization Q6H PRN Lucila Elena PA-C   3 mL at 06/16/24 2201    atorvastatin tablet 40 mg  40 mg Per NG tube Daily Марина Flores MD   40 mg at 06/19/24 0929    balsam peru-castor oiL Oint   Topical (Top) BID Savage Reyes MD   Given at 06/19/24 0930    calcium gluconate 1 g in NS IVPB (premixed)  1 g Intravenous PRN Savage Reyes MD   Stopped at 06/14/24 1032    calcium gluconate 1 g in NS IVPB (premixed)  2 g Intravenous PRN Savage Reyes MD        calcium gluconate 1 g in NS IVPB (premixed)  3 g Intravenous PRN Savage Reyes MD        dextrose 10% bolus 125 mL 125 mL  12.5 g Intravenous PRN Марина Flores MD        dextrose 10% bolus 250 mL 250 mL  25 g Intravenous PRN Марина Flores MD        fentaNYL 50 mcg/mL injection 25 mcg  25 mcg Intravenous Q1H PRN Christiano Jensen PA-C        glucagon (human recombinant) injection 1 mg  1 mg Intramuscular PRN Марина Flores MD        glucose chewable tablet 16 g  16 g Per NG tube PRN Savage Reyes MD        glucose chewable tablet 24 g  24 g Per NG tube PRN Savage Reyes MD        heparin (porcine) injection 5,000 Units  5,000 Units Subcutaneous Q12H Carolee Oconnell NP   5,000 Units at 06/19/24 0930    hydrALAZINE injection 10 mg  10 mg Intravenous Q4H PRN Isa Ash NP   10 mg at 06/17/24 2309    lacosamide tablet 200 mg  200 mg Per NG tube Q12H Christiano Jensen PA-C   200 mg at 06/19/24 0929    levETIRAcetam injection 1,500 mg  1,500 mg Intravenous Q12H Quinten Haji MD   1,500 mg at 06/19/24 0929    magnesium  oxide tablet 800 mg  800 mg Per NG tube PRN Carolee Oconnell NP   800 mg at 06/19/24 0639    magnesium oxide tablet 800 mg  800 mg Per NG tube PRN Carolee Oconnell NP        metoprolol tartrate (LOPRESSOR) tablet 25 mg  25 mg Per NG tube BID Lucila Elena PA-C   25 mg at 06/19/24 0929    naloxone 0.4 mg/mL injection 0.02 mg  0.02 mg Intravenous PRN Марина Flores MD        ondansetron injection 4 mg  4 mg Intravenous Q8H PRN Марина Flores MD        pantoprazole suspension 40 mg  40 mg Per NG tube Daily Betzaida Randall PA-C   40 mg at 06/19/24 0930    perampaneL tablet 12 mg  12 mg Per NG tube Daily Carolee Oconnell NP   12 mg at 06/19/24 0929    potassium bicarbonate disintegrating tablet 35 mEq  35 mEq Per NG tube PRN Carolee Oconnell NP   35 mEq at 06/14/24 0434    potassium bicarbonate disintegrating tablet 50 mEq  50 mEq Per NG tube PRN Carolee Oconnell NP   50 mEq at 06/19/24 0218    potassium bicarbonate disintegrating tablet 60 mEq  60 mEq Per NG tube PRN Carolee Oconnell NP   60 mEq at 06/12/24 0516    potassium, sodium phosphates 280-160-250 mg packet 2 packet  2 packet Per NG tube PRN Carolee Oconnell NP   2 packet at 06/17/24 0929    potassium, sodium phosphates 280-160-250 mg packet 2 packet  2 packet Per NG tube PRN Carolee Oconnell NP   2 packet at 06/12/24 1211    potassium, sodium phosphates 280-160-250 mg packet 2 packet  2 packet Per NG tube PRN Carolee Oconnell NP        psyllium husk (aspartame) 3.4 gram packet 1 packet  1 packet Per NG tube TID Marek Quintanilla, NP        sodium chloride 0.9% flush 10 mL  10 mL Intravenous Q12H PRN Марина Flores MD        thiamine tablet 100 mg  100 mg Per NG tube Daily Марина Flores MD   100 mg at 06/19/24 0929     Continuous Infusions:    Review of Systems  Objective:     Vital Signs (Most Recent):  Temp: 99.3 °F (37.4 °C) (06/19/24 1001)  Pulse: 70 (06/19/24 1001)  Resp: 20 (06/19/24 1001)  BP: 120/62 (06/19/24  1001)  SpO2: 100 % (06/19/24 1001) Vital Signs (24h Range):  Temp:  [98.4 °F (36.9 °C)-99.5 °F (37.5 °C)] 99.3 °F (37.4 °C)  Pulse:  [] 70  Resp:  [20-40] 20  SpO2:  [95 %-100 %] 100 %  BP: (120-172)/(62-86) 120/62  Arterial Line BP: ()/() 124/64     Weight: 47 kg (103 lb 9.9 oz)  Body mass index is 21.66 kg/m².     Physical Exam  Constitutional:       General: She is not in acute distress.     Appearance: She is ill-appearing. She is not diaphoretic.      Interventions: She is intubated.   HENT:      Head: Normocephalic and atraumatic.      Comments: EEG in place  Eyes:      General: No scleral icterus.        Right eye: No discharge.         Left eye: No discharge.      Conjunctiva/sclera: Conjunctivae normal.      Pupils: Pupils are equal, round, and reactive to light.   Cardiovascular:      Rate and Rhythm: Normal rate.   Pulmonary:      Effort: Pulmonary effort is normal. No respiratory distress. She is intubated.      Comments: Intubated  Skin:     General: Skin is warm and dry.   Psychiatric:      Comments: Unable to assess            NEUROLOGICAL EXAMINATION:     CRANIAL NERVES     CN III, IV, VI   Pupils are equal, round, and reactive to light.       JUAN RAMON OU   Corneals intact  No spontaneous eye opening   Face symmetric   Tongue midline   No spontaneous movements  Does not follow commands    Exam findings to suggest seizures:  Myoclonus - no   eye twitching - no   Nystagmus - no   gaze deviation - no   waxy rigidity - no        Significant Labs: All pertinent lab results from the past 24 hours have been reviewed.    Significant Studies: I have reviewed all pertinent imaging results/findings within the past 24 hours.

## 2024-06-19 NOTE — ASSESSMENT & PLAN NOTE
Focal seizures  69F initially admitted to OSH 5/23 after found unresponsive by family, hospital course c/b hypercapnia requiring intubation, afib RVR, RUE jerking prompting EEG which revealed recurrent focal left seizures. Patient transferred to [facility] for higher level of care and cEEG. Course further complicated by status epilepticus requiring intubation and anesthetics.    Recommendations:  - Continuous vEEG monitoring  - Recommend to continue Lacosamide 200 mg BID, Levetiracetam 1500 mg BID, and Perampanel 12 mg daily  - Propofol weaned off 6/16 AM, Ketamine weaned to off 6/18 AM  - Consider LP and MRI (if able) to evaluate for etiology of focal status epilepticus  - Avoid agents that lower seizure threshold  - Management of infectious/metabolic abnormalities per NCC  - Seizure precautions  - Per [place] Law, patient must refrain from driving for 6 months after having a seizure or cleared by a neurologist to legally resume driving      Discussed plan of care with NCC team. No family at bedside. Will follow, please call with questions.
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
## 2024-06-19 NOTE — PROCEDURES
ICU EEG/VIDEO MONITORING REPORT    DATE OF SERVICE: 6/18/24-6/19/24  EEG NUMBER: [id number]  [facility]    METHODOLOGY   Electroencephalographic (EEG) recording is with electrodes placed according to the International 10-20 placement system.  Thirty two (32) channels of digital signal are simultaneously recorded from the scalp and may include EKG, EMG, and/or eye monitors.   Recording band pass was 0.1 to 512 hz.  Digital video recording of the patient is simultaneously recorded with the EEG.  The nursing staff report clinical symptoms and may press an event button when the patient has symptoms of clinical interest to the treating physicians.  EEG and video recording is stored and archived in digital format.  The entire recording is visually reviewed and the times identified by computer analysis as being spikes or seizures are reviewed again.  Activation procedures which include photic stimulation, hyperventilation and instructing patients to perform simple task are done in selected patients.   Compresses spectral analysis (CSA) is also performed on the activity recorded from each individual channel.  This is displayed as a power display of frequencies from 0 to 30 Hz over time.   The CSA analysis is done and displayed continuously.  This is reviewed for asymmetries in power between homologous areas of the scalp and for presence of changes in power which canbe seen when seizures occur.  Sections of suspected abnormalities on the CSA is then compared with the original EEG recording.     Md7 software was also utilized in the review of this study.  This software suite analyzes the EEG recording in multiple domains.  Coherence and rhythmicity is computed to identify EEG sections which may contain organized seizures.  Each channel undergoes analysis to detect presence of spike and sharp waves which have special and morphological characteristic of epileptic activity.  The routine EEG recording is  converted from spacial into frequency domain.  This is then displayed comparing homologous areas to identify areas of significant asymmetry.  Algorithm to identify non-cortically generated artifact is used to separate eye movement, EMG and other artifact from the EEG.      Recording Times  Start on 6/18/24 at 07:00  Stop on 6/19/24 at 07:00  A total of 24 hours of EEG was recorded.    This EEG study is performed in the ICU with the patient on the following sedative medications:none    Indication: 69F initially admitted to [facility] 5/23 after found unresponsive by family, hospital course c/b hypercapnia requiring intubation, afib RVR, RUE jerking prompting EEG which revealed recurrent focal left hemispheric seizures.  Ultimately the patient required intubation and sedation for status epilepticus.  EEG to evaluate response to treatment.     State of Consciousness:   Stupor    Background:   The background is composed of diffuse low amplitude theta and delta activity with a prominent amount of over-riding beta activity.  There is no posterior dominant rhythm present.    Sleep:   Normal sleep architecture is not appreciated.    Epileptiform Abnormalities  Lateralized periodic discharges occur in the left posterior quadrant (occipital/temporal/parietal - max 01/T5/P3) at a fluctuating frequency of 1-2 hz.  There are occasional brief periods of evolution and the appearance of over-riding fast activity however not enough to meet criteria for electrographic seizure.    EKG:   Sinus rhythm    Seizures/Events:   No discrete electrographic seizures     Impression:   Abnormal vEEG consistent with a severe diffuse encephalopathy and a highly epileptogenic focus in the left posterior quadrant.  LPDs occur at a frequency of 1-2 hz placing this activity on the ictal interictal continuum.  Prior studies were reviewed.  This record is still slightly improved compared to the record dated 6/9/24 before the initiation of anesthetic  medications, however epileptiform activity has increased since cessation of anesthetic medications.      Celena Simental MD  [facility]   Department of Neurology/Epilepsy

## 2024-06-19 NOTE — ASSESSMENT & PLAN NOTE
-A-fib w/ RVR @ OSH requiring diltiazem infusion  -Currently NSR on telemetry  -Continue to hold metoprolol 2/2 current pressor need  -Therapeutic lovenox discontinued 2/2 GI bleed  6/19/2024 Continue lopressor 25 mg bid

## 2024-06-19 NOTE — SUBJECTIVE & OBJECTIVE
Interval History:  see hospital course    Review of Systems   Unable to perform ROS: Intubated     Objective:     Vitals:  Temp: 98.1 °F (36.7 °C)  Pulse: 86  Rhythm: normal sinus rhythm  BP: 131/64  MAP (mmHg): 90  Resp: (!) 40  SpO2: 99 %  Oxygen Concentration (%): 40  Vent Mode: A/C  Set Rate: 20 BPM  Vt Set: 320 mL  PEEP/CPAP: 5 cmH20  Peak Airway Pressure: 47 cmH20  Mean Airway Pressure: 13 cmH20  Plateau Pressure: 16 cmH20    Temp  Min: 98.1 °F (36.7 °C)  Max: 99.7 °F (37.6 °C)  Pulse  Min: 70  Max: 101  BP  Min: 119/62  Max: 171/86  MAP (mmHg)  Min: 84  Max: 116  Resp  Min: 20  Max: 43  SpO2  Min: 97 %  Max: 100 %  Oxygen Concentration (%)  Min: 40  Max: 40    06/18 0701 - 06/19 0700  In: 1732.8 [I.V.:12.8]  Out: 2670 [Urine:1750]   Unmeasured Output  Urine Occurrence: 1  Stool Occurrence: 1  Pad Count: 1        Physical Exam  Vitals and nursing note reviewed.   HENT:      Head: Normocephalic.      Mouth/Throat:      Mouth: Mucous membranes are moist.      Pharynx: Oropharynx is clear.   Cardiovascular:      Rate and Rhythm: Normal rate and regular rhythm.   Pulmonary:      Effort: Pulmonary effort is normal. No respiratory distress.      Comments: intubated  Abdominal:      General: Abdomen is flat. There is no distension.      Palpations: Abdomen is soft. There is no mass.      Tenderness: There is no abdominal tenderness. There is no guarding.   Musculoskeletal:         General: No swelling.      Cervical back: Neck supple.   Skin:     General: Skin is warm.   Neurological:      Mental Status: She is lethargic.      GCS: GCS eye subscore is 2. GCS verbal subscore is 2. GCS motor subscore is 5.      Comments: Minimal movement in lowers             Medications:  Continuous   Scheduledatorvastatin, 40 mg, Daily  balsam peru-castor oiL, , BID  cloBAZam, 20 mg, QHS  heparin (porcine), 5,000 Units, Q12H  lacosamide, 200 mg, Q12H  levetiracetam, 1,500 mg, BID  metoprolol tartrate, 25 mg, BID  pantoprazole, 40 mg,  Daily  perampaneL, 12 mg, Daily  psyllium husk (aspartame), 1 packet, TID  thiamine, 100 mg, Daily    PRNacetaminophen, 650 mg, Q4H PRN  calcium gluconate IVPB, 1 g, PRN  calcium gluconate IVPB, 2 g, PRN  calcium gluconate IVPB, 3 g, PRN  dextrose 10%, 12.5 g, PRN  dextrose 10%, 25 g, PRN  fentaNYL, 25 mcg, Q1H PRN  glucagon (human recombinant), 1 mg, PRN  glucose, 16 g, PRN  glucose, 24 g, PRN  hydrALAZINE, 10 mg, Q4H PRN  magnesium oxide, 800 mg, PRN  magnesium oxide, 800 mg, PRN  naloxone, 0.02 mg, PRN  ondansetron, 4 mg, Q8H PRN  potassium bicarbonate, 35 mEq, PRN  potassium bicarbonate, 50 mEq, PRN  potassium bicarbonate, 60 mEq, PRN  potassium, sodium phosphates, 2 packet, PRN  potassium, sodium phosphates, 2 packet, PRN  potassium, sodium phosphates, 2 packet, PRN  sodium chloride 0.9%, 10 mL, Q12H PRN      Today I personally reviewed pertinent medications, lines/drains/airways, imaging, laboratory results,     Diet  No diet orders on file  No diet orders on file

## 2024-06-19 NOTE — ASSESSMENT & PLAN NOTE
-Blood noted in stool AM of 6/12; Hgb w/ subsequent drop from 14 to 10  -Pantoprazole load w/ 80mg; now 40 BID  -Hgb stable on CBC; serial CBC discontinued  -Continue to hold therapeutic lovenox  -6/19/2024 continue SQH for DVT PPX
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
## 2024-06-19 NOTE — PROGRESS NOTES
EEG Hook up  AM Check Electrodes had to be fixed.No    Skin Integrity: Normal   No signs of skin breakdown seen during AM check  Yamileth Braswell   06/19/2024 10:33 AM

## 2024-06-19 NOTE — PLAN OF CARE
"[facility] Care Plan    POC reviewed with June Boykin and family at 0300. Pt verbalized understanding. Questions and concerns addressed. No acute events overnight. Pt progressing toward goals. Will continue to monitor. See below and flowsheets for full assessment and VS info.     -LUE soft wrist restraint added. Pt moving LUE spontaneously.  Opening eyes spontaneously intermittently.    -Potassium and Phos replacements completed overnight.   -Bed bath performed.   -Ketamine gtt remain off overnight.        Is this a stroke patient? no    Neuro:  [place] Coma Scale  Best Eye Response: 1-->(E1) none  Best Motor Response: 5-->(M5) localizes pain  Best Verbal Response: 1-->(V1) none  Alix Coma Scale Score: 7  Assessment Qualifiers: patient chemically sedated or paralyzed, patient intubated  Pupil PERRLA: yes     24hr Temp:  [97.9 °F (36.6 °C)-99.7 °F (37.6 °C)]     CV:   Rhythm: sinus arrhythmia  BP goals:   SBP < 180  MAP > 65    Resp:      Vent Mode: A/C  Set Rate: 20 BPM  Oxygen Concentration (%): 40  Vt Set: 320 mL  PEEP/CPAP: 5 cmH20    Plan: wean to extubate    GI/:     Diet/Nutrition Received: tube feeding  Last Bowel Movement: 06/16/24  Voiding Characteristics: unable to void    Intake/Output Summary (Last 24 hours) at 6/19/2024 0431  Last data filed at 6/19/2024 0401  Gross per 24 hour   Intake 1836.28 ml   Output 2670 ml   Net -833.72 ml     Unmeasured Output  Urine Occurrence: 1  Stool Occurrence: 1  Pad Count: 1    Labs/Accuchecks:  Recent Labs   Lab 06/19/24  0028   WBC 8.46   RBC 4.22   HGB 9.6*   HCT 31.2*         Recent Labs   Lab 06/19/24  0028   *   K 3.7   CO2 28   CL 99   BUN 14   CREATININE 0.5   ALKPHOS 62   ALT 11   AST 22   BILITOT 0.3    No results for input(s): "PROTIME", "INR", "APTT", "HEPANTIXA" in the last 168 hours. No results for input(s): "CPK", "CPKMB", "TROPONINI", "MB" in the last 168 hours.    Electrolytes: Electrolytes replaced  Accuchecks: " none    Gtts:      LDA/Wounds:    Nurses Note -- 4 Eyes    Is there altered skin present? yes     Please check the following boxes that apply:   [] LDA Added if Not in Epic (Describe Wound)   [] New Altered Skin Integrity was Present on Admit and Documented in LDA   [] Wound Image Taken    Wound Care Consulted? Yes    Second RN/Staff Member:  ALEX Abbott    Restraints: LUE soft wrist   Restraint Order  Length of Order: Order good for next 24 hours or when removed.  Date that the current order will : 24  Time that the current order will :   Order Upon Application: Yes    [facility]

## 2024-06-19 NOTE — ASSESSMENT & PLAN NOTE
-Intubated 2/2 hypoxemia + hypercapnia   -Current vent settings below  Vent Mode: A/C  Oxygen Concentration (%):  [40] 40  Resp Rate Total:  [20 br/min-23 br/min] 20 br/min  Vt Set:  [320 mL] 320 mL  PEEP/CPAP:  [5 cmH20] 5 cmH20  Mean Airway Pressure:  [12 rmX12-03 cmH20] 13 cmH20  -Famotidine BID for ulcer PPX  -VAP prevention per protocol  -CXR/ABGs daily  6/19/2024 Failed SBT

## 2024-06-19 NOTE — PLAN OF CARE
"[facility] Care Plan    POC reviewed with June Boykin and family at 1400. Pt unable to verbalize understanding. Questions and concerns addressed. No acute events today. Pt progressing toward goals. Will continue to monitor. See below and flowsheets for full assessment and VS info.     -bladder scan  -continue cEEG  -failed spontaneous trial   -added AED      Is this a stroke patient? no    Neuro:  [place] Coma Scale  Best Eye Response: 2-->(E2) to pain  Best Motor Response: 5-->(M5) localizes pain  Best Verbal Response: 1-->(V1) none  Alix Coma Scale Score: 8  Assessment Qualifiers: patient intubated  Pupil PERRLA: yes     24 hr Temp:  [98.1 °F (36.7 °C)-99.7 °F (37.6 °C)]     CV:   Rhythm: normal sinus rhythm  BP goals:   SBP < 180  MAP > 65    Resp:      Vent Mode: A/C  Set Rate: 20 BPM  Oxygen Concentration (%): 40  Vt Set: 320 mL  PEEP/CPAP: 5 cmH20    Plan: wean to extubate    GI/:     Diet/Nutrition Received: tube feeding  Last Bowel Movement: 06/19/24  Voiding Characteristics: external catheter    Intake/Output Summary (Last 24 hours) at 6/19/2024 1644  Last data filed at 6/19/2024 1601  Gross per 24 hour   Intake 1870 ml   Output 2795 ml   Net -925 ml     Unmeasured Output  Urine Occurrence: 1  Stool Occurrence: 1  Pad Count: 1    Labs/Accuchecks:  Recent Labs   Lab 06/19/24  0028   WBC 8.46   RBC 4.22   HGB 9.6*   HCT 31.2*         Recent Labs   Lab 06/19/24  0028   *   K 3.7   CO2 28   CL 99   BUN 14   CREATININE 0.5   ALKPHOS 62   ALT 11   AST 22   BILITOT 0.3    No results for input(s): "PROTIME", "INR", "APTT", "HEPANTIXA" in the last 168 hours. No results for input(s): "CPK", "CPKMB", "TROPONINI", "MB" in the last 168 hours.    Electrolytes: N/A - electrolytes WDL  Accuchecks: none    Gtts:      LDA/Wounds:      Nurses Note -- 4 Eyes      Is there altered skin present? yes     Please check the following boxes that apply:   [] LDA Added if Not in Epic (Describe Wound)   [] New " Altered Skin Integrity was Present on Admit and Documented in LDA   [] Wound Image Taken    Wound Care Consulted? Yes already following    Second RN/Staff Member:  Hue      Restraints:   Restraint Order  Length of Order: Order good for next 24 hours or when removed.  Date that the current order will : 24  Time that the current order will :   Order Upon Application: Yes    [facility]
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
## 2024-06-20 ENCOUNTER — DOCUMENTATION ONLY (OUTPATIENT)
Dept: NEUROLOGY | Facility: CLINIC | Age: 69
End: 2024-06-20
Payer: MEDICARE

## 2024-06-20 PROBLEM — R68.89 SUSPECTED DEEP TISSUE INJURY: Status: ACTIVE | Noted: 2024-06-20

## 2024-06-20 NOTE — SUBJECTIVE & OBJECTIVE
Interval History:  see hospital course    Review of Systems   Unable to perform ROS: Intubated     Objective:     Vitals:  Temp: 98.8 °F (37.1 °C)  Pulse: 71  Rhythm: normal sinus rhythm  BP: (!) 94/53  MAP (mmHg): 68  Resp: 20  SpO2: 99 %  Oxygen Concentration (%): 35  Vent Mode: A/C  Set Rate: 20 BPM  Vt Set: 320 mL  PEEP/CPAP: 5 cmH20  Peak Airway Pressure: 39 cmH20  Mean Airway Pressure: 14 cmH20  Plateau Pressure: 16 cmH20    Temp  Min: 97.9 °F (36.6 °C)  Max: 100 °F (37.8 °C)  Pulse  Min: 66  Max: 92  BP  Min: 89/50  Max: 163/74  MAP (mmHg)  Min: 64  Max: 107  Resp  Min: 20  Max: 43  SpO2  Min: 97 %  Max: 100 %  Oxygen Concentration (%)  Min: 35  Max: 40    06/19 0701 - 06/20 0700  In: 1430   Out: 2650 [Urine:2500]   Unmeasured Output  Urine Occurrence: 1  Stool Occurrence: 1  Pad Count: 1        Physical Exam  Vitals and nursing note reviewed.   HENT:      Head: Normocephalic.      Mouth/Throat:      Mouth: Mucous membranes are moist.      Pharynx: Oropharynx is clear.   Cardiovascular:      Rate and Rhythm: Normal rate and regular rhythm.   Pulmonary:      Effort: Pulmonary effort is normal. No respiratory distress.      Comments: intubated  Abdominal:      General: Abdomen is flat. There is no distension.      Palpations: Abdomen is soft. There is no mass.      Tenderness: There is no abdominal tenderness. There is no guarding.   Musculoskeletal:         General: No swelling.      Cervical back: Neck supple.   Skin:     General: Skin is warm.   Neurological:      Mental Status: She is lethargic.      GCS: GCS eye subscore is 2. GCS verbal subscore is 2. GCS motor subscore is 5.      Comments:   LUE follows commands, RUE flaccid, LEs withdrawals             Medications:  Continuous   Scheduledatorvastatin, 40 mg, Daily  balsam peru-castor oiL, , BID  cloBAZam, 10 mg, Daily   And  cloBAZam, 20 mg, QHS  heparin (porcine), 5,000 Units, Q12H  lacosamide, 200 mg, Q12H  levetiracetam, 1,500 mg, BID  metoprolol  tartrate, 25 mg, BID  pantoprazole, 40 mg, Daily  perampaneL, 12 mg, Daily  psyllium husk (aspartame), 1 packet, TID  sodium chloride, 2,000 mg, TID  thiamine, 100 mg, Daily    PRNacetaminophen, 650 mg, Q4H PRN  calcium gluconate IVPB, 1 g, PRN  calcium gluconate IVPB, 2 g, PRN  calcium gluconate IVPB, 3 g, PRN  dextrose 10%, 12.5 g, PRN  dextrose 10%, 25 g, PRN  glucagon (human recombinant), 1 mg, PRN  glucose, 16 g, PRN  glucose, 24 g, PRN  hydrALAZINE, 10 mg, Q4H PRN  magnesium oxide, 800 mg, PRN  magnesium oxide, 800 mg, PRN  naloxone, 0.02 mg, PRN  ondansetron, 4 mg, Q8H PRN  potassium bicarbonate, 35 mEq, PRN  potassium bicarbonate, 50 mEq, PRN  potassium bicarbonate, 60 mEq, PRN  potassium, sodium phosphates, 2 packet, PRN  potassium, sodium phosphates, 2 packet, PRN  potassium, sodium phosphates, 2 packet, PRN  sodium chloride 0.9%, 10 mL, Q12H PRN      Today I personally reviewed pertinent medications, lines/drains/airways, imaging, laboratory results,     Diet  No diet orders on file  No diet orders on file

## 2024-06-20 NOTE — PROCEDURES
ICU EEG/VIDEO MONITORING REPORT    DATE OF SERVICE: 6/19/24-6/20/24  EEG NUMBER: [id number]  [facility]    METHODOLOGY   Electroencephalographic (EEG) recording is with electrodes placed according to the International 10-20 placement system.  Thirty two (32) channels of digital signal are simultaneously recorded from the scalp and may include EKG, EMG, and/or eye monitors.   Recording band pass was 0.1 to 512 hz.  Digital video recording of the patient is simultaneously recorded with the EEG.  The nursing staff report clinical symptoms and may press an event button when the patient has symptoms of clinical interest to the treating physicians.  EEG and video recording is stored and archived in digital format.  The entire recording is visually reviewed and the times identified by computer analysis as being spikes or seizures are reviewed again.  Activation procedures which include photic stimulation, hyperventilation and instructing patients to perform simple task are done in selected patients.   Compresses spectral analysis (CSA) is also performed on the activity recorded from each individual channel.  This is displayed as a power display of frequencies from 0 to 30 Hz over time.   The CSA analysis is done and displayed continuously.  This is reviewed for asymmetries in power between homologous areas of the scalp and for presence of changes in power which canbe seen when seizures occur.  Sections of suspected abnormalities on the CSA is then compared with the original EEG recording.     [employer] software was also utilized in the review of this study.  This software suite analyzes the EEG recording in multiple domains.  Coherence and rhythmicity is computed to identify EEG sections which may contain organized seizures.  Each channel undergoes analysis to detect presence of spike and sharp waves which have special and morphological characteristic of epileptic activity.  The routine EEG recording is  converted from spacial into frequency domain.  This is then displayed comparing homologous areas to identify areas of significant asymmetry.  Algorithm to identify non-cortically generated artifact is used to separate eye movement, EMG and other artifact from the EEG.      Recording Times  Start on 6/19/24 at 07:00  Stop on 6/20/24 at 07:00  A total of 24 hours of EEG was recorded.    This EEG study is performed in the ICU with the patient on the following sedative medications:none    Indication: 69F initially admitted to OS 5/23 after found unresponsive by family, hospital course c/b hypercapnia requiring intubation, afib RVR, RUE jerking prompting EEG which revealed recurrent focal left hemispheric seizures.  Ultimately the patient required intubation and sedation for status epilepticus.  EEG to evaluate response to treatment.     State of Consciousness:   Stupor    Background:   The background is composed of diffuse low amplitude theta and delta activity without a posterior dominant rhythm.      Sleep:   Normal sleep architecture is not appreciated.    Epileptiform Abnormalities  Lateralized periodic discharges occur in the left posterior quadrant (occipital/temporal/parietal - max 01/T5/P3) at a fluctuating frequency of 1-2 hz.      EKG:   Sinus rhythm    Seizures/Events:   There are several instances where this activity evolves and in addition increases in frequency to 2-3 hz meeting criteria for electrographic seizure.      Impression:   Abnormal vEEG consistent with a severe diffuse encephalopathy and a highly epileptogenic focus in the left posterior quadrant.  LPDs occur at a frequency of 1-2 hz placing this activity on the ictal interictal continuum.  There are several instances of evolution and increase in activity to 2.5-3 hz meeting criteria for subclinical seizures.    Celena Simental MD  [facility]   Department of Neurology/Epilepsy
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
## 2024-06-20 NOTE — ASSESSMENT & PLAN NOTE
Focal seizures  69F initially admitted to OSH 5/23 after found unresponsive by family, hospital course c/b hypercapnia requiring intubation, afib RVR, RUE jerking prompting EEG which revealed recurrent focal left seizures. Patient transferred to [facility] for higher level of care and cEEG. Course further complicated by status epilepticus requiring intubation and anesthetics.    Recommendations:  - Continuous vEEG monitoring  - Recommend increasing Clobazam to 10/20 mg  - Recommend to continue Lacosamide 200 mg BID, Levetiracetam 1500 mg BID, and Perampanel 12 mg daily  - Propofol weaned off 6/16 AM, Ketamine weaned to off 6/18 AM  - Consider LP and MRI (if able) to evaluate for etiology of focal status epilepticus  - Avoid agents that lower seizure threshold  - Management of infectious/metabolic abnormalities per NCC  - Seizure precautions  - Per [place] Law, patient must refrain from driving for 6 months after having a seizure or cleared by a neurologist to legally resume driving      Discussed plan of care with NCC team. No family at bedside. Will follow, please call with questions.

## 2024-06-20 NOTE — ASSESSMENT & PLAN NOTE
-Blood noted in stool AM of 6/12; Hgb w/ subsequent drop from 14 to 10  -Pantoprazole load w/ 80mg; now 40 BID  -Hgb stable on CBC; serial CBC discontinued  -Continue to hold therapeutic lovenox  -6/20/2024 continue SQH for DVT PPX

## 2024-06-20 NOTE — ACP (ADVANCE CARE PLANNING)
Advance Care Planning     Date: 06/20/2024    Today a voluntary meeting took place: ICU cellphone call    Patient Participation: Patient is unable to participate     Attendees (Name and  Relationship to patient):  daughter in law ( to son Jason) [phone]   (pt has two children)    Staff attendees (Name and  Role): carmen    ACP Conversation (General): Understanding of current condition epilepsy, increasing anti-antiepileptics  medications causing further drowsiness but w/ improvements to cerebral irritation, increasing the need for tracheostomy and peg placement, and then locating a facility to convalesce.           Code Status: DNR; status confirmed/order placed in chart --full supportive care, hold at compressions          Goals of care: The family endorses that what is most important right now is to focus on spending time at home, avoiding the hospital, remaining as independent as possible, symptom/pain control, quality of life, even if it means sacrificing a little time, and extending life as long as possible, even it it means sacrificing quality    Accordingly, we have decided that the best plan to meet the patient's goals includes continuing with treatment        Length of ACP   conversation in minutes: A total of 30 min was spent on advance care planning, goals of care discussion, emotional support, formulating and communicating prognosis and exploring burden/benefit of various approaches of treatment. This discussion occurred on a fully voluntary basis with the verbal consent of the patient and/or family.

## 2024-06-20 NOTE — SUBJECTIVE & OBJECTIVE
Interval History: NAEON. EEG with IIC pattern with several instances of evolution meeting criteria for subclinical seizures. Some improvement noted this morning since Clobazam added. Patient with clinical improvement this morning, spontaneous movements noted. No family at bedside on rounds.    Current Facility-Administered Medications   Medication Dose Route Frequency Provider Last Rate Last Admin    acetaminophen tablet 650 mg  650 mg Per NG tube Q4H PRN Savage Reyes MD        atorvastatin tablet 40 mg  40 mg Per NG tube Daily Марина Flores MD   40 mg at 06/20/24 0915    balsam peru-castor oiL Oint   Topical (Top) BID Savage Reyes MD   Given at 06/20/24 0916    calcium gluconate 1 g in NS IVPB (premixed)  1 g Intravenous PRN Savage Reyes MD   Stopped at 06/14/24 1032    calcium gluconate 1 g in NS IVPB (premixed)  2 g Intravenous PRN Savage Reyes MD        calcium gluconate 1 g in NS IVPB (premixed)  3 g Intravenous PRN Savage Reyes MD        cloBAZam Tab 10 mg  10 mg Per NG tube Daily AndMarek borges, NP   10 mg at 06/20/24 1102    And    cloBAZam Tab 20 mg  20 mg Per NG tube QHS Marek Quintanilla P., NP        dextrose 10% bolus 125 mL 125 mL  12.5 g Intravenous PRN Марина Flores MD        dextrose 10% bolus 250 mL 250 mL  25 g Intravenous PRN Марина Flores MD        fentaNYL 50 mcg/mL injection 25 mcg  25 mcg Intravenous Q1H PRN Christiano Jensen PA-C        glucagon (human recombinant) injection 1 mg  1 mg Intramuscular PRN Марина Flores MD        glucose chewable tablet 16 g  16 g Per NG tube PRN Savage Reyes MD        glucose chewable tablet 24 g  24 g Per NG tube PRN Savage Reyes MD        heparin (porcine) injection 5,000 Units  5,000 Units Subcutaneous Q12H Carolee Oconnell NP   5,000 Units at 06/20/24 0916    hydrALAZINE injection 10 mg  10 mg Intravenous Q4H PRN Isa Ash NP   10 mg at 06/17/24 2309    lacosamide tablet 200 mg  200 mg Per NG  tube Q12H MickalChristiano PA-C   200 mg at 06/20/24 0915    levetiracetam 500 mg/5 mL (5 mL) liquid Soln 1,500 mg  1,500 mg Per NG tube BID Andrea Gutiérrez MD   1,500 mg at 06/20/24 0915    magnesium oxide tablet 800 mg  800 mg Per NG tube PRN Carolee Oconnell NP   800 mg at 06/19/24 0639    magnesium oxide tablet 800 mg  800 mg Per NG tube PRN Carolee Oconnell NP        metoprolol tartrate (LOPRESSOR) tablet 25 mg  25 mg Per NG tube BID Lucila Elena PA-C   25 mg at 06/20/24 0915    naloxone 0.4 mg/mL injection 0.02 mg  0.02 mg Intravenous PRN Маирна Flores MD        ondansetron injection 4 mg  4 mg Intravenous Q8H PRN аМрина Flores MD        pantoprazole suspension 40 mg  40 mg Per NG tube Daily Betzaida Randall PA-C   40 mg at 06/20/24 0915    perampaneL tablet 12 mg  12 mg Per NG tube Daily Carolee Oconnell NP   12 mg at 06/20/24 0916    potassium bicarbonate disintegrating tablet 35 mEq  35 mEq Per NG tube PRN Carolee Oconnell NP   35 mEq at 06/14/24 0434    potassium bicarbonate disintegrating tablet 50 mEq  50 mEq Per NG tube PRN Carolee Oconnell NP   50 mEq at 06/19/24 0218    potassium bicarbonate disintegrating tablet 60 mEq  60 mEq Per NG tube PRN Carolee Oconnell NP   60 mEq at 06/12/24 0516    potassium, sodium phosphates 280-160-250 mg packet 2 packet  2 packet Per NG tube PRN Carolee Oconnell NP   2 packet at 06/17/24 0929    potassium, sodium phosphates 280-160-250 mg packet 2 packet  2 packet Per NG tube PRN Carolee Oconnell NP   2 packet at 06/12/24 1211    potassium, sodium phosphates 280-160-250 mg packet 2 packet  2 packet Per NG tube PRN Carolee Oconnell NP        psyllium husk (aspartame) 3.4 gram packet 1 packet  1 packet Per NG tube TID Marek Quintanilla, NP   1 packet at 06/20/24 0916    sodium chloride 0.9% flush 10 mL  10 mL Intravenous Q12H PRN Марина Flores MD        sodium chloride oral tablet 2,000 mg  2,000 mg Per NG tube TID [place],  Marek CRANE NP   2,000 mg at 06/20/24 0915    thiamine tablet 100 mg  100 mg Per NG tube Daily Марина Flores MD   100 mg at 06/20/24 0915     Continuous Infusions:    Review of Systems  Objective:     Vital Signs (Most Recent):  Temp: 98.4 °F (36.9 °C) (06/20/24 1101)  Pulse: 68 (06/20/24 1101)  Resp: (!) 21 (06/20/24 1101)  BP: (!) 112/55 (06/20/24 1101)  SpO2: 100 % (06/20/24 1101) Vital Signs (24h Range):  Temp:  [97.9 °F (36.6 °C)-100 °F (37.8 °C)] 98.4 °F (36.9 °C)  Pulse:  [66-92] 68  Resp:  [20-43] 21  SpO2:  [97 %-100 %] 100 %  BP: (112-163)/(55-74) 112/55  Arterial Line BP: ()/(57-83) 129/62     Weight: 47 kg (103 lb 9.9 oz)  Body mass index is 21.66 kg/m².     Physical Exam  Constitutional:       General: She is not in acute distress.     Appearance: She is ill-appearing. She is not diaphoretic.      Interventions: She is intubated.   HENT:      Head: Normocephalic and atraumatic.      Comments: EEG in place  Eyes:      General: No scleral icterus.        Right eye: No discharge.         Left eye: No discharge.      Conjunctiva/sclera: Conjunctivae normal.      Pupils: Pupils are equal, round, and reactive to light.   Cardiovascular:      Rate and Rhythm: Normal rate.   Pulmonary:      Effort: Pulmonary effort is normal. No respiratory distress. She is intubated.      Comments: Intubated  Skin:     General: Skin is warm and dry.   Psychiatric:      Comments: Unable to assess            NEUROLOGICAL EXAMINATION:     CRANIAL NERVES     CN III, IV, VI   Pupils are equal, round, and reactive to light.       JUAN RAMON [facility]   Corneals intact  Resists eye opening  No spontaneous eye opening   Face symmetric   Tongue midline   No movement in RUE  Spontaneous movements in LUE and BLE  Does not follow commands    Exam findings to suggest seizures:  Myoclonus - no   eye twitching - no   Nystagmus - no   gaze deviation - no   waxy rigidity - no        Significant Labs: All pertinent lab results from the past 24 hours  have been reviewed.    Significant Studies: I have reviewed all pertinent imaging results/findings within the past 24 hours.

## 2024-06-20 NOTE — PROGRESS NOTES
EEG   AM Check Electrodes had to be fixed.Yes    Skin Integrity: Mild      Facial redness from all facial leads.   New head wrap.       Shashank Heard   06/20/2024 8:54 AM

## 2024-06-20 NOTE — ASSESSMENT & PLAN NOTE
-Intubated 2/2 hypoxemia + hypercapnia   -Current vent settings below  Vent Mode: A/C  Oxygen Concentration (%):  [35-40] 35  Resp Rate Total:  [20 br/min-22 br/min] 20 br/min  Vt Set:  [320 mL] 320 mL  PEEP/CPAP:  [5 cmH20] 5 cmH20  Mean Airway Pressure:  [12 ihV17-65 cmH20] 14 cmH20  -Famotidine BID for ulcer PPX  -VAP prevention per protocol  -CXR/ABGs daily  6/20/2024 Failed SBT

## 2024-06-20 NOTE — PROGRESS NOTES
[facility] - Neuro Critical Care  Neurology-Epilepsy  Progress Note    Patient Name: June Boykin  MRN: [id number]  Admission Date: 6/8/2024  Hospital Length of Stay: 12 days  Code Status: Partial Code   Attending Provider: Andrea Gutiérrez MD  Primary Care Physician: Mauricio Pierre MD   Principal Problem:Status epilepticus    Subjective:     Hospital Course:   6/6>6/7: EEG at OSH - continuous lateralized periodic discharges in the left hemisphere consistent with a highly irritable epileptogenic focus in this region, often time locked with clonic jerking of the RUE making it ictal in nature  6/7>6/8: EEG CAP at OSH - underlying bilateral/generalized epileptiform activity with potential to evolve into status epilepticus  6/8>6/9: Periodic lateralized epileptiform discharges over the left hemisphere consistent with significant focal cortical irritability and epileptic potential, frequent focal seizures with onset over the left hemisphere  6/9>6/10: EEG report pending  6/10>6/11: Frequent focal seizures up to 6 per hour with onset over the left hemisphere  6/11>6/12: Periodic lateralized epileptiform discharges over the left hemisphere consistent with significant cortical irritability and epileptic potential, burst suppression pattern due to anesthetics  6/12>6/13: Periodic lateralized epileptiform discharges over the left hemisphere consistent with significant cortical irritability and epileptic potential and burst suppression pattern due to anesthetics  6/13>6/14: Burst suppression pattern  6/14>6/15: burst suppression pattern, no electrographic seizures  6/15>6/16: left interictals seen more frequently after Propofol weaned, no electrographic seizures  6/16>6/17: left interictals, no clinical or electrographic seizures  6/17>6/18: left posterior quadrant lateralized periodic discharges, no discrete seizures  6/18>6/19: severe encephalopathy, left posterior quadrant LPDs on IIC; slightly improved  compared to record on 6/9, however epileptiform activity has increased since cessation of anesthetics   6/19>6/20: IIC pattern with several instances of evolution meeting criteria for subclinical seizures    See EEG reports for details.    Interval History: NAEON. EEG with IIC pattern with several instances of evolution meeting criteria for subclinical seizures. Some improvement noted this morning since Clobazam added. Patient with clinical improvement this morning, spontaneous movements noted. No family at bedside on rounds.    Current Facility-Administered Medications   Medication Dose Route Frequency Provider Last Rate Last Admin    acetaminophen tablet 650 mg  650 mg Per NG tube Q4H PRN Savage Reyes MD        atorvastatin tablet 40 mg  40 mg Per NG tube Daily Марина Flores MD   40 mg at 06/20/24 0915    balsam peru-castor oiL Oint   Topical (Top) BID Savage Reyes MD   Given at 06/20/24 0916    calcium gluconate 1 g in NS IVPB (premixed)  1 g Intravenous PRN Savage Reyes MD   Stopped at 06/14/24 1032    calcium gluconate 1 g in NS IVPB (premixed)  2 g Intravenous PRN Savage Reyes MD        calcium gluconate 1 g in NS IVPB (premixed)  3 g Intravenous PRN Savage Reyes MD        cloBAZam Tab 10 mg  10 mg Per NG tube Daily AndMarek borges P., NP   10 mg at 06/20/24 1102    And    cloBAZam Tab 20 mg  20 mg Per NG tube QHS Andkaterina Marek P., NP        dextrose 10% bolus 125 mL 125 mL  12.5 g Intravenous PRN Марина Flores MD        dextrose 10% bolus 250 mL 250 mL  25 g Intravenous PRN Марина Flores MD        fentaNYL 50 mcg/mL injection 25 mcg  25 mcg Intravenous Q1H PRN Christiano Jensen PA-C        glucagon (human recombinant) injection 1 mg  1 mg Intramuscular PRN Марина Flores MD        glucose chewable tablet 16 g  16 g Per NG tube PRN Savage Reyes MD        glucose chewable tablet 24 g  24 g Per NG tube PRN Savage Reyes MD        heparin (porcine) injection  5,000 Units  5,000 Units Subcutaneous Q12H Carolee Oconnell NP   5,000 Units at 06/20/24 0916    hydrALAZINE injection 10 mg  10 mg Intravenous Q4H PRN Isa Ash NP   10 mg at 06/17/24 2309    lacosamide tablet 200 mg  200 mg Per NG tube Q12H Christiano Jensen PA-C   200 mg at 06/20/24 0915    levetiracetam 500 mg/5 mL (5 mL) liquid Soln 1,500 mg  1,500 mg Per NG tube BID Andrea Gutiérrez MD   1,500 mg at 06/20/24 0915    magnesium oxide tablet 800 mg  800 mg Per NG tube PRN Carolee Oconnell NP   800 mg at 06/19/24 0639    magnesium oxide tablet 800 mg  800 mg Per NG tube PRN Carolee Oconnell NP        metoprolol tartrate (LOPRESSOR) tablet 25 mg  25 mg Per NG tube BID Lucila Elena PA-C   25 mg at 06/20/24 0915    naloxone 0.4 mg/mL injection 0.02 mg  0.02 mg Intravenous PRN Марина Flores MD        ondansetron injection 4 mg  4 mg Intravenous Q8H PRN Марина Flores MD        pantoprazole suspension 40 mg  40 mg Per NG tube Daily Betzaida Randall PA-C   40 mg at 06/20/24 0915    perampaneL tablet 12 mg  12 mg Per NG tube Daily Carolee Oconnell NP   12 mg at 06/20/24 0916    potassium bicarbonate disintegrating tablet 35 mEq  35 mEq Per NG tube PRN Carolee Oconnell NP   35 mEq at 06/14/24 0434    potassium bicarbonate disintegrating tablet 50 mEq  50 mEq Per NG tube PRN Carolee Oconnell NP   50 mEq at 06/19/24 0218    potassium bicarbonate disintegrating tablet 60 mEq  60 mEq Per NG tube PRN Carolee Oconnell NP   60 mEq at 06/12/24 0516    potassium, sodium phosphates 280-160-250 mg packet 2 packet  2 packet Per NG tube PRN Carolee Oconnell NP   2 packet at 06/17/24 0929    potassium, sodium phosphates 280-160-250 mg packet 2 packet  2 packet Per NG tube PRN Carolee Oconnell, NP   2 packet at 06/12/24 1211    potassium, sodium phosphates 280-160-250 mg packet 2 packet  2 packet Per NG tube PRN Carolee Oconnell, NP        psyllium husk (aspartame) 3.4 gram packet 1  packet  1 packet Per NG tube TID Marek Quintanilla, NP   1 packet at 06/20/24 0916    sodium chloride 0.9% flush 10 mL  10 mL Intravenous Q12H PRN Марина Flores MD        sodium chloride oral tablet 2,000 mg  2,000 mg Per NG tube TID Marek Quintanilla, NP   2,000 mg at 06/20/24 0915    thiamine tablet 100 mg  100 mg Per NG tube Daily Марина Flores MD   100 mg at 06/20/24 0915     Continuous Infusions:    Review of Systems  Objective:     Vital Signs (Most Recent):  Temp: 98.4 °F (36.9 °C) (06/20/24 1101)  Pulse: 68 (06/20/24 1101)  Resp: (!) 21 (06/20/24 1101)  BP: (!) 112/55 (06/20/24 1101)  SpO2: 100 % (06/20/24 1101) Vital Signs (24h Range):  Temp:  [97.9 °F (36.6 °C)-100 °F (37.8 °C)] 98.4 °F (36.9 °C)  Pulse:  [66-92] 68  Resp:  [20-43] 21  SpO2:  [97 %-100 %] 100 %  BP: (112-163)/(55-74) 112/55  Arterial Line BP: ()/(57-83) 129/62     Weight: 47 kg (103 lb 9.9 oz)  Body mass index is 21.66 kg/m².     Physical Exam  Constitutional:       General: She is not in acute distress.     Appearance: She is ill-appearing. She is not diaphoretic.      Interventions: She is intubated.   HENT:      Head: Normocephalic and atraumatic.      Comments: EEG in place  Eyes:      General: No scleral icterus.        Right eye: No discharge.         Left eye: No discharge.      Conjunctiva/sclera: Conjunctivae normal.      Pupils: Pupils are equal, round, and reactive to light.   Cardiovascular:      Rate and Rhythm: Normal rate.   Pulmonary:      Effort: Pulmonary effort is normal. No respiratory distress. She is intubated.      Comments: Intubated  Skin:     General: Skin is warm and dry.   Psychiatric:      Comments: Unable to assess            NEUROLOGICAL EXAMINATION:     CRANIAL NERVES     CN III, IV, VI   Pupils are equal, round, and reactive to light.       JUAN RAMON OU   Corneals intact  Resists eye opening  No spontaneous eye opening   Face symmetric   Tongue midline   No movement in RUE  Spontaneous movements in LUE  and BLE  Does not follow commands    Exam findings to suggest seizures:  Myoclonus - no   eye twitching - no   Nystagmus - no   gaze deviation - no   waxy rigidity - no        Significant Labs: All pertinent lab results from the past 24 hours have been reviewed.    Significant Studies: I have reviewed all pertinent imaging results/findings within the past 24 hours.  Assessment and Plan:     * Status epilepticus  Focal seizures  69F initially admitted to OSH 5/23 after found unresponsive by family, hospital course c/b hypercapnia requiring intubation, afib RVR, RUE jerking prompting EEG which revealed recurrent focal left seizures. Patient transferred to [facility] for higher level of care and cEEG. Course further complicated by status epilepticus requiring intubation and anesthetics.    Recommendations:  - Continuous vEEG monitoring  - Recommend increasing Clobazam to 10/20 mg  - Recommend to continue Lacosamide 200 mg BID, Levetiracetam 1500 mg BID, and Perampanel 12 mg daily  - Propofol weaned off 6/16 AM, Ketamine weaned to off 6/18 AM  - Consider LP and MRI (if able) to evaluate for etiology of focal status epilepticus  - Avoid agents that lower seizure threshold  - Management of infectious/metabolic abnormalities per [facility]  - Seizure precautions  - Per [place] Law, patient must refrain from driving for 6 months after having a seizure or cleared by a neurologist to legally resume driving      Discussed plan of care with [facility] team. No family at bedside. Will follow, please call with questions.    Focal seizures  See Status epilepticus    Paroxysmal atrial fibrillation  - Rate controlled on Lopressor  - Management per [facility]    Acute hypercapnic respiratory failure  - Intubated   - Anesthetics weaned to off 6/18  - Failed SBT 6/19  - Vent management per [facility]        VTE Risk Mitigation (From admission, onward)           Ordered     heparin (porcine) injection 5,000 Units  Every 12 hours         06/13/24 1012     IP VTE HIGH  RISK PATIENT  Once         06/08/24 1629     Place sequential compression device  Until discontinued         06/08/24 1629                    Patricia Stewart PA-C  Neurology-Epilepsy  [facility]  Staff: Dr. Simental

## 2024-06-20 NOTE — PLAN OF CARE
"[facility] Care Plan    POC reviewed with June Boykin and family at 0300. Pt verbalized understanding. Questions and concerns addressed. No acute events overnight. Pt progressing toward goals. Will continue to monitor. See below and flowsheets for full assessment and VS info.     TF at goal  EEG cont'd      Is this a stroke patient? no    Neuro:  Screven Coma Scale  Best Eye Response: 2-->(E2) to pain  Best Motor Response: 5-->(M5) localizes pain  Best Verbal Response: 1-->(V1) none  Alix Coma Scale Score: 8  Assessment Qualifiers: patient not sedated/intubated, no eye obstruction present  Pupil PERRLA: yes     24hr Temp:  [98.1 °F (36.7 °C)-100 °F (37.8 °C)]     CV:   Rhythm: normal sinus rhythm  BP goals:   SBP < 180  MAP > 65    Resp:      Vent Mode: A/C  Set Rate: 20 BPM  Oxygen Concentration (%): 40  Vt Set: 320 mL  PEEP/CPAP: 5 cmH20    Plan: status epilepticus    GI/:     Diet/Nutrition Received: tube feeding  Last Bowel Movement: 06/19/24  Voiding Characteristics: external catheter    Intake/Output Summary (Last 24 hours) at 6/20/2024 0542  Last data filed at 6/20/2024 0501  Gross per 24 hour   Intake 1385 ml   Output 2300 ml   Net -915 ml     Unmeasured Output  Urine Occurrence: 1  Stool Occurrence: 1  Pad Count: 1    Labs/Accuchecks:  Recent Labs   Lab 06/20/24  0140   WBC 7.43   RBC 3.95*   HGB 9.2*   HCT 29.4*         Recent Labs   Lab 06/20/24  0140   *   K 4.0   CO2 28   CL 99   BUN 11   CREATININE 0.5   ALKPHOS 59   ALT 12   AST 24   BILITOT 0.2    No results for input(s): "PROTIME", "INR", "APTT", "HEPANTIXA" in the last 168 hours. No results for input(s): "CPK", "CPKMB", "TROPONINI", "MB" in the last 168 hours.    Electrolytes: Electrolytes replaced  Accuchecks: none    Gtts:      LDA/Wounds:    Nurses Note -- 4 Eyes    Is there altered skin present? no       Prevention Measures Documented    Second RN/Staff Member:  na    Restraints:   Restraint Order  Length of Order: Order " good for next 24 hours or when removed.  Date that the current order will : 24  Time that the current order will : 101  Order Upon Application: Yes    WC
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
## 2024-06-20 NOTE — ASSESSMENT & PLAN NOTE
-A-fib w/ RVR @ OSH requiring diltiazem infusion  -Currently NSR on telemetry  -Continue to hold metoprolol 2/2 current pressor need  -Therapeutic lovenox discontinued 2/2 GI bleed  6/20/2024 Continue lopressor 25 mg bid
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
## 2024-06-20 NOTE — PLAN OF CARE
"[facility] Care Plan    POC reviewed with June Lopez Ashutosh and family at 1400. Pt family verbalized understanding. Questions and concerns addressed. No acute events today. Pt progressing toward goals. Will continue to monitor. See below and flowsheets for full assessment and VS info.     -BMx2  -bath completed  -added Na tablets, goal 135-145  -pt more alert this afternoon  -cEEG remains in place  -updated family over phone        Is this a stroke patient? no    Neuro:  Milton Coma Scale  Best Eye Response: 4-->(E4) spontaneous  Best Motor Response: 5-->(M5) localizes pain  Best Verbal Response: 1-->(V1) none  Milton Coma Scale Score: 10  Assessment Qualifiers: patient intubated  Pupil PERRLA: yes     24 hr Temp:  [97.7 °F (36.5 °C)-100 °F (37.8 °C)]     CV:   Rhythm: normal sinus rhythm  BP goals:   SBP < 180  MAP > 65    Resp:      Vent Mode: A/C  Set Rate: 20 BPM  Oxygen Concentration (%): 35  Vt Set: 320 mL  PEEP/CPAP: 5 cmH20    Plan: wean to extubate    GI/:     Diet/Nutrition Received: tube feeding  Last Bowel Movement: 06/20/24  Voiding Characteristics: external catheter    Intake/Output Summary (Last 24 hours) at 6/20/2024 1659  Last data filed at 6/20/2024 1601  Gross per 24 hour   Intake 1390 ml   Output 2550 ml   Net -1160 ml     Unmeasured Output  Urine Occurrence: 1  Stool Occurrence: 2  Pad Count: 1    Labs/Accuchecks:  Recent Labs   Lab 06/20/24  0140   WBC 7.43   RBC 3.95*   HGB 9.2*   HCT 29.4*         Recent Labs   Lab 06/20/24  0140   *   K 4.0   CO2 28   CL 99   BUN 11   CREATININE 0.5   ALKPHOS 59   ALT 12   AST 24   BILITOT 0.2    No results for input(s): "PROTIME", "INR", "APTT", "HEPANTIXA" in the last 168 hours. No results for input(s): "CPK", "CPKMB", "TROPONINI", "MB" in the last 168 hours.    Electrolytes: N/A - electrolytes WDL  Accuchecks: none    Gtts:      LDA/Wounds:      Nurses Note -- 4 Eyes      Is there altered skin present? yes     Please check the following " boxes that apply:   [] LDA Added if Not in Epic (Describe Wound)   [] New Altered Skin Integrity was Present on Admit and Documented in LDA   [] Wound Image Taken    Wound Care Consulted? No,  already following    Second RN/Staff Member:  Hue      Restraints:   Restraint Order  Length of Order: Order good for next 24 hours or when removed.  Date that the current order will : 24  Time that the current order will : 101  Order Upon Application: Yes    [facility]

## 2024-06-20 NOTE — PLAN OF CARE
[facility] - Neuro Critical Care  Discharge Reassessment    Primary Care Provider: Mauricio Pierre MD    Expected Discharge Date: 6/24/2024    Reassessment (most recent)       Discharge Reassessment - 06/20/24 1532          Discharge Reassessment    Assessment Type Discharge Planning Reassessment (P)      Did the patient's condition or plan change since previous assessment? No (P)      Communicated LACY with patient/caregiver No (P)    no answer from primary    Discharge Plan A -- (P)    tbd    DME Needed Upon Discharge  none (P)      Transition of Care Barriers None (P)      Why the patient remains in the hospital Requires continued medical care (P)         Post-Acute Status    Discharge Delays None known at this time (P)                    Pt is not medically ready to discharge.   SW attempted to contact primary contact [place] without much success.  A voicemail was left.  SW will continue to monitor pt needs.     Discharge Plan A and Plan B have been determined by review of patient's clinical status, future medical and therapeutic needs, and coverage/benefits for post-acute care in coordination with multidisciplinary team members.    Harmony Damon MSW, LCSW  [facility]  Case Management Dept.
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
## 2024-06-20 NOTE — SUBJECTIVE & OBJECTIVE
Scheduled Meds:   atorvastatin  40 mg Per NG tube Daily    balsam peru-castor oiL   Topical (Top) BID    cloBAZam  10 mg Per NG tube Daily    And    cloBAZam  20 mg Per NG tube QHS    heparin (porcine)  5,000 Units Subcutaneous Q12H    lacosamide  200 mg Per NG tube Q12H    levetiracetam  1,500 mg Per NG tube BID    metoprolol tartrate  25 mg Per NG tube BID    pantoprazole  40 mg Per NG tube Daily    perampaneL  12 mg Per NG tube Daily    psyllium husk (aspartame)  1 packet Per NG tube TID    sodium chloride  2,000 mg Per NG tube TID    thiamine  100 mg Per NG tube Daily     Continuous Infusions:  PRN Meds:  Current Facility-Administered Medications:     acetaminophen, 650 mg, Per NG tube, Q4H PRN    calcium gluconate IVPB, 1 g, Intravenous, PRN    calcium gluconate IVPB, 2 g, Intravenous, PRN    calcium gluconate IVPB, 3 g, Intravenous, PRN    dextrose 10%, 12.5 g, Intravenous, PRN    dextrose 10%, 25 g, Intravenous, PRN    fentaNYL, 25 mcg, Intravenous, Q1H PRN    glucagon (human recombinant), 1 mg, Intramuscular, PRN    glucose, 16 g, Per NG tube, PRN    glucose, 24 g, Per NG tube, PRN    hydrALAZINE, 10 mg, Intravenous, Q4H PRN    magnesium oxide, 800 mg, Per NG tube, PRN    magnesium oxide, 800 mg, Per NG tube, PRN    naloxone, 0.02 mg, Intravenous, PRN    ondansetron, 4 mg, Intravenous, Q8H PRN    potassium bicarbonate, 35 mEq, Per NG tube, PRN    potassium bicarbonate, 50 mEq, Per NG tube, PRN    potassium bicarbonate, 60 mEq, Per NG tube, PRN    potassium, sodium phosphates, 2 packet, Per NG tube, PRN    potassium, sodium phosphates, 2 packet, Per NG tube, PRN    potassium, sodium phosphates, 2 packet, Per NG tube, PRN    sodium chloride 0.9%, 10 mL, Intravenous, Q12H PRN    Review of patient's allergies indicates:  No Known Allergies     Past Medical History:   Diagnosis Date    Abdominal pain 05/2024    Acid reflux 05/2024    ACP (advance care planning) 6/1/2024    Anxiety disorder, unspecified     At  high risk for falls     Emphysema, unspecified     Focal seizures 6/7/2024    History of opioid abuse     Hypertension     On home oxygen therapy     2l/nc    Wears partial dentures     upper-none on lower    Weight loss 05/2024     Past Surgical History:   Procedure Laterality Date    ANKLE SURGERY Left     HYSTERECTOMY      TONSILLECTOMY         Family History       Problem Relation (Age of Onset)    Diabetes Father    No Known Problems Mother, Brother, Brother, Brother    Pancreatic cancer Father    Parkinsonism Sister    Stroke Father          Tobacco Use    Smoking status: Former     Current packs/day: 1.00     Types: Cigarettes    Smokeless tobacco: Never    Tobacco comments:     Quit 2 years ago   Substance and Sexual Activity    Alcohol use: No    Drug use: Not Currently     Types: Oxycodone, Hydrocodone    Sexual activity: Not on file     Comment:      Review of Systems   Skin:  Positive for wound.       Objective:     Vital Signs (Most Recent):  Temp: 98.4 °F (36.9 °C) (06/20/24 1101)  Pulse: 68 (06/20/24 1101)  Resp: (!) 21 (06/20/24 1101)  BP: (!) 112/55 (06/20/24 1101)  SpO2: 100 % (06/20/24 1101) Vital Signs (24h Range):  Temp:  [97.9 °F (36.6 °C)-100 °F (37.8 °C)] 98.4 °F (36.9 °C)  Pulse:  [66-92] 68  Resp:  [20-43] 21  SpO2:  [97 %-100 %] 100 %  BP: (112-163)/(55-74) 112/55  Arterial Line BP: ()/(57-83) 129/62     Weight: 47 kg (103 lb 9.9 oz)  Body mass index is 21.66 kg/m².     Physical Exam  Constitutional:       Appearance: Normal appearance.   Skin:     General: Skin is warm and dry.      Findings: Lesion present.   Neurological:      Mental Status: She is alert.          Laboratory:  All pertinent labs reviewed within the last 24 hours.    Diagnostic Results:  None

## 2024-06-21 ENCOUNTER — DOCUMENTATION ONLY (OUTPATIENT)
Dept: NEUROLOGY | Facility: CLINIC | Age: 69
End: 2024-06-21
Payer: MEDICARE

## 2024-06-21 NOTE — PROGRESS NOTES
[facility] - Neuro Critical Care  Wound Care    Patient Name:  June Boykin   MRN:  [id number]  Date: 6/21/2024  Diagnosis: Status epilepticus    History:     Past Medical History:   Diagnosis Date    Abdominal pain 05/2024    Acid reflux 05/2024    ACP (advance care planning) 6/1/2024    Anxiety disorder, unspecified     At high risk for falls     Emphysema, unspecified     Focal seizures 6/7/2024    History of opioid abuse     Hypertension     On home oxygen therapy     2l/nc    Wears partial dentures     upper-none on lower    Weight loss 05/2024       Social History     Socioeconomic History    Marital status:    Tobacco Use    Smoking status: Former     Current packs/day: 1.00     Types: Cigarettes    Smokeless tobacco: Never    Tobacco comments:     Quit 2 years ago   Substance and Sexual Activity    Alcohol use: No    Drug use: Not Currently     Types: Oxycodone, Hydrocodone     Social Determinants of Health     Financial Resource Strain: Patient Unable To Answer (6/10/2024)    Overall Financial Resource Strain (CARDIA)     Difficulty of Paying Living Expenses: Patient unable to answer   Food Insecurity: Patient Unable To Answer (6/10/2024)    Hunger Vital Sign     Worried About Running Out of Food in the Last Year: Patient unable to answer     Ran Out of Food in the Last Year: Patient unable to answer   Transportation Needs: Patient Unable To Answer (6/10/2024)    TRANSPORTATION NEEDS     Transportation : Patient unable to answer   Physical Activity: Patient Unable To Answer (6/10/2024)    Exercise Vital Sign     Days of Exercise per Week: Patient unable to answer     Minutes of Exercise per Session: Patient unable to answer   Stress: Patient Unable To Answer (6/10/2024)    [employer] - Occupational Stress Questionnaire     Feeling of Stress : Patient unable to answer   Housing Stability: Patient Unable To Answer (6/10/2024)    Housing Stability Vital Sign     Unable  to Pay for Housing in the Last Year: Patient unable to answer     Homeless in the Last Year: Patient unable to answer       Precautions:     Allergies as of 06/07/2024    (No Known Allergies)       [facility] Assessment Details/Treatment   Patient seen for wound care consultation.   Reviewed chart for this encounter.   See Flow Sheet for findings.      RECOMMENDATIONS: Patient's primary RN at bedside and agreeable to inpatient wound care. . Patient w/ incontinence associated dermatitis to andrew-rectal region, continue Triad for moisture barrier protection.  Sacral foam dressing removed - skin is intact, however, non-blanchable w/ purple discoloration. Continue BPCO/foam dressings for continued care.  Patient turned w/ wedge use for pressure offloading. Heel lift boots in place. Immerse surface ordered. Skin integrity PARRISH following.     RECOMMENDATIONS:  BID/PRN - andrew-rectal region - cleanse gently w/ soap and water, pat dry and apply Triad to affected area, leave DIGNA.     BID/PRN - sacrum - cleanse gently w/ soap and water, pat dry and apply BPCO to areas of purple/maroon discoloration. Cover w/ foam dressing.     Q2hr turns.     Immerse.        Discussed POC with patient and primary nurse.   See EMR for orders & patient education.    Discussed nutrition and the role of protein in wound healing with the patient. Instructed patient to optimize protein for wound healing.    Bedside nursing to continue care & monitoring.  Bedside nursing to maintain pressure injury prevention interventions.              06/21/24 0955   WOCN Assessment   WOCN Total Time (mins) 30   Visit Date 06/21/24   Visit Time 0955   Consult Type Follow Up   WOCN Speciality Wound   Intervention assessed;changed;applied;chart review;coordination of care;orders   Teaching on-going   Skin Interventions   Pressure Reduction Devices specialty bed utilized           Date First Assessed/Time First Assessed: 06/10/24 9050   Present on Original Admission: Yes   Primary Wound Type: Moisture associated dermatitis  Orientation: midline  Location: (c) Perirectal  Wound Number: #1   Wound Image         Wound 06/14/24 1030 Pressure Injury Sacral spine   Date First Assessed/Time First Assessed: 06/14/24 1030   Primary Wound Type: Pressure Injury  Location: Sacral spine   Dressing Appearance Open to air;Dry   Drainage Amount None   Drainage Characteristics/Odor No odor   Appearance Maroon;Purple   Care Cleansed with:;Sterile normal saline       Orders placed.   Nayan JHAVERIN, RN  06/21/2024

## 2024-06-21 NOTE — ASSESSMENT & PLAN NOTE
-Blood noted in stool AM of 6/12; Hgb w/ subsequent drop from 14 to 10  -Pantoprazole load w/ 80mg; now 40 BID  -Hgb stable on CBC; serial CBC discontinued  -Continue to hold therapeutic lovenox  -6/21/2024 continue SQH for DVT PPX
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
## 2024-06-21 NOTE — SUBJECTIVE & OBJECTIVE
Interval History: NAEON. EEG with IIC pattern with evolution meeting criteria for subclinical seizures, last @~2200. Some clinical improvement this morning with spontaneous eye opening. No family at bedside.    Current Facility-Administered Medications   Medication Dose Route Frequency Provider Last Rate Last Admin    acetaminophen tablet 650 mg  650 mg Per NG tube Q4H PRN Savage Reyes MD        atorvastatin tablet 40 mg  40 mg Per NG tube Daily Марина Flores MD   40 mg at 06/21/24 0915    balsam peru-castor oiL Oint   Topical (Top) BID Savage Reyes MD   Given at 06/21/24 0915    calcium gluconate 1 g in NS IVPB (premixed)  1 g Intravenous PRN Savage Reyes MD   Stopped at 06/14/24 1032    calcium gluconate 1 g in NS IVPB (premixed)  2 g Intravenous PRN Savage Reyes MD        calcium gluconate 1 g in NS IVPB (premixed)  3 g Intravenous PRN Savage Reyes MD        [START ON 6/22/2024] cloBAZam Tab 20 mg  20 mg Per NG tube Daily Leslie Guzman NP        And    cloBAZam Tab 20 mg  20 mg Per NG tube QHS Leslie Guzman NP        dextrose 10% bolus 125 mL 125 mL  12.5 g Intravenous PRN Марина Flores MD        dextrose 10% bolus 250 mL 250 mL  25 g Intravenous PRN Марина Flores MD        glucagon (human recombinant) injection 1 mg  1 mg Intramuscular PRN Марина Flores MD        glucose chewable tablet 16 g  16 g Per NG tube PRN Savage Reyes MD        glucose chewable tablet 24 g  24 g Per NG tube PRN Savage Reyes MD        heparin (porcine) injection 5,000 Units  5,000 Units Subcutaneous Q12H Carolee Oconnell NP   5,000 Units at 06/21/24 0915    hydrALAZINE injection 10 mg  10 mg Intravenous Q4H PRN Isa Ash NP   10 mg at 06/17/24 2309    lacosamide tablet 200 mg  200 mg Per NG tube Q12H Christiano Jensen PA-C   200 mg at 06/21/24 0613    levetiracetam 500 mg/5 mL (5 mL) liquid Soln 1,500 mg  1,500 mg Per NG tube BID Andrea Gutiérrez MD   1,500 mg at  06/21/24 0914    magnesium oxide tablet 800 mg  800 mg Per NG tube PRN Carolee Oconnell NP   800 mg at 06/21/24 0917    magnesium oxide tablet 800 mg  800 mg Per NG tube PRN Carolee Oconnell NP        metoprolol tartrate (LOPRESSOR) tablet 25 mg  25 mg Per NG tube BID Lucila Elena PA-SCOT   25 mg at 06/21/24 0914    naloxone 0.4 mg/mL injection 0.02 mg  0.02 mg Intravenous PRN Марина Flores MD        ondansetron injection 4 mg  4 mg Intravenous Q8H PRN Марина Flores MD        pantoprazole suspension 40 mg  40 mg Per NG tube Daily Betzaida Randall PA-C   40 mg at 06/21/24 0914    perampaneL tablet 12 mg  12 mg Per NG tube Daily Carolee Oconnell NP   12 mg at 06/21/24 0914    potassium bicarbonate disintegrating tablet 35 mEq  35 mEq Per NG tube PRN Carolee Oconnell NP   35 mEq at 06/14/24 0434    potassium bicarbonate disintegrating tablet 50 mEq  50 mEq Per NG tube PRN Carolee Oconnell NP   50 mEq at 06/21/24 0613    potassium bicarbonate disintegrating tablet 60 mEq  60 mEq Per NG tube PRN Carolee Oconnell NP   60 mEq at 06/12/24 0516    potassium, sodium phosphates 280-160-250 mg packet 2 packet  2 packet Per NG tube PRN Carolee Oconnell NP   2 packet at 06/17/24 0929    potassium, sodium phosphates 280-160-250 mg packet 2 packet  2 packet Per NG tube PRN Carolee Oconnell NP   2 packet at 06/12/24 1211    potassium, sodium phosphates 280-160-250 mg packet 2 packet  2 packet Per NG tube PRN Carolee Oconnell NP        psyllium husk (aspartame) 3.4 gram packet 1 packet  1 packet Per NG tube TID AndMarek borges P., NP   1 packet at 06/21/24 0915    sodium chloride 0.9% flush 10 mL  10 mL Intravenous Q12H PRN Марина Flores MD        sodium chloride oral tablet 2,000 mg  2,000 mg Per NG tube TID Marek Quintanilla PYsabel, NP   2,000 mg at 06/21/24 0913    thiamine tablet 100 mg  100 mg Per NG tube Daily Марина Flores MD   100 mg at 06/21/24 0913     Continuous Infusions:    Review of  Systems  Objective:     Vital Signs (Most Recent):  Temp: 99.3 °F (37.4 °C) (06/21/24 1139)  Pulse: 66 (06/21/24 1139)  Resp: 20 (06/21/24 1139)  BP: (!) 176/77 (06/21/24 1001)  SpO2: 100 % (06/21/24 1139) Vital Signs (24h Range):  Temp:  [97.7 °F (36.5 °C)-100 °F (37.8 °C)] 99.3 °F (37.4 °C)  Pulse:  [63-88] 66  Resp:  [20-41] 20  SpO2:  [97 %-100 %] 100 %  BP: ()/(50-82) 176/77  Arterial Line BP: ()/() 156/87     Weight: 47 kg (103 lb 9.9 oz)  Body mass index is 21.66 kg/m².     Physical Exam  Constitutional:       General: She is not in acute distress.     Appearance: She is ill-appearing. She is not diaphoretic.      Interventions: She is intubated.   HENT:      Head: Normocephalic and atraumatic.      Comments: EEG in place  Eyes:      General: No scleral icterus.        Right eye: No discharge.         Left eye: No discharge.      Conjunctiva/sclera: Conjunctivae normal.      Pupils: Pupils are equal, round, and reactive to light.   Cardiovascular:      Rate and Rhythm: Normal rate.   Pulmonary:      Effort: Pulmonary effort is normal. No respiratory distress. She is intubated.      Comments: Intubated  Skin:     General: Skin is warm and dry.   Psychiatric:      Comments: Unable to assess            NEUROLOGICAL EXAMINATION:     CRANIAL NERVES     CN III, IV, VI   Pupils are equal, round, and reactive to light.       Arouses to verbal stimuli  JUAN RAMON OU   Corneals intact  + spontaneous eye opening   Face symmetric   Tongue midline   Spontaneous movements of LUE and BLE  No movement to RUE  Does not follow commands    Exam findings to suggest seizures:  Myoclonus - no   eye twitching - no   Nystagmus - no   gaze deviation - no   waxy rigidity - no        Significant Labs: All pertinent lab results from the past 24 hours have been reviewed.    Significant Studies: I have reviewed all pertinent imaging results/findings within the past 24 hours.

## 2024-06-21 NOTE — SUBJECTIVE & OBJECTIVE
Review of Systems Unable to obtain a complete ROS due to level of consciousness.  Objective:     Vitals:  Temp: 99.7 °F (37.6 °C)  Pulse: 74  Rhythm: normal sinus rhythm  BP: 138/65  MAP (mmHg): 94  Resp: (!) 24  SpO2: 99 %  Oxygen Concentration (%): 35  Vent Mode: A/C  Set Rate: 20 BPM  Vt Set: 320 mL  PEEP/CPAP: 5 cmH20  Peak Airway Pressure: 42 cmH20  Mean Airway Pressure: 14 cmH20  Plateau Pressure: 16 cmH20    Temp  Min: 97.7 °F (36.5 °C)  Max: 100 °F (37.8 °C)  Pulse  Min: 63  Max: 88  BP  Min: 102/54  Max: 176/77  MAP (mmHg)  Min: 74  Max: 118  Resp  Min: 20  Max: 41  SpO2  Min: 97 %  Max: 100 %  Oxygen Concentration (%)  Min: 35  Max: 35    06/20 0701 - 06/21 0700  In: 1650   Out: 2525 [Urine:2525]   Unmeasured Output  Urine Occurrence: 1  Stool Occurrence: 1  Pad Count: 3        Physical Exam  GA: Alert, comfortable, no acute distress.   HEENT: No scleral icterus or JVD.   Pulmonary: Clear to auscultation A/L. Cardiac: RRR S1 & S2 w/o rubs/murmurs/gallops.   Abdominal: Bowel sounds present x 4. No appreciable hepatosplenomegaly.  Skin: No jaundice, rashes, or visible lesions.  Neuro:  --GCS: E3 Vt1 M5  --Mental Status:  opens eyes, doesn't track, doesn't follow commands  --CN II-XII grossly intact.   --Pupils 3mm, PERRL.   --Corneal reflex, gag, cough intact.  --LUE spont  --RUE  no movement  --BLE spont       Medications:  Continuous Scheduledatorvastatin, 40 mg, Daily  balsam peru-castor oiL, , BID  [START ON 6/22/2024] cloBAZam, 20 mg, Daily   And  cloBAZam, 20 mg, QHS  heparin (porcine), 5,000 Units, Q12H  lacosamide, 200 mg, Q12H  levetiracetam, 1,500 mg, BID  metoprolol tartrate, 25 mg, BID  pantoprazole, 40 mg, Daily  perampaneL, 12 mg, Daily  psyllium husk (aspartame), 1 packet, TID  sodium chloride, 2,000 mg, TID  thiamine, 100 mg, Daily    PRNacetaminophen, 650 mg, Q4H PRN  calcium gluconate IVPB, 1 g, PRN  calcium gluconate IVPB, 2 g, PRN  calcium gluconate IVPB, 3 g, PRN  dextrose 10%, 12.5  g, PRN  dextrose 10%, 25 g, PRN  glucagon (human recombinant), 1 mg, PRN  glucose, 16 g, PRN  glucose, 24 g, PRN  hydrALAZINE, 10 mg, Q4H PRN  magnesium oxide, 800 mg, PRN  magnesium oxide, 800 mg, PRN  naloxone, 0.02 mg, PRN  ondansetron, 4 mg, Q8H PRN  potassium bicarbonate, 35 mEq, PRN  potassium bicarbonate, 50 mEq, PRN  potassium bicarbonate, 60 mEq, PRN  potassium, sodium phosphates, 2 packet, PRN  potassium, sodium phosphates, 2 packet, PRN  potassium, sodium phosphates, 2 packet, PRN  sodium chloride 0.9%, 10 mL, Q12H PRN      Today I personally reviewed pertinent medications, lines/drains/airways, imaging, cardiology results, laboratory results, microbiology results,     Diet  No diet orders on file

## 2024-06-21 NOTE — ASSESSMENT & PLAN NOTE
-A-fib w/ RVR @ OSH requiring diltiazem infusion  -Currently NSR on telemetry  -Continue to hold metoprolol 2/2 current pressor need  -Therapeutic lovenox discontinued 2/2 GI bleed  6/21/2024 Continue lopressor 25 mg bid

## 2024-06-21 NOTE — PROGRESS NOTES
[facility]  Neurology-Epilepsy  Progress Note    Patient Name: June Boykin  MRN: [id number]  Admission Date: 6/8/2024  Hospital Length of Stay: 13 days  Code Status: Partial Code   Attending Provider: Andrea Gutiérrez MD  Primary Care Physician: Mauricio Pierre MD   Principal Problem:Status epilepticus    Subjective:     Hospital Course:   6/6>6/7: EEG at OSH - continuous lateralized periodic discharges in the left hemisphere consistent with a highly irritable epileptogenic focus in this region, often time locked with clonic jerking of the RUE making it ictal in nature  6/7>6/8: EEG CAP at OSH - underlying bilateral/generalized epileptiform activity with potential to evolve into status epilepticus  6/8>6/9: Periodic lateralized epileptiform discharges over the left hemisphere consistent with significant focal cortical irritability and epileptic potential, frequent focal seizures with onset over the left hemisphere  6/9>6/10: EEG report pending  6/10>6/11: Frequent focal seizures up to 6 per hour with onset over the left hemisphere  6/11>6/12: Periodic lateralized epileptiform discharges over the left hemisphere consistent with significant cortical irritability and epileptic potential, burst suppression pattern due to anesthetics  6/12>6/13: Periodic lateralized epileptiform discharges over the left hemisphere consistent with significant cortical irritability and epileptic potential and burst suppression pattern due to anesthetics  6/13>6/14: Burst suppression pattern  6/14>6/15: burst suppression pattern, no electrographic seizures  6/15>6/16: left interictals seen more frequently after Propofol weaned, no electrographic seizures  6/16>6/17: left interictals, no clinical or electrographic seizures  6/17>6/18: left posterior quadrant lateralized periodic discharges, no discrete seizures  6/18>6/19: severe encephalopathy, left posterior quadrant LPDs on IIC; slightly improved  compared to record on 6/9, however epileptiform activity has increased since cessation of anesthetics   6/19>6/20: IIC pattern with several instances of evolution meeting criteria for subclinical seizures  6/20>6/21: IIC pattern with evolution meeting criteria for subclinical seizures, last @~2200    See EEG reports for details.    Interval History: NAEON. EEG with IIC pattern with evolution meeting criteria for subclinical seizures, last @~2200. Some clinical improvement this morning with spontaneous eye opening. No family at bedside.    Current Facility-Administered Medications   Medication Dose Route Frequency Provider Last Rate Last Admin    acetaminophen tablet 650 mg  650 mg Per NG tube Q4H PRN Savage Reyes MD        atorvastatin tablet 40 mg  40 mg Per NG tube Daily Марина Flores MD   40 mg at 06/21/24 0915    balsam peru-castor oiL Oint   Topical (Top) BID Savage Reyes MD   Given at 06/21/24 0915    calcium gluconate 1 g in NS IVPB (premixed)  1 g Intravenous PRN Savage Reyes MD   Stopped at 06/14/24 1032    calcium gluconate 1 g in NS IVPB (premixed)  2 g Intravenous PRN Savage Reyes MD        calcium gluconate 1 g in NS IVPB (premixed)  3 g Intravenous PRN Savage Reyes MD        [START ON 6/22/2024] cloBAZam Tab 20 mg  20 mg Per NG tube Daily Leslie Guzman NP        And    cloBAZam Tab 20 mg  20 mg Per NG tube QHS Leslie Guzman NP        dextrose 10% bolus 125 mL 125 mL  12.5 g Intravenous PRN Марина Flores MD        dextrose 10% bolus 250 mL 250 mL  25 g Intravenous PRN Марина Flores MD        glucagon (human recombinant) injection 1 mg  1 mg Intramuscular PRN Марина Flores MD        glucose chewable tablet 16 g  16 g Per NG tube PRN Savage Reyes MD        glucose chewable tablet 24 g  24 g Per NG tube PRSavage Mahmood MD        heparin (porcine) injection 5,000 Units  5,000 Units Subcutaneous Q12H Carolee Oconnell NP   5,000 Units at 06/21/24  0915    hydrALAZINE injection 10 mg  10 mg Intravenous Q4H PRN Isa Ash NP   10 mg at 06/17/24 2309    lacosamide tablet 200 mg  200 mg Per NG tube Q12H Christiano Jensen PA-C   200 mg at 06/21/24 0613    levetiracetam 500 mg/5 mL (5 mL) liquid Soln 1,500 mg  1,500 mg Per NG tube BID Andrea Gutiérrez MD   1,500 mg at 06/21/24 0914    magnesium oxide tablet 800 mg  800 mg Per NG tube PRN Carolee Oconnell NP   800 mg at 06/21/24 0917    magnesium oxide tablet 800 mg  800 mg Per NG tube PRN Carolee Oconnell NP        metoprolol tartrate (LOPRESSOR) tablet 25 mg  25 mg Per NG tube BID Lucila Elena PA-C   25 mg at 06/21/24 0914    naloxone 0.4 mg/mL injection 0.02 mg  0.02 mg Intravenous PRN Марина Flores MD        ondansetron injection 4 mg  4 mg Intravenous Q8H PRN Марина Flores MD        pantoprazole suspension 40 mg  40 mg Per NG tube Daily Betzaida Randall PA-C   40 mg at 06/21/24 0914    perampaneL tablet 12 mg  12 mg Per NG tube Daily Carolee Oconnell NP   12 mg at 06/21/24 0914    potassium bicarbonate disintegrating tablet 35 mEq  35 mEq Per NG tube PRN Carolee Oconnell NP   35 mEq at 06/14/24 0434    potassium bicarbonate disintegrating tablet 50 mEq  50 mEq Per NG tube PRN Carolee Oconnell NP   50 mEq at 06/21/24 0613    potassium bicarbonate disintegrating tablet 60 mEq  60 mEq Per NG tube PRN Carolee Oconnell NP   60 mEq at 06/12/24 0516    potassium, sodium phosphates 280-160-250 mg packet 2 packet  2 packet Per NG tube PRN Carolee Oconnell NP   2 packet at 06/17/24 0929    potassium, sodium phosphates 280-160-250 mg packet 2 packet  2 packet Per NG tube PRN Carolee Oconnell NP   2 packet at 06/12/24 1211    potassium, sodium phosphates 280-160-250 mg packet 2 packet  2 packet Per NG tube PRN Carolee Oconnell, NP        psyllium husk (aspartame) 3.4 gram packet 1 packet  1 packet Per NG tube Marek Hernandez, NP   1 packet at 06/21/24 0915    sodium  chloride 0.9% flush 10 mL  10 mL Intravenous Q12H PRN Марина Flores MD        sodium chloride oral tablet 2,000 mg  2,000 mg Per NG tube TID AndMarek borges NP   2,000 mg at 06/21/24 0913    thiamine tablet 100 mg  100 mg Per NG tube Daily Марина Flores MD   100 mg at 06/21/24 0913     Continuous Infusions:    Review of Systems  Objective:     Vital Signs (Most Recent):  Temp: 99.3 °F (37.4 °C) (06/21/24 1139)  Pulse: 66 (06/21/24 1139)  Resp: 20 (06/21/24 1139)  BP: (!) 176/77 (06/21/24 1001)  SpO2: 100 % (06/21/24 1139) Vital Signs (24h Range):  Temp:  [97.7 °F (36.5 °C)-100 °F (37.8 °C)] 99.3 °F (37.4 °C)  Pulse:  [63-88] 66  Resp:  [20-41] 20  SpO2:  [97 %-100 %] 100 %  BP: ()/(50-82) 176/77  Arterial Line BP: ()/() 156/87     Weight: 47 kg (103 lb 9.9 oz)  Body mass index is 21.66 kg/m².     Physical Exam  Constitutional:       General: She is not in acute distress.     Appearance: She is ill-appearing. She is not diaphoretic.      Interventions: She is intubated.   HENT:      Head: Normocephalic and atraumatic.      Comments: EEG in place  Eyes:      General: No scleral icterus.        Right eye: No discharge.         Left eye: No discharge.      Conjunctiva/sclera: Conjunctivae normal.      Pupils: Pupils are equal, round, and reactive to light.   Cardiovascular:      Rate and Rhythm: Normal rate.   Pulmonary:      Effort: Pulmonary effort is normal. No respiratory distress. She is intubated.      Comments: Intubated  Skin:     General: Skin is warm and dry.   Psychiatric:      Comments: Unable to assess            NEUROLOGICAL EXAMINATION:     CRANIAL NERVES     CN III, IV, VI   Pupils are equal, round, and reactive to light.       Arouses to verbal stimuli  JUAN RAMON OU   Corneals intact  + spontaneous eye opening   Face symmetric   Tongue midline   Spontaneous movements of LUE and BLE  No movement to RUE  Does not follow commands    Exam findings to suggest seizures:  Myoclonus - no    eye twitching - no   Nystagmus - no   gaze deviation - no   waxy rigidity - no        Significant Labs: All pertinent lab results from the past 24 hours have been reviewed.    Significant Studies: I have reviewed all pertinent imaging results/findings within the past 24 hours.  Assessment and Plan:     * Status epilepticus  Focal seizures  69F initially admitted to OSH 5/23 after found unresponsive by family, hospital course c/b hypercapnia requiring intubation, afib RVR, RUE jerking prompting EEG which revealed recurrent focal left seizures. Patient transferred to [facility] for higher level of care and cEEG. Course further complicated by status epilepticus requiring intubation and anesthetics.    Recommendations:  - Continuous vEEG monitoring  - Recommend increasing Clobazam to 20 mg BID  - Recommend to continue Lacosamide 200 mg BID, Levetiracetam 1500 mg BID, and Perampanel 12 mg daily  - Propofol weaned off 6/16 AM, Ketamine weaned to off 6/18 AM  - Consider LP and MRI (if able) to evaluate for etiology of focal status epilepticus  - Recommend ongoing GOC discussions  - Avoid agents that lower seizure threshold  - Management of infectious/metabolic abnormalities per [facility]  - Seizure precautions  - Per [place] Law, patient must refrain from driving for 6 months after having a seizure or cleared by a neurologist to legally resume driving      Discussed plan of care with [facility] team. No family at bedside. Will follow, please call with questions.    Focal seizures  See Status epilepticus    Paroxysmal atrial fibrillation  - Rate controlled on Lopressor  - Management per [facility]    Acute hypercapnic respiratory failure  - Intubated   - Anesthetics weaned to off 6/18  - Failed SBT 6/19  - Vent management per [facility]        VTE Risk Mitigation (From admission, onward)           Ordered     heparin (porcine) injection 5,000 Units  Every 12 hours         06/13/24 1012     IP VTE HIGH RISK PATIENT  Once         06/08/24 1629     Place  sequential compression device  Until discontinued         06/08/24 3001                    Patricia Stewart PA-C  Neurology-Epilepsy  [facility]  Staff: Dr. Simental

## 2024-06-21 NOTE — PLAN OF CARE
"[facility] Care Plan    POC reviewed with June Boykin and family at 1400. Pt unable verbalized understanding. Questions and concerns addressed. No acute events today. Pt progressing toward goals. Will continue to monitor. See below and flowsheets for full assessment and VS info.     -alert throughout the day, not tracking or following commands, restless/agitated, kicking legs  -multiple BMs   -changed purewick x3  -repalced lytes  -wound care checked wounds  -increased AED per epilepsy recs, still on cEEG    Is this a stroke patient? no    Neuro:  [place] Coma Scale  Best Eye Response: 4-->(E4) spontaneous  Best Motor Response: 5-->(M5) localizes pain  Best Verbal Response: 1-->(V1) none  Alix Coma Scale Score: 10  Assessment Qualifiers: patient intubated  Pupil PERRLA: yes     24 hr Temp:  [98.6 °F (37 °C)-100 °F (37.8 °C)]     CV:   Rhythm: normal sinus rhythm  BP goals:   SBP < 180  MAP > 65    Resp:      Vent Mode: A/C  Set Rate: 20 BPM  Oxygen Concentration (%): 35  Vt Set: 320 mL  PEEP/CPAP: 5 cmH20    Plan: Failed SBT, Low volumes Less than 200, possible consider Trach     GI/:     Diet/Nutrition Received: tube feeding  Last Bowel Movement: 06/21/24  Voiding Characteristics: external catheter    Intake/Output Summary (Last 24 hours) at 6/21/2024 1836  Last data filed at 6/21/2024 1801  Gross per 24 hour   Intake 1640 ml   Output 2925 ml   Net -1285 ml     Unmeasured Output  Urine Occurrence: 1  Stool Occurrence: 3  Pad Count: 2    Labs/Accuchecks:  Recent Labs   Lab 06/21/24  0015   WBC 6.35   RBC 3.70*   HGB 8.5*   HCT 27.6*         Recent Labs   Lab 06/21/24  0015      K 3.7   CO2 27      BUN 11   CREATININE 0.4*   ALKPHOS 60   ALT 15   AST 27   BILITOT 0.2    No results for input(s): "PROTIME", "INR", "APTT", "HEPANTIXA" in the last 168 hours. No results for input(s): "CPK", "CPKMB", "TROPONINI", "MB" in the last 168 hours.    Electrolytes: Electrolytes replaced  Accuchecks: " none    Gtts:      LDA/Wounds:      Nurses Note -- 4 Eyes      Is there altered skin present? yes     Please check the following boxes that apply:   [] LDA Added if Not in Epic (Describe Wound)   [] New Altered Skin Integrity was Present on Admit and Documented in LDA   [] Wound Image Taken    Wound Care Consulted? Yes, already following    Second RN/Staff Member:  Jhony      Restraints:   Restraint Order  Length of Order: Order good for next 24 hours or when removed.  Date that the current order will : 24  Time that the current order will : 010  Order Upon Application: Yes    [facility]
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
## 2024-06-21 NOTE — PLAN OF CARE
Pt is not medically ready to discharge.   Pt remains intubated and will be Trach and PEG.  Treatment team recommends LTAC.  RONALD contacted Stefany ( daughter n ) [phone] to discuss and explain LTAC placement.  RONALD sent list  to Stefany via email at [email or web address].   RONALD will continue to monitor pt needs.     Discharge Plan A and Plan B have been determined by review of patient's clinical status, future medical and therapeutic needs, and coverage/benefits for post-acute care in coordination with multidisciplinary team members.    Harmony Damon MSW, TERRYW  [facility]  Case Management Dept.

## 2024-06-21 NOTE — ASSESSMENT & PLAN NOTE
-Intubated 2/2 hypoxemia + hypercapnia   -Current vent settings below  Vent Mode: A/C  Oxygen Concentration (%):  [35] 35  Resp Rate Total:  [20 br/min-22 br/min] 20 br/min  Vt Set:  [320 mL] 320 mL  PEEP/CPAP:  [5 cmH20] 5 cmH20  Mean Airway Pressure:  [12 plC66-03 cmH20] 14 cmH20  -Famotidine BID for ulcer PPX  -VAP prevention per protocol  -CXR/ABGs daily  6/20/2024 Failed SBT

## 2024-06-21 NOTE — ASSESSMENT & PLAN NOTE
Focal seizures  69F initially admitted to OSH 5/23 after found unresponsive by family, hospital course c/b hypercapnia requiring intubation, afib RVR, RUE jerking prompting EEG which revealed recurrent focal left seizures. Patient transferred to [facility] for higher level of care and cEEG. Course further complicated by status epilepticus requiring intubation and anesthetics.    Recommendations:  - Continuous vEEG monitoring  - Recommend increasing Clobazam to 20 mg BID  - Recommend to continue Lacosamide 200 mg BID, Levetiracetam 1500 mg BID, and Perampanel 12 mg daily  - Propofol weaned off 6/16 AM, Ketamine weaned to off 6/18 AM  - Consider LP and MRI (if able) to evaluate for etiology of focal status epilepticus  - Recommend ongoing GOC discussions  - Avoid agents that lower seizure threshold  - Management of infectious/metabolic abnormalities per [facility]  - Seizure precautions  - Per [place] Law, patient must refrain from driving for 6 months after having a seizure or cleared by a neurologist to legally resume driving      Discussed plan of care with [facility] team. No family at bedside. Will follow, please call with questions.
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
## 2024-06-21 NOTE — PROCEDURES
ICU EEG/VIDEO MONITORING REPORT    DATE OF SERVICE: 6/20/24-6/21/24  EEG NUMBER: [id number]  [facility]    METHODOLOGY   Electroencephalographic (EEG) recording is with electrodes placed according to the International 10-20 placement system.  Thirty two (32) channels of digital signal are simultaneously recorded from the scalp and may include EKG, EMG, and/or eye monitors.   Recording band pass was 0.1 to 512 hz.  Digital video recording of the patient is simultaneously recorded with the EEG.  The nursing staff report clinical symptoms and may press an event button when the patient has symptoms of clinical interest to the treating physicians.  EEG and video recording is stored and archived in digital format.  The entire recording is visually reviewed and the times identified by computer analysis as being spikes or seizures are reviewed again.  Activation procedures which include photic stimulation, hyperventilation and instructing patients to perform simple task are done in selected patients.   Compresses spectral analysis (CSA) is also performed on the activity recorded from each individual channel.  This is displayed as a power display of frequencies from 0 to 30 Hz over time.   The CSA analysis is done and displayed continuously.  This is reviewed for asymmetries in power between homologous areas of the scalp and for presence of changes in power which canbe seen when seizures occur.  Sections of suspected abnormalities on the CSA is then compared with the original EEG recording.     VulevÃƒÂº software was also utilized in the review of this study.  This software suite analyzes the EEG recording in multiple domains.  Coherence and rhythmicity is computed to identify EEG sections which may contain organized seizures.  Each channel undergoes analysis to detect presence of spike and sharp waves which have special and morphological characteristic of epileptic activity.  The routine EEG recording is  converted from spacial into frequency domain.  This is then displayed comparing homologous areas to identify areas of significant asymmetry.  Algorithm to identify non-cortically generated artifact is used to separate eye movement, EMG and other artifact from the EEG.      Recording Times  Start on 6/20/24 at 07:00  Stop on 6/21/24 at 07:00  A total of 24 hours of EEG was recorded.    This EEG study is performed in the ICU with the patient on the following sedative medications:none    Indication: 69F initially admitted to OSH 5/23 after found unresponsive by family, hospital course c/b hypercapnia requiring intubation, afib RVR, RUE jerking prompting EEG which revealed recurrent focal left hemispheric seizures.  Ultimately the patient required intubation and sedation for status epilepticus.  EEG to evaluate response to treatment.     State of Consciousness:   Lethargy    Background:   The background is composed of diffuse low amplitude theta and delta activity without a posterior dominant rhythm.      Sleep:   Normal sleep architecture is not appreciated.    Epileptiform Abnormalities  Lateralized periodic discharges occur in the left posterior quadrant (occipital/temporal/parietal - max 01/T5/P3) at a fluctuating frequency of 1-2 hz.      EKG:   Sinus rhythm    Seizures/Events:   Several instances of evolution into electrographic seizure is noted.  Typically this starts with increased frequency of LPDs to 2-3 hz followed by the appearance of rhythmic theta activity.  No correlate seen on video review.  This occurs frequently in the afternoon on 6/20, with the last seizure occurring at 22:11.      One event button push at 05:57 during which patient appeared to be staring forwards and not interacting with nurse.  No EEG seizure pattern is seen but IIC activity continues during this period.     Impression:   Abnormal vEEG consistent with a severe diffuse encephalopathy and a highly epileptogenic focus in the left  posterior quadrant.  LPDs occur at a frequency of 1-2 hz placing this activity on the ictal interictal continuum.  There are several instances of evolution and discrete subclinical seizures in the first half of the record, which resolves at 22:11 on 6/20.     Celena Simental MD  [facility]   Department of Neurology/Epilepsy

## 2024-06-21 NOTE — PROGRESS NOTES
EEG Hook up  AM Check Electrodes had to be fixed.No    Skin Integrity: Normal   No signs of skin breakdown seen during AM check  Yamileth Braswell   06/21/2024 10:44 AM

## 2024-06-22 NOTE — ASSESSMENT & PLAN NOTE
-A-fib w/ RVR @ OSH requiring diltiazem infusion  -Currently NSR on telemetry  -Continue to hold metoprolol 2/2 current pressor need  -Therapeutic lovenox discontinued 2/2 GI bleed  6/22/2024 Continue lopressor 25 mg bid
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
## 2024-06-22 NOTE — ASSESSMENT & PLAN NOTE
-Intubated 2/2 hypoxemia + hypercapnia     -Current vent settings below  Vent Mode: A/C  Oxygen Concentration (%):  [35] 35  Resp Rate Total:  [20 br/min-26 br/min] 21 br/min  Vt Set:  [320 mL] 320 mL  PEEP/CPAP:  [5 cmH20] 5 cmH20  Mean Airway Pressure:  [11 naK27-39 cmH20] 13 cmH20    -Famotidine BID for ulcer PPX  -VAP prevention per protocol  -CXR/ABGs daily  6/20/2024 Failed SBT

## 2024-06-22 NOTE — SUBJECTIVE & OBJECTIVE
Review of Systems Unable to obtain a complete ROS due to level of consciousness.  Objective:     Vitals:  Temp: 98.1 °F (36.7 °C)  Pulse: 80  Rhythm: normal sinus rhythm  BP: (!) 164/77  MAP (mmHg): 109  Resp: (!) 37  SpO2: 98 %  Oxygen Concentration (%): 35  Vent Mode: A/C  Set Rate: 20 BPM  Vt Set: 320 mL  PEEP/CPAP: 5 cmH20  Peak Airway Pressure: 36 cmH20  Mean Airway Pressure: 13 cmH20  Plateau Pressure: 16 cmH20    Temp  Min: 96.4 °F (35.8 °C)  Max: 99.7 °F (37.6 °C)  Pulse  Min: 65  Max: 93  BP  Min: 84/46  Max: 176/77  MAP (mmHg)  Min: 60  Max: 133  Resp  Min: 20  Max: 45  SpO2  Min: 98 %  Max: 100 %  Oxygen Concentration (%)  Min: 35  Max: 35    06/21 0701 - 06/22 0700  In: 2075   Out: 2875 [Urine:2875]   Unmeasured Output  Urine Occurrence: 1  Stool Occurrence: 1  Pad Count: 1        Physical Exam  GA: Alert, comfortable, no acute distress.   HEENT: No scleral icterus or JVD.   Pulmonary: Clear to auscultation A/L. Cardiac: RRR S1 & S2 w/o rubs/murmurs/gallops.   Abdominal: Bowel sounds present x 4. No appreciable hepatosplenomegaly.  Skin: No jaundice, rashes, or visible lesions.  Neuro:  --GCS: E3 Vt1 M5  --Mental Status:  opens eyes, doesn't track, doesn't follow commands  --CN II-XII grossly intact.   --Pupils 3mm, PERRL.   --Corneal reflex, gag, cough intact.  --LUE spont  --RUE  no movement  --BLE spont       Medications:  Continuous Scheduledatorvastatin, 40 mg, Daily  balsam peru-castor oiL, , BID  cloBAZam, 20 mg, Daily   And  cloBAZam, 20 mg, QHS  heparin (porcine), 5,000 Units, Q12H  lacosamide, 200 mg, Q12H  levetiracetam, 1,500 mg, BID  metoprolol tartrate, 25 mg, BID  pantoprazole, 40 mg, Daily  perampaneL, 12 mg, Daily  psyllium husk (aspartame), 1 packet, TID  QUEtiapine, 25 mg, QHS  sodium chloride, 2,000 mg, TID  thiamine, 100 mg, Daily    PRNacetaminophen, 650 mg, Q4H PRN  calcium gluconate IVPB, 1 g, PRN  calcium gluconate IVPB, 2 g, PRN  calcium gluconate IVPB, 3 g, PRN  dextrose  10%, 12.5 g, PRN  dextrose 10%, 25 g, PRN  glucagon (human recombinant), 1 mg, PRN  glucose, 16 g, PRN  glucose, 24 g, PRN  hydrALAZINE, 10 mg, Q4H PRN  magnesium oxide, 800 mg, PRN  magnesium oxide, 800 mg, PRN  naloxone, 0.02 mg, PRN  ondansetron, 4 mg, Q8H PRN  potassium bicarbonate, 35 mEq, PRN  potassium bicarbonate, 50 mEq, PRN  potassium bicarbonate, 60 mEq, PRN  potassium, sodium phosphates, 2 packet, PRN  potassium, sodium phosphates, 2 packet, PRN  potassium, sodium phosphates, 2 packet, PRN  sodium chloride 0.9%, 10 mL, Q12H PRN      Today I personally reviewed pertinent medications, lines/drains/airways, imaging, cardiology results, laboratory results, microbiology results,     Diet  No diet orders on file

## 2024-06-22 NOTE — PROCEDURES
ICU EEG/VIDEO MONITORING REPORT    DATE OF SERVICE: 6/21/24-6/22/24  EEG NUMBER: [id number]  [facility]    METHODOLOGY   Electroencephalographic (EEG) recording is with electrodes placed according to the International 10-20 placement system.  Thirty two (32) channels of digital signal are simultaneously recorded from the scalp and may include EKG, EMG, and/or eye monitors.   Recording band pass was 0.1 to 512 hz.  Digital video recording of the patient is simultaneously recorded with the EEG.  The nursing staff report clinical symptoms and may press an event button when the patient has symptoms of clinical interest to the treating physicians.  EEG and video recording is stored and archived in digital format.  The entire recording is visually reviewed and the times identified by computer analysis as being spikes or seizures are reviewed again.  Activation procedures which include photic stimulation, hyperventilation and instructing patients to perform simple task are done in selected patients.   Compresses spectral analysis (CSA) is also performed on the activity recorded from each individual channel.  This is displayed as a power display of frequencies from 0 to 30 Hz over time.   The CSA analysis is done and displayed continuously.  This is reviewed for asymmetries in power between homologous areas of the scalp and for presence of changes in power which canbe seen when seizures occur.  Sections of suspected abnormalities on the CSA is then compared with the original EEG recording.     "[employer] software was also utilized in the review of this study.  This software suite analyzes the EEG recording in multiple domains.  Coherence and rhythmicity is computed to identify EEG sections which may contain organized seizures.  Each channel undergoes analysis to detect presence of spike and sharp waves which have special and morphological characteristic of epileptic activity.  The routine EEG recording  is converted from spacial into frequency domain.  This is then displayed comparing homologous areas to identify areas of significant asymmetry.  Algorithm to identify non-cortically generated artifact is used to separate eye movement, EMG and other artifact from the EEG.      Recording Times  Start on 6/21/24 at 07:00  Stop on 6/22/24 at 07:00  A total of 24 hours of EEG was recorded.    This EEG study is performed in the ICU with the patient on the following sedative medications:none    Indication: 69F initially admitted to OS 5/23 after found unresponsive by family, hospital course c/b hypercapnia requiring intubation, afib RVR, RUE jerking prompting EEG which revealed recurrent focal left hemispheric seizures.  Ultimately the patient required intubation and sedation for status epilepticus.  EEG to evaluate response to treatment.     State of Consciousness:   Lethargy    Background:   The background is composed of diffuse low amplitude theta and delta activity without a posterior dominant rhythm.      Sleep:   Normal sleep architecture is not appreciated.  State changes seen with periods of more prominent delta with spindle like activity.    Epileptiform Abnormalities  Lateralized periodic discharges occur in the left posterior quadrant (occipital/temporal/parietal - max 01/T5/P3) at a fluctuating frequency of 1-2 hz.      EKG:   Sinus rhythm    Seizures/Events:   No clear evolution into electrographic seizures seen in this study    Impression:   Abnormal vEEG consistent with a severe diffuse encephalopathy and a highly epileptogenic focus in the left posterior quadrant.  LPDs occur at a frequency of 1-2 hz placing this activity on the ictal interictal continuum.  No electrographic seizures seen in the study.    Nargis Liriano MD  Neurocritical Care   Neurology-Epilepsy

## 2024-06-22 NOTE — PLAN OF CARE
"[facility] Care Plan    POC reviewed with June Boykin and family at 1400. Pt unable to verbalize understanding. Questions and concerns addressed. No acute events today. Pt progressing toward goals. Assessments ongoing See below and flowsheets for full assessment and VS info.   - Purewick in place  - Tube feeds @ goal          Is this a stroke patient? no    Neuro:  Alix Coma Scale  Best Eye Response: 4-->(E4) spontaneous  Best Motor Response: 5-->(M5) localizes pain  Best Verbal Response: 1-->(V1) none  Alix Coma Scale Score: 10  Assessment Qualifiers: patient intubated  Pupil PERRLA: yes     24 hr Temp:  [96.4 °F (35.8 °C)-99.5 °F (37.5 °C)]     CV:   Rhythm: normal sinus rhythm  BP goals:   SBP < 180  MAP > 65    Resp:      Vent Mode: A/C  Set Rate: 22 BPM  Oxygen Concentration (%): 35  Vt Set: 320 mL  PEEP/CPAP: 5 cmH20    Plan: trach/PEG discussions    GI/:     Diet/Nutrition Received: tube feeding  Last Bowel Movement: 06/22/24  Voiding Characteristics: external catheter    Intake/Output Summary (Last 24 hours) at 6/22/2024 1415  Last data filed at 6/22/2024 1400  Gross per 24 hour   Intake 1745 ml   Output 1850 ml   Net -105 ml     Unmeasured Output  Urine Occurrence: 1  Stool Occurrence: 1  Pad Count: 1    Labs/Accuchecks:  Recent Labs   Lab 06/22/24  0029   WBC 7.31   RBC 4.00   HGB 9.2*   HCT 31.0*         Recent Labs   Lab 06/22/24  0029      K 4.0   CO2 30*      BUN 8   CREATININE 0.5   ALKPHOS 65   ALT 16   AST 27   BILITOT 0.3    No results for input(s): "PROTIME", "INR", "APTT", "HEPANTIXA" in the last 168 hours. No results for input(s): "CPK", "CPKMB", "TROPONINI", "MB" in the last 168 hours.    Electrolytes: N/A - electrolytes WDL  Accuchecks: none    Gtts:      LDA/Wounds:      Nurses Note -- 4 Eyes      Is there altered skin present? yes     Please check the following boxes that apply:   [] LDA Added if Not in Epic (Describe Wound)   [] New Altered Skin Integrity was " Present on Admit and Documented in LDA   [] Wound Image Taken    Wound Care Consulted? Yes    Second RN/Staff Member:  ALEX Dudley      Restraints:   Restraint Order  Length of Order: Order good for next 24 hours or when removed.  Date that the current order will : 24  Time that the current order will : 101  Order Upon Application: Yes

## 2024-06-22 NOTE — ASSESSMENT & PLAN NOTE
Hypothermic to 33.9C; hypotensive to MAP 40 following intubation. Slight improvement w/ fluid resuscitation and use of pressors.     -Pan cultured 6/11  -Sputum gram stain w/ rare gram + cocci  -Blood cultures w/ NGTD  -completed ana/angelita kay w/ goal normothermia  -off Levophed to keep MAP > 65
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
## 2024-06-22 NOTE — ACP (ADVANCE CARE PLANNING)
Advance Care Planning     Date: 06/22/2024    Today a voluntary meeting took place: ICU    Patient Participation: Patient is unable to participate     Attendees (Name and  Relationship to patient):  Jason and wife Agnes -[phone] (m)     Staff attendees (Name and  Role): carmen    ACP Conversation (General): Understanding of current condition mental status improving but not following commands yet moving all extremities, seizures under better control with medications, proceed with tracheostomy and peg, family reviewing facilities and authorizations, confirmed DNR status (full supportive care without compressions nor defib).     Code Status: DNR; status confirmed/order placed in chart         Goals of care: The family endorses that what is most important right now is to focus on spending time at home, avoiding the hospital, remaining as independent as possible, symptom/pain control, quality of life, even if it means sacrificing a little time, and extending life as long as possible, even it it means sacrificing quality    Accordingly, we have decided that the best plan to meet the patient's goals includes continuing with treatment           Length of ACP   conversation in minutes: A total of 30 min was spent on advance care planning, goals of care discussion, emotional support, formulating and communicating prognosis and exploring burden/benefit of various approaches of treatment. This discussion occurred on a fully voluntary basis with the verbal consent of the patient and/or family.
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
## 2024-06-23 ENCOUNTER — ANESTHESIA EVENT (OUTPATIENT)
Dept: SURGERY | Facility: HOSPITAL | Age: 69
End: 2024-06-23
Payer: MEDICARE

## 2024-06-23 NOTE — ASSESSMENT & PLAN NOTE
-A-fib w/ RVR @ OSH requiring diltiazem infusion  -Currently NSR on telemetry  -Continue to hold metoprolol 2/2 current pressor need  -Therapeutic lovenox discontinued 2/2 GI bleed   Continue lopressor 25 mg bid

## 2024-06-23 NOTE — NURSING
Pt places on SBT. Pt RR jumped to 40 and tidal volumes were <200. Pt placed back on a rate. Assessments ongoing.

## 2024-06-23 NOTE — ASSESSMENT & PLAN NOTE
June Boykin is a 69yoF who was found down in late May. Her work-up has been significant for ongoing seizures. She has failed multiple extubations and failed another spontaneous breathing trial today. General surgery consulted for tracheostomy and PEG tube placement.     - patient seen and examined   - on minimal ventilatory settings   - failed SBT today   - no anatomical barrier identified to surgery  - plan for tracheostomy and PEG tube placement tomorrow   - consent to be obtained   - did discuss with Son, Daughter-in-law, to confirm family wishes ([phone])  - please hold tube feeds at midnight  - call with questions
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
## 2024-06-23 NOTE — SUBJECTIVE & OBJECTIVE
S: not following commands    Review of Systems Unable to obtain a complete ROS due to level of consciousness.  Objective:     Vitals:  Temp: 99 °F (37.2 °C)  Pulse: 77  Rhythm: normal sinus rhythm  BP: 134/63  MAP (mmHg): 90  Resp: (!) 23  SpO2: 98 %  Oxygen Concentration (%): 35  Vent Mode: A/C  Set Rate: 22 BPM  Vt Set: 320 mL  Pressure Support: 8 cmH20  PEEP/CPAP: 5 cmH20  Peak Airway Pressure: 27 cmH20  Mean Airway Pressure: 12 cmH20  Plateau Pressure: 16 cmH20    Temp  Min: 98.4 °F (36.9 °C)  Max: 100.8 °F (38.2 °C)  Pulse  Min: 69  Max: 104  BP  Min: 85/58  Max: 179/83  MAP (mmHg)  Min: 63  Max: 119  Resp  Min: 22  Max: 46  SpO2  Min: 95 %  Max: 100 %  Oxygen Concentration (%)  Min: 35  Max: 35    06/22 0701 - 06/23 0700  In: 1215   Out: 1500 [Urine:1500]   Unmeasured Output  Urine Occurrence: 1  Stool Occurrence: 1  Pad Count: 1        Physical Exam  GA: Alert, comfortable, no acute distress.   HEENT: No scleral icterus or JVD.   Pulmonary: Clear to auscultation A/L. Cardiac: RRR S1 & S2 w/o rubs/murmurs/gallops.   Abdominal: Bowel sounds present x 4. No appreciable hepatosplenomegaly.  Skin: No jaundice, rashes, or visible lesions.  Neuro:  --GCS: E3 Vt1 M5  --Mental Status:  opens eyes, doesn't track, doesn't follow commands  --CN II-XII grossly intact.   --Pupils 3mm, PERRL.   --Corneal reflex, gag, cough intact.  --LUE spont  --RUE  no movement  --BLE spont       Medications:  Continuous Scheduledatorvastatin, 40 mg, Daily  balsam peru-castor oiL, , BID  cloBAZam, 20 mg, Daily   And  cloBAZam, 20 mg, QHS  heparin (porcine), 5,000 Units, Q12H  lacosamide, 200 mg, Q12H  levetiracetam, 1,500 mg, BID  metoprolol tartrate, 25 mg, BID  pantoprazole, 40 mg, Daily  perampaneL, 12 mg, Daily  psyllium husk (aspartame), 1 packet, TID  QUEtiapine, 25 mg, QHS  sodium chloride, 2,000 mg, TID  thiamine, 100 mg, Daily    PRNacetaminophen, 650 mg, Q4H PRN  calcium gluconate IVPB, 1 g, PRN  calcium gluconate IVPB, 2 g,  PRN  calcium gluconate IVPB, 3 g, PRN  dextrose 10%, 12.5 g, PRN  dextrose 10%, 25 g, PRN  glucagon (human recombinant), 1 mg, PRN  glucose, 16 g, PRN  glucose, 24 g, PRN  hydrALAZINE, 10 mg, Q4H PRN  magnesium oxide, 800 mg, PRN  magnesium oxide, 800 mg, PRN  naloxone, 0.02 mg, PRN  ondansetron, 4 mg, Q8H PRN  potassium bicarbonate, 35 mEq, PRN  potassium bicarbonate, 50 mEq, PRN  potassium bicarbonate, 60 mEq, PRN  potassium, sodium phosphates, 2 packet, PRN  potassium, sodium phosphates, 2 packet, PRN  potassium, sodium phosphates, 2 packet, PRN  sodium chloride 0.9%, 10 mL, Q12H PRN      Today I personally reviewed pertinent medications, lines/drains/airways, imaging, cardiology results, laboratory results, microbiology results,     Diet  No diet orders on file

## 2024-06-23 NOTE — PLAN OF CARE
"[facility] Care Plan    POC reviewed with June Boykin and family at 0300. Pt verbalized understanding. Questions and concerns addressed. No acute events overnight. Pt progressing toward goals. Will continue to monitor. See below and flowsheets for full assessment and VS info.     Tylenol for 100.8 tmax  K, Mg replaced  Bath complete, linen changed  Intermittently flailing and kicking lower extremities.         Is this a stroke patient? no    Neuro:  Alix Coma Scale  Best Eye Response: 4-->(E4) spontaneous  Best Motor Response: 5-->(M5) localizes pain  Best Verbal Response: 1-->(V1) none  Alix Coma Scale Score: 10  Assessment Qualifiers: patient intubated  Pupil PERRLA: yes     24hr Temp:  [97.7 °F (36.5 °C)-100.8 °F (38.2 °C)]     CV:   Rhythm: normal sinus rhythm  BP goals:   SBP < 180  MAP > 65    Resp:      Vent Mode: A/C  Set Rate: 22 BPM  Oxygen Concentration (%): 35  Vt Set: 320 mL  PEEP/CPAP: 5 cmH20    Plan: trach/PEG discussions    GI/:     Diet/Nutrition Received: tube feeding  Last Bowel Movement: 06/22/24  Voiding Characteristics: external catheter    Intake/Output Summary (Last 24 hours) at 6/23/2024 0636  Last data filed at 6/23/2024 0601  Gross per 24 hour   Intake 1215 ml   Output 1500 ml   Net -285 ml     Unmeasured Output  Urine Occurrence: 1  Stool Occurrence: 1  Pad Count: 1    Labs/Accuchecks:  Recent Labs   Lab 06/23/24  0209   WBC 7.44   RBC 3.57*   HGB 8.1*   HCT 27.7*         Recent Labs   Lab 06/23/24  0209      K 3.5   CO2 23      BUN 8   CREATININE 0.4*   ALKPHOS 52*   ALT 15   AST 24   BILITOT 0.3    No results for input(s): "PROTIME", "INR", "APTT", "HEPANTIXA" in the last 168 hours. No results for input(s): "CPK", "CPKMB", "TROPONINI", "MB" in the last 168 hours.    Electrolytes: Electrolytes replaced  Accuchecks: none    Gtts:      LDA/Wounds:    Nurses Note -- 4 Eyes    Is there altered skin present? no       Prevention Measures Documented    Second " RN/Staff Member:  janet    Restraints:   Restraint Order  Length of Order: Order good for next 24 hours or when removed.  Date that the current order will : 24  Time that the current order will : 101  Order Upon Application: Yes    [facility]     Problem: Adult Inpatient Plan of Care  Goal: Plan of Care Review  Outcome: Progressing  Goal: Patient-Specific Goal (Individualized)  Outcome: Progressing  Goal: Absence of Hospital-Acquired Illness or Injury  Outcome: Progressing  Goal: Optimal Comfort and Wellbeing  Outcome: Progressing  Goal: Readiness for Transition of Care  Outcome: Progressing     Problem: Sepsis/Septic Shock  Goal: Optimal Coping  Outcome: Progressing  Goal: Absence of Bleeding  Outcome: Progressing  Goal: Blood Glucose Level Within Targeted Range  Outcome: Progressing  Goal: Absence of Infection Signs and Symptoms  Outcome: Progressing  Goal: Optimal Nutrition Intake  Outcome: Progressing     Problem: Pneumonia  Goal: Fluid Balance  Outcome: Progressing  Goal: Resolution of Infection Signs and Symptoms  Outcome: Progressing  Goal: Effective Oxygenation and Ventilation  Outcome: Progressing     Problem: Fall Injury Risk  Goal: Absence of Fall and Fall-Related Injury  Outcome: Progressing     Problem: Skin Injury Risk Increased  Goal: Skin Health and Integrity  Outcome: Progressing     Problem: Wound  Goal: Optimal Coping  Outcome: Progressing  Goal: Optimal Functional Ability  Outcome: Progressing  Goal: Absence of Infection Signs and Symptoms  Outcome: Progressing  Goal: Improved Oral Intake  Outcome: Progressing  Goal: Optimal Pain Control and Function  Outcome: Progressing  Goal: Skin Health and Integrity  Outcome: Progressing  Goal: Optimal Wound Healing  Outcome: Progressing     Problem: Infection  Goal: Absence of Infection Signs and Symptoms  Outcome: Progressing     Problem: Mechanical Ventilation Invasive  Goal: Effective Communication  Outcome: Progressing  Goal: Optimal  Device Function  Outcome: Progressing  Goal: Mechanical Ventilation Liberation  Outcome: Progressing  Goal: Optimal Nutrition Delivery  Outcome: Progressing  Goal: Absence of Device-Related Skin and Tissue Injury  Outcome: Progressing  Goal: Absence of Ventilator-Induced Lung Injury  Outcome: Progressing     Problem: Artificial Airway  Goal: Effective Communication  Outcome: Progressing  Goal: Optimal Device Function  Outcome: Progressing  Goal: Absence of Device-Related Skin or Tissue Injury  Outcome: Progressing

## 2024-06-23 NOTE — CONSULTS
[facility] - Neuro Critical Care  General Surgery  Consult Note    Patient Name: June Boykin  MRN: [id number]  Code Status: Partial Code  Admission Date: 6/8/2024  Hospital Length of Stay: 15 days  Attending Physician: Andrea Gutiérrez MD  Primary Care Provider: Mauricio Pierre MD    Patient information was obtained from past medical records.     Inpatient consult to General Surgery  Consult performed by: Mark Cochran MD  Consult ordered by: Christiano Jensen PA-C        Subjective:     Principal Problem: Status epilepticus    History of Present Illness: June Boykin is a 69yoF who was found down unresponsive in late May. She has been admitted since that time with several attempts at extubation with subsequent failure. She has undergone several EEGs which have demonstrated seizure activity. Currently, she remains intubated on minimal ventilatory settings. She is receiving tube feeds, at goal, through an NG tube. She is non-purposeful in her movements is not responsive. General surgery consulted for tracheostomy and PEG tube insertion.     No current facility-administered medications on file prior to encounter.     Current Outpatient Medications on File Prior to Encounter   Medication Sig    albuterol (PROVENTIL/VENTOLIN HFA) 90 mcg/actuation inhaler Inhale into the lungs.    amoxicillin-clavulanate 875-125mg (AUGMENTIN) 875-125 mg per tablet Take 1 tablet by mouth every 12 (twelve) hours.    amoxicillin-clavulanate 875-125mg (AUGMENTIN) 875-125 mg per tablet Take 1 tablet by mouth 2 (two) times daily.    aspirin 81 MG Chew Take 81 mg by mouth once daily. Stop 05/20/2024 per lucero Dominguez office    budesonide-formoterol 160-4.5 mcg (SYMBICORT) 160-4.5 mcg/actuation HFAA 2 puffs Inhalation Twice a day    diazePAM (VALIUM) 10 MG Tab Take 10 mg by mouth every 6 (six) hours as needed.    docusate sodium (COLACE) 100 MG capsule Take 100 mg by mouth 2 (two) times daily.    fluticasone  propionate (FLONASE) 50 mcg/actuation nasal spray     lisinopriL-hydrochlorothiazide (PRINZIDE,ZESTORETIC) 20-25 mg Tab     potassium chloride (MICRO-K) 10 MEQ CpSR Take 10 mEq by mouth once.    pravastatin (PRAVACHOL) 20 MG tablet     QUEtiapine (SEROQUEL) 50 MG tablet Take 1 tablet (50 mg total) by mouth every evening.    QUEtiapine (SEROQUEL) 50 MG tablet Take 1 tablet (50 mg total) by mouth nightly.       Review of patient's allergies indicates:  No Known Allergies    Past Medical History:   Diagnosis Date    Abdominal pain 05/2024    Acid reflux 05/2024    ACP (advance care planning) 6/1/2024    Anxiety disorder, unspecified     At high risk for falls     Emphysema, unspecified     Focal seizures 6/7/2024    History of opioid abuse     Hypertension     On home oxygen therapy     2l/nc    Wears partial dentures     upper-none on lower    Weight loss 05/2024     Past Surgical History:   Procedure Laterality Date    ANKLE SURGERY Left     HYSTERECTOMY      TONSILLECTOMY       Family History       Problem Relation (Age of Onset)    Diabetes Father    No Known Problems Mother, Brother, Brother, Brother    Pancreatic cancer Father    Parkinsonism Sister    Stroke Father          Tobacco Use    Smoking status: Former     Current packs/day: 1.00     Types: Cigarettes    Smokeless tobacco: Never    Tobacco comments:     Quit 2 years ago   Substance and Sexual Activity    Alcohol use: No    Drug use: Not Currently     Types: Oxycodone, Hydrocodone    Sexual activity: Not on file     Comment:      Review of Systems   Unable to perform ROS: Intubated     Objective:     Vital Signs (Most Recent):  Temp: 99.7 °F (37.6 °C) (06/23/24 1701)  Pulse: 87 (06/23/24 1701)  Resp: (!) 36 (06/23/24 1701)  BP: (!) 175/76 (06/23/24 1701)  SpO2: 97 % (06/23/24 1701) Vital Signs (24h Range):  Temp:  [98.4 °F (36.9 °C)-100.8 °F (38.2 °C)] 99.7 °F (37.6 °C)  Pulse:  [] 87  Resp:  [22-37] 36  SpO2:  [95 %-100 %] 97 %  BP:  ()/(50-83) 175/76     Weight: 47 kg (103 lb 9.9 oz)  Body mass index is 21.66 kg/m².     Physical Exam  Vitals and nursing note reviewed.   Constitutional:       Appearance: She is ill-appearing.   HENT:      Head: Normocephalic and atraumatic.      Nose:      Comments: NG tube in place with tube feeds running.   Cardiovascular:      Rate and Rhythm: Normal rate and regular rhythm.   Pulmonary:      Comments: Intubated  Minimal ventilatory settings  Abdominal:      General: Abdomen is flat. There is no distension.      Palpations: Abdomen is soft.      Tenderness: There is no abdominal tenderness.      Comments: No previous intra-abdominal surgical scars noted   Neurological:      Comments: Non-responsive, non-purposeful movements of the upper and lower extremities            I have reviewed all pertinent lab results within the past 24 hours.  CBC:   Recent Labs   Lab 06/23/24  0209   WBC 7.44   RBC 3.57*   HGB 8.1*   HCT 27.7*      MCV 78*   MCH 22.7*   MCHC 29.2*     CMP:   Recent Labs   Lab 06/23/24  0209   GLU 91   CALCIUM 7.6*   ALBUMIN 2.0*   PROT 5.6*      K 3.5   CO2 23      BUN 8   CREATININE 0.4*   ALKPHOS 52*   ALT 15   AST 24   BILITOT 0.3       Significant Diagnostics:  I have reviewed all pertinent imaging results/findings within the past 24 hours.    Assessment/Plan:     Focal seizures  June Boykin is a 69yoF who was found down in late May. Her work-up has been significant for ongoing seizures. She has failed multiple extubations and failed another spontaneous breathing trial today. General surgery consulted for tracheostomy and PEG tube placement.     - patient seen and examined   - on minimal ventilatory settings   - failed SBT today   - no anatomical barrier identified to surgery  - plan for tracheostomy and PEG tube placement tomorrow   - consent to be obtained   - did discuss with Son, Daughter-in-law, to confirm family wishes ([phone])  - please hold tube feeds  at midnight  - call with questions      VTE Risk Mitigation (From admission, onward)           Ordered     heparin (porcine) injection 5,000 Units  Every 12 hours         06/13/24 1012     IP VTE HIGH RISK PATIENT  Once         06/08/24 1629     Place sequential compression device  Until discontinued         06/08/24 1629                    Thank you for your consult. I will follow-up with patient. Please contact us if you have any additional questions.    Mark Cochran MD  General Surgery  [facility] - Neuro Critical Care

## 2024-06-23 NOTE — ANESTHESIA PREPROCEDURE EVALUATION
[facility]  Anesthesia Pre-Operative Evaluation         Patient Name: June Boykin  YOB: 1955  MRN: [id number]    SUBJECTIVE:     Pre-operative evaluation for Procedure(s) (LRB):  CREATION, TRACHEOSTOMY (N/A)  EGD, WITH PEG TUBE INSERTION (N/A)  Scheduled for 6/24/2024    HPI 06/23/2024:  June Boykin is a 69 y.o. female with hx of HTN, pAF, COPD, seizures, and schizoaffective disorder found down and has had significant workup for seizures with titration of AEDs, most recent EEG negative for seizures. However, she continues to fail SBTs and now presents for above procedure.    E-consent signed with son Jason over the phone.    Prev airway:   Placed 6/11 via glidescope in NCC; 7.5 oral ETT    Oxygen/Ventilation Requirements:    Vent Mode: A/C  Oxygen Concentration (%):  [35] 35  Resp Rate Total:  [22 br/min-36 br/min] 26 br/min  Vt Set:  [320 mL] 320 mL  PEEP/CPAP:  [5 cmH20] 5 cmH20  Pressure Support:  [5 cmH20-8 cmH20] 8 cmH20  Mean Airway Pressure:  [7.1 cyF09-82 cmH20] 12 cmH20        Patient Active Problem List   Diagnosis    Moderate recurrent major depression    Paranoid schizophrenia    Schizoaffective disorder    Acute cystitis    Other hyperlipidemia    Hypotension due to drugs    Acute hypercapnic respiratory failure    Sepsis with acute renal failure and tubular necrosis without septic shock    Polypharmacy    Encephalopathy, metabolic    Encephalopathy, toxic    Pneumonia    COPD exacerbation    Hypertension    ACP (advance care planning)    Paroxysmal atrial fibrillation    AMS (altered mental status)    Monoplegia    Focal seizures    Moderate malnutrition    Hyperkalemia    Hyponatremia    Reduced mobility    SIRS (systemic inflammatory response syndrome)    GI bleed    Status epilepticus    Hypothermia    Suspected deep tissue injury       Review of patient's allergies indicates:  No Known Allergies    Outpatient Medications:  No current  facility-administered medications on file prior to encounter.     Current Outpatient Medications on File Prior to Encounter   Medication Sig Dispense Refill    albuterol (PROVENTIL/VENTOLIN HFA) 90 mcg/actuation inhaler Inhale into the lungs.      amoxicillin-clavulanate 875-125mg (AUGMENTIN) 875-125 mg per tablet Take 1 tablet by mouth every 12 (twelve) hours. 7 tablet 0    amoxicillin-clavulanate 875-125mg (AUGMENTIN) 875-125 mg per tablet Take 1 tablet by mouth 2 (two) times daily. 9 tablet 0    aspirin 81 MG Chew Take 81 mg by mouth once daily. Stop 05/20/2024 per kathleen@ Dr. Dominguez office      budesonide-formoterol 160-4.5 mcg (SYMBICORT) 160-4.5 mcg/actuation HFAA 2 puffs Inhalation Twice a day      diazePAM (VALIUM) 10 MG Tab Take 10 mg by mouth every 6 (six) hours as needed.      docusate sodium (COLACE) 100 MG capsule Take 100 mg by mouth 2 (two) times daily.      fluticasone propionate (FLONASE) 50 mcg/actuation nasal spray       lisinopriL-hydrochlorothiazide (PRINZIDE,ZESTORETIC) 20-25 mg Tab       potassium chloride (MICRO-K) 10 MEQ CpSR Take 10 mEq by mouth once.      pravastatin (PRAVACHOL) 20 MG tablet       QUEtiapine (SEROQUEL) 50 MG tablet Take 1 tablet (50 mg total) by mouth every evening. 30 tablet 0    QUEtiapine (SEROQUEL) 50 MG tablet Take 1 tablet (50 mg total) by mouth nightly. 30 tablet 0        Current Inpatient Medications:   atorvastatin  40 mg Per NG tube Daily    balsam peru-castor oiL   Topical (Top) BID    cloBAZam  20 mg Per NG tube Daily    And    cloBAZam  20 mg Per NG tube QHS    heparin (porcine)  5,000 Units Subcutaneous Q12H    lacosamide  200 mg Per NG tube Q12H    levetiracetam  1,500 mg Per NG tube BID    metoprolol tartrate  25 mg Per NG tube BID    pantoprazole  40 mg Per NG tube Daily    perampaneL  12 mg Per NG tube Daily    psyllium husk (aspartame)  1 packet Per NG tube TID    QUEtiapine  25 mg Per NG tube QHS    sodium chloride  2,000 mg Per NG tube TID     thiamine  100 mg Per NG tube Daily       Past Surgical History:   Procedure Laterality Date    ANKLE SURGERY Left     HYSTERECTOMY      TONSILLECTOMY         Social History     Socioeconomic History    Marital status:    Tobacco Use    Smoking status: Former     Current packs/day: 1.00     Types: Cigarettes    Smokeless tobacco: Never    Tobacco comments:     Quit 2 years ago   Substance and Sexual Activity    Alcohol use: No    Drug use: Not Currently     Types: Oxycodone, Hydrocodone     Social Determinants of Health     Financial Resource Strain: Patient Unable To Answer (6/10/2024)    Overall Financial Resource Strain (CARDIA)     Difficulty of Paying Living Expenses: Patient unable to answer   Food Insecurity: Patient Unable To Answer (6/10/2024)    Hunger Vital Sign     Worried About Running Out of Food in the Last Year: Patient unable to answer     Ran Out of Food in the Last Year: Patient unable to answer   Transportation Needs: Patient Unable To Answer (6/10/2024)    TRANSPORTATION NEEDS     Transportation : Patient unable to answer   Physical Activity: Patient Unable To Answer (6/10/2024)    Exercise Vital Sign     Days of Exercise per Week: Patient unable to answer     Minutes of Exercise per Session: Patient unable to answer   Stress: Patient Unable To Answer (6/10/2024)    [employer] [place] [employer] - Occupational Stress Questionnaire     Feeling of Stress : Patient unable to answer   Housing Stability: Patient Unable To Answer (6/10/2024)    Housing Stability Vital Sign     Unable to Pay for Housing in the Last Year: Patient unable to answer     Homeless in the Last Year: Patient unable to answer       OBJECTIVE:     Weight:  Wt Readings from Last 1 Encounters:   06/17/24 47 kg (103 lb 9.9 oz)     Body mass index is 21.66 kg/m².    Recent Blood Pressure Readings:  BP Readings from Last 3 Encounters:   06/23/24 (!) 155/69   06/08/24 (!) 153/74   05/31/24 136/88       Vital Signs  Range (Last 24H):  Temp:  [36.9 °C (98.4 °F)-38.2 °C (100.8 °F)]   Pulse:  []   Resp:  [22-37]   BP: ()/(50-83)   SpO2:  [95 %-100 %]       CBC:   Lab Results   Component Value Date    WBC 7.44 06/23/2024    HGB 8.1 (L) 06/23/2024    HCT 27.7 (L) 06/23/2024    MCV 78 (L) 06/23/2024     06/23/2024       CMP:     Chemistry        Component Value Date/Time     06/23/2024 0209    K 3.5 06/23/2024 0209     06/23/2024 0209    CO2 23 06/23/2024 0209    BUN 8 06/23/2024 0209    CREATININE 0.4 (L) 06/23/2024 0209    GLU 91 06/23/2024 0209        Component Value Date/Time    CALCIUM 7.6 (L) 06/23/2024 0209    ALKPHOS 52 (L) 06/23/2024 0209    AST 24 06/23/2024 0209    ALT 15 06/23/2024 0209    BILITOT 0.3 06/23/2024 0209            INR:  Lab Results   Component Value Date    INR 1.3 (H) 05/31/2024    INR 1.1 05/30/2024    INR 1.1 05/21/2024       Diagnostic Studies:      EKG:     Results for orders placed or performed during the hospital encounter of 06/08/24   EKG 12-lead    Collection Time: 06/22/24 12:14 AM   Result Value Ref Range    QRS Duration 80 ms    [facility] QTC Calculation 428 ms    Narrative    Test Reason : Z91.89,    Vent. Rate : 075 BPM     Atrial Rate : 075 BPM     P-R Int : 152 ms          QRS Dur : 080 ms      QT Int : 384 ms       P-R-T Axes : 087 067 058 degrees     QTc Int : 428 ms    Normal sinus rhythm  Normal ECG  When compared with ECG of 15-TRINIDAD-2024 03:24,  Nonspecific T wave abnormality, improved in Anterior-lateral leads  Confirmed by Reno RODRIGUES MD ([id number]) on 6/22/2024 10:37:19 AM    Referred By: DESIREE EASLEY           Confirmed By:Reno RODRIGUES MD       2D Echo:    No results found for this or any previous visit.    Results for orders placed or performed during the hospital encounter of 05/31/24   Echo   Result Value Ref Range    BSA 1.34 m2    Mijares's Biplane MOD Ejection Fraction 70 %    LVOT stroke volume 52.08 cm3    LVIDd 3.50 3.5 - 6.0 cm    LV Systolic Volume 23.07  "mL    LV Systolic Volume Index 17.2 mL/m2    LVIDs 2.53 2.1 - 4.0 cm    LV Diastolic Volume 50.82 mL    LV Diastolic Volume Index 37.93 mL/m2    IVS 1.04 0.6 - 1.1 cm    LVOT diameter 2.03 cm    LVOT area 3.2 cm2    FS 28 28 - 44 %    Left Ventricle Relative Wall Thickness 0.64 cm    Posterior Wall 1.12 (A) 0.6 - 1.1 cm    LV mass 115.79 g    LV Mass Index 86 g/m2    MV Peak E Danny 0.54 m/s    TDI LATERAL 0.04 m/s    TDI SEPTAL 0.03 m/s    E/E' ratio 15.43 m/s    MV Peak A Danny 0.86 m/s    TR Max Danny 3.11 m/s    E/A ratio 0.63     E wave deceleration time 305.72 msec    MV "A" wave duration [id number] msec    LV SEPTAL E/E' RATIO 18.00 m/s    LV LATERAL E/E' RATIO 13.50 m/s    LVOT peak danny 0.79 m/s    Left Ventricular Outflow Tract Mean Velocity 0.58 cm/s    Left Ventricular Outflow Tract Mean Gradient 1.47 mmHg    RV S' 13.03 cm/s    TAPSE 1.60 cm    LA size 2.68 cm    Left Atrium Minor Axis 4.35 cm    Left Atrium Major Axis 4.53 cm    LA volume (mod) 23.41 cm3    LA Volume Index (Mod) 17.5 mL/m2    RA Major Axis 4.27 cm    RA Width 3.46 cm    AV mean gradient 4 mmHg    AV peak gradient 7 mmHg    Ao peak danny 1.28 m/s    Ao VTI 26.10 cm    LVOT peak VTI 16.10 cm    AV valve area 2.00 cm²    AV Velocity Ratio 0.62     AV index (prosthetic) 0.62     BLAIRE by Velocity Ratio 2.00 cm²    MV mean gradient 1 mmHg    MV peak gradient 3 mmHg    MV stenosis pressure 1/2 time 88.66 ms    MV valve area p 1/2 method 2.48 cm2    MV valve area by continuity eq 2.83 cm2    MV VTI 18.4 cm    Triscuspid Valve Regurgitation Peak Gradient 39 mmHg    PV PEAK VELOCITY 1.09 m/s    PV peak gradient 5 mmHg    Pulmonary Valve Mean Velocity 0.74 m/s    Sinus 3.00 cm    STJ 2.74 cm    Ascending aorta 2.90 cm    IVC diameter 1.49 cm    Mean e' 0.04 m/s    ZLVIDS -0.43     ZLVIDD -2.07     LA Volume Index 25.7 mL/m2    LA volume 34.37 cm3    LA WIDTH 3.4 cm    TV resting pulmonary artery pressure 42 mmHg    RV TB RVSP 6 mmHg    Est. RA " pres 3 mmHg    Narrative      Left Ventricle: The left ventricle is normal in size. Normal wall   thickness. There is concentric remodeling. There is normal systolic   function with a visually estimated ejection fraction of 55 - 60%.    Right Ventricle: Normal right ventricular cavity size. Wall thickness   is normal. Systolic function is normal.    Aortic Valve: There is moderate aortic valve sclerosis.    Mitral Valve: There is mild regurgitation.    Tricuspid Valve: There is mild regurgitation.    Pulmonary Artery: The estimated pulmonary artery systolic pressure is   42 mmHg.    IVC/SVC: Normal venous pressure at 3 mmHg.         No results found for this or any previous visit.      ASSESSMENT/PLAN:           Pre-op Assessment    I have reviewed the Patient Summary Reports.       I have reviewed the Medications.     Review of Systems  Anesthesia Hx:  No problems with previous Anesthesia                Social:  Former Smoker       Cardiovascular:     Hypertension    Denies CABG/stent. Dysrhythmias atrial fibrillation   Denies CHF.                                 Pulmonary:   COPD                     Hepatic/GI:     GERD             Neurological:    Denies CVA.    Seizures                                Endocrine:  Denies Diabetes.         Denies Obesity / BMI > 30  Psych:  Psychiatric History                  Physical Exam  General: Well nourished, Cooperative, Alert and Oriented    Airway:  Mallampati: unable to assess   Pre-Existing Airway: Oral Endotracheal tube    Dental:  Partial Dentures    Chest/Lungs:  Mechanical ventilation  Heart:  Rate: Normal  Rhythm: Regular Rhythm        Anesthesia Plan  Type of Anesthesia, risks & benefits discussed:    Anesthesia Type: Gen ETT  Intra-op Monitoring Plan: Standard ASA Monitors  Post Op Pain Control Plan: multimodal analgesia  Induction:  IV and Inhalation  ASA Score: 4  Day of Surgery Review of History & Physical: H&P Update referred to the surgeon/provider.    Ready  For Surgery From Anesthesia Perspective.     .
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
## 2024-06-23 NOTE — ASSESSMENT & PLAN NOTE
-Intubated 2/2 hypoxemia + hypercapnia     -Current vent settings below  Vent Mode: A/C  Oxygen Concentration (%):  [35] 35  Resp Rate Total:  [22 br/min-36 br/min] 22 br/min  Vt Set:  [320 mL] 320 mL  PEEP/CPAP:  [5 cmH20] 5 cmH20  Pressure Support:  [5 cmH20-8 cmH20] 8 cmH20  Mean Airway Pressure:  [7.1 afB33-47 cmH20] 12 cmH20    -Famotidine BID for ulcer PPX  -VAP prevention per protocol  -CXR/ABGs daily  6/20/2024 Failed SBT  6/22, 6/23 : failed pst, apnea
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
## 2024-06-23 NOTE — HPI
June Boykin is a 69yoF who was found down unresponsive in late May. She has been admitted since that time with several attempts at extubation with subsequent failure. She has undergone several EEGs which have demonstrated seizure activity. Currently, she remains intubated on minimal ventilatory settings. She is receiving tube feeds, at goal, through an NG tube. She is non-purposeful in her movements is not responsive. General surgery consulted for tracheostomy and PEG tube insertion.

## 2024-06-23 NOTE — SUBJECTIVE & OBJECTIVE
No current facility-administered medications on file prior to encounter.     Current Outpatient Medications on File Prior to Encounter   Medication Sig    albuterol (PROVENTIL/VENTOLIN HFA) 90 mcg/actuation inhaler Inhale into the lungs.    amoxicillin-clavulanate 875-125mg (AUGMENTIN) 875-125 mg per tablet Take 1 tablet by mouth every 12 (twelve) hours.    amoxicillin-clavulanate 875-125mg (AUGMENTIN) 875-125 mg per tablet Take 1 tablet by mouth 2 (two) times daily.    aspirin 81 MG Chew Take 81 mg by mouth once daily. Stop 05/20/2024 per [facility] office    budesonide-formoterol 160-4.5 mcg (SYMBICORT) 160-4.5 mcg/actuation HFAA 2 puffs Inhalation Twice a day    diazePAM (VALIUM) 10 MG Tab Take 10 mg by mouth every 6 (six) hours as needed.    docusate sodium (COLACE) 100 MG capsule Take 100 mg by mouth 2 (two) times daily.    fluticasone propionate (FLONASE) 50 mcg/actuation nasal spray     lisinopriL-hydrochlorothiazide (PRINZIDE,ZESTORETIC) 20-25 mg Tab     potassium chloride (MICRO-K) 10 MEQ CpSR Take 10 mEq by mouth once.    pravastatin (PRAVACHOL) 20 MG tablet     QUEtiapine (SEROQUEL) 50 MG tablet Take 1 tablet (50 mg total) by mouth every evening.    QUEtiapine (SEROQUEL) 50 MG tablet Take 1 tablet (50 mg total) by mouth nightly.       Review of patient's allergies indicates:  No Known Allergies    Past Medical History:   Diagnosis Date    Abdominal pain 05/2024    Acid reflux 05/2024    ACP (advance care planning) 6/1/2024    Anxiety disorder, unspecified     At high risk for falls     Emphysema, unspecified     Focal seizures 6/7/2024    History of opioid abuse     Hypertension     On home oxygen therapy     2l/nc    Wears partial dentures     upper-none on lower    Weight loss 05/2024     Past Surgical History:   Procedure Laterality Date    ANKLE SURGERY Left     HYSTERECTOMY      TONSILLECTOMY       Family History       Problem Relation (Age of Onset)    Diabetes Father    No Known Problems  Mother, Brother, Brother, Brother    Pancreatic cancer Father    Parkinsonism Sister    Stroke Father          Tobacco Use    Smoking status: Former     Current packs/day: 1.00     Types: Cigarettes    Smokeless tobacco: Never    Tobacco comments:     Quit 2 years ago   Substance and Sexual Activity    Alcohol use: No    Drug use: Not Currently     Types: Oxycodone, Hydrocodone    Sexual activity: Not on file     Comment:      Review of Systems   Unable to perform ROS: Intubated     Objective:     Vital Signs (Most Recent):  Temp: 99.7 °F (37.6 °C) (06/23/24 1701)  Pulse: 87 (06/23/24 1701)  Resp: (!) 36 (06/23/24 1701)  BP: (!) 175/76 (06/23/24 1701)  SpO2: 97 % (06/23/24 1701) Vital Signs (24h Range):  Temp:  [98.4 °F (36.9 °C)-100.8 °F (38.2 °C)] 99.7 °F (37.6 °C)  Pulse:  [] 87  Resp:  [22-37] 36  SpO2:  [95 %-100 %] 97 %  BP: ()/(50-83) 175/76     Weight: 47 kg (103 lb 9.9 oz)  Body mass index is 21.66 kg/m².     Physical Exam  Vitals and nursing note reviewed.   Constitutional:       Appearance: She is ill-appearing.   HENT:      Head: Normocephalic and atraumatic.      Nose:      Comments: NG tube in place with tube feeds running.   Cardiovascular:      Rate and Rhythm: Normal rate and regular rhythm.   Pulmonary:      Comments: Intubated  Minimal ventilatory settings  Abdominal:      General: Abdomen is flat. There is no distension.      Palpations: Abdomen is soft.      Tenderness: There is no abdominal tenderness.      Comments: No previous intra-abdominal surgical scars noted   Neurological:      Comments: Non-responsive, non-purposeful movements of the upper and lower extremities            I have reviewed all pertinent lab results within the past 24 hours.  CBC:   Recent Labs   Lab 06/23/24  0209   WBC 7.44   RBC 3.57*   HGB 8.1*   HCT 27.7*      MCV 78*   MCH 22.7*   MCHC 29.2*     CMP:   Recent Labs   Lab 06/23/24  0209   GLU 91   CALCIUM 7.6*   ALBUMIN 2.0*   PROT 5.6*   NA  140   K 3.5   CO2 23      BUN 8   CREATININE 0.4*   ALKPHOS 52*   ALT 15   AST 24   BILITOT 0.3       Significant Diagnostics:  I have reviewed all pertinent imaging results/findings within the past 24 hours.

## 2024-06-23 NOTE — PROCEDURES
ICU EEG/VIDEO MONITORING REPORT    DATE OF SERVICE: 6/22/24-6/23/24  EEG NUMBER:  [id number]  [facility]    METHODOLOGY   Electroencephalographic (EEG) recording is with electrodes placed according to the International 10-20 placement system.  Thirty two (32) channels of digital signal are simultaneously recorded from the scalp and may include EKG, EMG, and/or eye monitors.   Recording band pass was 0.1 to 512 hz.  Digital video recording of the patient is simultaneously recorded with the EEG.  The nursing staff report clinical symptoms and may press an event button when the patient has symptoms of clinical interest to the treating physicians.  EEG and video recording is stored and archived in digital format.  The entire recording is visually reviewed and the times identified by computer analysis as being spikes or seizures are reviewed again.  Activation procedures which include photic stimulation, hyperventilation and instructing patients to perform simple task are done in selected patients.   Compresses spectral analysis (CSA) is also performed on the activity recorded from each individual channel.  This is displayed as a power display of frequencies from 0 to 30 Hz over time.   The CSA analysis is done and displayed continuously.  This is reviewed for asymmetries in power between homologous areas of the scalp and for presence of changes in power which canbe seen when seizures occur.  Sections of suspected abnormalities on the CSA is then compared with the original EEG recording.     VERTILAS software was also utilized in the review of this study.  This software suite analyzes the EEG recording in multiple domains.  Coherence and rhythmicity is computed to identify EEG sections which may contain organized seizures.  Each channel undergoes analysis to detect presence of spike and sharp waves which have special and morphological characteristic of epileptic activity.  The routine EEG  recording is converted from spacial into frequency domain.  This is then displayed comparing homologous areas to identify areas of significant asymmetry.  Algorithm to identify non-cortically generated artifact is used to separate eye movement, EMG and other artifact from the EEG.      Recording Times  Start on 6/22/24 at 07:00 running for 24 hrs  Start on 6/23/24 at 0700 running for 3 hrs and 16 min    This EEG study is performed in the ICU with the patient on the following sedative medications:none    Indication: 69F initially admitted to [facility] 5/23 after found unresponsive by family, hospital course c/b hypercapnia requiring intubation, afib RVR, RUE jerking prompting EEG which revealed recurrent focal left hemispheric seizures.  Ultimately the patient required intubation and sedation for status epilepticus.  EEG to evaluate response to treatment.     State of Consciousness:   Lethargy    Background:   The background is composed of diffuse low amplitude theta/alpha and delta activity with a poorly sustained posterior dominant rhythm reaching 7 hz.     Sleep:   Normal sleep architecture is not appreciated.  State changes seen with periods of more prominent delta with spindle like activity. Period of cyclic alternating pattern of encephalopathy noted.     Epileptiform Abnormalities  Lateralized periodic discharges occur in the left posterior quadrant (occipital/temporal/parietal - max 01/T5/P3) at a fluctuating frequency of 1-2 hz.      EKG:   Sinus rhythm    Seizures/Events:   No clear evolution into electrographic seizures seen in this study    Impression:   Abnormal vEEG consistent with a moderate diffuse encephalopathy and a highly epileptogenic focus in the left posterior quadrant.  LPDs occur at a frequency of 1-2 hz placing this activity on the ictal interictal continuum.  No electrographic seizures seen in the study. There is some improvement in background compared to prior studies.     Nargis Liriano,  MD  Neurocritical Care   Neurology-Epilepsy

## 2024-06-24 ENCOUNTER — ANESTHESIA (OUTPATIENT)
Dept: SURGERY | Facility: HOSPITAL | Age: 69
End: 2024-06-24
Payer: MEDICARE

## 2024-06-24 PROCEDURE — 25000003 PHARM REV CODE 250: Performed by: NURSE ANESTHETIST, CERTIFIED REGISTERED

## 2024-06-24 PROCEDURE — 63600175 PHARM REV CODE 636 W HCPCS: Performed by: NURSE ANESTHETIST, CERTIFIED REGISTERED

## 2024-06-24 RX ORDER — DEXMEDETOMIDINE HYDROCHLORIDE 100 UG/ML
INJECTION, SOLUTION INTRAVENOUS
Status: DISCONTINUED | OUTPATIENT
Start: 2024-06-24 | End: 2024-06-24

## 2024-06-24 RX ORDER — CEFAZOLIN SODIUM 1 G/3ML
INJECTION, POWDER, FOR SOLUTION INTRAMUSCULAR; INTRAVENOUS
Status: DISCONTINUED | OUTPATIENT
Start: 2024-06-24 | End: 2024-06-24

## 2024-06-24 RX ORDER — MIDAZOLAM HYDROCHLORIDE 1 MG/ML
INJECTION INTRAMUSCULAR; INTRAVENOUS
Status: DISCONTINUED | OUTPATIENT
Start: 2024-06-24 | End: 2024-06-24

## 2024-06-24 RX ORDER — PHENYLEPHRINE HYDROCHLORIDE 10 MG/ML
INJECTION INTRAVENOUS
Status: DISCONTINUED | OUTPATIENT
Start: 2024-06-24 | End: 2024-06-24

## 2024-06-24 RX ORDER — ROCURONIUM BROMIDE 10 MG/ML
INJECTION, SOLUTION INTRAVENOUS
Status: DISCONTINUED | OUTPATIENT
Start: 2024-06-24 | End: 2024-06-24

## 2024-06-24 RX ORDER — FENTANYL CITRATE 50 UG/ML
INJECTION, SOLUTION INTRAMUSCULAR; INTRAVENOUS
Status: DISCONTINUED | OUTPATIENT
Start: 2024-06-24 | End: 2024-06-24

## 2024-06-24 RX ADMIN — MIDAZOLAM HYDROCHLORIDE 1 MG: 2 INJECTION, SOLUTION INTRAMUSCULAR; INTRAVENOUS at 07:06

## 2024-06-24 RX ADMIN — PHENYLEPHRINE HYDROCHLORIDE 200 MCG: 10 INJECTION INTRAVENOUS at 07:06

## 2024-06-24 RX ADMIN — FENTANYL CITRATE 50 MCG: 50 INJECTION, SOLUTION INTRAMUSCULAR; INTRAVENOUS at 07:06

## 2024-06-24 RX ADMIN — FENTANYL CITRATE 25 MCG: 50 INJECTION, SOLUTION INTRAMUSCULAR; INTRAVENOUS at 08:06

## 2024-06-24 RX ADMIN — SODIUM CHLORIDE, SODIUM GLUCONATE, SODIUM ACETATE, POTASSIUM CHLORIDE, MAGNESIUM CHLORIDE, SODIUM PHOSPHATE, DIBASIC, AND POTASSIUM PHOSPHATE: .53; .5; .37; .037; .03; .012; .00082 INJECTION, SOLUTION INTRAVENOUS at 07:06

## 2024-06-24 RX ADMIN — DEXMEDETOMIDINE 8 MCG: 100 INJECTION, SOLUTION, CONCENTRATE INTRAVENOUS at 07:06

## 2024-06-24 RX ADMIN — ROCURONIUM BROMIDE 20 MG: 10 INJECTION, SOLUTION INTRAVENOUS at 08:06

## 2024-06-24 RX ADMIN — PHENYLEPHRINE HYDROCHLORIDE 100 MCG: 10 INJECTION INTRAVENOUS at 07:06

## 2024-06-24 RX ADMIN — ROCURONIUM BROMIDE 30 MG: 10 INJECTION, SOLUTION INTRAVENOUS at 07:06

## 2024-06-24 RX ADMIN — SUGAMMADEX 200 MG: 100 INJECTION, SOLUTION INTRAVENOUS at 09:06

## 2024-06-24 RX ADMIN — PHENYLEPHRINE HYDROCHLORIDE 100 MCG: 10 INJECTION INTRAVENOUS at 08:06

## 2024-06-24 RX ADMIN — FENTANYL CITRATE 25 MCG: 50 INJECTION, SOLUTION INTRAMUSCULAR; INTRAVENOUS at 09:06

## 2024-06-24 RX ADMIN — CEFAZOLIN 2 G: 330 INJECTION, POWDER, FOR SOLUTION INTRAMUSCULAR; INTRAVENOUS at 07:06

## 2024-06-24 NOTE — PLAN OF CARE
"[facility] Care Plan    POC reviewed with June Boykin and family at 0300. Pt verbalized understanding. Questions and concerns addressed. No acute events overnight. Pt progressing toward goals. Will continue to monitor. See below and flowsheets for full assessment and VS info.     2 PIVs placed  Old Ivs removed  Bath complete, linen changed  TF held at MN for trach/peg today  Assisted anesthesia with getting consent  Patient globally aphasic, kicking and flailing with legs.   Blood pressure labile. 1L bolus x1 for maps in the 50s. Later in shift systolic blood pressure in the 160s,170s.      Is this a stroke patient? no    Neuro:  [place] Coma Scale  Best Eye Response: 4-->(E4) spontaneous  Best Motor Response: 5-->(M5) localizes pain  Best Verbal Response: 1-->(V1) none  [place] Coma Scale Score: 10  Assessment Qualifiers: patient intubated  Pupil PERRLA: yes     24hr Temp:  [97.9 °F (36.6 °C)-100.6 °F (38.1 °C)]     CV:   Rhythm: normal sinus rhythm  BP goals:   SBP < 180  MAP > 65    Resp:      Vent Mode: A/C  Set Rate: 22 BPM  Oxygen Concentration (%): 35  Vt Set: 320 mL  PEEP/CPAP: 5 cmH20  Pressure Support: 8 cmH20    Plan: trach/PEG discussions    GI/:     Diet/Nutrition Received: NPO, tube feeding  Last Bowel Movement: 06/23/24  Voiding Characteristics: external catheter    Intake/Output Summary (Last 24 hours) at 6/24/2024 0541  Last data filed at 6/24/2024 0533  Gross per 24 hour   Intake 765 ml   Output 1400 ml   Net -635 ml     Unmeasured Output  Urine Occurrence: 1  Stool Occurrence: 1  Pad Count: 1    Labs/Accuchecks:  Recent Labs   Lab 06/24/24  0029   WBC 6.83   RBC 3.49*   HGB 8.3*   HCT 27.2*         Recent Labs   Lab 06/24/24  0029      K 3.8   CO2 23      BUN 10   CREATININE 0.5   ALKPHOS 60   ALT 15   AST 24   BILITOT 0.3    No results for input(s): "PROTIME", "INR", "APTT", "HEPANTIXA" in the last 168 hours. No results for input(s): "CPK", "CPKMB", "TROPONINI", "MB" in " the last 168 hours.    Electrolytes: Electrolytes replaced  Accuchecks: none    Gtts:      LDA/Wounds:    Nurses Note -- 4 Eyes    Is there altered skin present? no       Prevention Measures Documented    Second RN/Staff Member:  geo    Restraints:   Restraint Order  Length of Order: Order good for next 24 hours or when removed.  Date that the current order will : 24  Time that the current order will : 010  Order Upon Application: Yes    WCTM     Problem: Adult Inpatient Plan of Care  Goal: Plan of Care Review  Outcome: Progressing  Goal: Patient-Specific Goal (Individualized)  Outcome: Progressing  Goal: Absence of Hospital-Acquired Illness or Injury  Outcome: Progressing  Goal: Optimal Comfort and Wellbeing  Outcome: Progressing  Goal: Readiness for Transition of Care  Outcome: Progressing     Problem: Sepsis/Septic Shock  Goal: Optimal Coping  Outcome: Progressing  Goal: Absence of Bleeding  Outcome: Progressing  Goal: Blood Glucose Level Within Targeted Range  Outcome: Progressing  Goal: Absence of Infection Signs and Symptoms  Outcome: Progressing  Goal: Optimal Nutrition Intake  Outcome: Progressing     Problem: Pneumonia  Goal: Fluid Balance  Outcome: Progressing  Goal: Resolution of Infection Signs and Symptoms  Outcome: Progressing  Goal: Effective Oxygenation and Ventilation  Outcome: Progressing     Problem: Fall Injury Risk  Goal: Absence of Fall and Fall-Related Injury  Outcome: Progressing     Problem: Skin Injury Risk Increased  Goal: Skin Health and Integrity  Outcome: Progressing     Problem: Wound  Goal: Optimal Coping  Outcome: Progressing  Goal: Optimal Functional Ability  Outcome: Progressing  Goal: Absence of Infection Signs and Symptoms  Outcome: Progressing  Goal: Improved Oral Intake  Outcome: Progressing  Goal: Optimal Pain Control and Function  Outcome: Progressing  Goal: Skin Health and Integrity  Outcome: Progressing  Goal: Optimal Wound Healing  Outcome:  Progressing     Problem: Infection  Goal: Absence of Infection Signs and Symptoms  Outcome: Progressing     Problem: Mechanical Ventilation Invasive  Goal: Effective Communication  Outcome: Progressing  Goal: Optimal Device Function  Outcome: Progressing  Goal: Mechanical Ventilation Liberation  Outcome: Progressing  Goal: Optimal Nutrition Delivery  Outcome: Progressing  Goal: Absence of Device-Related Skin and Tissue Injury  Outcome: Progressing  Goal: Absence of Ventilator-Induced Lung Injury  Outcome: Progressing     Problem: Artificial Airway  Goal: Effective Communication  Outcome: Progressing  Goal: Optimal Device Function  Outcome: Progressing  Goal: Absence of Device-Related Skin or Tissue Injury  Outcome: Progressing

## 2024-06-24 NOTE — NURSING
Pt arrived back to room following OR for trach/peg placement.    Time of anesthesia transfer of care: 0955    Surgical incision care orders in? Yes    Drain care orders in? n/a    [facility] provider notified: ALLI Diego MD and ARACELI Rangel NP notified    Pt was escorted by CRNA on cardiac monitoring, O2, and ambu bag.    Patient placed back on bedside monitor with alarms audible, bed in low position with bed alarm on.

## 2024-06-24 NOTE — ASSESSMENT & PLAN NOTE
Daily abg  Daily cxr  Daily vent weaning  Post trach and peg this morning  Vent Mode: A/C  Oxygen Concentration (%):  [35] 35  Resp Rate Total:  [22 br/min-30 br/min] 23 br/min  Vt Set:  [320 mL] 320 mL  PEEP/CPAP:  [5 cmH20] 5 cmH20  Mean Airway Pressure:  [12 oqO49-19 cmH20] 12 cmH20
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
## 2024-06-24 NOTE — ASSESSMENT & PLAN NOTE
Notified by nurse that family was upset this morning after patient trach'd  Per nursing family stating that Christopher should not have consented to the trach and peg  NCC team states they were told Christopher was to make decisions while family was away in [facility] dealing with other family emergency  Can try to get family in contact with the person that did the consent for procedure

## 2024-06-24 NOTE — PLAN OF CARE
"[facility] Care Plan    POC reviewed with June Kearnsraheem Boykin and family at 1400. Pt unable to verbalize understanding. Pt progressing toward goals. Will continue to monitor. See below and flowsheets for full assessment and VS info.     - S/p trach/peg placement. Moderate amount of blood noted to site. Bleeding is controlled.  - Tube feeding restarted (Bertha Herr goal of 45mL/hr currently infusing at 10mL/hr)  - Pending family conference/goals of care. Tentatively planned for Thursday.  - CHG bath completed       Is this a stroke patient? no    Neuro:  Alix Coma Scale  Best Eye Response: 4-->(E4) spontaneous  Best Motor Response: 5-->(M5) localizes pain  Best Verbal Response: 1-->(V1) none  [place] Coma Scale Score: 10  Assessment Qualifiers: no eye obstruction present, patient intubated  Pupil PERRLA: yes     24 hr Temp:  [97.1 °F (36.2 °C)-100.6 °F (38.1 °C)]     CV:   Rhythm: normal sinus rhythm  BP goals:   SBP < 180  MAP > 65    Resp:      Vent Mode: A/C  Set Rate: 22 BPM  Oxygen Concentration (%): 35  Vt Set: 320 mL  PEEP/CPAP: 5 cmH20  Pressure Support: 8 cmH20    Plan: trach in place    GI/:     Diet/Nutrition Received: NPO  Last Bowel Movement: 06/23/24  Voiding Characteristics: external catheter    Intake/Output Summary (Last 24 hours) at 6/24/2024 1728  Last data filed at 6/24/2024 0859  Gross per 24 hour   Intake 870 ml   Output 500 ml   Net 370 ml     Unmeasured Output  Urine Occurrence: 1  Stool Occurrence: 1  Pad Count: 1    Labs/Accuchecks:  Recent Labs   Lab 06/24/24  0029   WBC 6.83   RBC 3.49*   HGB 8.3*   HCT 27.2*         Recent Labs   Lab 06/24/24  0029      K 3.8   CO2 23      BUN 10   CREATININE 0.5   ALKPHOS 60   ALT 15   AST 24   BILITOT 0.3    No results for input(s): "PROTIME", "INR", "APTT", "HEPANTIXA" in the last 168 hours. No results for input(s): "CPK", "CPKMB", "TROPONINI", "MB" in the last 168 hours.    Electrolytes: N/A - electrolytes WDL  Accuchecks: " none    Gtts:      LDA/Wounds:      Nurses Note -- 4 Eyes      Is there altered skin present? yes       Wound Care Consulted? yes    Second RN/Staff Member:  ALEX Ibrahim      Restraints:   Restraint Order  Length of Order: Order good for next 24 hours or when removed.  Date that the current order will : 24  Time that the current order will : 101  Order Upon Application: Yes    [facility]

## 2024-06-24 NOTE — OP NOTE
[facility]  Surgery Department  Operative Note    SUMMARY     Patient: June Boykin  Date: 6/24/2024  MRN: [id number]    Date of Procedure: 6/24/2024     Procedure: Procedure(s) (LRB):  CREATION, TRACHEOSTOMY (N/A)  EGD, WITH PEG TUBE INSERTION (N/A)     Surgeons and Role:     * Stephon Botello MD - Primary     * Kell Ma MD - Resident - Assisting    Pre-Operative Diagnosis: Focal seizures [R56.9]    Post-Operative Diagnosis: Post-Op Diagnosis Codes:     * Focal seizures [R56.9]    Anesthesia: General    Indications for Procedure:   June Boykin is a 69 y.o.  female with Hx of who was found down unresponsive in late May. She has been admitted since that time with several attempts at extubation with subsequent failure. She has undergone several EEGs which have demonstrated seizure activity. Currently, she remains intubated on minimal ventilatory settings. She is receiving tube feeds, at goal, through an NG tube. She is non-purposeful in her movements is not responsive. General surgery consulted for tracheostomy and PEG tube insertion.     Procedure in Detail:   After consent was obtained, the patient was taken to the operating room and placed supine on the operating room table. General anesthesia was induced. The patient's neck was then prepped and draped in the normal sterile fashion. Pre-operative antibiotics were administered. Time out was performed and all present were in agreement.     Tracheal landmarks were identified and a transverse midline incision was made with a #15 scalpel through the level of the epidermis and dermis. Subcutaneous tissues platysma were divided with electrocautery, and the strap muscles were identified and split in the midline with blunt a cautery dissection. The thyroid isthmus was divided and the raw edges were suture ligated. The trachea was then readily visible. Stay sutures using prolene were placed on each side of the trachea. At this  point the patient was preoxygenated, and utilizing a #11 scalpel, the second tracheal space was sharply incised and the second tracheal ring was divided inferiorly in the midline to create an H incision tracheotomy. The endotracheal tube was withdrawn under direct vision to above the tracheotomy and a cuffed #8.5 tracheostomy tube was placed into the airway, the balloon was insufflated, and the obturator was exchanged for the inner cannula. End tidal CO2 was then confirmed by our anesthesia colleagues. The endotracheal tube was removed and the tracheostomy tube was secured in place using Velcro tracheostomy tape and 2-0 nylon sutures.     The patient's abdomen was prepped and draped. An upper endoscope was introduced into the oropharynx and guided down into the esophagus and stomach. The stomach was insufflated with air. The endoscope was withdrawn into the stomach and identified an appropriate position for gastrostomy tube placement 2 finger-breadths below the left subcostal margin. Palpation of the anterior abdominal wall at this point was visualized endoscopically and transillumination from the endoscope was visualized through the anterior abdominal wall. A small skin incision was made and the stomach was cannulated with the angiocatheter. A guidewire was placed and was snared using the endoscope. The endoscope, snare, and guidewire were all withdrawn from the patient's mouth. A 20-Egyptian gastrostomy tube was loaded onto the guidewire and pulled through the anterior abdominal wall via Seldinger technique. Repeat endoscopy was performed with the gastrostomy tube at the 2 cm essie at the skin. There was no blanching of the gastric mucosa, and when the tube was twisted, the button did not grab the mucosa. Gastric insufflation was evacuated, and the endoscope was removed. Esophagus was visualized on withdrawal and demonstrated no abnormalities. The gastrostomy tube was secured in place using the supplied devices and  capped. The patient was then transferred to the ICU in stable condition.     Estimated Blood Loss (EBL): < 10cc    Complications: No    Specimens:   Specimen (24h ago, onward)      None            Condition: Stable    Disposition: ICU - intubated and hemodynamically stable.    Kell Ma MD   General Surgery PGY2

## 2024-06-24 NOTE — PLAN OF CARE
[facility] - Neuro Critical Care  Discharge Reassessment    Primary Care Provider: Mauricio Pierre MD    Expected Discharge Date: 6/27/2024    Reassessment (most recent)       Discharge Reassessment - 06/24/24 1041          Discharge Reassessment    Assessment Type Discharge Planning Reassessment (P)      Did the patient's condition or plan change since previous assessment? No (P)      Communicated LACY with patient/caregiver No (P)    pt unable to respond    Discharge Plan A Long-term acute care facility (LTAC) (P)      Discharge Plan B Home with family (P)      DME Needed Upon Discharge  none (P)      Transition of Care Barriers None (P)      Why the patient remains in the hospital Requires continued medical care (P)         Post-Acute Status    Discharge Delays None known at this time (P)                    Pt is not medically ready to discharge.   SW will continue to monitor pt needs.         Discharge Plan A and Plan B have been determined by review of patient's clinical status, future medical and therapeutic needs, and coverage/benefits for post-acute care in coordination with multidisciplinary team members.    2:57 PM      SW contact pt daughter Alexandria [phone] to discuss discharge planning. SW attempted to provide a list of LTAC placements for the family to review.  The daughter indicated that she is not interested in placement and she is not in favor of her mother being PEG and Trach.  Daughter indicated when she gets in the city on Thursday she would like the PEG and Trach removed.   She said this is not her mother's wish.    SW will continue to follow up on pt needs.     Harmony Damon MSW, TERRYW  [facility]  Case Management Dept.

## 2024-06-24 NOTE — CONSULTS
NIAS consulted for PIV insertion in real time using ultrasound guidance.    Patient has adequate access at this time.     If access needed, re consult NIAS in the am.

## 2024-06-24 NOTE — CARE UPDATE
Pt came from OR with a 8.0 [employer] trach. Pt is on ventilator at documented settings. Trach is secured and patent. Trach supplies, extra trachs, and obturator at bedside. No respiratory distress noted. Will continue to monitor.

## 2024-06-24 NOTE — BRIEF OP NOTE
[facility] - Neuro Critical Care  Brief Operative Note    SUMMARY     Surgery Date: 6/24/2024     Surgeons and Role:     * Stephon Botello MD - Primary     * Kell Ma MD - Resident - Assisting    Pre-op Diagnosis:  Focal seizures [R56.9]    Post-op Diagnosis:  Post-Op Diagnosis Codes:     * Focal seizures [R56.9]    Procedure(s) (LRB):  CREATION, TRACHEOSTOMY (N/A)  EGD, WITH PEG TUBE INSERTION (N/A)    Anesthesia: General    Implants:  Implant Name Type Inv. Item Serial No.  Lot No. LRB No. Used Action   ADULT FLEXIBLE TRACHEOSTOMY TUBE    [employer] [id number] N/A 1 Implanted   STANDARD PEG KIT    [employer] [id number] N/A 1 Implanted       Operative Findings:   Tracheostomy with Shiley 8.5  Peg tube 2cm at skin  No apparent complications    Estimated Blood Loss: < 10cc    Estimated Blood Loss has been documented.         Specimens:   Specimen (24h ago, onward)      None            [id number]

## 2024-06-24 NOTE — TREATMENT PLAN
Routine Post-Op PEG Care:     Please keep tube to gravity for 6 hours after placement.  May administer meds after 6 hours and clamp for 30 minutes after medication administration.  We will give further recs about when to restart tube feeds.   Please notify General Surgery with any significant changes in patient's vital signs within the first 24 hours after PEG placement.  Please call with questions about PEG tube.      Mark Skaggs MD  General Surgery, PGY-3

## 2024-06-24 NOTE — TRANSFER OF CARE
"Anesthesia Transfer of Care Note    Patient: June Boykin    Procedure(s) Performed: Procedure(s) (LRB):  CREATION, TRACHEOSTOMY (N/A)  EGD, WITH PEG TUBE INSERTION (N/A)    Patient location: ICU    Anesthesia Type: general    Transport from OR: Transported from OR intubated on 100% O2 by AMBU with adequate controlled ventilation. Upon arrival to PACU/ICU, patient attached to ventilator and auscultated to confirm bilateral breath sounds and adequate TV. Continuous ECG monitoring in transport. Continuous SpO2 monitoring in transport    Post pain: adequate analgesia    Post assessment: no apparent anesthetic complications and tolerated procedure well    Post vital signs: stable    Level of consciousness: sedated    Nausea/Vomiting: no nausea/vomiting    Complications: none    Transfer of care protocol was followed      Last vitals: Visit Vitals  BP (!) 95/57 (BP Location: Left arm, Patient Position: Lying)   Pulse 88   Temp (!) 38.1 °C (100.6 °F) (Core Esophageal)   Resp (!) 22   Ht 4' 10" (1.473 m)   Wt 47 kg (103 lb 9.9 oz)   LMP  (LMP Unknown)   SpO2 100%   BMI 21.66 kg/m²     "

## 2024-06-24 NOTE — PLAN OF CARE
ICU Attending    Post trach and peg  Family called and upset that patient had trach and peg performed  Unsure as just taking over now  Appears that proceduralist consented Christopher and family not happy with this  Ok to start tube feeds  Obtain post trach CXR    Andreas Diego MD MPH  NeuroCritical Care & Vascular Neurology

## 2024-06-24 NOTE — ANESTHESIA POSTPROCEDURE EVALUATION
Anesthesia Post Evaluation    Patient: June Boykin    Procedure(s) Performed: Procedure(s) (LRB):  CREATION, TRACHEOSTOMY (N/A)  EGD, WITH PEG TUBE INSERTION (N/A)    Final Anesthesia Type: general      Patient location during evaluation: ICU  Patient participation: No - Unable to Participate, Coma/Other Inability to Communicate  Level of consciousness: obtunded/minimal responses  Post-procedure vital signs: reviewed and stable  Pain management: adequate  Airway patency: patent  MARÍA ELENA mitigation strategies: Intraoperative administration of CPAP, nasopharyngeal airway, or oral appliance during sedation, Multimodal analgesia, Extubation while patient is awake, Verification of full reversal of neuromuscular block and Extubation and recovery carried out in lateral, semiupright, or other nonsupine position  PONV status at discharge: No PONV  Anesthetic complications: no      Cardiovascular status: hemodynamically stable  Respiratory status: Tracheostomy and ventilator  Hydration status: euvolemic  Follow-up not needed.              Vitals Value Taken Time   /65 06/24/24 1204   Temp 38 °C (100.4 °F) 06/24/24 0918   Pulse 72 06/24/24 1215   Resp 24 06/24/24 1215   SpO2 100 % 06/24/24 1215   Vitals shown include unfiled device data.      No case tracking events are documented in the log.      Pain/Huong Score: Pain Rating Prior to Med Admin: 0 (6/23/2024  7:33 PM)  Pain Rating Post Med Admin: 0 (6/23/2024 11:39 AM)

## 2024-06-25 NOTE — NURSING
Reluctant to wean off Levophed. When patient relaxes the Map is exactly 65. However, patient agitated and SBP is 150-170. Mickal PA aware.
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
## 2024-06-25 NOTE — PLAN OF CARE
"[facility] Care Plan    POC reviewed with June Boykin  0300. Pt progressing toward goals. Will continue to monitor. See below and flowsheets for full assessment and VS info.     -Acute event; See PA note, hypotensive episode    Is this a stroke patient? no    Neuro:  [employer] Coma Scale  Best Eye Response: 3-->(E3) to speech  Best Motor Response: 5-->(M5) localizes pain  Best Verbal Response: 1-->(V1) none  [place] Coma Scale Score: 9  Assessment Qualifiers: patient intubated  Pupil PERRLA: yes     24hr Temp:  [97.1 °F (36.2 °C)-100.7 °F (38.2 °C)]     CV:   Rhythm: normal sinus rhythm  BP goals:   SBP < 180  MAP > 65    Resp:      Vent Mode: A/C  Set Rate: 22 BPM  Oxygen Concentration (%): 35  Vt Set: 320 mL  PEEP/CPAP: 5 cmH20  Pressure Support: 8 cmH20    Plan: trach in place    GI/:     Diet/Nutrition Received: tube feeding  Last Bowel Movement: 06/24/24  Voiding Characteristics: external catheter, voids spontaneously without difficulty    Intake/Output Summary (Last 24 hours) at 6/25/2024 0340  Last data filed at 6/25/2024 0150  Gross per 24 hour   Intake 2789 ml   Output 1200 ml   Net 1589 ml     Unmeasured Output  Urine Occurrence: 1 (very large)  Stool Occurrence: 1  Pad Count: 1    Labs/Accuchecks:  Recent Labs   Lab 06/25/24  0051   WBC 11.75   RBC 3.46*   HGB 8.1*   HCT 27.1*         Recent Labs   Lab 06/25/24  0051      K 3.8   CO2 24      BUN 9   CREATININE 0.5   ALKPHOS 47*   ALT 14   AST 23   BILITOT 0.5    No results for input(s): "PROTIME", "INR", "APTT", "HEPANTIXA" in the last 168 hours. No results for input(s): "CPK", "CPKMB", "TROPONINI", "MB" in the last 168 hours.    Electrolytes: Electrolytes replaced  Accuchecks: none    Gtts:   NORepinephrine bitartrate-D5W  0-1 mcg/kg/min Intravenous Continuous 17.6 mL/hr at 06/25/24 0236 0.1 mcg/kg/min at 06/25/24 0236       LDA/Wounds:    Nurses Note -- 4 Eyes    Is there altered skin present? Yes    Second RN/Staff Member:  " Margi RN    Restraints:   Restraint Order  Length of Order: Order good for next 24 hours or when removed.  Date that the current order will : 24  Time that the current order will : 100  Order Upon Application: Yes    WC   Problem: Wound  Goal: Skin Health and Integrity  Outcome: Progressing     Problem: Restraint, Nonviolent  Goal: Absence of Harm or Injury  Outcome: Progressing

## 2024-06-25 NOTE — PLAN OF CARE
Lab Results   Component Value Date    PSA 3.33 09/25/2019        Patient is not medically ready to discharge.   SW spoke with treatment team concerning the daughter being upset that pt is PEG and Trach.    SW discussed with supervisor Tiff.   Supervisor contacted Alexandria and learned that Alexandria is not the POA and the conflict is a family issue.  SW will continue to monitor the pt needs.        Discharge Plan A and Plan B have been determined by review of patient's clinical status, future medical and therapeutic needs, and coverage/benefits for post-acute care in coordination with multidisciplinary team members.\      Harmony Damon MSW, LCSW  [facility]  Case Management Dept.

## 2024-06-25 NOTE — SIGNIFICANT EVENT
Called by RN around 2200 for MAP of 40 (53/35) and HR 66 from a SBP in 160s on the last reading.     I was in the ED at this time. Repeated BP cuff read w passive leg raise and MAP increased to 57. Instructed RN to give 1 jayla bump, started 1L NS bolus, and have BP cycle q1 minute. Tube feeds were held.  I immediately returned to 9079 and MAP was 55 on my arrival. Gave another jayla bump and ordered levophed. Pt was obtunded and not moving extremities.  2216: MAP high 50s. Gave another 100mcg jayla bump. Levophed started.   2218: MAP dropped to 49. Given 2 jayla bumps and levophed increased to 1.3.   1027: MAP remained in low 50s, given an additional 200mcg jayla and uptitrated levo.  1030: MAP increased to 62 and pt woke up and was again [id number].  1035: Called pt's daughter, Alexandria, to provide an update regarding the current event of hypotension. I was on speaker phone w her and her  Angel. Alexandria and Angel stated that they do not want to proceed w any further aggressive measures, but they were going to try and get into town tomorrow or they will be here on Thursday to see the pt. I explained the need for IVF and pressor support to maintain her BP to adequately perfuse her organs so that they could see her when they get in town, and that if I stopped treating her hypotension, she may not make it to Thursday. I also explained that the pt has poor IV access at the moment and the BP cuff has been recycling [id number] over where these pressors and fluids were infusing. Explained the benefit of placing another arterial line in this situation. Alexandria decided to move forward with treating the pt's hypotension and consented to placing an arterial line so that they would be able to see the pt. The arterial line consent was verified by 2 RNs over the phone at [phone].   2250: L radial arterial line placed. Levophed titrated for MAP >65.  2330 Given additional 1L of IVF NS. Remains on levophed at 0.4 for MAP >65.    SpO2 was 100%  throughout this entire event with HR WNL. There was no suctioning or any other obvious event that may have caused this acute hypotensive episode. She was not on any sedation. Pt is back to her baseline from earlier at this time. Pt had a low grade temp earlier but has been having temps- she has bl cephalic vein thromboses. Full labs, CXR, ABG, lactic, and procal sent.     Christiano Jensen, Jimena, PA-C  Neurocritical Care

## 2024-06-25 NOTE — PLAN OF CARE
"[facility] Care Plan    POC reviewed with June Boykin. Pt unable to verbalize understanding d/t poor neurological status and ventilator. Pt progressing toward goals. Will continue to monitor. See below and flowsheets for full assessment and VS info.     - Approximately 1515, patient soiled. RN attempting to perform patient care. Patient aggressively kicking, thrashing side to side in bed, and agitated. Unable to console patient. RN heard "pop" sound. G tube dislodged from patient abdomen. YADIRA De La O and YADIRA Crawford called to bedside. Gen surg notified. Temporary catheter in place. Pending placement verification.  - Pending family conference      Is this a stroke patient? No    Neuro:  [place] Coma Scale  Best Eye Response: 4-->(E4) spontaneous  Best Motor Response: 5-->(M5) localizes pain  Best Verbal Response: 1-->(V1) none  [place] Coma Scale Score: 10  Assessment Qualifiers: no eye obstruction present, patient intubated  Pupil PERRLA: yes     24 hr Temp:  [97.9 °F (36.6 °C)-100.7 °F (38.2 °C)]     CV:   Rhythm: normal sinus rhythm  BP goals:   SBP < 180  MAP > 65    Resp:      Vent Mode: A/C  Set Rate: 22 BPM  Oxygen Concentration (%): 35  Vt Set: 320 mL  PEEP/CPAP: 5 cmH20  Pressure Support: 8 cmH20    Plan: trach in place    GI/:     Diet/Nutrition Received: tube feeding  Last Bowel Movement: 06/25/24  Voiding Characteristics: external catheter    Intake/Output Summary (Last 24 hours) at 6/25/2024 1659  Last data filed at 6/25/2024 1101  Gross per 24 hour   Intake 2604.32 ml   Output 705 ml   Net 1899.32 ml     Unmeasured Output  Urine Occurrence: 1  Stool Occurrence: 1  Pad Count: 1    Labs/Accuchecks:  Recent Labs   Lab 06/25/24  0051   WBC 11.75   RBC 3.46*   HGB 8.1*   HCT 27.1*         Recent Labs   Lab 06/25/24  0051      K 3.8   CO2 24      BUN 9   CREATININE 0.5   ALKPHOS 47*   ALT 14   AST 23   BILITOT 0.5    No results for input(s): "PROTIME", "INR", "APTT", "HEPANTIXA" " "in the last 168 hours. No results for input(s): "CPK", "CPKMB", "TROPONINI", "MB" in the last 168 hours.    Electrolytes: N/A - electrolytes WDL  Accuchecks: none    Gtts:   NORepinephrine bitartrate-D5W  0-1 mcg/kg/min Intravenous Continuous 10.6 mL/hr at 24 1101 0.06 mcg/kg/min at 24 1101       LDA/Wounds:      Nurses Note -- 4 Eyes      Is there altered skin present? yes     Please check the following boxes that apply:   [x] LDA Added if Not in Epic (Describe Wound)   [x] New Altered Skin Integrity was Present on Admit and Documented in LDA   [x] Wound Image Taken    Wound Care Consulted? Yes. - WC orders in place. Patient on speciality mattress.    Second RN/Staff Member:  ALEX Olivo      Restraints:   Restraint Order  Length of Order: Order good for next 24 hours or when removed.  Date that the current order will : 24  Time that the current order will : 0100  Order Upon Application: Yes    WCTM   "

## 2024-06-25 NOTE — LOPA/MORA/SWTA/AOC/AEB
[facility] ([facility])  On-Site Evaluation  [facility] Contact # [phone]        Thank you for the referral of this patient to determine suitability for organ, tissue, and eye donation.  A chart review has been conducted 6/25/24 at 1200  [facility]s findings are as follows:      ? Potential candidate for donation/ Referral Closed- Any changes in patients condition, GCS of 8 or less, discussion of moving to comfort measures only, brain death exams, or family mention of donation immediately call [phone].      [facility] Representative: EMILIO RiderN, RN, CCRN    [facility] Referral Number:  [id number]

## 2024-06-25 NOTE — ASSESSMENT & PLAN NOTE
HISTORY OF PRESENT ILLNESS     HPI  Jignesh presents for complete physical exam.  The patient was last seen by me within the past year.  He has no acute complaints or concerns otherwise today.    PAST MEDICAL, FAMILY AND SOCIAL HISTORY     Patient Active Problem List   Diagnosis   • Anxiety   • Depression   • Hyperhidrosis   • Tobacco dependence syndrome   • Postprocedural pseudomeningocele   • Migraine headache   • Gastroesophageal reflux disease without esophagitis   • Spinal stenosis of lumbar region with radiculopathy - L5/S1   • DDD (degenerative disc disease), lumbosacral - L5/S1   • Facet arthropathy, cervical   • Cervical radiculopathy   • Sacroiliitis (CMS/HCC)   • Failed back surgical syndrome   • Bilateral occipital neuralgia   • Facet arthropathy, lumbar   • Cervical myofascial pain syndrome   • Lumbar radiculopathy   • History of lumbar fusion   • Lumbar post-laminectomy syndrome   • Chronic myofascial pain   • S/P insertion of spinal cord stimulator   • Subacromial bursitis of left shoulder joint   • Left shoulder pain       Current Outpatient Medications   Medication Sig Dispense Refill   • celecoxib (CeleBREX) 200 MG capsule Take 1 capsule by mouth daily. 90 capsule 3   • famotidine (PEPCID) 20 MG tablet Take 1 tablet by mouth 2 times daily. 180 tablet 3   • methocarbamol (ROBAXIN) 750 MG tablet Take 1 tablet by mouth at bedtime as needed (muscle spasms). 30 tablet 2   • pregabalin (LYRICA) 100 MG capsule Take 1 capsule by mouth in the morning and 1 capsule in the evening. 56 capsule 2   • galcanezumab-gnlm (Emgality) 120 MG/ML injection INJECT 240 MG UNDER THE SKIN ONCE, THEN 120 MG EVERY MONTH. 1 mL 5   • Eszopiclone 3 MG tablet Take 1 tablet by mouth at bedtime as needed for Sleep. 30 tablet 3   • QUEtiapine (SEROquel) 200 MG tablet Take 1 tablet by mouth at bedtime. 90 tablet 1   • buPROPion XL (WELLBUTRIN XL) 300 MG 24 hr tablet Take 1 tablet by mouth daily. 90 tablet 1   • LORazepam (ATIVAN) 2  June Boykin is a 69yoF who was found down in late May. Her work-up has been significant for ongoing seizures. She has failed multiple extubations and failed another spontaneous breathing trial today. General surgery consulted for tracheostomy and PEG tube placement.     - OK to use PEG as needed.  - Please call General Surgery with questions, concerns or changes in patient's clinical status.   MG tablet TAKE 1 TABLET BY MOUTH EVERY 6 HOURS AS NEEDED FOR ANXIETY 15 tablet 3   • cyclobenzaprine-diclofenac-lidocaine 2-5-5% compounded pain cream Apply 2-4 pumps (grams) to lower back 3-4 times daily. Rub in well for 1-2 minutes. 240 g 5   • SUMAtriptan (IMITREX) 50 MG tablet Take 1 tablet by mouth at onset of migraine. May repeat after 2 hours if needed. 18 tablet 2   • fluticasone (Flonase) 50 MCG/ACT nasal spray Spray 2 sprays in each nostril daily. 16 g 0   • duPILUmab (DUPIXENT) 300 MG/2ML injection Inject 300 mg into the skin twice monthly.     • aluminum chloride (DRYSOL) 20 % external solution Apply topically daily 35 mL 6     No current facility-administered medications for this visit.       I have reviewed the patient's medications and allergies, past medical, surgical, social and family history, updating these as appropriate.  See histories section of the electronic medical record for a display of this information.    REVIEW OF SYSTEMS     Review of Systems   Constitutional: Negative for chills, diaphoresis and fever.   Respiratory: Negative for cough, shortness of breath and wheezing.    Cardiovascular: Negative for chest pain, palpitations and leg swelling.   Gastrointestinal: Negative for diarrhea, nausea and vomiting.       PHYSICAL EXAM     Visit Vitals  /70   Pulse 74   Ht 6' 1\" (1.854 m)   Wt 94.8 kg (209 lb)   BMI 27.57 kg/m²       Physical Exam  Constitutional:       General: He is not in acute distress.  HENT:      Head: Normocephalic and atraumatic.      Right Ear: Tympanic membrane and ear canal normal.      Left Ear: Tympanic membrane and ear canal normal.      Nose: No rhinorrhea.   Eyes:      Conjunctiva/sclera: Conjunctivae normal.   Neck:      Thyroid: No thyromegaly.   Cardiovascular:      Rate and Rhythm: Normal rate and regular rhythm.      Heart sounds: No murmur heard.  Pulmonary:      Effort: Pulmonary effort is normal.      Breath sounds: No wheezing, rhonchi or rales.    Abdominal:      General: Bowel sounds are normal.      Palpations: Abdomen is soft.      Tenderness: There is no abdominal tenderness.   Lymphadenopathy:      Head:      Right side of head: No submandibular, preauricular or posterior auricular adenopathy.      Left side of head: No submandibular, preauricular or posterior auricular adenopathy.      Cervical: No cervical adenopathy.      Upper Body:      Right upper body: No supraclavicular adenopathy.      Left upper body: No supraclavicular adenopathy.   Skin:     General: Skin is warm and dry.      Findings: No rash.   Neurological:      Mental Status: He is alert.   Psychiatric:         Behavior: Behavior normal.         ASSESSMENT/PLAN     ASSESSMENT:  1. Annual physical exam        PLAN:  Orders Placed This Encounter   • Basic Metabolic Panel   • Lipid Panel With Reflex   • celecoxib (CeleBREX) 200 MG capsule   • famotidine (PEPCID) 20 MG tablet     In addition to being tobacco free and maintaining a healthy weight, I have recommended 30 minutes of cardiovascular exercise 3-5 times a week and attention to a balanced diet with controlled portion sizes.    Reviewed the health risks associated with continued smoking and urged the patient to quit.  Unfortunately, his readiness to quit at this time is in the pre-contemplation stage.  I have offered assistance with smoking cessation when he is ready to move forward to the contemplation or preparation stage.      Return in about 1 year (around 11/3/2023) for physical exam, with FASTING bloodwork prior to appointment.    He will call or return to the office for any persistent, worsening, or concerning symptoms.  For any emergencies, he will present to the emergency room or call 911.    The patient understands, agrees, and will comply with today's plan.

## 2024-06-25 NOTE — PROGRESS NOTES
"[facility]  Adult Nutrition  Progress Note    SUMMARY       Recommendations    As tolerated, increase TF rate (of [employer] Peptide 1.5) to 35 mL/hr = 1292 kcals, 62 g of protein, 588 mL fluid.   RD to monitor & follow-up.    Goals: Meet % EEN, EPN by RD f/u  Nutrition Goal Status: progressing towards goal  Communication of RD Recs: reviewed with RN    Assessment and Plan    Moderate malnutrition    Malnutrition Type:  Context: chronic illness  Level: moderate    Related to (etiology):   Inadequate energy intake    Signs and Symptoms (as evidenced by):   Malnutrition Characteristic Summary:  Weight Loss (Malnutrition): 10% in 6 months  Energy Intake (Malnutrition): less than 75% for greater than 7 days    Interventions/Recommendations (treatment strategy):  Collaboration of nutrition care w/ other providers  EN    Nutrition Diagnosis Status:   Continues    Malnutrition Assessment    Malnutrition Context: chronic illness  Malnutrition Level: moderate    Weight Loss (Malnutrition): 10% in 6 months  Energy Intake (Malnutrition): less than 75% for greater than 7 days     Reason for Assessment    Reason For Assessment: RD follow-up  Diagnosis:  (Focal seizures)  Relevant Medical History: schizoaffective disorder, acute hypercapnic respiratory failure, COPD, pneumonia, metabolic encephalopathy, HTN  Interdisciplinary Rounds: did not attend    General Information Comments: Remains intubated, s/p PEG & trach placement yesterday - TFs resumed & pt tolerating thus far. Moderate malnutrition diagnosis continues; please see PES statement for details.   Nutrition Discharge Planning: Pending medical course    Nutrition/Diet History    Factors Affecting Nutritional Intake: NPO, on mechanical ventilation    Anthropometrics    Temp: 98.3 °F (36.8 °C)  Height: 4' 10" (147.3 cm)  Height (inches): 58 in  Weight Method: Bed Scale  Weight: 47 kg (103 lb 9.9 oz)  Weight (lb): 103.62 lb  Ideal Body Weight (IBW), " Female: 90 lb  % Ideal Body Weight, Female (lb): 115.13 %  BMI (Calculated): 21.7  BMI Grade: 18.5-24.9 - normal    Lab/Procedures/Meds    Pertinent Labs Reviewed: reviewed  Pertinent Labs Comments: -  Pertinent Medications Reviewed: reviewed  Pertinent Medications Comments: Levophed    Estimated/Assessed Needs    Weight Used For Calorie Calculations: 47 kg (103 lb 9.9 oz)    Energy Calorie Requirements (kcal): 1215 kcal/d  Energy Need Method: [facility]    Protein Requirements: 57-71 g/d (1.2-1.5 g/kg)  Weight Used For Protein Calculations: 47 kg (103 lb 9.9 oz)    Estimated Fluid Requirement Method: RDA Method (Per MD)  RDA Method (mL): 1215    Nutrition Prescription Ordered    Current Diet Order: NPO  Current Nutrition Support Formula Ordered: IActive Peptide 1.5  Current Nutrition Support Rate Ordered: 10 mL/hr    Evaluation of Received Nutrient/Fluid Intake    Enteral Calories (kcal): 369  Enteral Protein (gm): 18  Enteral (Free Water) Fluid (mL): 168    I/O: +9.9L since 6/11    Energy Calories Required: not meeting needs  Protein Required: not meeting needs  Fluid Required: other (see comments) (Per MD)    Comments: LBM: 6/24    Tolerance: tolerating    Nutrition Risk    Level of Risk/Frequency of Follow-up:  (1x/week)     Monitor and Evaluation    Food and Nutrient Intake: food and beverage intake, enteral nutrition intake, energy intake  Food and Nutrient Adminstration: diet order, enteral and parenteral nutrition administration  Physical Activity and Function: nutrition-related ADLs and IADLs  Anthropometric Measurements: weight, weight change  Biochemical Data, Medical Tests and Procedures: inflammatory profile, lipid profile, glucose/endocrine profile, gastrointestinal profile, electrolyte and renal panel  Nutrition-Focused Physical Findings: overall appearance     Nutrition Follow-Up    RD Follow-up?: Yes

## 2024-06-25 NOTE — ASSESSMENT & PLAN NOTE
Daily abg  Daily cxr  Daily vent weaning  Post trach and peg   Vent Mode: A/C  Oxygen Concentration (%):  [35] 35  Resp Rate Total:  [22 br/min-25 br/min] 22 br/min  Vt Set:  [320 mL] 320 mL  PEEP/CPAP:  [5 cmH20] 5 cmH20  Mean Airway Pressure:  [12 cmH20-15 cmH20] 12 cmH20

## 2024-06-25 NOTE — SUBJECTIVE & OBJECTIVE
Interval History: Trach/PEG yesterday without issue. Labile BP overnight. On minimal vent settings this morning. Some oozing but improved according to nurse. Tolerating TF with BM.     Medications:  Continuous Infusions:   NORepinephrine bitartrate-D5W  0-1 mcg/kg/min Intravenous Continuous   Stopped at 06/25/24 0609     Scheduled Meds:   atorvastatin  40 mg Per G Tube Daily    balsam peru-castor oiL   Topical (Top) BID    cloBAZam  20 mg Per G Tube Daily    And    cloBAZam  20 mg Per G Tube QHS    heparin (porcine)  5,000 Units Subcutaneous Q12H    lacosamide  200 mg Per NG tube Q12H    levetiracetam  1,500 mg Per NG tube BID    metoprolol tartrate  25 mg Per NG tube BID    pantoprazole  40 mg Per NG tube Daily    perampaneL  12 mg Per NG tube Daily    phenylephrine HCl in 0.9% NaCl  100 mcg Intravenous Once    psyllium husk (aspartame)  1 packet Per NG tube TID    QUEtiapine  25 mg Per NG tube QHS    sodium chloride  2,000 mg Per NG tube TID    thiamine  100 mg Per NG tube Daily     PRN Meds:  Current Facility-Administered Medications:     acetaminophen, 650 mg, Per NG tube, Q4H PRN    calcium gluconate IVPB, 1 g, Intravenous, PRN    calcium gluconate IVPB, 2 g, Intravenous, PRN    calcium gluconate IVPB, 3 g, Intravenous, PRN    dextrose 10%, 12.5 g, Intravenous, PRN    dextrose 10%, 25 g, Intravenous, PRN    glucagon (human recombinant), 1 mg, Intramuscular, PRN    glucose, 16 g, Per NG tube, PRN    glucose, 24 g, Per NG tube, PRN    hydrALAZINE, 10 mg, Intravenous, Q4H PRN    magnesium oxide, 800 mg, Per NG tube, PRN    magnesium oxide, 800 mg, Per NG tube, PRN    naloxone, 0.02 mg, Intravenous, PRN    ondansetron, 4 mg, Intravenous, Q8H PRN    potassium bicarbonate, 35 mEq, Per NG tube, PRN    potassium bicarbonate, 50 mEq, Per NG tube, PRN    potassium bicarbonate, 60 mEq, Per NG tube, PRN    potassium, sodium phosphates, 2 packet, Per NG tube, PRN    potassium, sodium phosphates, 2 packet, Per NG tube, PRN     potassium, sodium phosphates, 2 packet, Per NG tube, PRN    sodium chloride 0.9%, 10 mL, Intravenous, Q12H PRN     Review of patient's allergies indicates:  No Known Allergies  Objective:     Vital Signs (Most Recent):  Temp: 98.3 °F (36.8 °C) (06/25/24 0701)  Pulse: 85 (06/25/24 0701)  Resp: (!) 22 (06/24/24 2300)  BP: (!) 108/56 (06/25/24 0701)  SpO2: 99 % (06/25/24 0701) Vital Signs (24h Range):  Temp:  [97.1 °F (36.2 °C)-100.7 °F (38.2 °C)] 98.3 °F (36.8 °C)  Pulse:  [] 85  Resp:  [14-32] 22  SpO2:  [97 %-100 %] 99 %  BP: ()/() 108/56  Arterial Line BP: ()/(50-93) 108/56     Weight: 47 kg (103 lb 9.9 oz)  Body mass index is 21.66 kg/m².    Intake/Output - Last 3 Shifts         06/23 0700 06/24 0659 06/24 0700 06/25 0659 06/25 0700 06/26 0659    I.V. (mL/kg)  330.3 (7)     NG/ 240     IV Piggyback  2555     Total Intake(mL/kg) 720 (15.3) 3125.3 (66.5)     Urine (mL/kg/hr) 1400 (1.2) 600 (0.5)     Stool 0 100     Total Output 1400 700     Net -680 +2425.3            Urine Occurrence 6 x 4 x     Stool Occurrence 3 x 1 x              Physical Exam  Vitals and nursing note reviewed.   Constitutional:       Appearance: She is ill-appearing.   HENT:      Head: Normocephalic and atraumatic.   Neck:      Comments: Trach in place with improving oozing  Cardiovascular:      Rate and Rhythm: Normal rate and regular rhythm.   Pulmonary:      Comments: Minimal ventilatory settings  Abdominal:      General: Abdomen is flat. There is no distension.      Palpations: Abdomen is soft.      Tenderness: There is no abdominal tenderness.      Comments: PEG tube   Neurological:      Comments: Non-responsive, non-purposeful movements of the upper and lower extremities          Significant Labs:  I have reviewed all pertinent lab results within the past 24 hours.  CBC:   Recent Labs   Lab 06/25/24  0051   WBC 11.75   RBC 3.46*   HGB 8.1*   HCT 27.1*      MCV 78*   MCH 23.4*   MCHC 29.9*     CMP:    Recent Labs   Lab 06/25/24  0051   *   CALCIUM 8.5*   ALBUMIN 2.2*   PROT 6.3      K 3.8   CO2 24      BUN 9   CREATININE 0.5   ALKPHOS 47*   ALT 14   AST 23   BILITOT 0.5       Significant Diagnostics:  I have reviewed all pertinent imaging results/findings within the past 24 hours.

## 2024-06-25 NOTE — PLAN OF CARE
Case  called the patient's daughter Alexandria at [phone] to discuss who if anyone in the family has power of  for the patient. Alexandria stated that she does not have power of  but she will when she gets here. Case  clarified that if there is no P.O.A already written and signed for the patient then one can not be done now that the patient is unable to make decisions for herself. Case  informed Alexandria that per [place] law the all of the patient's children are now tasked with being the decision makers. Case  asked Alexandria if the patient had any other children, Alexandria replied no they are all dead. Case  offered condolences for her  siblings. Then it was explained that a majority rules with needing the children to be in agreement with the next decisions for the patient. Alexandria asked why her brother needs to be involved since he hasn't been involved this whole time. Case  stated that from our stand point he was the one to make the decisions so far for the patient's care. The call disconnected.    2:16 pm  Case  called Alexandria again to finish the previous discussion and obtain a time line for when she was going to be in town to have these discussions. There was no answer. Case  left a message asking for a return call to discuss arranging a meeting once she is in town. RONALD Antunez notified of all of the above information.

## 2024-06-25 NOTE — PROGRESS NOTES
[facility]  General Surgery  Progress Note    Subjective:     History of Present Illness:  June Boykin is a 69yoF who was found down unresponsive in late May. She has been admitted since that time with several attempts at extubation with subsequent failure. She has undergone several EEGs which have demonstrated seizure activity. Currently, she remains intubated on minimal ventilatory settings. She is receiving tube feeds, at goal, through an NG tube. She is non-purposeful in her movements is not responsive. General surgery consulted for tracheostomy and PEG tube insertion.     Post-Op Info:  Procedure(s) (LRB):  CREATION, TRACHEOSTOMY (N/A)  EGD, WITH PEG TUBE INSERTION (N/A)   1 Day Post-Op     Interval History: Trach/PEG yesterday without issue. Labile BP overnight. On minimal vent settings this morning. Some oozing but improved according to nurse. Tolerating TF with BM.     Medications:  Continuous Infusions:   NORepinephrine bitartrate-D5W  0-1 mcg/kg/min Intravenous Continuous   Stopped at 06/25/24 0609     Scheduled Meds:   atorvastatin  40 mg Per G Tube Daily    balsam peru-castor oiL   Topical (Top) BID    cloBAZam  20 mg Per G Tube Daily    And    cloBAZam  20 mg Per G Tube QHS    heparin (porcine)  5,000 Units Subcutaneous Q12H    lacosamide  200 mg Per NG tube Q12H    levetiracetam  1,500 mg Per NG tube BID    metoprolol tartrate  25 mg Per NG tube BID    pantoprazole  40 mg Per NG tube Daily    perampaneL  12 mg Per NG tube Daily    phenylephrine HCl in 0.9% NaCl  100 mcg Intravenous Once    psyllium husk (aspartame)  1 packet Per NG tube TID    QUEtiapine  25 mg Per NG tube QHS    sodium chloride  2,000 mg Per NG tube TID    thiamine  100 mg Per NG tube Daily     PRN Meds:  Current Facility-Administered Medications:     acetaminophen, 650 mg, Per NG tube, Q4H PRN    calcium gluconate IVPB, 1 g, Intravenous, PRN    calcium gluconate IVPB, 2 g, Intravenous, PRN    calcium gluconate  IVPB, 3 g, Intravenous, PRN    dextrose 10%, 12.5 g, Intravenous, PRN    dextrose 10%, 25 g, Intravenous, PRN    glucagon (human recombinant), 1 mg, Intramuscular, PRN    glucose, 16 g, Per NG tube, PRN    glucose, 24 g, Per NG tube, PRN    hydrALAZINE, 10 mg, Intravenous, Q4H PRN    magnesium oxide, 800 mg, Per NG tube, PRN    magnesium oxide, 800 mg, Per NG tube, PRN    naloxone, 0.02 mg, Intravenous, PRN    ondansetron, 4 mg, Intravenous, Q8H PRN    potassium bicarbonate, 35 mEq, Per NG tube, PRN    potassium bicarbonate, 50 mEq, Per NG tube, PRN    potassium bicarbonate, 60 mEq, Per NG tube, PRN    potassium, sodium phosphates, 2 packet, Per NG tube, PRN    potassium, sodium phosphates, 2 packet, Per NG tube, PRN    potassium, sodium phosphates, 2 packet, Per NG tube, PRN    sodium chloride 0.9%, 10 mL, Intravenous, Q12H PRN     Review of patient's allergies indicates:  No Known Allergies  Objective:     Vital Signs (Most Recent):  Temp: 98.3 °F (36.8 °C) (06/25/24 0701)  Pulse: 85 (06/25/24 0701)  Resp: (!) 22 (06/24/24 2300)  BP: (!) 108/56 (06/25/24 0701)  SpO2: 99 % (06/25/24 0701) Vital Signs (24h Range):  Temp:  [97.1 °F (36.2 °C)-100.7 °F (38.2 °C)] 98.3 °F (36.8 °C)  Pulse:  [] 85  Resp:  [14-32] 22  SpO2:  [97 %-100 %] 99 %  BP: ()/() 108/56  Arterial Line BP: ()/(50-93) 108/56     Weight: 47 kg (103 lb 9.9 oz)  Body mass index is 21.66 kg/m².    Intake/Output - Last 3 Shifts         06/23 0700 06/24 0659 06/24 0700 06/25 0659 06/25 0700 06/26 0659    I.V. (mL/kg)  330.3 (7)     NG/ 240     IV Piggyback  2555     Total Intake(mL/kg) 720 (15.3) 3125.3 (66.5)     Urine (mL/kg/hr) 1400 (1.2) 600 (0.5)     Stool 0 100     Total Output 1400 700     Net -680 +2425.3            Urine Occurrence 6 x 4 x     Stool Occurrence 3 x 1 x              Physical Exam  Vitals and nursing note reviewed.   Constitutional:       Appearance: She is ill-appearing.   HENT:      Head:  Normocephalic and atraumatic.   Neck:      Comments: Trach in place with improving oozing  Cardiovascular:      Rate and Rhythm: Normal rate and regular rhythm.   Pulmonary:      Comments: Minimal ventilatory settings  Abdominal:      General: Abdomen is flat. There is no distension.      Palpations: Abdomen is soft.      Tenderness: There is no abdominal tenderness.      Comments: PEG tube   Neurological:      Comments: Non-responsive, non-purposeful movements of the upper and lower extremities          Significant Labs:  I have reviewed all pertinent lab results within the past 24 hours.  CBC:   Recent Labs   Lab 06/25/24  0051   WBC 11.75   RBC 3.46*   HGB 8.1*   HCT 27.1*      MCV 78*   MCH 23.4*   MCHC 29.9*     CMP:   Recent Labs   Lab 06/25/24  0051   *   CALCIUM 8.5*   ALBUMIN 2.2*   PROT 6.3      K 3.8   CO2 24      BUN 9   CREATININE 0.5   ALKPHOS 47*   ALT 14   AST 23   BILITOT 0.5       Significant Diagnostics:  I have reviewed all pertinent imaging results/findings within the past 24 hours.  Assessment/Plan:     Focal seizures  June Boykin is a 69yoF who was found down in late May. Her work-up has been significant for ongoing seizures. She has failed multiple extubations and failed another spontaneous breathing trial today. General surgery consulted for tracheostomy and PEG tube placement.     - OK to use PEG as needed.  - General Surgery to sign off, please call with questions, concerns or changes in patient's clinical status.        Kell Ma MD  General Surgery  [facility]

## 2024-06-26 ENCOUNTER — ANESTHESIA EVENT (OUTPATIENT)
Dept: SURGERY | Facility: HOSPITAL | Age: 69
End: 2024-06-26
Payer: MEDICARE

## 2024-06-26 ENCOUNTER — ANESTHESIA (OUTPATIENT)
Dept: SURGERY | Facility: HOSPITAL | Age: 69
End: 2024-06-26
Payer: MEDICARE

## 2024-06-26 PROCEDURE — 25000003 PHARM REV CODE 250

## 2024-06-26 PROCEDURE — 63600175 PHARM REV CODE 636 W HCPCS

## 2024-06-26 PROCEDURE — 63600175 PHARM REV CODE 636 W HCPCS: Performed by: STUDENT IN AN ORGANIZED HEALTH CARE EDUCATION/TRAINING PROGRAM

## 2024-06-26 PROCEDURE — 25000003 PHARM REV CODE 250: Performed by: STUDENT IN AN ORGANIZED HEALTH CARE EDUCATION/TRAINING PROGRAM

## 2024-06-26 RX ORDER — CEFAZOLIN SODIUM 1 G/3ML
INJECTION, POWDER, FOR SOLUTION INTRAMUSCULAR; INTRAVENOUS
Status: DISCONTINUED | OUTPATIENT
Start: 2024-06-26 | End: 2024-06-26

## 2024-06-26 RX ORDER — ONDANSETRON HYDROCHLORIDE 2 MG/ML
INJECTION, SOLUTION INTRAVENOUS
Status: DISCONTINUED | OUTPATIENT
Start: 2024-06-26 | End: 2024-06-26

## 2024-06-26 RX ORDER — PHENYLEPHRINE HCL IN 0.9% NACL 1 MG/10 ML
SYRINGE (ML) INTRAVENOUS
Status: DISCONTINUED | OUTPATIENT
Start: 2024-06-26 | End: 2024-06-26

## 2024-06-26 RX ORDER — ROCURONIUM BROMIDE 10 MG/ML
INJECTION, SOLUTION INTRAVENOUS
Status: DISCONTINUED | OUTPATIENT
Start: 2024-06-26 | End: 2024-06-26

## 2024-06-26 RX ADMIN — Medication 100 MCG: at 12:06

## 2024-06-26 RX ADMIN — SUGAMMADEX 200 MG: 100 INJECTION, SOLUTION INTRAVENOUS at 01:06

## 2024-06-26 RX ADMIN — CEFAZOLIN 2 G: 330 INJECTION, POWDER, FOR SOLUTION INTRAMUSCULAR; INTRAVENOUS at 12:06

## 2024-06-26 RX ADMIN — ROCURONIUM BROMIDE 30 MG: 10 INJECTION INTRAVENOUS at 12:06

## 2024-06-26 RX ADMIN — ONDANSETRON 4 MG: 2 INJECTION INTRAMUSCULAR; INTRAVENOUS at 12:06

## 2024-06-26 RX ADMIN — Medication 200 MCG: at 12:06

## 2024-06-26 RX ADMIN — SODIUM CHLORIDE, SODIUM ACETATE ANHYDROUS, SODIUM GLUCONATE, POTASSIUM CHLORIDE, AND MAGNESIUM CHLORIDE: 526; 222; 502; 37; 30 INJECTION, SOLUTION INTRAVENOUS at 11:06

## 2024-06-26 NOTE — BRIEF OP NOTE
[facility]  Brief Operative Note    SUMMARY     Surgery Date: 6/26/2024     Surgeons and Role:     * Stephon Botello MD - Primary     * Mark Skaggs MD - Resident - Assisting        Pre-op Diagnosis:  Seizures [R56.9]    Post-op Diagnosis:  Post-Op Diagnosis Codes:     * Seizures [R56.9]    Procedure(s) (LRB):  ROBOTIC CLOSURE, GASTROSTOMY (N/A)    Anesthesia: General    Implants:  * No implants in log *    Operative Findings: Prior gastrotomy site identified on the anterior wall of the stomach without evidence of ongoing leak. Area over-sewn.     Estimated Blood Loss: * No values recorded between 6/26/2024 12:22 PM and 6/26/2024  1:15 PM *    Estimated Blood Loss has not been documented. EBL = 5.         Specimens:   Specimen (24h ago, onward)      None            [id number]

## 2024-06-26 NOTE — ASSESSMENT & PLAN NOTE
Family meeting planned for tomorrow  Appears to be much family disagreement in [facility]

## 2024-06-26 NOTE — NURSING
[place] catheter removed from PEG insertion site as instructed by provider. Gen surg at patient's bedside for ABD exam. NGT placed by RN. MAHESH ordered. Awaiting verification.

## 2024-06-26 NOTE — TRANSFER OF CARE
"Anesthesia Transfer of Care Note    Patient: June Boykin    Procedure(s) Performed: Procedure(s) (LRB):  ROBOTIC CLOSURE, GASTROSTOMY (N/A)    Patient location: ICU    Anesthesia Type: general    Transport from OR: Transported from OR intubated on 100% O2  with adequate ventilation controlled by transport ventilator. Upon arrival to PACU/ICU, patient attached to ventilator and auscultated to confirm bilateral breath sounds and adequate TV. Continuous ECG monitoring in transport. Continuous SpO2 monitoring in transport. Continuos invasive BP monitoring in transport    Post pain: adequate analgesia    Post assessment: no apparent anesthetic complications and tolerated procedure well    Post vital signs: stable    Level of consciousness: sedated    Nausea/Vomiting: no nausea/vomiting    Complications: none    Transfer of care protocol was followed      Last vitals: Visit Vitals  /60   Pulse 83   Temp 36.8 °C (98.2 °F) (Axillary)   Resp (!) 22   Ht 4' 10" (1.473 m)   Wt 47 kg (103 lb 9.9 oz)   LMP  (LMP Unknown)   SpO2 100%   BMI 21.66 kg/m²     "

## 2024-06-26 NOTE — ANESTHESIA PREPROCEDURE EVALUATION
[facility]  Anesthesia Pre-Operative Evaluation         Patient Name: June Boykin  YOB: 1955  MRN: [id number]    SUBJECTIVE:     Pre-operative Evaluation for Procedure(s) (LRB):  ROBOTIC CLOSURE, GASTROSTOMY (N/A)     06/26/2024    June Boykin is a 69 y.o. female with a PMHx significant for HTN, paroxysmal AFib, COPD, and schizoaffective disorder admitted to Neuro ICU for management of refractory seizures requiring multiple anti-epileptic agents. Patient was intubated for airway protection due to neurologic status and decision made to proceed with tracheostomy/PEG placement. Following PEG placement on 06/24, extravasation of contrast noted into abdomen for which repeat surgical intervention for closure was recommended.     Consent obtained over phone with daughter.    She now presents for the above procedure(s) with General Surgery - Dr. Botello.        Level of Care: ICU (Neuro)    Hemodynamic Status: No inotropic/vasopressor infusions.     Respiratory Status: Ventilator dependent via 8.0 cuffed tracheostomy.  Vent Mode: A/C  Oxygen Concentration (%):  [35] 35  Resp Rate Total:  [22 br/min-27 br/min] 25 br/min  Vt Set:  [320 mL] 320 mL  PEEP/CPAP:  [5 cmH20] 5 cmH20  Mean Airway Pressure:  [11 ehO22-28 cmH20] 13 cmH20    IV Access: 20G PIV x 2, right arm    NPO Status: Tube feeds - held    Previous Airway (06/11/2024):   Intubation method: video-assisted  Patient status: paralyzed (RSI)  Preoxygenation: bag valve mask  Sedatives: etomidate  Paralytic: rocuronium  Laryngoscope size: Glide 3  Tube size: 7.5 mm Tube type: cuffed  Number of attempts: 1  Cords visualized: yes  ETT to lip: 23 cm  Chest x-ray interpreted by me and radiologist.  Chest x-ray findings: endotracheal tube too high  Tube repositioned: tube repositioned successfully  Patient tolerance: Patient tolerated the procedure well with no immediate complications       LDA:        Peripheral IV -  Single Lumen 06/24/24 2237 20 G 3/4 in Yes Anterior;Right Upper Arm (Active)   Number of days: 1            Peripheral IV - Single Lumen 06/24/24 2215 20 G No Anterior;Proximal;Right Forearm (Active)   Number of days: 1       Arterial Line 06/24/24 2256 Left Radial (Active)   Number of days: 1            NG/OG Tube 06/25/24 2130 Morton sump 14 Fr. Right nostril (Active)   Number of days: 0       Drips: None documented.      Patient Active Problem List   Diagnosis    Moderate recurrent major depression    Paranoid schizophrenia    Schizoaffective disorder    Acute cystitis    Other hyperlipidemia    Hypotension due to drugs    Acute hypercapnic respiratory failure    Sepsis with acute renal failure and tubular necrosis without septic shock    Polypharmacy    Encephalopathy, metabolic    Encephalopathy, toxic    Pneumonia    COPD exacerbation    Hypertension    ACP (advance care planning)    Paroxysmal atrial fibrillation    AMS (altered mental status)    Monoplegia    Focal seizures    Moderate malnutrition    Hyperkalemia    Hyponatremia    Reduced mobility    SIRS (systemic inflammatory response syndrome)    GI bleed    Status epilepticus    Hypothermia    Suspected deep tissue injury       Review of patient's allergies indicates:  No Known Allergies    Current Inpatient Medications:   atorvastatin  40 mg Per G Tube Daily    balsam peru-castor oiL   Topical (Top) BID    cloBAZam  20 mg Per G Tube Daily    And    cloBAZam  20 mg Per G Tube QHS    heparin (porcine)  5,000 Units Subcutaneous Q12H    lacosamide (VIMPAT) IVPB  200 mg Intravenous Q12H    levETIRAcetam (Keppra) IV (PEDS and ADULTS)  1,500 mg Intravenous Q12H    pantoprazole  40 mg Per G Tube Daily    perampaneL  12 mg Per G Tube Daily    psyllium husk (aspartame)  1 packet Per G Tube TID    QUEtiapine  25 mg Per G Tube QHS    sodium chloride  2,000 mg Per G Tube TID    thiamine  100 mg Per G Tube Daily       Current Outpatient Medications   Medication  Instructions    albuterol (PROVENTIL/VENTOLIN HFA) 90 mcg/actuation inhaler Inhalation    amoxicillin-clavulanate 875-125mg (AUGMENTIN) 875-125 mg per tablet 1 tablet, Oral, Every 12 hours    amoxicillin-clavulanate 875-125mg (AUGMENTIN) 875-125 mg per tablet 1 tablet, Oral, 2 times daily    aspirin 81 mg, Oral, Daily, Stop 05/20/2024 per [facility] office    budesonide-formoterol 160-4.5 mcg (SYMBICORT) 160-4.5 mcg/actuation HFAA 2 puffs Inhalation Twice a day    diazePAM (VALIUM) 10 mg, Oral, Every 6 hours PRN    docusate sodium (COLACE) 100 mg, Oral, 2 times daily    fluticasone propionate (FLONASE) 50 mcg/actuation nasal spray No dose, route, or frequency recorded.    lisinopriL-hydrochlorothiazide (PRINZIDE,ZESTORETIC) 20-25 mg Tab No dose, route, or frequency recorded.    potassium chloride (MICRO-K) 10 MEQ CpSR 10 mEq, Oral, Once    pravastatin (PRAVACHOL) 20 MG tablet No dose, route, or frequency recorded.    QUEtiapine (SEROQUEL) 50 mg, Oral, Nightly    QUEtiapine (SEROQUEL) 50 mg, Oral, Nightly       Past Surgical History:   Procedure Laterality Date    ANKLE SURGERY Left     ESOPHAGOGASTRODUODENOSCOPY W/ PEG N/A 6/24/2024    Procedure: EGD, WITH PEG TUBE INSERTION;  Surgeon: Stephon Botello MD;  Location: [facility] OR [address] [place] [address];  Service: General;  Laterality: N/A;    HYSTERECTOMY      TONSILLECTOMY      TRACHEOSTOMY N/A 6/24/2024    Procedure: CREATION, TRACHEOSTOMY;  Surgeon: Stephon Botello MD;  Location: [facility] OR [address] [place] [address];  Service: General;  Laterality: N/A;       Social History     Substance and Sexual Activity   Drug Use Not Currently    Types: Oxycodone, Hydrocodone     Tobacco Use: Medium Risk (6/8/2024)    Patient History     Smoking Tobacco Use: Former     Smokeless Tobacco Use: Never     Passive Exposure: Not on file     Alcohol Use: Patient Unable To Answer (6/10/2024)    AUDIT-C     Frequency of Alcohol Consumption: Patient unable to answer     Average Number of Drinks: Patient unable  to answer     Frequency of Binge Drinking: Patient unable to answer       OBJECTIVE:     Vital Signs Range (Last 24H):  Temp:  [36.8 °C (98.2 °F)-37.2 °C (99 °F)]   Pulse:  []   Resp:  [20-33]   BP: ()/(53-95)   SpO2:  [98 %-100 %]   Arterial Line BP: ()/(50-93)       Significant Labs    Heme Profile  Lab Results   Component Value Date    WBC 8.08 06/26/2024    HGB 8.5 (L) 06/26/2024    HCT 27.7 (L) 06/26/2024     06/26/2024       Coagulation Studies  Lab Results   Component Value Date    LABPROT 13.4 (H) 05/31/2024    INR 1.3 (H) 05/31/2024    APTT 25.6 05/30/2024       Metabolic Profile  Lab Results   Component Value Date     06/26/2024    K 3.7 06/26/2024     06/26/2024    CO2 28 06/26/2024    BUN 5 (L) 06/26/2024    CREATININE 0.5 06/26/2024    MG 1.7 06/26/2024    PHOS 4.0 06/26/2024    CAION 1.05 (L) 06/14/2024       Liver Function Tests  Lab Results   Component Value Date    AST 24 06/26/2024    ALT 16 06/26/2024    ALKPHOS 49 (L) 06/26/2024    BILITOT 0.5 06/26/2024    PROT 6.8 06/26/2024    ALBUMIN 2.4 (L) 06/26/2024       Lipid Profile  Lab Results   Component Value Date    CHOL 191 06/07/2024    HDL 44 06/07/2024    TRIG 101 06/16/2024       Endocrine Profile  Lab Results   Component Value Date    HGBA1C 5.4 06/06/2024    TSH 2.209 06/04/2024       Diagnostic Studies    XR Gastric Tube Check (06/26/2024)  FINDINGS:  Single film of the upper abdomen and lower thorax shows a gastric tube coiled in the fundus of the stomach it is been advanced a little since yesterday's study.  Side hole appears to be within the the gastric fundus.    Cardiac Studies    EKG:   Results for orders placed or performed during the hospital encounter of 06/08/24   EKG 12-lead    Collection Time: 06/22/24 12:14 AM   Result Value Ref Range    QRS Duration 80 ms    OHS QTC Calculation 428 ms    Narrative    Test Reason : Z91.89,    Vent. Rate : 075 BPM     Atrial Rate : 075 BPM     P-R Int : 152  ms          QRS Dur : 080 ms      QT Int : 384 ms       P-R-T Axes : 087 067 058 degrees     QTc Int : 428 ms    Normal sinus rhythm  Normal ECG  When compared with ECG of 15-TRINIDAD-2024 03:24,  Nonspecific T wave abnormality, improved in Anterior-lateral leads  Confirmed by Reno RODRIGUES MD ([id number]) on 6/22/2024 10:37:19 AM    Referred By: DESIREE EASLEY           Confirmed By:Reno RODRIGUES MD       TTE (05/31/2024):  Interpretation Summary    Left Ventricle: The left ventricle is normal in size. Normal wall thickness. There is concentric remodeling. There is normal systolic function with a visually estimated ejection fraction of 55 - 60%.    Right Ventricle: Normal right ventricular cavity size. Wall thickness is normal. Systolic function is normal.    Aortic Valve: There is moderate aortic valve sclerosis.    Mitral Valve: There is mild regurgitation.    Tricuspid Valve: There is mild regurgitation.    Pulmonary Artery: The estimated pulmonary artery systolic pressure is 42 mmHg.    IVC/SVC: Normal venous pressure at 3 mmHg.      ASSESSMENT/PLAN:     June Boykin is a 69 y.o. female with leaking gastrostomy tube presenting for robotic closure.      Pre-op Assessment    I have reviewed the Patient Summary Reports.     I have reviewed the Nursing Notes.    I have reviewed the Medications.     Review of Systems  Anesthesia Hx:  No problems with previous Anesthesia   History of prior surgery of interest to airway management or planning:            Denies Personal Hx of Anesthesia complications.                    Social:  Former Smoker       Hematology/Oncology:       -- Anemia:                                  Cardiovascular:     Denies Pacemaker. Hypertension    Dysrhythmias atrial fibrillation                                   Pulmonary:   COPD                     Hepatic/GI:     GERD             Neurological:       Seizures, poorly controlled           Seizure Disorder                          Endocrine:         Denies Obesity / BMI > 30  Psych:  Psychiatric History                     Anesthesia Plan  Type of Anesthesia, risks & benefits discussed:    Anesthesia Type: Gen ETT  Intra-op Monitoring Plan: Standard ASA Monitors and Art Line  Post Op Pain Control Plan: multimodal analgesia and IV/PO Opioids PRN  Induction:  Inhalation  Airway Plan: Via Tracheostomy  ASA Score: 4  Day of Surgery Review of History & Physical: H&P Update referred to the surgeon/provider.    Ready For Surgery From Anesthesia Perspective.     .

## 2024-06-26 NOTE — SUBJECTIVE & OBJECTIVE
Interval History: PEG tube removed by patient yesterday. Replaced by lira however found not to be stomach. HDS overnight, slight tachycardia. Abdominal exam benign.     Medications:  Continuous Infusions:  Scheduled Meds:   atorvastatin  40 mg Per G Tube Daily    balsam peru-castor oiL   Topical (Top) BID    cloBAZam  20 mg Per G Tube Daily    And    cloBAZam  20 mg Per G Tube QHS    heparin (porcine)  5,000 Units Subcutaneous Q12H    lacosamide (VIMPAT) IVPB  200 mg Intravenous Q12H    levETIRAcetam (Keppra) IV (PEDS and ADULTS)  1,500 mg Intravenous Q12H    pantoprazole  40 mg Per G Tube Daily    perampaneL  12 mg Per G Tube Daily    psyllium husk (aspartame)  1 packet Per G Tube TID    QUEtiapine  25 mg Per G Tube QHS    sodium chloride  2,000 mg Per G Tube TID    thiamine  100 mg Per G Tube Daily     PRN Meds:  Current Facility-Administered Medications:     acetaminophen, 650 mg, Per G Tube, Q4H PRN    calcium gluconate IVPB, 1 g, Intravenous, PRN    calcium gluconate IVPB, 2 g, Intravenous, PRN    calcium gluconate IVPB, 3 g, Intravenous, PRN    dextrose 10%, 12.5 g, Intravenous, PRN    dextrose 10%, 25 g, Intravenous, PRN    glucagon (human recombinant), 1 mg, Intramuscular, PRN    glucose, 16 g, Per G Tube, PRN    glucose, 24 g, Per G Tube, PRN    hydrALAZINE, 10 mg, Intravenous, Q4H PRN    magnesium oxide, 800 mg, Per G Tube, PRN    magnesium oxide, 800 mg, Per G Tube, PRN    ondansetron, 4 mg, Intravenous, Q8H PRN    potassium bicarbonate, 35 mEq, Per G Tube, PRN    potassium bicarbonate, 50 mEq, Per G Tube, PRN    potassium bicarbonate, 60 mEq, Per G Tube, PRN    potassium, sodium phosphates, 2 packet, Per G Tube, PRN    potassium, sodium phosphates, 2 packet, Per G Tube, PRN    potassium, sodium phosphates, 2 packet, Per G Tube, PRN    sodium chloride 0.9%, 10 mL, Intravenous, Q12H PRN     Review of patient's allergies indicates:  No Known Allergies  Objective:     Vital Signs (Most Recent):  Temp: 98.2  °F (36.8 °C) (06/26/24 0800)  Pulse: 97 (06/26/24 0900)  Resp: (!) 24 (06/26/24 0900)  BP: (!) 166/74 (06/26/24 0900)  SpO2: 100 % (06/26/24 0900) Vital Signs (24h Range):  Temp:  [98.2 °F (36.8 °C)-99 °F (37.2 °C)] 98.2 °F (36.8 °C)  Pulse:  [] 97  Resp:  [20-33] 24  SpO2:  [98 %-100 %] 100 %  BP: ()/(53-95) 166/74  Arterial Line BP: ()/(50-93) 167/80     Weight: 47 kg (103 lb 9.9 oz)  Body mass index is 21.66 kg/m².    Intake/Output - Last 3 Shifts         06/24 0700 06/25 0659 06/25 0700 06/26 0659 06/26 0700 06/27 0659    I.V. (mL/kg) 330.3 (7) 6.2 (0.1)     NG/ 475 50    IV Piggyback 2555 598 500    Total Intake(mL/kg) 3125.3 (66.5) 1079.3 (23) 550 (11.7)    Urine (mL/kg/hr) 600 (0.5) 705 (0.6) 200 (1.4)    Other  0     Stool 100 100     Total Output 700 805 200    Net +2425.3 +274.3 +350           Urine Occurrence 4 x 2 x     Stool Occurrence 1 x 1 x              Physical Exam  Vitals and nursing note reviewed.   HENT:      Head: Normocephalic and atraumatic.   Neck:      Comments: Trach in place with improving oozing  Cardiovascular:      Rate and Rhythm: Normal rate and regular rhythm.   Pulmonary:      Comments: Minimal ventilatory settings  Abdominal:      General: Abdomen is flat. There is no distension.      Palpations: Abdomen is soft.      Tenderness: There is no abdominal tenderness.      Comments: PEG tube site with mild focal tenderness however diffusely soft and non-tender   Neurological:      Comments: Non-responsive, non-purposeful movements of the upper and lower extremities          Significant Labs:  I have reviewed all pertinent lab results within the past 24 hours.  CBC:   Recent Labs   Lab 06/26/24  0130   WBC 8.08   RBC 3.62*   HGB 8.5*   HCT 27.7*      MCV 77*   MCH 23.5*   MCHC 30.7*     CMP:   Recent Labs   Lab 06/26/24  0130   GLU 76   CALCIUM 9.4   ALBUMIN 2.4*   PROT 6.8      K 3.7   CO2 28      BUN 5*   CREATININE 0.5   ALKPHOS 49*   ALT  16   AST 24   BILITOT 0.5       Significant Diagnostics:  I have reviewed all pertinent imaging results/findings within the past 24 hours.

## 2024-06-26 NOTE — PROGRESS NOTES
[facility]  General Surgery  Progress Note    Subjective:     History of Present Illness:  June Boykin is a 69yoF who was found down unresponsive in late May. She has been admitted since that time with several attempts at extubation with subsequent failure. She has undergone several EEGs which have demonstrated seizure activity. Currently, she remains intubated on minimal ventilatory settings. She is receiving tube feeds, at goal, through an NG tube. She is non-purposeful in her movements is not responsive. General surgery consulted for tracheostomy and PEG tube insertion.     Post-Op Info:  Procedure(s) (LRB):  CREATION, TRACHEOSTOMY (N/A)  EGD, WITH PEG TUBE INSERTION (N/A)   2 Days Post-Op     Interval History: PEG tube removed by patient yesterday. Replaced by lira however found not to be stomach. HDS overnight, slight tachycardia. Abdominal exam benign.     Medications:  Continuous Infusions:  Scheduled Meds:   atorvastatin  40 mg Per G Tube Daily    balsam peru-castor oiL   Topical (Top) BID    cloBAZam  20 mg Per G Tube Daily    And    cloBAZam  20 mg Per G Tube QHS    heparin (porcine)  5,000 Units Subcutaneous Q12H    lacosamide (VIMPAT) IVPB  200 mg Intravenous Q12H    levETIRAcetam (Keppra) IV (PEDS and ADULTS)  1,500 mg Intravenous Q12H    pantoprazole  40 mg Per G Tube Daily    perampaneL  12 mg Per G Tube Daily    psyllium husk (aspartame)  1 packet Per G Tube TID    QUEtiapine  25 mg Per G Tube QHS    sodium chloride  2,000 mg Per G Tube TID    thiamine  100 mg Per G Tube Daily     PRN Meds:  Current Facility-Administered Medications:     acetaminophen, 650 mg, Per G Tube, Q4H PRN    calcium gluconate IVPB, 1 g, Intravenous, PRN    calcium gluconate IVPB, 2 g, Intravenous, PRN    calcium gluconate IVPB, 3 g, Intravenous, PRN    dextrose 10%, 12.5 g, Intravenous, PRN    dextrose 10%, 25 g, Intravenous, PRN    glucagon (human recombinant), 1 mg, Intramuscular, PRN    glucose, 16  g, Per G Tube, PRN    glucose, 24 g, Per G Tube, PRN    hydrALAZINE, 10 mg, Intravenous, Q4H PRN    magnesium oxide, 800 mg, Per G Tube, PRN    magnesium oxide, 800 mg, Per G Tube, PRN    ondansetron, 4 mg, Intravenous, Q8H PRN    potassium bicarbonate, 35 mEq, Per G Tube, PRN    potassium bicarbonate, 50 mEq, Per G Tube, PRN    potassium bicarbonate, 60 mEq, Per G Tube, PRN    potassium, sodium phosphates, 2 packet, Per G Tube, PRN    potassium, sodium phosphates, 2 packet, Per G Tube, PRN    potassium, sodium phosphates, 2 packet, Per G Tube, PRN    sodium chloride 0.9%, 10 mL, Intravenous, Q12H PRN     Review of patient's allergies indicates:  No Known Allergies  Objective:     Vital Signs (Most Recent):  Temp: 98.2 °F (36.8 °C) (06/26/24 0800)  Pulse: 97 (06/26/24 0900)  Resp: (!) 24 (06/26/24 0900)  BP: (!) 166/74 (06/26/24 0900)  SpO2: 100 % (06/26/24 0900) Vital Signs (24h Range):  Temp:  [98.2 °F (36.8 °C)-99 °F (37.2 °C)] 98.2 °F (36.8 °C)  Pulse:  [] 97  Resp:  [20-33] 24  SpO2:  [98 %-100 %] 100 %  BP: ()/(53-95) 166/74  Arterial Line BP: ()/(50-93) 167/80     Weight: 47 kg (103 lb 9.9 oz)  Body mass index is 21.66 kg/m².    Intake/Output - Last 3 Shifts         06/24 0700 06/25 0659 06/25 0700 06/26 0659 06/26 0700 06/27 0659    I.V. (mL/kg) 330.3 (7) 6.2 (0.1)     NG/ 475 50    IV Piggyback 2169 594 500    Total Intake(mL/kg) 3125.3 (66.5) 1079.3 (23) 550 (11.7)    Urine (mL/kg/hr) 600 (0.5) 705 (0.6) 200 (1.4)    Other  0     Stool 100 100     Total Output 700 805 200    Net +2425.3 +274.3 +350           Urine Occurrence 4 x 2 x     Stool Occurrence 1 x 1 x              Physical Exam  Vitals and nursing note reviewed.   HENT:      Head: Normocephalic and atraumatic.   Neck:      Comments: Trach in place with improving oozing  Cardiovascular:      Rate and Rhythm: Normal rate and regular rhythm.   Pulmonary:      Comments: Minimal ventilatory settings  Abdominal:      General:  Abdomen is flat. There is no distension.      Palpations: Abdomen is soft.      Tenderness: There is no abdominal tenderness.      Comments: PEG tube site with mild focal tenderness however diffusely soft and non-tender   Neurological:      Comments: Non-responsive, non-purposeful movements of the upper and lower extremities          Significant Labs:  I have reviewed all pertinent lab results within the past 24 hours.  CBC:   Recent Labs   Lab 06/26/24  0130   WBC 8.08   RBC 3.62*   HGB 8.5*   HCT 27.7*      MCV 77*   MCH 23.5*   MCHC 30.7*     CMP:   Recent Labs   Lab 06/26/24  0130   GLU 76   CALCIUM 9.4   ALBUMIN 2.4*   PROT 6.8      K 3.7   CO2 28      BUN 5*   CREATININE 0.5   ALKPHOS 49*   ALT 16   AST 24   BILITOT 0.5       Significant Diagnostics:  I have reviewed all pertinent imaging results/findings within the past 24 hours.    Assessment/Plan:     Focal seizures  June Boykin is a 69yoF who was found down in late May. Her work-up has been significant for ongoing seizures. She has failed multiple extubations and failed another spontaneous breathing trial today. General surgery consulted for tracheostomy and PEG tube placement.     - Extensively discussed plan of care with daughter Alexandria Cleveland about replacing PEG vs closure of open gastrostomy site. She believes patient would not want a feeding tube and would have not wanted the trach that her son consented for. Her daughter elected for closure of the gastrostomy site. Discussed robotic approach and risk of procedure. Consent obtained with phone with nurse as witness.   - Will take patient to OR today for robotic closure of gastrostomy site.  - Please continue NGT to JOE Ma MD  General Surgery  [facility]

## 2024-06-26 NOTE — SIGNIFICANT EVENT
KUB performed following administration of gastrograffin, with read concerning for extravasation of contrast into peritoneum. Dr. Mark Skaggs with General Surgery notified, and recommended removing lira and covering the site with gauze bandage. He also recommended NG tube be placed to LIWS.

## 2024-06-26 NOTE — PLAN OF CARE
Problem: Adult Inpatient Plan of Care  Goal: Plan of Care Review  Outcome: Progressing     Problem: Sepsis/Septic Shock  Goal: Optimal Coping  Outcome: Progressing     Problem: Wound  Goal: Optimal Coping  Outcome: Progressing     Problem: Artificial Airway  Goal: Effective Communication  Outcome: Progressing     Problem: Restraint, Nonviolent  Goal: Absence of Harm or Injury  Outcome: Progressing     [facility] Care Plan    POC reviewed with June Boykin and family at 1400. Pt daughter verbalized understanding. Questions and concerns addressed. No acute events today. Pt progressing toward goals. Will continue to monitor. See below and flowsheets for full assessment and VS info.     GOC discussion planned for tomorrow, not sure of what time yet.  Pt. Went to OR for closure of PEG tube wound.  Pt. Still has NGT to LIS, can clamp for 30 minutes after meds.           Is this a stroke patient? no    Neuro:  [place] Coma Scale  Best Eye Response: 4-->(E4) spontaneous  Best Motor Response: 5-->(M5) localizes pain  Best Verbal Response: 1-->(V1) none  [place] Coma Scale Score: 10  Assessment Qualifiers: patient not sedated/intubated, no eye obstruction present  Pupil PERRLA: no     24 hr Temp:  [97.1 °F (36.2 °C)-98.5 °F (36.9 °C)]     CV:   Rhythm: normal sinus rhythm  BP goals:   SBP < 180  MAP > 65    Resp:      Vent Mode: A/C  Set Rate: 22 BPM  Oxygen Concentration (%): 35  Vt Set: 320 mL  PEEP/CPAP: 5 cmH20  Pressure Support: 8 cmH20    Plan: trach in place    GI/:     Diet/Nutrition Received: NPO  Last Bowel Movement: 06/26/24  Voiding Characteristics: external catheter    Intake/Output Summary (Last 24 hours) at 6/26/2024 1632  Last data filed at 6/26/2024 1501  Gross per 24 hour   Intake 1148.49 ml   Output 1300 ml   Net -151.51 ml     Unmeasured Output  Urine Occurrence: 1  Stool Occurrence: 1  Pad Count: 1    Labs/Accuchecks:  Recent Labs   Lab 06/26/24  0130   WBC 8.08   RBC 3.62*   HGB 8.5*   HCT 27.7*  "        Recent Labs   Lab 24  0130      K 3.7   CO2 28      BUN 5*   CREATININE 0.5   ALKPHOS 49*   ALT 16   AST 24   BILITOT 0.5    No results for input(s): "PROTIME", "INR", "APTT", "HEPANTIXA" in the last 168 hours. No results for input(s): "CPK", "CPKMB", "TROPONINI", "MB" in the last 168 hours.    Electrolytes: N/A - electrolytes WDL  Accuchecks: none    Gtts:      LDA/Wounds:      Nurses Note -- 4 Eyes      Is there altered skin present? yes     Please check the following boxes that apply:   [] LDA Added if Not in Epic (Describe Wound)   [] New Altered Skin Integrity was Present on Admit and Documented in LDA   [x] Wound Image Taken    Wound Care Consulted? Yes          Restraints:   Restraint Order  Length of Order: Order good for next 24 hours or when removed.  Date that the current order will : 24  Time that the current order will : 1355  Order Upon Application: Yes    [facility]   "

## 2024-06-26 NOTE — PROGRESS NOTES
Approximately 1515, patient soiled. RN providing patient care. Patient aggressively kicking, thrashing/agitated in bed. G tube noted to dislodge from abdomen. YADIRA De La O and YADIRA Chandler called to bedside. General surgery notified and called to bedside. Temporary catheter in place. Studies ordered for placement verification. Patient calm and consolable at this time.    Delays in xray studies due to study contrast not available on unit. Pharmacy and xray contacted regarding urgency for medication administration. General surgery and NCC providers updated. 1800 vimpat switched to IVPB due to loss of enteral access during due time. Pending medication on unit. Patient VS WDL. Patient resting. Patient neurologically unchanged from previous assessments. T-slit gauze in place. Site clean, dry, no swelling, no drainage noted.

## 2024-06-26 NOTE — NURSING
Called pt. Daughter in regards to timing of the family meeting tomorrow, Pt. Daughter states she will be leaving [place] around 11 so she should be at the hospital no later than 1. States she has reached out to pt. Son (her brother) but has had trouble getting a hold of him.

## 2024-06-26 NOTE — ASSESSMENT & PLAN NOTE
Daily abg  Daily cxr  Daily vent weaning  Post trach and peg   Vent Mode: A/C  Oxygen Concentration (%):  [35] 35  Resp Rate Total:  [22 br/min-27 br/min] 22 br/min  Vt Set:  [320 mL] 320 mL  PEEP/CPAP:  [5 cmH20] 5 cmH20  Mean Airway Pressure:  [11 tmM63-01 cmH20] 13 cmH20

## 2024-06-26 NOTE — PLAN OF CARE
"[facility] Care Plan    POC reviewed with June Boykin at 0300. Pt unable to verbalize understanding. Questions and concerns addressed. No acute events overnight. Pt progressing toward goals. See below and flowsheets for full assessment and VS info.     -Hourly neuro checks  -Cleveland catheter removed from PEG insertion site.  -NGT placed; connected to LIWS      Is this a stroke patient? no    Neuro:  [place] Coma Scale  Best Eye Response: 4-->(E4) spontaneous  Best Motor Response: 5-->(M5) localizes pain  Best Verbal Response: 1-->(V1) none  [place] Coma Scale Score: 10  Assessment Qualifiers: no eye obstruction present, patient intubated  Pupil PERRLA: no     24hr Temp:  [98.2 °F (36.8 °C)-99 °F (37.2 °C)]     CV:   Rhythm: sinus tachycardia  BP goals:   SBP < 180  MAP > 65    Resp:      Vent Mode: A/C  Set Rate: 22 BPM  Oxygen Concentration (%): 35  Vt Set: 320 mL  PEEP/CPAP: 5 cmH20  Pressure Support: 8 cmH20    Plan: trach in place    GI/:     Diet/Nutrition Received: NPO  Last Bowel Movement: 06/26/24  Voiding Characteristics: external catheter    Intake/Output Summary (Last 24 hours) at 6/26/2024 0639  Last data filed at 6/26/2024 0603  Gross per 24 hour   Intake 1079.28 ml   Output 805 ml   Net 274.28 ml     Unmeasured Output  Urine Occurrence: 1  Stool Occurrence: 1  Pad Count: 1    Labs/Accuchecks:  Recent Labs   Lab 06/26/24  0130   WBC 8.08   RBC 3.62*   HGB 8.5*   HCT 27.7*         Recent Labs   Lab 06/26/24  0130      K 3.7   CO2 28      BUN 5*   CREATININE 0.5   ALKPHOS 49*   ALT 16   AST 24   BILITOT 0.5    No results for input(s): "PROTIME", "INR", "APTT", "HEPANTIXA" in the last 168 hours. No results for input(s): "CPK", "CPKMB", "TROPONINI", "MB" in the last 168 hours.    Electrolytes: Electrolytes replaced  Accuchecks: none    Gtts:      LDA/Wounds:    Nurses Note -- 4 Eyes    Is there altered skin present? yes     Please check the following boxes that apply:   [] LDA Added " if Not in Epic (Describe Wound)   [] New Altered Skin Integrity was Present on Admit and Documented in LDA   [] Wound Image Taken    Wound Care Consulted? No    Second RN/Staff Member:  ALEX Murphy    Restraints:   Restraint Order  Length of Order: Order good for next 24 hours or when removed.  Date that the current order will : 24  Time that the current order will :   Order Upon Application: Yes
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
## 2024-06-26 NOTE — ANESTHESIA POSTPROCEDURE EVALUATION
Anesthesia Post Evaluation    Patient: June Boykin    Procedure(s) Performed: Procedure(s) (LRB):  ROBOTIC CLOSURE, GASTROSTOMY (N/A)    Final Anesthesia Type: general      Patient location during evaluation: ICU  Patient participation: No - Unable to Participate, Sedation  Level of consciousness: responds to stimulation  Post-procedure vital signs: reviewed and stable  Pain management: adequate  Airway patency: patent (Trach)  MARÍA ELENA mitigation strategies: Multimodal analgesia  PONV status at discharge: No PONV  Anesthetic complications: no      Cardiovascular status: hemodynamically stable and stable  Respiratory status: Tracheostomy and spontaneous ventilation  Hydration status: euvolemic                Vitals Value Taken Time   /53 06/26/24 1428   Temp 36.2 °C (97.1 °F) 06/26/24 1400   Pulse 74 06/26/24 1435   Resp 0 06/26/24 1435   SpO2 100 % 06/26/24 1435   Vitals shown include unfiled device data.      No case tracking events are documented in the log.      Pain/Huong Score: No data recorded

## 2024-06-26 NOTE — ASSESSMENT & PLAN NOTE
June Boykin is a 69yoF who was found down in late May. Her work-up has been significant for ongoing seizures. She has failed multiple extubations and failed another spontaneous breathing trial today. General surgery consulted for tracheostomy and PEG tube placement.     - Extensively discussed plan of care with daughter Alexandria Cleveland about replacing PEG vs closure of open gastrostomy site. She believes patient would not want a feeding tube and would have not wanted the trach that her son consented for. Her daughter elected for closure of the gastrostomy site. Discussed robotic approach and risk of procedure. Consent obtained with phone with nurse as witness.

## 2024-06-27 PROBLEM — N39.0 UTI (URINARY TRACT INFECTION): Status: ACTIVE | Noted: 2024-01-01

## 2024-06-27 PROBLEM — L89.152 PRESSURE INJURY OF SACRAL REGION, STAGE 2: Status: ACTIVE | Noted: 2024-06-27
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
## 2024-06-27 NOTE — ASSESSMENT & PLAN NOTE
Cleveland placed due to unhealing sacral wound; upon insertion, purulent exudate noted; UA obtained  Started on ceftiraxone 1g x3 doses   Ucx pending, UA reviewed

## 2024-06-27 NOTE — NURSING
Upon placement of ordered lira catheter, urine return showed entirely cloudy urine with the appearance of condensed milk. RN called NCC and spoke with YADIRA Aranda- RN suggested UA with culture and PA agreed, and just waiting for order to be placed    Additionally, order for tube feed restart placed, but no nourishment order in place. PA notified of this as well

## 2024-06-27 NOTE — PLAN OF CARE
06/27/24 1354   Discharge Reassessment   Assessment Type Discharge Planning Reassessment   Did the patient's condition or plan change since previous assessment? Yes   Discharge Plan discussed with: Adult children  (MD team in communication with son All and daughter Yvonne)   Communicated LACY with patient/caregiver Other (see comments)  (Date is tentative)   Discharge Plan A Long-term acute care facility (LTAC)  (Disp TBD determined by Pt's condition)   Discharge Plan B New Nursing Home placement - long-term care facility  (Disp TBD determined by Pt's condition)   DME Needed Upon Discharge  none   Transition of Care Barriers Nursing Home rejection;Other (see comments)  (Pt may have long term NG tube)   Why the patient remains in the hospital Requires continued medical care   Post-Acute Status   Post-Acute Authorization Placement   Coverage PHN   Discharge Delays (!) Change in Medical Condition     Discharge Plan A and Plan B have been determined by review of patient's clinical status, future medical and therapeutic needs, and coverage/benefits for post-acute care in coordination with multidisciplinary team members.    Pt requires continued medical care. Pt currently has an NG tube, MD team reports continued need for this, which is a barrier to placement. SW will follow.    KRISTEL Osborn LMSW  [facility]  [id number]

## 2024-06-27 NOTE — PLAN OF CARE
"[facility] Care Plan    POC reviewed with June Boykin and family at 1400. Pt verbalized understanding. Questions and concerns addressed. No acute events today. Pt progressing toward goals. Will continue to monitor. See below and flowsheets for full assessment and VS info.     -lira placed--urine return and throughout rest of shift has appearance of condensed milk--UA with culture sent  -TF restarted @ 10ml/hr at approx 1430  -500ml NS bolus for hypotension      Is this a stroke patient? no    Neuro:  [place] Coma Scale  Best Eye Response: 4-->(E4) spontaneous  Best Motor Response: 5-->(M5) localizes pain  Best Verbal Response: 1-->(V1) none  Alix Coma Scale Score: 10  Assessment Qualifiers: patient intubated  Pupil PERRLA: yes     24 hr Temp:  [97 °F (36.1 °C)-97.7 °F (36.5 °C)]     CV:   Rhythm: normal sinus rhythm  BP goals:   SBP < 180  MAP > 65    Resp:      Vent Mode: A/C  Set Rate: 22 BPM  Oxygen Concentration (%): 35  Vt Set: 320 mL  PEEP/CPAP: 5 cmH20  Pressure Support: 8 cmH20    Plan: trach in place    GI/:     Diet/Nutrition Received: NPO  Last Bowel Movement: 06/26/24  Voiding Characteristics: external catheter    Intake/Output Summary (Last 24 hours) at 6/27/2024 1617  Last data filed at 6/27/2024 1601  Gross per 24 hour   Intake 1931.16 ml   Output 550 ml   Net 1381.16 ml     Unmeasured Output  Urine Occurrence: 1  Stool Occurrence: 1  Pad Count: 1    Labs/Accuchecks:  Recent Labs   Lab 06/27/24  0132 06/27/24  0501   WBC 5.84  --    RBC 3.13*  --    HGB 7.4*  --    HCT 24.4* 24*     --       Recent Labs   Lab 06/27/24  0132      K 3.9   CO2 27      BUN 7*   CREATININE 0.5   ALKPHOS 39*   ALT 6*   AST 18   BILITOT 0.4    No results for input(s): "PROTIME", "INR", "APTT", "HEPANTIXA" in the last 168 hours. No results for input(s): "CPK", "CPKMB", "TROPONINI", "MB" in the last 168 hours.    Electrolytes: Electrolytes replaced  Accuchecks: none    Gtts:   0.9% NaCl   " Intravenous Continuous 50 mL/hr at 24 1601 Rate Verify at 24 1601       LDA/Wounds:      Nurses Note -- 4 Eyes      Is there altered skin present? yes     Please check the following boxes that apply:   [] LDA Added if Not in Epic (Describe Wound)   [] New Altered Skin Integrity was Present on Admit and Documented in LDA   [] Wound Image Taken    Wound Care Consulted? Yes    Second RN/Staff Member:  ALEX Diaz      Restraints:   Restraint Order  Length of Order: Order good for next 24 hours or when removed.  Date that the current order will : 24  Time that the current order will : 1011  Order Upon Application: Yes    [facility]

## 2024-06-27 NOTE — ASSESSMENT & PLAN NOTE
-Blood noted in stool AM of 6/12; Hgb w/ subsequent drop from 14 to 10  -Pantoprazole load w/ 80mg; now 40 BID  -Hgb stable on CBC; serial CBC discontinued  -Continue to hold therapeutic lovenox  -6/21/2024 continue SQH for DVT PPX    - Resolved; No signs of active GIB, cont to assess

## 2024-06-27 NOTE — ASSESSMENT & PLAN NOTE
June Boykin is a 69yoF who was found down in late May. Her work-up has been significant for ongoing seizures. She has failed multiple extubations s/p trach, PEG 6/24 with PEG subsequently removed by patient 6/25. Went for robotic closure of gastrostomy site 6/26 after family reportedly did not wish to replace G tube.     - Extensively discussed plan of care with daughter Alexandria Cleveland about replacing PEG vs closure of open gastrostomy site. She believes patient would not want a feeding tube and would have not wanted the trach that her son consented for. Plan for family meeting today  - No heavy lifting (>10lbs) for 4 weeks post op  - No soaking or submerging in water for 2 weeks post op  - Okay for shower/bath this afternoon  - Remainder of care per primary team  - Please contact general surgery with any questions, concerns, or clinical status changes

## 2024-06-27 NOTE — SUBJECTIVE & OBJECTIVE
Interval History:  see hospital course    Review of Systems   Unable to perform ROS: Intubated     Objective:     Vitals:  Temp: 97.7 °F (36.5 °C)  Pulse: 76  BP: (!) 96/53  MAP (mmHg): 71  Resp: (!) 22  SpO2: 98 %  Oxygen Concentration (%): 35  Vent Mode: A/C  Set Rate: 22 BPM  Vt Set: 320 mL  PEEP/CPAP: 5 cmH20  Peak Airway Pressure: 27 cmH20  Mean Airway Pressure: 11 cmH20  Plateau Pressure: 0 cmH20    Temp  Min: 97 °F (36.1 °C)  Max: 97.7 °F (36.5 °C)  Pulse  Min: 65  Max: 86  BP  Min: 96/53  Max: 182/79  MAP (mmHg)  Min: 71  Max: 115  Resp  Min: 11  Max: 36  SpO2  Min: 94 %  Max: 100 %  Oxygen Concentration (%)  Min: 35  Max: 35    06/26 0701 - 06/27 0700  In: 1853.1 [I.V.:602.6]  Out: 850 [Urine:850]   Unmeasured Output  Urine Occurrence: 1  Stool Occurrence: 1  Pad Count: 1        Physical Exam  Vitals and nursing note reviewed.   Constitutional:       General: She is not in acute distress.     Interventions: She is intubated and restrained.   HENT:      Head: Normocephalic.   Cardiovascular:      Rate and Rhythm: Normal rate and regular rhythm.   Pulmonary:      Effort: Tachypnea present. No respiratory distress. She is intubated.   Abdominal:      General: Abdomen is flat. A surgical scar is present.      Comments: Old PEG tube site w/drsg CDI   Neurological:      Comments: GCS 10 T (E4 V1 M5 ) - patient has tracheostomy and mechanically ventilated   Not following commands  PERRLA  All brainstem reflexes intact  right upper no movements, left upper spontaneous, bilat lowers strong kicking to pain    BARAK sensation d/t intubation    Gait deferred    Psychiatric:         Behavior: Behavior is uncooperative.      Comments: BARAK              Medications:  Continuous0.9% NaCl, Last Rate: 50 mL/hr at 06/27/24 1701    Scheduledatorvastatin, 40 mg, Daily  balsam peru-castor oiL, , BID  cefTRIAXone (Rocephin) IV (PEDS and ADULTS), 1 g, Q24H  cloBAZam, 20 mg, Daily   And  [START ON 6/28/2024] cloBAZam, 20 mg,  QHS  heparin (porcine), 5,000 Units, Q12H  lacosamide, 200 mg, Q12H  levetiracetam, 1,500 mg, BID  pantoprazole, 40 mg, Daily  perampaneL, 12 mg, Daily  psyllium husk (aspartame), 1 packet, TID  QUEtiapine, 25 mg, QHS  sodium chloride 0.9%, 500 mL, Once  sodium chloride, 2,000 mg, TID  thiamine, 100 mg, Daily    PRNacetaminophen, 650 mg, Q4H PRN  calcium gluconate IVPB, 1 g, PRN  calcium gluconate IVPB, 2 g, PRN  calcium gluconate IVPB, 3 g, PRN  dextrose 10%, 12.5 g, PRN  dextrose 10%, 25 g, PRN  glucagon (human recombinant), 1 mg, PRN  glucose, 16 g, PRN  glucose, 24 g, PRN  hydrALAZINE, 10 mg, Q4H PRN  magnesium oxide, 800 mg, PRN  magnesium oxide, 800 mg, PRN  ondansetron, 4 mg, Q8H PRN  potassium bicarbonate, 35 mEq, PRN  potassium bicarbonate, 50 mEq, PRN  potassium bicarbonate, 60 mEq, PRN  potassium, sodium phosphates, 2 packet, PRN  potassium, sodium phosphates, 2 packet, PRN  potassium, sodium phosphates, 2 packet, PRN  sodium chloride 0.9%, 10 mL, Q12H PRN      Today I personally reviewed pertinent medications, lines/drains/airways, imaging, cardiology results, laboratory results, :    Diet  Diet NPO

## 2024-06-27 NOTE — ASSESSMENT & PLAN NOTE
Family meeting planned for today was cancelled due to the family did not present  Appears to be much family disagreement in GOC

## 2024-06-27 NOTE — PLAN OF CARE
"[facility] Care Plan    POC reviewed with June Boykin at 0300. Pt unable to verbalize understanding. Questions and concerns addressed. No acute events overnight. Pt progressing toward goals. See below and flowsheets for full assessment and VS info.     -Hourly neuro checks  -NGT to LIWS  -250cc NS bolus for hypotension  - CHG bath given  -GOC discussion w/ family pending    Is this a stroke patient? no    Neuro:  Alix Coma Scale  Best Eye Response: 4-->(E4) spontaneous  Best Motor Response: 5-->(M5) localizes pain  Best Verbal Response: 1-->(V1) none  Alix Coma Scale Score: 10  Assessment Qualifiers: patient not sedated/intubated, no eye obstruction present  Pupil PERRLA: no     24hr Temp:  [97.1 °F (36.2 °C)-98.2 °F (36.8 °C)]     CV:   Rhythm: normal sinus rhythm  BP goals:   SBP < 180  MAP > 65    Resp:      Vent Mode: A/C  Set Rate: 22 BPM  Oxygen Concentration (%): 35  Vt Set: 320 mL  PEEP/CPAP: 5 cmH20  Pressure Support: 8 cmH20    Plan: trach in place    GI/:     Diet/Nutrition Received: NPO  Last Bowel Movement: 06/26/24  Voiding Characteristics: external catheter    Intake/Output Summary (Last 24 hours) at 6/27/2024 0626  Last data filed at 6/27/2024 0601  Gross per 24 hour   Intake 1853.08 ml   Output 850 ml   Net 1003.08 ml     Unmeasured Output  Urine Occurrence: 1  Stool Occurrence: 1  Pad Count: 1    Labs/Accuchecks:  Recent Labs   Lab 06/27/24  0132 06/27/24  0501   WBC 5.84  --    RBC 3.13*  --    HGB 7.4*  --    HCT 24.4* 24*     --       Recent Labs   Lab 06/27/24  0132      K 3.9   CO2 27      BUN 7*   CREATININE 0.5   ALKPHOS 39*   ALT 6*   AST 18   BILITOT 0.4    No results for input(s): "PROTIME", "INR", "APTT", "HEPANTIXA" in the last 168 hours. No results for input(s): "CPK", "CPKMB", "TROPONINI", "MB" in the last 168 hours.    Electrolytes: Electrolytes replaced  Accuchecks: none    Gtts:   0.9% NaCl   Intravenous Continuous 50 mL/hr at 06/27/24 0601 Rate " Verify at 24 0601       LDA/Wounds:    Nurses Note -- 4 Eyes    Is there altered skin present? yes     Please check the following boxes that apply:   [] LDA Added if Not in Epic (Describe Wound)   [] New Altered Skin Integrity was Present on Admit and Documented in LDA   [] Wound Image Taken    Wound Care Consulted? Yes    Second RN/Staff Member:  ALEX Murphy    Restraints:   Restraint Order  Length of Order: Order good for next 24 hours or when removed.  Date that the current order will : 24  Time that the current order will : 1355  Order Upon Application: Yes

## 2024-06-27 NOTE — ASSESSMENT & PLAN NOTE
- follow up evaluation of skin injury.  - pt transferred from [facility] with [facility] for neurosx consult and continuous eeg monitoring.  - pt likely presented with purple/maroon discoloration and intact skin to sacral region thought as a suspected deep tissue injury. Pt also has redness and irritation from a moisture related injury to the buttocks/groin area.  - continue Triad bid/prn to perirectal area.  - continue BPCO bid/prn to sacrum.  - central sacral area now with open wound and appears as a stage 2 pressure injury, healing. Brownish and pink wound base. Left sacrum remains with intact purple discoloration.  - Immerse surface.  - lira catheter in place.  - foam dressing intact to sacrum.  - wedge for offloading. Heel foams intact.  - turn q2h.  - robert score documented at 10 with a nutrition sub scale score of 2.  - RD following. TFs ordered.  - nursing to maintain pressure injury prevention measures and continue wound care per orders.
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
## 2024-06-27 NOTE — PROGRESS NOTES
[facility] Critical [facility]  General Surgery  Progress Note    Subjective:     History of Present Illness:  June Boykin is a 69yoF who was found down unresponsive in late May. She has been admitted since that time with several attempts at extubation with subsequent failure. She has undergone several EEGs which have demonstrated seizure activity. Currently, she remains intubated on minimal ventilatory settings. She is receiving tube feeds, at goal, through an NG tube. She is non-purposeful in her movements is not responsive. General surgery consulted for tracheostomy and PEG tube insertion.     Post-Op Info:  Procedure(s) (LRB):  ROBOTIC CLOSURE, GASTROSTOMY (N/A)   1 Day Post-Op     Interval History: NAEON. POD1 robotic gastrostomy closure. Plan for family meeting with primary team today     Medications:  Continuous Infusions:   0.9% NaCl   Intravenous Continuous 50 mL/hr at 06/27/24 0601 Rate Verify at 06/27/24 0601     Scheduled Meds:   atorvastatin  40 mg Per G Tube Daily    balsam peru-castor oiL   Topical (Top) BID    cloBAZam  20 mg Per G Tube Daily    And    cloBAZam  20 mg Per G Tube QHS    heparin (porcine)  5,000 Units Subcutaneous Q12H    lacosamide  200 mg Per G Tube Q12H    levetiracetam  1,500 mg Per G Tube BID    pantoprazole  40 mg Per G Tube Daily    perampaneL  12 mg Per G Tube Daily    psyllium husk (aspartame)  1 packet Per G Tube TID    QUEtiapine  25 mg Per G Tube QHS    sodium chloride  2,000 mg Per G Tube TID    thiamine  100 mg Per G Tube Daily     PRN Meds:  Current Facility-Administered Medications:     acetaminophen, 650 mg, Per G Tube, Q4H PRN    calcium gluconate IVPB, 1 g, Intravenous, PRN    calcium gluconate IVPB, 2 g, Intravenous, PRN    calcium gluconate IVPB, 3 g, Intravenous, PRN    dextrose 10%, 12.5 g, Intravenous, PRN    dextrose 10%, 25 g, Intravenous, PRN    glucagon (human recombinant), 1 mg, Intramuscular, PRN    glucose, 16 g, Per G Tube, PRN    glucose, 24 g, Per G  Tube, PRN    hydrALAZINE, 10 mg, Intravenous, Q4H PRN    magnesium oxide, 800 mg, Per G Tube, PRN    magnesium oxide, 800 mg, Per G Tube, PRN    ondansetron, 4 mg, Intravenous, Q8H PRN    potassium bicarbonate, 35 mEq, Per G Tube, PRN    potassium bicarbonate, 50 mEq, Per G Tube, PRN    potassium bicarbonate, 60 mEq, Per G Tube, PRN    potassium, sodium phosphates, 2 packet, Per G Tube, PRN    potassium, sodium phosphates, 2 packet, Per G Tube, PRN    potassium, sodium phosphates, 2 packet, Per G Tube, PRN    sodium chloride 0.9%, 10 mL, Intravenous, Q12H PRN     Review of patient's allergies indicates:  No Known Allergies  Objective:     Vital Signs (Most Recent):  Temp: 97.6 °F (36.4 °C) (06/27/24 0301)  Pulse: 65 (06/27/24 0728)  Resp: (!) 28 (06/27/24 0728)  BP: (!) 144/79 (06/27/24 0601)  SpO2: 100 % (06/27/24 0728) Vital Signs (24h Range):  Temp:  [97.1 °F (36.2 °C)-97.6 °F (36.4 °C)] 97.6 °F (36.4 °C)  Pulse:  [65-97] 65  Resp:  [0-28] 28  SpO2:  [97 %-100 %] 100 %  BP: (110-166)/(55-86) 144/79  Arterial Line BP: ()/(51-89) 158/71     Weight: 47 kg (103 lb 9.9 oz)  Body mass index is 21.66 kg/m².    Intake/Output - Last 3 Shifts         06/25 0700 06/26 0659 06/26 0700 06/27 0659 06/27 0700 06/28 0659    I.V. (mL/kg) 6.2 (0.1) 602.6 (12.8)     NG/ 400     IV Piggyback 598 850.5     Total Intake(mL/kg) 1079.3 (23) 1853.1 (39.4)     Urine (mL/kg/hr) 705 (0.6) 850 (0.8)     Other 0      Stool 100      Total Output 805 850     Net +274.3 +1003.1            Urine Occurrence 2 x 1 x     Stool Occurrence 1 x               Physical Exam  Vitals and nursing note reviewed.   Constitutional:       General: She is not in acute distress.     Appearance: She is ill-appearing.   HENT:      Head: Normocephalic and atraumatic.   Neck:      Comments: Trach in place with no bleeding noted  Cardiovascular:      Rate and Rhythm: Normal rate.   Pulmonary:      Effort: Pulmonary effort is normal. No respiratory  distress.      Comments: Vent Mode: A/C  Oxygen Concentration (%):  [35] 35  Resp Rate Total:  [22 br/min-25 br/min] 22 br/min  Vt Set:  [320 mL] 320 mL  PEEP/CPAP:  [5 cmH20] 5 cmH20  Mean Airway Pressure:  [11 qxP96-55 cmH20] 11 cmH20  Abdominal:      General: Abdomen is flat. There is no distension.      Palpations: Abdomen is soft.      Tenderness: There is no abdominal tenderness. There is no guarding or rebound.      Comments: Incisions are clean, dry, and intact without drainage, erythema, or increased warmth. Appropriately TTP. No peritonitic signs    Neurological:      Comments: Non-responsive, non-purposeful movements of the upper and lower extremities          Significant Labs:  I have reviewed all pertinent lab results within the past 24 hours.  CBC:   Recent Labs   Lab 06/27/24 0132 06/27/24  0501   WBC 5.84  --    RBC 3.13*  --    HGB 7.4*  --    HCT 24.4* 24*     --    MCV 78*  --    MCH 23.6*  --    MCHC 30.3*  --      CMP:   Recent Labs   Lab 06/27/24 0132   GLU 81   CALCIUM 9.1   ALBUMIN 2.0*   PROT 5.9*      K 3.9   CO2 27      BUN 7*   CREATININE 0.5   ALKPHOS 39*   ALT 6*   AST 18   BILITOT 0.4       Significant Diagnostics:  I have reviewed all pertinent imaging results/findings within the past 24 hours.    Assessment/Plan:     Focal seizures  June Boykin is a 69yoF who was found down in late May. Her work-up has been significant for ongoing seizures. She has failed multiple extubations s/p trach, PEG 6/24 with PEG subsequently removed by patient 6/25. Went for robotic closure of gastrostomy site 6/26 after family reportedly did not wish to replace G tube.     - Extensively discussed plan of care with daughter Alexandria Cleveland about replacing PEG vs closure of open gastrostomy site. She believes patient would not want a feeding tube and would have not wanted the trach that her son consented for. Plan for family meeting today  - No heavy lifting (>10lbs) for 4 weeks post  op  - No soaking or submerging in water for 2 weeks post op  - Okay for shower/bath this afternoon  - Remainder of care per primary team  - Please contact general surgery with any questions, concerns, or clinical status changes      Case discussed with Dr. Botello.      YADIRA Sloan-C  General Surgery  [facility]

## 2024-06-27 NOTE — ASSESSMENT & PLAN NOTE
-tube feeds held due to patient pulling out PEG tube  -GenSurg consulted  - Awaiting decision from collaborative family discussion regarding PEG tube replacement
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
## 2024-06-27 NOTE — SUBJECTIVE & OBJECTIVE
Interval History: NACHRISTIANOON. POD1 robotic gastrostomy closure. Plan for family meeting with primary team today     Medications:  Continuous Infusions:   0.9% NaCl   Intravenous Continuous 50 mL/hr at 06/27/24 0601 Rate Verify at 06/27/24 0601     Scheduled Meds:   atorvastatin  40 mg Per G Tube Daily    balsam peru-castor oiL   Topical (Top) BID    cloBAZam  20 mg Per G Tube Daily    And    cloBAZam  20 mg Per G Tube QHS    heparin (porcine)  5,000 Units Subcutaneous Q12H    lacosamide  200 mg Per G Tube Q12H    levetiracetam  1,500 mg Per G Tube BID    pantoprazole  40 mg Per G Tube Daily    perampaneL  12 mg Per G Tube Daily    psyllium husk (aspartame)  1 packet Per G Tube TID    QUEtiapine  25 mg Per G Tube QHS    sodium chloride  2,000 mg Per G Tube TID    thiamine  100 mg Per G Tube Daily     PRN Meds:  Current Facility-Administered Medications:     acetaminophen, 650 mg, Per G Tube, Q4H PRN    calcium gluconate IVPB, 1 g, Intravenous, PRN    calcium gluconate IVPB, 2 g, Intravenous, PRN    calcium gluconate IVPB, 3 g, Intravenous, PRN    dextrose 10%, 12.5 g, Intravenous, PRN    dextrose 10%, 25 g, Intravenous, PRN    glucagon (human recombinant), 1 mg, Intramuscular, PRN    glucose, 16 g, Per G Tube, PRN    glucose, 24 g, Per G Tube, PRN    hydrALAZINE, 10 mg, Intravenous, Q4H PRN    magnesium oxide, 800 mg, Per G Tube, PRN    magnesium oxide, 800 mg, Per G Tube, PRN    ondansetron, 4 mg, Intravenous, Q8H PRN    potassium bicarbonate, 35 mEq, Per G Tube, PRN    potassium bicarbonate, 50 mEq, Per G Tube, PRN    potassium bicarbonate, 60 mEq, Per G Tube, PRN    potassium, sodium phosphates, 2 packet, Per G Tube, PRN    potassium, sodium phosphates, 2 packet, Per G Tube, PRN    potassium, sodium phosphates, 2 packet, Per G Tube, PRN    sodium chloride 0.9%, 10 mL, Intravenous, Q12H PRN     Review of patient's allergies indicates:  No Known Allergies  Objective:     Vital Signs (Most Recent):  Temp: 97.6 °F (36.4  °C) (06/27/24 0301)  Pulse: 65 (06/27/24 0728)  Resp: (!) 28 (06/27/24 0728)  BP: (!) 144/79 (06/27/24 0601)  SpO2: 100 % (06/27/24 0728) Vital Signs (24h Range):  Temp:  [97.1 °F (36.2 °C)-97.6 °F (36.4 °C)] 97.6 °F (36.4 °C)  Pulse:  [65-97] 65  Resp:  [0-28] 28  SpO2:  [97 %-100 %] 100 %  BP: (110-166)/(55-86) 144/79  Arterial Line BP: ()/(51-89) 158/71     Weight: 47 kg (103 lb 9.9 oz)  Body mass index is 21.66 kg/m².    Intake/Output - Last 3 Shifts         06/25 0700 06/26 0659 06/26 0700 06/27 0659 06/27 0700 06/28 0659    I.V. (mL/kg) 6.2 (0.1) 602.6 (12.8)     NG/ 400     IV Piggyback 598 850.5     Total Intake(mL/kg) 1079.3 (23) 1853.1 (39.4)     Urine (mL/kg/hr) 705 (0.6) 850 (0.8)     Other 0      Stool 100      Total Output 805 850     Net +274.3 +1003.1            Urine Occurrence 2 x 1 x     Stool Occurrence 1 x               Physical Exam  Vitals and nursing note reviewed.   Constitutional:       General: She is not in acute distress.     Appearance: She is ill-appearing.   HENT:      Head: Normocephalic and atraumatic.   Neck:      Comments: Trach in place with no bleeding noted  Cardiovascular:      Rate and Rhythm: Normal rate.   Pulmonary:      Effort: Pulmonary effort is normal. No respiratory distress.      Comments: Vent Mode: A/C  Oxygen Concentration (%):  [35] 35  Resp Rate Total:  [22 br/min-25 br/min] 22 br/min  Vt Set:  [320 mL] 320 mL  PEEP/CPAP:  [5 cmH20] 5 cmH20  Mean Airway Pressure:  [11 ktI20-69 cmH20] 11 cmH20  Abdominal:      General: Abdomen is flat. There is no distension.      Palpations: Abdomen is soft.      Tenderness: There is no abdominal tenderness. There is no guarding or rebound.      Comments: Incisions are clean, dry, and intact without drainage, erythema, or increased warmth. Appropriately TTP. No peritonitic signs    Neurological:      Comments: Non-responsive, non-purposeful movements of the upper and lower extremities          Significant Labs:  I  have reviewed all pertinent lab results within the past 24 hours.  CBC:   Recent Labs   Lab 06/27/24  0132 06/27/24  0501   WBC 5.84  --    RBC 3.13*  --    HGB 7.4*  --    HCT 24.4* 24*     --    MCV 78*  --    MCH 23.6*  --    MCHC 30.3*  --      CMP:   Recent Labs   Lab 06/27/24  0132   GLU 81   CALCIUM 9.1   ALBUMIN 2.0*   PROT 5.9*      K 3.9   CO2 27      BUN 7*   CREATININE 0.5   ALKPHOS 39*   ALT 6*   AST 18   BILITOT 0.4       Significant Diagnostics:  I have reviewed all pertinent imaging results/findings within the past 24 hours.

## 2024-06-27 NOTE — ASSESSMENT & PLAN NOTE
Daily abg  Daily cxr  Daily vent weaning  Post trach   Vent Mode: A/C  Oxygen Concentration (%):  [35] 35  Resp Rate Total:  [22 br/min] 22 br/min  Vt Set:  [320 mL] 320 mL  PEEP/CPAP:  [5 cmH20] 5 cmH20  Mean Airway Pressure:  [11 rkS69-06 cmH20] 11 cmH20

## 2024-06-28 ENCOUNTER — DOCUMENTATION ONLY (OUTPATIENT)
Dept: NEUROLOGY | Facility: CLINIC | Age: 69
End: 2024-06-28
Payer: MEDICARE

## 2024-06-28 NOTE — ASSESSMENT & PLAN NOTE
Appears to be much family disagreement in [facility]  MD Diego unable to directly speak with daughter and son today via phone. Spoke with son's wife who was instructed to have pts children agree about decision regarding proceeding with PEG procedure, then to call unit back with the plan.

## 2024-06-28 NOTE — ASSESSMENT & PLAN NOTE
Hypothermic to 33.9C; hypotensive to MAP 40 following intubation. Slight improvement w/ fluid resuscitation and use of pressors.     -Pan cultured 6/11  -Sputum gram stain w/ rare gram + cocci  -Blood cultures w/ NGTD  -completed ana/angelita kay w/ goal normothermia

## 2024-06-28 NOTE — ASSESSMENT & PLAN NOTE
-tube feeds held due to patient pulling out PEG tube  -GenSurg consulted  - Awaiting decision from collaborative family discussion regarding PEG tube replacement; readdressed 6/28, family instructed to discuss amongst themselves regarding replacement of PEG tube then to notify us

## 2024-06-28 NOTE — OP NOTE
[facility]  Surgery Department  Operative Note    SUMMARY     Patient: June Boykin  Date: 6/27/2024  MRN: [id number]    Date of Procedure: 6/26/2024     Procedure: Procedure(s) (LRB):  ROBOTIC CLOSURE, GASTROSTOMY (N/A)       Surgeons and Role:     * Stephon Botello MD - Primary     * Mark Skaggs MD - Resident - Assisting      Pre-Operative Diagnosis: Seizures [R56.9]    Post-Operative Diagnosis: Post-Op Diagnosis Codes:     * Seizures [R56.9]    Anesthesia: General    Findings: small hole in anterior stomach along the greater curve, no gastric contents in the abdomen    Indications for Procedure:   June Boykin is a 69 y.o.  female s/p PEG tube placement whose tube was inadvertently pulled prematurely     Procedure in Detail:     The patient was brought to the OR and positioned in supine position. General anesthesia was administered. The patient was prepped and draped in the standard sterile fashion.. A time out was called and all were in agreement.     Local anesthetic was administered just inferior to umbilicus and an incision was made using a #11 blade. Using blunt and sharp dissection, the anterior fascia was identified. The Veress needle was inserted and after water drop test confirmation, the abdomen was insufflated to 15 mmHg. Two stay sutures of 0 Vicryl were placed in the fascia and an 8mm da Sue trocar was placed via optiview technique between the sutures, without complication. Three 8 mm trocars were placed in a similar fashion under direct visualization.      The abdomen was free of gastric contents  The PEG tube insertion site as located on the anterior stomach along the lesser curve. It as oversewn with a running 3-0 Vloc. The trocars were removed and pneumoperitoneum was relieved and the fascial defect was closed with 0-vicryl suture with a U-stitch. 3-0 vicryl deep dermal and 4-0 monocryl subcutaneous was used to close all skin incisions. Dermabond was applied.  All sponge, instrument, and need counts were correct at the end of the case.     Estimated Blood Loss (EBL): minimal    Complications: No    Specimens: none    Condition: Good    Disposition: PACU - hemodynamically stable.    Attestation: Dr. Botello was present and scrubbed for the entire procedure.    Stephon Botello MD, [facility], [facility]  Acute Care Surgery & Surgical Critical Care  [facility]
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
## 2024-06-28 NOTE — PLAN OF CARE
"[facility] Care Plan    POC reviewed with June Boykin at 0300. Pt unable to verbalize understanding. Questions and concerns addressed. No acute events overnight. Pt progressing toward goals. See below and flowsheets for full assessment and VS info.     -Hourly neuro checks  -250 cc NS bolus for hypotension  -Tube feeds at goal rate  -Q8 CBC; HBG 7.1 on AM labs      Is this a stroke patient? no    Neuro:  [place] Coma Scale  Best Eye Response: 4-->(E4) spontaneous  Best Motor Response: 5-->(M5) localizes pain  Best Verbal Response: 1-->(V1) none  [place] Coma Scale Score: 10  Assessment Qualifiers: patient intubated  Pupil PERRLA: no     24hr Temp:  [97 °F (36.1 °C)-98.9 °F (37.2 °C)]     CV:   Rhythm: normal sinus rhythm  BP goals:   SBP < 180  MAP > 65    Resp:      Vent Mode: A/C  Set Rate: 22 BPM  Oxygen Concentration (%): 35  Vt Set: 320 mL  PEEP/CPAP: 5 cmH20  Pressure Support: 8 cmH20    Plan: trach in place    GI/:     Diet/Nutrition Received: tube feeding  Last Bowel Movement: 06/26/24  Voiding Characteristics: urethral catheter (bladder)    Intake/Output Summary (Last 24 hours) at 6/28/2024 0625  Last data filed at 6/28/2024 0601  Gross per 24 hour   Intake 3787.5 ml   Output 1315 ml   Net 2472.5 ml     Unmeasured Output  Urine Occurrence: 1  Stool Occurrence: 1  Pad Count: 1    Labs/Accuchecks:  Recent Labs   Lab 06/28/24  0136   WBC 4.53   RBC 3.00*   HGB 7.1*   HCT 23.4*         Recent Labs   Lab 06/28/24  0136      K 3.2*   CO2 24      BUN 5*   CREATININE 0.5   ALKPHOS 43*   ALT <5*   AST 18   BILITOT 0.2    No results for input(s): "PROTIME", "INR", "APTT", "HEPANTIXA" in the last 168 hours. No results for input(s): "CPK", "CPKMB", "TROPONINI", "MB" in the last 168 hours.    Electrolytes: Electrolytes replaced  Accuchecks: none    Gtts:   0.9% NaCl   Intravenous Continuous 50 mL/hr at 06/28/24 0601 Rate Verify at 06/28/24 0601       LDA/Wounds:    Nurses Note -- 4 Eyes    Is " there altered skin present? yes     Please check the following boxes that apply:   [] LDA Added if Not in Epic (Describe Wound)   [] New Altered Skin Integrity was Present on Admit and Documented in LDA   [] Wound Image Taken    Wound Care Consulted? Yes    Second RN/Staff Member:  ALEX Murphy    Restraints:   Restraint Order  Length of Order: Order good for next 24 hours or when removed.  Date that the current order will : 24  Time that the current order will : 1011  Order Upon Application: Yes

## 2024-06-28 NOTE — PROGRESS NOTES
EEG Hook up  PM Check Electrodes had to be fixed.Yes    Skin Integrity: Normal   No signs of skin breakdown seen during hookup  Yamileth Braswell   06/28/2024 2:01 PM

## 2024-06-28 NOTE — PLAN OF CARE
ICU Attending    Attempted to call Alexandria and Jason to get a plan of care from them. Alexandria's phone went to voice mail and I left a message requesting a call back. When I called Jason, his wife answered and she he was in the restroom. I explained to her that we need a decision on the care plan. We must decide if we are going to A) continue care which would mean replacing the PEG tube and then LTAC once patient ready or B) not continue care and transition to CMO. She expressed understanding and said she would have Jason call us after he finished work around 4 pm.  I explained to her that I do not need Jason to call me and tell me his wishes, rather I need BOTH him and Alexandria to come together and discuss amongst themselves what the FINAL decision for the care plan will be for their mother. I also explained to her that if one of the siblings no longer wishes to be part of the decision process than that sibling must call and inform us of that.    She verbalized understanding and assured me that they would talk and we would get a call back this afternoon. She explained that they are having car trouble and it is difficult for them to travel here. When asked if she knew why Alexandria never showed or called for the family meeting she planned earlier this week she said she was not clear why.     Andreas Diego MD MPH  NeuroCritical Care & Vascular Neurology

## 2024-06-28 NOTE — SUBJECTIVE & OBJECTIVE
Interval History:  see hospital course    Review of Systems   Unable to perform ROS: Intubated     Objective:     Vitals:  Temp: 98.9 °F (37.2 °C)  Pulse: 89  Rhythm: normal sinus rhythm  BP: 138/89  MAP (mmHg): 109  Resp: (!) 43  SpO2: 96 %  Oxygen Concentration (%): 35  Vent Mode: A/C  Set Rate: 22 BPM  Vt Set: 320 mL  PEEP/CPAP: 5 cmH20  Peak Airway Pressure: 27 cmH20  Mean Airway Pressure: 12 cmH20  Plateau Pressure: 0 cmH20    Temp  Min: 97.7 °F (36.5 °C)  Max: 99.5 °F (37.5 °C)  Pulse  Min: 73  Max: 94  BP  Min: 96/53  Max: 180/67  MAP (mmHg)  Min: 71  Max: 110  Resp  Min: 21  Max: 43  SpO2  Min: 95 %  Max: 100 %  Oxygen Concentration (%)  Min: 35  Max: 35    06/27 0701 - 06/28 0700  In: 3787.5 [I.V.:1178.3]  Out: 1315 [Urine:1315]   Unmeasured Output  Urine Occurrence: 1  Stool Occurrence: 1  Pad Count: 1        Physical Exam  Vitals and nursing note reviewed.   Constitutional:       General: She is not in acute distress.     Interventions: She is intubated.   HENT:      Head: Normocephalic.      Mouth/Throat:      Mouth: Mucous membranes are moist.   Cardiovascular:      Rate and Rhythm: Normal rate and regular rhythm.      Pulses:           Radial pulses are 2+ on the right side and 2+ on the left side.   Pulmonary:      Effort: Pulmonary effort is normal. No respiratory distress. She is intubated.      Comments: Tracheostomy  Neurological:      Comments: BARAK orientation d/t tracheostomy   Not following commands  GCS 10T  BUE flaccid, BLE movement elicited w/noxious stimuli   BARAK sensation d/t tracheostomy           Medications:  Continuous0.9% NaCl, Last Rate: 50 mL/hr at 06/28/24 1145    Scheduledatorvastatin, 40 mg, Daily  balsam peru-castor oiL, , BID  cefTRIAXone (Rocephin) IV (PEDS and ADULTS), 1 g, Q24H  cloBAZam, 20 mg, Daily   And  cloBAZam, 20 mg, QHS  cosyntropin, 0.25 mg, Once  heparin (porcine), 5,000 Units, Q12H  lacosamide, 200 mg, Q12H  levetiracetam, 1,500 mg, BID  pantoprazole, 40 mg,  Daily  perampaneL, 12 mg, Daily  sodium chloride, 2,000 mg, TID  thiamine, 100 mg, Daily    PRNacetaminophen, 650 mg, Q4H PRN  calcium gluconate IVPB, 1 g, PRN  calcium gluconate IVPB, 2 g, PRN  calcium gluconate IVPB, 3 g, PRN  dextrose 10%, 12.5 g, PRN  dextrose 10%, 25 g, PRN  glucagon (human recombinant), 1 mg, PRN  glucose, 16 g, PRN  glucose, 24 g, PRN  hydrALAZINE, 10 mg, Q4H PRN  magnesium oxide, 800 mg, PRN  magnesium oxide, 800 mg, PRN  ondansetron, 4 mg, Q8H PRN  potassium bicarbonate, 35 mEq, PRN  potassium bicarbonate, 50 mEq, PRN  potassium bicarbonate, 60 mEq, PRN  potassium, sodium phosphates, 2 packet, PRN  potassium, sodium phosphates, 2 packet, PRN  potassium, sodium phosphates, 2 packet, PRN  sodium chloride 0.9%, 10 mL, Q12H PRN      Today I personally reviewed pertinent medications, lines/drains/airways, imaging, cardiology results, laboratory results, notably:    Diet  Diet NPO

## 2024-06-28 NOTE — PLAN OF CARE
"[facility] Care Plan    POC reviewed with June Boykin and family at 1400. Pt unable to verbalize understanding. Questions and concerns addressed. No acute events today. Pt progressing toward goals. Will continue to monitor. See below and flowsheets for full assessment and VS info.     -cEEG placed d/t concerns about decreased LOC (not opening eyes and minimal movement)  -BM x2   -arterial line dressing changed  -partial bath  -see nurses note about RN interaction with both son and daughter         Is this a stroke patient? no    Neuro:  [place] Coma Scale  Best Eye Response: 4-->(E4) spontaneous  Best Motor Response: 5-->(M5) localizes pain  Best Verbal Response: 1-->(V1) none  [place] Coma Scale Score: 10  Assessment Qualifiers: patient intubated  Pupil PERRLA: yes     24 hr Temp:  [98.6 °F (37 °C)-99.5 °F (37.5 °C)]     CV:   Rhythm: normal sinus rhythm  BP goals:   SBP < 180  MAP > 65    Resp:      Vent Mode: A/C  Set Rate: 22 BPM  Oxygen Concentration (%): 35  Vt Set: 320 mL  PEEP/CPAP: 5 cmH20  Pressure Support: 8 cmH20    Plan: trach in place    GI/:     Diet/Nutrition Received: tube feeding  Last Bowel Movement: 06/28/24  Voiding Characteristics: urethral catheter (bladder)    Intake/Output Summary (Last 24 hours) at 6/28/2024 1843  Last data filed at 6/28/2024 1801  Gross per 24 hour   Intake 3718.56 ml   Output 1565 ml   Net 2153.56 ml     Unmeasured Output  Urine Occurrence: 1  Stool Occurrence: 1  Pad Count: 1    Labs/Accuchecks:  Recent Labs   Lab 06/28/24  1601   WBC 4.00   RBC 2.99*   HGB 7.2*   HCT 22.9*         Recent Labs   Lab 06/28/24  0136      K 3.2*   CO2 24      BUN 5*   CREATININE 0.5   ALKPHOS 43*   ALT <5*   AST 18   BILITOT 0.2    No results for input(s): "PROTIME", "INR", "APTT", "HEPANTIXA" in the last 168 hours. No results for input(s): "CPK", "CPKMB", "TROPONINI", "MB" in the last 168 hours.    Electrolytes: Electrolytes replaced  Accuchecks: none    Gtts:   " 0.9% NaCl   Intravenous Continuous 50 mL/hr at 24 1801 Rate Verify at 24 1801       LDA/Wounds:      Nurses Note -- 4 Eyes      Is there altered skin present? yes     Please check the following boxes that apply:   [] LDA Added if Not in Epic (Describe Wound)   [] New Altered Skin Integrity was Present on Admit and Documented in LDA   [] Wound Image Taken    Wound Care Consulted? Yes    Second RN/Staff Member:  GÉNESIS Quiroga      Restraints:   Restraint Order  Length of Order: Order good for next 24 hours or when removed.  Date that the current order will : 24  Time that the current order will : 806  Order Upon Application: Yes    [facility]

## 2024-06-28 NOTE — ASSESSMENT & PLAN NOTE
Cleveland placed due to unhealing sacral wound; upon insertion, purulent exudate noted; UA obtained  Continue on ceftiraxone 1g x3 doses   Ucx pending

## 2024-06-28 NOTE — NURSING
"This afternoon the patients daughter and significant other drove from [place] to see patient. They expressed their grief about the patients situation and discussed the patients status at length. During this discussion, the patients daughter expressed she and the patient had several discussions about her wishes and goals of care if in the event the patient was in a state such as her current one. She expressed to her daughter that "she doesn't want any of this" and that "she needs to go home with the lord, her body is done". She expressed that her brother will not take her phone calls, refuses a ride to the hospital and refuses to withdraw care. RN spoke and provided problem solving solutions and possible avenues for the patient and her family.     At 1840 the patients son Jason called and stated " I didn't know things were like this"--despite several conversations with him personally. Discussed with him the absolute need for himself and Alexandria, his sister to be present here for a family meeting for goals of care. Son agreeable and said we would talk in the morning. I informed him to allow some time for the providers to coordinate their schedules so as to find a time that is accommodating to both for a quality conversation.       "

## 2024-06-29 ENCOUNTER — DOCUMENTATION ONLY (OUTPATIENT)
Dept: NEUROLOGY | Facility: CLINIC | Age: 69
End: 2024-06-29
Payer: MEDICARE

## 2024-06-29 NOTE — NURSING
Pt arrived back to room following CT        Pt was escorted by RN, PCT, and RT on cardiac monitoring, O2, ambu bag, and portable vent.        Patient placed back on bedside monitor with alarms audible, bed in low position with bed alarm on, call light within reach. DOLORES.

## 2024-06-29 NOTE — PROCEDURES
DATE: 6/28/24    EEG NUMBER: FH -1    REFERRING PHYSICIAN:  Dr. Resendez      This EEG was performed to assess for subclinical seizures      ELECTROENCEPHALOGRAM REPORT     METHODOLOGY:  Electroencephalographic (EEG) recording is with electrodes placed according to the International 10-20 placement system.  Thirty two (32) channels of digital signal are simultaneously recorded from the scalp and may include EKG, EMG, and/or eye monitors.   Recording band pass was 0.1 to 512 hz.  Digital video recording of the patient is simultaneously recorded with the EEG.  The nursing staff report clinical symptoms and may press an event button when the patient has symptoms of clinical interest to the treating physicians.  EEG and video recording is stored and archived in digital format.  The entire recording is visually reviewed, and the times identified by computer analysis as being spikes or seizures are reviewed again.  Activation procedures which include photic stimulation, hyperventilation and instructing patients to perform simple task are done in selected patients.   Compresses spectral analysis (CSA) is also performed on the activity recorded from each individual channel.  This is displayed as a power display of frequencies from 0 to 30 Hz over time.   The CSA analysis is done and displayed continuously.  This is reviewed for asymmetries in power between homologous areas of the scalp and for presence of changes in power which can be seen when seizures occur.  Sections of suspected abnormalities on the CSA is then compared with the original EEG recording.                [employer] software was also utilized in the review of this study.  This software suite analyzes the EEG recording in multiple domains.  Coherence and rhythmicity is computed to identify EEG sections which may contain organized seizures.  Each channel undergoes analysis to detect presence of spike and sharp waves which have special and morphological  characteristic of epileptic activity.  The routine EEG recording is converted from spacial into frequency domain.  This is then displayed comparing homologous areas to identify areas of significant asymmetry.  Algorithm to identify non-cortically generated artifact is used to separate eye movement, EMG and other artifact from the EEG.     Recording times   Start on June 28, 2024 at hours 13 minute 34 seconds 8   End on June 29, 2024 at hours 7 minute 0 seconds 11   Restart on June 29, 2024 at hours 7 minute 0 seconds 39   End on June 29, 2024 at hours 13 minute 30 seconds 52  The total time of EEG recording for the study 24 hours and 56 minutes    EEG FINDINGS:  The recording was obtained with a number of standard bipolar and referential montages during obtunded state.  Diffuse disorganized slowing of the background with an admixture of theta range frequencies with occasional posterior alpha range frequencies were noted.  Frontal predominant semi rhythmical delta activity was seen.  The background was punctuated by pseudo periodic left posterior maximum medium amplitude as well as left lateralized epileptiform discharges with a large field involving the right hemisphere.  These occurred with a periodicity of 1-1.5 hertz  During deeper stages of clinical sleep symmetric spindles were noted.  Normal sleep architecture was seen Spontaneous variability and reactivity were noted.  No evolving electrographic seizures were visualized    The EKG channel revealed a sinus rhythm.     IMPRESSION:  This is an abnormal EEG during obtunded state.  Diffuse disorganized low-amplitude slowing of the background was noted.  Left posterior maximum as well as lateralized pseudo periodic epileptiform discharges were noted       CLINICAL CORRELATION:  The patient is 69-year-old female with a history of seizures was currently maintained on Keppra, Vimpat, Onfi, Fycompa.  This is an abnormal EEG during obtunded state.  Diffuse  disorganization of the background noted suggestive of moderate to severe encephalopathy nonspecific to the cause.  The presence of left posterior maximum as well as left lateralized periodic epileptiform discharges is suggestive of cortical irritability in the left hemisphere with an increased risk of focal seizures.  During this study no seizures were recorded.

## 2024-06-29 NOTE — SUBJECTIVE & OBJECTIVE
Interval History: As      Review of Systems   Unable to perform ROS: Acuity of condition     Unable to obtain a complete ROS due to level of consciousness.  Objective:     Vitals:  Temp: 98.7 °F (37.1 °C)  Pulse: 81  Rhythm: normal sinus rhythm  BP: (!) 94/55  MAP (mmHg): 78  Resp: (!) 22  SpO2: 97 %  Oxygen Concentration (%): 35  Vent Mode: A/C  Set Rate: 22 BPM  Vt Set: 320 mL  PEEP/CPAP: 5 cmH20  Peak Airway Pressure: 42 cmH20  Mean Airway Pressure: 15 cmH20  Plateau Pressure: 0 cmH20    Temp  Min: 98.5 °F (36.9 °C)  Max: 98.8 °F (37.1 °C)  Pulse  Min: 64  Max: 105  BP  Min: 94/55  Max: 195/88  MAP (mmHg)  Min: 77  Max: 130  Resp  Min: 22  Max: 37  SpO2  Min: 93 %  Max: 100 %  Oxygen Concentration (%)  Min: 35  Max: 35    06/28 0701 - 06/29 0700  In: 2867 [I.V.:1198.4]  Out: 1910 [Urine:1910]   Unmeasured Output  Urine Occurrence: 1  Stool Occurrence: 1  Pad Count: 1        Physical Exam  Vitals and nursing note reviewed.   Constitutional:       Appearance: She is ill-appearing.   HENT:      Head:      Comments: EEG in place     Right Ear: External ear normal.      Left Ear: External ear normal.      Nose: Nose normal.      Mouth/Throat:      Mouth: Mucous membranes are moist.      Pharynx: Oropharynx is clear.   Eyes:      Pupils: Pupils are equal, round, and reactive to light.   Cardiovascular:      Rate and Rhythm: Normal rate and regular rhythm.   Pulmonary:      Breath sounds: No rhonchi or rales.      Comments: Trach on vent   Abdominal:      General: There is no distension.      Palpations: Abdomen is soft.      Tenderness: There is no abdominal tenderness. There is no guarding.   Musculoskeletal:      Cervical back: Normal range of motion.      Right lower leg: No edema.      Left lower leg: No edema.   Skin:     General: Skin is warm and dry.      Capillary Refill: Capillary refill takes less than 2 seconds.   Neurological:      Comments: No sedation     E2 V1T M5  Obtunded. Not following commands.   CN  II-XII grossly intact, specifically:  PERRL.   Downward gaze, eyes crossing midline w OCR   No facial asymmetry  + cough, corneals  Motor:  Moving all extremities spontaneously   Sensation intact to noxious stimuli x4- grimaces       Unable to test orientation, language, memory, judgment, insight, fund of knowledge, hearing, shoulder shrug, tongue protrusion, coordination, gait due to level of consciousness.       Medications:  Continuous0.9% NaCl, Last Rate: 50 mL/hr at 06/29/24 1501    Scheduledatorvastatin, 40 mg, Daily  balsam peru-castor oiL, , BID  cefTRIAXone (Rocephin) IV (PEDS and ADULTS), 1 g, Q24H  cloBAZam, 20 mg, Daily   And  cloBAZam, 20 mg, QHS  heparin (porcine), 5,000 Units, Q12H  lacosamide, 200 mg, Q12H  levetiracetam, 1,500 mg, BID  mupirocin, , BID  pantoprazole, 40 mg, Daily  perampaneL, 12 mg, Daily  sodium chloride, 2,000 mg, TID  thiamine, 100 mg, Daily    PRNacetaminophen, 650 mg, Q4H PRN  calcium gluconate IVPB, 1 g, PRN  calcium gluconate IVPB, 2 g, PRN  calcium gluconate IVPB, 3 g, PRN  dextrose 10%, 12.5 g, PRN  dextrose 10%, 25 g, PRN  glucagon (human recombinant), 1 mg, PRN  glucose, 16 g, PRN  glucose, 24 g, PRN  hydrALAZINE, 10 mg, Q4H PRN  magnesium oxide, 800 mg, PRN  magnesium oxide, 800 mg, PRN  ondansetron, 4 mg, Q8H PRN  potassium bicarbonate, 35 mEq, PRN  potassium bicarbonate, 50 mEq, PRN  potassium bicarbonate, 60 mEq, PRN  potassium, sodium phosphates, 2 packet, PRN  potassium, sodium phosphates, 2 packet, PRN  potassium, sodium phosphates, 2 packet, PRN  sodium chloride 0.9%, 10 mL, Q12H PRN      Today I personally reviewed pertinent medications, lines/drains/airways, imaging, cardiology results, laboratory results, microbiology results, notably:    6/29- EEG   This is an abnormal EEG during obtunded state.  Diffuse disorganized low-amplitude slowing of the background was noted.  Left posterior maximum as well as lateralized pseudo periodic epileptiform discharges were  noted    6/29- CTH  No acute intracranial process.     Hgb 6.8, repeat hgb 8.6    Diet  Diet NPO  Diet NPO

## 2024-06-29 NOTE — PLAN OF CARE
"[facility] Care Plan    POC reviewed with June Boykin and family at 1400. Pt verbalized understanding. Questions and concerns addressed. No acute events today. Pt progressing toward goals. Will continue to monitor. See below and flowsheets for full assessment and VS info.     -CT head complete  -new NGT as prior was clogged by PPI   -K+ and mag replaced  -BM x2  -cEEG removed  -500 ml NS bolus x1 for hypotension          Is this a stroke patient? no    Neuro:  Pilgrim Coma Scale  Best Eye Response: 4-->(E4) spontaneous  Best Motor Response: 5-->(M5) localizes pain  Best Verbal Response: 1-->(V1) none  Alix Coma Scale Score: 10  Assessment Qualifiers: patient intubated  Pupil PERRLA: yes     24 hr Temp:  [98.5 °F (36.9 °C)-98.8 °F (37.1 °C)]     CV:   Rhythm: normal sinus rhythm  BP goals:   SBP < 180  MAP > 65    Resp:      Vent Mode: A/C  Set Rate: 22 BPM  Oxygen Concentration (%): 35  Vt Set: 320 mL  PEEP/CPAP: 5 cmH20  Pressure Support: 8 cmH20    Plan: trach in place    GI/:     Diet/Nutrition Received: tube feeding  Last Bowel Movement: 06/28/24  Voiding Characteristics: urethral catheter (bladder)    Intake/Output Summary (Last 24 hours) at 6/29/2024 1546  Last data filed at 6/29/2024 1501  Gross per 24 hour   Intake 2785.86 ml   Output 2495 ml   Net 290.86 ml     Unmeasured Output  Urine Occurrence: 1  Stool Occurrence: 1  Pad Count: 1    Labs/Accuchecks:  Recent Labs   Lab 06/29/24  0807   WBC 6.05   RBC 3.56*   HGB 8.6*   HCT 27.8*         Recent Labs   Lab 06/29/24  1357      K 5.1   CO2 31*      BUN 8   CREATININE 0.5   ALKPHOS 49*   ALT 5*   AST 18   BILITOT 0.2    No results for input(s): "PROTIME", "INR", "APTT", "HEPANTIXA" in the last 168 hours. No results for input(s): "CPK", "CPKMB", "TROPONINI", "MB" in the last 168 hours.    Electrolytes: Electrolytes replaced  Accuchecks: none    Gtts:   0.9% NaCl   Intravenous Continuous 50 mL/hr at 06/29/24 1501 Rate Verify at " 24 1501       LDA/Wounds:      Nurses Note -- 4 Eyes      Is there altered skin present? yes     Please check the following boxes that apply:   [] LDA Added if Not in Epic (Describe Wound)   [] New Altered Skin Integrity was Present on Admit and Documented in LDA   [] Wound Image Taken    Wound Care Consulted? Yes    Second RN/Staff Member:  GÉNESIS Quiroga      Restraints:   Restraint Order  Length of Order: Order good for next 24 hours or when removed.  Date that the current order will : 24  Time that the current order will : 744  Order Upon Application: Yes    [facility]
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
## 2024-06-29 NOTE — PLAN OF CARE
"[facility] Care Plan    POC reviewed with June Boykin at 0300. Pt unable to verbalized understanding. Questions and concerns addressed. No acute events overnight. Pt progressing toward goals. See below and flowsheets for full assessment and VS info.     -Hourly neuro checks  -cEEG in place  -250cc NS bolus for hypotension  -CHG bath given      Is this a stroke patient? no    Neuro:  [place] Coma Scale  Best Eye Response: 4-->(E4) spontaneous  Best Motor Response: 5-->(M5) localizes pain  Best Verbal Response: 1-->(V1) none  Alix Coma Scale Score: 10  Assessment Qualifiers: patient intubated  Pupil PERRLA: no     24hr Temp:  [98.5 °F (36.9 °C)-99.5 °F (37.5 °C)]     CV:   Rhythm: normal sinus rhythm  BP goals:   SBP < 180  MAP > 65    Resp:      Vent Mode: A/C  Set Rate: 22 BPM  Oxygen Concentration (%): 35  Vt Set: 320 mL  PEEP/CPAP: 5 cmH20  Pressure Support: 8 cmH20    Plan: trach in place    GI/:     Diet/Nutrition Received: tube feeding  Last Bowel Movement: 06/28/24  Voiding Characteristics: urethral catheter (bladder)    Intake/Output Summary (Last 24 hours) at 6/29/2024 0643  Last data filed at 6/29/2024 0601  Gross per 24 hour   Intake 2867.02 ml   Output 1910 ml   Net 957.02 ml     Unmeasured Output  Urine Occurrence: 1  Stool Occurrence: 1  Pad Count: 1    Labs/Accuchecks:  Recent Labs   Lab 06/29/24  0127 06/29/24  0404   WBC 4.23  --    RBC 2.86*  --    HGB 6.8*  --    HCT 22.4* 23*     --       Recent Labs   Lab 06/29/24  0127      K 3.6   CO2 28      BUN 7*   CREATININE 0.5   ALKPHOS 47*   ALT <5*   AST 14   BILITOT 0.2    No results for input(s): "PROTIME", "INR", "APTT", "HEPANTIXA" in the last 168 hours. No results for input(s): "CPK", "CPKMB", "TROPONINI", "MB" in the last 168 hours.    Electrolytes: Electrolytes replaced  Accuchecks: none    Gtts:   0.9% NaCl   Intravenous Continuous 50 mL/hr at 06/29/24 0601 Rate Verify at 06/29/24 0601       LDA/Wounds:    Nurses Note " -- 4 Eyes    Is there altered skin present? yes     Please check the following boxes that apply:   [] LDA Added if Not in Epic (Describe Wound)   [] New Altered Skin Integrity was Present on Admit and Documented in LDA   [] Wound Image Taken    Wound Care Consulted? Yes    Second RN/Staff Member:  ALEX Murphy    Restraints:   Restraint Order  Length of Order: Order good for next 24 hours or when removed.  Date that the current order will : 24  Time that the current order will : 806  Order Upon Application: Yes

## 2024-06-29 NOTE — NURSING
Approx 1515 pt blood pressure dropped randomly from a SBP in the 140's to 90's. No other changes in pt vital signs or physical assessment. NCC provided order for 500ml NS bolus, administered to patient and patients blood pressure resumed back into the 140's

## 2024-06-29 NOTE — PROGRESS NOTES
EEG Disconnect  Skin breakdown on pt's forehead and cheeks from previous EEGs are healing  No new breakdown noticed    Skin Integrity: Mild     Ulices Villanueva   06/29/2024 1:55 PM

## 2024-06-29 NOTE — ASSESSMENT & PLAN NOTE
Hypothermic to 33.9C; hypotensive to MAP 40 following intubation. Slight improvement w/ fluid resuscitation and use of pressors.     -Pan cultured 6/11  -Sputum gram stain w/ rare gram + cocci  -Blood cultures w/ NGTD  -completed ana/angelita kay w/ goal normothermia    -  6/27: Urine cx- citrobacter braaki  - rocephin x7d
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
## 2024-06-29 NOTE — ASSESSMENT & PLAN NOTE
Daily abg  Daily cxr  Daily vent weaning  Post trach   Vent Mode: A/C  Oxygen Concentration (%):  [35] 35  Resp Rate Total:  [22 br/min-32 br/min] 22 br/min  Vt Set:  [320 mL] 320 mL  PEEP/CPAP:  [5 cmH20] 5 cmH20  Mean Airway Pressure:  [11 fgS57-16 cmH20] 15 cmH20

## 2024-06-29 NOTE — NURSING
NGT clogged with protonix granules unable to dislodge. Removed and new NGT placed in R nare. KUB placed, pending xray arrival

## 2024-06-29 NOTE — ASSESSMENT & PLAN NOTE
Continue current AEDs  Previous EEG without seizure  EEG 6/28: Diffuse disorganized low-amplitude slowing of the background was noted.  Left posterior maximum as well as lateralized pseudo periodic epileptiform discharges were noted

## 2024-06-29 NOTE — ASSESSMENT & PLAN NOTE
Cleveland placed due to unhealing sacral wound; upon insertion, purulent exudate noted; UA obtained  Continue on ceftiraxone 1g x 7 days  Ucx 6/27 w citrobacter braaki

## 2024-06-29 NOTE — ASSESSMENT & PLAN NOTE
- family disagreement regarding GOC  - Spoke with son's wife who was instructed to have pts children agree about decision regarding proceeding with PEG procedure, then to call unit back with the plan.

## 2024-06-30 NOTE — NURSING
RN walked to NCC team as they were nearby rounding and informed team pt BP had dropped to a MAP less than 65. Order placed for 500ml LR bolus which was administered immediately.

## 2024-06-30 NOTE — CARE UPDATE
Spoke with Alexandria (pt's daughter). She and her brother Jason are riding together to be here at 2pm tomorrow for a family meeting.     Christiano Jensen, [facility], PA-C  Neurocritical Care
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
## 2024-06-30 NOTE — ASSESSMENT & PLAN NOTE
-tube feeds held due to patient pulling out PEG tube  -GenSurg consulted  - Awaiting decision from collaborative family discussion regarding PEG tube replacement; readdressed 6/28, family instructed to discuss amongst themselves regarding replacement of PEG tube then to notify us  - 6/30 family still has not come to a conclusion, encouraging family meeting tomorrow at 2pm- awaiting confirmation of meeting time

## 2024-06-30 NOTE — NURSING
Earlier this afternoon RN spoke with patients granddaughter Caro. Caro informed RN that the patient was essentially a mother to her, and raised her. Jason, the patients son is her estranged father. After a long conversation it was decided with the team to inform the family to be here tomorrow at 2PM for [facility] meeting. RN then called all family members to attempt to reach and notify of scheduled meeting. Alexandria and Angel's phones both went to . Jason the son answered his wifes phone and verbally acknowledged meeting time set for 2PM.
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
## 2024-06-30 NOTE — ASSESSMENT & PLAN NOTE
Daily abg  Daily cxr  Daily vent weaning  Post trach   Vent Mode: A/C  Oxygen Concentration (%):  [35] 35  Resp Rate Total:  [22 br/min-43 br/min] 22 br/min  Vt Set:  [320 mL-335 mL] 335 mL  PEEP/CPAP:  [5 cmH20] 5 cmH20  Mean Airway Pressure:  [12 cmH20-15 cmH20] 12 cmH20

## 2024-06-30 NOTE — SUBJECTIVE & OBJECTIVE
Interval History: As      Review of Systems   Unable to perform ROS: Acuity of condition   Trach   Objective:     Vitals:  Temp: 97.9 °F (36.6 °C)  Pulse: 81  Rhythm: normal sinus rhythm  BP: (!) 155/93  MAP (mmHg): 118  Resp: (!) 33  SpO2: 99 %  Oxygen Concentration (%): 35  Vent Mode: A/C  Set Rate: 22 BPM  Vt Set: 335 mL  PEEP/CPAP: 5 cmH20  Peak Airway Pressure: 30 cmH20  Mean Airway Pressure: 12 cmH20  Plateau Pressure: 0 cmH20    Temp  Min: 97.9 °F (36.6 °C)  Max: 98.7 °F (37.1 °C)  Pulse  Min: 74  Max: 108  BP  Min: 89/54  Max: 198/89  MAP (mmHg)  Min: 64  Max: 127  Resp  Min: 22  Max: 43  SpO2  Min: 89 %  Max: 100 %  Oxygen Concentration (%)  Min: 35  Max: 35    06/29 0701 - 06/30 0700  In: 2555.1 [I.V.:988.3]  Out: 3830 [Urine:3830]   Unmeasured Output  Urine Occurrence: 1  Stool Occurrence: 1  Pad Count: 1        Physical Exam  Vitals and nursing note reviewed.   Constitutional:       Appearance: She is ill-appearing.   HENT:      Head:      Comments: EEG in place     Right Ear: External ear normal.      Left Ear: External ear normal.      Nose: Nose normal.      Mouth/Throat:      Mouth: Mucous membranes are moist.      Pharynx: Oropharynx is clear.   Eyes:      Pupils: Pupils are equal, round, and reactive to light.   Cardiovascular:      Rate and Rhythm: Normal rate and regular rhythm.   Pulmonary:      Breath sounds: No rhonchi or rales.      Comments: Trach on vent   Abdominal:      General: There is no distension.      Palpations: Abdomen is soft.      Tenderness: There is no abdominal tenderness. There is no guarding.   Musculoskeletal:      Cervical back: Normal range of motion.      Right lower leg: No edema.      Left lower leg: No edema.   Skin:     General: Skin is warm and dry.      Capillary Refill: Capillary refill takes less than 2 seconds.   Neurological:      Comments: No sedation     E2 V1T M5  Obtunded. Not following commands.   CN II-XII grossly intact, specifically:  PERRL.   Downward  gaze, eyes crossing midline w OCR   No facial asymmetry  + cough, corneals  Motor:  Moving all extremities spontaneously   Sensation intact to noxious stimuli x4- grimaces       Unable to test orientation, language, memory, judgment, insight, fund of knowledge, hearing, shoulder shrug, tongue protrusion, coordination, gait due to level of consciousness.       Medications:  Continuous0.9% NaCl, Last Rate: 75 mL/hr at 06/30/24 1401    Scheduledatorvastatin, 40 mg, Daily  balsam peru-castor oiL, , BID  cefTRIAXone (Rocephin) IV (PEDS and ADULTS), 1 g, Q24H  cloBAZam, 20 mg, Daily   And  cloBAZam, 20 mg, QHS  famotidine, 20 mg, BID  gabapentin, 100 mg, TID  heparin (porcine), 5,000 Units, Q12H  lacosamide, 200 mg, Q12H  levetiracetam, 1,500 mg, BID  mupirocin, , BID  perampaneL, 12 mg, Daily  sodium chloride, 2,000 mg, TID  thiamine, 100 mg, Daily    PRNacetaminophen, 650 mg, Q4H PRN  calcium gluconate IVPB, 1 g, PRN  calcium gluconate IVPB, 2 g, PRN  calcium gluconate IVPB, 3 g, PRN  dextrose 10%, 12.5 g, PRN  dextrose 10%, 25 g, PRN  glucagon (human recombinant), 1 mg, PRN  glucose, 16 g, PRN  glucose, 24 g, PRN  hydrALAZINE, 10 mg, Q4H PRN  magnesium oxide, 800 mg, PRN  magnesium oxide, 800 mg, PRN  ondansetron, 4 mg, Q8H PRN  potassium bicarbonate, 35 mEq, PRN  potassium bicarbonate, 50 mEq, PRN  potassium bicarbonate, 60 mEq, PRN  potassium, sodium phosphates, 2 packet, PRN  potassium, sodium phosphates, 2 packet, PRN  potassium, sodium phosphates, 2 packet, PRN  sodium chloride 0.9%, 10 mL, Q12H PRN      Today I personally reviewed pertinent medications, lines/drains/airways, imaging, cardiology results, laboratory results, microbiology results, notably:    6/29- EEG   This is an abnormal EEG during obtunded state.  Diffuse disorganized low-amplitude slowing of the background was noted.  Left posterior maximum as well as lateralized pseudo periodic epileptiform discharges were noted    6/29- CTH  No acute  intracranial process.     Ucx- citrobacter braheavenly    ABG 7.42/51/114/34, increased Vt to 340    Diet  Diet NPO  Diet NPO

## 2024-06-30 NOTE — PLAN OF CARE
"[facility] Care Plan    POC reviewed with June Boykin and family at 1400. Pt verbalized understanding. Questions and concerns addressed. No acute events today. Pt progressing toward goals. Will continue to monitor. See below and flowsheets for full assessment and VS info.     -more than 4+ liquid BM's in less than 24 hours--flexi placed  -500ml LR bolus given for hypotension/volume repletion        Is this a stroke patient? no    Neuro:  [place] Coma Scale  Best Eye Response: 4-->(E4) spontaneous  Best Motor Response: 5-->(M5) localizes pain  Best Verbal Response: 1-->(V1) none  [place] Coma Scale Score: 10  Assessment Qualifiers: patient intubated  Pupil PERRLA: yes     24 hr Temp:  [97.9 °F (36.6 °C)-98.7 °F (37.1 °C)]     CV:   Rhythm: normal sinus rhythm  BP goals:   SBP < 180  MAP > 65    Resp:      Vent Mode: A/C  Set Rate: 22 BPM  Oxygen Concentration (%): 35  Vt Set: 335 mL  PEEP/CPAP: 5 cmH20  Pressure Support: 8 cmH20    Plan: trach in place    GI/:     Diet/Nutrition Received: tube feeding  Last Bowel Movement: 06/30/24  Voiding Characteristics: urethral catheter (bladder)    Intake/Output Summary (Last 24 hours) at 6/30/2024 1638  Last data filed at 6/30/2024 1601  Gross per 24 hour   Intake 3135.03 ml   Output 3670 ml   Net -534.97 ml     Unmeasured Output  Urine Occurrence: 1  Stool Occurrence: 1  Pad Count: 1    Labs/Accuchecks:  Recent Labs   Lab 06/30/24  0202   WBC 6.05   RBC 3.50*   HGB 8.3*   HCT 28.3*         Recent Labs   Lab 06/30/24  0202      K 3.8   CO2 28      BUN 7*   CREATININE 0.5   ALKPHOS 53*   ALT 6*   AST 17   BILITOT 0.2    No results for input(s): "PROTIME", "INR", "APTT", "HEPANTIXA" in the last 168 hours. No results for input(s): "CPK", "CPKMB", "TROPONINI", "MB" in the last 168 hours.    Electrolytes: Electrolytes replaced  Accuchecks: none    Gtts:   0.9% NaCl   Intravenous Continuous 75 mL/hr at 06/30/24 1601 Rate Verify at 06/30/24 1601 "       LDA/Wounds:      Nurses Note -- 4 Eyes      Is there altered skin present? yes     Please check the following boxes that apply:   [] LDA Added if Not in Epic (Describe Wound)   [] New Altered Skin Integrity was Present on Admit and Documented in LDA   [] Wound Image Taken    Wound Care Consulted? Yes    Second RN/Staff Member:  GÉNESIS Quiroga      Restraints:   Restraint Order  Length of Order: Order good for next 24 hours or when removed.  Date that the current order will : 24  Time that the current order will : 610  Order Upon Application: Yes    [facility]

## 2024-06-30 NOTE — ASSESSMENT & PLAN NOTE
-Blood noted in stool AM of 6/12; Hgb w/ subsequent drop from 14 to 10  -Pantoprazole load w/ 80mg; now 40 BID  -Hgb stable on CBC; serial CBC discontinued  -Continue to hold therapeutic lovenox  -6/21/2024 continue SQH for DVT PPX    - Resolved; No signs of active GIB, cont to assess   - changed PPI to H2B due to NGT being clogged.

## 2024-07-01 PROBLEM — Z51.5 COMFORT MEASURES ONLY STATUS: Status: ACTIVE | Noted: 2024-01-01

## 2024-07-01 NOTE — PLAN OF CARE
[facility] - Neuro Critical Care  Discharge Reassessment    Primary Care Provider: Mauricio Pierre MD    Expected Discharge Date: 7/2/2024    Reassessment (most recent)       Discharge Reassessment - 07/01/24 1234          Discharge Reassessment    Assessment Type Discharge Planning Assessment (P)      Did the patient's condition or plan change since previous assessment? No (P)      Discharge Plan discussed with: -- (P)    Grand daughter madhuri    Communicated LACY with patient/caregiver Yes (P)    Grand daughter madhuri    Discharge Plan A -- (P)    TBD family meeting scheduled    DME Needed Upon Discharge  none (P)      Transition of Care Barriers Other (see comments) (P)    family concerns with decisions    Why the patient remains in the hospital Requires continued medical care (P)         Post-Acute Status    Discharge Delays Patient and Family Barriers (P)                    Pt is not medically ready to discharge.   Family meeting is scheduled tomorrow for 2pm to discuss plan of care.   SW will continue to monitor pt needs.       Discharge Plan A and Plan B have been determined by review of patient's clinical status, future medical and therapeutic needs, and coverage/benefits for post-acute care in coordination with multidisciplinary team members.    Harmony Damon MSW, LCSW  [facility]  Case Management Dept.

## 2024-07-01 NOTE — SUBJECTIVE & OBJECTIVE
Interval History: As above    Review of Systems   Unable to perform ROS: Patient nonverbal (Trachesotomy)     Objective:     Vitals:  Temp: 97.7 °F (36.5 °C)  Pulse: 77  Rhythm: normal sinus rhythm  BP: 129/84  MAP (mmHg): 102  Resp: (!) 22  SpO2: 98 %  Oxygen Concentration (%): 35  Vent Mode: A/C  Set Rate: 22 BPM  Vt Set: 335 mL  PEEP/CPAP: 5 cmH20  Peak Airway Pressure: 38 cmH20  Mean Airway Pressure: 15 cmH20  Plateau Pressure: 0 cmH20    Temp  Min: 97.7 °F (36.5 °C)  Max: 98.6 °F (37 °C)  Pulse  Min: 71  Max: 88  BP  Min: 89/53  Max: 181/85  MAP (mmHg)  Min: 65  Max: 128  Resp  Min: 21  Max: 49  SpO2  Min: 97 %  Max: 100 %  Oxygen Concentration (%)  Min: 35  Max: 35    06/30 0701 - 07/01 0700  In: 3595.8 [I.V.:1755.8]  Out: 3200 [Urine:3000]   Unmeasured Output  Urine Occurrence: 1  Stool Occurrence: 1  Pad Count: 1        Physical Exam  Vitals and nursing note reviewed.   Constitutional:       General: She is not in acute distress.     Appearance: She is ill-appearing.   HENT:      Head: Normocephalic and atraumatic.      Mouth/Throat:      Mouth: Mucous membranes are moist.      Pharynx: Oropharynx is clear.   Eyes:      Pupils: Pupils are equal, round, and reactive to light.   Cardiovascular:      Rate and Rhythm: Normal rate and regular rhythm.   Pulmonary:      Breath sounds: No rhonchi or rales.      Comments: Trach on vent   Abdominal:      General: There is no distension.      Palpations: Abdomen is soft.      Tenderness: There is no abdominal tenderness. There is no guarding.   Musculoskeletal:      Cervical back: Normal range of motion.      Right lower leg: No edema.      Left lower leg: No edema.   Skin:     General: Skin is warm and dry.      Capillary Refill: Capillary refill takes less than 2 seconds.   Neurological:      Comments: No sedation     [id number]  Obtunded. Not following commands.   CN II-XII grossly intact, specifically:  PERRL.   Downward gaze, eyes crossing midline with OCR   Does not  track  No facial asymmetry  + cough, corneals  Moving all extremities spontaneously        Unable to test orientation, language, memory, judgment, insight, fund of knowledge, hearing, shoulder shrug, tongue protrusion, coordination, gait due to level of consciousness.       Medications:  Continuous0.9% NaCl, Last Rate: 75 mL/hr at 07/01/24 1400  morphine    Scheduledglycopyrrolate, 0.1 mg, Once  lacosamide (VIMPAT) IVPB, 200 mg, Q12H  levETIRAcetam (Keppra) IV (PEDS and ADULTS), 1,500 mg, Q12H    PRNhaloperidol lactate, 1 mg, Q4H PRN  morphine, 2 mg, Q4H PRN  ondansetron, 8 mg, Q8H PRN      Today I personally reviewed pertinent medications, lines/drains/airways, imaging, cardiology results, laboratory results, microbiology results, notably:    6/29- EEG   This is an abnormal EEG during obtunded state.  Diffuse disorganized low-amplitude slowing of the background was noted.  Left posterior maximum as well as lateralized pseudo periodic epileptiform discharges were noted    6/29- CTH  No acute intracranial process.     Ucx- citrobacter braaki    ABG 7.42/51/114/34, increased Vt to 340    Diet  Diet NPO  Diet NPO

## 2024-07-01 NOTE — PROGRESS NOTES
Trach disconnected from ventilator by RT per order from NCC for transition to comfort care. Family at bedside at this time.
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
## 2024-07-01 NOTE — ACP (ADVANCE CARE PLANNING)
Advance Care Planning     Date: 07/01/2024    Today a voluntary meeting took place: other (conference room)      Patient Participation: Patient is unable to participate     Attendees (Name and  Relationship to patient):  Patients two children, spouses and grandchildren.     Staff attendees (Name and  Role): Jonathon Mg RN    ACP Conversation (General): Understanding of current condition       Code Status: DNR; status confirmed/order placed in chart    ACP Documents: None    [facility]  I engaged the family in a voluntary conversation about advance care planning and we specifically addressed what the goals of care would be moving forward, in light of the patient's change in clinical status, specifically ongoing respiratory failure, seizure activity and continued encephalopathy.  We did specifically address the patient's likely prognosis, which is poor.  We explored the patient's values and preferences for future care.  The family endorses that what is most important right now is to focus on comfort and QOL     Accordingly, we have decided that the best plan to meet the patient's goals includes pivot to comfort-focused care    A total of 45 min was spent on advance care planning, goals of care discussion, emotional support, formulating and communicating prognosis and exploring burden/benefit of various approaches of treatment. This discussion occurred on a fully voluntary basis with the verbal consent of the patient and/or family.        Jonathon Richey MD, MPH  Neurocritical Care   [facility]  [facility] [place] [address]

## 2024-07-01 NOTE — PLAN OF CARE
"[facility] Care Plan    POC reviewed with June Boykin and family at 0300. Pt unable to verbalize understanding.Will continue to monitor. See below and flowsheets for full assessment and VS info.     - NS @ 75  - Neuro exam stable  - Flexi remains in place      Is this a stroke patient? no    Neuro:  Alix Coma Scale  Best Eye Response: 4-->(E4) spontaneous  Best Motor Response: 5-->(M5) localizes pain  Best Verbal Response: 1-->(V1) none  Alix Coma Scale Score: 10  Assessment Qualifiers: patient intubated, no eye obstruction present  Pupil PERRLA: yes     24hr Temp:  [97.9 °F (36.6 °C)-98.6 °F (37 °C)]     CV:   Rhythm: normal sinus rhythm  BP goals:   SBP < 180  MAP > 65    Resp:      Vent Mode: A/C  Set Rate: 22 BPM  Oxygen Concentration (%): 35  Vt Set: 335 mL  PEEP/CPAP: 5 cmH20  Pressure Support: 8 cmH20    Plan: trach in place    GI/:     Diet/Nutrition Received: tube feeding  Last Bowel Movement: 06/30/24  Voiding Characteristics: urethral catheter (bladder)    Intake/Output Summary (Last 24 hours) at 7/1/2024 0224  Last data filed at 7/1/2024 0202  Gross per 24 hour   Intake 3349.1 ml   Output 3050 ml   Net 299.1 ml     Unmeasured Output  Urine Occurrence: 1  Stool Occurrence: 1  Pad Count: 1    Labs/Accuchecks:  Recent Labs   Lab 07/01/24  0052   WBC 5.71   RBC 3.04*   HGB 7.2*   HCT 23.7*         Recent Labs   Lab 07/01/24  0052   *   K 4.1   CO2 28      BUN 9   CREATININE 0.4*   ALKPHOS 47*   ALT 5*   AST 14   BILITOT 0.2    No results for input(s): "PROTIME", "INR", "APTT", "HEPANTIXA" in the last 168 hours. No results for input(s): "CPK", "CPKMB", "TROPONINI", "MB" in the last 168 hours.    Electrolytes: N/A - electrolytes WDL  Accuchecks: none    Gtts:   0.9% NaCl   Intravenous Continuous 75 mL/hr at 06/30/24 2202 Rate Verify at 06/30/24 2202       LDA/Wounds:    Nurses Note -- 4 Eyes    Is there altered skin present? yes     Please check the following boxes that " apply:   [x] LDA Added if Not in Epic (Describe Wound)   [] New Altered Skin Integrity was Present on Admit and Documented in LDA   [] Wound Image Taken    Wound Care Consulted? Yes    Second RN/Staff Member:  ALEX Baez    Restraints:   Restraint Order  Length of Order: Order good for next 24 hours or when removed.  Date that the current order will : 24  Time that the current order will : 610  Order Upon Application: Yes    [facility]       Problem: Adult Inpatient Plan of Care  Goal: Plan of Care Review  Outcome: Not Progressing  Goal: Patient-Specific Goal (Individualized)  Outcome: Not Progressing  Goal: Absence of Hospital-Acquired Illness or Injury  Outcome: Not Progressing  Goal: Optimal Comfort and Wellbeing  Outcome: Not Progressing  Goal: Readiness for Transition of Care  Outcome: Not Progressing     Problem: Sepsis/Septic Shock  Goal: Optimal Coping  Outcome: Not Progressing  Goal: Absence of Bleeding  Outcome: Not Progressing  Goal: Blood Glucose Level Within Targeted Range  Outcome: Not Progressing  Goal: Absence of Infection Signs and Symptoms  Outcome: Not Progressing  Goal: Optimal Nutrition Intake  Outcome: Not Progressing
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
## 2024-07-02 NOTE — CARE UPDATE
Death Note      Called to bedside by patient's nurse. Nursing supervisor notified.     Patient is not responding to verbal or tactile stimuli.   Patient does not have a papillary or corneal reflex.   Patient's pupils are fixed and dilated.   No heart or breath sounds on auscultation.   No respirations.   No palpable pulses.     Time of death 04:45 am.     Cause of Death: Cardiopulmonary arrest 2/2 septic shock and status epilepticus      Shawna Min MD  PGY-3, Internal Medicine  [email or web address]

## 2024-07-03 NOTE — PLAN OF CARE
[facility] - Neuro Critical Care  Discharge Final Note    Primary Care Provider: Mauricio Pierre MD    Expected Discharge Date: 7/3/2024    Final Discharge Note (most recent)       Final Note - 24 1248          Final Note    Assessment Type Final Discharge Note (P)      Anticipated Discharge Disposition  (P)         Post-Acute Status    Discharge Delays None known at this time (P)                  Pt .    Discharge Plan A and Plan B have been determined by review of patient's clinical status, future medical and therapeutic needs, and coverage/benefits for post-acute care in coordination with multidisciplinary team members.    Harmony Damon MSW, LCSW  [facility]  Case Management Dept.        Important Message from Medicare

## 2024-07-11 NOTE — PHYSICIAN QUERY
Due to the conflicting clinical picture, please clinically validate the septic shock. If validated, please provide additional clinical support for the septic shock   The condition is not confirmed and/or it has been ruled out

## (undated) DEVICE — SUT SILK SH 2-0 30IN MP BLK

## (undated) DEVICE — OBTURATOR BLADELESS 8MM XI

## (undated) DEVICE — SEAL UNIVERSAL 5MM-8MM XI

## (undated) DEVICE — DRAPE ABDOMINAL TIBURON 14X11

## (undated) DEVICE — URINARY DRAINAGE BAG

## (undated) DEVICE — TIP YANKAUERS BULB NO VENT

## (undated) DEVICE — DRAPE ARM DAVINCI XI

## (undated) DEVICE — SUT ETHILON 2-0 BLK PS-2

## (undated) DEVICE — DRAPE EENT SPLIT STERILE

## (undated) DEVICE — IRRIGATOR ENDOWRIST XI SUCTION

## (undated) DEVICE — DRAPE COLUMN DAVINCI XI

## (undated) DEVICE — KIT PEG PULL STANDARD 20F

## (undated) DEVICE — SOL ELECTROLUBE ANTI-STIC

## (undated) DEVICE — GAUZE DRAIN N WVN 6PLY 4X4IN

## (undated) DEVICE — TRACH TUBE CUFF FLEX DISP 8.5

## (undated) DEVICE — TROCAR ENDOPATH XCEL 5MM 7.5CM

## (undated) DEVICE — ELECTRODE BLADE INSULATED 1 IN

## (undated) DEVICE — BOVIE SUCTION

## (undated) DEVICE — SYS SEE SHARP SCP ANTIFG LNG

## (undated) DEVICE — NDL INSUF ULTRA VERESS 120MM

## (undated) DEVICE — BLADE SURG CARBON STEEL SZ11

## (undated) DEVICE — HOLDER TRACH TUBE NECKBAND

## (undated) DEVICE — CHLORAPREP 10.5 ML APPLICATOR

## (undated) DEVICE — DRAPE SCOPE PILLOW WARMER

## (undated) DEVICE — PAD PINK TRENDELENBURG POS XL

## (undated) DEVICE — NDL HYPO REG 25G X 1 1/2

## (undated) DEVICE — TRAY MINOR GEN SURG OMC

## (undated) DEVICE — GLOVE GAMMEX SURG LF PI SZ 7.5

## (undated) DEVICE — ELECTRODE REM PLYHSV RETURN 9

## (undated) DEVICE — TUBE SET SINGLE LUMEN FILTERED

## (undated) DEVICE — LUBRICANT SURGILUBE 2 OZ

## (undated) DEVICE — ADHESIVE DERMABOND ADVANCED

## (undated) DEVICE — TUBING SUC UNIV W/CONN 12FT

## (undated) DEVICE — GOWN POLY REINF BRTH SLV XL

## (undated) DEVICE — SUT SILK 2-0 SH 18IN BLACK

## (undated) DEVICE — GOWN SURGICAL X-LARGE

## (undated) DEVICE — SUT PROLENE 2-0 30 SH